# Patient Record
Sex: FEMALE | Race: WHITE | Employment: FULL TIME | URBAN - NONMETROPOLITAN AREA
[De-identification: names, ages, dates, MRNs, and addresses within clinical notes are randomized per-mention and may not be internally consistent; named-entity substitution may affect disease eponyms.]

---

## 2019-03-16 ENCOUNTER — HOSPITAL ENCOUNTER (EMERGENCY)
Age: 29
Discharge: HOME | End: 2019-03-16
Attending: EMERGENCY MEDICINE
Payer: COMMERCIAL

## 2019-03-16 VITALS
HEART RATE: 90 BPM | RESPIRATION RATE: 18 BRPM | SYSTOLIC BLOOD PRESSURE: 120 MMHG | TEMPERATURE: 98.7 F | DIASTOLIC BLOOD PRESSURE: 70 MMHG | OXYGEN SATURATION: 94 %

## 2019-03-16 DIAGNOSIS — J02.9 ACUTE PHARYNGITIS, UNSPECIFIED ETIOLOGY: Primary | ICD-10-CM

## 2019-03-16 LAB
DIRECT EXAM: NORMAL
DIRECT EXAM: NORMAL
Lab: NORMAL
Lab: NORMAL
MONONUCLEOSIS SCREEN: NEGATIVE
SPECIMEN DESCRIPTION: NORMAL
SPECIMEN DESCRIPTION: NORMAL

## 2019-03-16 PROCEDURE — 86308 HETEROPHILE ANTIBODY SCREEN: CPT

## 2019-03-16 PROCEDURE — 87804 INFLUENZA ASSAY W/OPTIC: CPT

## 2019-03-16 PROCEDURE — 99283 EMERGENCY DEPT VISIT LOW MDM: CPT

## 2019-03-16 PROCEDURE — 87880 STREP A ASSAY W/OPTIC: CPT

## 2019-03-16 PROCEDURE — 36415 COLL VENOUS BLD VENIPUNCTURE: CPT

## 2019-03-16 RX ORDER — AMOXICILLIN 500 MG/1
500 CAPSULE ORAL 3 TIMES DAILY
Qty: 30 CAPSULE | Refills: 0 | Status: SHIPPED | OUTPATIENT
Start: 2019-03-16 | End: 2019-03-26

## 2019-03-16 ASSESSMENT — ENCOUNTER SYMPTOMS
ABDOMINAL PAIN: 0
SORE THROAT: 1
SHORTNESS OF BREATH: 0
EYE REDNESS: 0
CHEST TIGHTNESS: 0
EYE DISCHARGE: 0
APNEA: 0

## 2019-03-16 ASSESSMENT — PAIN SCALES - GENERAL: PAINLEVEL_OUTOF10: 8

## 2019-03-16 ASSESSMENT — PAIN DESCRIPTION - PAIN TYPE: TYPE: ACUTE PAIN

## 2019-03-16 ASSESSMENT — PAIN DESCRIPTION - LOCATION: LOCATION: THROAT

## 2019-03-16 ASSESSMENT — PAIN DESCRIPTION - DESCRIPTORS: DESCRIPTORS: ACHING;SORE

## 2019-08-01 ENCOUNTER — HOSPITAL ENCOUNTER (OUTPATIENT)
Age: 29
Setting detail: SPECIMEN
Discharge: HOME | End: 2019-08-01
Payer: COMMERCIAL

## 2019-08-01 ENCOUNTER — INITIAL PRENATAL (OUTPATIENT)
Dept: OBGYN | Age: 29
End: 2019-08-01
Payer: COMMERCIAL

## 2019-08-01 DIAGNOSIS — Z32.02 URINE PREGNANCY TEST NEGATIVE: ICD-10-CM

## 2019-08-01 DIAGNOSIS — N91.2 AMENORRHEA: Primary | ICD-10-CM

## 2019-08-01 DIAGNOSIS — N91.2 AMENORRHEA: ICD-10-CM

## 2019-08-01 LAB
HCG QUANTITATIVE: <1 IU/L
THYROXINE, FREE: 1.08 NG/DL (ref 0.93–1.7)
TSH SERPL DL<=0.05 MIU/L-ACNC: 0.8 MIU/L (ref 0.3–5)

## 2019-08-01 PROCEDURE — 36415 COLL VENOUS BLD VENIPUNCTURE: CPT | Performed by: ADVANCED PRACTICE MIDWIFE

## 2019-08-01 PROCEDURE — 83001 ASSAY OF GONADOTROPIN (FSH): CPT

## 2019-08-01 PROCEDURE — 84702 CHORIONIC GONADOTROPIN TEST: CPT

## 2019-08-01 PROCEDURE — 84439 ASSAY OF FREE THYROXINE: CPT

## 2019-08-01 PROCEDURE — 84443 ASSAY THYROID STIM HORMONE: CPT

## 2019-08-01 PROCEDURE — 83002 ASSAY OF GONADOTROPIN (LH): CPT

## 2019-08-01 PROCEDURE — 84146 ASSAY OF PROLACTIN: CPT

## 2019-08-02 LAB
FOLLICLE STIMULATING HORMONE: 2.4 U/L (ref 1.7–21.5)
LH: 3.1 U/L (ref 1–95.6)
PROLACTIN: 7.57 UG/L (ref 4.79–23.3)

## 2020-07-06 ENCOUNTER — HOSPITAL ENCOUNTER (EMERGENCY)
Age: 30
Discharge: HOME | End: 2020-07-06
Payer: COMMERCIAL

## 2020-07-06 ENCOUNTER — APPOINTMENT (OUTPATIENT)
Dept: CT IMAGING | Age: 30
End: 2020-07-06
Payer: COMMERCIAL

## 2020-07-06 ENCOUNTER — APPOINTMENT (OUTPATIENT)
Dept: GENERAL RADIOLOGY | Age: 30
End: 2020-07-06
Payer: COMMERCIAL

## 2020-07-06 VITALS
WEIGHT: 135 LBS | BODY MASS INDEX: 24.84 KG/M2 | TEMPERATURE: 99.4 F | DIASTOLIC BLOOD PRESSURE: 71 MMHG | HEART RATE: 99 BPM | RESPIRATION RATE: 16 BRPM | OXYGEN SATURATION: 98 % | HEIGHT: 62 IN | SYSTOLIC BLOOD PRESSURE: 112 MMHG

## 2020-07-06 LAB
-: ABNORMAL
ABSOLUTE EOS #: 0 K/UL (ref 0–0.44)
ABSOLUTE IMMATURE GRANULOCYTE: 0.24 K/UL (ref 0–0.3)
ABSOLUTE LYMPH #: 1.43 K/UL (ref 1.1–3.7)
ABSOLUTE MONO #: 1.2 K/UL (ref 0.1–1.2)
AMORPHOUS: ABNORMAL
ANION GAP SERPL CALCULATED.3IONS-SCNC: 14 MMOL/L (ref 9–17)
BACTERIA: ABNORMAL
BASOPHILS # BLD: 1 % (ref 0–2)
BASOPHILS ABSOLUTE: 0.24 K/UL (ref 0–0.2)
BILIRUBIN URINE: NEGATIVE
BUN BLDV-MCNC: 6 MG/DL (ref 6–20)
BUN/CREAT BLD: 6 (ref 9–20)
CALCIUM SERPL-MCNC: 8.9 MG/DL (ref 8.6–10.4)
CASTS UA: ABNORMAL /LPF
CHLORIDE BLD-SCNC: 100 MMOL/L (ref 98–107)
CO2: 23 MMOL/L (ref 20–31)
COLOR: YELLOW
COMMENT UA: ABNORMAL
CREAT SERPL-MCNC: 0.97 MG/DL (ref 0.5–0.9)
CRYSTALS, UA: ABNORMAL /HPF
DIFFERENTIAL TYPE: ABNORMAL
EOSINOPHILS RELATIVE PERCENT: 0 % (ref 1–4)
EPITHELIAL CELLS UA: ABNORMAL /HPF (ref 0–25)
GFR AFRICAN AMERICAN: >60 ML/MIN
GFR NON-AFRICAN AMERICAN: >60 ML/MIN
GFR SERPL CREATININE-BSD FRML MDRD: ABNORMAL ML/MIN/{1.73_M2}
GFR SERPL CREATININE-BSD FRML MDRD: ABNORMAL ML/MIN/{1.73_M2}
GLUCOSE BLD-MCNC: 175 MG/DL (ref 70–99)
GLUCOSE URINE: NEGATIVE
HCG(URINE) PREGNANCY TEST: NEGATIVE
HCT VFR BLD CALC: 44.8 % (ref 36.3–47.1)
HEMOGLOBIN: 14.4 G/DL (ref 11.9–15.1)
IMMATURE GRANULOCYTES: 1 %
KETONES, URINE: NEGATIVE
LACTIC ACID, SEPSIS WHOLE BLOOD: NORMAL MMOL/L (ref 0.5–1.9)
LACTIC ACID, SEPSIS: 1.9 MMOL/L (ref 0.5–1.9)
LEUKOCYTE ESTERASE, URINE: ABNORMAL
LYMPHOCYTES # BLD: 6 % (ref 24–43)
MCH RBC QN AUTO: 29.8 PG (ref 25.2–33.5)
MCHC RBC AUTO-ENTMCNC: 32.1 G/DL (ref 28.4–34.8)
MCV RBC AUTO: 92.8 FL (ref 82.6–102.9)
MONOCYTES # BLD: 5 % (ref 3–12)
MORPHOLOGY: NORMAL
MUCUS: ABNORMAL
NITRITE, URINE: NEGATIVE
NRBC AUTOMATED: 0 PER 100 WBC
OTHER OBSERVATIONS UA: ABNORMAL
PDW BLD-RTO: 12.7 % (ref 11.8–14.4)
PH UA: 6 (ref 5–9)
PLATELET # BLD: 238 K/UL (ref 138–453)
PLATELET ESTIMATE: ABNORMAL
PMV BLD AUTO: 10.6 FL (ref 8.1–13.5)
POTASSIUM SERPL-SCNC: 4.3 MMOL/L (ref 3.7–5.3)
PROTEIN UA: ABNORMAL
RBC # BLD: 4.83 M/UL (ref 3.95–5.11)
RBC # BLD: ABNORMAL 10*6/UL
RBC UA: ABNORMAL /HPF (ref 0–2)
RENAL EPITHELIAL, UA: ABNORMAL /HPF
SEG NEUTROPHILS: 87 % (ref 36–65)
SEGMENTED NEUTROPHILS ABSOLUTE COUNT: 20.79 K/UL (ref 1.5–8.1)
SODIUM BLD-SCNC: 137 MMOL/L (ref 135–144)
SPECIFIC GRAVITY UA: 1.01 (ref 1.01–1.02)
TRICHOMONAS: ABNORMAL
TURBIDITY: ABNORMAL
URINE HGB: ABNORMAL
UROBILINOGEN, URINE: NORMAL
WBC # BLD: 23.9 K/UL (ref 3.5–11.3)
WBC # BLD: ABNORMAL 10*3/UL
WBC UA: ABNORMAL /HPF (ref 0–5)
YEAST: ABNORMAL

## 2020-07-06 PROCEDURE — 99284 EMERGENCY DEPT VISIT MOD MDM: CPT

## 2020-07-06 PROCEDURE — 85025 COMPLETE CBC W/AUTO DIFF WBC: CPT

## 2020-07-06 PROCEDURE — 71045 X-RAY EXAM CHEST 1 VIEW: CPT

## 2020-07-06 PROCEDURE — 6360000004 HC RX CONTRAST MEDICATION: Performed by: PHYSICIAN ASSISTANT

## 2020-07-06 PROCEDURE — 81025 URINE PREGNANCY TEST: CPT

## 2020-07-06 PROCEDURE — 96375 TX/PRO/DX INJ NEW DRUG ADDON: CPT

## 2020-07-06 PROCEDURE — 6370000000 HC RX 637 (ALT 250 FOR IP): Performed by: PHYSICIAN ASSISTANT

## 2020-07-06 PROCEDURE — 96374 THER/PROPH/DIAG INJ IV PUSH: CPT

## 2020-07-06 PROCEDURE — 6360000002 HC RX W HCPCS: Performed by: PHYSICIAN ASSISTANT

## 2020-07-06 PROCEDURE — 87086 URINE CULTURE/COLONY COUNT: CPT

## 2020-07-06 PROCEDURE — 87186 SC STD MICRODIL/AGAR DIL: CPT

## 2020-07-06 PROCEDURE — 87088 URINE BACTERIA CULTURE: CPT

## 2020-07-06 PROCEDURE — 81001 URINALYSIS AUTO W/SCOPE: CPT

## 2020-07-06 PROCEDURE — 2580000003 HC RX 258: Performed by: PHYSICIAN ASSISTANT

## 2020-07-06 PROCEDURE — 80048 BASIC METABOLIC PNL TOTAL CA: CPT

## 2020-07-06 PROCEDURE — 83605 ASSAY OF LACTIC ACID: CPT

## 2020-07-06 PROCEDURE — 74177 CT ABD & PELVIS W/CONTRAST: CPT

## 2020-07-06 RX ORDER — ACETAMINOPHEN 325 MG/1
650 TABLET ORAL ONCE
Status: COMPLETED | OUTPATIENT
Start: 2020-07-06 | End: 2020-07-06

## 2020-07-06 RX ORDER — 0.9 % SODIUM CHLORIDE 0.9 %
1000 INTRAVENOUS SOLUTION INTRAVENOUS ONCE
Status: COMPLETED | OUTPATIENT
Start: 2020-07-06 | End: 2020-07-06

## 2020-07-06 RX ORDER — KETOROLAC TROMETHAMINE 15 MG/ML
30 INJECTION, SOLUTION INTRAMUSCULAR; INTRAVENOUS ONCE
Status: COMPLETED | OUTPATIENT
Start: 2020-07-06 | End: 2020-07-06

## 2020-07-06 RX ORDER — IBUPROFEN 400 MG/1
400 TABLET ORAL EVERY 6 HOURS PRN
COMMUNITY
End: 2022-01-04

## 2020-07-06 RX ORDER — IBUPROFEN 600 MG/1
600 TABLET ORAL ONCE
Status: COMPLETED | OUTPATIENT
Start: 2020-07-06 | End: 2020-07-06

## 2020-07-06 RX ORDER — CEPHALEXIN 500 MG/1
500 CAPSULE ORAL 4 TIMES DAILY
Qty: 40 CAPSULE | Refills: 0 | Status: SHIPPED | OUTPATIENT
Start: 2020-07-06 | End: 2020-07-16

## 2020-07-06 RX ADMIN — ACETAMINOPHEN 650 MG: 325 TABLET, FILM COATED ORAL at 13:03

## 2020-07-06 RX ADMIN — IOPAMIDOL 75 ML: 755 INJECTION, SOLUTION INTRAVENOUS at 14:08

## 2020-07-06 RX ADMIN — WATER 1 G: 1 INJECTION INTRAMUSCULAR; INTRAVENOUS; SUBCUTANEOUS at 15:05

## 2020-07-06 RX ADMIN — SODIUM CHLORIDE 1000 ML: 9 INJECTION, SOLUTION INTRAVENOUS at 13:16

## 2020-07-06 RX ADMIN — IBUPROFEN 600 MG: 600 TABLET, FILM COATED ORAL at 14:21

## 2020-07-06 RX ADMIN — KETOROLAC TROMETHAMINE 30 MG: 15 INJECTION, SOLUTION INTRAMUSCULAR; INTRAVENOUS at 13:19

## 2020-07-06 ASSESSMENT — PAIN DESCRIPTION - LOCATION
LOCATION: FLANK
LOCATION: FLANK

## 2020-07-06 ASSESSMENT — PAIN DESCRIPTION - PAIN TYPE
TYPE: ACUTE PAIN
TYPE: ACUTE PAIN

## 2020-07-06 ASSESSMENT — PAIN DESCRIPTION - ORIENTATION
ORIENTATION: LEFT
ORIENTATION: LEFT

## 2020-07-06 ASSESSMENT — PAIN SCALES - GENERAL
PAINLEVEL_OUTOF10: 7
PAINLEVEL_OUTOF10: 0
PAINLEVEL_OUTOF10: 5

## 2020-07-06 NOTE — LETTER
[facility] ED  [address] [place] [address]  Phone: [phone]  Fax: [phone]             July 6, 2020    Patient: Marcia Cerda   YOB: 1990   Date of Visit: 7/6/2020       To Whom It May Concern:    Marleen Emery was seen and treated in our emergency department on 7/6/2020. She may return to work on 07/11/2020.       Sincerely,             Signature:__________________________________

## 2020-07-06 NOTE — ED PROVIDER NOTES
[facility]  EMERGENCY DEPARTMENT ENCOUNTER      Pt Name: Mauri Bethea  MRN: [id number]  [place] 1990  Date of evaluation: 7/6/2020  Provider: Katie Jacinto PA-C    CHIEF COMPLAINT       Chief Complaint   Patient presents with    Flank Pain     Left, onset 3 days ago. Pt reports the pain began in lower back and is now in left abdomen, side. HISTORY OF PRESENT ILLNESS    Mauri Bethea is a 31 y.o. female who presents to the emergency department from home 3 days left flank pain began in low back now in the left flank and abdominal side denies dysuria urgency or frequency she is had a fever for 2 days denies chest pain cough shortness of breath nausea or vomiting. Seen in outpatient clinic yesterday was COVID tested negative. Triage notes and Nursing notes were reviewed by myself. Any discrepancies are addressed above. PAST MEDICAL HISTORY   History reviewed. No pertinent past medical history. SURGICAL HISTORY     History reviewed. No pertinent surgical history. CURRENT MEDICATIONS       Previous Medications    ACETAMINOPHEN (TYLENOL) 500 MG TABLET    Take 500 mg by mouth every 6 hours as needed for Pain. IBUPROFEN (ADVIL;MOTRIN) 400 MG TABLET    Take 400 mg by mouth every 6 hours as needed for Pain       ALLERGIES     Patient has no known allergies. FAMILY HISTORY     History reviewed. No pertinent family history.      SOCIAL HISTORY       Social History     Socioeconomic History    Marital status: Single     Spouse name: None    Number of children: None    Years of education: None    Highest education level: None   Occupational History    None   Social Needs    Financial resource strain: None    Food insecurity     Worry: None     Inability: None    Transportation needs     Medical: None     Non-medical: None   Tobacco Use    Smoking status: Current Every Day Smoker     Packs/day: 0.50     Types: Cigarettes    Smokeless tobacco: Never Used neurologic deficit noted. Good gait and balance. Clear speech. Good affect. Pleasant patient. DIAGNOSTIC RESULTS       RADIOLOGY: (none if blank)   Interpretation per the Radiologistbelow, if available at the time of this note:    CT ABDOMEN PELVIS W IV CONTRAST Additional Contrast? None   Final Result   1. Findings suggestive of focal pyelonephritis in the superior pole of the   left kidney. 2.  2 mm stone in the lower pole of the left kidney. No hydronephrosis. XR CHEST PORTABLE   Final Result   No acute airspace disease identified. LABS:  Labs Reviewed   URINALYSIS - Abnormal; Notable for the following components:       Result Value    Turbidity UA SLIGHTLY CLOUDY (*)     Urine Hgb 2+ (*)     Protein, UA TRACE (*)     Leukocyte Esterase, Urine TRACE (*)     All other components within normal limits   CBC WITH AUTO DIFFERENTIAL - Abnormal; Notable for the following components:    WBC 23.9 (*)     Seg Neutrophils 87 (*)     Lymphocytes 6 (*)     Eosinophils % 0 (*)     Immature Granulocytes 1 (*)     Segs Absolute 20.79 (*)     Basophils Absolute 0.24 (*)     All other components within normal limits   BASIC METABOLIC PANEL W/ REFLEX TO MG FOR LOW K - Abnormal; Notable for the following components:    Glucose 175 (*)     CREATININE 0.97 (*)     Bun/Cre Ratio 6 (*)     All other components within normal limits   MICROSCOPIC URINALYSIS - Abnormal; Notable for the following components:    Bacteria, UA 2+ (*)     All other components within normal limits   CULTURE, URINE   LACTATE, SEPSIS   PREGNANCY, URINE   LACTATE, SEPSIS       All other labs were within normal range or not returned as of this dictation.     EMERGENCY DEPARTMENT COURSE andMedical Decision Making:     Vitals:    Vitals:    07/06/20 1357 07/06/20 1415 07/06/20 1416 07/06/20 1422   BP: 111/66   112/70   Pulse: 111   102   Resp: 16   16   Temp:  101 °F (38.3 °C)     TempSrc:  Oral     SpO2: 100%  98% 98%   Weight: Height:           MDM/     Patient does have a significant leukocytosis but only 1 immature  granulocytes, lactic acid is negative, vital signs remained stable and she has no other signs of septic shock. She does have pyelonephritis has been treated with IV Rocephin culture of the urine will be ordered and she will be sent home on oral Keflex. Patient is instructed have a very low threshold to return should she develop any worsening symptoms feeling lightheaded or dizzy vomiting or any worsening symptoms she should be reevaluated in the emergency department. Strict return precautions and follow up instructions were discussed with the patient with which the patient agrees    ED Medications administered this visit:    Medications   cefTRIAXone (ROCEPHIN) 1 g in sterile water 10 mL IV syringe (has no administration in time range)   ketorolac (TORADOL) injection 30 mg (30 mg Intravenous Given 7/6/20 1319)   acetaminophen (TYLENOL) tablet 650 mg (650 mg Oral Given 7/6/20 1303)   0.9 % sodium chloride bolus (1,000 mLs Intravenous New Bag 7/6/20 1316)   iopamidol (ISOVUE-370) 76 % injection 75 mL (75 mLs Intravenous Given 7/6/20 1408)   ibuprofen (ADVIL;MOTRIN) tablet 600 mg (600 mg Oral Given 7/6/20 1421)       CONSULTS: (None if blank)  None    Procedures: (None if blank)       CLINICAL       1.  Acute pyelonephritis          DISPOSITION/PLAN   DISPOSITION        PATIENT REFERRED TO:  [address]  [phone]  In 2 days        DISCHARGE MEDICATIONS:  New Prescriptions    CEPHALEXIN (KEFLEX) 500 MG CAPSULE    Take 1 capsule by mouth 4 times daily for 10 days              (Please note that portions of this note were completed with a voice recognition program.  Efforts were made to edit the dictations but occasionallywords are mis-transcribed.)      Ana Rosa Pandya II, PA-C (electronically signed)           Ana Rosa Pandya II, PA-C  07/06/20 [address] [facility] [facility] Luis Armando II, [place]  07/06/20 1456

## 2020-07-06 NOTE — ED NOTES
Patient reports continued flank pain but significant improvement to generalized aches. She is aware of plan for CT.       Jessica Bae RN  07/06/20 1400

## 2020-07-08 LAB
CULTURE: ABNORMAL
Lab: ABNORMAL
SPECIMEN DESCRIPTION: ABNORMAL

## 2021-08-23 ENCOUNTER — HOSPITAL ENCOUNTER (EMERGENCY)
Age: 31
Discharge: HOME | End: 2021-08-23
Payer: COMMERCIAL

## 2021-08-23 VITALS
DIASTOLIC BLOOD PRESSURE: 103 MMHG | OXYGEN SATURATION: 97 % | SYSTOLIC BLOOD PRESSURE: 146 MMHG | HEART RATE: 80 BPM | TEMPERATURE: 97.5 F | RESPIRATION RATE: 16 BRPM

## 2021-08-23 DIAGNOSIS — T78.40XA ALLERGIC REACTION, INITIAL ENCOUNTER: Primary | ICD-10-CM

## 2021-08-23 DIAGNOSIS — R21 RASH AND OTHER NONSPECIFIC SKIN ERUPTION: ICD-10-CM

## 2021-08-23 PROCEDURE — 99282 EMERGENCY DEPT VISIT SF MDM: CPT

## 2021-08-23 RX ORDER — TRIAMCINOLONE ACETONIDE 1 MG/G
CREAM TOPICAL
Qty: 1 G | Refills: 0 | Status: SHIPPED | OUTPATIENT
Start: 2021-08-23 | End: 2022-01-04

## 2021-08-23 ASSESSMENT — ENCOUNTER SYMPTOMS
CHEST TIGHTNESS: 0
NAUSEA: 0
BLOOD IN STOOL: 0
BACK PAIN: 0
SHORTNESS OF BREATH: 0
DIARRHEA: 0
VOMITING: 0
EYE DISCHARGE: 0
CONSTIPATION: 0
SORE THROAT: 0
EYE REDNESS: 0
ABDOMINAL PAIN: 0
COUGH: 0
RHINORRHEA: 0
WHEEZING: 0

## 2021-08-23 ASSESSMENT — PAIN SCALES - GENERAL
PAINLEVEL_OUTOF10: 0
PAINLEVEL_OUTOF10: 6

## 2021-08-23 ASSESSMENT — PAIN DESCRIPTION - PAIN TYPE: TYPE: ACUTE PAIN

## 2021-08-23 ASSESSMENT — PAIN DESCRIPTION - DESCRIPTORS: DESCRIPTORS: ITCHING

## 2021-08-23 ASSESSMENT — PAIN DESCRIPTION - LOCATION: LOCATION: GENERALIZED

## 2021-08-23 NOTE — ED PROVIDER NOTES
(Non-Medical):    Physical Activity:     Days of Exercise per Week:     Minutes of Exercise per Session:    Stress:     Feeling of Stress :    Social Connections:     Frequency of Communication with Friends and Family:     Frequency of Social Gatherings with Friends and Family:     Attends Restorationist Services:     Active Member of Clubs or Organizations:     Attends Club or Organization Meetings:     Marital Status:    Intimate Partner Violence:     Fear of Current or Ex-Partner:     Emotionally Abused:     Physically Abused:     Sexually Abused:        SCREENINGS             PHYSICAL EXAM    (up to 7 for level 4, 8 or more for level 5)     ED Triage Vitals [08/23/21 1021]   BP Temp Temp Source Pulse Resp SpO2 Height Weight   (!) 146/103 97.5 °F (36.4 °C) Tympanic 80 18 97 % -- --       Physical Exam  Vitals and nursing note reviewed. Constitutional:       General: She is not in acute distress. Appearance: She is well-developed. She is not diaphoretic. HENT:      Head: Normocephalic and atraumatic. Right Ear: External ear normal.      Left Ear: External ear normal.      Mouth/Throat:      Mouth: Mucous membranes are moist.      Pharynx: Oropharynx is clear. No oropharyngeal exudate or posterior oropharyngeal erythema. Comments: Uvula is midline. No oral pharyngeal swelling or edema noted. No stridorous breathing  Eyes:      General: No scleral icterus. Right eye: No discharge. Left eye: No discharge. Extraocular Movements: Extraocular movements intact. Conjunctiva/sclera: Conjunctivae normal.      Pupils: Pupils are equal, round, and reactive to light. Neck:      Thyroid: No thyromegaly. Trachea: No tracheal deviation. Cardiovascular:      Rate and Rhythm: Normal rate and regular rhythm. Heart sounds: No murmur heard. No friction rub. No gallop. Pulmonary:      Effort: Pulmonary effort is normal. No respiratory distress.       Breath sounds: Normal breath sounds. No stridor. No wheezing, rhonchi or rales. Abdominal:      General: Bowel sounds are normal. There is no distension. Palpations: Abdomen is soft. There is no mass. Tenderness: There is no abdominal tenderness. There is no guarding or rebound. Hernia: No hernia is present. Musculoskeletal:         General: No tenderness or deformity. Normal range of motion. Cervical back: Normal range of motion and neck supple. Lymphadenopathy:      Cervical: No cervical adenopathy. Skin:     General: Skin is warm and dry. Capillary Refill: Capillary refill takes less than 2 seconds. Findings: Rash present. No erythema. Comments: Patient has some small areas of urticaria noted to the abdomen. Nothing to palms or soles. No intraoral lesions. Neurological:      General: No focal deficit present. Mental Status: She is alert and oriented to person, place, and time. Cranial Nerves: No cranial nerve deficit. Motor: No abnormal muscle tone. Deep Tendon Reflexes: Reflexes are normal and symmetric. Reflexes normal.   Psychiatric:         Behavior: Behavior normal.         DIAGNOSTIC RESULTS     EKG: All EKG's are interpreted by the Emergency Department Physician who either signs orCo-signs this chart in the absence of a cardiologist.      RADIOLOGY:   Non-plainfilm images such as CT, Ultrasound and MRI are read by the radiologist. Plain radiographic images are visualized and preliminarily interpreted by the emergency physician with the below findings:      Interpretationper the Radiologist below, if available at the time of this note:    No orders to display         ED BEDSIDE ULTRASOUND:   Performed by ED Physician - none    LABS:  Labs Reviewed - No data to display    All other labs were within normal range or not returned as of this dictation.     EMERGENCY DEPARTMENT COURSE and DIFFERENTIAL DIAGNOSIS/MDM:   Vitals:    Vitals:    08/23/21 1021   BP: (!) 146/103   Pulse: 80   Resp: 18   Temp: 97.5 °F (36.4 °C)   TempSrc: Tympanic   SpO2: 97%         MDM  27-year-old female who presents secondary to itchy rash all over her body. Nothing intraorally nothing on palms or soles. Is actually starting to resolve she is already given a steroid shot. Intermittently will have some urticaria on her abdomen. No recent new medications no recent changes in laundry detergent no recent changes in food. At this point will give her follow-up with dermatology I do not think a systemic steroid is needed at this time. She will be given triamcinolone cream she understands not use on her face fingers or genitals. Specifically cannot get in her eyes. She will follow-up with dermatology otherwise return here with any worse complaints. Procedures    FINAL IMPRESSION      1. Allergic reaction, initial encounter    2. Rash and other nonspecific skin eruption        DISPOSITION/PLAN   DISPOSITION Decision To Discharge 08/23/2021 01:44:08 PM      PATIENT REFERRED TO:  [facility] ED  [address]  [phone]    If symptoms worsen, As needed    [address] [facility]  [phone]    Schedule an appointment as soon as possible for a visit         DISCHARGE MEDICATIONS:  New Prescriptions    TRIAMCINOLONE (KENALOG) 0.1 % CREAM    Apply topically 2 times daily. Do not apply to face or eyes. Or genitals. Summation      Patient Course:      ED Medications administered this visit:  Medications - No data to display    New Prescriptions from this visit:    New Prescriptions    TRIAMCINOLONE (KENALOG) 0.1 % CREAM    Apply topically 2 times daily. Do not apply to face or eyes. Or genitals.        Follow-up:  [facility] ED  [address] [facility] [address] [facility]  [phone]    If symptoms worsen, As needed    [address] [address]  [phone]    Schedule an appointment as soon as possible for a visit           Final Impression:   1. Allergic reaction, initial encounter    2.  Rash and other nonspecific skin eruption               (Please note that portions of this note were completed with a voice recognition program.  Efforts were made to edit the dictations but occasionally words are mis-transcribed.)           Marbin Staples PA-C  08/23/21 7889

## 2022-01-04 ENCOUNTER — OFFICE VISIT (OUTPATIENT)
Dept: FAMILY MEDICINE CLINIC | Age: 32
End: 2022-01-04
Payer: COMMERCIAL

## 2022-01-04 VITALS
SYSTOLIC BLOOD PRESSURE: 110 MMHG | WEIGHT: 160 LBS | OXYGEN SATURATION: 98 % | RESPIRATION RATE: 14 BRPM | HEIGHT: 62 IN | HEART RATE: 68 BPM | BODY MASS INDEX: 29.44 KG/M2 | DIASTOLIC BLOOD PRESSURE: 80 MMHG

## 2022-01-04 DIAGNOSIS — Z72.0 TOBACCO USE: Primary | ICD-10-CM

## 2022-01-04 DIAGNOSIS — K21.9 GASTROESOPHAGEAL REFLUX DISEASE, UNSPECIFIED WHETHER ESOPHAGITIS PRESENT: ICD-10-CM

## 2022-01-04 PROCEDURE — 4004F PT TOBACCO SCREEN RCVD TLK: CPT | Performed by: NURSE PRACTITIONER

## 2022-01-04 PROCEDURE — G8419 CALC BMI OUT NRM PARAM NOF/U: HCPCS | Performed by: NURSE PRACTITIONER

## 2022-01-04 PROCEDURE — 99204 OFFICE O/P NEW MOD 45 MIN: CPT | Performed by: NURSE PRACTITIONER

## 2022-01-04 PROCEDURE — G8427 DOCREV CUR MEDS BY ELIG CLIN: HCPCS | Performed by: NURSE PRACTITIONER

## 2022-01-04 PROCEDURE — G8484 FLU IMMUNIZE NO ADMIN: HCPCS | Performed by: NURSE PRACTITIONER

## 2022-01-04 RX ORDER — BUPROPION HYDROCHLORIDE 150 MG/1
TABLET, EXTENDED RELEASE ORAL
Qty: 60 TABLET | Refills: 0 | Status: SHIPPED | OUTPATIENT
Start: 2022-01-04 | End: 2022-02-19

## 2022-01-04 RX ORDER — FAMOTIDINE 20 MG/1
20 TABLET, FILM COATED ORAL
Qty: 60 TABLET | Refills: 2 | Status: SHIPPED | OUTPATIENT
Start: 2022-01-04 | End: 2022-02-23 | Stop reason: ALTCHOICE

## 2022-01-04 SDOH — ECONOMIC STABILITY: FOOD INSECURITY: WITHIN THE PAST 12 MONTHS, YOU WORRIED THAT YOUR FOOD WOULD RUN OUT BEFORE YOU GOT MONEY TO BUY MORE.: NEVER TRUE

## 2022-01-04 SDOH — ECONOMIC STABILITY: FOOD INSECURITY: WITHIN THE PAST 12 MONTHS, THE FOOD YOU BOUGHT JUST DIDN'T LAST AND YOU DIDN'T HAVE MONEY TO GET MORE.: NEVER TRUE

## 2022-01-04 ASSESSMENT — SOCIAL DETERMINANTS OF HEALTH (SDOH): HOW HARD IS IT FOR YOU TO PAY FOR THE VERY BASICS LIKE FOOD, HOUSING, MEDICAL CARE, AND HEATING?: NOT HARD AT ALL

## 2022-01-04 ASSESSMENT — ENCOUNTER SYMPTOMS
DIARRHEA: 0
SINUS PAIN: 0
CONSTIPATION: 0
RHINORRHEA: 0
SORE THROAT: 0
WHEEZING: 0
SINUS PRESSURE: 0
SHORTNESS OF BREATH: 0
COUGH: 0
ABDOMINAL PAIN: 0
NAUSEA: 1

## 2022-01-04 ASSESSMENT — PATIENT HEALTH QUESTIONNAIRE - PHQ9
SUM OF ALL RESPONSES TO PHQ QUESTIONS 1-9: 0
1. LITTLE INTEREST OR PLEASURE IN DOING THINGS: 0
SUM OF ALL RESPONSES TO PHQ QUESTIONS 1-9: 0
2. FEELING DOWN, DEPRESSED OR HOPELESS: 0
SUM OF ALL RESPONSES TO PHQ9 QUESTIONS 1 & 2: 0

## 2022-01-04 NOTE — PATIENT INSTRUCTIONS
SURVEY:    You may be receiving a survey from [employer] regarding your visit today. Please complete the survey to enable us to provide the highest quality of care to you and your family. If you cannot score us a very good on any question, please call the office to discuss how we could of made your experience a very good one. Thank you.

## 2022-01-04 NOTE — PROGRESS NOTES
Name: Gallito Reddy  : 1990         Chief Complaint:     Chief Complaint   Patient presents with    hospitals Care     new patient     Nicotine Dependence     patient would like to quit smoking       History of Present Illness:      Gallito Reddy is a 36 y.o.  female who presents with hospitals Care (new patient ) and Nicotine Dependence (patient would like to quit smoking)      HPI    The patient presents to [facility]. Wellness:  Medical history includes post-partum depression. She states her mood currently is \"up and down\". She admits lack of motivation and \"getting in her thoughts a lot\". Denies anxious thoughts. Denies thoughts of hurting self or others. Admits a \"decent\" well balanced diet with occasional poor eating choices. She is walking twice weekly for exercise. She denies routine dental appointments. Admits routine eye appointments. She is following with Idalia Osborn CNM. She is due for pap smear. She is not UTD on flu or covid vaccinations. Tobacco Use:   She started \"really young but started smoking heavy in her mid-20s\". She admits intermittently trying to stop. She has tried patches in the past but d/c due to side effects. She is averaging 0.5 - 1.5 packs daily. Denies coughing, SOB, or wheezing. Acid Reflux:  Admits worsening reflux during menses. She has taken TUMS with some relief. She admits heartburn, nausea, and vomiting stomach bile. Past Medical History:     No past medical history on file. Reviewed all health maintenance requirements and ordered appropriate tests  Health Maintenance Due   Topic Date Due    Pap smear  Never done       Past Surgical History:     No past surgical history on file. Medications:       Prior to Admission medications    Medication Sig Start Date End Date Taking?  Authorizing Provider   buPROPion (ZYBAN) 150 MG extended release tablet Take 1 tablet by mouth once daily for three days then increase to 1 tablet by mouth twice daily. 1/4/22  Yes LewisDEEDEE Loya CNP   famotidine (PEPCID) 20 MG tablet Take 1 tablet by mouth 2 times daily (before meals) . Take as needed for acid reflux. 1/4/22  Yes Lewis DEEDEE Moses CNP        Allergies:       Patient has no known allergies. Social History:     Tobacco:    reports that she has been smoking cigarettes. She has a 7.00 pack-year smoking history. She has never used smokeless tobacco.  Alcohol:      reports current alcohol use. Drug Use:  reports no history of drug use. Family History:     No family history on file. Review of Systems:     Positive and Negative as described in HPI    Review of Systems   Constitutional: Negative for chills, fatigue, fever and unexpected weight change. HENT: Negative for congestion, postnasal drip, rhinorrhea, sinus pressure, sinus pain and sore throat. Eyes: Negative for visual disturbance. Respiratory: Negative for cough, shortness of breath and wheezing. Cardiovascular: Negative for chest pain and palpitations. Gastrointestinal: Positive for nausea (with acid reflux). Negative for abdominal pain, constipation and diarrhea. Genitourinary: Negative for difficulty urinating. Musculoskeletal: Negative for arthralgias, joint swelling and myalgias. Skin: Negative for rash. Neurological: Negative for dizziness, light-headedness and headaches. Physical Exam:   Vitals:  /80   Pulse 68   Resp 14   Ht 5' 2\" (1.575 m)   Wt 160 lb (72.6 kg)   SpO2 98%   BMI 29.26 kg/m²     Physical Exam  Constitutional:       General: She is not in acute distress. Appearance: Normal appearance. She is normal weight. She is not ill-appearing or toxic-appearing. HENT:      Head: Normocephalic. Right Ear: Tympanic membrane, ear canal and external ear normal. There is no impacted cerumen. Left Ear: Tympanic membrane, ear canal and external ear normal. There is no impacted cerumen.       Nose: Nose normal. No congestion or rhinorrhea. Mouth/Throat:      Mouth: Mucous membranes are moist.      Pharynx: No oropharyngeal exudate or posterior oropharyngeal erythema. Cardiovascular:      Rate and Rhythm: Normal rate and regular rhythm. Heart sounds: Normal heart sounds. No murmur heard. Pulmonary:      Effort: Pulmonary effort is normal. No respiratory distress. Breath sounds: Normal breath sounds. No stridor. No wheezing, rhonchi or rales. Musculoskeletal:      Cervical back: Neck supple. Lymphadenopathy:      Cervical: No cervical adenopathy. Neurological:      Mental Status: She is alert and oriented to person, place, and time. Psychiatric:         Mood and Affect: Mood normal.         Behavior: Behavior normal.         Thought Content: Thought content normal.         Judgment: Judgment normal.         Data:     Lab Results   Component Value Date     07/06/2020    K 4.3 07/06/2020     07/06/2020    CO2 23 07/06/2020    BUN 6 07/06/2020    CREATININE 0.97 07/06/2020    GLUCOSE 175 07/06/2020     Lab Results   Component Value Date    WBC 23.9 07/06/2020    RBC 4.83 07/06/2020    RBC 4.35 09/29/2011    HGB 14.4 07/06/2020    HCT 44.8 07/06/2020    MCV 92.8 07/06/2020    MCH 29.8 07/06/2020    MCHC 32.1 07/06/2020    RDW 12.7 07/06/2020     07/06/2020     09/29/2011    MPV 10.6 07/06/2020     Lab Results   Component Value Date    TSH 0.80 08/01/2019     No results found for: CHOL, LDL, HDL, PSA, LABA1C    Assessment/Plan:      Diagnosis Orders   1. Tobacco use     2. Gastroesophageal reflux disease, unspecified whether esophagitis present         Wellness:   Counseled on routine diet and exercise  Recommend routine eye and dental exams  She is due for Covid, influenza, and tetanus vaccinations. Discussed in office. Offered flu vaccine, patient declines. Due for Pap smear. Offered follow-up here in office or to send patient back to Ryan Julien CNM.   She prefers to complete Pap smear here in PCP office. We will schedule for 3-month woman wellness. Will order wellness labs in 3 months at wellness visit    Smoking cessation:  Patient has trialed patches in the past but discontinued due to side effects of nausea and lightheadedness  Initiate Wellbutrin 150 mg daily x3 days then increase to 150 mg twice daily. Reviewed side effects of Wellbutrin  Offered counseling for depression, patient states she has done this in the past and this was not beneficial  Follow-up in 3 months for smoking cessation. If she is doing well on Wellbutrin from a depression standpoint, will continue. If this is not helping her depression and smoking cessation was successful, will discontinue Wellbutrin. GERD:  Initiate Pepcid twice daily as needed    Completed Refills   Requested Prescriptions     Signed Prescriptions Disp Refills    buPROPion (ZYBAN) 150 MG extended release tablet 60 tablet 0     Sig: Take 1 tablet by mouth once daily for three days then increase to 1 tablet by mouth twice daily.  famotidine (PEPCID) 20 MG tablet 60 tablet 2     Sig: Take 1 tablet by mouth 2 times daily (before meals) . Take as needed for acid reflux. No orders of the defined types were placed in this encounter. No results found for this visit on 01/04/22. Return in about 3 months (around 4/4/2022), or if symptoms worsen or fail to improve, for smoking cessation f/u + womens wellness (40 min) .     Electronically signed by DEEDEE Aragon CNP on 01/04/22 at 11:46 AM.

## 2022-01-24 ENCOUNTER — HOSPITAL ENCOUNTER (OUTPATIENT)
Age: 32
Discharge: HOME | End: 2022-01-24
Payer: COMMERCIAL

## 2022-01-24 DIAGNOSIS — N91.2 AMENORRHEA: Primary | ICD-10-CM

## 2022-01-24 DIAGNOSIS — N91.2 AMENORRHEA: ICD-10-CM

## 2022-01-24 LAB — HCG QUANTITATIVE: <1 IU/L

## 2022-01-24 PROCEDURE — 84702 CHORIONIC GONADOTROPIN TEST: CPT

## 2022-01-24 PROCEDURE — 36415 COLL VENOUS BLD VENIPUNCTURE: CPT

## 2022-01-24 PROCEDURE — 36415 COLL VENOUS BLD VENIPUNCTURE: CPT | Performed by: ADVANCED PRACTICE MIDWIFE

## 2022-02-17 ENCOUNTER — NURSE TRIAGE (OUTPATIENT)
Dept: OTHER | Facility: CLINIC | Age: 32
End: 2022-02-17

## 2022-02-17 NOTE — TELEPHONE ENCOUNTER
Received call from Patrice at [facility] with [employer]. Subjective: Caller states \"was supposed to have an appt and noticed it was yesterday. Appt was for back pain and pressure in lower abdomen. Started period last night and had been very heavy and having clots. \"     Current Symptoms: back pain, abd pressure-both are constant  Period started yesterday, is heavy and lots of clots. Has went through 3 pads soaked through this morning since 0500. No dizziness. Onset: today    Associated Symptoms: NA    Pain Severity: 5/10; discomfort; constant    Temperature: no fever    What has been tried: ibuprofen    LMP: started yesterday Pregnant: No    Recommended disposition: go to [facility] [address] or PCP triage    Care advice provided, patient verbalizes understanding; denies any other questions or concerns; instructed to call back for any new or worsening symptoms. Spoke with Feng Elkins at PCP office. She states pt need to go through OB/GYN. Attention Provider: Thank you for allowing me to participate in the care of your patient. The patient was connected to triage in response to information provided to the ECC/PSC. Please do not respond through this encounter as the response is not directed to a shared pool.       Reason for Disposition   Constant abdominal pain lasting > 2 hours    Protocols used: VAGINAL BLEEDING - ABNORMAL-ADULT-[place]

## 2022-02-18 ENCOUNTER — NURSE TRIAGE (OUTPATIENT)
Dept: OTHER | Facility: CLINIC | Age: 32
End: 2022-02-18

## 2022-02-18 NOTE — TELEPHONE ENCOUNTER
Subjective: Caller states \"has been having a heavy period since Wednesday evening with large blood clots, filling up pad every one to two hrs. Started 2 weeks early. Has abdominal pressure and lower back pain. Both thighs have been hurting. Experiencing fatigue. Was able to make appt with GYN on 3/10 but unable to get in sooner. \"     Current Symptoms: see above    Onset: 2 days ago; sudden    Associated Symptoms: increased sleepiness    Pain Severity: back 5/10; abdomen 3-6/10    Temperature: denies fever   What has been tried: Ibuprofen, Tylenol    LMP: 2/16/22 Pregnant: No    Recommended disposition: see in office today or go to [facility] Jose Luis Kerr advice provided, patient verbalizes understanding; denies any other questions or concerns; instructed to call back for any new or worsening symptoms. Patient/caller agrees to follow-up with PCP     This triage is a result of a call to [address] [place] [address]. Please do not respond to the triage nurse through this encounter. Any subsequent communication should be directly with the patient.       Reason for Disposition   Patient wants to be seen    Protocols used: VAGINAL BLEEDING - ABNORMAL-ADULT-[place]

## 2022-02-19 ENCOUNTER — APPOINTMENT (OUTPATIENT)
Dept: ULTRASOUND IMAGING | Age: 32
End: 2022-02-19
Payer: COMMERCIAL

## 2022-02-19 ENCOUNTER — HOSPITAL ENCOUNTER (EMERGENCY)
Age: 32
Discharge: HOME | End: 2022-02-19
Attending: EMERGENCY MEDICINE
Payer: COMMERCIAL

## 2022-02-19 VITALS
SYSTOLIC BLOOD PRESSURE: 147 MMHG | RESPIRATION RATE: 16 BRPM | OXYGEN SATURATION: 98 % | TEMPERATURE: 98.2 F | DIASTOLIC BLOOD PRESSURE: 102 MMHG | WEIGHT: 150 LBS | HEART RATE: 83 BPM | BODY MASS INDEX: 27.44 KG/M2

## 2022-02-19 DIAGNOSIS — N83.299 COMPLEX OVARIAN CYST: Primary | ICD-10-CM

## 2022-02-19 DIAGNOSIS — N93.9 VAGINAL BLEEDING: ICD-10-CM

## 2022-02-19 LAB
-: ABNORMAL
ABSOLUTE EOS #: 0.09 K/UL (ref 0–0.44)
ABSOLUTE IMMATURE GRANULOCYTE: 0.04 K/UL (ref 0–0.3)
ABSOLUTE LYMPH #: 1.45 K/UL (ref 1.1–3.7)
ABSOLUTE MONO #: 0.55 K/UL (ref 0.1–1.2)
AMORPHOUS: ABNORMAL
ANION GAP SERPL CALCULATED.3IONS-SCNC: 11 MMOL/L (ref 9–17)
BACTERIA: ABNORMAL
BASOPHILS # BLD: 1 % (ref 0–2)
BASOPHILS ABSOLUTE: 0.06 K/UL (ref 0–0.2)
BILIRUBIN URINE: NEGATIVE
BUN BLDV-MCNC: 8 MG/DL (ref 6–20)
BUN/CREAT BLD: 13 (ref 9–20)
CALCIUM SERPL-MCNC: 9.3 MG/DL (ref 8.6–10.4)
CASTS UA: ABNORMAL /LPF
CHLORIDE BLD-SCNC: 104 MMOL/L (ref 98–107)
CO2: 20 MMOL/L (ref 20–31)
COLOR: YELLOW
COMMENT UA: ABNORMAL
CREAT SERPL-MCNC: 0.61 MG/DL (ref 0.5–0.9)
CRYSTALS, UA: ABNORMAL /HPF
DIFFERENTIAL TYPE: ABNORMAL
EOSINOPHILS RELATIVE PERCENT: 1 % (ref 1–4)
EPITHELIAL CELLS UA: ABNORMAL /HPF (ref 0–25)
GFR AFRICAN AMERICAN: >60 ML/MIN
GFR NON-AFRICAN AMERICAN: >60 ML/MIN
GFR SERPL CREATININE-BSD FRML MDRD: ABNORMAL ML/MIN/{1.73_M2}
GFR SERPL CREATININE-BSD FRML MDRD: ABNORMAL ML/MIN/{1.73_M2}
GLUCOSE BLD-MCNC: 100 MG/DL (ref 70–99)
GLUCOSE URINE: NEGATIVE
HCG QUANTITATIVE: <1 IU/L
HCT VFR BLD CALC: 39.9 % (ref 36.3–47.1)
HEMOGLOBIN: 13.2 G/DL (ref 11.9–15.1)
IMMATURE GRANULOCYTES: 1 %
KETONES, URINE: ABNORMAL
LEUKOCYTE ESTERASE, URINE: NEGATIVE
LYMPHOCYTES # BLD: 18 % (ref 24–43)
MCH RBC QN AUTO: 29.2 PG (ref 25.2–33.5)
MCHC RBC AUTO-ENTMCNC: 33.1 G/DL (ref 28.4–34.8)
MCV RBC AUTO: 88.3 FL (ref 82.6–102.9)
MONOCYTES # BLD: 7 % (ref 3–12)
MUCUS: ABNORMAL
NITRITE, URINE: NEGATIVE
NRBC AUTOMATED: 0 PER 100 WBC
OTHER OBSERVATIONS UA: ABNORMAL
PDW BLD-RTO: 12.6 % (ref 11.8–14.4)
PH UA: 7.5 (ref 5–9)
PLATELET # BLD: 238 K/UL (ref 138–453)
PLATELET ESTIMATE: ABNORMAL
PMV BLD AUTO: 10.8 FL (ref 8.1–13.5)
POTASSIUM SERPL-SCNC: 3.8 MMOL/L (ref 3.7–5.3)
PROTEIN UA: NEGATIVE
RBC # BLD: 4.52 M/UL (ref 3.95–5.11)
RBC # BLD: ABNORMAL 10*6/UL
RBC UA: ABNORMAL /HPF (ref 0–2)
RENAL EPITHELIAL, UA: ABNORMAL /HPF
SEG NEUTROPHILS: 72 % (ref 36–65)
SEGMENTED NEUTROPHILS ABSOLUTE COUNT: 5.69 K/UL (ref 1.5–8.1)
SODIUM BLD-SCNC: 135 MMOL/L (ref 135–144)
SPECIFIC GRAVITY UA: 1.02 (ref 1.01–1.02)
TRICHOMONAS: ABNORMAL
TURBIDITY: CLEAR
URINE HGB: ABNORMAL
UROBILINOGEN, URINE: NORMAL
WBC # BLD: 7.9 K/UL (ref 3.5–11.3)
WBC # BLD: ABNORMAL 10*3/UL
WBC UA: ABNORMAL /HPF (ref 0–5)
YEAST: ABNORMAL

## 2022-02-19 PROCEDURE — 2580000003 HC RX 258: Performed by: EMERGENCY MEDICINE

## 2022-02-19 PROCEDURE — 36415 COLL VENOUS BLD VENIPUNCTURE: CPT

## 2022-02-19 PROCEDURE — 96375 TX/PRO/DX INJ NEW DRUG ADDON: CPT

## 2022-02-19 PROCEDURE — 6360000002 HC RX W HCPCS: Performed by: EMERGENCY MEDICINE

## 2022-02-19 PROCEDURE — 6370000000 HC RX 637 (ALT 250 FOR IP): Performed by: EMERGENCY MEDICINE

## 2022-02-19 PROCEDURE — 85025 COMPLETE CBC W/AUTO DIFF WBC: CPT

## 2022-02-19 PROCEDURE — 80048 BASIC METABOLIC PNL TOTAL CA: CPT

## 2022-02-19 PROCEDURE — 96374 THER/PROPH/DIAG INJ IV PUSH: CPT

## 2022-02-19 PROCEDURE — 81001 URINALYSIS AUTO W/SCOPE: CPT

## 2022-02-19 PROCEDURE — 76856 US EXAM PELVIC COMPLETE: CPT

## 2022-02-19 PROCEDURE — 84702 CHORIONIC GONADOTROPIN TEST: CPT

## 2022-02-19 PROCEDURE — 99284 EMERGENCY DEPT VISIT MOD MDM: CPT

## 2022-02-19 RX ORDER — ONDANSETRON 2 MG/ML
4 INJECTION INTRAMUSCULAR; INTRAVENOUS ONCE
Status: COMPLETED | OUTPATIENT
Start: 2022-02-19 | End: 2022-02-19

## 2022-02-19 RX ORDER — 0.9 % SODIUM CHLORIDE 0.9 %
1000 INTRAVENOUS SOLUTION INTRAVENOUS ONCE
Status: COMPLETED | OUTPATIENT
Start: 2022-02-19 | End: 2022-02-19

## 2022-02-19 RX ORDER — KETOROLAC TROMETHAMINE 15 MG/ML
30 INJECTION, SOLUTION INTRAMUSCULAR; INTRAVENOUS ONCE
Status: COMPLETED | OUTPATIENT
Start: 2022-02-19 | End: 2022-02-19

## 2022-02-19 RX ADMIN — Medication: at 19:15

## 2022-02-19 RX ADMIN — KETOROLAC TROMETHAMINE 30 MG: 15 INJECTION, SOLUTION INTRAMUSCULAR; INTRAVENOUS at 13:40

## 2022-02-19 RX ADMIN — ONDANSETRON 4 MG: 2 INJECTION INTRAMUSCULAR; INTRAVENOUS at 13:40

## 2022-02-19 RX ADMIN — SODIUM CHLORIDE 1000 ML: 9 INJECTION, SOLUTION INTRAVENOUS at 13:40

## 2022-02-19 ASSESSMENT — PAIN DESCRIPTION - DESCRIPTORS: DESCRIPTORS: CRAMPING

## 2022-02-19 ASSESSMENT — PAIN SCALES - GENERAL
PAINLEVEL_OUTOF10: 4
PAINLEVEL_OUTOF10: 4

## 2022-02-19 ASSESSMENT — PAIN DESCRIPTION - FREQUENCY: FREQUENCY: CONTINUOUS

## 2022-02-19 ASSESSMENT — PAIN DESCRIPTION - PAIN TYPE: TYPE: ACUTE PAIN

## 2022-02-19 ASSESSMENT — PAIN - FUNCTIONAL ASSESSMENT: PAIN_FUNCTIONAL_ASSESSMENT: 0-10

## 2022-02-19 ASSESSMENT — PAIN DESCRIPTION - ORIENTATION: ORIENTATION: LOWER

## 2022-02-19 NOTE — ED PROVIDER NOTES
[facility] [place] ED  EMERGENCY DEPARTMENT ENCOUNTER      Pt Name: Cuong Souza  MRN: [id number]  Hannahgftg 1990  Date of evaluation: 2/19/2022  Provider: Trinh Laguna MD    CHIEF COMPLAINT       Chief Complaint   Patient presents with    Vaginal Bleeding     onset for 3 days. Pt states she is going through one seth pad per hour. Pt states today she started feeling lightheaded         HISTORY OF PRESENT ILLNESS   (Location/Symptom, Timing/Onset, Context/Setting, Quality, Duration, Modifying Factors, Severity)  Note limiting factors. Cuong Souza is a 29 y.o. female who presents to the emergency department      Very pleasant 53-year-old female present in the emergency department for evaluation of vaginal bleeding. Patient states she has been bleeding for the past 3 days. Has had some intermittent cramping. Dates the bleeding has been heavy and she is now feeling lightheaded. Denies any dysuria urinary frequency. No vaginal discharge. No flank pain. States she is not pregnant. [place] any other acute concern          Nursing Notes were reviewed. REVIEW OF SYSTEMS    (2-9 systems for level 4, 10 or more for level 5)     Review of Systems   All other systems reviewed and are negative. Except as noted above the remainder of the review of systems was reviewed and negative. PAST MEDICAL HISTORY   History reviewed. No pertinent past medical history. SURGICAL HISTORY     History reviewed. No pertinent surgical history. CURRENT MEDICATIONS       Discharge Medication List as of 2/19/2022  4:05 PM      CONTINUE these medications which have NOT CHANGED    Details   famotidine (PEPCID) 20 MG tablet Take 1 tablet by mouth 2 times daily (before meals) . Take as needed for acid reflux. , Disp-60 tablet, R-2Normal             ALLERGIES     Patient has no known allergies. FAMILY HISTORY     History reviewed. No pertinent family history.        SOCIAL HISTORY       Social History Socioeconomic History    Marital status: Single     Spouse name: None    Number of children: None    Years of education: None    Highest education level: None   Occupational History    None   Tobacco Use    Smoking status: Current Some Day Smoker     Packs/day: 0.50     Years: 14.00     Pack years: 7.00     Types: Cigarettes    Smokeless tobacco: Never Used   Vaping Use    Vaping Use: Never used   Substance and Sexual Activity    Alcohol use: Yes     Comment: occ    Drug use: No    Sexual activity: Not Currently     Partners: Male     Birth control/protection: Condom   Other Topics Concern    None   Social History Narrative    None     Social Determinants of Health     Financial Resource Strain: Low Risk     Difficulty of Paying Living Expenses: Not hard at all   Food Insecurity: No Food Insecurity    Worried About Running Out of Food in the Last Year: Never true    Moy of Food in the Last Year: Never true   Transportation Needs:     Lack of Transportation (Medical): Not on file    Lack of Transportation (Non-Medical):  Not on file   Physical Activity:     Days of Exercise per Week: Not on file    Minutes of Exercise per Session: Not on file   Stress:     Feeling of Stress : Not on file   Social Connections:     Frequency of Communication with Friends and Family: Not on file    Frequency of Social Gatherings with Friends and Family: Not on file    Attends Yazidi Services: Not on file    Active Member of [address] [place] [address] or Organizations: Not on file    Attends Club or Organization Meetings: Not on file    Marital Status: Not on file   Intimate Partner Violence:     Fear of Current or Ex-Partner: Not on file    Emotionally Abused: Not on file    Physically Abused: Not on file    Sexually Abused: Not on file   Housing Stability:     Unable to Pay for Housing in the Last Year: Not on file    Number of [place] in the Last Year: Not on file    Unstable Housing in the Last Year: Not on file       SCREENINGS                        PHYSICAL EXAM    (up to 7 for level 4, 8 or more for level 5)     ED Triage Vitals [02/19/22 1238]   BP Temp Temp Source Pulse Resp SpO2 Height Weight   (!) 153/109 98.2 °F (36.8 °C) Tympanic 83 16 97 % -- 150 lb (68 kg)       Physical Exam  Vitals and nursing note reviewed. Constitutional:       General: She is not in acute distress. Appearance: She is not toxic-appearing. HENT:      Head: Normocephalic and atraumatic. Cardiovascular:      Rate and Rhythm: Normal rate and regular rhythm. Pulmonary:      Effort: Pulmonary effort is normal. No respiratory distress. Breath sounds: Normal breath sounds. Abdominal:      General: There is no distension. Palpations: Abdomen is soft. Tenderness: There is no abdominal tenderness. Comments: Mild right and left lower quadrant tenderness on exam.  No rebound tenderness. No guarding. Musculoskeletal:         General: Normal range of motion. Skin:     General: Skin is warm and dry. Neurological:      General: No focal deficit present. Mental Status: She is alert and oriented to person, place, and time. DIAGNOSTIC RESULTS     EKG: All EKG's are interpreted by the Emergency Department Physician who either signs or Co-signs this chart in the absence of a cardiologist.        RADIOLOGY:   Non-plain film images such as CT, Ultrasound and MRI are read by the radiologist. Plain radiographic images are visualized and preliminarily interpreted by the emergency physician with the below findings:        Interpretation per the Radiologist below, if available at the time of this note:    [address]   Final Result   Complex right ovarian cyst without evidence of torsion. Recommend pregnancy test to exclude ectopic pregnancy. Otherwise   recommendations as below.       RECOMMENDATIONS:   2.1 cm right ovarian indeterminate cyst. Recommend pelvic US follow-up in   6-12 weeks; if unchanged, continue follow-up with US OR MRI with IV contrast   - if follow-up studies do not confirm endometrioma or dermoid, consider   surgical evaluation. Reference: Radiology 2010 Sep;256(3):943-54               ED BEDSIDE ULTRASOUND:   Performed by ED Physician - none    LABS:  Labs Reviewed   CBC WITH AUTO DIFFERENTIAL - Abnormal; Notable for the following components:       Result Value    Seg Neutrophils 72 (*)     Lymphocytes 18 (*)     Immature Granulocytes 1 (*)     All other components within normal limits   BASIC METABOLIC PANEL W/ REFLEX TO MG FOR LOW K - Abnormal; Notable for the following components:    Glucose 100 (*)     All other components within normal limits   URINALYSIS WITH REFLEX TO CULTURE - Abnormal; Notable for the following components:    Ketones, Urine 1+ (*)     Urine Hgb 3+ (*)     All other components within normal limits   MICROSCOPIC URINALYSIS - Abnormal; Notable for the following components:    Mucus, UA TRACE (*)     Amorphous, UA TRACE (*)     All other components within normal limits   HCG, QUANTITATIVE, PREGNANCY       All other labs were within normal range or not returned as of this dictation. EMERGENCY DEPARTMENT COURSE and DIFFERENTIAL DIAGNOSIS/MDM:   Vitals:    Vitals:    02/19/22 1238 02/19/22 1645   BP: (!) 153/109 (!) 147/102   Pulse: 83    Resp: 16    Temp: 98.2 °F (36.8 °C)    TempSrc: Tympanic    SpO2: 97% 98%   Weight: 150 lb (68 kg)            MDM  Number of Diagnoses or Management Options  Complex ovarian cyst  Vaginal bleeding  Diagnosis management comments: Results reviewed. Patient's hemoglobin is 13.2. Urine significant for 3+ hemoglobin 0-2 white blood cells 0-2 red blood cells. Present was ordered since patient did have some mild tenderness bilaterally in her lower abdomen. Complex right ovarian cyst was noted without evidence of torsion per radiology read patient was resting comfortably. I was comfortable discharging her home.   ED return and follow-up instructions discussed prior to discharge      Edie Mojica Esperanza 0469 time was  minutes, excluding separately reportable procedures. There was a high probability of clinically significant/life threatening deterioration in the patient's condition which required my urgent intervention. CONSULTS:  None    PROCEDURES:  Unless otherwise noted below, none     Procedures        FINAL IMPRESSION      1. Complex ovarian cyst    2. Vaginal bleeding          DISPOSITION/PLAN   DISPOSITION Decision To Discharge 02/19/2022 04:00:19 PM      PATIENT REFERRED TO:  [address]ry [address]  [place] [address]   [phone]      Call Monday morning to schedule earliest available appointment      DISCHARGE MEDICATIONS:  Discharge Medication List as of 2/19/2022  4:05 PM        Controlled Substances Monitoring:     No flowsheet data found.     (Please note that portions of this note were completed with a voice recognition program.  Efforts were made to edit the dictations but occasionally words are mis-transcribed.)    Sebastian Moctezuma MD (electronically signed)  Attending Emergency Physician             Sebastian Moctezuma MD  02/23/22 26 [id number]

## 2022-02-22 ENCOUNTER — OFFICE VISIT (OUTPATIENT)
Dept: OBGYN | Age: 32
End: 2022-02-22
Payer: COMMERCIAL

## 2022-02-22 ENCOUNTER — HOSPITAL ENCOUNTER (OUTPATIENT)
Age: 32
Discharge: HOME | End: 2022-02-22
Payer: COMMERCIAL

## 2022-02-22 ENCOUNTER — HOSPITAL ENCOUNTER (OUTPATIENT)
Age: 32
Setting detail: SPECIMEN
Discharge: HOME | End: 2022-02-22
Payer: COMMERCIAL

## 2022-02-22 VITALS
DIASTOLIC BLOOD PRESSURE: 72 MMHG | BODY MASS INDEX: 29.52 KG/M2 | WEIGHT: 160.4 LBS | SYSTOLIC BLOOD PRESSURE: 136 MMHG | HEIGHT: 62 IN

## 2022-02-22 DIAGNOSIS — N83.299 COMPLEX OVARIAN CYST: ICD-10-CM

## 2022-02-22 DIAGNOSIS — R10.2 PELVIC PAIN: ICD-10-CM

## 2022-02-22 DIAGNOSIS — N94.9 CERVICAL MOTION TENDERNESS: ICD-10-CM

## 2022-02-22 DIAGNOSIS — Z12.4 SCREENING FOR MALIGNANT NEOPLASM OF CERVIX: ICD-10-CM

## 2022-02-22 DIAGNOSIS — N83.299 COMPLEX OVARIAN CYST: Primary | ICD-10-CM

## 2022-02-22 DIAGNOSIS — N83.209 CYST OF OVARY, UNSPECIFIED LATERALITY: Primary | ICD-10-CM

## 2022-02-22 PROCEDURE — 99213 OFFICE O/P EST LOW 20 MIN: CPT | Performed by: ADVANCED PRACTICE MIDWIFE

## 2022-02-22 PROCEDURE — G8484 FLU IMMUNIZE NO ADMIN: HCPCS | Performed by: ADVANCED PRACTICE MIDWIFE

## 2022-02-22 PROCEDURE — G8419 CALC BMI OUT NRM PARAM NOF/U: HCPCS | Performed by: ADVANCED PRACTICE MIDWIFE

## 2022-02-22 PROCEDURE — G8427 DOCREV CUR MEDS BY ELIG CLIN: HCPCS | Performed by: ADVANCED PRACTICE MIDWIFE

## 2022-02-22 PROCEDURE — 87070 CULTURE OTHR SPECIMN AEROBIC: CPT

## 2022-02-22 PROCEDURE — 82378 CARCINOEMBRYONIC ANTIGEN: CPT

## 2022-02-22 PROCEDURE — 86304 IMMUNOASSAY TUMOR CA 125: CPT

## 2022-02-22 PROCEDURE — 4004F PT TOBACCO SCREEN RCVD TLK: CPT | Performed by: ADVANCED PRACTICE MIDWIFE

## 2022-02-22 PROCEDURE — 36415 COLL VENOUS BLD VENIPUNCTURE: CPT

## 2022-02-22 RX ORDER — AZITHROMYCIN 250 MG/1
TABLET, FILM COATED ORAL
Qty: 10 TABLET | Refills: 0 | Status: ON HOLD | OUTPATIENT
Start: 2022-02-22 | End: 2022-05-06 | Stop reason: HOSPADM

## 2022-02-22 NOTE — PROGRESS NOTES
PROBLEM VISIT     Date of service: 2022    Gallito Reddy  Is a 29 y.o. single female    PT's PCP is: DEEDEE Maldonado - [facility]     : 1990                                             Subjective:       Patient's last menstrual period was 2022. OB History    Para Term  AB Living   2 2 2     2   SAB IAB Ectopic Molar Multiple Live Births             2      # Outcome Date GA Lbr Gallo/2nd Weight Sex Delivery Anes PTL Lv   2 Term 16 40w0d  7 lb 9 oz (3.43 kg) M Vag-Spont   MEGHNA   1 Term 11 40w0d  8 lb 10 oz (3.912 kg) M Vag-Spont   MEGHNA        Social History     Tobacco Use   Smoking Status Current Some Day Smoker    Packs/day: 0.50    Years: 14.00    Pack years: 7.00    Types: Cigarettes   Smokeless Tobacco Never Used        Social History     Substance and Sexual Activity   Alcohol Use Yes    Comment: occ       Allergies: Patient has no known allergies. Current Outpatient Medications:     azithromycin (ZITHROMAX) 250 MG tablet, One a day x 10 days, Disp: 10 tablet, Rfl: 0    Social History     Substance and Sexual Activity   Sexual Activity Not Currently    Partners: Male    Birth control/protection: Condom       Last Yearly:  unknown    Last pap: 5 years ago ? Last HPV: never ? Have you had a positive covid test: No    Have you had the covid immunization: No    Chief Complaint   Patient presents with    Vaginal Bleeding     ED follow up, patient was at [facility] 2022 with concerns of heavy bleeding and clots for last 5 days. Patient had ulrasound and was found to have right ovarian cyst. Patient c/o generalized abdominal pain  and pressure and thigh pain. Periods had been regular up until last cycle. PE:  Vital Signs  Blood pressure 136/72, height 5' 2\" (1.575 m), weight 160 lb 6.4 oz (72.8 kg), last menstrual period 2022, not currently breastfeeding.   Estimated body mass index is 29.34 kg/m² as calculated from the following: Height as of this encounter: 5' 2\" (1.575 m). Weight as of this encounter: 160 lb 6.4 oz (72.8 kg). No data recorded    NURSE: Byron COOK    HPI:here for f/u to  right complex ovarian cyst, had been seen in the ED for pelvic pain      PT denies fever, chills, nausea and vomiting       Objective   No acute distress  Excellent communications  Well-nourished  Lymphatic:   no lymphadenopathy     GI: Abdomen soft, non-tender. BS normal. No masses,  No organomegaly     Pelvic Exam: GENITAL/URINARY:  External Genitalia:  General appearance; normal, Hair distribution; normal, Lesions absent  Urethral Meatus:  Size normal, Location normal, Lesions absent, Prolapse absent  Urethra: Fullness absent, Masses absent  Bladder:  Fullness absent, Masses absent, Tenderness absent, Cystocele absent  Vagina:  General appearance normal, Estrogen effect normal, Discharge absent, Lesions absent, Pelvic support normal  Cervix:  General appearance normal, Lesions absent, Discharge absent, Tenderness absent, Enlargement absent, Nodularity absent  Uterus:  Size normal, tenderness present  Adenexa:  Tenderness absent  Anus/Perineum:  Lesions absent and Masses absent                                                          Results reviewed today:    No results found for this visit on 02/22/22. Assessment and Plan          Diagnosis Orders   1. Complex ovarian cyst  CEA       2. Screening for malignant neoplasm of cervix  PAP SMEAR   3. Pelvic pain  C.trachomatis N.gonorrhoeae DNA, Thin Prep    azithromycin (ZITHROMAX) 250 MG tablet    Culture, Genital   4. Cervical motion tenderness         will await cultures and tumor markers, initiate antibiotic treatment for CMT and if levels normal repeat sono in 4-6 weeks      I have discontinued Brigitte Anderson's famotidine. I am also having her start on azithromycin. Return for will call with results. There are no Patient Instructions on file for this visit.     Over 50% of time spent on counseling and care coordination on: see assessment and plan,  She was also counseled on her preventative health maintenance recommendations and follow-up.         FF time: 20 min      Lenette Lombard, APRN - CNM,2/23/2022 5:35 PM

## 2022-02-23 LAB
CA 125: 61 U/ML
CARCINOEMBRYONIC ANTIGEN: 1 NG/ML

## 2022-02-24 NOTE — PROGRESS NOTES
Since ca 125 was elevated I'm going to have her come back now to discuss options with Dr. Antonio Calvin please discontinue that scheduled ultrasound

## 2022-02-25 LAB
CHLAMYDIA TRACHOMATIS RNA: POSITIVE
CULTURE: NORMAL
GYNECOLOGY CYTOLOGY REPORT: NORMAL
HPV SPECIMEN TYPE: NORMAL
HPV, GENOTYPE 16: NEGATIVE
HPV, GENOTYPE 18: NEGATIVE
NEISSERIA GONORRHOEAE RNA: NEGATIVE
OTHER HR HPV GENOTYPES: NEGATIVE
SOURCE: ABNORMAL
SPECIMEN DESCRIPTION: NORMAL

## 2022-02-28 DIAGNOSIS — N83.201 RIGHT OVARIAN CYST: Primary | ICD-10-CM

## 2022-03-01 ENCOUNTER — OFFICE VISIT (OUTPATIENT)
Dept: OBGYN | Age: 32
End: 2022-03-01
Payer: COMMERCIAL

## 2022-03-01 VITALS
BODY MASS INDEX: 29.26 KG/M2 | SYSTOLIC BLOOD PRESSURE: 136 MMHG | DIASTOLIC BLOOD PRESSURE: 86 MMHG | WEIGHT: 159 LBS | HEIGHT: 62 IN

## 2022-03-01 DIAGNOSIS — N83.201 RIGHT OVARIAN CYST: Primary | ICD-10-CM

## 2022-03-01 DIAGNOSIS — N92.0 MENORRHAGIA WITH REGULAR CYCLE: ICD-10-CM

## 2022-03-01 DIAGNOSIS — N94.6 DYSMENORRHEA: ICD-10-CM

## 2022-03-01 PROCEDURE — G8427 DOCREV CUR MEDS BY ELIG CLIN: HCPCS | Performed by: OBSTETRICS & GYNECOLOGY

## 2022-03-01 PROCEDURE — 99213 OFFICE O/P EST LOW 20 MIN: CPT | Performed by: OBSTETRICS & GYNECOLOGY

## 2022-03-01 PROCEDURE — 4004F PT TOBACCO SCREEN RCVD TLK: CPT | Performed by: OBSTETRICS & GYNECOLOGY

## 2022-03-01 PROCEDURE — G8484 FLU IMMUNIZE NO ADMIN: HCPCS | Performed by: OBSTETRICS & GYNECOLOGY

## 2022-03-01 PROCEDURE — G8419 CALC BMI OUT NRM PARAM NOF/U: HCPCS | Performed by: OBSTETRICS & GYNECOLOGY

## 2022-03-01 NOTE — PATIENT INSTRUCTIONS
Patient Education        Laparoscopy: Before Your Surgery  What is laparoscopy? Laparoscopy (say \"rsf-fl-HUSZ-tom-david\") is a type of surgery that uses very small cuts. These cuts are called incisions. The doctor puts a lighted tube through incisions in your belly. This tube is called a scope. Then the doctor puts special tools through the tube to do the surgery. The surgery may be done to diagnose a condition, repair or remove an organ, or see if cancer has spread. For some surgeries, you can usually go home the same day. The incisions from the surgery usually leave several scars about half an inch long. Follow-up care is a key part of your treatment and safety. Be sure to make and go to all appointments, and call your doctor if you are having problems. It's also a good idea to know your test results and keep a list of the medicines you take. How do you prepare for surgery? Surgery can be stressful. This information will help you understand what you can expect. And it will help you safely prepare for surgery. Preparing for surgery    · You may need to empty your colon with an enema or laxative. Your doctor will tell you how to do this.     · Be sure you have someone to take you home. Anesthesia and pain medicine will make it unsafe for you to drive or get home on your own.     · Understand exactly what surgery is planned, along with the risks, benefits, and other options.     · Tell your doctor ALL the medicines, vitamins, supplements, and herbal remedies you take. Some may increase the risk of problems during your surgery. Your doctor will tell you if you should stop taking any of them before the surgery and how soon to do it.     · If you take aspirin or some other blood thinner, ask your doctor if you should stop taking it before your surgery. Make sure that you understand exactly what your doctor wants you to do.  These medicines increase the risk of bleeding.     · Make sure your doctor and the hospital a medical condition or this instruction, always ask your healthcare professional. [place] [address] any warranty or liability for your use of this information.

## 2022-03-01 NOTE — Clinical Note
Pt with complex r ov cyst and heavy menses - elevated ca 125 so pry endometriosis; needs lap salpingectomy and right ov cystectomy poss oophorectomy and hysteroscopy d&c ablation

## 2022-03-01 NOTE — PROGRESS NOTES
DATE OF VISIT:  3/1/22    PATIENT NAME:  Deborah Ware     YOB: 1990    REASON FOR VISIT:    Chief Complaint   Patient presents with   [address]     Patient is being seen to follow up on  elevation and she has complex ovarian cyst on the right. She notes that she has had episodes of pelvic pain but none currently. HISTORY OF PRESENT ILLNESS:  Pt had been seen in ED and then saw SS for follow up, patient was at ED 02/19/2022 with concerns of heavy bleeding and clots for last 5 days. Patient had ulrasound and was found to have right ovarian cyst. Patient c/o generalized abdominal pain  and pressure and thigh pain. Periods had been regular up until last cycle. USN done showed approx 9 cm ut with right ovary having complex cyst that measured 2.1x 1.3 x 1.3 cm.   61. Disc'd usn findings and ca 125. Disc'd need for diagnostic lap and potentially need for oophorectomy. Disc'd suspected endometriosis and need to rule out anything of malignant potential.  Pt interested in novasure with b/l salpingectomy - completed family status and dx lap with poss oophorectomy/lysis of adhesions or ablation of endo        Patient's last menstrual period was 02/16/2022. Vitals:    03/01/22 1009   BP: 136/86   Weight: 159 lb (72.1 kg)   Height: 5' 2\" (1.575 m)     Body mass index is 29.08 kg/m². No Known Allergies  Current Outpatient Medications   Medication Sig Dispense Refill    azithromycin (ZITHROMAX) 250 MG tablet One a day x 10 days 10 tablet 0     No current facility-administered medications for this visit.      Social History     Socioeconomic History    Marital status: Single     Spouse name: None    Number of children: None    Years of education: None    Highest education level: None   Occupational History    None   Tobacco Use    Smoking status: Current Some Day Smoker     Packs/day: 0.50     Years: 14.00     Pack years: 7.00     Types: Cigarettes    Smokeless tobacco: Exam  Constitutional:       Appearance: Normal appearance. HENT:      Head: Normocephalic and atraumatic. Eyes:      Extraocular Movements: Extraocular movements intact. Pupils: Pupils are equal, round, and reactive to light. Cardiovascular:      Rate and Rhythm: Normal rate. Neurological:      Mental Status: She is alert and oriented to person, place, and time. Skin:     General: Skin is warm and dry. Psychiatric:         Mood and Affect: Mood normal.         Behavior: Behavior normal.         Thought Content: Thought content normal.         Judgment: Judgment normal.       The patient, Thelma Mohan is a 29 y.o. female, was seen with a total time spent of 20 minutes for the visit on this date of service by the E/M provider. The time component had both face to face and non face to face time spent in determining the total time component. Counseling and education regarding her diagnosis listed below and her options regarding those diagnoses were also included in determining her time component. Diagnosis Orders   1. Right ovarian cyst     2. Menorrhagia with regular cycle     3. Dysmenorrhea          The patient had her preventative health maintenance recommendations and follow-up reviewed with her at the completion of her visit. ASSESSMENT:      1. Right ovarian cyst    2. Menorrhagia with regular cycle    3. Dysmenorrhea        PLAN:  No orders of the defined types were placed in this encounter. Return for  to coordinate.        Electronically signed by Kal Donnelly DO on 03/01/22

## 2022-03-02 ENCOUNTER — TELEPHONE (OUTPATIENT)
Dept: OBGYN | Age: 32
End: 2022-03-02

## 2022-03-02 DIAGNOSIS — R10.2 PELVIC PAIN: ICD-10-CM

## 2022-03-02 DIAGNOSIS — Z12.4 SCREENING FOR MALIGNANT NEOPLASM OF CERVIX: ICD-10-CM

## 2022-03-03 ENCOUNTER — NURSE ONLY (OUTPATIENT)
Dept: OBGYN | Age: 32
End: 2022-03-03
Payer: COMMERCIAL

## 2022-03-03 DIAGNOSIS — N73.9 PID (PELVIC INFLAMMATORY DISEASE): Primary | ICD-10-CM

## 2022-03-03 PROCEDURE — 96372 THER/PROPH/DIAG INJ SC/IM: CPT | Performed by: ADVANCED PRACTICE MIDWIFE

## 2022-03-03 RX ORDER — DOXYCYCLINE HYCLATE 100 MG
100 TABLET ORAL 2 TIMES DAILY
Qty: 28 TABLET | Refills: 0 | Status: SHIPPED | OUTPATIENT
Start: 2022-03-03 | End: 2022-03-15 | Stop reason: ALTCHOICE

## 2022-03-03 RX ORDER — METRONIDAZOLE 500 MG/1
500 TABLET ORAL 2 TIMES DAILY
Qty: 28 TABLET | Refills: 0 | Status: SHIPPED | OUTPATIENT
Start: 2022-03-03 | End: 2022-03-17

## 2022-03-03 RX ORDER — CEFTRIAXONE SODIUM 250 MG/1
500 INJECTION, POWDER, FOR SOLUTION INTRAMUSCULAR; INTRAVENOUS ONCE
Status: COMPLETED | OUTPATIENT
Start: 2022-03-03 | End: 2022-03-03

## 2022-03-03 RX ADMIN — CEFTRIAXONE SODIUM 500 MG: 250 INJECTION, POWDER, FOR SOLUTION INTRAMUSCULAR; INTRAVENOUS at 10:44

## 2022-03-03 NOTE — RESULT ENCOUNTER NOTE
Pt. notified of results and voices understanding. Patient has received medication, is scheduled for ANAHI 04/2022 and note added to schedule yearly pap 03/2022.

## 2022-03-07 NOTE — PROGRESS NOTES
I called patient to discuss surgery and expectations. She is scheduled for a Laparoscopic Bilateral Salpingectomy, Right Ovarian Cystectomy, possible Right Oophorectomy, Hysteroscopy D&C, Novasure endometrial ablation at [facility] on 4/8/22. She is aware that a nurse from [facility] will call her to complete a phone interview and arrange COVID testing. 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 She will talk to her employer to see if they need a note for work. Patient will come in to see Dr. Theresa Cooper prior to surgery and will get labs on admission. We will also follow up 3-4 weeks after surgery. Appointments scheduled. Patient verbalized understanding, no further questions/concerns voiced.

## 2022-03-14 ENCOUNTER — TELEPHONE (OUTPATIENT)
Dept: OBGYN CLINIC | Age: 32
End: 2022-03-14

## 2022-03-15 NOTE — PROGRESS NOTES
Patient instructed on the pre-operative, intra-operative, and post-operative process. Patient instructed on NPO status. Medication instructions and pre operative instruction sheet reviewed with the patient. CHG skin prep instructions reviewed with patient.

## 2022-03-16 ENCOUNTER — HOSPITAL ENCOUNTER (OUTPATIENT)
Dept: PREADMISSION TESTING | Age: 32
Setting detail: SPECIMEN
Discharge: HOME | End: 2022-03-20
Payer: COMMERCIAL

## 2022-03-16 DIAGNOSIS — Z01.818 PREOP TESTING: Primary | ICD-10-CM

## 2022-03-16 PROCEDURE — C9803 HOPD COVID-19 SPEC COLLECT: HCPCS

## 2022-03-16 PROCEDURE — U0005 INFEC AGEN DETEC AMPLI PROBE: HCPCS

## 2022-03-16 PROCEDURE — U0003 INFECTIOUS AGENT DETECTION BY NUCLEIC ACID (DNA OR RNA); SEVERE ACUTE RESPIRATORY SYNDROME CORONAVIRUS 2 (SARS-COV-2) (CORONAVIRUS DISEASE [COVID-19]), AMPLIFIED PROBE TECHNIQUE, MAKING USE OF HIGH THROUGHPUT TECHNOLOGIES AS DESCRIBED BY CMS-2020-01-R: HCPCS

## 2022-03-17 LAB
SARS-COV-2: NORMAL
SARS-COV-2: NOT DETECTED
SOURCE: NORMAL

## 2022-03-18 ENCOUNTER — ANESTHESIA EVENT (OUTPATIENT)
Dept: OPERATING ROOM | Age: 32
End: 2022-03-18
Payer: COMMERCIAL

## 2022-03-21 ENCOUNTER — ANESTHESIA (OUTPATIENT)
Dept: OPERATING ROOM | Age: 32
End: 2022-03-21
Payer: COMMERCIAL

## 2022-03-21 ENCOUNTER — HOSPITAL ENCOUNTER (OUTPATIENT)
Age: 32
Setting detail: OUTPATIENT SURGERY
Discharge: HOME | End: 2022-03-21
Attending: OBSTETRICS & GYNECOLOGY | Admitting: OBSTETRICS & GYNECOLOGY
Payer: COMMERCIAL

## 2022-03-21 VITALS
OXYGEN SATURATION: 99 % | RESPIRATION RATE: 18 BRPM | BODY MASS INDEX: 29.44 KG/M2 | TEMPERATURE: 98.1 F | HEART RATE: 74 BPM | HEIGHT: 62 IN | WEIGHT: 160 LBS | SYSTOLIC BLOOD PRESSURE: 121 MMHG | DIASTOLIC BLOOD PRESSURE: 64 MMHG

## 2022-03-21 VITALS — SYSTOLIC BLOOD PRESSURE: 120 MMHG | DIASTOLIC BLOOD PRESSURE: 74 MMHG | OXYGEN SATURATION: 98 %

## 2022-03-21 DIAGNOSIS — G89.18 POST-OP PAIN: Primary | ICD-10-CM

## 2022-03-21 LAB — HCG(URINE) PREGNANCY TEST: NEGATIVE

## 2022-03-21 PROCEDURE — 3600000013 HC SURGERY LEVEL 3 ADDTL 15MIN: Performed by: OBSTETRICS & GYNECOLOGY

## 2022-03-21 PROCEDURE — 3600000003 HC SURGERY LEVEL 3 BASE: Performed by: OBSTETRICS & GYNECOLOGY

## 2022-03-21 PROCEDURE — 88305 TISSUE EXAM BY PATHOLOGIST: CPT

## 2022-03-21 PROCEDURE — 7100000010 HC PHASE II RECOVERY - FIRST 15 MIN: Performed by: OBSTETRICS & GYNECOLOGY

## 2022-03-21 PROCEDURE — 7100000000 HC PACU RECOVERY - FIRST 15 MIN: Performed by: OBSTETRICS & GYNECOLOGY

## 2022-03-21 PROCEDURE — 58563 HYSTEROSCOPY ABLATION: CPT | Performed by: OBSTETRICS & GYNECOLOGY

## 2022-03-21 PROCEDURE — 7100000001 HC PACU RECOVERY - ADDTL 15 MIN: Performed by: OBSTETRICS & GYNECOLOGY

## 2022-03-21 PROCEDURE — 2709999900 HC NON-CHARGEABLE SUPPLY: Performed by: OBSTETRICS & GYNECOLOGY

## 2022-03-21 PROCEDURE — 2580000003 HC RX 258: Performed by: NURSE ANESTHETIST, CERTIFIED REGISTERED

## 2022-03-21 PROCEDURE — 6360000002 HC RX W HCPCS: Performed by: NURSE ANESTHETIST, CERTIFIED REGISTERED

## 2022-03-21 PROCEDURE — 6360000002 HC RX W HCPCS

## 2022-03-21 PROCEDURE — A4216 STERILE WATER/SALINE, 10 ML: HCPCS | Performed by: NURSE ANESTHETIST, CERTIFIED REGISTERED

## 2022-03-21 PROCEDURE — 2720000010 HC SURG SUPPLY STERILE: Performed by: OBSTETRICS & GYNECOLOGY

## 2022-03-21 PROCEDURE — 3700000001 HC ADD 15 MINUTES (ANESTHESIA): Performed by: OBSTETRICS & GYNECOLOGY

## 2022-03-21 PROCEDURE — 81025 URINE PREGNANCY TEST: CPT

## 2022-03-21 PROCEDURE — 2500000003 HC RX 250 WO HCPCS: Performed by: NURSE ANESTHETIST, CERTIFIED REGISTERED

## 2022-03-21 PROCEDURE — 6370000000 HC RX 637 (ALT 250 FOR IP): Performed by: NURSE ANESTHETIST, CERTIFIED REGISTERED

## 2022-03-21 PROCEDURE — 64488 TAP BLOCK BI INJECTION: CPT | Performed by: NURSE ANESTHETIST, CERTIFIED REGISTERED

## 2022-03-21 PROCEDURE — 58661 LAPAROSCOPY REMOVE ADNEXA: CPT | Performed by: OBSTETRICS & GYNECOLOGY

## 2022-03-21 PROCEDURE — 7100000011 HC PHASE II RECOVERY - ADDTL 15 MIN: Performed by: OBSTETRICS & GYNECOLOGY

## 2022-03-21 PROCEDURE — 3700000000 HC ANESTHESIA ATTENDED CARE: Performed by: OBSTETRICS & GYNECOLOGY

## 2022-03-21 RX ORDER — MIDAZOLAM HYDROCHLORIDE 1 MG/ML
INJECTION INTRAMUSCULAR; INTRAVENOUS PRN
Status: DISCONTINUED | OUTPATIENT
Start: 2022-03-21 | End: 2022-03-21 | Stop reason: SDUPTHER

## 2022-03-21 RX ORDER — SODIUM CHLORIDE 0.9 % (FLUSH) 0.9 %
5-40 SYRINGE (ML) INJECTION EVERY 12 HOURS SCHEDULED
Status: DISCONTINUED | OUTPATIENT
Start: 2022-03-21 | End: 2022-03-21 | Stop reason: HOSPADM

## 2022-03-21 RX ORDER — HYDROCODONE BITARTRATE AND ACETAMINOPHEN 5; 325 MG/1; MG/1
1 TABLET ORAL EVERY 6 HOURS PRN
Status: DISCONTINUED | OUTPATIENT
Start: 2022-03-21 | End: 2022-03-21 | Stop reason: HOSPADM

## 2022-03-21 RX ORDER — FENTANYL CITRATE 50 UG/ML
INJECTION, SOLUTION INTRAMUSCULAR; INTRAVENOUS PRN
Status: DISCONTINUED | OUTPATIENT
Start: 2022-03-21 | End: 2022-03-21 | Stop reason: SDUPTHER

## 2022-03-21 RX ORDER — ROCURONIUM BROMIDE 10 MG/ML
INJECTION, SOLUTION INTRAVENOUS PRN
Status: DISCONTINUED | OUTPATIENT
Start: 2022-03-21 | End: 2022-03-21 | Stop reason: SDUPTHER

## 2022-03-21 RX ORDER — KETOROLAC TROMETHAMINE 30 MG/ML
INJECTION, SOLUTION INTRAMUSCULAR; INTRAVENOUS PRN
Status: DISCONTINUED | OUTPATIENT
Start: 2022-03-21 | End: 2022-03-21 | Stop reason: SDUPTHER

## 2022-03-21 RX ORDER — DIMENHYDRINATE 50 MG/1
50 TABLET ORAL ONCE
Status: COMPLETED | OUTPATIENT
Start: 2022-03-21 | End: 2022-03-21

## 2022-03-21 RX ORDER — ROPIVACAINE HYDROCHLORIDE 5 MG/ML
INJECTION, SOLUTION EPIDURAL; INFILTRATION; PERINEURAL PRN
Status: DISCONTINUED | OUTPATIENT
Start: 2022-03-21 | End: 2022-03-21 | Stop reason: SDUPTHER

## 2022-03-21 RX ORDER — HYDRALAZINE HYDROCHLORIDE 20 MG/ML
INJECTION INTRAMUSCULAR; INTRAVENOUS PRN
Status: DISCONTINUED | OUTPATIENT
Start: 2022-03-21 | End: 2022-03-21 | Stop reason: SDUPTHER

## 2022-03-21 RX ORDER — SODIUM CHLORIDE 0.9 % (FLUSH) 0.9 %
5-40 SYRINGE (ML) INJECTION PRN
Status: DISCONTINUED | OUTPATIENT
Start: 2022-03-21 | End: 2022-03-21 | Stop reason: HOSPADM

## 2022-03-21 RX ORDER — LABETALOL HYDROCHLORIDE 5 MG/ML
INJECTION, SOLUTION INTRAVENOUS PRN
Status: DISCONTINUED | OUTPATIENT
Start: 2022-03-21 | End: 2022-03-21 | Stop reason: SDUPTHER

## 2022-03-21 RX ORDER — DEXAMETHASONE SODIUM PHOSPHATE 4 MG/ML
INJECTION, SOLUTION INTRA-ARTICULAR; INTRALESIONAL; INTRAMUSCULAR; INTRAVENOUS; SOFT TISSUE PRN
Status: DISCONTINUED | OUTPATIENT
Start: 2022-03-21 | End: 2022-03-21 | Stop reason: SDUPTHER

## 2022-03-21 RX ORDER — SODIUM CHLORIDE 9 MG/ML
INJECTION INTRAVENOUS PRN
Status: DISCONTINUED | OUTPATIENT
Start: 2022-03-21 | End: 2022-03-21 | Stop reason: SDUPTHER

## 2022-03-21 RX ORDER — METOCLOPRAMIDE HYDROCHLORIDE 5 MG/ML
10 INJECTION INTRAMUSCULAR; INTRAVENOUS
Status: COMPLETED | OUTPATIENT
Start: 2022-03-21 | End: 2022-03-21

## 2022-03-21 RX ORDER — FENTANYL CITRATE 50 UG/ML
50 INJECTION, SOLUTION INTRAMUSCULAR; INTRAVENOUS EVERY 5 MIN PRN
Status: DISCONTINUED | OUTPATIENT
Start: 2022-03-21 | End: 2022-03-21 | Stop reason: HOSPADM

## 2022-03-21 RX ORDER — SODIUM CHLORIDE 9 MG/ML
25 INJECTION, SOLUTION INTRAVENOUS PRN
Status: DISCONTINUED | OUTPATIENT
Start: 2022-03-21 | End: 2022-03-21 | Stop reason: HOSPADM

## 2022-03-21 RX ORDER — SODIUM CHLORIDE, SODIUM LACTATE, POTASSIUM CHLORIDE, CALCIUM CHLORIDE 600; 310; 30; 20 MG/100ML; MG/100ML; MG/100ML; MG/100ML
INJECTION, SOLUTION INTRAVENOUS CONTINUOUS
Status: DISCONTINUED | OUTPATIENT
Start: 2022-03-21 | End: 2022-03-21 | Stop reason: HOSPADM

## 2022-03-21 RX ORDER — GLYCOPYRROLATE 1 MG/5 ML
SYRINGE (ML) INTRAVENOUS PRN
Status: DISCONTINUED | OUTPATIENT
Start: 2022-03-21 | End: 2022-03-21 | Stop reason: SDUPTHER

## 2022-03-21 RX ORDER — ONDANSETRON 2 MG/ML
INJECTION INTRAMUSCULAR; INTRAVENOUS PRN
Status: DISCONTINUED | OUTPATIENT
Start: 2022-03-21 | End: 2022-03-21 | Stop reason: SDUPTHER

## 2022-03-21 RX ORDER — ACETAMINOPHEN 325 MG/1
650 TABLET ORAL ONCE
Status: COMPLETED | OUTPATIENT
Start: 2022-03-21 | End: 2022-03-21

## 2022-03-21 RX ORDER — NEOSTIGMINE METHYLSULFATE 1 MG/ML
INJECTION, SOLUTION INTRAVENOUS PRN
Status: DISCONTINUED | OUTPATIENT
Start: 2022-03-21 | End: 2022-03-21 | Stop reason: SDUPTHER

## 2022-03-21 RX ORDER — DEXAMETHASONE SODIUM PHOSPHATE 10 MG/ML
INJECTION, SOLUTION INTRAMUSCULAR; INTRAVENOUS PRN
Status: DISCONTINUED | OUTPATIENT
Start: 2022-03-21 | End: 2022-03-21 | Stop reason: SDUPTHER

## 2022-03-21 RX ORDER — PROPOFOL 10 MG/ML
INJECTION, EMULSION INTRAVENOUS PRN
Status: DISCONTINUED | OUTPATIENT
Start: 2022-03-21 | End: 2022-03-21 | Stop reason: SDUPTHER

## 2022-03-21 RX ORDER — HYDROCODONE BITARTRATE AND ACETAMINOPHEN 5; 325 MG/1; MG/1
1 TABLET ORAL EVERY 6 HOURS PRN
Qty: 6 TABLET | Refills: 0 | Status: SHIPPED | OUTPATIENT
Start: 2022-03-21 | End: 2022-03-26

## 2022-03-21 RX ORDER — LIDOCAINE HYDROCHLORIDE 20 MG/ML
INJECTION, SOLUTION EPIDURAL; INFILTRATION; INTRACAUDAL; PERINEURAL PRN
Status: DISCONTINUED | OUTPATIENT
Start: 2022-03-21 | End: 2022-03-21 | Stop reason: SDUPTHER

## 2022-03-21 RX ORDER — KETOROLAC TROMETHAMINE 10 MG/1
10 TABLET, FILM COATED ORAL EVERY 6 HOURS PRN
Qty: 20 TABLET | Refills: 0 | Status: ON HOLD | OUTPATIENT
Start: 2022-03-21 | End: 2022-05-06 | Stop reason: HOSPADM

## 2022-03-21 RX ORDER — ATROPA BELLADONNA AND OPIUM 16.2; 3 MG/1; MG/1
SUPPOSITORY RECTAL PRN
Status: DISCONTINUED | OUTPATIENT
Start: 2022-03-21 | End: 2022-03-21 | Stop reason: SDUPTHER

## 2022-03-21 RX ADMIN — ROCURONIUM BROMIDE 20 MG: 10 SOLUTION INTRAVENOUS at 14:44

## 2022-03-21 RX ADMIN — ATROPA BELLADONNA AND OPIUM 30 MG: 16.2; 3 SUPPOSITORY RECTAL at 14:36

## 2022-03-21 RX ADMIN — ONDANSETRON 4 MG: 2 INJECTION INTRAMUSCULAR; INTRAVENOUS at 15:10

## 2022-03-21 RX ADMIN — KETOROLAC TROMETHAMINE 30 MG: 30 INJECTION, SOLUTION INTRAMUSCULAR at 15:10

## 2022-03-21 RX ADMIN — LABETALOL HYDROCHLORIDE 10 MG: 5 INJECTION, SOLUTION INTRAVENOUS at 14:51

## 2022-03-21 RX ADMIN — ROCURONIUM BROMIDE 30 MG: 10 SOLUTION INTRAVENOUS at 14:23

## 2022-03-21 RX ADMIN — ROPIVACAINE HYDROCHLORIDE 40 ML: 5 INJECTION, SOLUTION EPIDURAL; INFILTRATION; PERINEURAL at 13:45

## 2022-03-21 RX ADMIN — PROPOFOL 150 MG: 10 INJECTION, EMULSION INTRAVENOUS at 14:23

## 2022-03-21 RX ADMIN — HYDROCODONE BITARTRATE AND ACETAMINOPHEN 1 TABLET: 5; 325 TABLET ORAL at 16:15

## 2022-03-21 RX ADMIN — SODIUM CHLORIDE, POTASSIUM CHLORIDE, SODIUM LACTATE AND CALCIUM CHLORIDE: 600; 310; 30; 20 INJECTION, SOLUTION INTRAVENOUS at 12:39

## 2022-03-21 RX ADMIN — Medication 0.4 MG: at 15:11

## 2022-03-21 RX ADMIN — FENTANYL CITRATE 50 MCG: 50 INJECTION INTRAMUSCULAR; INTRAVENOUS at 13:40

## 2022-03-21 RX ADMIN — SODIUM CHLORIDE 40 ML: 9 INJECTION, SOLUTION INTRAMUSCULAR; INTRAVENOUS; SUBCUTANEOUS at 13:45

## 2022-03-21 RX ADMIN — Medication 3 MG: at 15:11

## 2022-03-21 RX ADMIN — MIDAZOLAM 2 MG: 1 INJECTION INTRAMUSCULAR; INTRAVENOUS at 13:40

## 2022-03-21 RX ADMIN — DEXAMETHASONE SODIUM PHOSPHATE 10 MG: 10 INJECTION, SOLUTION INTRAMUSCULAR; INTRAVENOUS at 13:45

## 2022-03-21 RX ADMIN — DEXAMETHASONE SODIUM PHOSPHATE 4 MG: 4 INJECTION, SOLUTION INTRAMUSCULAR; INTRAVENOUS at 15:10

## 2022-03-21 RX ADMIN — METOCLOPRAMIDE 10 MG: 5 INJECTION, SOLUTION INTRAMUSCULAR; INTRAVENOUS at 15:40

## 2022-03-21 RX ADMIN — ACETAMINOPHEN 650 MG: 325 TABLET ORAL at 12:39

## 2022-03-21 RX ADMIN — DIMENHYDRINATE 50 MG: 50 TABLET ORAL at 12:39

## 2022-03-21 RX ADMIN — CEFAZOLIN 2000 MG: 10 INJECTION, POWDER, FOR SOLUTION INTRAVENOUS at 14:18

## 2022-03-21 RX ADMIN — LIDOCAINE HYDROCHLORIDE 70 MG: 20 INJECTION, SOLUTION EPIDURAL; INFILTRATION; INTRACAUDAL; PERINEURAL at 14:23

## 2022-03-21 RX ADMIN — HYDRALAZINE HYDROCHLORIDE 10 MG: 20 INJECTION INTRAMUSCULAR; INTRAVENOUS at 14:56

## 2022-03-21 ASSESSMENT — PAIN SCALES - GENERAL
PAINLEVEL_OUTOF10: 0
PAINLEVEL_OUTOF10: 6
PAINLEVEL_OUTOF10: 3

## 2022-03-21 ASSESSMENT — PAIN - FUNCTIONAL ASSESSMENT: PAIN_FUNCTIONAL_ASSESSMENT: 0-10

## 2022-03-21 ASSESSMENT — LIFESTYLE VARIABLES: SMOKING_STATUS: 1

## 2022-03-21 NOTE — OP NOTE
Preoperative diagnosis: Menorrhagia     Postoperative diagnosis: Same     Procedure: Hysteroscopy D&C Novasure endometrial ablation with medically indicated laparoscopic bilateral salpingectomy and right oophorectomy and lysis of adhesions    Surgeon:  Dr. Brian Medellin    Assistant: Melita Moreno PGY 2    Anesthesia:  general    EBL:  10 mL    Fluids:  as per anesthesia    Condition:  stable    Complications:  none    Speciamens:  Bilateral tubes, Right ovary, endometrial currettings    Findings: The uterus had filmy adhesions to the colon. The left tube was adhered to the colon and ovary. The posterior cul de sac was obliterated with filmy adhesions. The uterus sounded to 8 cm in depth. The uterine cavity was 5.5 cm deep and 3.1 cm wide. Description of Procedure: The patient was moved to the operative suite where she was placed under general anesthesia without difficulty. Time out was preformed. The patient was placed in the dorsal lithotomy position utilizing Twist. The Positioning was checked without stress or pressure on any joints. Her bladder was drained with a stevenson catheter. She was prepped and draped in sterile fashion. The uterus was sounded to 8.5 cm and a  kronner manipulator was placed. Gloves were then changed and attention was turned to the abdomen. A 5 millimeter infraumbilical skin incision was made. A Veress needle was carefully introduced at a 45 degree angle while tenting the abdominal wall. Intra-abdominal placement was confirmed by use of a water-filled syringe and drop in intra-abdominal pressure with insufflation of CO2. Pneumoperitoneum was obtained with about 2-1/2 L of CO2. A 5 mm step port was then placed without difficulty and intra-abdominal placement was confirmed by laparoscopy. Inspection of the patient's pelvis revealed the findings noted above.   Under direct visualization an 8 mm trocar was placed medial to the left ASIS and an 11 mm trocar was placed medial to the right ASIS. The adhesions were taken down with sharp and blunt dissection. The underlying structures were noted to be without trauma. Both fallopian tubes were traced from their cornual insertion to the fimbriated ends. The uterus was deviated laterally  with the manipulator to gain access to the bilateral fallopian tubes. The fallopian tube on the left was mobilized and grasped. The mesosalpinx was tented and utilizing the LigaSure in a stepwise fashion it was ligated and transected along the entire length of the fallopian tube. The isthmus of the tube was then ligated and transected in a similar manner. Hemostasis was maintained throughout. The fallopian tube was removed through the port. Excellent hemostasis was achieved. The right infundibulopelvic ligament was then ligated and transected. The dissection continued along the lateral aspect of tube along the mesosalpinx. The tubal stump and ovarian pedicle were then ligated and transected. The tube and ovary were placed into an Endocatch and removed through the 11 mm port. Re-inspection confirmed the bilateral mesenteric edges and tubal stumps were hemostatic. The bilateral tubes and ovary were sent to pathology. All instruments and excess gas was removed from the abdomen and the trocars were removed without difficulty. The skin incisions were closed with 4-0 vicryl in a subcuticular fashion and dressed with steri-strips and a sterile dressing. A weighted speculum was placed along the posterior vaginal wall and the  cervix was visualized. The uterine manipulator was removed. The cervix was progressively dilated and a hysteroscope was introduced with a proliferative lining being noted. The hysteroscope was then withdrawn and a sharp curettage was performed to sample the endometrium. The cervix was dilated to a # 21 and the NovaSure array was introduced.   A seating technique was utilized to find the overall cavity width. A CO2 test was performed and passed. The ablation was then carried out at [address]  for 78 seconds. At the completion of the ablation the array was withdrawn into its protective sheath and removed from the uterus. The tenaculum was released with hemostasis being noted and the weighted speculum was removed. The patient was awakened from anesthesia. The patient tolerated the procedure well and was taken to PACU in stable condition. All sponge, needle and instrument counts were correct x 2. Surgical Assistant documentation:    An assistant surgeon was required with this surgery. She was able to provide the necessary visualization, uterine manipulation, and suturing. Having her available allowed this case to be perfomed in a safe and timely manner.

## 2022-03-21 NOTE — PROGRESS NOTES
Patient states pain tolerable and ready to be discharged at this time. Discharge instructions given to pt and spouse. Both verbalize understanding,deny any questions and/or concerns. Pt transfer off unit in wheelchair w/ spouse and all belongings. Discharge Criteria    Inpatients must meet Criteria 1 through 7. All other patients are either YES or N/A. If a NO is chosen then Anesthesia or Surgeon must be notified. 1.  Minimum 30 minutes after last dose of sedative medication, minimum 120 minutes after last dose of reversal agent. Yes      2. Systolic BP stable within 20 mmHg for 30 minutes & systolic BP between 90 & 975 or within 10 mmHg of baseline. Yes      3. Pulse between 60 and 100 or within 10 bpm of baseline. Yes      4. Spontaneous respiratory rate >/= 10 per minute. Yes      5. SaO2 >/= 95 or  >/= baseline. Yes      6. Able to cough and swallow or return to baseline function. Yes      7. Alert and oriented or return to baseline mental status. Yes      8. Demonstrates controlled, coordinated movements, ambulates with steady gait, or return to baseline activity function. Yes      9. Minimal or no pain or nausea, or at a level tolerable and acceptable to patient. Yes      10. Takes and retains oral fluids as allowed. Yes      11. Procedural / perioperative site stable. Minimal or no bleeding. Yes          12. If GI endoscopy procedure, minimal or no abdominal distention or passing flatus. N/A      13. Written discharge instructions and emergency telephone number provided. Yes      14. Accompanied by a responsible adult.     Yes

## 2022-03-21 NOTE — ANESTHESIA PRE PROCEDURE
Department of Anesthesiology  Preprocedure Note       Name:  Padilla Madrid   Age:  29 y.o.  :  1990                                          MRN:  [id number]         Date:  3/21/2022      Surgeon: Lory Valente):  Cam Espinoza DO    Procedure: Procedure(s):  SALPINGECTOMY LAPAROSCOPIC  OVARIAN CYSTECTOMY LAPAROSCOPIC-POSSIBLE RIGHT OOPHERECTOMY  DILATATION AND CURETTAGE HYSTEROSCOPY CAUTERY ABLATION-NOVASURE ENDOMETRIAL ABLATION    Medications prior to admission:   Prior to Admission medications    Medication Sig Start Date End Date Taking? Authorizing Provider   ketorolac (TORADOL) 10 MG tablet Take 1 tablet by mouth every 6 hours as needed for Pain 3/21/22 3/21/23 Yes Martín Lopez PA-C   HYDROcodone-acetaminophen (NORCO) 5-325 MG per tablet Take 1 tablet by mouth every 6 hours as needed for Pain for up to 5 days. Intended supply: 5 days. Take lowest dose possible to manage pain 3/21/22 3/26/22 Yes Martín Lopez PA-C   [facility]) 250 MG tablet One a day x 10 days 22   DEEDEE Bar CNM       Current medications:    Current Facility-Administered Medications   Medication Dose Route Frequency Provider Last Rate Last Admin    lactated ringers infusion   IntraVENous Continuous DEEDEE Beatty CRNA 100 mL/hr at 22 1239 New Bag at 22 1239    sodium chloride flush 0.9 % injection 5-40 mL  5-40 mL IntraVENous 2 times per day Martín Lopez PA-C        sodium chloride flush 0.9 % injection 5-40 mL  5-40 mL IntraVENous PRN Martín Lopez PA-C        0.9 % sodium chloride infusion  25 mL IntraVENous PRN Martín Lopez PA-C        ceFAZolin (ANCEF) 2000 mg in dextrose 5 % 100 mL IVPB  2,000 mg IntraVENous On Call to Gloria Hall PA-C           Allergies:  No Known Allergies    Problem List:    Patient Active Problem List   Diagnosis Code    Tobacco use Z72.0    Gastroesophageal reflux disease K21.9       Past Tests: No results for input(s): POCGLU, POCNA, POCK, POCCL, POCBUN, POCHEMO, POCHCT in the last 72 hours. Coags: No results found for: PROTIME, INR, APTT    HCG (If Applicable):   Lab Results   Component Value Date    PREGTESTUR NEGATIVE 03/21/2022    HCGQUANT <1 02/19/2022        ABGs: No results found for: PHART, PO2ART, FRO6ZCH, HUY0TJA, BEART, H8WOXBJR     Type & Screen (If Applicable):  No results found for: LABABO, LABRH    Drug/Infectious Status (If Applicable):  No results found for: HIV, HEPCAB    COVID-19 Screening (If Applicable):   Lab Results   Component Value Date    COVID19 Not Detected 03/16/2022           Anesthesia Evaluation  Patient summary reviewed and Nursing notes reviewed no history of anesthetic complications (first anesthetic.):   Airway: Mallampati: III  TM distance: >3 FB   Neck ROM: full  Mouth opening: > = 3 FB Dental: normal exam         Pulmonary:normal exam  breath sounds clear to auscultation  (+) current smoker          Patient smoked on day of surgery. Cardiovascular:Negative CV ROS  Exercise tolerance: good (>4 METS),           Rhythm: regular  Rate: normal           Beta Blocker:  Not on Beta Blocker         Neuro/Psych:   Negative Neuro/Psych ROS              GI/Hepatic/Renal:   (+) GERD: well controlled,           Endo/Other: Negative Endo/Other ROS                    Abdominal:             Vascular: negative vascular ROS. Other Findings:             Anesthesia Plan      general     ASA 2       Induction: intravenous. MIPS: Postoperative opioids intended and Prophylactic antiemetics administered. Anesthetic plan and risks discussed with patient. Plan discussed with CRNA.                   DEEDEE Oliveira - CESAR   3/21/2022

## 2022-03-21 NOTE — ANESTHESIA PROCEDURE NOTES
Peripheral Block    Patient location during procedure: pre-op  Start time: 3/21/2022 1:40 PM  End time: 3/21/2022 1:45 PM  Staffing  Performed: resident/CRNA   Resident/CRNA: DEEDEE Renteria CRNA  Preanesthetic Checklist  Completed: patient identified, IV checked, site marked, risks and benefits discussed, surgical consent, monitors and equipment checked, pre-op evaluation, timeout performed, anesthesia consent given, oxygen available and patient being monitored  Peripheral Block  Patient position: supine  Prep: ChloraPrep  Patient monitoring: continuous pulse ox and IV access  Block type: TAP and Rectus sheath  Laterality: bilateral  Injection technique: single-shot  Guidance: ultrasound guided  Local infiltration: ropivacaine and decadron (See MAR for details.)  Provider prep: mask and sterile gloves  Local infiltration: ropivacaine and decadron (See MAR for details.)  Needle  Needle type:  Other   Needle gauge: 22 G  Needle localization: ultrasound guidanceOther needle type: pajunk  Assessment  Injection assessment: no paresthesia on injection, local visualized surrounding nerve on ultrasound and negative aspiration for heme  Paresthesia pain: none  Slow fractionated injection: yes  Hemodynamics: stable  Reason for block: post-op pain management and at surgeon's request

## 2022-03-21 NOTE — H&P
HISTORY AND PHYSICAL             Date: 3/21/2022        Patient Name: Doreene Harada     YOB: 1990      Age:  29 y.o. Chief Complaint   No chief complaint on file. History Obtained From   patient    History of Present Illness   Patient was at ED 02/19/2022 with concerns of heavy bleeding and clots for last 5 days. Patient had ulrasound and was found to have right ovarian cyst.  USN done showed approx 9 cm ut with right ovary having complex cyst that measured 2.1x 1.3 x 1.3 cm.   61. Pt presents today for novasure with b/l salpingectomy and dx lap with poss oophorectomy/lysis of adhesions or ablation of endo. Past Medical History   History reviewed. No pertinent past medical history. Past Surgical History   History reviewed. No pertinent surgical history. Medications Prior to Admission     Prior to Admission medications    Medication Sig Start Date End Date Taking? Authorizing Provider   ketorolac (TORADOL) 10 MG tablet Take 1 tablet by mouth every 6 hours as needed for Pain 3/21/22 3/21/23 Yes Ricky Pichardo PA-C   HYDROcodone-acetaminophen (NORCO) 5-325 MG per tablet Take 1 tablet by mouth every 6 hours as needed for Pain for up to 5 days. Intended supply: 5 days. Take lowest dose possible to manage pain 3/21/22 3/26/22 Yes Ricky Pichardo PA-C   azithromycin [facility]) 250 MG tablet One a day x 10 days 2/22/22   DEEDEE Freire CNM        Allergies   Patient has no known allergies.     Social History     Social History     Tobacco History     Smoking Status  Former Smoker Quit date  1/20/2022 Smoking Frequency  0.5 packs/day for 14 years (7 pk yrs) Smoking Tobacco Type  Cigarettes    Smokeless Tobacco Use  Never Used          Alcohol History     Alcohol Use Status  Yes Comment  occ          Drug Use     Drug Use Status  No          Sexual Activity     Sexually Active  Not Currently Partners  Male Birth Control/Protection  Condom                Family History   History reviewed. No pertinent family history. Review of Systems   Review of Systems   Constitutional: Negative for chills, fatigue and fever. Genitourinary: Positive for dyspareunia, menstrual problem and pelvic pain. Negative for dysuria and vaginal pain. Physical Exam   BP (!) 163/115 Comment: will recheck  Pulse 69   Temp 98 °F (36.7 °C) (Temporal)   Resp 22   Ht 5' 2\" (1.575 m)   Wt 160 lb (72.6 kg)   LMP 02/07/2022   SpO2 97%   Breastfeeding No   BMI 29.26 kg/m²     Physical Exam  Constitutional:       Appearance: Normal appearance. HENT:      Head: Normocephalic and atraumatic. Eyes:      Extraocular Movements: Extraocular movements intact. Pupils: Pupils are equal, round, and reactive to light. Cardiovascular:      Rate and Rhythm: Normal rate. Pulmonary:      Effort: Pulmonary effort is normal.   Musculoskeletal:         General: Normal range of motion. Cervical back: Normal range of motion. Skin:     General: Skin is warm and dry. Neurological:      Mental Status: She is alert and oriented to person, place, and time. Psychiatric:         Mood and Affect: Mood normal.         Behavior: Behavior normal.         Thought Content: Thought content normal.         Judgment: Judgment normal.         Labs      Recent Results (from the past 24 hour(s))   Pregnancy, Urine    Collection Time: 03/21/22 12:05 PM   Result Value Ref Range    HCG(Urine) Pregnancy Test NEGATIVE NEGATIVE        Imaging/Diagnostics Last 24 Hours   No results found. Assessment    1. Right ovarian cyst  2. Menorrhagia with regular cycle  3. Dysmenorrhea     Plan   1.  Novasure ablation w/ b/l laparoscopic salpingectomy, right ovarian cystectomy w/ poss right oophorectomy     Consultations Ordered:  None    Electronically signed by Dave Neff PA-C on 3/21/22 at 12:31 PM EDT

## 2022-03-22 NOTE — ANESTHESIA POSTPROCEDURE EVALUATION
Department of Anesthesiology  Postprocedure Note    Patient: Ana Bradshaw  MRN: [id number]  YOB: 1990  Date of evaluation: 3/22/2022  Time:  10:01 AM     Procedure Summary     Date: 03/21/22 Room / Location: [address] [place] [address]    Anesthesia Start: 1419 Anesthesia Stop: 2208    Procedures:       SALPINGECTOMY LAPAROSCOPIC (Bilateral )      LAPAROSCOPIC- RIGHT OOPHERECTOMY (Right )      DILATATION AND CURETTAGE HYSTEROSCOPY CAUTERY ABLATION-NOVASURE ENDOMETRIAL ABLATION (N/A ) Diagnosis: (RIGHT OVARIAN CYST  MENORRHAGIA, Pia Blackshear ABLATION)    Surgeons: Lenetta Nissen, DO Responsible Provider: DEEDEE Mireles CRNA    Anesthesia Type: general ASA Status: 2          Anesthesia Type: general    Priscilla Phase I: Priscilla Score: 9    Priscilla Phase II: Priscilla Score: 10    Last vitals: Reviewed and per EMR flowsheets.        Anesthesia Post Evaluation    Patient location during evaluation: PACU  Patient participation: complete - patient participated  Level of consciousness: awake and alert  Pain score: 3  Airway patency: patent  Nausea & Vomiting: no vomiting and no nausea  Complications: no  Cardiovascular status: blood pressure returned to baseline  Respiratory status: acceptable  Hydration status: stable  Multimodal analgesia pain management approach

## 2022-03-24 LAB — SURGICAL PATHOLOGY REPORT: NORMAL

## 2022-04-05 ENCOUNTER — OFFICE VISIT (OUTPATIENT)
Dept: OBGYN | Age: 32
End: 2022-04-05

## 2022-04-05 VITALS
WEIGHT: 160 LBS | HEIGHT: 62 IN | DIASTOLIC BLOOD PRESSURE: 76 MMHG | BODY MASS INDEX: 29.44 KG/M2 | SYSTOLIC BLOOD PRESSURE: 124 MMHG

## 2022-04-05 DIAGNOSIS — Z09 POSTOPERATIVE EXAMINATION: Primary | ICD-10-CM

## 2022-04-05 PROCEDURE — 99024 POSTOP FOLLOW-UP VISIT: CPT | Performed by: OBSTETRICS & GYNECOLOGY

## 2022-04-17 ENCOUNTER — HOSPITAL ENCOUNTER (EMERGENCY)
Age: 32
Discharge: ANOTHER HOSPITAL | End: 2022-04-17
Attending: EMERGENCY MEDICINE
Payer: COMMERCIAL

## 2022-04-17 ENCOUNTER — APPOINTMENT (OUTPATIENT)
Dept: CT IMAGING | Age: 32
End: 2022-04-17
Payer: COMMERCIAL

## 2022-04-17 VITALS
RESPIRATION RATE: 20 BRPM | SYSTOLIC BLOOD PRESSURE: 160 MMHG | DIASTOLIC BLOOD PRESSURE: 102 MMHG | TEMPERATURE: 98 F | OXYGEN SATURATION: 100 % | HEART RATE: 81 BPM

## 2022-04-17 DIAGNOSIS — I60.8 SUBARACHNOID HEMORRHAGE DUE TO CEREBRAL ANEURYSM (HCC): Primary | ICD-10-CM

## 2022-04-17 LAB
ABSOLUTE EOS #: 0.31 K/UL (ref 0–0.44)
ABSOLUTE IMMATURE GRANULOCYTE: 0.07 K/UL (ref 0–0.3)
ABSOLUTE LYMPH #: 3.56 K/UL (ref 1.1–3.7)
ABSOLUTE MONO #: 0.86 K/UL (ref 0.1–1.2)
BASOPHILS # BLD: 1 % (ref 0–2)
BASOPHILS ABSOLUTE: 0.12 K/UL (ref 0–0.2)
EOSINOPHILS RELATIVE PERCENT: 2 % (ref 1–4)
HCT VFR BLD CALC: 40 % (ref 36.3–47.1)
HEMOGLOBIN: 13.3 G/DL (ref 11.9–15.1)
IMMATURE GRANULOCYTES: 1 %
INR BLD: 1
LYMPHOCYTES # BLD: 26 % (ref 24–43)
MCH RBC QN AUTO: 29.5 PG (ref 25.2–33.5)
MCHC RBC AUTO-ENTMCNC: 33.3 G/DL (ref 28.4–34.8)
MCV RBC AUTO: 88.7 FL (ref 82.6–102.9)
MONOCYTES # BLD: 6 % (ref 3–12)
NRBC AUTOMATED: 0 PER 100 WBC
PARTIAL THROMBOPLASTIN TIME: 25.3 SEC (ref 26.8–34.8)
PDW BLD-RTO: 12.5 % (ref 11.8–14.4)
PLATELET # BLD: 235 K/UL (ref 138–453)
PMV BLD AUTO: 10.3 FL (ref 8.1–13.5)
PROTHROMBIN TIME: 12.6 SEC (ref 11.5–14.2)
RBC # BLD: 4.51 M/UL (ref 3.95–5.11)
SEG NEUTROPHILS: 64 % (ref 36–65)
SEGMENTED NEUTROPHILS ABSOLUTE COUNT: 8.82 K/UL (ref 1.5–8.1)
WBC # BLD: 13.7 K/UL (ref 3.5–11.3)

## 2022-04-17 PROCEDURE — 85730 THROMBOPLASTIN TIME PARTIAL: CPT

## 2022-04-17 PROCEDURE — 2500000003 HC RX 250 WO HCPCS: Performed by: EMERGENCY MEDICINE

## 2022-04-17 PROCEDURE — 36415 COLL VENOUS BLD VENIPUNCTURE: CPT

## 2022-04-17 PROCEDURE — 2580000003 HC RX 258: Performed by: EMERGENCY MEDICINE

## 2022-04-17 PROCEDURE — 96374 THER/PROPH/DIAG INJ IV PUSH: CPT

## 2022-04-17 PROCEDURE — 85610 PROTHROMBIN TIME: CPT

## 2022-04-17 PROCEDURE — 85025 COMPLETE CBC W/AUTO DIFF WBC: CPT

## 2022-04-17 PROCEDURE — 96375 TX/PRO/DX INJ NEW DRUG ADDON: CPT

## 2022-04-17 PROCEDURE — 6360000002 HC RX W HCPCS: Performed by: EMERGENCY MEDICINE

## 2022-04-17 PROCEDURE — 99284 EMERGENCY DEPT VISIT MOD MDM: CPT

## 2022-04-17 PROCEDURE — 70450 CT HEAD/BRAIN W/O DYE: CPT

## 2022-04-17 RX ORDER — ONDANSETRON 2 MG/ML
4 INJECTION INTRAMUSCULAR; INTRAVENOUS ONCE
Status: COMPLETED | OUTPATIENT
Start: 2022-04-17 | End: 2022-04-17

## 2022-04-17 RX ORDER — DEXAMETHASONE SODIUM PHOSPHATE 10 MG/ML
10 INJECTION INTRAMUSCULAR; INTRAVENOUS ONCE
Status: COMPLETED | OUTPATIENT
Start: 2022-04-17 | End: 2022-04-17

## 2022-04-17 RX ORDER — 0.9 % SODIUM CHLORIDE 0.9 %
1000 INTRAVENOUS SOLUTION INTRAVENOUS ONCE
Status: COMPLETED | OUTPATIENT
Start: 2022-04-17 | End: 2022-04-17

## 2022-04-17 RX ORDER — FERROUS SULFATE 325(65) MG
325 TABLET ORAL
COMMUNITY
End: 2022-08-31

## 2022-04-17 RX ADMIN — LEVETIRACETAM 1000 MG: 100 INJECTION, SOLUTION, CONCENTRATE INTRAVENOUS at 23:11

## 2022-04-17 RX ADMIN — ONDANSETRON 4 MG: 2 INJECTION INTRAMUSCULAR; INTRAVENOUS at 22:52

## 2022-04-17 RX ADMIN — SODIUM CHLORIDE 1000 ML: 9 INJECTION, SOLUTION INTRAVENOUS at 22:52

## 2022-04-17 RX ADMIN — DEXAMETHASONE SODIUM PHOSPHATE 10 MG: 10 INJECTION INTRAMUSCULAR; INTRAVENOUS at 22:53

## 2022-04-18 ENCOUNTER — APPOINTMENT (OUTPATIENT)
Dept: INTERVENTIONAL RADIOLOGY/VASCULAR | Age: 32
DRG: 021 | End: 2022-04-18
Payer: COMMERCIAL

## 2022-04-18 ENCOUNTER — ANESTHESIA EVENT (OUTPATIENT)
Dept: INTERVENTIONAL RADIOLOGY/VASCULAR | Age: 32
DRG: 021 | End: 2022-04-18
Payer: COMMERCIAL

## 2022-04-18 ENCOUNTER — APPOINTMENT (OUTPATIENT)
Dept: CT IMAGING | Age: 32
DRG: 021 | End: 2022-04-18
Payer: COMMERCIAL

## 2022-04-18 ENCOUNTER — APPOINTMENT (OUTPATIENT)
Dept: GENERAL RADIOLOGY | Age: 32
DRG: 021 | End: 2022-04-18
Payer: COMMERCIAL

## 2022-04-18 ENCOUNTER — ANESTHESIA (OUTPATIENT)
Dept: INTERVENTIONAL RADIOLOGY/VASCULAR | Age: 32
DRG: 021 | End: 2022-04-18
Payer: COMMERCIAL

## 2022-04-18 ENCOUNTER — HOSPITAL ENCOUNTER (INPATIENT)
Age: 32
LOS: 18 days | Discharge: HOME | DRG: 021 | End: 2022-05-06
Attending: EMERGENCY MEDICINE | Admitting: PSYCHIATRY & NEUROLOGY
Payer: COMMERCIAL

## 2022-04-18 VITALS — OXYGEN SATURATION: 99 % | TEMPERATURE: 97.6 F | SYSTOLIC BLOOD PRESSURE: 118 MMHG | DIASTOLIC BLOOD PRESSURE: 81 MMHG

## 2022-04-18 DIAGNOSIS — I60.9 SAH (SUBARACHNOID HEMORRHAGE) (HCC): Primary | ICD-10-CM

## 2022-04-18 PROBLEM — I67.1 ANEURYSM OF ANTERIOR COMMUNICATING ARTERY: Status: ACTIVE | Noted: 2022-04-18

## 2022-04-18 LAB
ABSOLUTE EOS #: 0.07 K/UL (ref 0–0.44)
ABSOLUTE IMMATURE GRANULOCYTE: 0.15 K/UL (ref 0–0.3)
ABSOLUTE LYMPH #: 1.39 K/UL (ref 1.1–3.7)
ABSOLUTE MONO #: 0.46 K/UL (ref 0.1–1.2)
ACTIVATED CLOTTING TIME: 131 SEC (ref 79–149)
ALBUMIN SERPL-MCNC: 4.7 G/DL (ref 3.5–5.2)
ALBUMIN/GLOBULIN RATIO: 1.6 (ref 1–2.5)
ALP BLD-CCNC: 83 U/L (ref 35–104)
ALT SERPL-CCNC: 25 U/L (ref 5–33)
ANION GAP SERPL CALCULATED.3IONS-SCNC: 11 MMOL/L (ref 9–17)
ANION GAP SERPL CALCULATED.3IONS-SCNC: 13 MMOL/L (ref 9–17)
AST SERPL-CCNC: 25 U/L
BASOPHILS # BLD: 1 % (ref 0–2)
BASOPHILS ABSOLUTE: 0.08 K/UL (ref 0–0.2)
BILIRUB SERPL-MCNC: 0.43 MG/DL (ref 0.3–1.2)
BUN BLDV-MCNC: 12 MG/DL (ref 6–20)
BUN BLDV-MCNC: 9 MG/DL (ref 6–20)
CALCIUM SERPL-MCNC: 8.4 MG/DL (ref 8.6–10.4)
CALCIUM SERPL-MCNC: 8.9 MG/DL (ref 8.6–10.4)
CHLORIDE BLD-SCNC: 102 MMOL/L (ref 98–107)
CHLORIDE BLD-SCNC: 106 MMOL/L (ref 98–107)
CHOLESTEROL/HDL RATIO: 2.5
CHOLESTEROL: 182 MG/DL
CO2: 19 MMOL/L (ref 20–31)
CO2: 22 MMOL/L (ref 20–31)
CREAT SERPL-MCNC: 0.62 MG/DL (ref 0.5–0.9)
CREAT SERPL-MCNC: 0.67 MG/DL (ref 0.5–0.9)
EKG ATRIAL RATE: 79 BPM
EKG P AXIS: 18 DEGREES
EKG P-R INTERVAL: 142 MS
EKG Q-T INTERVAL: 396 MS
EKG QRS DURATION: 78 MS
EKG QTC CALCULATION (BAZETT): 454 MS
EKG R AXIS: 32 DEGREES
EKG T AXIS: 16 DEGREES
EKG VENTRICULAR RATE: 79 BPM
EOSINOPHILS RELATIVE PERCENT: 1 % (ref 1–4)
ESTIMATED AVERAGE GLUCOSE: 111 MG/DL
GFR AFRICAN AMERICAN: >60 ML/MIN
GFR AFRICAN AMERICAN: >60 ML/MIN
GFR NON-AFRICAN AMERICAN: >60 ML/MIN
GFR NON-AFRICAN AMERICAN: >60 ML/MIN
GFR SERPL CREATININE-BSD FRML MDRD: ABNORMAL ML/MIN/{1.73_M2}
GFR SERPL CREATININE-BSD FRML MDRD: ABNORMAL ML/MIN/{1.73_M2}
GLUCOSE BLD-MCNC: 139 MG/DL (ref 70–99)
GLUCOSE BLD-MCNC: 150 MG/DL (ref 70–99)
HBA1C MFR BLD: 5.5 % (ref 4–6)
HCT VFR BLD CALC: 39.6 % (ref 36.3–47.1)
HCT VFR BLD CALC: 43.3 % (ref 36.3–47.1)
HDLC SERPL-MCNC: 74 MG/DL
HEMOGLOBIN: 13.2 G/DL (ref 11.9–15.1)
HEMOGLOBIN: 14.6 G/DL (ref 11.9–15.1)
IMMATURE GRANULOCYTES: 1 %
INR BLD: 0.9
LDL CHOLESTEROL: 96 MG/DL (ref 0–130)
LYMPHOCYTES # BLD: 9 % (ref 24–43)
MCH RBC QN AUTO: 29.7 PG (ref 25.2–33.5)
MCH RBC QN AUTO: 30 PG (ref 25.2–33.5)
MCHC RBC AUTO-ENTMCNC: 33.3 G/DL (ref 28.4–34.8)
MCHC RBC AUTO-ENTMCNC: 33.7 G/DL (ref 28.4–34.8)
MCV RBC AUTO: 88.9 FL (ref 82.6–102.9)
MCV RBC AUTO: 89.2 FL (ref 82.6–102.9)
MONOCYTES # BLD: 3 % (ref 3–12)
MYOGLOBIN: 205 NG/ML (ref 25–58)
NRBC AUTOMATED: 0 PER 100 WBC
NRBC AUTOMATED: 0 PER 100 WBC
PARTIAL THROMBOPLASTIN TIME: 22.6 SEC (ref 20.5–30.5)
PDW BLD-RTO: 12.7 % (ref 11.8–14.4)
PDW BLD-RTO: 12.7 % (ref 11.8–14.4)
PLATELET # BLD: 225 K/UL (ref 138–453)
PLATELET # BLD: 260 K/UL (ref 138–453)
PMV BLD AUTO: 10.5 FL (ref 8.1–13.5)
PMV BLD AUTO: 10.8 FL (ref 8.1–13.5)
POTASSIUM SERPL-SCNC: 3.8 MMOL/L (ref 3.7–5.3)
POTASSIUM SERPL-SCNC: 4.2 MMOL/L (ref 3.7–5.3)
PRO-BNP: 125 PG/ML
PROTHROMBIN TIME: 10.2 SEC (ref 9.1–12.3)
RBC # BLD: 4.44 M/UL (ref 3.95–5.11)
RBC # BLD: 4.87 M/UL (ref 3.95–5.11)
SARS-COV-2, RAPID: NOT DETECTED
SEDIMENTATION RATE, ERYTHROCYTE: 2 MM/HR (ref 0–20)
SEG NEUTROPHILS: 85 % (ref 36–65)
SEGMENTED NEUTROPHILS ABSOLUTE COUNT: 13.28 K/UL (ref 1.5–8.1)
SODIUM BLD-SCNC: 135 MMOL/L (ref 135–144)
SODIUM BLD-SCNC: 138 MMOL/L (ref 135–144)
SPECIMEN DESCRIPTION: NORMAL
TOTAL CK: 197 U/L (ref 26–192)
TOTAL PROTEIN: 7.6 G/DL (ref 6.4–8.3)
TRIGL SERPL-MCNC: 60 MG/DL
TROPONIN, HIGH SENSITIVITY: <6 NG/L (ref 0–14)
WBC # BLD: 12.2 K/UL (ref 3.5–11.3)
WBC # BLD: 15.4 K/UL (ref 3.5–11.3)

## 2022-04-18 PROCEDURE — 6360000004 HC RX CONTRAST MEDICATION: Performed by: STUDENT IN AN ORGANIZED HEALTH CARE EDUCATION/TRAINING PROGRAM

## 2022-04-18 PROCEDURE — 36415 COLL VENOUS BLD VENIPUNCTURE: CPT

## 2022-04-18 PROCEDURE — 94761 N-INVAS EAR/PLS OXIMETRY MLT: CPT

## 2022-04-18 PROCEDURE — C1760 CLOSURE DEV, VASC: HCPCS

## 2022-04-18 PROCEDURE — 2580000003 HC RX 258: Performed by: STUDENT IN AN ORGANIZED HEALTH CARE EDUCATION/TRAINING PROGRAM

## 2022-04-18 PROCEDURE — 85610 PROTHROMBIN TIME: CPT

## 2022-04-18 PROCEDURE — 3700000000 HC ANESTHESIA ATTENDED CARE

## 2022-04-18 PROCEDURE — 93010 ELECTROCARDIOGRAM REPORT: CPT | Performed by: INTERNAL MEDICINE

## 2022-04-18 PROCEDURE — 2709999900 HC NON-CHARGEABLE SUPPLY

## 2022-04-18 PROCEDURE — C1887 CATHETER, GUIDING: HCPCS

## 2022-04-18 PROCEDURE — 99282 EMERGENCY DEPT VISIT SF MDM: CPT

## 2022-04-18 PROCEDURE — C1889 IMPLANT/INSERT DEVICE, NOC: HCPCS

## 2022-04-18 PROCEDURE — 36224 PLACE CATH CAROTD ART: CPT

## 2022-04-18 PROCEDURE — 6370000000 HC RX 637 (ALT 250 FOR IP): Performed by: STUDENT IN AN ORGANIZED HEALTH CARE EDUCATION/TRAINING PROGRAM

## 2022-04-18 PROCEDURE — 71045 X-RAY EXAM CHEST 1 VIEW: CPT

## 2022-04-18 PROCEDURE — 85025 COMPLETE CBC W/AUTO DIFF WBC: CPT

## 2022-04-18 PROCEDURE — 36226 PLACE CATH VERTEBRAL ART: CPT

## 2022-04-18 PROCEDURE — 80053 COMPREHEN METABOLIC PANEL: CPT

## 2022-04-18 PROCEDURE — 85027 COMPLETE CBC AUTOMATED: CPT

## 2022-04-18 PROCEDURE — 2500000003 HC RX 250 WO HCPCS

## 2022-04-18 PROCEDURE — 83036 HEMOGLOBIN GLYCOSYLATED A1C: CPT

## 2022-04-18 PROCEDURE — 6360000002 HC RX W HCPCS: Performed by: ANESTHESIOLOGY

## 2022-04-18 PROCEDURE — 61624 TCAT PERM OCCLS/EMBOLJ CNS: CPT | Performed by: PSYCHIATRY & NEUROLOGY

## 2022-04-18 PROCEDURE — 6360000002 HC RX W HCPCS

## 2022-04-18 PROCEDURE — 61210 BURR HOLE IMPLT VENTR CATH: CPT | Performed by: NEUROLOGICAL SURGERY

## 2022-04-18 PROCEDURE — 6360000002 HC RX W HCPCS: Performed by: STUDENT IN AN ORGANIZED HEALTH CARE EDUCATION/TRAINING PROGRAM

## 2022-04-18 PROCEDURE — 83874 ASSAY OF MYOGLOBIN: CPT

## 2022-04-18 PROCEDURE — C1769 GUIDE WIRE: HCPCS

## 2022-04-18 PROCEDURE — 7100000001 HC PACU RECOVERY - ADDTL 15 MIN

## 2022-04-18 PROCEDURE — 2500000003 HC RX 250 WO HCPCS: Performed by: STUDENT IN AN ORGANIZED HEALTH CARE EDUCATION/TRAINING PROGRAM

## 2022-04-18 PROCEDURE — 85730 THROMBOPLASTIN TIME PARTIAL: CPT

## 2022-04-18 PROCEDURE — 6360000004 HC RX CONTRAST MEDICATION: Performed by: PSYCHIATRY & NEUROLOGY

## 2022-04-18 PROCEDURE — 2000000003 HC NEURO ICU R&B

## 2022-04-18 PROCEDURE — 3700000001 HC ADD 15 MINUTES (ANESTHESIA)

## 2022-04-18 PROCEDURE — 99223 1ST HOSP IP/OBS HIGH 75: CPT | Performed by: NEUROLOGICAL SURGERY

## 2022-04-18 PROCEDURE — 96374 THER/PROPH/DIAG INJ IV PUSH: CPT

## 2022-04-18 PROCEDURE — 80061 LIPID PANEL: CPT

## 2022-04-18 PROCEDURE — 2700000000 HC OXYGEN THERAPY PER DAY

## 2022-04-18 PROCEDURE — 36224 PLACE CATH CAROTD ART: CPT | Performed by: PSYCHIATRY & NEUROLOGY

## 2022-04-18 PROCEDURE — 61624 TCAT PERM OCCLS/EMBOLJ CNS: CPT

## 2022-04-18 PROCEDURE — 2580000003 HC RX 258: Performed by: ANESTHESIOLOGY

## 2022-04-18 PROCEDURE — 75898 FOLLOW-UP ANGIOGRAPHY: CPT

## 2022-04-18 PROCEDURE — 82553 CREATINE MB FRACTION: CPT

## 2022-04-18 PROCEDURE — C1894 INTRO/SHEATH, NON-LASER: HCPCS

## 2022-04-18 PROCEDURE — 93005 ELECTROCARDIOGRAM TRACING: CPT | Performed by: STUDENT IN AN ORGANIZED HEALTH CARE EDUCATION/TRAINING PROGRAM

## 2022-04-18 PROCEDURE — 36620 INSERTION CATHETER ARTERY: CPT | Performed by: ANESTHESIOLOGY

## 2022-04-18 PROCEDURE — 82550 ASSAY OF CK (CPK): CPT

## 2022-04-18 PROCEDURE — 84484 ASSAY OF TROPONIN QUANT: CPT

## 2022-04-18 PROCEDURE — 83880 ASSAY OF NATRIURETIC PEPTIDE: CPT

## 2022-04-18 PROCEDURE — 76377 3D RENDER W/INTRP POSTPROCES: CPT | Performed by: PSYCHIATRY & NEUROLOGY

## 2022-04-18 PROCEDURE — 36226 PLACE CATH VERTEBRAL ART: CPT | Performed by: PSYCHIATRY & NEUROLOGY

## 2022-04-18 PROCEDURE — 85347 COAGULATION TIME ACTIVATED: CPT

## 2022-04-18 PROCEDURE — 03VG3DZ RESTRICTION OF INTRACRANIAL ARTERY WITH INTRALUMINAL DEVICE, PERCUTANEOUS APPROACH: ICD-10-PCS | Performed by: PSYCHIATRY & NEUROLOGY

## 2022-04-18 PROCEDURE — 75894 X-RAYS TRANSCATH THERAPY: CPT

## 2022-04-18 PROCEDURE — 2580000003 HC RX 258

## 2022-04-18 PROCEDURE — 76377 3D RENDER W/INTRP POSTPROCES: CPT

## 2022-04-18 PROCEDURE — 87641 MR-STAPH DNA AMP PROBE: CPT

## 2022-04-18 PROCEDURE — 70498 CT ANGIOGRAPHY NECK: CPT

## 2022-04-18 PROCEDURE — 85652 RBC SED RATE AUTOMATED: CPT

## 2022-04-18 PROCEDURE — 7100000000 HC PACU RECOVERY - FIRST 15 MIN

## 2022-04-18 PROCEDURE — 36228 PLACE CATH INTRACRANIAL ART: CPT

## 2022-04-18 PROCEDURE — 87635 SARS-COV-2 COVID-19 AMP PRB: CPT

## 2022-04-18 PROCEDURE — 009630Z DRAINAGE OF CEREBRAL VENTRICLE WITH DRAINAGE DEVICE, PERCUTANEOUS APPROACH: ICD-10-PCS | Performed by: NEUROLOGICAL SURGERY

## 2022-04-18 PROCEDURE — 80048 BASIC METABOLIC PNL TOTAL CA: CPT

## 2022-04-18 RX ORDER — ACETAMINOPHEN 325 MG/1
650 TABLET ORAL EVERY 4 HOURS PRN
Status: DISCONTINUED | OUTPATIENT
Start: 2022-04-18 | End: 2022-04-22

## 2022-04-18 RX ORDER — SODIUM CHLORIDE 0.9 % (FLUSH) 0.9 %
5-40 SYRINGE (ML) INJECTION EVERY 12 HOURS SCHEDULED
Status: DISCONTINUED | OUTPATIENT
Start: 2022-04-18 | End: 2022-05-06 | Stop reason: HOSPADM

## 2022-04-18 RX ORDER — NIMODIPINE 30 MG/1
60 CAPSULE, LIQUID FILLED ORAL
Status: DISCONTINUED | OUTPATIENT
Start: 2022-04-18 | End: 2022-05-06 | Stop reason: HOSPADM

## 2022-04-18 RX ORDER — ONDANSETRON 4 MG/1
4 TABLET, ORALLY DISINTEGRATING ORAL EVERY 8 HOURS PRN
Status: DISCONTINUED | OUTPATIENT
Start: 2022-04-18 | End: 2022-04-19

## 2022-04-18 RX ORDER — FENTANYL CITRATE 50 UG/ML
25 INJECTION, SOLUTION INTRAMUSCULAR; INTRAVENOUS
Status: DISCONTINUED | OUTPATIENT
Start: 2022-04-18 | End: 2022-04-26

## 2022-04-18 RX ORDER — ATORVASTATIN CALCIUM 80 MG/1
80 TABLET, FILM COATED ORAL NIGHTLY
Status: DISCONTINUED | OUTPATIENT
Start: 2022-04-18 | End: 2022-05-06 | Stop reason: HOSPADM

## 2022-04-18 RX ORDER — ASPIRIN 81 MG/1
81 TABLET, CHEWABLE ORAL DAILY
Status: DISCONTINUED | OUTPATIENT
Start: 2022-04-19 | End: 2022-04-19

## 2022-04-18 RX ORDER — ONDANSETRON 2 MG/ML
4 INJECTION INTRAMUSCULAR; INTRAVENOUS EVERY 6 HOURS PRN
Status: DISCONTINUED | OUTPATIENT
Start: 2022-04-18 | End: 2022-05-06 | Stop reason: HOSPADM

## 2022-04-18 RX ORDER — DEXAMETHASONE SODIUM PHOSPHATE 10 MG/ML
INJECTION INTRAMUSCULAR; INTRAVENOUS PRN
Status: DISCONTINUED | OUTPATIENT
Start: 2022-04-18 | End: 2022-04-18 | Stop reason: SDUPTHER

## 2022-04-18 RX ORDER — SODIUM CHLORIDE 0.9 % (FLUSH) 0.9 %
5-40 SYRINGE (ML) INJECTION PRN
Status: DISCONTINUED | OUTPATIENT
Start: 2022-04-18 | End: 2022-05-06 | Stop reason: HOSPADM

## 2022-04-18 RX ORDER — PROPOFOL 10 MG/ML
INJECTION, EMULSION INTRAVENOUS PRN
Status: DISCONTINUED | OUTPATIENT
Start: 2022-04-18 | End: 2022-04-18 | Stop reason: SDUPTHER

## 2022-04-18 RX ORDER — LIDOCAINE HYDROCHLORIDE 10 MG/ML
INJECTION, SOLUTION EPIDURAL; INFILTRATION; INTRACAUDAL; PERINEURAL PRN
Status: DISCONTINUED | OUTPATIENT
Start: 2022-04-18 | End: 2022-04-18 | Stop reason: SDUPTHER

## 2022-04-18 RX ORDER — SODIUM CHLORIDE 9 MG/ML
INJECTION, SOLUTION INTRAVENOUS CONTINUOUS
Status: DISCONTINUED | OUTPATIENT
Start: 2022-04-18 | End: 2022-04-19

## 2022-04-18 RX ORDER — POLYETHYLENE GLYCOL 3350 17 G/17G
17 POWDER, FOR SOLUTION ORAL DAILY PRN
Status: DISCONTINUED | OUTPATIENT
Start: 2022-04-18 | End: 2022-04-22

## 2022-04-18 RX ORDER — FENTANYL CITRATE 50 UG/ML
INJECTION, SOLUTION INTRAMUSCULAR; INTRAVENOUS PRN
Status: DISCONTINUED | OUTPATIENT
Start: 2022-04-18 | End: 2022-04-18 | Stop reason: SDUPTHER

## 2022-04-18 RX ORDER — FENTANYL CITRATE 50 UG/ML
INJECTION, SOLUTION INTRAMUSCULAR; INTRAVENOUS
Status: COMPLETED
Start: 2022-04-18 | End: 2022-04-18

## 2022-04-18 RX ORDER — ONDANSETRON 2 MG/ML
4 INJECTION INTRAMUSCULAR; INTRAVENOUS
Status: DISPENSED | OUTPATIENT
Start: 2022-04-18 | End: 2022-04-18

## 2022-04-18 RX ORDER — CLOPIDOGREL BISULFATE 75 MG/1
75 TABLET ORAL DAILY
Status: DISCONTINUED | OUTPATIENT
Start: 2022-04-19 | End: 2022-04-19

## 2022-04-18 RX ORDER — IODIXANOL 270 MG/ML
96 INJECTION, SOLUTION INTRAVASCULAR
Status: COMPLETED | OUTPATIENT
Start: 2022-04-18 | End: 2022-04-18

## 2022-04-18 RX ORDER — FENTANYL CITRATE 50 UG/ML
50 INJECTION, SOLUTION INTRAMUSCULAR; INTRAVENOUS ONCE
Status: COMPLETED | OUTPATIENT
Start: 2022-04-18 | End: 2022-04-18

## 2022-04-18 RX ORDER — FENTANYL CITRATE 50 UG/ML
50 INJECTION, SOLUTION INTRAMUSCULAR; INTRAVENOUS EVERY 5 MIN PRN
Status: DISCONTINUED | OUTPATIENT
Start: 2022-04-18 | End: 2022-04-19

## 2022-04-18 RX ORDER — ONDANSETRON 4 MG/1
4 TABLET, ORALLY DISINTEGRATING ORAL EVERY 8 HOURS PRN
Status: DISCONTINUED | OUTPATIENT
Start: 2022-04-18 | End: 2022-05-06 | Stop reason: HOSPADM

## 2022-04-18 RX ORDER — FENTANYL CITRATE 50 UG/ML
25 INJECTION, SOLUTION INTRAMUSCULAR; INTRAVENOUS EVERY 5 MIN PRN
Status: COMPLETED | OUTPATIENT
Start: 2022-04-18 | End: 2022-04-18

## 2022-04-18 RX ORDER — PROMETHAZINE HYDROCHLORIDE 25 MG/ML
12.5 INJECTION, SOLUTION INTRAMUSCULAR; INTRAVENOUS ONCE
Status: DISCONTINUED | OUTPATIENT
Start: 2022-04-18 | End: 2022-04-25

## 2022-04-18 RX ORDER — CEFAZOLIN SODIUM 1 G/3ML
INJECTION, POWDER, FOR SOLUTION INTRAMUSCULAR; INTRAVENOUS PRN
Status: DISCONTINUED | OUTPATIENT
Start: 2022-04-18 | End: 2022-04-18 | Stop reason: SDUPTHER

## 2022-04-18 RX ORDER — ONDANSETRON 2 MG/ML
4 INJECTION INTRAMUSCULAR; INTRAVENOUS EVERY 6 HOURS PRN
Status: DISCONTINUED | OUTPATIENT
Start: 2022-04-18 | End: 2022-04-19

## 2022-04-18 RX ORDER — LABETALOL HYDROCHLORIDE 5 MG/ML
10 INJECTION, SOLUTION INTRAVENOUS EVERY 10 MIN PRN
Status: DISCONTINUED | OUTPATIENT
Start: 2022-04-18 | End: 2022-04-24

## 2022-04-18 RX ORDER — SODIUM CHLORIDE 9 MG/ML
INJECTION, SOLUTION INTRAVENOUS CONTINUOUS PRN
Status: DISCONTINUED | OUTPATIENT
Start: 2022-04-18 | End: 2022-04-18 | Stop reason: SDUPTHER

## 2022-04-18 RX ORDER — ONDANSETRON 2 MG/ML
INJECTION INTRAMUSCULAR; INTRAVENOUS PRN
Status: DISCONTINUED | OUTPATIENT
Start: 2022-04-18 | End: 2022-04-18 | Stop reason: SDUPTHER

## 2022-04-18 RX ORDER — SODIUM CHLORIDE 9 MG/ML
INJECTION, SOLUTION INTRAVENOUS PRN
Status: DISCONTINUED | OUTPATIENT
Start: 2022-04-18 | End: 2022-05-06 | Stop reason: HOSPADM

## 2022-04-18 RX ORDER — LEVETIRACETAM 5 MG/ML
500 INJECTION INTRAVASCULAR EVERY 12 HOURS
Status: COMPLETED | OUTPATIENT
Start: 2022-04-18 | End: 2022-04-19

## 2022-04-18 RX ORDER — ROCURONIUM BROMIDE 10 MG/ML
INJECTION, SOLUTION INTRAVENOUS PRN
Status: DISCONTINUED | OUTPATIENT
Start: 2022-04-18 | End: 2022-04-18 | Stop reason: SDUPTHER

## 2022-04-18 RX ADMIN — LIDOCAINE HYDROCHLORIDE 10 MG: 10 INJECTION, SOLUTION EPIDURAL; INFILTRATION; INTRACAUDAL; PERINEURAL at 07:49

## 2022-04-18 RX ADMIN — FENTANYL CITRATE 25 MCG: 50 INJECTION, SOLUTION INTRAMUSCULAR; INTRAVENOUS at 22:58

## 2022-04-18 RX ADMIN — PHENYLEPHRINE HYDROCHLORIDE 100 MCG: 10 INJECTION INTRAVENOUS at 09:49

## 2022-04-18 RX ADMIN — SODIUM CHLORIDE 2.5 MG/HR: 9 INJECTION, SOLUTION INTRAVENOUS at 03:25

## 2022-04-18 RX ADMIN — ACETAMINOPHEN 650 MG: 325 TABLET ORAL at 18:56

## 2022-04-18 RX ADMIN — SODIUM CHLORIDE 10 MG/HR: 9 INJECTION, SOLUTION INTRAVENOUS at 12:18

## 2022-04-18 RX ADMIN — PROPOFOL 150 MG: 10 INJECTION, EMULSION INTRAVENOUS at 07:49

## 2022-04-18 RX ADMIN — IODIXANOL 96 ML: 270 INJECTION, SOLUTION INTRAVASCULAR at 10:43

## 2022-04-18 RX ADMIN — SODIUM CHLORIDE 9.5 MG/HR: 9 INJECTION, SOLUTION INTRAVENOUS at 23:16

## 2022-04-18 RX ADMIN — FENTANYL CITRATE 50 MCG: 50 INJECTION, SOLUTION INTRAMUSCULAR; INTRAVENOUS at 01:00

## 2022-04-18 RX ADMIN — FENTANYL CITRATE 50 MCG: 50 INJECTION, SOLUTION INTRAMUSCULAR; INTRAVENOUS at 09:05

## 2022-04-18 RX ADMIN — PHENYLEPHRINE HYDROCHLORIDE 50 MCG: 10 INJECTION INTRAVENOUS at 09:41

## 2022-04-18 RX ADMIN — FENTANYL CITRATE 100 MCG: 50 INJECTION, SOLUTION INTRAMUSCULAR; INTRAVENOUS at 07:49

## 2022-04-18 RX ADMIN — CEFAZOLIN 2000 MG: 330 INJECTION, POWDER, FOR SOLUTION INTRAMUSCULAR; INTRAVENOUS at 08:49

## 2022-04-18 RX ADMIN — LEVETIRACETAM 500 MG: 5 INJECTION INTRAVENOUS at 13:26

## 2022-04-18 RX ADMIN — PHENYLEPHRINE HYDROCHLORIDE 50 MCG: 10 INJECTION INTRAVENOUS at 09:35

## 2022-04-18 RX ADMIN — ACETAMINOPHEN 650 MG: 325 TABLET ORAL at 14:04

## 2022-04-18 RX ADMIN — IOPAMIDOL 90 ML: 755 INJECTION, SOLUTION INTRAVENOUS at 01:01

## 2022-04-18 RX ADMIN — ROCURONIUM BROMIDE 10 MG: 10 INJECTION INTRAVENOUS at 09:28

## 2022-04-18 RX ADMIN — SODIUM CHLORIDE 9.5 MG/HR: 9 INJECTION, SOLUTION INTRAVENOUS at 16:39

## 2022-04-18 RX ADMIN — ROCURONIUM BROMIDE 10 MG: 10 INJECTION INTRAVENOUS at 09:59

## 2022-04-18 RX ADMIN — FENTANYL CITRATE 25 MCG: 50 INJECTION, SOLUTION INTRAMUSCULAR; INTRAVENOUS at 20:12

## 2022-04-18 RX ADMIN — SODIUM CHLORIDE, PRESERVATIVE FREE 10 ML: 5 INJECTION INTRAVENOUS at 20:16

## 2022-04-18 RX ADMIN — ROCURONIUM BROMIDE 20 MG: 10 INJECTION INTRAVENOUS at 09:05

## 2022-04-18 RX ADMIN — SODIUM CHLORIDE: 9 INJECTION, SOLUTION INTRAVENOUS at 06:04

## 2022-04-18 RX ADMIN — SODIUM CHLORIDE 9 MG/HR: 9 INJECTION, SOLUTION INTRAVENOUS at 13:26

## 2022-04-18 RX ADMIN — ROCURONIUM BROMIDE 10 MG: 10 INJECTION INTRAVENOUS at 08:30

## 2022-04-18 RX ADMIN — PHENYLEPHRINE HYDROCHLORIDE 50 MCG/MIN: 10 INJECTION INTRAVENOUS at 09:56

## 2022-04-18 RX ADMIN — SODIUM CHLORIDE 9.5 MG/HR: 9 INJECTION, SOLUTION INTRAVENOUS at 20:16

## 2022-04-18 RX ADMIN — PHENYLEPHRINE HYDROCHLORIDE 50 MCG: 10 INJECTION INTRAVENOUS at 08:33

## 2022-04-18 RX ADMIN — SODIUM CHLORIDE: 9 INJECTION, SOLUTION INTRAVENOUS at 07:45

## 2022-04-18 RX ADMIN — PHENYLEPHRINE HYDROCHLORIDE 100 MCG: 10 INJECTION INTRAVENOUS at 08:42

## 2022-04-18 RX ADMIN — NIMODIPINE 60 MG: 30 CAPSULE, LIQUID FILLED ORAL at 18:07

## 2022-04-18 RX ADMIN — FENTANYL CITRATE 50 MCG: 50 INJECTION, SOLUTION INTRAMUSCULAR; INTRAVENOUS at 08:17

## 2022-04-18 RX ADMIN — DEXAMETHASONE SODIUM PHOSPHATE 10 MG: 10 INJECTION INTRAMUSCULAR; INTRAVENOUS at 08:17

## 2022-04-18 RX ADMIN — PHENYLEPHRINE HYDROCHLORIDE 50 MCG: 10 INJECTION INTRAVENOUS at 09:53

## 2022-04-18 RX ADMIN — ATORVASTATIN CALCIUM 80 MG: 80 TABLET, FILM COATED ORAL at 20:47

## 2022-04-18 RX ADMIN — PHENYLEPHRINE HYDROCHLORIDE 50 MCG: 10 INJECTION INTRAVENOUS at 08:49

## 2022-04-18 RX ADMIN — SUGAMMADEX 150 MG: 100 INJECTION, SOLUTION INTRAVENOUS at 10:34

## 2022-04-18 RX ADMIN — PHENYLEPHRINE HYDROCHLORIDE 100 MCG: 10 INJECTION INTRAVENOUS at 09:45

## 2022-04-18 RX ADMIN — SODIUM CHLORIDE, PRESERVATIVE FREE 10 ML: 5 INJECTION INTRAVENOUS at 12:21

## 2022-04-18 RX ADMIN — SODIUM CHLORIDE: 9 INJECTION, SOLUTION INTRAVENOUS at 10:39

## 2022-04-18 RX ADMIN — SODIUM CHLORIDE 2.5 MG/HR: 9 INJECTION, SOLUTION INTRAVENOUS at 06:34

## 2022-04-18 RX ADMIN — ROCURONIUM BROMIDE 50 MG: 10 INJECTION INTRAVENOUS at 07:49

## 2022-04-18 RX ADMIN — ONDANSETRON 4 MG: 2 INJECTION INTRAMUSCULAR; INTRAVENOUS at 20:12

## 2022-04-18 RX ADMIN — NIMODIPINE 60 MG: 30 CAPSULE, LIQUID FILLED ORAL at 13:40

## 2022-04-18 RX ADMIN — PHENYLEPHRINE HYDROCHLORIDE 50 MCG: 10 INJECTION INTRAVENOUS at 08:39

## 2022-04-18 RX ADMIN — ONDANSETRON 4 MG: 2 INJECTION INTRAMUSCULAR; INTRAVENOUS at 10:35

## 2022-04-18 RX ADMIN — NIMODIPINE 60 MG: 30 CAPSULE, LIQUID FILLED ORAL at 20:47

## 2022-04-18 ASSESSMENT — ENCOUNTER SYMPTOMS
COUGH: 0
SHORTNESS OF BREATH: 0
NAUSEA: 1
ABDOMINAL PAIN: 0
VOMITING: 1

## 2022-04-18 ASSESSMENT — PULMONARY FUNCTION TESTS
PIF_VALUE: 22
PIF_VALUE: 21
PIF_VALUE: 22
PIF_VALUE: 20
PIF_VALUE: 22
PIF_VALUE: 22
PIF_VALUE: 23
PIF_VALUE: 22
PIF_VALUE: 1
PIF_VALUE: 22
PIF_VALUE: 19
PIF_VALUE: 22
PIF_VALUE: 21
PIF_VALUE: 21
PIF_VALUE: 23
PIF_VALUE: 22
PIF_VALUE: 22
PIF_VALUE: 21
PIF_VALUE: 22
PIF_VALUE: 21
PIF_VALUE: 18
PIF_VALUE: 22
PIF_VALUE: 23
PIF_VALUE: 22
PIF_VALUE: 22
PIF_VALUE: 23
PIF_VALUE: 0
PIF_VALUE: 22
PIF_VALUE: 23
PIF_VALUE: 22
PIF_VALUE: 29
PIF_VALUE: 21
PIF_VALUE: 22
PIF_VALUE: 22
PIF_VALUE: 23
PIF_VALUE: 22
PIF_VALUE: 23
PIF_VALUE: 22
PIF_VALUE: 7
PIF_VALUE: 23
PIF_VALUE: 22
PIF_VALUE: 23
PIF_VALUE: 5
PIF_VALUE: 21
PIF_VALUE: 22
PIF_VALUE: 23
PIF_VALUE: 19
PIF_VALUE: 22
PIF_VALUE: 21
PIF_VALUE: 23
PIF_VALUE: 23
PIF_VALUE: 20
PIF_VALUE: 22
PIF_VALUE: 21
PIF_VALUE: 22
PIF_VALUE: 22
PIF_VALUE: 23
PIF_VALUE: 20
PIF_VALUE: 22
PIF_VALUE: 20
PIF_VALUE: 22
PIF_VALUE: 2
PIF_VALUE: 21
PIF_VALUE: 23
PIF_VALUE: 22
PIF_VALUE: 25
PIF_VALUE: 22
PIF_VALUE: 23
PIF_VALUE: 21
PIF_VALUE: 22
PIF_VALUE: 22
PIF_VALUE: 1
PIF_VALUE: 21
PIF_VALUE: 22
PIF_VALUE: 23
PIF_VALUE: 22
PIF_VALUE: 23
PIF_VALUE: 22
PIF_VALUE: 32
PIF_VALUE: 23
PIF_VALUE: 22
PIF_VALUE: 23
PIF_VALUE: 22
PIF_VALUE: 23
PIF_VALUE: 22
PIF_VALUE: 23
PIF_VALUE: 23
PIF_VALUE: 21
PIF_VALUE: 6
PIF_VALUE: 22
PIF_VALUE: 20
PIF_VALUE: 22
PIF_VALUE: 23
PIF_VALUE: 23
PIF_VALUE: 21
PIF_VALUE: 22
PIF_VALUE: 22
PIF_VALUE: 18
PIF_VALUE: 22
PIF_VALUE: 23
PIF_VALUE: 23
PIF_VALUE: 22
PIF_VALUE: 22
PIF_VALUE: 23
PIF_VALUE: 22
PIF_VALUE: 21
PIF_VALUE: 21
PIF_VALUE: 22
PIF_VALUE: 22
PIF_VALUE: 23
PIF_VALUE: 22
PIF_VALUE: 23
PIF_VALUE: 22
PIF_VALUE: 22
PIF_VALUE: 21
PIF_VALUE: 4
PIF_VALUE: 23
PIF_VALUE: 23
PIF_VALUE: 22
PIF_VALUE: 11
PIF_VALUE: 19
PIF_VALUE: 23
PIF_VALUE: 22
PIF_VALUE: 23
PIF_VALUE: 21
PIF_VALUE: 21
PIF_VALUE: 22
PIF_VALUE: 21
PIF_VALUE: 22
PIF_VALUE: 22
PIF_VALUE: 21
PIF_VALUE: 22
PIF_VALUE: 23
PIF_VALUE: 9
PIF_VALUE: 22
PIF_VALUE: 2
PIF_VALUE: 22
PIF_VALUE: 22
PIF_VALUE: 21
PIF_VALUE: 21
PIF_VALUE: 22
PIF_VALUE: 23
PIF_VALUE: 22
PIF_VALUE: 22
PIF_VALUE: 5
PIF_VALUE: 22
PIF_VALUE: 21
PIF_VALUE: 23
PIF_VALUE: 22
PIF_VALUE: 19
PIF_VALUE: 23
PIF_VALUE: 10
PIF_VALUE: 22
PIF_VALUE: 23
PIF_VALUE: 19

## 2022-04-18 ASSESSMENT — PAIN SCALES - GENERAL
PAINLEVEL_OUTOF10: 0
PAINLEVEL_OUTOF10: 3
PAINLEVEL_OUTOF10: 7
PAINLEVEL_OUTOF10: 3
PAINLEVEL_OUTOF10: 0
PAINLEVEL_OUTOF10: 8

## 2022-04-18 ASSESSMENT — PAIN DESCRIPTION - LOCATION: LOCATION: HEAD

## 2022-04-18 ASSESSMENT — PAIN DESCRIPTION - FREQUENCY: FREQUENCY: CONTINUOUS

## 2022-04-18 ASSESSMENT — PAIN DESCRIPTION - DESCRIPTORS: DESCRIPTORS: ACHING;CONSTANT;DISCOMFORT

## 2022-04-18 ASSESSMENT — PAIN DESCRIPTION - PAIN TYPE: TYPE: ACUTE PAIN

## 2022-04-18 ASSESSMENT — PAIN DESCRIPTION - ORIENTATION: ORIENTATION: RIGHT

## 2022-04-18 ASSESSMENT — LIFESTYLE VARIABLES: SMOKING_STATUS: 1

## 2022-04-18 NOTE — ED PROVIDER NOTES
[facility] ED  EMERGENCY DEPARTMENT ENCOUNTER      Pt Name: Ryan Lee  MRN: [id number]  [place] 1990  Date of evaluation: 4/17/2022  Provider: Dixon Burns MD    CHIEF COMPLAINT       Chief Complaint   Patient presents with    Migraine     frontal, sudden sharp onset with blurry vision onset 1 hour PTA    Hemoptysis         HISTORY OF PRESENT ILLNESS   (Location/Symptom, Timing/Onset, Context/Setting, Quality, Duration, Modifying Factors, Severity)  Note limiting factors. Ryan Lee is a 29 y.o. female who presents to the emergency department concern for a sudden headache approxi-1 hour prior to arrival.  Patient's not on any antiplatelet or anticoagulation therapy. Denies any acute focal neuro deficits complains of feeling dizzy nauseous. States headache is diffuse. Denies any known trauma. HPI    Nursing Notes were reviewed. REVIEW OF SYSTEMS    (2-9 systems for level 4, 10 or more for level 5)     Review of Systems   All other systems reviewed and are negative. Except as noted above the remainder of the review of systems was reviewed and negative. PAST MEDICAL HISTORY   History reviewed. No pertinent past medical history.       SURGICAL HISTORY       Past Surgical History:   Procedure Laterality Date    DILATION AND CURETTAGE OF UTERUS N/A 3/21/2022    DILATATION AND CURETTAGE HYSTEROSCOPY CAUTERY ABLATION-NOVASURE ENDOMETRIAL ABLATION performed by Sergio Melo DO at [address] [facility] [place] Right 3/21/2022    LAPAROSCOPIC- RIGHT OOPHERECTOMY performed by Sergio Melo, DO at [address] [facility] SALPINGECTOMY Bilateral 3/21/2022    SALPINGECTOMY LAPAROSCOPIC performed by Sergio Melo DO at [address]       Discharge Medication List as of 4/17/2022 11:46 PM      CONTINUE these medications which have NOT CHANGED    Details   ferrous sulfate (IRON 325) 325 (65 Fe) MG tablet Take 325 mg by mouth daily (with breakfast)Historical Med      ketorolac (TORADOL) 10 MG tablet Take 1 tablet by mouth every 6 hours as needed for Pain, Disp-20 tablet, R-0Normal      azithromycin (ZITHROMAX) 250 MG tablet One a day x 10 days, Disp-10 tablet, R-0Normal             ALLERGIES     Patient has no known allergies. FAMILY HISTORY     History reviewed. No pertinent family history. SOCIAL HISTORY       Social History     Socioeconomic History    Marital status: Single     Spouse name: None    Number of children: None    Years of education: None    Highest education level: None   Occupational History    None   Tobacco Use    Smoking status: Former Smoker     Packs/day: 0.50     Years: 14.00     Pack years: 7.00     Types: Cigarettes     Quit date: 2022     Years since quittin.2    Smokeless tobacco: Never Used   Vaping Use    Vaping Use: Some days    Substances: Nicotine   Substance and Sexual Activity    Alcohol use: Yes     Comment: occ    Drug use: No    Sexual activity: Not Currently     Partners: Male     Birth control/protection: Condom   Other Topics Concern    None   Social History Narrative    None     Social Determinants of Health     Financial Resource Strain: Low Risk     Difficulty of Paying Living Expenses: Not hard at all   Food Insecurity: No Food Insecurity    Worried About Running Out of Food in the Last Year: Never true    Moy of Food in the Last Year: Never true   Transportation Needs:     Lack of Transportation (Medical): Not on file    Lack of Transportation (Non-Medical):  Not on file   Physical Activity:     Days of Exercise per Week: Not on file    Minutes of Exercise per Session: Not on file   Stress:     Feeling of Stress : Not on file   Social Connections:     Frequency of Communication with Friends and Family: Not on file    Frequency of Social Gatherings with Friends and Family: Not on file    Attends Judaism Services: Not on file   [employer] or Organizations: Not on file    Attends Club or Organization Meetings: Not on file    Marital Status: Not on file   Intimate Partner Violence:     Fear of Current or Ex-Partner: Not on file    Emotionally Abused: Not on file    Physically Abused: Not on file    Sexually Abused: Not on file   Housing Stability:     Unable to Pay for Housing in the Last Year: Not on file    Number of [place] in the Last Year: Not on file    Unstable Housing in the Last Year: Not on file       SCREENINGS         Ransom Coma Scale  Eye Opening: Spontaneous  Best Verbal Response: Oriented  Best Motor Response: Obeys commands  Ransom Coma Scale Score: 15                     CIWA Assessment  BP: (!) 160/102  Pulse: 81                 PHYSICAL EXAM    (up to 7 for level 4, 8 or more for level 5)     ED Triage Vitals [04/17/22 2214]   BP Temp Temp Source Pulse Resp SpO2 Height Weight   (!) 197/106 98 °F (36.7 °C) Tympanic 80 19 100 % -- --       Physical Exam  Constitutional:       General: She is in acute distress. Appearance: She is well-developed. She is ill-appearing. She is not diaphoretic. HENT:      Head: Normocephalic and atraumatic. Eyes:      General:         Right eye: No discharge. Left eye: No discharge. Neck:      Trachea: No tracheal deviation. Cardiovascular:      Rate and Rhythm: Normal rate and regular rhythm. Heart sounds: No murmur heard. No friction rub. Pulmonary:      Effort: Pulmonary effort is normal. No respiratory distress. Breath sounds: No stridor. No wheezing or rales. Chest:      Chest wall: No tenderness. Abdominal:      General: There is no distension. Palpations: Abdomen is soft. There is no mass. Tenderness: There is no abdominal tenderness. There is no guarding or rebound. Musculoskeletal:         General: No tenderness or deformity. Normal range of motion. Cervical back: Normal range of motion. Skin:     General: Skin is warm. Findings: No erythema or rash. Neurological:      General: No focal deficit present. Mental Status: She is alert and oriented to person, place, and time. Psychiatric:         Behavior: Behavior normal.         DIAGNOSTIC RESULTS     EKG: All EKG's are interpreted by the Emergency Department Physician who either signs or Co-signs this chart in the absence of a cardiologist.        RADIOLOGY:   Non-plain film images such as CT, Ultrasound and MRI are read by the radiologist. Plain radiographic images are visualized and preliminarily interpreted by the emergency physician with the below findings:        Interpretation per the Radiologist below, if available at the time of this note:    CT Head WO Contrast   Final Result   Diffuse subarachnoid hemorrhage. Recommend CTA for further evaluation. Findings were given to Dr. Nicole Braden in the ED on 4/17/2022 at 10:48 p.m. ED BEDSIDE ULTRASOUND:   Performed by ED Physician - none    LABS:  Labs Reviewed   APTT - Abnormal; Notable for the following components:       Result Value    PTT 25.3 (*)     All other components within normal limits   CBC WITH AUTO DIFFERENTIAL - Abnormal; Notable for the following components:    WBC 13.7 (*)     Immature Granulocytes 1 (*)     Segs Absolute 8.82 (*)     All other components within normal limits   PROTIME-INR       All other labs were within normal range or not returned as of this dictation. EMERGENCY DEPARTMENT COURSE and DIFFERENTIAL DIAGNOSIS/MDM:   Vitals:    Vitals:    04/17/22 2304 04/17/22 2309 04/17/22 2314 04/17/22 2319   BP: (!) 159/102  (!) 177/116 (!) 160/102   Pulse: 83 78 73 81   Resp: 20 19 18 20   Temp:       TempSrc:       SpO2:               MDM  Number of Diagnoses or Management Options  Diagnosis management comments: 82-year-old female present with concern for a sudden onset headache.   Initial assessment patient was hemodynamically stable with no acute focal neuro deficits but due to the intensity of her headache with concern for possible subarachnoid hemorrhage and a head CT was ordered. CT was concerning for a diffuse subarachnoid hemorrhage. Patient was provided Keppra IV, nicardipine infusion started to control patient's systolic blood pressure, below 140. Patient was transferred to [facility]'s neurosurgeon spoken to, who is agreed with management plan. At time of being transferred patient was maintaining her airway GCS was 15. Amount and/or Complexity of Data Reviewed  Clinical lab tests: reviewed  Tests in the radiology section of CPT®: reviewed          REASSESSMENT          CRITICAL CARE TIME   Total Critical Care time was  minutes, excluding separately reportable procedures. There was a high probability of clinically significant/life threatening deterioration in the patient's condition which required my urgent intervention. CONSULTS:  None    PROCEDURES:  Unless otherwise noted below, none     Procedures        FINAL IMPRESSION      1. Subarachnoid hemorrhage due to cerebral aneurysm [facility])          DISPOSITION/PLAN   DISPOSITION Decision To Transfer 04/17/2022 11:41:53 PM      PATIENT REFERRED TO:  No follow-up provider specified. DISCHARGE MEDICATIONS:  Discharge Medication List as of 4/17/2022 11:46 PM        Controlled Substances Monitoring:     No flowsheet data found.     (Please note that portions of this note were completed with a voice recognition program.  Efforts were made to edit the dictations but occasionally words are mis-transcribed.)    Timo Jaramillo MD (electronically signed)  Attending Emergency Physician            Timo Jaramillo MD  04/18/22 [id number]

## 2022-04-18 NOTE — SEDATION DOCUMENTATION
vascade deployed at this time. dr Halima Zaldivar holding manual pressure.  anesthesia to attempt extubation

## 2022-04-18 NOTE — ED PROVIDER NOTES
[facility]  ED  Emergency Department Encounter  Emergency Medicine Resident     Pt Name: Sravan Weaver  MRN: [id number]  [place] 1990  Date of evaluation: 4/18/22  PCP:  DEEDEE Ness CNP    CHIEF COMPLAINT       Chief Complaint   Patient presents with    Altered Mental Status       HISTORY OFPRESENT ILLNESS  (Location/Symptom, Timing/Onset, Context/Setting, Quality, Duration, Modifying Factors,Severity.)      Sravan Weaver is a 29 y. o.yo female who presents to the emergency room, transferred from [place] after found to have subarachnoid hemorrhage. Patient reports that prior to arrival to [place], her headache was sudden onset, started about 2 hours prior to initial arrival.  On arrival, patient was a bit confused, lethargic, found to have diffuse subarachnoid hemorrhage. Patient was started on Cardene drip for pressure less than 140s, was transferred to [facility] for further management and care by neuro critical care team.  Patient was recurrently vomiting on her way to [facility], was given multiple doses of antiemetics. Arrival, patient reports that her vomiting is much better, she denies any chest pain, shortness of breath, abdominal pain. Patient states her headache is mild now. PAST MEDICAL / SURGICAL / SOCIAL / FAMILY HISTORY      has no past medical history on file. has a past surgical history that includes salpingectomy (Bilateral, 3/21/2022); ovarian cyst removal (Right, 3/21/2022); and Dilation and curettage of uterus (N/A, 3/21/2022).      Social History     Socioeconomic History    Marital status: Single     Spouse name: Not on file    Number of children: Not on file    Years of education: Not on file    Highest education level: Not on file   Occupational History    Not on file   Tobacco Use    Smoking status: Former Smoker     Packs/day: 0.50     Years: 14.00     Pack years: 7.00     Types: Cigarettes     Quit date: 1/20/2022     Years since quittin.2    Smokeless tobacco: Never Used   Vaping Use    Vaping Use: Some days    Substances: Nicotine   Substance and Sexual Activity    Alcohol use: Yes     Comment: occ    Drug use: No    Sexual activity: Not Currently     Partners: Male     Birth control/protection: Condom   Other Topics Concern    Not on file   Social History Narrative    Not on file     Social Determinants of Health     Financial Resource Strain: Low Risk     Difficulty of Paying Living Expenses: Not hard at all   Food Insecurity: No Food Insecurity    Worried About Running Out of Food in the Last Year: Never true    Moy of Food in the Last Year: Never true   Transportation Needs:     Lack of Transportation (Medical): Not on file    Lack of Transportation (Non-Medical): Not on file   Physical Activity:     Days of Exercise per Week: Not on file    Minutes of Exercise per Session: Not on file   Stress:     Feeling of Stress : Not on file   Social Connections:     Frequency of Communication with Friends and Family: Not on file    Frequency of Social Gatherings with Friends and Family: Not on file    Attends Restoration Services: Not on file    Active Member of [address] [place] [address] or Organizations: Not on file    Attends Club or Organization Meetings: Not on file    Marital Status: Not on file   Intimate Partner Violence:     Fear of Current or Ex-Partner: Not on file    Emotionally Abused: Not on file    Physically Abused: Not on file    Sexually Abused: Not on file   Housing Stability:     Unable to Pay for Housing in the Last Year: Not on file    Number of [place] in the Last Year: Not on file    Unstable Housing in the Last Year: Not on file       No family history on file. Allergies:  Patient has no known allergies. Home Medications:  Prior to Admission medications    Medication Sig Start Date End Date Taking?  Authorizing Provider   ferrous sulfate (IRON 325) 325 (65 Fe) MG tablet Take 325 mg by mouth daily (with breakfast)    Historical Provider, MD   ketorolac (TORADOL) 10 MG tablet Take 1 tablet by mouth every 6 hours as needed for Pain  Patient not taking: Reported on 4/17/2022 3/21/22 3/21/23  Yesi Griffiths PA-C   azithromycin [facility]) 250 MG tablet One a day x 10 days  Patient not taking: Reported on 4/17/2022 2/22/22   DEEDEE Jordan - ALEXI       REVIEW OFSYSTEMS    (2-9 systems for level 4, 10 or more for level 5)      Review of Systems   Respiratory: Negative for cough and shortness of breath. Cardiovascular: Negative for chest pain and leg swelling. Gastrointestinal: Positive for nausea and vomiting. Negative for abdominal pain. Skin: Negative for rash and wound. Neurological: Positive for headaches. Psychiatric/Behavioral: Positive for confusion. Negative for behavioral problems. PHYSICAL EXAM   (up to 7 for level 4, 8 or more forlevel 5)      INITIAL VITALS:   ED Triage Vitals   BP Temp Temp src Pulse Resp SpO2 Height Weight   -- -- -- -- -- -- -- --       Physical Exam  HENT:      Head: Normocephalic. Mouth/Throat:      Mouth: Mucous membranes are moist.   Eyes:      Pupils: Pupils are equal, round, and reactive to light. Comments: Reports there are 2 mm however they are equal and reactive   Cardiovascular:      Rate and Rhythm: Normal rate. Pulmonary:      Effort: Pulmonary effort is normal. No respiratory distress. Abdominal:      General: There is no distension. Tenderness: There is no abdominal tenderness. Musculoskeletal:         General: No deformity or signs of injury. Cervical back: No rigidity or tenderness. Skin:     General: Skin is warm. Capillary Refill: Capillary refill takes less than 2 seconds. Coloration: Skin is not jaundiced. Neurological:      Mental Status: She is alert. GCS: GCS eye subscore is 3. GCS verbal subscore is 4. GCS motor subscore is 6. Comments:  On assessement, patient with GCS of 13 given that she does not open her eyes spontaneously only to voice. She does answer questions however she is a little bit slow to answer and slightly confused. She does not have any facial asymmetry, no dysarthric speech. She is able to plantarflex and dorsiflex her feet. Patient also able to sweeze both of my fingers. Unable to assess gait because patient is laying on bed   Psychiatric:         Mood and Affect: Mood normal.         Behavior: Behavior normal.         DIFFERENTIAL  DIAGNOSIS     PLAN (LABS / IMAGING / EKG):  Orders Placed This Encounter   Procedures     [facility]    Inpatient consult to Neurosurgery    Inpatient consult to Neurocritical care    End Tidal CO2 Continuous    EKG 12 Lead    ADMIT TO INPATIENT       MEDICATIONS ORDERED:  Orders Placed This Encounter   Medications    DISCONTD: dilTIAZem 125 mg in dextrose 5 % 125 mL infusion     Order Specific Question:   Titrate Infusion? Answer:   Yes     Order Specific Question:   Initial Infusion Dose: Answer:   5 mg/hr     Order Specific Question:   Goal of Therapy is: Answer: Other     Order Specific Question:   Other     Answer:   blood pressure goal of 120-140     Order Specific Question:   Contact Provider if:     Answer:   SBP less than 90 mmHg     Order Specific Question:   Contact Provider if:     Answer:   HR less than 60 bpm     Order Specific Question:   HOLD for     Answer:   SBP less than 90 mmHg     Order Specific Question:   HOLD for     Answer:   HR less than 60 bpm    promethazine (PHENERGAN) injection 12.5 mg    niCARdipine (CARDENE) 25 mg in sodium chloride 0.9 % 250 mL infusion     Order Specific Question:   Titrate Infusion? Answer:   Yes     Order Specific Question:   Initial Infusion Rate: Answer:   5 mg/hr     Order Specific Question:   Goal of Therapy is: Answer:    Other     Order Specific Question:   Other Goal:     Answer:   goal of 120-140     Order Specific Question:   Contact Provider if:     Answer:   Patient is receiving the maximum dose and is not achieving the goal of therapy    iopamidol (ISOVUE-370) 76 % injection 90 mL    fentaNYL (SUBLIMAZE) injection 50 mcg    fentaNYL (SUBLIMAZE) 100 MCG/2ML injection     Vesta Lew: cabinet override       Initial MDM/Plan: 29 y.o. female who presents with subarachnoid hemorrhage. Head of bed elevated at 30 degrees  Cardene drip restart for goal pressure of 449-163 systolic  Phenergan 46.4 mg IM ordered as needed for vomit  Neurosurgery and neuro critical care team consult  We will repeat EKG and get CMP labs  Patient to be admitted  DIAGNOSTIC RESULTS / [address] [place] [address] / [facility]     LABS:  Labs Reviewed   STROKE PANEL - Abnormal; Notable for the following components:       Result Value    WBC 15.4 (*)     Seg Neutrophils 85 (*)     Lymphocytes 9 (*)     Immature Granulocytes 1 (*)     Segs Absolute 13.28 (*)     All other components within normal limits         RADIOLOGY:  CT Head WO Contrast    Result Date: 4/17/2022  EXAMINATION: CT OF THE HEAD WITHOUT CONTRAST  4/17/2022 10:30 pm TECHNIQUE: CT of the head was performed without the administration of intravenous contrast. Dose modulation, iterative reconstruction, and/or weight based adjustment of the mA/kV was utilized to reduce the radiation dose to as low as reasonably achievable. COMPARISON: None. HISTORY: Acute headache. FINDINGS: BRAIN/VENTRICLES: Diffuse subarachnoid hemorrhage bilaterally. No midline shift. The gray-white differentiation is maintained without evidence of an acute infarct. No hydrocephalus. ORBITS: The visualized portion of the orbits demonstrate no acute abnormality. SINUSES: The visualized paranasal sinuses and mastoid air cells demonstrate no acute abnormality. SOFT TISSUES/SKULL:  No acute abnormality of the visualized skull or soft tissues. Diffuse subarachnoid hemorrhage. Recommend CTA for further evaluation. Findings were given to Dr. Kym Jaimes in the ED on 4/17/2022 at 10:48 p.m. CTA HEAD NECK W CONTRAST    Result Date: 4/18/2022  EXAMINATION: CTA OF THE HEAD AND NECK WITH CONTRAST 4/18/2022 12:50 am: TECHNIQUE: CTA of the head and neck was performed with the administration of intravenous contrast. Multiplanar reformatted images are provided for review. MIP images are provided for review. Stenosis of the internal carotid arteries measured using NASCET criteria. Dose modulation, iterative reconstruction, and/or weight based adjustment of the mA/kV was utilized to reduce the radiation dose to as low as reasonably achievable. COMPARISON: None. HISTORY: ORDERING SYSTEM PROVIDED HISTORY: acute headache TECHNOLOGIST PROVIDED HISTORY: acute headache Decision Support Exception - unselect if not a suspected or confirmed emergency medical condition->Emergency Medical Condition (MA) FINDINGS: CTA NECK: AORTIC ARCH/ARCH VESSELS: No dissection or arterial injury. No significant stenosis of the brachiocephalic or subclavian arteries. CAROTID ARTERIES: No dissection, arterial injury, or hemodynamically significant stenosis by NASCET criteria. VERTEBRAL ARTERIES: No dissection, arterial injury, or significant stenosis. SOFT TISSUES: The lung apices are clear. No cervical or superior mediastinal lymphadenopathy. The larynx and pharynx are unremarkable. No acute abnormality of the salivary and thyroid glands. BONES: No acute osseous abnormality. CTA HEAD: ANTERIOR CIRCULATION: No significant stenosis of the intracranial internal carotid, anterior cerebral, or middle cerebral arteries. 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 POSTERIOR CIRCULATION: No significant stenosis of the vertebral, basilar, or posterior cerebral arteries. No aneurysm. OTHER: No dural venous sinus thrombosis on this non-dedicated study. BRAIN: No mass effect or midline shift.  No extra-axial fluid collection. The gray-white differentiation is maintained. Aneurysm arising from the anterior communicating artery measuring 3 mm x 2 mm. Findings were discussed with Dr. Brandon Waller At 1:13 am on 4/18/2022. EKG      All EKG's are interpreted by the Emergency Department Physicianwho either signs or Co-signs this chart in the absence of a cardiologist.    EMERGENCY DEPARTMENT COURSE:          PROCEDURES:  None    CONSULTS:  IP CONSULT TO NEUROSURGERY  IP CONSULT TO NEUROCRITICAL CARE    CRITICAL CARE:      FINAL IMPRESSION      1. SAH (subarachnoid hemorrhage) ([facility] [address]          DISPOSITION / PLAN     DISPOSITION Admitted 04/18/2022 01:12:38 AM      PATIENT REFERRED TO:  No follow-up provider specified.     DISCHARGE MEDICATIONS:  New Prescriptions    No medications on file       Maggie Gupta MD  Emergency Medicine Resident    (Please note that portions of this note were completed with a voice recognition program.Efforts were made to edit the dictations but occasionally words are mis-transcribed.)        Maggie Gupta MD  Resident  04/18/22 [id number]

## 2022-04-18 NOTE — SIGNIFICANT EVENT
Herlinda [address]  Flight Physician       Pt Name: Elsa Nation  MRN: [id number]  [place] 1990  Date of evaluation: 04/18/22    REASON FOR FLIGHT      Patient was transported from [place] to [address] [place] [address] due to need for higher level of care. HISTORY OF PRESENT ILLNESS     Elsa Nation is a 46-year-old female with history of ablation, pelvic adhesions, menorrhagia who presented to [facility] with a 2-hour history of severe headache that started spontaneously. Noted a history of striking her head on a cabinet about 2 days prior but otherwise denies any recent trauma other than a new piercing over the right eyebrow last Thursday with persistent bruise to the area. While at [facility] it was noted she also had high-frequency hematemesis and patient stated that this started along with a headache. CT head demonstrated subarachnoid hemorrhage and [facility] was called for emergent transfer. Patient was noted to have severe hypertension at [facility] and so IV nicardipine was started with an initial dose of 5 mg/h. In addition 1 g of Keppra, 10 mg Decadron, 4 mg Zofran was administered with no change in patient condition. Upon arrival [address] team notes a female in acute distress, sitting forward holding her head and retching into a pan. Pants are noted to be soaked with urine but no history of witnessed seizure. Access: 20-gauge IV bilateral [facility]    PMH: Ablation, laparoscopic bilateral salpingectomy with right oophorectomy and D&C May 21, 2022    Meds:   Prior to Admission medications    Medication Sig Start Date End Date Taking?  Authorizing Provider   ferrous sulfate (IRON 325) 325 (65 Fe) MG tablet Take 325 mg by mouth daily (with breakfast)    Historical Provider, MD   ketorolac (TORADOL) 10 MG tablet Take 1 tablet by mouth every 6 hours as needed for Pain  Patient not taking: Reported on 4/17/2022 3/21/22 3/21/23  Meliza Lugo PA-C azithromycin (ZITHROMAX) 250 MG tablet One a day x 10 days  Patient not taking: Reported on 4/17/2022 2/22/22   DEEDEE Allen CNM       Allergies: No Known Allergies    [facility] Arrival Time: 2321    VS: HR 83 /106 RR 24 SPO2 99%    Physical Exam:  Gen: Unwell appearing female who is mentating appropriately but sitting forward holding her head and retching into a pan, hematemesis noted  HEENT: Normocephalic but patient refuses to open eyes for pupillary exam.  After pain and nausea control pupils appear equal and reactive  Neuro: GCS 15, patient alert and oriented x3, RACE score 0  CV: Regular rate and rhythm  Pulm: No respiratory distress, saturating appropriately on room air  Abd: Nondistended  MSK: Moving all 4 extremities spontaneously, able to transition self over to [address] Time: 2349    MDM:  Patient is a 79-year-old female with recent history of OB/GYN procedures in March of this year who presents with severe headache and CT diagnostic of subarachnoid hemorrhage, CTA pending. Initially nicardipine, antiepileptics, steroids, fluids, antiemetics provided but patient continued to vomit uncontrollably. An additional 4 mg of Zofran was administered at bedside at approximately 2320 with minimal improvement in condition and this was followed by a one-time dose of 25 mg IM Phenergan. Pain control provided with initial dose of 100 mcg fentanyl. After these medications patient stopped vomiting and appeared to be resting comfortably, stated her pain was significantly improved as well as nausea. Nicardipine was adjusted from 5 to 2.5 due to adequate blood pressure control. Slept comfortably on the trip to [facility] although patient did require another dose of 100 mcg fentanyl during takeoff secondary to shaking of the helicopter causing worsening symptoms of headache.   During flight fingerstick glucose obtained and found to be 153, temperature 97.0. Nicardipine remained at 2.5 throughout the trip. Care transferred at bedside to emergency room team at [facility], patient resting comfortably at that time and states her symptoms were still present but better controlled. All questions answered prior to departure. Gloria 69 MEDICATIONS     Medications at sending facility: Started 1 L normal saline, 4 mg Zofran at 2252, 10 mg Decadron, Cardene at 5 mg/h started at 2313, 1 g Keppra    Medications in flight: Fentanyl 100x2 with one-time dose of 50 mcg of fentanyl upon landing at [facility], Zofran 4 mg, Phenergan 25 mg IM, nicardipine adjusted to 2.5 and remained at that level throughout flight. IV fluids continued, total about 700 cc of the original 1 L normal saline bag administered prior to handoff. PROCEDURES/Ultrasound:  None    CRITICAL CARE:  There was a high probability of this patient's clinical status decompensating without my direct intervention. Total critical care time was 35 minutes.       Volodymyr Lange MD  [facility] Physician     (Please note that portions of this note were completed with a voice recognition program.  Efforts were made to edit the dictations but occasionally words are mis-transcribed.)

## 2022-04-18 NOTE — CONSULTS
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 Qing Villela is a 30 y/o f with no significant past medical history, chromic smoking, marijuana abuse presented to [facility]. V as a transfer from [facility] as patient was found to have diffuse SAH on CT head. Apparently patient was at her usual state of health at around 9 PM last night. Patient started having acute onset intense headache. Patient described the headache as worst headache of her life. Patient was then taken to ER where she received IV Zofran and Phenergan. CT head showed diffuse subarachnoid hemorrhage. Patient received CT angiogram head and neck at [facility], which showed anterior communicating artery aneurysm. Therefore endovascular team was consulted for further management. This morning patient complained that her headaches has been improved. Patient appeared to be sleepy. Allergies  has No Known Allergies. Medications  Prior to Admission medications    Medication Sig Start Date End Date Taking?  Authorizing Provider   ferrous sulfate (IRON 325) 325 (65 Fe) MG tablet Take 325 mg by mouth daily (with breakfast)    Historical Provider, MD   ketorolac (TORADOL) 10 MG tablet Take 1 tablet by mouth every 6 hours as needed for Pain  Patient not taking: Reported on 4/17/2022 3/21/22 3/21/23  Clementine Rust PA-C   azithromycin [facility]) 250 MG tablet One a day x 10 days  Patient not taking: Reported on 4/17/2022 2/22/22   DEEDEE Avery CNM    Scheduled Meds:  [address] [place] [address] promethazine  12.5 mg IntraMUSCular Once    atorvastatin  80 mg Oral Nightly    niMODipine  60 mg Oral 6 times per day     Continuous Infusions:   niCARdipine 2.5 mg/hr (04/18/22 0634)    sodium chloride 125 mL/hr at 04/18/22 0604     PRN Meds:.ondansetron **OR** ondansetron, polyethylene glycol, labetalol  Past Medical History   has no past medical history on file.  Past Surgical History   has a past surgical history that includes salpingectomy (Bilateral, 3/21/2022); ovarian cyst removal (Right, 3/21/2022); and Dilation and curettage of uterus (N/A, 3/21/2022). Social History   reports that she quit smoking about 2 months ago. Her smoking use included cigarettes. She has a 7.00 pack-year smoking history. She has never used smokeless tobacco.   reports current alcohol use. reports no history of drug use. Family History  family history is not on file. Review of Systems:  CONSTITUTIONAL:  negative for fevers, chills, fatigue and malaise    EYES:  negative for double vision, blurred vision and photophobia     HEENT:  negative for tinnitus, epistaxis and sore throat    RESPIRATORY:  negative for cough, shortness of breath, wheezing    CARDIOVASCULAR:  negative for chest pain, palpitations, syncope, edema    GASTROINTESTINAL:  negative for nausea, vomiting    GENITOURINARY:  negative for incontinence    MUSCULOSKELETAL:  negative for neck or back pain    NEUROLOGICAL:  Negative for weakness and tingling  negative for headaches and dizziness    PSYCHIATRIC:  negative for anxiety      Review of systems otherwise negative. OBJECTIVE:     Vitals:    04/18/22 0700   BP: 128/87   Pulse: 89   Resp: 15   Temp:    SpO2: 98%        General:  Gen: normal habitus, NAD  HEENT: NCAT, mucosa moist  Cvs: RRR, S1 S2 normal  Resp: symmetric unlabored breathing  Abd: s/nd/nt  Ext: no edema  Skin: no lesions seen, warm and dry    Neuro:  Gen: drowsy but easily arousable, oriented x3. Lang/speech: no aphasia or dysarthria. Follows commands. CN: PERRL, EOMI, VFF, V1-3 intact, face symmetric, hearing intact, shoulder shrug symmetric, tongue midline  Motor: grossly 5/5 UE and LE b/l  Sense: LT intact in all 4 ext.   Coord: FTN and HTS intact b/l  DTR: deferred  Gait: narrow base gait    NIH Stroke Scale:   1a  Level of consciousness: 1 - not alert but arousable by minor stimulation to obey, answer or respond   1b. LOC questions:  0 - answers both questions correctly   1c. LOC commands: 0 - performs both tasks correctly   2. Best Gaze: 0 - normal   3. Visual: 0 - no visual loss   4. Facial Palsy: 0 - normal symmetric movement   5a. Motor left arm: 0 - no drift, limb holds 90 (or 45) degrees for full 10 seconds   5b. Motor right arm: 0 - no drift, limb holds 90 (or 45) degrees for full 10 seconds   6a. Motor left le - no drift; leg holds 30 degree position for full 5 seconds   6b  Motor right le - no drift; leg holds 30 degree position for full 5 seconds   7. Limb Ataxia: 0 - absent   8. Sensory: 0 - normal; no sensory loss   9. Best Language:  0 - no aphasia, normal   10. Dysarthria: 0 - normal   11. Extinction and Inattention: 0 - no abnormality         Total:   0     MRS: 0      LABS:   Reviewed. Lab Results   Component Value Date    HGB 14.6 2022    WBC 12.2 (H) 2022     2022     2022    BUN 9 2022    CREATININE 0.62 2022    AST 25 2022    ALT 25 2022    APTT 22.6 2022    INR 0.9 2022      Lab Results   Component Value Date    COVID19 Not Detected 2022    COVID19 Not Detected 2022       RADIOLOGY:   Images were personally reviewed including:  CT head:   Diffuse subarachnoid hemorrhage. CTA head and neck; Aneurysm arising from the anterior communicating artery measuring 3 mm x 2 mm. ASSESSMENT:    30 y/o f with no significant past medical history, chromic smoking, marijuana abuse presented to [facility]. V as a transfer from [facility] [place] [facility] as patient was found to have diffuse SAH on CT head. CTA head significant for ACOM artery aneurysm measuring 3 mm x 2 mm. HH 03, mFS 03  PBD:01   Patient was therefore recommended to have diagnostic cerebral angiogram with possible endovascular intervention.    Risks and benefits discussed, risks include but are not limited to bruising, stroke, subarachnoid hemorrhage, death, retroperitoneal hematoma, pseudoaneurysm, lower extremity/renal/peripheral vascular compromise, informed consent obtained. PLAN:   --SBP goal  mm hg   --Emergent diagnostic cerebral angiogram with possible intervention of ruptured ACOm aneurysm   --EVD placement with ICP monitoring per neurosurgery   --Neuro ICU admission post procedure   --[facility] management with daily TCDs, Nimodipine, Mg level 3-4    Case discussed with Dr. Angela Eldridge attending.     Debbi Chaudhry MD    Stroke, [facility]  [id number]  Electronically signed 4/18/2022 at 7:53 AM

## 2022-04-18 NOTE — PROGRESS NOTES
Physical Therapy        Physical Therapy Cancel Note      DATE: 2022    NAME: Aby Alves  MRN: [id number]   : 1990      Patient not seen this date for Physical Therapy due to:    Surgery/Procedure: pt off unit at IR.  PT will check back as time allows this PM.       Electronically signed by Penelope Cutler PT on 2022 at 9:54 AM

## 2022-04-18 NOTE — ED NOTES
[facility] called back with Dr Phoebe Torres from 's, connected call to Dr Joann Banks  04/17/22 7940

## 2022-04-18 NOTE — ANESTHESIA PRE PROCEDURE
Department of Anesthesiology  Preprocedure Note       Name:  Burr Cogan   Age:  29 y.o.  :  1990                                          MRN:  [id number]         Date:  2022      Surgeon: Dr. Menard Staff    Procedure: IR ANGIOGRAM CAROTID CEREBRAL BILATERAL      Medications prior to admission:   Prior to Admission medications    Medication Sig Start Date End Date Taking?  Authorizing Provider   ferrous sulfate (IRON 325) 325 (65 Fe) MG tablet Take 325 mg by mouth daily (with breakfast)    Historical Provider, MD   ketorolac (TORADOL) 10 MG tablet Take 1 tablet by mouth every 6 hours as needed for Pain  Patient not taking: Reported on 2022 3/21/22 3/21/23  Mendoza Altamirano PA-C   azithromycin [facility]) 250 MG tablet One a day x 10 days  Patient not taking: Reported on 2022   DEEDEE Dodge CNM       Current medications:    Current Facility-Administered Medications   Medication Dose Route Frequency Provider Last Rate Last Admin    promethazine (PHENERGAN) injection 12.5 mg  12.5 mg IntraMUSCular Once Wilmer Singh MD        niCARdipine (CARDENE) 25 mg in sodium chloride 0.9 % 250 mL infusion  2.5-15 mg/hr IntraVENous Continuous Assumpta C Kody Oconnell MD 25 mL/hr at 22 0634 2.5 mg/hr at 22 0634    ondansetron (ZOFRAN-ODT) disintegrating tablet 4 mg  4 mg Oral Q8H PRN Darryl Latif MD        Or    ondansetron (ZOFRAN) injection 4 mg  4 mg IntraVENous Q6H PRN Darryl Olvera MD        polyethylene glycol (GLYCOLAX) packet 17 g  17 g Oral Daily PRN Darryl Olvera MD        0.9 % sodium chloride infusion   IntraVENous Continuous Jennifer Wing  mL/hr at 22 0604 New Bag at 22 0604    atorvastatin (LIPITOR) tablet 80 mg  80 mg Oral Nightly Darryl Olvera MD        labetalol (NORMODYNE;TRANDATE) injection 10 mg  10 mg IntraVENous Q10 Min PRN Darryl Olvera MD        niMODipine (NIMOTOP) capsule 60 mg  60 mg Oral 6 times per day Jennifer Wing MD Allergies:  No Known Allergies    Problem List:    Patient Active Problem List   Diagnosis Code    Tobacco use Z72.0    Gastroesophageal reflux disease K21.9    Pelvic adhesions N73.6    Menorrhagia with regular cycle N92.0    Complex cyst of right ovary N83.291    SAH (subarachnoid hemorrhage) ([facility]) I60.9    Aneurysm of anterior communicating artery I67.1       Past Medical History:  History reviewed. No pertinent past medical history.     Past Surgical History:        Procedure Laterality Date    DILATION AND CURETTAGE OF UTERUS N/A 3/21/2022    DILATATION AND CURETTAGE HYSTEROSCOPY CAUTERY ABLATION-NOVASURE ENDOMETRIAL ABLATION performed by Gomez Chua DO at [address] Right 3/21/2022    LAPAROSCOPIC- RIGHT OOPHERECTOMY performed by Gomez Chua DO at [address] SALPINGECTOMY Bilateral 3/21/2022    SALPINGECTOMY LAPAROSCOPIC performed by Gomez Chua DO at [facility] History:    Social History     Tobacco Use    Smoking status: Former Smoker     Packs/day: 0.50     Years: 14.00     Pack years: 7.00     Types: Cigarettes     Quit date: 2022     Years since quittin.2    Smokeless tobacco: Never Used   Substance Use Topics    Alcohol use: Yes     Comment: occ                                Counseling given: Not Answered      Vital Signs (Current):   Vitals:    22 0630 22 0643 22 0645 22 0700   BP: 123/78   128/87   Pulse: 90  80 89   Resp: 15  14 15   Temp:       TempSrc:       SpO2: 96%  98% 98%   Weight:  161 lb 13.1 oz (73.4 kg)     Height:                                                  BP Readings from Last 3 Encounters:   22 128/87   22 118/81   22 (!) 160/102       NPO Status:                                                                                 BMI:   Wt Readings from Last 3 Encounters:   22 161 lb 13.1 oz (73.4 kg)   22 160 lb (72.6 kg)   22 160 lb (72.6 kg)     Body mass index is 29.6 kg/m². CBC:   Lab Results   Component Value Date    WBC 12.2 04/18/2022    RBC 4.87 04/18/2022    RBC 4.35 09/29/2011    HGB 14.6 04/18/2022    HCT 43.3 04/18/2022    MCV 88.9 04/18/2022    RDW 12.7 04/18/2022     04/18/2022     09/29/2011       CMP:   Lab Results   Component Value Date     04/18/2022    K 4.2 04/18/2022     04/18/2022    CO2 19 04/18/2022    BUN 9 04/18/2022    CREATININE 0.62 04/18/2022    GFRAA >60 04/18/2022    LABGLOM >60 04/18/2022    GLUCOSE 150 04/18/2022    PROT 7.6 04/18/2022    CALCIUM 8.9 04/18/2022    BILITOT 0.43 04/18/2022    ALKPHOS 83 04/18/2022    AST 25 04/18/2022    ALT 25 04/18/2022       POC Tests: No results for input(s): POCGLU, POCNA, POCK, POCCL, POCBUN, POCHEMO, POCHCT in the last 72 hours. Coags:   Lab Results   Component Value Date    PROTIME 10.2 04/18/2022    INR 0.9 04/18/2022    APTT 22.6 04/18/2022       HCG (If Applicable):   Lab Results   Component Value Date    PREGTESTUR NEGATIVE 03/21/2022    HCGQUANT <1 02/19/2022        ABGs: No results found for: PHART, PO2ART, NTD3UQG, OOO7NWX, BEART, I0VSKGMA     Type & Screen (If Applicable):  No results found for: LABABO, LABRH    Drug/Infectious Status (If Applicable):  No results found for: HIV, HEPCAB    COVID-19 Screening (If Applicable):   Lab Results   Component Value Date    COVID19 Not Detected 04/18/2022    COVID19 Not Detected 03/16/2022           Anesthesia Evaluation  Patient summary reviewed and Nursing notes reviewed no history of anesthetic complications (first anesthetic.):   Airway: Mallampati: III  TM distance: >3 FB   Neck ROM: full  Mouth opening: > = 3 FB Dental: normal exam         Pulmonary:normal exam  breath sounds clear to auscultation  (+) current smoker          Patient smoked on day of surgery.                  Cardiovascular:Negative CV ROS  Exercise tolerance: good (>4 METS),           Rhythm: regular  Rate: normal           Beta Blocker:  Not on Beta Blocker         Neuro/Psych:   Negative Neuro/Psych ROS  (+) CVA:, headaches:,             GI/Hepatic/Renal:   (+) GERD: well controlled,           Endo/Other: Negative Endo/Other ROS                    Abdominal:             Vascular: negative vascular ROS. Other Findings:             Anesthesia Plan      general     ASA 3 - emergent       Induction: intravenous. arterial line  MIPS: Postoperative opioids intended and Prophylactic antiemetics administered. Anesthetic plan and risks discussed with patient and Unable to obtain due to emergent nature. Plan discussed with CRNA.                 Lake Doyle MD   4/18/2022

## 2022-04-18 NOTE — PROGRESS NOTES
Pt transported to Recovery from Neuro IR with CRNA and WILLIAM. Assessment unchanged. Continue to closely monitor.      Post Procedure Transfer from IR Table  [x] Tubes and Lines intact     Post Procedure Neuro-Checks/NIHSS/Vitals  [x] Completed post procedure  [x] Completed bedside handoff  [x] Frequency ordered  [x] Verbal communication of frequency      Post Procedure Puncture Site Checks  [x] Completed post procedure  [x] Completed bedside handoff  [x] Frequency ordered  [x] Verbal communication of frequency    Post Procedure Pulse  Checks  [x] Completed post procedure  [x] Completed bedside handoff  [x] Frequency ordered  [x] Verbal communication of frequency    Order Set  [x] Post Neuro-Endo Procedure  [] Stroke  [] t-PA     B/P control  [x] Verbal Communication   [x] Order in Care Path    Medication Review  [x] Given during procedure  [x] Current drips/meds/fluids    [x] Physician Notified of All changes in Assessment    Family  [x] Location Post Procedure; sister brought to recovery waiting room

## 2022-04-18 NOTE — ED NOTES
[facility]ed [facility] for transfer to [facility]. They will page neuro surg. [facility] auto launched.  Waiting for call back     Az Berrios  04/17/22 9230

## 2022-04-18 NOTE — RESEARCH
Writer RN to pt bedside to discuss potential involvement in SURF study. Pt currently resting. Writer RN educated pt on SURF study & possible enrollment. answered all questions regarding study participation. Copy of ICF left with patient for review. Neuro research team to follow up and answer any questions regarding research consent. Please call the Neuro Research Office with any questions.     Boris Saenz  [phone]

## 2022-04-18 NOTE — ED PROVIDER NOTES
[address] [facility]     Emergency Department     Faculty Attestation    I performed a history and physical examination of the patient and discussed management with the resident. I have reviewed and agree with the residents findings including all diagnostic interpretations, and treatment plans as written. Any areas of disagreement are noted on the chart. I was personally present for the key portions of any procedures. I have documented in the chart those procedures where I was not present during the key portions. I have reviewed the emergency nurses triage note. I agree with the chief complaint, past medical history, past surgical history, allergies, medications, social and family history as documented unless otherwise noted below. Documentation of the HPI, Physical Exam and Medical Decision Making performed by scribsvetlana is based on my personal performance of the HPI, PE and MDM. For Physician Assistant/ Nurse Practitioner cases/documentation I have personally evaluated this patient and have completed at least one if not all key elements of the E/M (history, physical exam, and MDM). Additional findings are as noted. 30 yo F sudden onset ha / vomiting, dx sah on ct [place],   C/o ha, vomiting, given fentanyl & phenergan by flight,   On nicardepine,   pe sbp 142  Pt drowsy, awakens with voice, se, neck supple,     Neurosurgery  //admitted    EKG Interpretation    Interpreted by me  Normal sinus, hr 79, st elevation, normal axis, qtc 454    CRITICAL CARE: There was a high probability of clinically significant/life threatening deterioration in this patient's condition which required my urgent intervention. Total critical care time was 10 minutes. This excludes any time for separately reportable procedures.        Rosalinda 24, DO  04/18/22 [address] [facility],  [address], DO  04/18/22 [address] [facility],  [address], DO  04/18/22 9996

## 2022-04-18 NOTE — SEDATION DOCUMENTATION
Pt arrives to neuro IR on monitor, drowsy. Pt requires multiple stimulation to open eyes. PERRLA 3 Alert to self, place. NIH 4 for alertness, speech slurred, decreased sensation on right. Intubated per anesthesia  Art line placed per anesthesia  Leija catheter placed per RN, clear yellow urine obtained without complication. Bilateral pedal pulses palpable and marked. Continue to closely monitor.

## 2022-04-18 NOTE — PLAN OF CARE
Problem: Falls - Risk of:  Goal: Will remain free from falls  Description: Will remain free from falls  Outcome: Ongoing  Goal: Absence of physical injury  Description: Absence of physical injury  Outcome: Ongoing     Problem: Anxiety/Stress:  Goal: Level of anxiety will decrease  Description: Level of anxiety will decrease  Outcome: Ongoing     Problem: Mental Status - Impaired:  Goal: Mental status will be restored to baseline  Description: Mental status will be restored to baseline  Outcome: Ongoing     Problem: Pain:  Goal: Pain level will decrease  Description: Pain level will decrease  Outcome: Ongoing  Goal: Recognizes and communicates pain  Description: Recognizes and communicates pain  Outcome: Ongoing  Goal: Control of acute pain  Description: Control of acute pain  Outcome: Ongoing  Goal: Control of chronic pain  Description: Control of chronic pain  Outcome: Ongoing     Problem: Skin Integrity - Impaired:  Goal: Will show no infection signs and symptoms  Description: Will show no infection signs and symptoms  Outcome: Ongoing  Goal: Absence of new skin breakdown  Description: Absence of new skin breakdown  Outcome: Ongoing

## 2022-04-18 NOTE — H&P
Neuro ICU History & Physical    Patient Name: Merari Akers  Patient : 1990  Room/Bed:   Code Status: FULL Code   Allergies: No Known Allergies    CHIEF COMPLAINT   Worst headache of life  HPI    History Obtained From: patient and EMR review     The patient is a 29 y.o.  female who presents to our emergency department 2022 via [address] as a transfer from [facility] with acute worst headache of her life. Patient does not have any significant past medical history in our chart and denies any. At outlying facility patient underwent CT head without demonstrating diffuse subarachnoid hemorrhage. Patient was seen by telestroke physician in our group recommending transfer to our facility for further work-up. In the emergency department patient states that she was washing TV with her child when she began to have severe pounding headache 10 out of 10 worst in her life. Patient noted to be severely nauseous requiring multiple doses of IV Zofran and IM Phenergan. Patient also admitting to photosensitivity. Patient had significant hypertension at [place] started on IV nicardipine given 1 g of Keppra 10 mg Decadron. Arrival to our emergency department 140/102, heart rate 77 temperature 97.6. Initial labs PTT 25.3, INR 1.0, WBC 13.7, platelets 674, glucose 139 and high-sensitivity troponin less than 6. 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 Case discussed with senior endovascular fellow who is aware of case and current neurologic exam planning for emergent DSA 2022 early morning. For [facility] Hunt&De Souza: 2, Modified Combs: grade 3       Admitted to ICU From: ED 24  Reason for ICU Admission: diffuse SAH with ACOM aneurysm        PATIENT HISTORY   Past Medical History:    No past medical history on file.     Past Surgical History:        Procedure Laterality Date    DILATION AND CURETTAGE OF UTERUS N/A 3/21/2022    DILATATION AND CURETTAGE HYSTEROSCOPY CAUTERY ABLATION-NOVASURE ENDOMETRIAL ABLATION performed by Flores Greenwood DO at [address] Right 3/21/2022    LAPAROSCOPIC- RIGHT OOPHERECTOMY performed by Flores Greenwood DO at [facility] SALPINGECTOMY Bilateral 3/21/2022    SALPINGECTOMY LAPAROSCOPIC performed by Flores Greenwood DO at [facility] History:   Social History     Socioeconomic History    Marital status: Single     Spouse name: Not on file    Number of children: Not on file    Years of education: Not on file    Highest education level: Not on file   Occupational History    Not on file   Tobacco Use    Smoking status: Former Smoker     Packs/day: 0.50     Years: 14.00     Pack years: 7.00     Types: Cigarettes     Quit date: 2022     Years since quittin.2    Smokeless tobacco: Never Used   Vaping Use    Vaping Use: Some days    Substances: Nicotine   Substance and Sexual Activity    Alcohol use: Yes     Comment: occ    Drug use: No    Sexual activity: Not Currently     Partners: Male     Birth control/protection: Condom   Other Topics Concern    Not on file   Social History Narrative    Not on file     Social Determinants of Health     Financial Resource Strain: Low Risk     Difficulty of Paying Living Expenses: Not hard at all   Food Insecurity: No Food Insecurity    Worried About Running Out of Food in the Last Year: Never true    Moy of Food in the Last Year: Never true   Transportation Needs:     Lack of Transportation (Medical): Not on file    Lack of Transportation (Non-Medical):  Not on file   Physical Activity:     Days of Exercise per Week: Not on file    Minutes of Exercise per Session: Not on file   Stress:     Feeling of Stress : Not on file   Social Connections:     Frequency of Communication with Friends and Family: Not on file    Frequency of Social Gatherings with Friends and Family: Not on file    Attends Gnosticist Services: Not on file    Active Member of Clubs or Organizations: Not on file    Attends Club or Organization Meetings: Not on file    Marital Status: Not on file   Intimate Partner Violence:     Fear of Current or Ex-Partner: Not on file    Emotionally Abused: Not on file    Physically Abused: Not on file    Sexually Abused: Not on file   Housing Stability:     Unable to Pay for Housing in the Last Year: Not on file    Number of [place] in the Last Year: Not on file    Unstable Housing in the Last Year: Not on file       Family History:   No family history on file. Allergies:    Patient has no known allergies. Medications Prior to Admission:    Not in a hospital admission.     Current Medications:  Current Facility-Administered Medications: promethazine (PHENERGAN) injection 12.5 mg, 12.5 mg, IntraMUSCular, Once  niCARdipine (CARDENE) 25 mg in sodium chloride 0.9 % 250 mL infusion, 2.5-15 mg/hr, IntraVENous, Continuous    REVIEW OF SYSTEMS     CONSTITUTIONAL:  In acute distress severe photosensitivity and debilitating headache   EYES:  Positive for photosensitivity difficulty keeping her eyes straight forward   HEENT: negative for tinnitus and sore throat   RESPIRATORY: negative for cough, shortness of breath   CARDIOVASCULAR: negative for chest pain, palpitations, or syncope   GASTROINTESTINAL: negative for abdominal pain, nausea, vomiting, diarrhea, or constipation    GENITOURINARY: negative for incontinence or retention    MUSCULOSKELETAL: negative for neck or back pain, negative for extremity pain   NEUROLOGICAL:  Positive for worse headache of her life   PSYCHIATRIC: negative for agitation, hallucination, SI/HI   SKIN  positive for recent right superior eyelid piercing with surrounding bruising and edema     PHYSICAL EXAM:     BP (!) 130/93   Pulse 88   Temp 97.6 °F (36.4 °C) (Oral)   Resp 16   Ht 5' 2\" (1.575 m)   Wt 160 lb (72.6 kg)   SpO2 99%   BMI 29.26 kg/m²     PHYSICAL EXAM:  CONSTITUTIONAL:  Well developed, well nourished, somnolent although patient is oriented to year month and president, in acute distress. GCS 14. Nontoxic. No dysarthria. No aphasia. HEAD:  normocephalic, atraumatic    EYES:  PERRLA, EOMI.   ENT:  moist mucous membranes   LUNGS:  Equal air entry bilaterally   CARDIOVASCULAR:  normal s1 / s2   ABDOMEN:  Soft, no rigidity   NECK supple, symmetric, no midline tenderness to palpation    BACK without midline tenderness, step-offs or deformities    EXTREMITIES Normal ROM with no deformities   NEUROLOGIC:  Mental Status:  A & O x3,somnolent             Cranial Nerves:    cranial nerves II-XII are grossly intact    Motor Exam:    Drift:  absent  Tone:  normal    Motor exam is symmetrical 5 out of 5 all extremities bilaterally    Sensory:    Touch:    Right Upper Extremity:  normal  Left Upper Extremity:  normal  Right Lower Extremity:  normal  Left Lower Extremity:  normal    Deep Tendon Reflexes:    Right Bicep:  1+  Left Bicep:  1+  Right Knee:  1+  Left Knee:  1+    Plantar Response:  Right:  downgoing  Left:  downgoing    Clonus:  absent  Andino's:  absent    Coordination/Dysmetria:  Heel to Shin:  Right:  normal  Left:  normal  Finger to Nose:   Right:  normal  Left:  normal      SKIN  recent right eye superior eyelid piercing with bruising   NIH Stroke Scale Total (if not done complete detailed one below):    1a.  Level of consciousness:  1 - not alert but arousable by minor stimulation to obey, answer or respond  1b. Level of consciousness questions:  0 - answers both questions correctly  1c. Level of consciousness questions:  0 - performs both tasks correctly  2. Best Gaze:  0 - normal  3. Visual:  0 - no visual loss  4. Facial Palsy:  0 - normal symmetric movement  5a. Motor left arm:  0 - no drift, limb holds 90 (or 45) degrees for full 10 seconds  5b. Motor right arm:  0 - no drift, limb holds 90 (or 45) degrees for full 10 seconds  6a. Motor left le - no drift; leg holds 30 degree position for full 5 seconds  6b. Motor right le - no drift; leg holds 30 degree position for full 5 seconds  7. Limb Ataxia:  0 - absent  8. Sensory:  0 - normal; no sensory loss  9. Best Language:  0 - no aphasia, normal  10. Dysarthria:  0 - normal  11. Extinction and Inattention:  0 - no abnormality  NIH 1   LABS AND IMAGING:     RECENT LABS:  CBC with Differential:    Lab Results   Component Value Date    WBC 15.4 2022    RBC 4.44 2022    RBC 4.35 2011    HGB 13.2 2022    HCT 39.6 2022     2022     2011    MCV 89.2 2022    MCH 29.7 2022    MCHC 33.3 2022    RDW 12.7 2022    LYMPHOPCT 9 2022    MONOPCT 3 2022    BASOPCT 1 2022    MONOSABS 0.46 2022    LYMPHSABS 1.39 2022    EOSABS 0.07 2022    BASOSABS 0.08 2022    DIFFTYPE NOT REPORTED 2022     BMP:    Lab Results   Component Value Date     2022    K 3.8 2022     2022    CO2 22 2022    BUN 12 2022    CREATININE 0.67 2022    CALCIUM 8.4 2022    GFRAA >60 2022    LABGLOM >60 2022    GLUCOSE 139 2022       RADIOLOGY:   CT head WO 22 @10:48 PM   Impression   Diffuse subarachnoid hemorrhage.  Recommend CTA for further evaluation. CTA head and neck 22   Impression   Aneurysm arising from the anterior communicating artery measuring 3 mm x 2 mm. Labs and Images reviewed with:    [x] Zelda Dang MD    [] Misha Goff MD  [] Tracy Haile MD  --[] there are no new interval images to review. ASSESSMENT AND PLAN:         ASSESSMENT:     This is a 29 y.o. female with acute worst headache of her life found to have diffuse subarachnoid hemorrhage presenting to [address] [place] [address].   Patient denies any significant past medical history and does not take any daily medications. 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 Patient admitted under neuro ICU services with endovascular neurosurgery consulted planning for urgent evaluation and likely DSA 4/18/2022 first thing in the morning    Patient care will be discussed with attending, will reevaluate patient along with attending. PLAN/MEDICAL DECISION MAKING:      NEUROLOGIC:  - Endovascular Neurosurgery consult for diffuse SAH- discussed with senior endovascular fellow at time of CTA head and neck demonstrating ACOM aneurysm and given stable neurologic exam planning for DSA first thing in morning but not emergently at this time   - Imaging   CT head WO-diffuse SAH  CTA head and Neck-ACOM aneurysm   - AEDs loaded 1gram keppra at tiffin  Continue 500mg BID   - Goal SBP <140  -nimodipine 60mg Q4 hours  -FU Mag with goal 2.5-3.0   - Neuro checks per protocol    CARDIOVASCULAR:  - Goal SBP <140  - titrate with cardene  - PRN IV labetalol 10mg Q4 hour for SBP >140  - Continue telemetry    PULMONARY:  - 99% on room air       RENAL/FLUID/ELECTROLYTE:  - BUN 12/ Creatinine . 67  - Urine output strict intake and out put monitoring   - IVF: 100 CC/hr NACL   - Replace electrolytes PRN  - Daily BMP    GI/NUTRITION:  NUTRITION: NPO prior to DSA with endovascular   No diet orders on file  - Bowel regimen: glycolax  - GI prophylaxis: protonix     ID:  - Tmax 97.6  - WBC 15.4  - FU CXR   - Continue to monitor for fevers  - Daily CBC    HEME:   - H&H 13.2/39.6  - Platelets 442  -PTT 95.8, INR 1.0   - Daily CBC    ENDOCRINE:  - Continue to monitor blood glucose, goal <180  - FU HBA1C and lipid panel   -TSH with reflex     OTHER:  - PT/OT/ST     PROPHYLAXIS:  Stress ulcer: not indicated at this time     DVT PROPHYLAXIS:  - SCD sleeves - Thigh High   - GERMAINE stockings - Thigh High  - No chemoprophylaxis anticoagulation at this time.       DISPOSITION: admit to Farhan Abdalla MD   PGY-3 Neurology Resident   Neuro Critical Care Service   Pager [phone]  4/18/2022     3:40 AM

## 2022-04-18 NOTE — ANESTHESIA PROCEDURE NOTES
Arterial Line:    An arterial line was placed using surface landmarks, in the procedure area for the following indication(s): continuous blood pressure monitoring and blood sampling needed. A 20 gauge (size), 4.45 cm (length), Arrow (type) catheter was placed, Seldinger technique used, into the left radial artery, secured by tape and Tegaderm. Anesthesia type: General    Events:  patient tolerated procedure well with no complications.   4/18/2022 7:56 AM4/18/2022 8:01 AM  Anesthesiologist: Mayelin Short MD  Performed: Anesthesiologist   Preanesthetic Checklist  Completed: patient identified, IV checked, site marked, risks and benefits discussed, surgical consent, monitors and equipment checked, pre-op evaluation, timeout performed, anesthesia consent given, oxygen available and patient being monitored

## 2022-04-18 NOTE — ED NOTES
29year old female    Sudden onset headache     CT showing [facility]    Concern for ruptured aneurysm    Neurosurgery aware    flight           Demi Schaefer RN  04/18/22 004
Pt. Presents to the ED from [facility] via life flight. Pt. Was seen at [facility] for a headache, dizziness and blurred vision. Pts. CT scan showed a sub arachnoid bleed. Pt. Is oriented x4 but she quiet somnolent but arousable. Pt. Answers questions appropriately. Pt. Was also vomiting at [facility] and was provided phenergan and fentanyl for pain.       Sho Waller RN  04/18/22 1968
Pure wick applied to patient.      Diana Chaudhry RN  04/18/22 6906
Report called to MICU. Pt. Handed off to [facility] until patient is transported upstairs.      Landon Hanley RN  04/18/22 1098
Sudden numbness or weakness of the face, arm, or leg, especially on one side of the body. Confusion, trouble speaking or understanding. Trouble seeing in one or both eyes. Trouble walking, dizziness, loss of balance or coordination. Severe headache.

## 2022-04-18 NOTE — ANESTHESIA POSTPROCEDURE EVALUATION
Department of Anesthesiology  Postprocedure Note    Patient: Merari Akers  MRN: [id number]  YOB: 1990  Date of evaluation: 4/18/2022  Time:  2:24 PM     Procedure Summary     Date: 04/18/22 Room / Location: [address] [place] [address] 83 Procedures    Anesthesia Start: 5205 Anesthesia Stop: 6420    Procedure: IR ANGIOGRAM CAROTID CEREBRAL BILATERAL Diagnosis: (Carotid Stent)    Scheduled Providers:  Responsible Provider: Lebron Valderrama MD    Anesthesia Type: general ASA Status: 3 - Emergent          Anesthesia Type: general    Priscilla Phase I: Priscilla Score: 8    Priscilla Phase II:      Last vitals: Reviewed and per EMR flowsheets.      POST-OP ANESTHESIA NOTE       BP (!) 140/97   Pulse 100   Temp 99.4 °F (37.4 °C) (Oral)   Resp (!) 7   Ht 5' 2\" (1.575 m)   Wt 161 lb 13.1 oz (73.4 kg)   SpO2 100%   BMI 29.60 kg/m²    Pain Assessment: FLACC  Pain Level: 3       Anesthesia Post Evaluation    Patient location during evaluation: PACU  Patient participation: complete - patient participated  Level of consciousness: awake  Pain score: 3  Airway patency: patent  Nausea & Vomiting: no vomiting and no nausea  Complications: no  Cardiovascular status: hemodynamically stable  Respiratory status: acceptable  Hydration status: stable

## 2022-04-18 NOTE — PROGRESS NOTES
[facility]  Speech Language Pathology    Date: 4/18/2022  Patient Name: Merari Akers  YOB: 1990   AGE: 29 y.o.   MRN: [id number]        Patient Not Available for Speech Therapy     Due to:  [] Testing  [] Hemodialysis  [] Cancelled by RN  [] Surgery   [] Intubation/Sedation/Pain Medication  [] Medical instability  [x] Other: Pt off the floor for procedure    Next scheduled treatment: later this date as appropriate or 4/19/22  Completed by: ELIU Casiano, M.S. [address] [facility], [place]

## 2022-04-18 NOTE — PROCEDURES
Time Out Performed    verbal consent was obtained from patient's sister after discussing the indications for the procedure as well as the risks patient radiographic imaging. Risks were discussed including seizure bleeding stroke infection and CSF diversion dependence. I reviewed with him the scan showing obstructive hydrocephalus indicating the need for EVD placement for CSF diversion    No complications or specimens     Procedure:     Imaging and labs reviewed. Right kochers point shaved, prepped, draped in sterile fashion. Sagittal incision over kochers made and retractor place. Trephination performed and dura perforated with spinal needle. EVD catheter advanced and csf clear return noted at 6mm and soft passed to 6.5cm. Catheter tunnelled and secured with nylon. Incision closed and catheter secured to head using combination of nylon running and staples.      Plan: keep open at 0436 [address] DO  Neurosurgery  O: Caroline  C: [address] [facility]

## 2022-04-18 NOTE — BRIEF OP NOTE
Brief Postoperative Note                  [facility]    NEUROENDOVASCULAR SERVICE: POST-OP NOTE: 4/18/2022    Pt Name: Ebony Jones  MRN: [id number]  [place]: 1990  Date of Procedure: 4/18/2022  Primary Care Physician: DEEDEE Ya CNP      Pre-Procedural Diagnosis:SAH secondary to ruptured ACOM aneurysm   Post-Procedural Diagnosis:Same as above       Procedure Performed:Endovascular primary coil embolization of the ruptured ACOM aneurysm     Surgeon:   Ada Kerr MD    Fellow:  MD Sumit Stephen MD, PhD    1st Assistant:  Ellie Dunne    PRE-PROCEDURAL EXAM:  Prestroke baseline mRS MODIFIED BRENDA SCORE: 0 - No symptoms at all. Neurological exam performed and unchanged from initial H&P or consult   Rosen and De Souza 3      Anesthesia: General Anesthesia  Complications: none    Intra-Operative EXAM:  Neurological exam performed and unchanged from initial H&P or consult    EBL: < less than 100       Cc            Specimens: Were not Obtained  Contrast:     Visipaque 270 low osmolar 96 Cc             Fluoro: 38.8 min    Findings:  Please see dictated Radiology note for further details:  --Wide necked inferiorly and anteriorly oriented ruptured ACOM aneurysm measuring neck 2.93 mm, height 2.82 mm, width 3.13 mm, length 2.78 mm   --The above aneurysm was treated with endovascular primary coil embolization using 6 fr short sheath, Neuron 070, SL-10 microcatheter and 3 penumbra smart coils. ( one coil deployed, not detached and later discarded)   --Final Vu Score class I   --Right fetal PCA  --Dominant right vertebral artery       Vu score: class I         POST-PROCEDURAL EXAM :   Stable neurological Exam  Neurological exam performed and unchanged from initial H&P or consult    Closure:  right Vascade 6   F        POST-PROCEDURAL MONITORING : see orders  Disposition: Neuro ICU      Recommendations:  --Back to Neuro ICU   --Do not bend right leg for 3 hours.  --Groin checks per protocol. --Peripheral pulse checks per protocol. --SBP goal 100-140 mm hg   --SAH therapy with daily TCDs, Nimodipine, Mg goal 3-4, Statins   --EVD management per Neurosurgery  --Follow up with Dr. Lillie Garvin 2 weeks after discharge and Dr. Cherri Talbot 3 months after discharge.     Lori Bates MD fellow    Alejandro Eric MD   Pager [phone]  Stroke, [facility]  [facility] [address]  Electronically signed 4/18/2022 at 10:48 AM

## 2022-04-18 NOTE — PLAN OF CARE
Problem: Falls - Risk of:  Goal: Will remain free from falls  Description: Will remain free from falls  4/18/2022 1833 by Te Plaza RN  Outcome: Ongoing  4/18/2022 0721 by Yazan Jimenez RN  Outcome: Ongoing  Goal: Absence of physical injury  Description: Absence of physical injury  4/18/2022 1833 by Te Plaza RN  Outcome: Ongoing  4/18/2022 0721 by Yazan Jimenez RN  Outcome: Ongoing     Problem: Anxiety/Stress:  Goal: Level of anxiety will decrease  Description: Level of anxiety will decrease  4/18/2022 1833 by Te Plaza RN  Outcome: Ongoing  4/18/2022 0721 by Yazan Jimenez RN  Outcome: Ongoing     Problem: Mental Status - Impaired:  Goal: Mental status will be restored to baseline  Description: Mental status will be restored to baseline  4/18/2022 1833 by Te Plaza RN  Outcome: Ongoing  4/18/2022 0721 by Yazan Jimenez RN  Outcome: Ongoing     Problem: Pain:  Goal: Pain level will decrease  Description: Pain level will decrease  4/18/2022 1833 by Te Plaza RN  Outcome: Ongoing  4/18/2022 0721 by Yazan Jimenez RN  Outcome: Ongoing  Goal: Recognizes and communicates pain  Description: Recognizes and communicates pain  4/18/2022 1833 by Te Plaza RN  Outcome: Ongoing  4/18/2022 0721 by Yazan Jimenez RN  Outcome: Ongoing  Goal: Control of acute pain  Description: Control of acute pain  4/18/2022 1833 by Te Plaza RN  Outcome: Ongoing  4/18/2022 0721 by Yazan Jimenez RN  Outcome: Ongoing  Goal: Control of chronic pain  Description: Control of chronic pain  4/18/2022 1833 by Te Plaza RN  Outcome: Ongoing  4/18/2022 0721 by Yazan Jimenez RN  Outcome: Ongoing     Problem: Skin Integrity - Impaired:  Goal: Will show no infection signs and symptoms  Description: Will show no infection signs and symptoms  4/18/2022 1833 by Te Plaza RN  Outcome: Ongoing  4/18/2022 0721 by Yazan Jimenez RN  Outcome: Ongoing  Goal: Absence of new skin breakdown  Description: Absence of new skin breakdown  4/18/2022 1833 by Jeanine Partida RN  Outcome: Ongoing  4/18/2022 0721 by Nakia Fisher RN  Outcome: Ongoing

## 2022-04-18 NOTE — CONSULTS
[address]    Department of Neurosurgery  Resident Consult Note      Reason for Consult:  Diffuse [facility]   Requesting Physician:  Dr. Morales Sis:   [x]Dr. Meche Figueroa      History Obtained From:  patient, electronic medical record    CHIEF COMPLAINT:     Acute worst headache of her life    HISTORY OF PRESENT ILLNESS:     The patient is a 29 y.o.  female who presents to our emergency department 4/18/2022 via [facility] as a transfer from [facility] with acute worst headache of her life. Patient does not have any significant past medical history in our chart and denies any. At outlying facility patient underwent CT head without demonstrating diffuse subarachnoid hemorrhage. Patient was seen by telestroke physician in our group recommending transfer to our facility for further work-up. In the emergency department patient states that she was washing TV with her child when she began to have severe pounding headache 10 out of 10 worst in her life. Patient noted to be severely nauseous requiring multiple doses of IV Zofran and IM Phenergan. Patient also admitting to photosensitivity. Patient had significant hypertension at [place] started on IV nicardipine given 1 g of Keppra 10 mg Decadron.     Arrival to our emergency department 140/102, heart rate 77 temperature 97.6. Initial labs PTT 25.3, INR 1.0, WBC 13.7, platelets 229, glucose 139 and high-sensitivity troponin less than 6. 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 Case discussed with senior endovascular fellow who is aware of case and current neurologic exam planning for emergent DSA 4/18/2022 early morning.     For [facility] Hunt&De Souza: 2, Modified Combs: grade 3, GCS 14     PAST MEDICAL HISTORY :       Past Medical History:    No past medical history on file.     Past Surgical History:        Procedure Laterality Date    DILATION AND CURETTAGE OF UTERUS N/A 3/21/2022    DILATATION AND CURETTAGE HYSTEROSCOPY CAUTERY ABLATION-NOVASURE ENDOMETRIAL ABLATION performed by Joellen Hoffman DO at [address] Right 3/21/2022    LAPAROSCOPIC- RIGHT OOPHERECTOMY performed by Joellen Hoffman DO at [address] SALPINGECTOMY Bilateral 3/21/2022    SALPINGECTOMY LAPAROSCOPIC performed by Joellen Hoffman DO at [facility] History:   Social History     Socioeconomic History    Marital status: Single     Spouse name: Not on file    Number of children: Not on file    Years of education: Not on file    Highest education level: Not on file   Occupational History    Not on file   Tobacco Use    Smoking status: Former Smoker     Packs/day: 0.50     Years: 14.00     Pack years: 7.00     Types: Cigarettes     Quit date: 2022     Years since quittin.2    Smokeless tobacco: Never Used   Vaping Use    Vaping Use: Some days    Substances: Nicotine   Substance and Sexual Activity    Alcohol use: Yes     Comment: occ    Drug use: No    Sexual activity: Not Currently     Partners: Male     Birth control/protection: Condom   Other Topics Concern    Not on file   Social History Narrative    Not on file     Social Determinants of Health     Financial Resource Strain: Low Risk     Difficulty of Paying Living Expenses: Not hard at all   Food Insecurity: No Food Insecurity    Worried About Running Out of Food in the Last Year: Never true    Moy of Food in the Last Year: Never true   Transportation Needs:     Lack of Transportation (Medical): Not on file    Lack of Transportation (Non-Medical):  Not on file   Physical Activity:     Days of Exercise per Week: Not on file    Minutes of Exercise per Session: Not on file   Stress:     Feeling of Stress : Not on file   Social Connections:     Frequency of Communication with Friends and Family: Not on file    Frequency of Social Gatherings with Friends and Family: Not on file    Attends Advent Services: Not on file    Active Member of Clubs or Organizations: Not on file    Attends Club or Organization Meetings: Not on file    Marital Status: Not on file   Intimate Partner Violence:     Fear of Current or Ex-Partner: Not on file    Emotionally Abused: Not on file    Physically Abused: Not on file    Sexually Abused: Not on file   Housing Stability:     Unable to Pay for Housing in the Last Year: Not on file    Number of [place] in the Last Year: Not on file    Unstable Housing in the Last Year: Not on file       Family History:   No family history on file. Allergies:   Patient has no known allergies. Home Medications:  Prior to Admission medications    Medication Sig Start Date End Date Taking?  Authorizing Provider   ferrous sulfate (IRON 325) 325 (65 Fe) MG tablet Take 325 mg by mouth daily (with breakfast)    Historical Provider, MD   ketorolac (TORADOL) 10 MG tablet Take 1 tablet by mouth every 6 hours as needed for Pain  Patient not taking: Reported on 4/17/2022 3/21/22 3/21/23  Berny Browning PA-C   azithromycin [facility]) 250 MG tablet One a day x 10 days  Patient not taking: Reported on 4/17/2022 2/22/22   DEEDEE Schmitz CNM       Current Medications:   Current Facility-Administered Medications: promethazine (PHENERGAN) injection 12.5 mg, 12.5 mg, IntraMUSCular, Once  niCARdipine (CARDENE) 25 mg in sodium chloride 0.9 % 250 mL infusion, 2.5-15 mg/hr, IntraVENous, Continuous    REVIEW OF SYSTEMS:       CONSTITUTIONAL:  In acute distress severe photosensitivity and debilitating headache   EYES:  Positive for photosensitivity difficulty keeping her eyes straight forward   HEENT: negative for tinnitus and sore throat   RESPIRATORY: negative for cough, shortness of breath   CARDIOVASCULAR: negative for chest pain, palpitations, or syncope   GASTROINTESTINAL: negative for abdominal pain, nausea, vomiting, diarrhea, or constipation GENITOURINARY: negative for incontinence or retention    MUSCULOSKELETAL: negative for neck or back pain, negative for extremity pain   NEUROLOGICAL:  Positive for worse headache of her life   PSYCHIATRIC: negative for agitation, hallucination, SI/HI   SKIN  positive for recent right superior eyelid piercing with surrounding bruising and edema       PHYSICAL EXAM:       Vitals:    04/18/22 0325   BP: (!) 130/93   Pulse: 88   Resp: 16   Temp:    SpO2: 99%     CONSTITUTIONAL:  Well developed, well nourished, somnolent although patient is oriented to year month and president, in acute distress. GCS 14. Nontoxic. No dysarthria. No aphasia.    HEAD:  normocephalic, atraumatic    EYES:  PERRLA, EOMI.   ENT:  moist mucous membranes   LUNGS:  Equal air entry bilaterally   CARDIOVASCULAR:  normal s1 / s2   ABDOMEN:  Soft, no rigidity   NECK supple, symmetric, no midline tenderness to palpation    BACK without midline tenderness, step-offs or deformities    EXTREMITIES Normal ROM with no deformities   NEUROLOGIC:  Mental Status:  A & O x3,somnolent             Cranial Nerves:    cranial nerves II-XII are grossly intact     Motor Exam:    Drift:  absent  Tone:  normal     Motor exam is symmetrical 5 out of 5 all extremities bilaterally     Sensory:    Touch:    Right Upper Extremity:  normal  Left Upper Extremity:  normal  Right Lower Extremity:  normal  Left Lower Extremity:  normal     Deep Tendon Reflexes:    Right Bicep:  1+  Left Bicep:  1+  Right Knee:  1+  Left Knee:  1+     Plantar Response:  Right:  downgoing  Left:  downgoing     Clonus:  absent  Andino's:  absent     Coordination/Dysmetria:  Heel to Shin:  Right:  normal  Left:  normal  Finger to Nose:   Right:  normal  Left:  normal       SKIN  recent right eye superior eyelid piercing with bruising         LABS AND IMAGING:     Labs:  CBC with Differential:    Lab Results   Component Value Date    WBC 15.4 04/18/2022    RBC 4.44 04/18/2022    RBC 4.35 09/29/2011 HGB 13.2 04/18/2022    HCT 39.6 04/18/2022     04/18/2022     09/29/2011    MCV 89.2 04/18/2022    MCH 29.7 04/18/2022    MCHC 33.3 04/18/2022    RDW 12.7 04/18/2022    LYMPHOPCT 9 04/18/2022    MONOPCT 3 04/18/2022    BASOPCT 1 04/18/2022    MONOSABS 0.46 04/18/2022    LYMPHSABS 1.39 04/18/2022    EOSABS 0.07 04/18/2022    BASOSABS 0.08 04/18/2022    DIFFTYPE NOT REPORTED 02/19/2022     BMP:    Lab Results   Component Value Date     04/18/2022    K 3.8 04/18/2022     04/18/2022    CO2 22 04/18/2022    BUN 12 04/18/2022    CREATININE 0.67 04/18/2022    CALCIUM 8.4 04/18/2022    GFRAA >60 04/18/2022    LABGLOM >60 04/18/2022    GLUCOSE 139 04/18/2022       Radiology Review:    CT head without 4/17/2022  Impression   Diffuse subarachnoid hemorrhage.  Recommend CTA for further evaluation. CTA head and neck 4/18/2022  Impression   Aneurysm arising from the anterior communicating artery measuring 3 mm x 2 mm. ASSESSMENT AND PLAN:       Patient Active Problem List   Diagnosis    Tobacco use    Gastroesophageal reflux disease    Pelvic adhesions    Menorrhagia with regular cycle    Complex cyst of right ovary    SAH (subarachnoid hemorrhage) ([address]    Aneurysm of anterior communicating artery       A/P:  Logan Darby is a 29 y.o. female who presents with worst headache of her life acute onset found to have diffuse subarachnoid hemorrhage with a comm aneurysm. Patient care will be discussed with attending, will reevaluate patient along with attending     - No neurosurgical interventions planned for now although do recommend urgent endovascular neurosurgery consult   - HOB: 45 degrees   - Obtain CT head in AM   - Neuro checks per protocol  - Hold all antiplatelets and anticoagulants  - We recommend SBP < 140        Additional recommendations may follow    Please contact neurosurgery with any changes in patient's neurologic status. Thank you for your consult. Rae Barboza MD    PGY-3 Neurology Resident   Neurosurgery Team - pager [phone]  4/18/2022 3:45 AM

## 2022-04-18 NOTE — PROGRESS NOTES
[address]   Occupational Therapy Not Seen Note    DATE: 2022    NAME: Rosita Jordan  MRN: [id number]   : 1990      Patient not seen this date for Occupational Therapy due to:    Surgery/Procedure: Interventional Radiology    Next Scheduled Treatment: Ck      Electronically signed by Nguyen Alvarado OT on 2022 at 8:27 AM

## 2022-04-18 NOTE — ED NOTES
Unable to complete CTA as ordered due to time contraints with [facility] arrival, [facility] ED and Dr. Jessy Peñaloza aware and given approval.      Oz Bray RN  04/17/22 0569

## 2022-04-19 ENCOUNTER — APPOINTMENT (OUTPATIENT)
Dept: CT IMAGING | Age: 32
DRG: 021 | End: 2022-04-19
Payer: COMMERCIAL

## 2022-04-19 PROBLEM — I60.9 SUBARACHNOID HEMORRHAGE (HCC): Status: ACTIVE | Noted: 2022-04-19

## 2022-04-19 LAB
ALBUMIN SERPL-MCNC: 3.8 G/DL (ref 3.5–5.2)
ALBUMIN/GLOBULIN RATIO: 1.8 (ref 1–2.5)
ALP BLD-CCNC: 66 U/L (ref 35–104)
ALT SERPL-CCNC: 18 U/L (ref 5–33)
AMPHETAMINE SCREEN URINE: NEGATIVE
ANION GAP SERPL CALCULATED.3IONS-SCNC: 10 MMOL/L (ref 9–17)
ANION GAP SERPL CALCULATED.3IONS-SCNC: 8 MMOL/L (ref 9–17)
AST SERPL-CCNC: 17 U/L
BARBITURATE SCREEN URINE: NEGATIVE
BENZODIAZEPINE SCREEN, URINE: NEGATIVE
BILIRUB SERPL-MCNC: 0.25 MG/DL (ref 0.3–1.2)
BUN BLDV-MCNC: 8 MG/DL (ref 6–20)
BUN BLDV-MCNC: 9 MG/DL (ref 6–20)
C-REACTIVE PROTEIN: <3 MG/L (ref 0–5)
CALCIUM SERPL-MCNC: 8.1 MG/DL (ref 8.6–10.4)
CALCIUM SERPL-MCNC: 8.3 MG/DL (ref 8.6–10.4)
CANNABINOID SCREEN URINE: NEGATIVE
CHLORIDE BLD-SCNC: 108 MMOL/L (ref 98–107)
CHLORIDE BLD-SCNC: 112 MMOL/L (ref 98–107)
CO2: 20 MMOL/L (ref 20–31)
CO2: 23 MMOL/L (ref 20–31)
COCAINE METABOLITE, URINE: NEGATIVE
CREAT SERPL-MCNC: 0.56 MG/DL (ref 0.5–0.9)
CREAT SERPL-MCNC: 0.6 MG/DL (ref 0.5–0.9)
CREATININE URINE: 15.3 MG/DL (ref 28–217)
CREATININE URINE: 15.6 MG/DL (ref 28–217)
GFR AFRICAN AMERICAN: >60 ML/MIN
GFR AFRICAN AMERICAN: >60 ML/MIN
GFR NON-AFRICAN AMERICAN: >60 ML/MIN
GFR NON-AFRICAN AMERICAN: >60 ML/MIN
GFR SERPL CREATININE-BSD FRML MDRD: ABNORMAL ML/MIN/{1.73_M2}
GFR SERPL CREATININE-BSD FRML MDRD: ABNORMAL ML/MIN/{1.73_M2}
GLUCOSE BLD-MCNC: 111 MG/DL (ref 70–99)
GLUCOSE BLD-MCNC: 135 MG/DL (ref 70–99)
HCT VFR BLD CALC: 37.2 % (ref 36.3–47.1)
HEMOGLOBIN: 12.2 G/DL (ref 11.9–15.1)
MAGNESIUM: 2.1 MG/DL (ref 1.6–2.6)
MCH RBC QN AUTO: 29.8 PG (ref 25.2–33.5)
MCHC RBC AUTO-ENTMCNC: 32.8 G/DL (ref 28.4–34.8)
MCV RBC AUTO: 90.7 FL (ref 82.6–102.9)
METHADONE SCREEN, URINE: NEGATIVE
MRSA, DNA, NASAL: NEGATIVE
NRBC AUTOMATED: 0 PER 100 WBC
OPIATES, URINE: NEGATIVE
OSMOLALITY URINE: 251 MOSM/KG (ref 80–1300)
OXYCODONE SCREEN URINE: POSITIVE
PDW BLD-RTO: 13.1 % (ref 11.8–14.4)
PHENCYCLIDINE, URINE: NEGATIVE
PLATELET # BLD: 225 K/UL (ref 138–453)
PMV BLD AUTO: 10.4 FL (ref 8.1–13.5)
POTASSIUM SERPL-SCNC: 3.8 MMOL/L (ref 3.7–5.3)
POTASSIUM SERPL-SCNC: 4.2 MMOL/L (ref 3.7–5.3)
POTASSIUM, UR: 6 MMOL/L
PROCALCITONIN: 0.04 NG/ML
RBC # BLD: 4.1 M/UL (ref 3.95–5.11)
SERUM OSMOLALITY: 292 MOSM/KG (ref 275–295)
SODIUM BLD-SCNC: 139 MMOL/L (ref 135–144)
SODIUM BLD-SCNC: 142 MMOL/L (ref 135–144)
SODIUM,UR: 86 MMOL/L
SPECIFIC GRAVITY UA: 1.01 (ref 1–1.03)
SPECIMEN DESCRIPTION: NORMAL
TEST INFORMATION: ABNORMAL
TOTAL PROTEIN, URINE: <4 MG/DL
TOTAL PROTEIN: 5.9 G/DL (ref 6.4–8.3)
URINE TOTAL PROTEIN CREATININE RATIO: ABNORMAL (ref 0–0.2)
WBC # BLD: 18.3 K/UL (ref 3.5–11.3)

## 2022-04-19 PROCEDURE — 6360000002 HC RX W HCPCS: Performed by: STUDENT IN AN ORGANIZED HEALTH CARE EDUCATION/TRAINING PROGRAM

## 2022-04-19 PROCEDURE — 70450 CT HEAD/BRAIN W/O DYE: CPT

## 2022-04-19 PROCEDURE — 83735 ASSAY OF MAGNESIUM: CPT

## 2022-04-19 PROCEDURE — 2580000003 HC RX 258: Performed by: STUDENT IN AN ORGANIZED HEALTH CARE EDUCATION/TRAINING PROGRAM

## 2022-04-19 PROCEDURE — 6370000000 HC RX 637 (ALT 250 FOR IP): Performed by: STUDENT IN AN ORGANIZED HEALTH CARE EDUCATION/TRAINING PROGRAM

## 2022-04-19 PROCEDURE — 84145 PROCALCITONIN (PCT): CPT

## 2022-04-19 PROCEDURE — 80307 DRUG TEST PRSMV CHEM ANLYZR: CPT

## 2022-04-19 PROCEDURE — 81003 URINALYSIS AUTO W/O SCOPE: CPT

## 2022-04-19 PROCEDURE — 6370000000 HC RX 637 (ALT 250 FOR IP): Performed by: NURSE PRACTITIONER

## 2022-04-19 PROCEDURE — 84133 ASSAY OF URINE POTASSIUM: CPT

## 2022-04-19 PROCEDURE — 92523 SPEECH SOUND LANG COMPREHEN: CPT

## 2022-04-19 PROCEDURE — 82570 ASSAY OF URINE CREATININE: CPT

## 2022-04-19 PROCEDURE — 84156 ASSAY OF PROTEIN URINE: CPT

## 2022-04-19 PROCEDURE — 93886 INTRACRANIAL COMPLETE STUDY: CPT

## 2022-04-19 PROCEDURE — 97535 SELF CARE MNGMENT TRAINING: CPT

## 2022-04-19 PROCEDURE — 83930 ASSAY OF BLOOD OSMOLALITY: CPT

## 2022-04-19 PROCEDURE — 97163 PT EVAL HIGH COMPLEX 45 MIN: CPT

## 2022-04-19 PROCEDURE — 85027 COMPLETE CBC AUTOMATED: CPT

## 2022-04-19 PROCEDURE — 87086 URINE CULTURE/COLONY COUNT: CPT

## 2022-04-19 PROCEDURE — 2500000003 HC RX 250 WO HCPCS: Performed by: STUDENT IN AN ORGANIZED HEALTH CARE EDUCATION/TRAINING PROGRAM

## 2022-04-19 PROCEDURE — 6360000002 HC RX W HCPCS: Performed by: HEALTH CARE PROVIDER

## 2022-04-19 PROCEDURE — 2580000003 HC RX 258: Performed by: ANESTHESIOLOGY

## 2022-04-19 PROCEDURE — 83935 ASSAY OF URINE OSMOLALITY: CPT

## 2022-04-19 PROCEDURE — 99233 SBSQ HOSP IP/OBS HIGH 50: CPT | Performed by: PSYCHIATRY & NEUROLOGY

## 2022-04-19 PROCEDURE — 97530 THERAPEUTIC ACTIVITIES: CPT

## 2022-04-19 PROCEDURE — 2000000003 HC NEURO ICU R&B

## 2022-04-19 PROCEDURE — 80048 BASIC METABOLIC PNL TOTAL CA: CPT

## 2022-04-19 PROCEDURE — 84300 ASSAY OF URINE SODIUM: CPT

## 2022-04-19 PROCEDURE — 86140 C-REACTIVE PROTEIN: CPT

## 2022-04-19 PROCEDURE — 97166 OT EVAL MOD COMPLEX 45 MIN: CPT

## 2022-04-19 PROCEDURE — 80053 COMPREHEN METABOLIC PANEL: CPT

## 2022-04-19 PROCEDURE — 99223 1ST HOSP IP/OBS HIGH 75: CPT | Performed by: PSYCHIATRY & NEUROLOGY

## 2022-04-19 PROCEDURE — 99232 SBSQ HOSP IP/OBS MODERATE 35: CPT | Performed by: NEUROLOGICAL SURGERY

## 2022-04-19 RX ORDER — OXYCODONE HYDROCHLORIDE 5 MG/1
5 TABLET ORAL EVERY 4 HOURS PRN
Status: DISCONTINUED | OUTPATIENT
Start: 2022-04-19 | End: 2022-04-21

## 2022-04-19 RX ORDER — LEVETIRACETAM 500 MG/1
500 TABLET ORAL 2 TIMES DAILY
Status: COMPLETED | OUTPATIENT
Start: 2022-04-19 | End: 2022-04-21

## 2022-04-19 RX ORDER — MAGNESIUM SULFATE IN WATER 40 MG/ML
2000 INJECTION, SOLUTION INTRAVENOUS 2 TIMES DAILY
Status: DISCONTINUED | OUTPATIENT
Start: 2022-04-19 | End: 2022-04-20

## 2022-04-19 RX ADMIN — LEVETIRACETAM 500 MG: 500 TABLET, FILM COATED ORAL at 20:59

## 2022-04-19 RX ADMIN — SODIUM CHLORIDE 7 MG/HR: 9 INJECTION, SOLUTION INTRAVENOUS at 02:45

## 2022-04-19 RX ADMIN — LEVETIRACETAM 500 MG: 5 INJECTION INTRAVENOUS at 01:33

## 2022-04-19 RX ADMIN — POTASSIUM BICARBONATE 40 MEQ: 782 TABLET, EFFERVESCENT ORAL at 13:27

## 2022-04-19 RX ADMIN — FENTANYL CITRATE 25 MCG: 50 INJECTION, SOLUTION INTRAMUSCULAR; INTRAVENOUS at 08:34

## 2022-04-19 RX ADMIN — OXYCODONE 5 MG: 5 TABLET ORAL at 10:59

## 2022-04-19 RX ADMIN — SODIUM CHLORIDE, PRESERVATIVE FREE 10 ML: 5 INJECTION INTRAVENOUS at 08:35

## 2022-04-19 RX ADMIN — ATORVASTATIN CALCIUM 80 MG: 80 TABLET, FILM COATED ORAL at 20:59

## 2022-04-19 RX ADMIN — SODIUM CHLORIDE, PRESERVATIVE FREE 10 ML: 5 INJECTION INTRAVENOUS at 20:59

## 2022-04-19 RX ADMIN — SODIUM CHLORIDE 7.5 MG/HR: 9 INJECTION, SOLUTION INTRAVENOUS at 14:13

## 2022-04-19 RX ADMIN — NIMODIPINE 60 MG: 30 CAPSULE, LIQUID FILLED ORAL at 08:34

## 2022-04-19 RX ADMIN — NIMODIPINE 60 MG: 30 CAPSULE, LIQUID FILLED ORAL at 13:27

## 2022-04-19 RX ADMIN — NIMODIPINE 60 MG: 30 CAPSULE, LIQUID FILLED ORAL at 20:59

## 2022-04-19 RX ADMIN — LEVETIRACETAM 500 MG: 5 INJECTION INTRAVENOUS at 13:27

## 2022-04-19 RX ADMIN — ACETAMINOPHEN 650 MG: 325 TABLET ORAL at 08:42

## 2022-04-19 RX ADMIN — SODIUM CHLORIDE 5 ML/HR: 9 INJECTION, SOLUTION INTRAVENOUS at 21:07

## 2022-04-19 RX ADMIN — ACETAMINOPHEN 650 MG: 325 TABLET ORAL at 04:39

## 2022-04-19 RX ADMIN — MAGNESIUM SULFATE 2000 MG: 2 INJECTION INTRAVENOUS at 21:10

## 2022-04-19 RX ADMIN — NIMODIPINE 60 MG: 30 CAPSULE, LIQUID FILLED ORAL at 18:00

## 2022-04-19 RX ADMIN — SODIUM CHLORIDE: 9 INJECTION, SOLUTION INTRAVENOUS at 04:30

## 2022-04-19 RX ADMIN — OXYCODONE 5 MG: 5 TABLET ORAL at 19:46

## 2022-04-19 RX ADMIN — NIMODIPINE 60 MG: 30 CAPSULE, LIQUID FILLED ORAL at 05:01

## 2022-04-19 RX ADMIN — SODIUM CHLORIDE 5 MG/HR: 9 INJECTION, SOLUTION INTRAVENOUS at 09:45

## 2022-04-19 RX ADMIN — OXYCODONE 5 MG: 5 TABLET ORAL at 15:38

## 2022-04-19 RX ADMIN — SODIUM CHLORIDE 75 ML/HR: 234 INJECTION INTRAMUSCULAR; INTRAVENOUS; SUBCUTANEOUS at 19:20

## 2022-04-19 RX ADMIN — ACETAMINOPHEN 650 MG: 325 TABLET ORAL at 19:11

## 2022-04-19 RX ADMIN — MAGNESIUM SULFATE 2000 MG: 2 INJECTION INTRAVENOUS at 14:43

## 2022-04-19 RX ADMIN — NIMODIPINE 60 MG: 30 CAPSULE, LIQUID FILLED ORAL at 01:30

## 2022-04-19 ASSESSMENT — PAIN DESCRIPTION - PAIN TYPE
TYPE: ACUTE PAIN
TYPE: ACUTE PAIN

## 2022-04-19 ASSESSMENT — PAIN DESCRIPTION - ORIENTATION: ORIENTATION: RIGHT

## 2022-04-19 ASSESSMENT — PAIN SCALES - GENERAL
PAINLEVEL_OUTOF10: 6
PAINLEVEL_OUTOF10: 4
PAINLEVEL_OUTOF10: 7
PAINLEVEL_OUTOF10: 5
PAINLEVEL_OUTOF10: 5
PAINLEVEL_OUTOF10: 4
PAINLEVEL_OUTOF10: 1
PAINLEVEL_OUTOF10: 5
PAINLEVEL_OUTOF10: 7
PAINLEVEL_OUTOF10: 4

## 2022-04-19 ASSESSMENT — PAIN DESCRIPTION - LOCATION
LOCATION: HEAD
LOCATION: HEAD

## 2022-04-19 ASSESSMENT — PAIN DESCRIPTION - FREQUENCY: FREQUENCY: CONTINUOUS

## 2022-04-19 ASSESSMENT — PAIN DESCRIPTION - DESCRIPTORS
DESCRIPTORS: DISCOMFORT;THROBBING
DESCRIPTORS: THROBBING

## 2022-04-19 NOTE — PROGRESS NOTES
Neurosurgery Resident  Daily Progress Note    4/19/2022  3:09 PM    Chart reviewed. No acute events overnight. No new complaints. Vitals reviewed, afebrile. Vitals:    04/19/22 0500 04/19/22 0600 04/19/22 0700 04/19/22 0800   BP: 120/66 119/66 115/65    Pulse: 82 96 93 90   Resp: 21 19 21    Temp:  98.7 °F (37.1 °C)     TempSrc:       SpO2: 98% 98% 98%    Weight:       Height:           PE:   Gen: AOx3, NAD  Cardiovascular: Regular rate, no dependent edema, distal pulses 2+  Respiratory: Chest symmetric, no accessory muscle use, normal respirations   Neuro: 5/5 BUE, 5/5 BLE. CN2-12 intact. Visual fields PERRLA, EOMI.      Lab Results   Component Value Date    WBC 18.3 (H) 04/19/2022    HGB 12.2 04/19/2022    HCT 37.2 04/19/2022     04/19/2022    CHOL 182 04/18/2022    TRIG 60 04/18/2022    HDL 74 04/18/2022    ALT 18 04/19/2022    AST 17 04/19/2022     04/19/2022    K 4.2 04/19/2022     (H) 04/19/2022    CREATININE 0.60 04/19/2022    BUN 8 04/19/2022    CO2 23 04/19/2022    TSH 0.80 08/01/2019    INR 0.9 04/18/2022    LABA1C 5.5 04/18/2022       Leatha Frias is a 29 y.o. female who presents with worst headache of her life acute onset found to have diffuse subarachnoid hemorrhage with a comm aneurysm s/p coil    - CT head today              - No neurosurgical interventions planned    - Neuroendovascular on   - Neurocritical care on  - HOB: 45 degrees              - Neuro checks per protocol  - Hold all antiplatelets and anticoagulants  - We recommend SBP < 16[address], DO   3:09 PM

## 2022-04-19 NOTE — CARE COORDINATION
PHQ-9  Pt presents with [facility]  Met with pt to assess for support and needs. Parents at bedside. Pt states she lives with her 2 children. Pt denies having any feelings of depression or suicidal ideations. Parents are very supportive. Patient Health Questionnaire-9 (PHQ-9)    Over the last 2 weeks, how often have you been bothered by any of the following problems? 1. Little Interest or pleasure in doing things? [x] Not at all  [] Several Days  [] More than half the day  []  Nearly every day    2. Feeling down, depressed or hopeless? [x] Not at all  [] Several Days  [] More than half the day  []  Nearly every day    3. Trouble falling or staying asleep, or sleeping too much? [x] Not at all  [] Several Days  [] More than half the day  []  Nearly every day    4. Feeling tired or having little energy? [x] Not at all  [] Several Days  [] More than half the day  []  Nearly every day    5. Poor apettite or overeating? [x] Not at all  [] Several Days  [] More than half the day  []  Nearly every day    6. Feeling bad about yourself-or that you are a failure or have let yourself or your family down? [x] Not at all  [] Several Days  [] More than half the day  []  Nearly every day    7. Trouble concentrating on things, such as reading the newspaper or watching television? [] Not at all  [x] Several Days  [] More than half the day  []  Nearly every day    8. Moving or speaking so slowly that other people could have noticed? Or the opposite-being so fidgety or restless that you have been moving around a lot more than usual?   [x] Not at all  [] Several Days  [] More than half the day  []  Nearly every day    9. Thoughts that you would be better off dead or of hurting yourself in some way?    [x] Not at all  [] Several Days  [] More than half the day  []  Nearly every day    Total Score: 1    If you checked off any problems, how difficult have these problems made it for you to do your work, take care of things at home, or get along with other people?    [x] Not difficult at all  [] Somewhat Difficult  [] Very Difficult  []  Extremely Difficult

## 2022-04-19 NOTE — PROGRESS NOTES
Physical Therapy    Facility/Department: [address]  Initial Assessment    NAME: Suzie Andrews  : 1990  MRN: [id number]    Date of Service: 2022  \"The patient is a 33 y.o.  female who presents to our emergency department 2022 via [address] as a transfer from [facility] acute worst headache of her life. Lester Strickland does not have any significant past medical history in our chart and denies any. [address] facility patient underwent CT head without demonstrating diffuse subarachnoid hemorrhage\"  Discharge Recommendations:    Further therapy recommended at discharge. PT Equipment Recommendations  Equipment Needed: No    Assessment   Body structures, Functions, Activity limitations: Decreased functional mobility ; Increased pain;Decreased balance;Decreased endurance  Assessment: Pt performed bed mobility and functional transfers CGA, ambulating 10ft with no AD CGA this date. Pt's tolerance to functional mobility limited due to dizziness with performance of mobility. Pt reports significant support from family upon discharge. Recommending continued skilled physical therapy to address thses deficits and maximize independence. Prognosis: Good  Decision Making: High Complexity  PT Education: Goals;PT Role;Plan of Care;Transfer Training  REQUIRES PT FOLLOW UP: Yes  Activity Tolerance  Activity Tolerance: Patient Tolerated treatment well       Patient Diagnosis(es): The encounter diagnosis was SAH (subarachnoid hemorrhage) ([facility] [address] has no past medical history on file. has a past surgical history that includes salpingectomy (Bilateral, 3/21/2022); ovarian cyst removal (Right, 3/21/2022); Dilation and curettage of uterus (N/A, 3/21/2022); and other surgical history (2022).     Restrictions  Restrictions/Precautions  Restrictions/Precautions: Up as Tolerated  Position Activity Restriction  Other position/activity restrictions: up as tolerated; EVD; BP<140; s/p angiogram 4/18  Vision/Hearing  Vision: Impaired  Vision Exceptions: Wears glasses for distance (glasses for driving)  Hearing: Within functional limits     Subjective  General  Patient assessed for rehabilitation services?: Yes  Response To Previous Treatment: Not applicable  Family / Caregiver Present: No  Follows Commands: Within Functional Limits  Subjective  Subjective: RN and pt in agreement for PT eval; pt supine in bed upon pt arrival, EVD clamped per RN prior to mobility; pt very pleasant and cooperative throughout session  Pain Screening  Patient Currently in Pain: Yes  Pain Assessment  Pain Assessment: 0-10  Pain Level: 5  Pain Type: Acute pain  Pain Location: Head  Pain Descriptors: Discomfort; Throbbing  Non-Pharmaceutical Pain Intervention(s): Ambulation/Increased Activity;Repositioned  Response to Pain Intervention: Patient Satisfied  Multiple Pain Sites: No  Vital Signs  Patient Currently in Pain: Yes       Orientation  Orientation  Overall Orientation Status: Within Functional Limits  Social/Functional History  Social/Functional History  Lives With: Family (two children, 11and 8years old)  Type of Home: House  Home Layout: One level,Able to Live on Main level with bedroom/bathroom  Home Access: Stairs to enter with rails  Entrance Stairs - Number of Steps: 2  Entrance Stairs - Rails: Both  Bathroom Shower/Tub: Walk-in shower  Bathroom Toilet: Standard  Bathroom Equipment: Grab bars in shower  Bathroom Accessibility: Accessible  Home Equipment:  (No DME)  Receives Help From: Friend(s),Family  ADL Assistance: Independent  Homemaking Assistance: Independent  Homemaking Responsibilities: Yes  Ambulation Assistance: Independent  Transfer Assistance: Independent  Active : Yes  Mode of Transportation: Truck,SUV  Occupation: Full time employment  Type of occupation: -heavy equipment  Leisure & Hobbies: nature walks  Additional Comments: Pt reports friends and family are closeby and can assist at home upon discharge as needed  Cognition   Cognition  Overall Cognitive Status: [facility]    Objective    Joint Mobility  Spine: WFL  ROM RLE: WFL  ROM LLE: WFL  ROM RUE: WFL  ROM LUE: WFL  Strength RLE  Strength RLE: WFL  Strength LLE  Strength LLE: WFL  Strength RUE  Strength RUE: WFL  Strength LUE  Strength LUE: WFL  Motor Control  Gross Motor?: WFL  Sensation  Overall Sensation Status: Impaired (Pt reports tingling in L hand s/p surgery)  Bed mobility  Supine to Sit: Contact guard assistance  Sit to Supine: Contact guard assistance  Comment: Min verbal cueing required for acknowledgement of lines with fair return demo. Pt reports dizziness with performance of bed mobility with subsession symptoms of ~2 minutes of seated rest break while EOB. Transfers  Sit to Stand: Contact guard assistance  Stand to sit: Contact guard assistance  Comment: Pt reports dizziness of ~2 minutes with change of position, subsession of symptoms reported from patient within ~2 minutes. Ambulation  Ambulation?: Yes  Ambulation 1  Surface: level tile  Device: No Device  Assistance: Contact guard assistance  Gait Deviations: Slow Lona  Distance: 10ft  Comments: Ambulation distance limited due to dizziness and hypertension of SBP >160 with mobility outside of recommended parameters. No LOB noted throughout session.   Stairs/Curb  Stairs?: No     Balance  Posture: Fair  Sitting - Static: Good  Sitting - Dynamic: Good  Standing - Static: Good;-  Standing - Dynamic: Fair;+  Comments: standing balance assessed w/ no AD; pt able to sit EOB with supervision        Plan   Plan  Times per week: 5-6x/week  Current Treatment Recommendations: Strengthening,Home Exercise Program,ROM,Safety Education & Training,Balance Training,Endurance Training,Patient/Caregiver Education & Training,Functional Mobility Training,Transfer Training,Gait Training,Stair training  Safety Devices  Type of devices: Call light within reach,Gait belt,Left in bed,Nurse notified  Restraints  Initially in place: No  AM-PAC Score  AM-PAC Inpatient Mobility Raw Score : 23 (04/19/22 1438)  AM-PAC Inpatient T-Scale Score : 56.93 (04/19/22 1438)  Mobility Inpatient CMS 0-100% Score: 11.2 (04/19/22 1438)  Mobility Inpatient CMS G-Code Modifier : CI (04/19/22 1438)          Goals  Short term goals  Time Frame for Short term goals: 14  Short term goal 1: Pt to perform bed mobility from flat surface independently  Short term goal 2: Demonstrate functional transfers independently  Short term goal 3: Ambulate 300ft w/ no AD independently  Short term goal 4: Ascend/descend 2 stairs with R rail independently  Patient Goals   Patient goals :  To go home       Therapy Time   Individual Concurrent Group Co-treatment   Time In 0946         Time Out 1032         Minutes 46         Timed Code Treatment Minutes: [address]       Orlin Abdalla PT

## 2022-04-19 NOTE — PROGRESS NOTES
RN noticed that EVD order was to have EVD at 15cm H2O. RN noticed two notes stating EVD to be open at 10cm H2O. RN clarified with Dr. Wen Siddiqi, EVD to be open at 10cm H2O.

## 2022-04-19 NOTE — PROGRESS NOTES
Daily Progress Note  Neuro Critical Care    Patient Name: Hansa Bradley  Patient : 1990  Room/Bed: 2100/2740-78  Code Status: FULL CODE   Allergies: No Known Allergies    CHIEF COMPLAINT:     Worst headache of her life acute onset x2 hours  INTERVAL HISTORY    Initial Presentation (Admitted 22 @00:45AM): History Obtained From: patient and EMR review      The patient is a 29 y.o.  female who presents to our emergency department 2022 via [address] as a transfer from [facility] with acute worst headache of her life. Patient does not have any significant past medical history in our chart and denies any. At outlying facility patient underwent CT head without demonstrating diffuse subarachnoid hemorrhage. Patient was seen by telestroke physician in our group recommending transfer to our facility for further work-up. In the emergency department patient states that she was washing TV with her child when she began to have severe pounding headache 10 out of 10 worst in her life. Patient noted to be severely nauseous requiring multiple doses of IV Zofran and IM Phenergan. Patient also admitting to photosensitivity. Patient had significant hypertension at [place] started on IV nicardipine given 1 g of Keppra 10 mg Decadron.     Arrival to our emergency department 140/102, heart rate 77 temperature 97.6. Initial labs PTT 25.3, INR 1.0, WBC 13.7, platelets 184, glucose 139 and high-sensitivity troponin less than 6. 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   Case discussed with senior endovascular fellow who is aware of case and current neurologic exam planning for emergent DSA 2022 early morning.     For [facility] Hunt&De Souza: 2, Modified Combs: grade 3         Admitted to ICU From: ED 24  Reason for ICU Admission: diffuse SAH with ACOM aneurysm     Hospital Course:   -around 8:00 AM Dr. Ольга Somers at bedside placing EVD prior to endovascular neurosurgery. Patient went for urgent DSA found to have a comm aneurysm treated by primary coil embolization. Last 24h:   No acute events overnight. Hemodynamically stable for arterial line readings -142, heart rate 82-99 T-max 98.9. Currently receiving María 5 mg every 4 hours as needed and fentanyl 25 every 2 as needed for pain control.     CURRENT MEDICATIONS:  SCHEDULED MEDICATIONS:   promethazine  12.5 mg IntraMUSCular Once    atorvastatin  80 mg Oral Nightly    niMODipine  60 mg Oral 6 times per day    sodium chloride flush  5-40 mL IntraVENous 2 times per day    levetiracetam  500 mg IntraVENous Q12H    aspirin  81 mg Oral Daily    clopidogrel  75 mg Oral Daily     CONTINUOUS INFUSIONS:   niCARdipine 5 mg/hr (22 0502)    sodium chloride 100 mL/hr at 22 0430    sodium chloride       PRN MEDICATIONS:   ondansetron **OR** ondansetron, polyethylene glycol, labetalol, sodium chloride flush, sodium chloride, meperidine, fentanNYL, acetaminophen, ondansetron **OR** ondansetron, fentanNYL    VITALS:  Temperature Range: Temp: 98.7 °F (37.1 °C) Temp  Av.3 °F (36.3 °C)  Min: 96.7 °F (35.9 °C)  Max: 99.4 °F (37.4 °C)  BP Range: Systolic (30RKL), MFA:192 , Min:104 , DOMINIC:401     Diastolic (33QIB), UMN:57, Min:55, Max:97    Pulse Range: Pulse  Av.9  Min: 80  Max: 106  Respiration Range: Resp  Av.9  Min: 0  Max: 27  Current Pulse Ox: SpO2: 98 %  24HR Pulse Ox Range: SpO2  Av.7 %  Min: 97 %  Max: 100 %  Patient Vitals for the past 12 hrs:   BP Temp Temp src Pulse Resp SpO2   22 0600 119/66 98.7 °F (37.1 °C) -- 96 19 98 %   22 0500 120/66 -- -- 82 21 98 %   22 0400 129/71 98.9 °F (37.2 °C) Oral 95 22 98 %   22 0300 (!) 108/55 -- -- 99 16 98 %   22 0200 (!) 116/57 98.9 °F (37.2 °C) -- 98 15 98 %   22 0100 (!) 120/59 -- -- 101 17 98 %   22 0000 124/60 98.6 °F (37 °C) Oral 106 18 98 %   22 2300 (!) 124/59 -- -- 103 22 99 %   04/18/22 2200 127/70 98.7 °F (37.1 °C) -- 106 14 98 %   04/18/22 2100 126/72 -- -- 96 20 99 %   04/18/22 2000 128/71 98.3 °F (36.8 °C) Oral 96 15 99 %   04/18/22 1900 126/71 -- -- 97 17 99 %     Estimated body mass index is 29.6 kg/m² as calculated from the following:    Height as of this encounter: 5' 2\" (1.575 m). Weight as of this encounter: 161 lb 13.1 oz (73.4 kg).  []<16 Severe malnutrition  []16-16.99 Moderate malnutrition  []17-18.49 Mild malnutrition  []18.5-24.9 Normal  []25-29.9 Overweight (not obese)  []30-34.9 Obese class 1 (Low Risk)  []35-39.9 Obese class 2 (Moderate Risk)  []=40 Obese class 3 (High Risk)    RECENT LABS:   Lab Results   Component Value Date    WBC 18.3 (H) 04/19/2022    HGB 12.2 04/19/2022    HCT 37.2 04/19/2022     04/19/2022    CHOL 182 04/18/2022    TRIG 60 04/18/2022    HDL 74 04/18/2022    ALT 25 04/18/2022    AST 25 04/18/2022     04/18/2022    K 4.2 04/18/2022     04/18/2022    CREATININE 0.62 04/18/2022    BUN 9 04/18/2022    CO2 19 (L) 04/18/2022    TSH 0.80 08/01/2019    INR 0.9 04/18/2022    LABA1C 5.5 04/18/2022     24 HOUR INTAKE/OUTPUT:    Intake/Output Summary (Last 24 hours) at 4/19/2022 0641  Last data filed at 4/19/2022 0600  Gross per 24 hour   Intake 5306.26 ml   Output 5379 ml   Net -72.74 ml       IMAGING:   CT head WO 4/17/22 @10:48 PM   Impression   Diffuse subarachnoid hemorrhage.  Recommend CTA for further evaluation. 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      CXR 4/18/22  Impression   No acute cardiopulmonary process. 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 --the above treated with Smart coil embolization x3, Vu 1. Smart coil x1 opened but not detached.    --Prominent musculocutaneous branch from the left V2 segment that anastomoses to the left occipital artery. 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 Labs and Images reviewed with:  [x] Dr. Alex Bolivar      PHYSICAL EXAM       CONSTITUTIONAL:  Well developed, well nourished, alert and oriented x 3, in no acute distress. GCS 15. Nontoxic. No dysarthria. No aphasia. HEAD:  normocephalic, atraumatic    EYES:  PERRLA, EOMI.   ENT:  moist mucous membranes   NECK:  supple, symmetric   LUNGS:  Equal air entry bilaterally   CARDIOVASCULAR:  normal s1 / s2, RRR, distal pulses intact   ABDOMEN:  Soft, no rigidity   NEUROLOGIC:  Mental Status:  A & O x3,awake             Cranial Nerves:    cranial nerves II-XII are grossly intact    Motor Exam:    Drift:  absent  Tone:  normal    Motor exam is symmetrical 5 out of 5 all extremities bilaterally    Sensory:    Touch:    Right Upper Extremity:  normal  Left Upper Extremity:  abnormal - slightly diminished describing odd sensory complaint feels like arm is swollen (on exam is not)  Right Lower Extremity:  normal  Left Lower Extremity:  normal    Deep Tendon Reflexes:    Right Bicep:  1+  Left Bicep:  1+  Right Knee:  2+  Left Knee:  1+    Plantar Response:  Right:  downgoing  Left:  downgoing    Clonus:  absent  Andino's:  absent    Coordination/Dysmetria:  Heel to Shin:  Right:  normal  Left:  normal  Finger to Nose:   Right:  normal  Left:  normal   Dysdiadochokinesia:  absent   NIH Stroke Scale Total (if not done complete detailed one below):    1a.  Level of consciousness:  0 - alert; keenly responsive  1b. Level of consciousness questions:  0 - answers both questions correctly  1c. Level of consciousness questions:  0 - performs both tasks correctly  2. Best Gaze:  0 - normal  3. Visual:  0 - no visual loss  4. Facial Palsy:  0 - normal symmetric movement  5a.   Motor left arm:  0 - no drift, limb holds 90 (or 45) degrees for full 10 seconds  5b. Motor right arm:  0 - no drift, limb holds 90 (or 45) degrees for full 10 seconds  6a. Motor left le - no drift; leg holds 30 degree position for full 5 seconds  6b. Motor right le - no drift; leg holds 30 degree position for full 5 seconds  7. Limb Ataxia:  0 - absent  8. Sensory:  1 - mild to moderate sensory loss; patient feels pinprick is less sharp or is dull on the affected side; there is a loss of superficial pain with pinprick but patient is aware of being touched   9. Best Language:  0 - no aphasia, normal  10. Dysarthria:  0 - normal  11. Extinction and Inattention:  0 - no abnormality  TOTAL: 1    DRAINS:  EVD right parietal placed - open at [id number] CC CSF ouput over 24 hours   Urethral catheter 22  Left radial art line placed  [email or web address]      ASSESSMENT AND PLAN:     ASSESSMENT:      This is a 29 y.o. female with acute worst headache of her life found to have diffuse subarachnoid hemorrhage presenting to [address] [place] [address]. Patient denies any significant past medical history and does not take any daily medications. 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 Patient admitted under neuro ICU services with endovascular neurosurgery consulted planning for urgent evaluation and likely DSA 2022 first thing in the morning. Patient underwent emergent EVD placement at bedside 2022 and diagnostic angio with primary coil embolization of a comm aneurysm successful.   Postoperatively being managed by neuro ICU for vasospasm monitoring.     Patient care will be discussed with attending, will reevaluate patient along with attending.      PLAN/MEDICAL DECISION MAKING:        NEUROLOGIC:    - Imaging   CT head WO-diffuse SAH  CTA head and Neck-ACOM aneurysm   IR angio- AM-primary coil embolization  Neurosurgery-Following placed EVD 4/18  - AEDs loaded 1gram keppra at [place]  Continue 500mg BID   -nimodipine 60mg Q4 hours Day2  -FU Mag with goal 3-4  - Neuro checks per protocol     CARDIOVASCULAR:  - Goal -140  - titrate with cardene  - PRN IV labetalol 10mg Q4 hour for SBP >140  - Continue telemetry     PULMONARY:  - 99% on room air         RENAL/FLUID/ELECTROLYTE:  - BUN 9/ Creatinine . 56  - Urine output strict intake and out put monitoring   - IVF: 100 CC/hr NACL   - Replace electrolytes PRN  - Daily BMP     GI/NUTRITION:  NUTRITION: NPO prior to DSA with endovascular   No diet orders on file  - Bowel regimen: glycolax  - GI prophylaxis: protonix      ID:  - Tmax 97.6  - WBC 18.3   - FU CXR   - Continue to monitor for fevers  - Daily CBC     HEME:   - H&H 12.2/39.6  - Platelets 925  -PTT 82.3, INR 1.0   - Daily CBC     ENDOCRINE:  - Continue to monitor blood glucose, goal <180  - FU HBA1C and lipid panel   -TSH with reflex      OTHER:  - PT/OT/ST      PROPHYLAXIS:  Stress ulcer: not indicated at this time      DVT PROPHYLAXIS:  - SCD sleeves - Thigh High   - GERMAINE stockings - Thigh High  - No chemoprophylaxis anticoagulation at this time. DISPOSITION:  [x] To remain NICU:   [] OK for out of ICU from Neuro Critical Care standpoint    We will continue to follow along. For any changes in exam or patient status please contact Neuro Critical Care.       Tanya Cadet MD   PGY-3 Neurology Resident   Neuro Critical Care  Pager [phone]  4/19/2022     6:41 AM

## 2022-04-19 NOTE — PROGRESS NOTES
Endovascular Neurosurgery Progress Note    SUBJECTIVE:   Patient was complaining of headache overnight. This am headache 6/10, denied nausea. Review of Systems:  CONSTITUTIONAL:  negative for fevers, chills, fatigue and malaise    EYES:  negative for double vision, blurred vision and photophobia     HEENT:  negative for tinnitus, epistaxis and sore throat    RESPIRATORY:  negative for cough, shortness of breath, wheezing    CARDIOVASCULAR:  negative for chest pain, palpitations, syncope, edema    GASTROINTESTINAL:  negative for nausea, vomiting    GENITOURINARY:  negative for incontinence    MUSCULOSKELETAL:  negative for neck or back pain    NEUROLOGICAL:  Negative for weakness and tingling  negative for headaches and dizziness    PSYCHIATRIC:  negative for anxiety      Review of systems otherwise negative. OBJECTIVE:     Vitals:    04/19/22 1806   BP:    Pulse: 91   Resp: 24   Temp:    SpO2: 98%        General:  Gen: normal habitus, NAD  HEENT: NCAT, mucosa moist  Cvs: RRR, S1 S2 normal  Resp: symmetric unlabored breathing  Abd: s/nd/nt  Ext: no edema  Skin: no lesions seen, warm and dry    Neuro:  Gen: awake and alert, oriented x3. Lang/speech: no aphasia or dysarthria. Follows commands. CN: PERRL, EOMI, VFF, V1-3 intact, face symmetric, hearing intact, shoulder shrug symmetric, tongue midline  Motor: grossly 5/5 UE and LE b/l  Sense: LT intact in all 4 ext. Coord: FTN and HTS intact b/l  DTR: deferred  Gait: narrow base gait    NIH Stroke Scale:   1a  Level of consciousness: 0 - alert; keenly responsive   1b. LOC questions:  0 - answers both questions correctly   1c. LOC commands: 0 - performs both tasks correctly   2. Best Gaze: 0 - normal   3. Visual: 0 - no visual loss   4. Facial Palsy: 0 - normal symmetric movement   5a. Motor left arm: 0 - no drift, limb holds 90 (or 45) degrees for full 10 seconds   5b.   Motor right arm: 0 - no drift, limb holds 90 (or 45) degrees for full 10 seconds 6a. Motor left le - no drift; leg holds 30 degree position for full 5 seconds   6b  Motor right le - no drift; leg holds 30 degree position for full 5 seconds   7. Limb Ataxia: 0 - absent   8. Sensory: 0 - normal; no sensory loss   9. Best Language:  0 - no aphasia, normal   10. Dysarthria: 0 - normal   11. Extinction and Inattention: 0 - no abnormality         Total:   0     MRS: 0      LABS:   Reviewed. Lab Results   Component Value Date    HGB 12.2 2022    WBC 18.3 (H) 2022     2022     2022    BUN 8 2022    CREATININE 0.60 2022    AST 17 2022    ALT 18 2022    MG 2.1 2022    APTT 22.6 2022    INR 0.9 2022      Lab Results   Component Value Date    COVID19 Not Detected 2022    COVID19 Not Detected 2022       RADIOLOGY:   Images were personally reviewed including:   CT head :   Scattered subarachnoid hemorrhage that is decreased compared to prior   examination.       Interval KRISSY aneurysm coiling. IR :   --left wide necked inferiorly and anteriorly oriented ruptured AComA aneurysm, dimensions neck 2.93mm, height 2.82mm, width 3.13mm, length 2.78mm. --the above treated with Smart coil embolization x3, Vu 1. Smart coil x1 opened but not detached. --Prominent musculocutaneous branch from the left V2 segment that anastomoses to the left occipital artery. 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            CT head :   Diffuse subarachnoid hemorrhage.     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     ASSESSMENT:   30 y/o f with no significant past medical history, chromic smoking, marijuana abuse presented to [facility] as a transfer from [facility] as patient was found to have diffuse SAH on CT head.  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 Eboni Rahman. HH 02, mFS 03  PBD:02  Patient is SURF registry candidate, consent to be obtained. Patient complains of moderate to severe headaches, non-focal neuro exam. Repeat CT head showed decreased SAH. PLAN:   --SBP goal 100-140  mm hg   --SAH management with daily TCDs, Nimodipine, Mg level 3-4 last Mg level 2.1   --On hypertonic saline per ICU   --EVD management per Neuro surgery. --f/up with Dr. Albert Tam in 2 weeks after discharge and f/up with Dr. Eboni Rahman in 3 months after discharge    Case discussed with Dr. Eboni Rahman attending.     Gracia Cho MD  Stroke, [facility]  [id number]  Electronically signed 4/19/2022 at 7:29 PM

## 2022-04-19 NOTE — PROGRESS NOTES
Speech Language Pathology  Facility/Department: [facility]  Initial Speech/Language/Cognitive Assessment    NAME: Dom Vázquez  : 1990   MRN: [id number]  ADMISSION DATE: 2022  ADMITTING DIAGNOSIS: has Tobacco use; Gastroesophageal reflux disease; Pelvic adhesions; Menorrhagia with regular cycle; Complex cyst of right ovary; SAH (subarachnoid hemorrhage) ([address] Aneurysm of anterior communicating artery; [place] headache; Subarachnoid hemorrhage from aneurysm of left anterior communicating artery ([address] Obstructive hydrocephalus ([address] and Subarachnoid hemorrhage (HCC) on their problem list.    Date of Eval: 2022   Evaluating Therapist: Veronica Spear    RECENT RESULTS  CT OF HEAD/MRI:  Scattered subarachnoid hemorrhage that is decreased compared to prior   examination.       Interval KRISSY aneurysm coiling. Primary Complaint:   The patient is a 29 y.o.  female who presents to our emergency department 2022 via [address] as a transfer from [facility] with acute worst headache of her life. Patient does not have any significant past medical history in our chart and denies any. At outlying facility patient underwent CT head without demonstrating diffuse subarachnoid hemorrhage. Patient was seen by telestroke physician in our group recommending transfer to our facility for further work-up. In the emergency department patient states that she was washing TV with her child when she began to have severe pounding headache 10 out of 10 worst in her life. Patient noted to be severely nauseous requiring multiple doses of IV Zofran and IM Phenergan. Patient also admitting to photosensitivity. Patient had significant hypertension at [place] started on IV nicardipine given 1 g of Keppra 10 mg Decadron.     Arrival to our emergency department 140/102, heart rate 77 temperature 97.6.   Initial labs PTT 25.3, INR 1.0, WBC 13.7, platelets 380, glucose 139 and high-sensitivity troponin less than 6. 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 Case discussed with senior endovascular fellow who is aware of case and current neurologic exam planning for emergent DSA 4/18/2022 early morning. Pain:  Pain Assessment  Pain Assessment: 0-10  Pain Level: 5    Assessment:    Pt presents with no apparent cognitive deficits at this time. No dysarthria noted, no oral motor deficits. No further ST is recommended. Verbal and written education provided. Recommendations:  Requires SLP Intervention: No     D/C Recommendations: No therapy recommended at discharge. Goals:  Patient/family involved in developing goals and treatment plan: yes    Subjective:  General  Chart Reviewed: Yes  Family / Caregiver Present: Yes  Social/Functional History  Lives With: Family  Vision  Vision: Within Functional Limits  Hearing  Hearing: Within functional limits           Objective:     Oral/Motor  Oral Motor: Within functional limits    Motor Speech  Motor Speech: Within Functional Limits     Cognition:      Orientation  Overall Orientation Status: Within Functional Limits  Attention  Attention: Within Functional Limits  Memory  Memory: Within Funtional Limits  Short-term Memory:  (2/3 increased to 3/3. 3/3.  Pt reports within baseline functioning.)  Problem Solving  Problem Solving: Within Functional Limits  Abstract Reasoning  Abstract Reasoning: Within Functional Limits  Safety/Judgement  Safety/Judgement: Within Functional Limits    Prognosis:  Speech Therapy Prognosis  Prognosis: Good  Individuals consulted  Consulted and agree with results and recommendations: Patient    Education:  Patient Education: yes  Patient Education Response: Verbalizes understanding          Therapy Time:   Individual Concurrent Group Co-treatment   Time In 1104         Time Out 1113         Minutes 9               Completed by: Jose Hogan  Clinician    Cosigned By: Viridiana Hayes S.CCC/SLP    4/19/2022 12:35 PM

## 2022-04-19 NOTE — RESEARCH
Writer RN spoke with patient at bedside regarding research participation. Pt had ample time to read consent and discuss with family. Pt would not like to participate in research at this time. Patient was encouraged to call research office with any questions or concerns.     [facility] BSN RN  Clinical Research Services - Neuroscience

## 2022-04-19 NOTE — PLAN OF CARE
Problem: Falls - Risk of:  Goal: Will remain free from falls  Description: Will remain free from falls  4/19/2022 0416 by Danielle Cho RN  Outcome: Ongoing     Problem: Falls - Risk of:  Goal: Absence of physical injury  Description: Absence of physical injury  4/19/2022 0416 by Danielle Cho RN  Outcome: Ongoing     Problem: Anxiety/Stress:  Goal: Level of anxiety will decrease  Description: Level of anxiety will decrease  4/19/2022 0416 by Danielle Cho RN  Outcome: Ongoing     Problem: Mental Status - Impaired:  Goal: Mental status will be restored to baseline  Description: Mental status will be restored to baseline  4/19/2022 0416 by Danielle Cho RN  Outcome: Ongoing     Problem: Pain:  Goal: Pain level will decrease  Description: Pain level will decrease  4/19/2022 0416 by Danielle Cho RN  Outcome: Ongoing     Problem: Pain:  Goal: Recognizes and communicates pain  Description: Recognizes and communicates pain  4/19/2022 0416 by Danielle Cho RN  Outcome: Ongoing     Problem: Pain:  Goal: Control of acute pain  Description: Control of acute pain  4/19/2022 0416 by Danielle Cho RN  Outcome: Ongoing     Problem: Pain:  Goal: Control of chronic pain  Description: Control of chronic pain  4/19/2022 0416 by Danielle Cho RN  Outcome: Ongoing

## 2022-04-19 NOTE — PROGRESS NOTES
Occupational Therapy   Occupational Therapy Initial Assessment  Date: 2022   Patient Name: Parker Garcia  MRN: [id number]     : 1990    Date of Service: 2022    Chief Complaint   Patient presents with    Altered Mental Status       Discharge Recommendations: No therapy recommended at discharge. Assessment   Performance deficits / Impairments: Decreased functional mobility ; Decreased ADL status; Decreased high-level IADLs  Assessment: Pt agreeable to OT eval this date. Pt completes bed mobility sup>sit with CGA for trunk support and SBA for sit>sup. Pt completes functional sit<>stand transfers and functional side-stepping with CGA for safety d/t pt reported dizziness upon sitting upright. Pt side-stepped ~2ft toward HOB with no LOB/unsteadiness. Pt limited to complete further functional mobility by art line and EVD line. Pt demonstrates deficits in functional mobility, ADLs, and IADLs. Pt would benefit from continued OT services at this time to increase independence in self-care and daily activities. Decision Making: Medium Complexity  OT Education: OT Role;Plan of Care;IADL Safety  Patient Education: Pt educated on OT role, OT POC, bed mobility, functional mobility and importance of continued OT- good return. REQUIRES OT FOLLOW UP: Yes  Activity Tolerance  Activity Tolerance: Patient Tolerated treatment well  Safety Devices  Safety Devices in place: Yes  Type of devices: Gait belt;Left in bed;Call light within reach;Nurse notified  Restraints  Initially in place: No           Patient Diagnosis(es): The encounter diagnosis was SAH (subarachnoid hemorrhage) ([facility] [address] has no past medical history on file. has a past surgical history that includes salpingectomy (Bilateral, 3/21/2022); ovarian cyst removal (Right, 3/21/2022); Dilation and curettage of uterus (N/A, 3/21/2022); and other surgical history (2022).            Restrictions  Restrictions/Precautions  Restrictions/Precautions: Up as Tolerated  Position Activity Restriction  Other position/activity restrictions: up as tolerated; EVD; BP<140; s/p angiogram 4/18    Subjective   General  Patient assessed for rehabilitation services?: Yes  Family / Caregiver Present: Yes (mother and father present at start of session)  General Comment  Comments: RN ok'd pt for OT eval this date. Pt pleasant/cooperative throughout session. Pt reports throbbing pain 5/10 in head. Social/Functional History  Social/Functional History  Lives With: Family (two children, 11and 8years old)  Type of Home: House  Home Layout: One level,Able to Live on Main level with bedroom/bathroom  Home Access: Stairs to enter with rails  Entrance Stairs - Number of Steps: 2  Entrance Stairs - Rails: Both  Bathroom Shower/Tub: Walk-in shower  Bathroom Toilet: Standard  Bathroom Equipment: Grab bars in shower  Bathroom Accessibility: Accessible  Home Equipment:  (No DME)  Receives Help From: Friend(s),Family  ADL Assistance: [address] [place] [address] Avenue: Independent  Homemaking Responsibilities: Yes  Ambulation Assistance: Independent  Transfer Assistance: Independent  Active : Yes  Mode of Transportation: Truck,SUV  Occupation: Full time employment  Type of occupation: -heavy equipment  Leisure & Hobbies: nature walks  Additional Comments: Pt reports friends and family are closeby and can assist at home upon discharge as needed       Objective   Vision: Impaired  Vision Exceptions: Wears glasses for distance (glasses for driving)  Hearing: Within functional limits          Balance  Sitting Balance: Contact guard assistance (SBA for static sitting; CGA for dynamic sitting balance d/t pt reported dizziness.  Pt sat EOB unsupported for ~35 min.)  Standing Balance: Contact guard assistance  Standing Balance  Time: ~2 min  Activity: Static standing  Comment: Pt stood statically with CGA in preparation for side-stepping with no LOB, unsteadiness, or report of dizziness. Functional Mobility  Functional - Mobility Device: No device  Assist Level: Contact guard assistance  Functional Mobility Comments: Pt side-stepped ~2ft toward HOB with CGA/no device. Pt with no LOB, unsteadiness, or report of dizziness. ADL  Feeding: Modified independent ;Setup  Grooming: Modified independent ;Setup; Pt completed oral hygiene and washed face seated EOB   UE Bathing: Stand by assistance;Setup  LE Bathing: Contact guard assistance;Setup  UE Dressing: Stand by assistance;Setup; Pt donned gown while seated EOB with assistance for line management  LE Dressing: Contact guard assistance;Setup; Pt donned socks while seated EOB using figure-four technique   Toileting: Contact guard assistance;    Tone RUE  RUE Tone: Normotonic  Tone LUE  LUE Tone: Normotonic  Coordination  Movements Are Fluid And Coordinated: Yes     Bed mobility  Supine to Sit:  (CGA for trunk support)  Sit to Supine: Stand by assistance  Scooting: Stand by assistance  Comment: Pt reported dizziness upon sitting upright that subsided after ~2 min  Transfers  Sit to stand: Contact guard assistance  Stand to sit: Contact guard assistance     Cognition  Overall Cognitive Status: WFL        Sensation  Overall Sensation Status: Impaired (Pt reports tingling in L hand s/p surgery)        LUE AROM (degrees)  LUE AROM : WFL  Left Hand AROM (degrees)  Left Hand AROM: WFL  RUE AROM (degrees)  RUE AROM : WFL  Right Hand AROM (degrees)  Right Hand AROM: WFL  LUE Strength  Gross LUE Strength: WFL  L Hand General: 4/5  LUE Strength Comment: Grossly 4/5  RUE Strength  Gross RUE Strength: WFL  R Hand General: 4/5  RUE Strength Comment: Grossly 4/5     Hand Dominance  Hand Dominance: Right          Plan   Plan  Times per week: 3-4x/wk  Current Treatment Recommendations: Safety Education & Training,Balance Training,Patient/Caregiver Education & Training,Self-Care / ADL,Functional Mobility Training,Equipment Evaluation, Education, & procurement      AM-PAC Score        AM-PAC Inpatient Daily Activity Raw Score: 20 (04/19/22 1814)  AM-PAC Inpatient ADL T-Scale Score : 42.03 (04/19/22 1814)  ADL Inpatient CMS 0-100% Score: 38.32 (04/19/22 1814)  ADL Inpatient CMS G-Code Modifier : CJ (04/19/22 1814)    Goals  Short term goals  Time Frame for Short term goals: By discharge, pt will:  Short term goal 1: Demo bed mobility sit<>supine with Mod. IND and HOB flat to mimic home setup.   Short term goal 2: Demo functional sit<>stand transfers and functional mobility simulating household distance with SBA and LRD PRN  Short term goal 3: Demo UB ADLs with SUP and setup  Short term goal 4: Demo LB ADLs with SUP and setup  Short term goal 5: Demo 8+ min of dynamic standing and reaching during functional task with SBA for improved balance during ADLs/IADLs       Therapy Time   Individual Concurrent Group Co-treatment   Time In 0947         Time Out 1034         Minutes 47         Timed Code Treatment Minutes: 25 Minutes       Ariana Connell, OTS

## 2022-04-20 LAB
ALBUMIN SERPL-MCNC: 3.5 G/DL (ref 3.5–5.2)
ALBUMIN/GLOBULIN RATIO: 1.7 (ref 1–2.5)
ALP BLD-CCNC: 57 U/L (ref 35–104)
ALT SERPL-CCNC: 16 U/L (ref 5–33)
ANION GAP SERPL CALCULATED.3IONS-SCNC: 8 MMOL/L (ref 9–17)
AST SERPL-CCNC: 15 U/L
BILIRUB SERPL-MCNC: 0.2 MG/DL (ref 0.3–1.2)
BUN BLDV-MCNC: 9 MG/DL (ref 6–20)
CALCIUM SERPL-MCNC: 7.7 MG/DL (ref 8.6–10.4)
CHLORIDE BLD-SCNC: 110 MMOL/L (ref 98–107)
CO2: 21 MMOL/L (ref 20–31)
CREAT SERPL-MCNC: 0.55 MG/DL (ref 0.5–0.9)
CULTURE: NO GROWTH
GFR AFRICAN AMERICAN: >60 ML/MIN
GFR NON-AFRICAN AMERICAN: >60 ML/MIN
GFR SERPL CREATININE-BSD FRML MDRD: ABNORMAL ML/MIN/{1.73_M2}
GLUCOSE BLD-MCNC: 100 MG/DL (ref 70–99)
HCT VFR BLD CALC: 35.1 % (ref 36.3–47.1)
HEMOGLOBIN: 11.3 G/DL (ref 11.9–15.1)
MAGNESIUM: 2.3 MG/DL (ref 1.6–2.6)
MCH RBC QN AUTO: 29.7 PG (ref 25.2–33.5)
MCHC RBC AUTO-ENTMCNC: 32.2 G/DL (ref 28.4–34.8)
MCV RBC AUTO: 92.4 FL (ref 82.6–102.9)
NRBC AUTOMATED: 0 PER 100 WBC
PDW BLD-RTO: 13.2 % (ref 11.8–14.4)
PLATELET # BLD: 214 K/UL (ref 138–453)
PMV BLD AUTO: 10.4 FL (ref 8.1–13.5)
POTASSIUM SERPL-SCNC: 3.9 MMOL/L (ref 3.7–5.3)
RBC # BLD: 3.8 M/UL (ref 3.95–5.11)
SODIUM BLD-SCNC: 137 MMOL/L (ref 135–144)
SODIUM BLD-SCNC: 139 MMOL/L (ref 135–144)
SODIUM BLD-SCNC: 140 MMOL/L (ref 135–144)
SODIUM BLD-SCNC: 141 MMOL/L (ref 135–144)
SPECIMEN DESCRIPTION: NORMAL
TOTAL PROTEIN: 5.6 G/DL (ref 6.4–8.3)
TSH SERPL DL<=0.05 MIU/L-ACNC: 2.18 UIU/ML (ref 0.3–5)
WBC # BLD: 10.7 K/UL (ref 3.5–11.3)

## 2022-04-20 PROCEDURE — 2580000003 HC RX 258: Performed by: ANESTHESIOLOGY

## 2022-04-20 PROCEDURE — 97530 THERAPEUTIC ACTIVITIES: CPT

## 2022-04-20 PROCEDURE — 2580000003 HC RX 258: Performed by: NURSE PRACTITIONER

## 2022-04-20 PROCEDURE — 2000000003 HC NEURO ICU R&B

## 2022-04-20 PROCEDURE — 84295 ASSAY OF SERUM SODIUM: CPT

## 2022-04-20 PROCEDURE — 6360000002 HC RX W HCPCS: Performed by: HEALTH CARE PROVIDER

## 2022-04-20 PROCEDURE — 99233 SBSQ HOSP IP/OBS HIGH 50: CPT | Performed by: PSYCHIATRY & NEUROLOGY

## 2022-04-20 PROCEDURE — 80053 COMPREHEN METABOLIC PANEL: CPT

## 2022-04-20 PROCEDURE — 99291 CRITICAL CARE FIRST HOUR: CPT | Performed by: PSYCHIATRY & NEUROLOGY

## 2022-04-20 PROCEDURE — APPSS15 APP SPLIT SHARED TIME 0-15 MINUTES: Performed by: NURSE PRACTITIONER

## 2022-04-20 PROCEDURE — 6370000000 HC RX 637 (ALT 250 FOR IP): Performed by: NURSE PRACTITIONER

## 2022-04-20 PROCEDURE — 85027 COMPLETE CBC AUTOMATED: CPT

## 2022-04-20 PROCEDURE — 6360000002 HC RX W HCPCS: Performed by: NURSE PRACTITIONER

## 2022-04-20 PROCEDURE — 99232 SBSQ HOSP IP/OBS MODERATE 35: CPT | Performed by: NEUROLOGICAL SURGERY

## 2022-04-20 PROCEDURE — 2500000003 HC RX 250 WO HCPCS: Performed by: STUDENT IN AN ORGANIZED HEALTH CARE EDUCATION/TRAINING PROGRAM

## 2022-04-20 PROCEDURE — 83735 ASSAY OF MAGNESIUM: CPT

## 2022-04-20 PROCEDURE — 6370000000 HC RX 637 (ALT 250 FOR IP): Performed by: STUDENT IN AN ORGANIZED HEALTH CARE EDUCATION/TRAINING PROGRAM

## 2022-04-20 PROCEDURE — 2580000003 HC RX 258: Performed by: STUDENT IN AN ORGANIZED HEALTH CARE EDUCATION/TRAINING PROGRAM

## 2022-04-20 PROCEDURE — 84443 ASSAY THYROID STIM HORMONE: CPT

## 2022-04-20 PROCEDURE — 6360000002 HC RX W HCPCS: Performed by: STUDENT IN AN ORGANIZED HEALTH CARE EDUCATION/TRAINING PROGRAM

## 2022-04-20 PROCEDURE — 94761 N-INVAS EAR/PLS OXIMETRY MLT: CPT

## 2022-04-20 PROCEDURE — 97116 GAIT TRAINING THERAPY: CPT

## 2022-04-20 RX ORDER — MAGNESIUM SULFATE HEPTAHYDRATE 40 MG/ML
4000 INJECTION, SOLUTION INTRAVENOUS ONCE
Status: COMPLETED | OUTPATIENT
Start: 2022-04-20 | End: 2022-04-20

## 2022-04-20 RX ORDER — ENOXAPARIN SODIUM 100 MG/ML
40 INJECTION SUBCUTANEOUS DAILY
Status: DISCONTINUED | OUTPATIENT
Start: 2022-04-20 | End: 2022-05-06 | Stop reason: HOSPADM

## 2022-04-20 RX ORDER — MAGNESIUM SULFATE HEPTAHYDRATE 40 MG/ML
4000 INJECTION, SOLUTION INTRAVENOUS 2 TIMES DAILY
Status: DISCONTINUED | OUTPATIENT
Start: 2022-04-20 | End: 2022-04-20

## 2022-04-20 RX ORDER — 0.9 % SODIUM CHLORIDE 0.9 %
500 INTRAVENOUS SOLUTION INTRAVENOUS ONCE
Status: COMPLETED | OUTPATIENT
Start: 2022-04-20 | End: 2022-04-20

## 2022-04-20 RX ADMIN — NIMODIPINE 60 MG: 30 CAPSULE, LIQUID FILLED ORAL at 17:44

## 2022-04-20 RX ADMIN — LEVETIRACETAM 500 MG: 500 TABLET, FILM COATED ORAL at 20:18

## 2022-04-20 RX ADMIN — NIMODIPINE 60 MG: 30 CAPSULE, LIQUID FILLED ORAL at 05:25

## 2022-04-20 RX ADMIN — Medication 10 MG: at 12:37

## 2022-04-20 RX ADMIN — SODIUM CHLORIDE, PRESERVATIVE FREE 10 ML: 5 INJECTION INTRAVENOUS at 08:33

## 2022-04-20 RX ADMIN — OXYCODONE 5 MG: 5 TABLET ORAL at 01:35

## 2022-04-20 RX ADMIN — NIMODIPINE 60 MG: 30 CAPSULE, LIQUID FILLED ORAL at 20:18

## 2022-04-20 RX ADMIN — Medication 10 MG: at 17:48

## 2022-04-20 RX ADMIN — OXYCODONE 5 MG: 5 TABLET ORAL at 20:25

## 2022-04-20 RX ADMIN — OXYCODONE 5 MG: 5 TABLET ORAL at 05:56

## 2022-04-20 RX ADMIN — SODIUM CHLORIDE 500 ML: 9 INJECTION, SOLUTION INTRAVENOUS at 11:42

## 2022-04-20 RX ADMIN — SODIUM CHLORIDE 75 ML/HR: 234 INJECTION INTRAMUSCULAR; INTRAVENOUS; SUBCUTANEOUS at 14:04

## 2022-04-20 RX ADMIN — LEVETIRACETAM 500 MG: 500 TABLET, FILM COATED ORAL at 08:32

## 2022-04-20 RX ADMIN — ENOXAPARIN SODIUM 40 MG: 100 INJECTION SUBCUTANEOUS at 17:44

## 2022-04-20 RX ADMIN — ACETAMINOPHEN 650 MG: 325 TABLET ORAL at 08:42

## 2022-04-20 RX ADMIN — MAGNESIUM SULFATE IN WATER 4000 MG: 40 INJECTION, SOLUTION INTRAVENOUS at 10:55

## 2022-04-20 RX ADMIN — OXYCODONE 5 MG: 5 TABLET ORAL at 11:50

## 2022-04-20 RX ADMIN — SODIUM CHLORIDE 75 ML/HR: 234 INJECTION INTRAMUSCULAR; INTRAVENOUS; SUBCUTANEOUS at 00:03

## 2022-04-20 RX ADMIN — NIMODIPINE 60 MG: 30 CAPSULE, LIQUID FILLED ORAL at 08:32

## 2022-04-20 RX ADMIN — SODIUM CHLORIDE, PRESERVATIVE FREE 10 ML: 5 INJECTION INTRAVENOUS at 20:19

## 2022-04-20 RX ADMIN — ATORVASTATIN CALCIUM 80 MG: 80 TABLET, FILM COATED ORAL at 20:18

## 2022-04-20 RX ADMIN — SODIUM CHLORIDE 75 ML/HR: 234 INJECTION INTRAMUSCULAR; INTRAVENOUS; SUBCUTANEOUS at 07:23

## 2022-04-20 RX ADMIN — SODIUM CHLORIDE 75 ML/HR: 234 INJECTION INTRAMUSCULAR; INTRAVENOUS; SUBCUTANEOUS at 21:15

## 2022-04-20 RX ADMIN — FENTANYL CITRATE 25 MCG: 50 INJECTION, SOLUTION INTRAMUSCULAR; INTRAVENOUS at 22:12

## 2022-04-20 RX ADMIN — NIMODIPINE 60 MG: 30 CAPSULE, LIQUID FILLED ORAL at 13:25

## 2022-04-20 RX ADMIN — NIMODIPINE 60 MG: 30 CAPSULE, LIQUID FILLED ORAL at 01:35

## 2022-04-20 RX ADMIN — POLYETHYLENE GLYCOL 3350 17 G: 17 POWDER, FOR SOLUTION ORAL at 20:18

## 2022-04-20 RX ADMIN — OXYCODONE 5 MG: 5 TABLET ORAL at 17:14

## 2022-04-20 ASSESSMENT — PAIN DESCRIPTION - ONSET
ONSET: GRADUAL
ONSET: GRADUAL
ONSET: ON-GOING

## 2022-04-20 ASSESSMENT — PAIN DESCRIPTION - FREQUENCY
FREQUENCY: INTERMITTENT
FREQUENCY: CONTINUOUS
FREQUENCY: INTERMITTENT

## 2022-04-20 ASSESSMENT — PAIN DESCRIPTION - DESCRIPTORS
DESCRIPTORS: POUNDING
DESCRIPTORS: CONSTANT;HEADACHE
DESCRIPTORS: ACHING
DESCRIPTORS: ACHING;DISCOMFORT
DESCRIPTORS: DISCOMFORT;ACHING
DESCRIPTORS: HEADACHE;POUNDING;SHARP

## 2022-04-20 ASSESSMENT — PAIN SCALES - GENERAL
PAINLEVEL_OUTOF10: 0
PAINLEVEL_OUTOF10: 1
PAINLEVEL_OUTOF10: 0
PAINLEVEL_OUTOF10: 6
PAINLEVEL_OUTOF10: 0
PAINLEVEL_OUTOF10: 7
PAINLEVEL_OUTOF10: 0
PAINLEVEL_OUTOF10: 6
PAINLEVEL_OUTOF10: 3
PAINLEVEL_OUTOF10: 7
PAINLEVEL_OUTOF10: 7
PAINLEVEL_OUTOF10: 0
PAINLEVEL_OUTOF10: 0
PAINLEVEL_OUTOF10: 5
PAINLEVEL_OUTOF10: 0
PAINLEVEL_OUTOF10: 4
PAINLEVEL_OUTOF10: 3
PAINLEVEL_OUTOF10: 7
PAINLEVEL_OUTOF10: 0

## 2022-04-20 ASSESSMENT — PAIN DESCRIPTION - ORIENTATION
ORIENTATION: PROXIMAL
ORIENTATION: UPPER
ORIENTATION: ANTERIOR
ORIENTATION: PROXIMAL
ORIENTATION: PROXIMAL
ORIENTATION: ANTERIOR
ORIENTATION: ANTERIOR

## 2022-04-20 ASSESSMENT — PAIN SCALES - WONG BAKER
WONGBAKER_NUMERICALRESPONSE: 0

## 2022-04-20 ASSESSMENT — PAIN DESCRIPTION - LOCATION
LOCATION: HEAD
LOCATION: BACK;HEAD

## 2022-04-20 ASSESSMENT — PAIN DESCRIPTION - PAIN TYPE
TYPE: ACUTE PAIN
TYPE: ACUTE PAIN;CHRONIC PAIN
TYPE: ACUTE PAIN

## 2022-04-20 ASSESSMENT — PAIN - FUNCTIONAL ASSESSMENT
PAIN_FUNCTIONAL_ASSESSMENT: INTOLERABLE, UNABLE TO DO ANY ACTIVE OR PASSIVE ACTIVITIES
PAIN_FUNCTIONAL_ASSESSMENT: PREVENTS OR INTERFERES SOME ACTIVE ACTIVITIES AND ADLS

## 2022-04-20 ASSESSMENT — PAIN DESCRIPTION - PROGRESSION: CLINICAL_PROGRESSION: NOT CHANGED

## 2022-04-20 NOTE — PROGRESS NOTES
Daily Progress Note  Neuro Critical Care    Patient Name: Keyanna Oliver  Patient : 1990  Room/Bed: 0516/0516-01  Code Status: FULL   Allergies: No Known Allergies    CHIEF COMPLAINT:     Worst headache of her life acute onset x2 hours     INTERVAL HISTORY    Initial Presentation (Admitted 22 @00:45AM): History Obtained From: patient and EMR review      The patient is B 32 y.o.  female who presents to our emergency department 2022 via [address] as a transfer from [facility] acute worst headache of her life. Pierre Huynh does not have any significant past medical history in our chart and denies any.  At outlying facility patient underwent CT head without demonstrating diffuse subarachnoid hemorrhage.  Patient was seen by telestroke physician in our group recommending transfer to our facility for further work-up.  In the emergency department patient states that she was washing TV with her child when she began to have severe pounding headache 10 out of 10 worst in her life.  Patient noted to be severely nauseous requiring multiple doses of IV Zofran and IM Phenergan.  Patient also admitting to photosensitivity.  Patient had significant hypertension at [place] started on IV nicardipine given 1 g of Keppra 10 mg Decadron.     Arrival to our emergency department 140/102, heart rate 77 temperature 97. 6.  Initial labs PTT 25.3, INR 1.0, WBC 13.7, platelets 405, glucose 139 and high-sensitivity troponin less than 6.  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  Case discussed with senior endovascular fellow who is aware of case and current neurologic exam planning for emergent DSA 2022 early morning.     For [facility]: 2, Modified Combs: grade 3         Admitted to ICU From: ED 24  Reason for ICU Admission: diffuse SAH with [facility] aneurysm      Hospital Course:   -around 8:00 AM Dr. Cruz Sy at bedside placing EVD prior to endovascular neurosurgery. Patient went for urgent DSA found to have a comm aneurysm treated by primary coil embolization. : Hemodynamically stable for arterial line readings -142, heart rate 82-99 T-max 98.9. Currently receiving Morenci 5 mg every 4 hours as needed and fentanyl 25 every 2 as needed for pain control. Last 24h:    No acute events overnight. Blood pressure within parameter -139 overnight Cardene has been held off since 6 PM 2022 not requiring any as needed IV medications, heart rate 65-79 T-max 98.5. Serum magnesium 2.3 was started on IV magnesium 2 g twice daily will increase to 4 g twice daily continue to trend magnesium daily. Of note yesterday patient started to have increased urine output 9 L total in 24 hours urine studies sent consistent with central salt wasting patient started on hypertonic saline 2% 150 cc bolus followed by 75 cc/h maintenance. Sodium checks every 8 hours with a sodium goal of 145.     CURRENT MEDICATIONS:  SCHEDULED MEDICATIONS:   magnesium sulfate  2,000 mg IntraVENous BID    levETIRAcetam  500 mg Oral BID    sodium chloride   IntraVENous Once    promethazine  12.5 mg IntraMUSCular Once    atorvastatin  80 mg Oral Nightly    niMODipine  60 mg Oral 6 times per day    sodium chloride flush  5-40 mL IntraVENous 2 times per day     CONTINUOUS INFUSIONS:   sodium chloride 75 mL/hr (22 0500)    niCARdipine Stopped (22 1905)    sodium chloride Stopped (22)     PRN MEDICATIONS:   oxyCODONE, ondansetron **OR** ondansetron, polyethylene glycol, labetalol, sodium chloride flush, sodium chloride, acetaminophen, fentanNYL    VITALS:  Temperature Range: Temp: 98.4 °F (36.9 °C) Temp  Av.7 °F (37.1 °C)  Min: 98.4 °F (36.9 °C)  Max: 99.2 °F (37.3 °C)  BP Range: Systolic (59PJS), BSQ:279 , Min:115 , CIC:654     Diastolic (57CUI), UBF:43, Min:65, Max:79    Pulse Range: Pulse  Av.9  Min: 65  Max: 110  Respiration Range: Resp  Av.7  Min: 13  Max: 29  Current Pulse Ox: SpO2: 97 %  24HR Pulse Ox Range: SpO2  Av.5 %  Min: 96 %  Max: 99 %  Patient Vitals for the past 12 hrs:   Temp Temp src Pulse Resp SpO2   22 0600 98.4 °F (36.9 °C) Oral 77 16 97 %   22 0500 -- -- 68 14 96 %   22 0400 98.4 °F (36.9 °C) Oral 65 13 97 %   22 0300 -- -- 71 13 97 %   22 0200 -- -- 67 18 98 %   22 0100 -- -- 71 17 98 %   22 0000 98.5 °F (36.9 °C) Oral 70 14 97 %   22 2300 -- -- 71 17 98 %   22 2200 -- -- 72 16 98 %   22 2100 -- -- 75 18 99 %   22 2000 98.7 °F (37.1 °C) Oral 79 21 98 %   22 1900 -- -- 86 19 98 %   22 1845 -- -- 87 17 98 %     Estimated body mass index is 29.6 kg/m² as calculated from the following:    Height as of this encounter: 5' 2\" (1.575 m).     Weight as of this encounter: 161 lb 13.1 oz (73.4 kg).  []<16 Severe malnutrition  []16-16.99 Moderate malnutrition  []17-18.49 Mild malnutrition  []18.5-24.9 Normal  [x]25-29.9 Overweight (not obese)  []30-34.9 Obese class 1 (Low Risk)  []35-39.9 Obese class 2 (Moderate Risk)  []=40 Obese class 3 (High Risk)    RECENT LABS:   Lab Results   Component Value Date    WBC 10.7 2022    HGB 11.3 (L) 2022    HCT 35.1 (L) 2022     2022    CHOL 182 2022    TRIG 60 2022    HDL 74 2022    ALT 16 2022    AST 15 2022     2022    K 3.9 2022     (H) 2022    CREATININE 0.55 2022    BUN 9 2022    CO2 21 2022    TSH 0.80 2019    INR 0.9 2022    LABA1C 5.5 2022     24 HOUR INTAKE/OUTPUT:    Intake/Output Summary (Last 24 hours) at 2022 0611  Last data filed at 2022 0600  Gross per 24 hour   Intake 2703.18 ml   Output 9178 ml   Net -6474.82 ml       IMAGING:   CT head WO 22     Impression   Scattered subarachnoid hemorrhage that is decreased compared to prior   examination.       Interval KRISSY aneurysm coiling. CT head WO 4/17/22 @10:48 PM   Impression   Diffuse subarachnoid hemorrhage.  Recommend CTA for further evaluation.      CTA head and neck 4/18/22   Impression   Aneurysm arising from the anterior communicating artery measuring 3 mm x 2 mm.      CXR 4/18/22  Impression   No acute cardiopulmonary process.      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 --the above treated with Smart coil embolization x3, Vu 1. Smart coil x1 opened but not detached. --Prominent musculocutaneous branch from the left V2 segment that anastomoses to the left occipital artery. 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 Labs and Images reviewed with:  [x] Dr. Emanuel Rosado. [facility]        PHYSICAL EXAM       CONSTITUTIONAL:  Well developed, well nourished, alert and oriented x 3, in no acute distress. GCS 15. Nontoxic. No dysarthria. No aphasia.    HEAD:  normocephalic, atraumatic    EYES:  PERRLA, EOMI.   ENT:  moist mucous membranes   NECK:  supple, symmetric   LUNGS:  Equal air entry bilaterally   CARDIOVASCULAR:  normal s1 / s2, RRR, distal pulses intact   ABDOMEN:  Soft, no rigidity   NEUROLOGIC:  Mental Status:  A & O x3,awake             Cranial Nerves:    cranial nerves II-XII are grossly intact    Motor Exam:    Drift:  absent  Tone:  normal    Motor exam is symmetrical 5 out of 5 all extremities bilaterally    Sensory:    Touch:    Right Upper Extremity:  normal  Left Upper Extremity:  abnormal   Right Lower Extremity:  normal  Left Lower Extremity:  normal    Deep Tendon Reflexes:    Right Bicep:  1+  Left Bicep:  1+  Right Knee:  1+  Left Knee:  1+    Plantar Response:  Right:  downgoing  Left:  downgoing    Clonus:  absent  Andino's: absent    Coordination/Dysmetria:  Heel to [place] magan:  Right:  normal  Left:  normal  Finger to Nose:   Right:  normal  Left:  normal   Dysdiadochokinesia:  absent       DRAINS:  EVD right parietal placed 4/18- open at [id number] 324 CC CSF ouput over 24 hours   Urethral catheter 4/18/22  Left radial art line placed 4/18 [email or web address]  ASSESSMENT AND PLAN:     ASSESSMENT:      This is a 32 y.o. female with acute worst headache of her life found to have diffuse subarachnoid hemorrhage presenting to [address] [place] [address], [address] denies any significant past medical history and does not take any daily medications.  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  Patient admitted under neuro ICU services with endovascular neurosurgery consulted planning for urgent evaluation and likely DSA 4/18/2022 first thing in the morning. Patient underwent emergent EVD placement at bedside 4/18/2022 and diagnostic angio with primary coil embolization of a comm aneurysm successful. Postoperatively being managed by neuro ICU for vasospasm monitoring.     Patient care will be discussed with attending, will reevaluate patient along with attending.      PLAN/MEDICAL DECISION MAKING:        NEUROLOGIC:     - Imaging   CT head WO-diffuse SAH  CTA head and Neck-ACOM aneurysm   IR angio-4/18 AM-primary coil embolization  Neurosurgery-Following placed EVD 4/18  - AEDs loaded 1gram keppra at [place]  Continue 500mg BID   -nimodipine 60mg Q4 hours Day2  -FU Mag with goal 3-4 --> currently on 4 g IV twice daily  -Noted to have central salt wasting and 9 L urine output over 24 hours starting 4/19.  Started on hypertonic saline 2% @75cc/hr and sodium goal of 145  - Neuro checks per protocol     CARDIOVASCULAR:  - Goal SBP 100-140  - titrate with cardene has been held since 6:00 PM 4/19, has not required any additional PRN in 24 hours   - PRN IV labetalol 10mg Q4 hour for SBP >140  - Continue telemetry     PULMONARY:  - 99% on room air         RENAL/FLUID/ELECTROLYTE:  - BUN 9/ Creatinine .55  - Urine output strict intake and out put monitoring   - IVF: 75cc/hr 2% hypertonic saline solution  - Replace electrolytes PRN  - Daily BMP     GI/NUTRITION:  NUTRITION: NPO prior to DSA with endovascular   No diet orders on file  - Bowel regimen: glycolax  - GI prophylaxis: protonix      ID:  - Tmax 98.6  - WBC 10.7   - FU CXR   - Continue to monitor for fevers  - Daily CBC     HEME:   - H&H 10.7/35.1  - Platelets 636  -PTT 11.2, INR 1.0   - Daily CBC     ENDOCRINE:  - Continue to monitor blood glucose, goal <180  -HbA1c 5.5 4/18/2022 and lipid panel total cholesterol 182, HDL 74, LDL 96  -TSH with reflex      OTHER:  - PT/OT/ST      PROPHYLAXIS:  Stress ulcer: not indicated at this time      DVT PROPHYLAXIS:  - SCD sleeves - Thigh High   - GERMAINE stockings - Thigh High  - will discuss starting lovenox daily 4/20        DISPOSITION:  [x] To remain ICU: Given modified Biggs 3 diffuse subarachnoid hemorrhage we will continue to monitor in neuro ICU closely given high risk for vasospasm. We will continue to follow along. For any changes in exam or patient status please contact Neuro Critical Care.       Kimberley Carbajal MD   PGY-3 Neurology Resident   Neuro Critical Care  Pager [phone]  4/20/2022     6:11 AM

## 2022-04-20 NOTE — PLAN OF CARE
TODAY:  4/20/22    AWAKE & FOLLOWING COMMANDS:  [] No   [x] Yes    INTUBATED:   [x] No   [] Yes    SEDATION/ANALGESIA:    [] Propofol gtt  [] Versed gtt  [] Ativan gtt   [x] No Sedation  Pain medications: Fentanyl 25 mcg q2h PRN, oxycodone 5 mg q4h prn    FEEDING: Able to take PO?     [x] Yes:  Diet: Adult diet    DVT Prophylaxis:  [] Yes:           [x] No rationale: SAH, EVD; start when OK with neurosurgery     Stress Ulcer Prophylaxis: [] Yes:   [x] Not indicated    VASOPRESSORS:  [x] No    [] Yes  [] Levophed [] Dopamine [] Vasopressin  [] Dobutamine [] Phenylephrine [] Epinephrine    CENTRAL/ARTERIAL LINES:  [] No    [x] Yes:  Location: Left radial arterial line, Date placed: 4/18, Indication: blood pressure monitoring    CAPUTO CATHETER: [] No    [x] Yes:  Date placed: 4/18, Indication: Strict I&Os, high urine output    DRAINS: [] No    [x] Yes:  Location: Right EVD, Date placed: 4/18, Output: 318 mL/24 hours    Head of Bed: [x] Elevated:          [] Flat    Glucose management: [x] Not indicated, consistently less than [id number] Bryn Sanchez, DEEDEE - CNP  4/20/2022

## 2022-04-20 NOTE — PLAN OF CARE
Problem: Falls - Risk of:  Goal: Will remain free from falls  Description: Will remain free from falls  Outcome: Ongoing  Goal: Absence of physical injury  Description: Absence of physical injury  Outcome: Ongoing     Problem: Anxiety/Stress:  Goal: Level of anxiety will decrease  Description: Level of anxiety will decrease  Outcome: Ongoing     Problem: Mental Status - Impaired:  Goal: Mental status will be restored to baseline  Description: Mental status will be restored to baseline  Outcome: Ongoing     Problem: Pain:  Goal: Pain level will decrease  Description: Pain level will decrease  Outcome: Ongoing  Goal: Recognizes and communicates pain  Description: Recognizes and communicates pain  Outcome: Ongoing  Goal: Control of acute pain  Description: Control of acute pain  Outcome: Ongoing  Goal: Control of chronic pain  Description: Control of chronic pain  Outcome: Ongoing     Problem: Skin Integrity - Impaired:  Goal: Will show no infection signs and symptoms  Description: Will show no infection signs and symptoms  Outcome: Ongoing  Goal: Absence of new skin breakdown  Description: Absence of new skin breakdown  Outcome: Ongoing     Problem: HEMODYNAMIC STATUS  Goal: Patient has stable vital signs and fluid balance  Outcome: Ongoing     Problem: ACTIVITY INTOLERANCE/IMPAIRED MOBILITY  Goal: Mobility/activity is maintained at optimum level for patient  Outcome: Ongoing     Problem: COMMUNICATION IMPAIRMENT  Goal: Ability to express needs and understand communication  Outcome: Ongoing     Problem: Swallowing - Impaired:  Goal: Able to swallow without choking  Description: Able to swallow without choking  Outcome: Ongoing  Goal: Absence of aspiration  Description: Absence of aspiration  Outcome: Ongoing     Problem: Pain:  Goal: Pain level will decrease  Description: Pain level will decrease  Outcome: Ongoing  Goal: Control of acute pain  Description: Control of acute pain  Outcome: Ongoing  Goal: Control of chronic pain  Description: Control of chronic pain  Outcome: Ongoing

## 2022-04-20 NOTE — PROGRESS NOTES
Endovascular Neurosurgery Progress Note    SUBJECTIVE:   Patient was complaining of headache overnight. This am headache 6/10, denied nausea. Review of Systems:  CONSTITUTIONAL:  negative for fevers, chills, fatigue and malaise    EYES:  negative for double vision, blurred vision and photophobia     HEENT:  negative for tinnitus, epistaxis and sore throat    RESPIRATORY:  negative for cough, shortness of breath, wheezing    CARDIOVASCULAR:  negative for chest pain, palpitations, syncope, edema    GASTROINTESTINAL:  negative for nausea, vomiting    GENITOURINARY:  negative for incontinence    MUSCULOSKELETAL:  negative for neck or back pain    NEUROLOGICAL:  Negative for weakness and tingling  negative for headaches and dizziness    PSYCHIATRIC:  negative for anxiety      Review of systems otherwise negative. OBJECTIVE:     Vitals:    04/20/22 1600   BP:    Pulse: 77   Resp: 16   Temp:    SpO2: 97%        General:  Gen: normal habitus, NAD  HEENT: NCAT, mucosa moist  Cvs: RRR, S1 S2 normal  Resp: symmetric unlabored breathing  Abd: s/nd/nt  Ext: no edema  Skin: no lesions seen, warm and dry    Neuro:  Gen: awake and alert, oriented x3. Lang/speech: no aphasia or dysarthria. Follows commands. CN: PERRL, EOMI, VFF, V1-3 intact, face symmetric, hearing intact, shoulder shrug symmetric, tongue midline  Motor: grossly 5/5 UE and LE b/l  Sense: LT intact in all 4 ext. Coord: FTN and HTS intact b/l  DTR: deferred  Gait: narrow base gait    NIH Stroke Scale:   1a  Level of consciousness: 0 - alert; keenly responsive   1b. LOC questions:  0 - answers both questions correctly   1c. LOC commands: 0 - performs both tasks correctly   2. Best Gaze: 0 - normal   3. Visual: 0 - no visual loss   4. Facial Palsy: 0 - normal symmetric movement   5a. Motor left arm: 0 - no drift, limb holds 90 (or 45) degrees for full 10 seconds   5b.   Motor right arm: 0 - no drift, limb holds 90 (or 45) degrees for full 10 seconds 6a. Motor left le - no drift; leg holds 30 degree position for full 5 seconds   6b  Motor right le - no drift; leg holds 30 degree position for full 5 seconds   7. Limb Ataxia: 0 - absent   8. Sensory: 0 - normal; no sensory loss   9. Best Language:  0 - no aphasia, normal   10. Dysarthria: 0 - normal   11. Extinction and Inattention: 0 - no abnormality         Total:   0     MRS: 0      LABS:   Reviewed. Lab Results   Component Value Date    HGB 11.3 (L) 2022    WBC 10.7 2022     2022     2022    BUN 9 2022    CREATININE 0.55 2022    AST 15 2022    ALT 16 2022    MG 2.3 2022    APTT 22.6 2022    INR 0.9 2022      Lab Results   Component Value Date    COVID19 Not Detected 2022    COVID19 Not Detected 2022       RADIOLOGY:   Images were personally reviewed including:   CT head :   Scattered subarachnoid hemorrhage that is decreased compared to prior   examination.       Interval KRISSY aneurysm coiling. IR :   --left wide necked inferiorly and anteriorly oriented ruptured AComA aneurysm, dimensions neck 2.93mm, height 2.82mm, width 3.13mm, length 2.78mm. --the above treated with Smart coil embolization x3, Vu 1. Smart coil x1 opened but not detached. --Prominent musculocutaneous branch from the left V2 segment that anastomoses to the left occipital artery. 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            CT head :   Diffuse subarachnoid hemorrhage.     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     ASSESSMENT:   28 y/o f with no significant past medical history, chromic smoking, marijuana abuse presented to [facility] as a transfer from [facility] as patient was found to have diffuse SAH on CT head.  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 Judi Jewell. HH 02, mFS 03  PBD:03   Patient complains of moderate to severe headaches, non-focal neuro exam. Repeat CT head on 04/19 showed decreased SAH. PLAN:   --SBP goal 100-140  mm hg   --SAH management with daily TCDs, Nimodipine, Mg level 3-4 last Mg level 2.1   --On hypertonic saline per ICU   --EVD management per Neuro surgery. --f/up with Dr. Leydi Alvarado in 2 weeks after discharge and f/up with Dr. Judi Jewell in 3 months after discharge    Case discussed with Dr. Judi Jewell attending.     Matias Deluna MD  Stroke, [facility]  [id number]  Electronically signed 4/20/2022 at 6:20 PM

## 2022-04-20 NOTE — PROGRESS NOTES
Physical Therapy  Facility/Department: [address]  Daily treatment notes    Name: Hansa Bradley  : 1990  MRN: [id number]  Date of Service: 2022    Discharge Recommendations:  Patient would benefit from continued therapy after discharge   PT Equipment Recommendations  Equipment Needed: No      Patient Diagnosis(es): The encounter diagnosis was SAH (subarachnoid hemorrhage) ([facility] [address] Past Medical History:  has no past medical history on file. Past Surgical History:  has a past surgical history that includes salpingectomy (Bilateral, 3/21/2022); ovarian cyst removal (Right, 3/21/2022); Dilation and curettage of uterus (N/A, 3/21/2022); and other surgical history (2022). Assessment   Body Structures, Functions, Activity Limitations Requiring Skilled Therapeutic Intervention: Decreased functional mobility ; Increased pain;Decreased balance;Decreased endurance  Assessment: The pt demo progress this date with funstional mobility , able to amb 50ft x2Pt reports significant support from family upon discharge. She will continue to benefit from continued skilled physical therapy to address thses deficits and maximize independence.   Therapy Prognosis: Good  Requires PT Follow-Up: Yes  Activity Tolerance  Activity Tolerance: Patient tolerated treatment well;Patient limited by fatigue     Plan   Plan  Current Treatment Recommendations: Balance training,ROM,Strengthening,Functional mobility training,Transfer training,Stair training,Gait training,Endurance training,Home exercise program,Safety education & training,Patient/Caregiver education & training  Safety Devices  Type of Devices: Call light within reach,Gait belt,Left in bed,Nurse notified  Restraints  Restraints Initially in Place: No     Restrictions  Restrictions/Precautions  Restrictions/Precautions: Up as Tolerated  Position Activity Restriction  Other position/activity restrictions: up as tolerated; EVD; BP<140; s/p angiogram      Subjective General  Patient assessed for rehabilitation services?: Yes  Response To Previous Treatment: Patient with no complaints from previous session. Family / Caregiver Present: No  Follows Commands: Within Functional Limits  Subjective  Subjective: RN and pt agreeable to PT; pt supine in bed upon pt arrival, EVD clamped per RN prior to mobility; pt very pleasant and cooperative throughout session  Pain Assessment  Pain Assessment: None - Denies Pain      Objective       Bed mobility  Supine to Sit: Contact guard assistance  Sit to Supine: Stand by assistance  Scooting: Stand by assistance  Transfers  Sit to Stand: Contact guard assistance  Stand to sit: Stand by assistance  Comment: Stood 4 mins weight shifting prior to ambulation. Ambulation  Surface: level tile  Device: No Device  Assistance: Contact guard assistance  Gait Deviations: Slow Lona  Distance: 50ft x2  Comments: Pt tolerated ambulation well this date. No LOB noted throughout session. More Ambulation?: No     Balance  Posture: Fair  Sitting - Static: Good  Sitting - Dynamic: Good  Standing - Static: Good;-  Standing - Dynamic: Fair;+  Comments: standing balance assessed w/ no AD; pt able to sit EOB with supervision  Exercise Treatment:  (defer d/t fatigue .)      Goals  Short Term Goals  Time Frame for Short term goals: 15  Short term goal 1: Pt to perform bed mobility from flat surface independently  Short term goal 2: Demonstrate functional transfers independently  Short term goal 3: Ambulate 300ft w/ no AD independently  Short term goal 4: Ascend/descend 2 stairs with R rail independently  Patient Goals   Patient goals :  To go home       Therapy Time   Individual Concurrent Group Co-treatment   Time In 1504         Time Out 1527         Minutes 23         Timed Code Treatment Minutes: [address], PTA

## 2022-04-20 NOTE — PROGRESS NOTES
Neurosurgery LILLY/Resident    Daily Progress Note   CC:  Chief Complaint   Patient presents with    Altered Mental Status     4/20/2022  11:33 AM    Chart reviewed. No acute events overnight. No new complaints.  Resting in bed, reporting headache this morning, ICP 5-10 over night, 159ml/12 hours from EVD, tolerating diet well, no nausea     Vitals:    04/20/22 0900 04/20/22 1000 04/20/22 1100 04/20/22 1115   BP:  119/70     Pulse: 88 86 82    Resp: 19 17 20 19   Temp:  98.6 °F (37 °C)     TempSrc:  Oral     SpO2: 99% 100% 99% 99%   Weight:       Height:           PE:   AOx3   PERRL, EOMI  Cranial Nerves:    II: Visual acuity:  normal  III: Pupils:  equal, round, reactive to light  III,IV,VI: Extra Ocular Movements:intact  V: Facial sensation:  intact  VII: Facial strength: intact  VIII: Hearing:  intact  IX: Palate:  intact  XI: Shoulder shrug: intact  XII: Tongue movement: intact      Motor   L deltoid 5/5; R deltoid 5/5  L biceps 5/5; R biceps 5/5  L triceps 5/5; R triceps 5/5  L wrist extension 5/5; R wrist extension 5/5  L intrinsics 5/5; R intrinsics 5/5      L iliopsoas 5/5 , R iliopsoas 5/5  L quadriceps 5/5; R quadriceps 5/5  L Dorsiflexion 5/5; R dorsiflexion 5/5  L Plantarflexion 5/5; R plantarflexion 5/5  L EHL 5/5; R EHL 5/5  Sensation: intact   Drain output: 159ml/12 hours   Incision: clean dry intact   ICP 5-10       Lab Results   Component Value Date    WBC 10.7 04/20/2022    HGB 11.3 (L) 04/20/2022    HCT 35.1 (L) 04/20/2022     04/20/2022    CHOL 182 04/18/2022    TRIG 60 04/18/2022    HDL 74 04/18/2022    ALT 16 04/20/2022    AST 15 04/20/2022     04/20/2022    K 3.9 04/20/2022     (H) 04/20/2022    CREATININE 0.55 04/20/2022    BUN 9 04/20/2022    CO2 21 04/20/2022    TSH 2.18 04/20/2022    INR 0.9 04/18/2022    LABA1C 5.5 04/18/2022    CRP <3.0 04/19/2022       A/P  29 y.o. female who presents with aneurysmal subarachnoid hemorrhage s/p right EVD placement 4/18 and Acom coiling    Raise EVD to 15cm H20, monitor ICP hourly and record- page NSG if patient develops nausea vomiting wosen headache, drainage from EVD site, AMS    [id number] for DVT prophylaxis  Neuro checks per floor protocol      Please contact neurosurgery with any changes in patients neurologic status.        Humphrey Alexis CNP  4/20/22  11:33 AM

## 2022-04-21 LAB
CREATININE URINE: 35.1 MG/DL (ref 28–217)
LV EF: 65 %
LVEF MODALITY: NORMAL
MAGNESIUM: 2.1 MG/DL (ref 1.6–2.6)
OSMOLALITY URINE: 405 MOSM/KG (ref 80–1300)
SERUM OSMOLALITY: 284 MOSM/KG (ref 275–295)
SODIUM BLD-SCNC: 129 MMOL/L (ref 135–144)
SODIUM BLD-SCNC: 135 MMOL/L (ref 135–144)
SODIUM BLD-SCNC: 140 MMOL/L (ref 135–144)
SODIUM BLD-SCNC: 141 MMOL/L (ref 135–144)
SODIUM,UR: 142 MMOL/L
SPECIFIC GRAVITY UA: 1.01 (ref 1–1.03)

## 2022-04-21 PROCEDURE — 36415 COLL VENOUS BLD VENIPUNCTURE: CPT

## 2022-04-21 PROCEDURE — 99233 SBSQ HOSP IP/OBS HIGH 50: CPT | Performed by: PSYCHIATRY & NEUROLOGY

## 2022-04-21 PROCEDURE — 76937 US GUIDE VASCULAR ACCESS: CPT

## 2022-04-21 PROCEDURE — 6370000000 HC RX 637 (ALT 250 FOR IP): Performed by: STUDENT IN AN ORGANIZED HEALTH CARE EDUCATION/TRAINING PROGRAM

## 2022-04-21 PROCEDURE — 6360000002 HC RX W HCPCS: Performed by: NURSE PRACTITIONER

## 2022-04-21 PROCEDURE — 2580000003 HC RX 258: Performed by: NURSE PRACTITIONER

## 2022-04-21 PROCEDURE — 6370000000 HC RX 637 (ALT 250 FOR IP): Performed by: NURSE PRACTITIONER

## 2022-04-21 PROCEDURE — 2500000003 HC RX 250 WO HCPCS: Performed by: STUDENT IN AN ORGANIZED HEALTH CARE EDUCATION/TRAINING PROGRAM

## 2022-04-21 PROCEDURE — 2000000003 HC NEURO ICU R&B

## 2022-04-21 PROCEDURE — 84300 ASSAY OF URINE SODIUM: CPT

## 2022-04-21 PROCEDURE — 93306 TTE W/DOPPLER COMPLETE: CPT

## 2022-04-21 PROCEDURE — 93886 INTRACRANIAL COMPLETE STUDY: CPT

## 2022-04-21 PROCEDURE — 83735 ASSAY OF MAGNESIUM: CPT

## 2022-04-21 PROCEDURE — 94761 N-INVAS EAR/PLS OXIMETRY MLT: CPT

## 2022-04-21 PROCEDURE — 83935 ASSAY OF URINE OSMOLALITY: CPT

## 2022-04-21 PROCEDURE — 84295 ASSAY OF SERUM SODIUM: CPT

## 2022-04-21 PROCEDURE — 83930 ASSAY OF BLOOD OSMOLALITY: CPT

## 2022-04-21 PROCEDURE — 82570 ASSAY OF URINE CREATININE: CPT

## 2022-04-21 PROCEDURE — 2580000003 HC RX 258: Performed by: STUDENT IN AN ORGANIZED HEALTH CARE EDUCATION/TRAINING PROGRAM

## 2022-04-21 PROCEDURE — 2580000003 HC RX 258: Performed by: ANESTHESIOLOGY

## 2022-04-21 PROCEDURE — 99291 CRITICAL CARE FIRST HOUR: CPT | Performed by: PSYCHIATRY & NEUROLOGY

## 2022-04-21 PROCEDURE — 6360000002 HC RX W HCPCS: Performed by: HEALTH CARE PROVIDER

## 2022-04-21 PROCEDURE — 6360000002 HC RX W HCPCS: Performed by: STUDENT IN AN ORGANIZED HEALTH CARE EDUCATION/TRAINING PROGRAM

## 2022-04-21 PROCEDURE — 81003 URINALYSIS AUTO W/O SCOPE: CPT

## 2022-04-21 PROCEDURE — 99232 SBSQ HOSP IP/OBS MODERATE 35: CPT | Performed by: NEUROLOGICAL SURGERY

## 2022-04-21 PROCEDURE — 2500000003 HC RX 250 WO HCPCS: Performed by: NURSE PRACTITIONER

## 2022-04-21 RX ORDER — DEXAMETHASONE SODIUM PHOSPHATE 4 MG/ML
4 INJECTION, SOLUTION INTRA-ARTICULAR; INTRALESIONAL; INTRAMUSCULAR; INTRAVENOUS; SOFT TISSUE EVERY 6 HOURS
Status: COMPLETED | OUTPATIENT
Start: 2022-04-21 | End: 2022-04-24

## 2022-04-21 RX ORDER — PANTOPRAZOLE SODIUM 40 MG/1
40 TABLET, DELAYED RELEASE ORAL
Status: DISCONTINUED | OUTPATIENT
Start: 2022-04-22 | End: 2022-04-26

## 2022-04-21 RX ORDER — 0.9 % SODIUM CHLORIDE 0.9 %
1000 INTRAVENOUS SOLUTION INTRAVENOUS ONCE
Status: COMPLETED | OUTPATIENT
Start: 2022-04-21 | End: 2022-04-21

## 2022-04-21 RX ORDER — OXYCODONE HYDROCHLORIDE 5 MG/1
5 TABLET ORAL EVERY 6 HOURS PRN
Status: DISCONTINUED | OUTPATIENT
Start: 2022-04-21 | End: 2022-04-26

## 2022-04-21 RX ORDER — HYDRALAZINE HYDROCHLORIDE 20 MG/ML
10 INJECTION INTRAMUSCULAR; INTRAVENOUS EVERY 6 HOURS PRN
Status: DISCONTINUED | OUTPATIENT
Start: 2022-04-21 | End: 2022-04-24

## 2022-04-21 RX ORDER — MAGNESIUM SULFATE HEPTAHYDRATE 40 MG/ML
4000 INJECTION, SOLUTION INTRAVENOUS ONCE
Status: COMPLETED | OUTPATIENT
Start: 2022-04-21 | End: 2022-04-21

## 2022-04-21 RX ADMIN — FENTANYL CITRATE 25 MCG: 50 INJECTION, SOLUTION INTRAMUSCULAR; INTRAVENOUS at 04:00

## 2022-04-21 RX ADMIN — ACETAMINOPHEN 650 MG: 325 TABLET ORAL at 10:48

## 2022-04-21 RX ADMIN — ATORVASTATIN CALCIUM 80 MG: 80 TABLET, FILM COATED ORAL at 20:05

## 2022-04-21 RX ADMIN — FENTANYL CITRATE 25 MCG: 50 INJECTION, SOLUTION INTRAMUSCULAR; INTRAVENOUS at 09:32

## 2022-04-21 RX ADMIN — MAGNESIUM SULFATE IN WATER 4000 MG: 40 INJECTION, SOLUTION INTRAVENOUS at 13:57

## 2022-04-21 RX ADMIN — ENOXAPARIN SODIUM 40 MG: 100 INJECTION SUBCUTANEOUS at 08:40

## 2022-04-21 RX ADMIN — SODIUM CHLORIDE 75 ML/HR: 234 INJECTION INTRAMUSCULAR; INTRAVENOUS; SUBCUTANEOUS at 10:32

## 2022-04-21 RX ADMIN — SODIUM CHLORIDE 75 ML/HR: 234 INJECTION INTRAMUSCULAR; INTRAVENOUS; SUBCUTANEOUS at 03:14

## 2022-04-21 RX ADMIN — OXYCODONE 5 MG: 5 TABLET ORAL at 18:15

## 2022-04-21 RX ADMIN — SODIUM CHLORIDE, PRESERVATIVE FREE 5 ML: 5 INJECTION INTRAVENOUS at 08:41

## 2022-04-21 RX ADMIN — NIMODIPINE 60 MG: 30 CAPSULE, LIQUID FILLED ORAL at 08:40

## 2022-04-21 RX ADMIN — NIMODIPINE 60 MG: 30 CAPSULE, LIQUID FILLED ORAL at 21:00

## 2022-04-21 RX ADMIN — OXYCODONE 5 MG: 5 TABLET ORAL at 10:48

## 2022-04-21 RX ADMIN — NIMODIPINE 60 MG: 30 CAPSULE, LIQUID FILLED ORAL at 18:14

## 2022-04-21 RX ADMIN — NIMODIPINE 60 MG: 30 CAPSULE, LIQUID FILLED ORAL at 06:23

## 2022-04-21 RX ADMIN — SODIUM CHLORIDE 1000 ML: 9 INJECTION, SOLUTION INTRAVENOUS at 11:46

## 2022-04-21 RX ADMIN — ACETAMINOPHEN 650 MG: 325 TABLET ORAL at 22:30

## 2022-04-21 RX ADMIN — FENTANYL CITRATE 25 MCG: 50 INJECTION, SOLUTION INTRAMUSCULAR; INTRAVENOUS at 20:07

## 2022-04-21 RX ADMIN — FENTANYL CITRATE 25 MCG: 50 INJECTION, SOLUTION INTRAMUSCULAR; INTRAVENOUS at 17:01

## 2022-04-21 RX ADMIN — NIMODIPINE 60 MG: 30 CAPSULE, LIQUID FILLED ORAL at 13:06

## 2022-04-21 RX ADMIN — FENTANYL CITRATE 25 MCG: 50 INJECTION, SOLUTION INTRAMUSCULAR; INTRAVENOUS at 00:33

## 2022-04-21 RX ADMIN — NIMODIPINE 60 MG: 30 CAPSULE, LIQUID FILLED ORAL at 00:34

## 2022-04-21 RX ADMIN — ACETAMINOPHEN 650 MG: 325 TABLET ORAL at 18:14

## 2022-04-21 RX ADMIN — DEXAMETHASONE SODIUM PHOSPHATE 4 MG: 4 INJECTION, SOLUTION INTRAMUSCULAR; INTRAVENOUS at 10:49

## 2022-04-21 RX ADMIN — LEVETIRACETAM 500 MG: 500 TABLET, FILM COATED ORAL at 08:39

## 2022-04-21 RX ADMIN — BISACODYL 10 MG: 5 TABLET, COATED ORAL at 08:45

## 2022-04-21 RX ADMIN — SODIUM CHLORIDE, PRESERVATIVE FREE 10 ML: 5 INJECTION INTRAVENOUS at 20:07

## 2022-04-21 RX ADMIN — Medication 10 MG: at 10:11

## 2022-04-21 RX ADMIN — DEXAMETHASONE SODIUM PHOSPHATE 4 MG: 4 INJECTION, SOLUTION INTRAMUSCULAR; INTRAVENOUS at 16:12

## 2022-04-21 RX ADMIN — SODIUM CHLORIDE 75 ML/HR: 234 INJECTION INTRAMUSCULAR; INTRAVENOUS; SUBCUTANEOUS at 17:53

## 2022-04-21 RX ADMIN — DEXAMETHASONE SODIUM PHOSPHATE 4 MG: 4 INJECTION, SOLUTION INTRAMUSCULAR; INTRAVENOUS at 21:51

## 2022-04-21 ASSESSMENT — PAIN DESCRIPTION - LOCATION
LOCATION: HEAD

## 2022-04-21 ASSESSMENT — PAIN SCALES - GENERAL
PAINLEVEL_OUTOF10: 0
PAINLEVEL_OUTOF10: 0
PAINLEVEL_OUTOF10: 5
PAINLEVEL_OUTOF10: 8
PAINLEVEL_OUTOF10: 0
PAINLEVEL_OUTOF10: 7
PAINLEVEL_OUTOF10: 8
PAINLEVEL_OUTOF10: 3
PAINLEVEL_OUTOF10: 4
PAINLEVEL_OUTOF10: 7
PAINLEVEL_OUTOF10: 0
PAINLEVEL_OUTOF10: 8

## 2022-04-21 ASSESSMENT — PAIN DESCRIPTION - ORIENTATION
ORIENTATION: RIGHT
ORIENTATION: PROXIMAL;UPPER
ORIENTATION: MID;PROXIMAL
ORIENTATION: PROXIMAL
ORIENTATION: PROXIMAL

## 2022-04-21 ASSESSMENT — PAIN DESCRIPTION - DESCRIPTORS
DESCRIPTORS: DISCOMFORT
DESCRIPTORS: POUNDING;PRESSURE
DESCRIPTORS: THROBBING
DESCRIPTORS: ACHING;DISCOMFORT
DESCRIPTORS: PRESSURE;POUNDING
DESCRIPTORS: SHOOTING
DESCRIPTORS: ACHING;DISCOMFORT

## 2022-04-21 ASSESSMENT — PAIN SCALES - WONG BAKER: WONGBAKER_NUMERICALRESPONSE: 0

## 2022-04-21 ASSESSMENT — PAIN DESCRIPTION - ONSET: ONSET: GRADUAL

## 2022-04-21 ASSESSMENT — PAIN DESCRIPTION - PAIN TYPE
TYPE: ACUTE PAIN

## 2022-04-21 ASSESSMENT — PAIN DESCRIPTION - FREQUENCY
FREQUENCY: INTERMITTENT
FREQUENCY: INTERMITTENT

## 2022-04-21 ASSESSMENT — PAIN - FUNCTIONAL ASSESSMENT
PAIN_FUNCTIONAL_ASSESSMENT: PREVENTS OR INTERFERES WITH MANY ACTIVE NOT PASSIVE ACTIVITIES
PAIN_FUNCTIONAL_ASSESSMENT: PREVENTS OR INTERFERES SOME ACTIVE ACTIVITIES AND ADLS
PAIN_FUNCTIONAL_ASSESSMENT: PREVENTS OR INTERFERES SOME ACTIVE ACTIVITIES AND ADLS

## 2022-04-21 NOTE — PROGRESS NOTES
Occupational [address]  Occupational Therapy Not Seen Note    DATE: 2022    NAME: Waylon Alvarado  MRN: [id number]   : 1990      Patient not seen this date for Occupational Therapy due to:    Per RN hold therapy for today d/t increased headache pain. Will continue as able.      Electronically signed by WALDO Mary on 2022 at 11:12 AM

## 2022-04-21 NOTE — PROGRESS NOTES
Progress Note - Neurosurgery    Chief Complaint   Patient presents with    Altered Mental Status       Subjective:  Woodrow Bergeron is a 29 y.o. female. Headache overnight improved with medication  Review of Systems    Objective:  Blood pressure (!) 141/91, pulse 69, temperature 99 °F (37.2 °C), temperature source Oral, resp. rate 14, height 5' 2\" (1.575 m), weight 161 lb 13.1 oz (73.4 kg), SpO2 96 %, not currently breastfeeding. Physical Exam reg rate. Normoxic. Soft ntnd abd. Equal pulses  Neurologic Exam   Tre. Eomi. Face symm. 5/5 all ext. Sensation intact. No drift  evd cdi  ICP< 12cm; output 9-10cc/hr    Labs:  Admission on 04/17/2022, Discharged on 04/17/2022   Component Date Value Ref Range Status    PTT 04/17/2022 25.3* 26.8 - 34.8 sec Final    Comment:       IV Heparin Therapy Range:      62.0-94.0            Protime 04/17/2022 12.6  11.5 - 14.2 sec Final    INR 04/17/2022 1.0   Final    Comment:       Non-therapeutic Range:     INR = 0.9-1.2  Therapeutic Range:    Moderate Anticoagulant Intensity:     INR = 2.0-3.0   High Anticoagulant Intensity:     INR = 2.5-3.5            WBC 04/17/2022 13.7* 3.5 - 11.3 k/uL Final    RBC 04/17/2022 4.51  3.95 - 5.11 m/uL Final    Hemoglobin 04/17/2022 13.3  11.9 - 15.1 g/dL Final    Hematocrit 04/17/2022 40.0  36.3 - 47.1 % Final    MCV 04/17/2022 88.7  82.6 - 102.9 fL Final    MCH 04/17/2022 29.5  25.2 - 33.5 pg Final    MCHC 04/17/2022 33.3  28.4 - 34.8 g/dL Final    RDW 04/17/2022 12.5  11.8 - 14.4 % Final    Platelets 15/47/7638 235  138 - 453 k/uL Final    MPV 04/17/2022 10.3  8.1 - 13.5 fL Final    NRBC Automated 04/17/2022 0.0  0.0 per 100 WBC Final    Seg Neutrophils 04/17/2022 64  36 - 65 % Final    Lymphocytes 04/17/2022 26  24 - 43 % Final    Monocytes 04/17/2022 6  3 - 12 % Final    Eosinophils % 04/17/2022 2  1 - 4 % Final    Basophils 04/17/2022 1  0 - 2 % Final    Immature Granulocytes 04/17/2022 1* 0 % Final    Segs Absolute 04/17/2022 8.82* 1.50 - 8.10 k/uL Final    Absolute Lymph # 04/17/2022 3.56  1.10 - 3.70 k/uL Final    Absolute Mono # 04/17/2022 0.86  0.10 - 1.20 k/uL Final    Absolute Eos # 04/17/2022 0.31  0.00 - 0.44 k/uL Final    Basophils Absolute 04/17/2022 0.12  0.00 - 0.20 k/uL Final    Absolute Immature Granulocyte 04/17/2022 0.07  0.00 - 0.30 k/uL Final     No results found for this or any previous visit. No results found for this or any previous visit. No results found for this or any previous visit. Results for orders placed during the hospital encounter of 04/18/22    CT HEAD WO CONTRAST    Narrative  EXAMINATION:  CT OF THE HEAD WITHOUT CONTRAST  4/19/2022 11:32 am    TECHNIQUE:  CT of the head was performed without the administration of intravenous  contrast. Dose modulation, iterative reconstruction, and/or weight based  adjustment of the mA/kV was utilized to reduce the radiation dose to as low  as reasonably achievable. COMPARISON:  CT brain performed 04/17/2022. HISTORY:  ORDERING SYSTEM PROVIDED HISTORY: sah  TECHNOLOGIST PROVIDED HISTORY:    sah  Is the patient pregnant?->No  Reason for Exam: sah    FINDINGS:  BRAIN/VENTRICLES: There is redemonstration of scattered subarachnoid  hemorrhage that is decreased compared to prior. There has been interval KRISSY  aneurysm coiling. The ventricular structures are unremarkable. There is a  shunt tube from a right frontal approach. The infratentorial structures are  unremarkable. ORBITS: The visualized portion of the orbits demonstrate no acute abnormality. SINUSES: The visualized paranasal sinuses and mastoid air cells demonstrate  no acute abnormality. SOFT TISSUES/SKULL:  No acute abnormality of the visualized skull or soft  tissues. Impression  Scattered subarachnoid hemorrhage that is decreased compared to prior  examination. Interval KRISSY aneurysm coiling.     RECOMMENDATIONS:  Unavailable      Assessment and Plan:    IVH  Hydrocephalus  Aneurysmal SAH from [facility] s/p coiliing    Keep evd at 15cm today  Regina. [address], DO  4/21/2022

## 2022-04-21 NOTE — PLAN OF CARE
DATE: 04/21/22    AWAKE & FOLLOWING COMMANDS:  [] No   [x] Yes    INTUBATED:   [x] No   [] Yes    SEDATION/ANALGESIA:    [] Propofol gtt  [] Precedex gtt [] Versed gtt   [x] No Sedation or Not Applicable  Pain medications: Roxicodone 5mg Q4h PRN, Fentanyl 25mcg Q2h PRN    VASOPRESSORS:  [x] No    [] Yes  [] Levophed [] Dopamine [] Vasopressin  [] Dobutamine [] Phenylephrine [] Epinephrine    CENTRAL LINES:  [x] No    [] Yes:     CAPUTO CATHETER: [] No    [x] Yes:  Date placed: 4/18/22, Indication: strict I&O in setting of CSW    FEEDING: Able to take PO?  [] No:  [] NG/OG [] PEG  [] NPO for: [] Tube Feeds    [x] Yes:  Diet: Regular diet    DVT Prophylaxis:  [x] Lovenox   [] Heparin subQ   [] Anticoagulation   [] Contraindicated secondary to:     Stress Ulcer Prophylaxis:  [x] PPI (starting steroids)  [] H2 Receptor Blocker  [] Not indicated    Blood Glucose:  [x] Blood glucose <180 consistently  [] Insulin sliding scale   [] Home Medications addressed    HOB >30 Degrees:  [x] Yes  [] No, contraindicated due to    DEEDEE Olson - CNP

## 2022-04-21 NOTE — PROGRESS NOTES
Physical Therapy         Physical Therapy Cancel Note      DATE: 2022    NAME: Chelsi Yee  MRN: [id number]   : 1990      Patient not seen this date for Physical Therapy due to: Withhold PT per nurse due to patient having a severe headache.         Electronically signed by Zhen Medellin PT on 2022 at 11:13 AM

## 2022-04-21 NOTE — PROGRESS NOTES
Notified dr Elin Koenig of pt throbbing headache that is becoming more constant and \"seems worse than yesterday\", but pain meds are working at this time per pt, he wants notified if pain is not relieved with current prn's. Dr Elin Koenig also notified of sodium of 129 from 137, will place a 150 bolus for this patient and recheck at next ordered time. Notified dr Elin Koenig of higher than 300ml/hr urine output for two hours tonight and all her urine trends, will continue to monitor and he will place appropriate orders. Will continue to monitor pt closely and as ordered.

## 2022-04-21 NOTE — PROGRESS NOTES
Endovascular Neurosurgery Progress Note    SUBJECTIVE:   Patient was complaining of headache overnight. This am headache 7/10, denied nausea. Review of Systems:  CONSTITUTIONAL:  negative for fevers, chills, fatigue and malaise    EYES:  negative for double vision, blurred vision and photophobia     HEENT:  negative for tinnitus, epistaxis and sore throat    RESPIRATORY:  negative for cough, shortness of breath, wheezing    CARDIOVASCULAR:  negative for chest pain, palpitations, syncope, edema    GASTROINTESTINAL:  negative for nausea, vomiting    GENITOURINARY:  negative for incontinence    MUSCULOSKELETAL:  negative for neck or back pain    NEUROLOGICAL:  Negative for weakness and tingling  negative for headaches and dizziness    PSYCHIATRIC:  negative for anxiety      Review of systems otherwise negative. OBJECTIVE:     Vitals:    04/21/22 1000   BP: (!) 164/115   Pulse: 76   Resp: 18   Temp:    SpO2: 98%        General:  Gen: normal habitus, NAD  HEENT: NCAT, mucosa moist  Cvs: RRR, S1 S2 normal  Resp: symmetric unlabored breathing  Abd: s/nd/nt  Ext: no edema  Skin: no lesions seen, warm and dry    Neuro:  Gen: awake and alert, oriented x3. Lang/speech: no aphasia or dysarthria. Follows commands. CN: PERRL, EOMI, VFF, V1-3 intact, face symmetric, hearing intact, shoulder shrug symmetric, tongue midline  Motor: grossly 5/5 UE and LE b/l  Sense: LT intact in all 4 ext. Coord: FTN and HTS intact b/l  DTR: deferred  Gait: narrow base gait    NIH Stroke Scale:   1a  Level of consciousness: 0 - alert; keenly responsive   1b. LOC questions:  0 - answers both questions correctly   1c. LOC commands: 0 - performs both tasks correctly   2. Best Gaze: 0 - normal   3. Visual: 0 - no visual loss   4. Facial Palsy: 0 - normal symmetric movement   5a. Motor left arm: 0 - no drift, limb holds 90 (or 45) degrees for full 10 seconds   5b.   Motor right arm: 0 - no drift, limb holds 90 (or 45) degrees for full 10 seconds   6a. Motor left le - no drift; leg holds 30 degree position for full 5 seconds   6b  Motor right le - no drift; leg holds 30 degree position for full 5 seconds   7. Limb Ataxia: 0 - absent   8. Sensory: 0 - normal; no sensory loss   9. Best Language:  0 - no aphasia, normal   10. Dysarthria: 0 - normal   11. Extinction and Inattention: 0 - no abnormality         Total:   0     MRS: 0      LABS:   Reviewed. Lab Results   Component Value Date    HGB 11.3 (L) 2022    WBC 10.7 2022     2022     2022    BUN 9 2022    CREATININE 0.55 2022    AST 15 2022    ALT 16 2022    MG 2.3 2022    APTT 22.6 2022    INR 0.9 2022      Lab Results   Component Value Date    COVID19 Not Detected 2022    COVID19 Not Detected 2022       RADIOLOGY:   Images were personally reviewed including:   CT head :   Scattered subarachnoid hemorrhage that is decreased compared to prior   examination.       Interval KRISSY aneurysm coiling. IR :   --left wide necked inferiorly and anteriorly oriented ruptured AComA aneurysm, dimensions neck 2.93mm, height 2.82mm, width 3.13mm, length 2.78mm. --the above treated with Smart coil embolization x3, Vu 1. Smart coil x1 opened but not detached. --Prominent musculocutaneous branch from the left V2 segment that anastomoses to the left occipital artery. 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            CT head :   Diffuse subarachnoid hemorrhage.     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     ASSESSMENT:   28 y/o f with no significant past medical history, chromic smoking, marijuana abuse presented to [facility] as a transfer from [facility] as patient was found to have diffuse SAH on CT head.  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 Marcos Cr. HH 02, mFS 03  PBD:04   Patient complains of moderate to severe headaches, non-focal neuro exam. Repeat CT head on 04/19 showed decreased SAH. TCD 04/20 wnl, no vasospasm, cerebral salt wasting  PLAN:   --SBP goal 100-140  mm hg   --SAH management with daily TCDs, Nimodipine, statins, Mg level 3-4 last Mg level 2.3  --On hypertonic saline per ICU   --EVD management per Neuro surgery. --f/up with Dr. Sofia Das in 2 weeks after discharge and f/up with Dr. Marcos Cr in 3 months after discharge    Case discussed with Dr. Marcos Cr attending.     Facundo Larry MD  Stroke, [facility]  [id number] [place]  Electronically signed 4/21/2022 at 11:05 AM

## 2022-04-21 NOTE — CARE COORDINATION
Case Management Initial Discharge Plan  [place], [place] with:patient and patient's sister to discuss discharge plans. Information verified: address, contacts, phone number, , insurance Yes  Insurance Provider: [employer] [place]    Emergency Contact/Next of Kin name & number:   Anika Olivarez (Sister) [phone]  Jason Wang (Mother) [phone]  Who are involved in patient's support system? Patient lives alone with 2 sons; patient's parents live 28 mins away    PCP: DEEDEE Saha CNP  Date of last visit: past year      Discharge Planning    Living Arrangements:        Home has 1 stories  5 stairs to climb to get into front door,   Patient able to perform ADL's:Independent    Current Services (outpatient & in home)   DME equipment: n/a   DME provider:    Is patient receiving oral anticoagulation therapy? No    Does patient have any issues/concerns obtaining medications? No  If yes, what are patient's concerns? Is there a preferred Pharmacy after hours or on weekends? Yes    If yes, which pharmacy? Potential Assistance Needed:       Patient agreeable to home care: Yes  Freedom of choice provided:  yes    Prior SNF/Rehab Placement and Facility:    Agreeable to SNF/Rehab: No  [place] of choice provided: n/a     Evaluation: yes    Expected Discharge date:       Patient expects to be discharged to: If home: is the family and/or caregiver wiling & able to provide support at home? no  Who will be providing this support? n/a*    Follow Up Appointment: Best Day/ Time:      Transportation provider: patient's mother/sister. Transportation arrangements needed for discharge: Yes    Readmission Risk              Risk of Unplanned Readmission:  10             Does patient have a readmission risk score greater than 14?: No  If yes, follow-up appointment must be made within 7 days of discharge.      Goals of Care:       Educated patient and patient's sister on transitional options, provided freedom of choice and are agreeable with plan      Discharge Plan: home goal; declined placement. Agreeable to [facility] if needed. Has transportation.            Electronically signed by Kavita Vincent RN on 4/21/22 at 11:00 AM EDT

## 2022-04-21 NOTE — PLAN OF CARE
Problem: Falls - Risk of:  Goal: Will remain free from falls  Description: Will remain free from falls  4/20/2022 2258 by Haley Mcarthur RN  Outcome: Progressing  4/20/2022 1621 by Bertin Giraldo RN  Outcome: Progressing  Goal: Absence of physical injury  Description: Absence of physical injury  4/20/2022 2258 by Haley Mcarthur RN  Outcome: Progressing  4/20/2022 1621 by Bertin Giraldo RN  Outcome: Progressing     Problem: Anxiety/Stress:  Goal: Level of anxiety will decrease  Description: Level of anxiety will decrease  4/20/2022 2258 by Haley Mcarthur RN  Outcome: Progressing  4/20/2022 1621 by Bertin Giraldo RN  Outcome: Progressing     Problem: Mental Status - Impaired:  Goal: Mental status will be restored to baseline  Description: Mental status will be restored to baseline  4/20/2022 2258 by Haley Mcarthur RN  Outcome: Progressing  4/20/2022 1621 by Bertin Giraldo RN  Outcome: Progressing     Problem: Pain:  Goal: Pain level will decrease  Description: Pain level will decrease  4/20/2022 2258 by Haley Mcarthur RN  Outcome: Progressing  4/20/2022 1621 by Bertin Giraldo RN  Outcome: Progressing  Goal: Recognizes and communicates pain  Description: Recognizes and communicates pain  4/20/2022 2258 by Haley Mcarthur RN  Outcome: Progressing  4/20/2022 1621 by Bertin Giraldo RN  Outcome: Progressing  Goal: Control of acute pain  Description: Control of acute pain  4/20/2022 2258 by Haley Mcarthur RN  Outcome: Progressing  4/20/2022 1621 by Bertin Giraldo RN  Outcome: Progressing  Goal: Control of chronic pain  Description: Control of chronic pain  4/20/2022 2258 by Haley Mcarthur RN  Outcome: Progressing  4/20/2022 1621 by Bertin Giraldo RN  Outcome: Progressing     Problem: Skin Integrity - Impaired:  Goal: Will show no infection signs and symptoms  Description: Will show no infection signs and symptoms  4/20/2022 2258 by Haley Mcarthur RN  Outcome: Progressing  4/20/2022 1621 by Bertin Giraldo RN  Outcome: Progressing  Goal: Absence of new skin breakdown  Description: Absence of new skin breakdown  4/20/2022 2258 by Philomena Hensley RN  Outcome: Progressing  4/20/2022 1621 by Yobani Kaminski RN  Outcome: Progressing     Problem: HEMODYNAMIC STATUS  Goal: Patient has stable vital signs and fluid balance  4/20/2022 2258 by Philomena Hensley RN  Outcome: Progressing  4/20/2022 1621 by Yobani Kaminski RN  Outcome: Progressing     Problem: ACTIVITY INTOLERANCE/IMPAIRED MOBILITY  Goal: Mobility/activity is maintained at optimum level for patient  4/20/2022 2258 by Philomena Hensley RN  Outcome: Progressing  4/20/2022 1621 by Yobani Kaminski RN  Outcome: Progressing     Problem: COMMUNICATION IMPAIRMENT  Goal: Ability to express needs and understand communication  4/20/2022 2258 by Philomena Hensley RN  Outcome: Progressing  4/20/2022 1621 by Yobani Kaminski RN  Outcome: Progressing     Problem: Swallowing - Impaired:  Goal: Able to swallow without choking  Description: Able to swallow without choking  4/20/2022 2258 by Philomena Hensley RN  Outcome: Progressing  4/20/2022 1621 by Yobani Kaminski RN  Outcome: Progressing  Goal: Absence of aspiration  Description: Absence of aspiration  4/20/2022 2258 by Philomena Hensley RN  Outcome: Progressing  4/20/2022 1621 by Yobani Kaminski RN  Outcome: Progressing     Problem: Pain:  Goal: Pain level will decrease  Description: Pain level will decrease  4/20/2022 2258 by Philomena Hensley RN  Outcome: Progressing  4/20/2022 1621 by Yobani Kaminski RN  Outcome: Progressing  Goal: Control of acute pain  Description: Control of acute pain  4/20/2022 2258 by Philomena Hensley RN  Outcome: Progressing  4/20/2022 1621 by Yobani Kaminski RN  Outcome: Progressing  Goal: Control of chronic pain  Description: Control of chronic pain  4/20/2022 2258 by Philomena Hensley RN  Outcome: Progressing  4/20/2022 1621 by Yobani Kaminski RN  Outcome: Progressing     Problem: Discharge Planning  Goal: Discharge to home or other facility with appropriate resources  4/20/2022 2258 by Esthela Kline RN  Outcome: Progressing  4/20/2022 1621 by Sanju Ponce RN  Outcome: Progressing

## 2022-04-21 NOTE — PROGRESS NOTES
Daily Progress Note  Neuro Critical Care    Patient Name: Markell Soto  Patient : 1990  Room/Bed: 0516/0516-01  Code Status: FULL CODE   Allergies: No Known Allergies    CHIEF COMPLAINT:     Worst headache of her life acute onset x2 hours     INTERVAL HISTORY    Initial Presentation (Admitted 22 @00:45AM): History Obtained From: patient and EMR review      The patient is D 75 y.o.  female who presents to our emergency department 2022 via [address] as a transfer from [facility] acute worst headache of her life. Cara Jay does not have any significant past medical history in our chart and denies any.  At outlying facility patient underwent CT head without demonstrating diffuse subarachnoid hemorrhage.  Patient was seen by telestroke physician in our group recommending transfer to our facility for further work-up.  In the emergency department patient states that she was washing TV with her child when she began to have severe pounding headache 10 out of 10 worst in her life.  Patient noted to be severely nauseous requiring multiple doses of IV Zofran and IM Phenergan.  Patient also admitting to photosensitivity.  Patient had significant hypertension at [place] started on IV nicardipine given 1 g of Keppra 10 mg Decadron.     Arrival to our emergency department 140/102, heart rate 77 temperature 97. 6.  Initial labs PTT 25.3, INR 1.0, WBC 13.7, platelets 488, glucose 139 and high-sensitivity troponin less than 6.  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  Case discussed with senior endovascular fellow who is aware of case and current neurologic exam planning for emergent DSA 2022 early morning.     For [facility]: 2, Modified Combs: grade 3         Admitted to ICU From: ED 24  Reason for ICU Admission: [facility] with [facility] [place] Course:   -around 8:00 AM Dr. Freddie Pierce bedside placing EVD prior to endovascular neurosurgery. Ami Reyes went for urgent DSA found to have a comm aneurysm treated by primary coil embolization. 4/19: Hemodynamically stable for arterial line readings -142, heart rate 82-99 T-max 98. 9.  Currently receiving [place] 5 mg every 4 hours as needed and fentanyl 25 every 2 as needed for pain control. 4/20: Blood pressure within parameter -139 overnight Cardene has been held off since 6 PM 4/19/2022 not requiring any as needed IV medications, heart rate 65-79 T-max 98.5. Serum magnesium 2.3 was started on IV magnesium 2 g twice daily will increase to 4 g twice daily continue to trend magnesium daily. Of note yesterday patient started to have increased urine output 9 L total in 24 hours urine studies sent consistent with central salt wasting patient started on hypertonic saline 2% 150 cc bolus followed by 75 cc/h maintenance. Sodium checks every 8 hours with a sodium goal of 145. Last 24h:   Vitals stable overnight blood pressures trending slightly up although okay to liberalize SBP <160 today. Overnight -152, HR 69-80, tmax 99.3. Labs pending this AM although sodium noted to drop from 137 @17:03 yesterday to 129 @midnight. Patient given 150CC bolus 2% and pending repeat this AM. Will send repeat urine studies and discussed with patient she has been drinking excess water in large water bottle at bedside so to limit today to Gatorade and 1-1.5L max. Headache continues to be stable same as yesterday 6/10 constant pressure and throbbing bifrontal location. Will get repeat TCD today to ensure no increase in vasospasm. No bowel movement X3 days giving dulcolax and milk of mag continue to monitor. protonix 40 and starting decadron 4Q6hr for headache decrease oxy pain med Q6hr.      CURRENT MEDICATIONS:  SCHEDULED MEDICATIONS:   sodium chloride   IntraVENous Once    enoxaparin  40 mg SubCUTAneous Daily    levETIRAcetam  500 mg Oral BID    sodium chloride   IntraVENous Once  promethazine  12.5 mg IntraMUSCular Once    atorvastatin  80 mg Oral Nightly    niMODipine  60 mg Oral 6 times per day    sodium chloride flush  5-40 mL IntraVENous 2 times per day     CONTINUOUS INFUSIONS:   sodium chloride 75 mL/hr (22)    niCARdipine Stopped (22)    sodium chloride Stopped (22)     PRN MEDICATIONS:   oxyCODONE, ondansetron **OR** ondansetron, polyethylene glycol, labetalol, sodium chloride flush, sodium chloride, acetaminophen, fentanNYL    VITALS:  Temperature Range: Temp: 98.9 °F (37.2 °C) Temp  Av.2 °F (36.8 °C)  Min: 97.8 °F (36.6 °C)  Max: 99.3 °F (37.4 °C)  BP Range: Systolic (94CSK), QZK:024 , Min:114 , QQF:603     Diastolic (80DYX), PIW:11, Min:58, Max:98    Pulse Range: Pulse  Av.4  Min: 67  Max: 89  Respiration Range: Resp  Av.5  Min: 13  Max: 30  Current Pulse Ox: SpO2: 96 %  24HR Pulse Ox Range: SpO2  Av.2 %  Min: 92 %  Max: 100 %  Patient Vitals for the past 12 hrs:   BP Temp Temp src Pulse Resp SpO2   22 0522 (!) 152/96 -- -- 71 17 96 %   22 0502 -- -- -- 80 18 98 %   22 0500 -- -- -- 70 16 96 %   22 0400 -- 98.9 °F (37.2 °C) Oral 72 16 98 %   22 0300 135/89 -- -- 69 16 98 %   22 0230 (!) 137/92 -- -- 70 16 98 %   22 0200 133/78 99.3 °F (37.4 °C) Oral 75 21 97 %   22 0130 129/82 -- -- 71 16 97 %   22 0100 135/87 -- -- 76 17 97 %   22 0053 -- -- -- 70 19 98 %   22 0050 -- -- -- 70 16 97 %   22 0037 (!) 143/88 -- -- 76 13 100 %   22 0005 (!) 129/95 98 °F (36.7 °C) Oral 77 15 100 %   22 2301 (!) 138/90 -- -- 73 16 98 %   22 2300 -- -- -- -- -- 100 %   22 2200 136/76 98 °F (36.7 °C) Oral 78 16 99 %   22 -- -- -- 72 15 99 %   22 -- -- -- 82 17 97 %   22 -- -- -- 72 13 99 %   22 -- -- -- 69 16 100 %   22 (!) 138/90 -- -- 67 18 100 %   22 -- 97.9 °F (36.6 °C) Oral 75 19 92 %   04/20/22 2025 -- -- -- -- 20 --   04/20/22 2015 -- -- -- 71 15 99 %   04/20/22 2000 (!) 117/98 -- -- 73 23 100 %   04/20/22 1945 -- -- -- 77 21 99 %   04/20/22 1930 128/78 -- -- 89 30 98 %   04/20/22 1919 -- -- -- 74 18 98 %   04/20/22 1915 -- -- -- 74 17 99 %   04/20/22 1900 125/78 -- -- 80 16 99 %     Estimated body mass index is 29.6 kg/m² as calculated from the following:    Height as of this encounter: 5' 2\" (1.575 m). Weight as of this encounter: 161 lb 13.1 oz (73.4 kg).  []<16 Severe malnutrition  []16-16.99 Moderate malnutrition  []17-18.49 Mild malnutrition  []18.5-24.9 Normal  [x]25-29.9 Overweight (not obese)  []30-34.9 Obese class 1 (Low Risk)  []35-39.9 Obese class 2 (Moderate Risk)  []?40 Obese class 3 (High Risk)    RECENT LABS:   Lab Results   Component Value Date    WBC 10.7 04/20/2022    HGB 11.3 (L) 04/20/2022    HCT 35.1 (L) 04/20/2022     04/20/2022    CHOL 182 04/18/2022    TRIG 60 04/18/2022    HDL 74 04/18/2022    ALT 16 04/20/2022    AST 15 04/20/2022     (L) 04/21/2022    K 3.9 04/20/2022     (H) 04/20/2022    CREATININE 0.55 04/20/2022    BUN 9 04/20/2022    CO2 21 04/20/2022    TSH 2.18 04/20/2022    INR 0.9 04/18/2022    LABA1C 5.5 04/18/2022     24 HOUR INTAKE/OUTPUT:    Intake/Output Summary (Last 24 hours) at 4/21/2022 0605  Last data filed at 4/21/2022 0500  Gross per 24 hour   Intake 2889.17 ml   Output 6735 ml   Net -3845.83 ml       IMAGING:   TCD 4/19/22-Peak velocities on the left 80's and peak velocities on the right 103.  Left Lindegaard ratio=2.07 and right lindegaard ratio-2.22.     CT head WO 4/20/22      Impression   Scattered subarachnoid hemorrhage that is decreased compared to prior   examination.       Interval KRISSY aneurysm coiling.            CT head WO 4/17/22 @10:48 PM   Impression   Diffuse subarachnoid hemorrhage.  Recommend CTA for further evaluation.      CTA head and neck 4/18/22   Impression   Aneurysm arising from the anterior communicating artery measuring 3 mm x 2 mm.      CXR 4/18/22  Impression   No acute cardiopulmonary process.      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 --the above treated with Smart coil embolization x3, Vu 1. Smart coil x1 opened but not detached. --Prominent musculocutaneous branch from the left V2 segment that anastomoses to the left occipital artery. 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              Labs and Images reviewed with:  [x] Dr. Jens Valdovinos. [facility]        PHYSICAL EXAM       CONSTITUTIONAL:  Well developed, well nourished, alert and oriented x 3, in no acute distress. GCS 15. Nontoxic. No dysarthria. No aphasia. HEAD:  normocephalic, atraumatic    EYES:  PERRLA, EOMI.   ENT:  moist mucous membranes   NECK:  supple, symmetric   LUNGS:  Equal air entry bilaterally   CARDIOVASCULAR:  normal s1 / s2, RRR, distal pulses intact   ABDOMEN:  Soft, no rigidity   NEUROLOGIC:  Mental Status:  A & O x3,awake             Cranial Nerves:    cranial nerves II-XII are grossly intact    Motor Exam:    Drift:  absent  Tone:  normal    Motor exam is symmetrical 5 out of 5 all extremities bilaterally    Sensory:    Touch:    Right Upper Extremity:  normal  Left Upper Extremity:  normal  Right Lower Extremity:  normal  Left Lower Extremity:  normal    Deep Tendon Reflexes:    Right Bicep:  1+  Left Bicep:  1+  Right Knee:  1+  Left Knee:  1+    Plantar Response:  Right:  downgoing  Left:  downgoing    Clonus:  absent  Andino's:  absent    Coordination/Dysmetria:  Heel to Shin:  Right:  normal  Left:  normal  Finger to Nose:   Right:  normal  Left:  normal   Dysdiadochokinesia:  absent    Gait:  normal   NIH Stroke Scale Total (if not done complete detailed one below):    1a. Level of consciousness:  0 - alert; keenly responsive  1b. Level of consciousness questions:  0 - answers both questions correctly  1c. Level of consciousness questions:  0 - performs both tasks correctly  2. Best Gaze:  0 - normal  3. Visual:  0 - no visual loss  4. Facial Palsy:  0 - normal symmetric movement  5a. Motor left arm:  0 - no drift, limb holds 90 (or 45) degrees for full 10 seconds  5b. Motor right arm:  0 - no drift, limb holds 90 (or 45) degrees for full 10 seconds  6a. Motor left le - no drift; leg holds 30 degree position for full 5 seconds  6b. Motor right le - no drift; leg holds 30 degree position for full 5 seconds  7. Limb Ataxia:  0 - absent  8. Sensory:  0 - normal; no sensory loss  9. Best Language:  0 - no aphasia, normal  10. Dysarthria:  0 - normal  11. Extinction and Inattention:  0 - no abnormality  TOTAL: 0    DRAINS:  [] There are no drains for Neuro Critical Care to monitor at this time.      ASSESSMENT AND PLAN:     ASSESSMENT:      This is a 32 y.o. female with acute worst headache of her life found to have diffuse subarachnoid hemorrhage presenting to [address] [place] [address],  [address] denies any significant past medical history and does not take any daily medications.  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  Patient admitted under neuro ICU services with endovascular neurosurgery consulted planning for urgent evaluation and likely DSA 2022 first thing in the morning.  Patient underwent emergent EVD placement at bedside 2022 and diagnostic angio with primary coil embolization of a comm aneurysm successful.  Postoperatively being managed by neuro ICU for vasospasm monitoring.     Patient care will be discussed with attending, will reevaluate patient along with attending.      PLAN/MEDICAL DECISION MAKING:        NEUROLOGIC:     - Imaging   CT head WO-diffuse SAH  CTA head and Neck-ACOM aneurysm   IR angio-4/18 AM-primary coil embolization  Neurosurgery-Following placed EVD 4/18 open at 15 cmH20 total output 204CC yesterday   - AEDs loaded 1gram keppra at [place]  keppra 500BID course completed last dose this morning   -nimodipine 60mg Q4 hours Day3  -FU Mag with goal 3-4 --> currently on 4 g IV twice daily  -Noted to have central salt wasting and 9 L urine output over 24 hours starting 4/19. Started on hypertonic saline 2% @75cc/hr and sodium goal of 145  - Neuro checks per protocol     CARDIOVASCULAR:  - Goal SBP 100-140  - titrate with cardene has been held since 6:00 PM 4/19, has not required any additional PRN in 24 hours   - PRN IV labetalol 10mg Q4 hour for SBP >140  - Continue telemetry     PULMONARY:  - 99% on room air         RENAL/FLUID/ELECTROLYTE:  - BUN 9/ Creatinine .55  - Urine output strict intake and out put monitoring   - IVF: 75cc/hr 2% hypertonic saline solution  - Replace electrolytes PRN  - Daily BMP     GI/NUTRITION:  NUTRITION: NPO prior to DSA with endovascular   No diet orders on file  - Bowel regimen: glycolax  - GI prophylaxis: protonix      ID:  - Tmax 98.6  - WBC 10.7   - FU CXR   - Continue to monitor for fevers  - Daily CBC     HEME:   - H&H 10.7/35.1  - Platelets 215  -PTT 23.4, INR 1.0   - Daily CBC     ENDOCRINE:  - Continue to monitor blood glucose, goal <180  -HbA1c 5.5 4/18/2022 and lipid panel total cholesterol 182, HDL 74, LDL 96  -TSH 2.18 4/20/22     OTHER:  - PT/OT/ST      PROPHYLAXIS:   Stress ulcer: protonix      DVT PROPHYLAXIS:  - SCD sleeves - Thigh High   - GERMAINE stockings - Thigh High  - started on lovenox 4/20    DISPOSITION:  [x] To remain ICU: [facility] with EVD. Close neuro checks with watch for vasospasm related complications from [facility]     We will continue to follow along. For any changes in exam or patient status please contact Neuro Critical Care.       Crystal Phelan MD   PGY-3 Neurology Resident   Neuro Critical Care  Pager [phone]  4/21/2022     6:05 AM

## 2022-04-22 LAB
ANION GAP SERPL CALCULATED.3IONS-SCNC: 12 MMOL/L (ref 9–17)
BUN BLDV-MCNC: 7 MG/DL (ref 6–20)
CALCIUM SERPL-MCNC: 9 MG/DL (ref 8.6–10.4)
CHLORIDE BLD-SCNC: 106 MMOL/L (ref 98–107)
CO2: 21 MMOL/L (ref 20–31)
CREAT SERPL-MCNC: 0.51 MG/DL (ref 0.5–0.9)
GFR AFRICAN AMERICAN: >60 ML/MIN
GFR NON-AFRICAN AMERICAN: >60 ML/MIN
GFR SERPL CREATININE-BSD FRML MDRD: ABNORMAL ML/MIN/{1.73_M2}
GLUCOSE BLD-MCNC: 118 MG/DL (ref 70–99)
HCT VFR BLD CALC: 39.3 % (ref 36.3–47.1)
HEMOGLOBIN: 13.1 G/DL (ref 11.9–15.1)
MAGNESIUM: 2.4 MG/DL (ref 1.6–2.6)
MCH RBC QN AUTO: 30.1 PG (ref 25.2–33.5)
MCHC RBC AUTO-ENTMCNC: 33.3 G/DL (ref 28.4–34.8)
MCV RBC AUTO: 90.3 FL (ref 82.6–102.9)
NRBC AUTOMATED: 0 PER 100 WBC
PDW BLD-RTO: 12.3 % (ref 11.8–14.4)
PLATELET # BLD: 251 K/UL (ref 138–453)
PMV BLD AUTO: 10.4 FL (ref 8.1–13.5)
POTASSIUM SERPL-SCNC: 4.2 MMOL/L (ref 3.7–5.3)
RBC # BLD: 4.35 M/UL (ref 3.95–5.11)
SODIUM BLD-SCNC: 137 MMOL/L (ref 135–144)
SODIUM BLD-SCNC: 138 MMOL/L (ref 135–144)
SODIUM BLD-SCNC: 139 MMOL/L (ref 135–144)
SODIUM BLD-SCNC: 140 MMOL/L (ref 135–144)
WBC # BLD: 16.9 K/UL (ref 3.5–11.3)

## 2022-04-22 PROCEDURE — 6370000000 HC RX 637 (ALT 250 FOR IP): Performed by: STUDENT IN AN ORGANIZED HEALTH CARE EDUCATION/TRAINING PROGRAM

## 2022-04-22 PROCEDURE — 6360000002 HC RX W HCPCS: Performed by: NURSE PRACTITIONER

## 2022-04-22 PROCEDURE — 97110 THERAPEUTIC EXERCISES: CPT

## 2022-04-22 PROCEDURE — 83735 ASSAY OF MAGNESIUM: CPT

## 2022-04-22 PROCEDURE — 99291 CRITICAL CARE FIRST HOUR: CPT | Performed by: PSYCHIATRY & NEUROLOGY

## 2022-04-22 PROCEDURE — 80048 BASIC METABOLIC PNL TOTAL CA: CPT

## 2022-04-22 PROCEDURE — 36415 COLL VENOUS BLD VENIPUNCTURE: CPT

## 2022-04-22 PROCEDURE — 2580000003 HC RX 258: Performed by: STUDENT IN AN ORGANIZED HEALTH CARE EDUCATION/TRAINING PROGRAM

## 2022-04-22 PROCEDURE — 99232 SBSQ HOSP IP/OBS MODERATE 35: CPT | Performed by: NEUROLOGICAL SURGERY

## 2022-04-22 PROCEDURE — 97530 THERAPEUTIC ACTIVITIES: CPT

## 2022-04-22 PROCEDURE — APPSS45 APP SPLIT SHARED TIME 31-45 MINUTES: Performed by: REGISTERED NURSE

## 2022-04-22 PROCEDURE — 2580000003 HC RX 258: Performed by: ANESTHESIOLOGY

## 2022-04-22 PROCEDURE — 2580000003 HC RX 258: Performed by: NURSE PRACTITIONER

## 2022-04-22 PROCEDURE — 6360000002 HC RX W HCPCS: Performed by: STUDENT IN AN ORGANIZED HEALTH CARE EDUCATION/TRAINING PROGRAM

## 2022-04-22 PROCEDURE — 93886 INTRACRANIAL COMPLETE STUDY: CPT

## 2022-04-22 PROCEDURE — 97116 GAIT TRAINING THERAPY: CPT

## 2022-04-22 PROCEDURE — 99233 SBSQ HOSP IP/OBS HIGH 50: CPT | Performed by: PSYCHIATRY & NEUROLOGY

## 2022-04-22 PROCEDURE — 2500000003 HC RX 250 WO HCPCS: Performed by: NURSE PRACTITIONER

## 2022-04-22 PROCEDURE — 6360000002 HC RX W HCPCS: Performed by: HEALTH CARE PROVIDER

## 2022-04-22 PROCEDURE — 6370000000 HC RX 637 (ALT 250 FOR IP): Performed by: NURSE PRACTITIONER

## 2022-04-22 PROCEDURE — 94761 N-INVAS EAR/PLS OXIMETRY MLT: CPT

## 2022-04-22 PROCEDURE — 2500000003 HC RX 250 WO HCPCS: Performed by: STUDENT IN AN ORGANIZED HEALTH CARE EDUCATION/TRAINING PROGRAM

## 2022-04-22 PROCEDURE — 85027 COMPLETE CBC AUTOMATED: CPT

## 2022-04-22 PROCEDURE — 84295 ASSAY OF SERUM SODIUM: CPT

## 2022-04-22 PROCEDURE — 2000000003 HC NEURO ICU R&B

## 2022-04-22 RX ORDER — SODIUM CHLORIDE 9 MG/ML
INJECTION, SOLUTION INTRAVENOUS CONTINUOUS
Status: DISCONTINUED | OUTPATIENT
Start: 2022-04-22 | End: 2022-04-30

## 2022-04-22 RX ORDER — 0.9 % SODIUM CHLORIDE 0.9 %
1000 INTRAVENOUS SOLUTION INTRAVENOUS ONCE
Status: COMPLETED | OUTPATIENT
Start: 2022-04-22 | End: 2022-04-22

## 2022-04-22 RX ORDER — CHOLECALCIFEROL (VITAMIN D3) 125 MCG
5 CAPSULE ORAL NIGHTLY
Status: DISCONTINUED | OUTPATIENT
Start: 2022-04-22 | End: 2022-05-06 | Stop reason: HOSPADM

## 2022-04-22 RX ORDER — POLYETHYLENE GLYCOL 3350 17 G/17G
17 POWDER, FOR SOLUTION ORAL DAILY
Status: DISCONTINUED | OUTPATIENT
Start: 2022-04-23 | End: 2022-05-06 | Stop reason: HOSPADM

## 2022-04-22 RX ORDER — MAGNESIUM SULFATE HEPTAHYDRATE 40 MG/ML
4000 INJECTION, SOLUTION INTRAVENOUS DAILY
Status: DISCONTINUED | OUTPATIENT
Start: 2022-04-22 | End: 2022-04-25

## 2022-04-22 RX ORDER — ACETAMINOPHEN 325 MG/1
650 TABLET ORAL EVERY 4 HOURS PRN
Status: DISCONTINUED | OUTPATIENT
Start: 2022-04-22 | End: 2022-04-29

## 2022-04-22 RX ORDER — CHOLECALCIFEROL (VITAMIN D3) 125 MCG
5 CAPSULE ORAL NIGHTLY PRN
Status: DISCONTINUED | OUTPATIENT
Start: 2022-04-22 | End: 2022-05-06 | Stop reason: HOSPADM

## 2022-04-22 RX ADMIN — DEXAMETHASONE SODIUM PHOSPHATE 4 MG: 4 INJECTION, SOLUTION INTRAMUSCULAR; INTRAVENOUS at 16:14

## 2022-04-22 RX ADMIN — PANTOPRAZOLE SODIUM 40 MG: 40 TABLET, DELAYED RELEASE ORAL at 06:03

## 2022-04-22 RX ADMIN — OXYCODONE 5 MG: 5 TABLET ORAL at 05:59

## 2022-04-22 RX ADMIN — FENTANYL CITRATE 25 MCG: 50 INJECTION, SOLUTION INTRAMUSCULAR; INTRAVENOUS at 08:13

## 2022-04-22 RX ADMIN — FENTANYL CITRATE 25 MCG: 50 INJECTION, SOLUTION INTRAMUSCULAR; INTRAVENOUS at 13:54

## 2022-04-22 RX ADMIN — Medication 5 MG: at 21:12

## 2022-04-22 RX ADMIN — DEXAMETHASONE SODIUM PHOSPHATE 4 MG: 4 INJECTION, SOLUTION INTRAMUSCULAR; INTRAVENOUS at 21:12

## 2022-04-22 RX ADMIN — NIMODIPINE 60 MG: 30 CAPSULE, LIQUID FILLED ORAL at 01:50

## 2022-04-22 RX ADMIN — DEXAMETHASONE SODIUM PHOSPHATE 4 MG: 4 INJECTION, SOLUTION INTRAMUSCULAR; INTRAVENOUS at 04:50

## 2022-04-22 RX ADMIN — ENOXAPARIN SODIUM 40 MG: 100 INJECTION SUBCUTANEOUS at 09:10

## 2022-04-22 RX ADMIN — NIMODIPINE 60 MG: 30 CAPSULE, LIQUID FILLED ORAL at 09:10

## 2022-04-22 RX ADMIN — Medication 10 MG: at 12:12

## 2022-04-22 RX ADMIN — MAGNESIUM SULFATE IN WATER 4000 MG: 40 INJECTION, SOLUTION INTRAVENOUS at 10:15

## 2022-04-22 RX ADMIN — ATORVASTATIN CALCIUM 80 MG: 80 TABLET, FILM COATED ORAL at 21:12

## 2022-04-22 RX ADMIN — SODIUM CHLORIDE 75 ML/HR: 234 INJECTION INTRAMUSCULAR; INTRAVENOUS; SUBCUTANEOUS at 03:02

## 2022-04-22 RX ADMIN — Medication 10 MG: at 08:12

## 2022-04-22 RX ADMIN — SODIUM CHLORIDE: 9 INJECTION, SOLUTION INTRAVENOUS at 10:14

## 2022-04-22 RX ADMIN — NIMODIPINE 60 MG: 30 CAPSULE, LIQUID FILLED ORAL at 21:12

## 2022-04-22 RX ADMIN — ACETAMINOPHEN 650 MG: 325 TABLET ORAL at 02:57

## 2022-04-22 RX ADMIN — ACETAMINOPHEN 650 MG: 325 TABLET ORAL at 14:45

## 2022-04-22 RX ADMIN — HYDRALAZINE HYDROCHLORIDE 10 MG: 20 INJECTION INTRAMUSCULAR; INTRAVENOUS at 09:29

## 2022-04-22 RX ADMIN — SODIUM CHLORIDE, PRESERVATIVE FREE 10 ML: 5 INJECTION INTRAVENOUS at 21:12

## 2022-04-22 RX ADMIN — ACETAMINOPHEN 650 MG: 325 TABLET ORAL at 21:11

## 2022-04-22 RX ADMIN — SODIUM CHLORIDE, PRESERVATIVE FREE 10 ML: 5 INJECTION INTRAVENOUS at 09:00

## 2022-04-22 RX ADMIN — OXYCODONE 5 MG: 5 TABLET ORAL at 12:11

## 2022-04-22 RX ADMIN — NIMODIPINE 60 MG: 30 CAPSULE, LIQUID FILLED ORAL at 13:18

## 2022-04-22 RX ADMIN — NIMODIPINE 60 MG: 30 CAPSULE, LIQUID FILLED ORAL at 04:50

## 2022-04-22 RX ADMIN — BISACODYL 10 MG: 5 TABLET, COATED ORAL at 09:10

## 2022-04-22 RX ADMIN — DEXAMETHASONE SODIUM PHOSPHATE 4 MG: 4 INJECTION, SOLUTION INTRAMUSCULAR; INTRAVENOUS at 09:10

## 2022-04-22 RX ADMIN — SODIUM CHLORIDE 1000 ML: 9 INJECTION, SOLUTION INTRAVENOUS at 09:11

## 2022-04-22 RX ADMIN — OXYCODONE 5 MG: 5 TABLET ORAL at 00:02

## 2022-04-22 RX ADMIN — MAGNESIUM HYDROXIDE 30 ML: 400 SUSPENSION ORAL at 04:51

## 2022-04-22 RX ADMIN — NIMODIPINE 60 MG: 30 CAPSULE, LIQUID FILLED ORAL at 17:06

## 2022-04-22 ASSESSMENT — PAIN SCALES - GENERAL
PAINLEVEL_OUTOF10: 7
PAINLEVEL_OUTOF10: 7
PAINLEVEL_OUTOF10: 5
PAINLEVEL_OUTOF10: 8
PAINLEVEL_OUTOF10: 7
PAINLEVEL_OUTOF10: 10
PAINLEVEL_OUTOF10: 7
PAINLEVEL_OUTOF10: 5
PAINLEVEL_OUTOF10: 8

## 2022-04-22 ASSESSMENT — PAIN DESCRIPTION - DESCRIPTORS: DESCRIPTORS: ACHING;DISCOMFORT;PRESSURE

## 2022-04-22 ASSESSMENT — PAIN DESCRIPTION - PAIN TYPE: TYPE: ACUTE PAIN

## 2022-04-22 NOTE — PROGRESS NOTES
Physical Therapy  Facility/Department: [address]  Physical Therapy Daily Treatment Note    Name: Ryan Lee  : 1990  MRN: [id number]  Date of Service: 2022    Discharge Recommendations:  Patient would benefit from continued therapy after discharge   PT Equipment Recommendations  Equipment Needed: No      Assessment   Body Structures, Functions, Activity Limitations Requiring Skilled Therapeutic Intervention: Decreased functional mobility ; Increased pain;Decreased balance;Decreased endurance  Assessment: Pt amb 250 ft without device and CGA. Limited by decreased balance, strength, endurance. , and increased c/o headache with mobility. Recommend continuned PT after d/c. Therapy Prognosis: Good  Requires PT Follow-Up: Yes  Activity Tolerance  Activity Tolerance: Patient tolerated treatment well;Patient limited by fatigue     Plan   Plan  Plan:  (5-6x)  Current Treatment Recommendations: Balance training,ROM,Strengthening,Functional mobility training,Transfer training,Stair training,Gait training,Endurance training,Home exercise program,Safety education & training,Patient/Caregiver education & training  Safety Devices  Type of Devices: Call light within reach,Gait belt,Left in bed,Nurse notified  Restraints  Restraints Initially in Place: No     Restrictions  Restrictions/Precautions  Restrictions/Precautions: Up as Tolerated  Required Braces or Orthoses?: No  Position Activity Restriction  Other position/activity restrictions: up as tolerated; EVD; BP<140; s/p angiogram      Subjective   General  Patient assessed for rehabilitation services?: Yes  Response To Previous Treatment: Patient with no complaints from previous session. Family / Caregiver Present: No  Follows Commands: Within Functional Limits  Subjective  Subjective: Pt agreeable to PT with encouragment.  Checked on multiple times, c/o headache and fatigue after not sleeping well last night  Pain Assessment  Pain Assessment: 0-10  Pain Level: 8  Pain Type: Acute pain  Response to Pain Intervention: None (pain unchanged or no improvement)           Cognition   Orientation  Overall Orientation Status: Within Functional Limits      Objective  Bed mobility  Rolling to Right: Stand by assistance  Supine to Sit: Stand by assistance  Sit to Supine: Stand by assistance  Scooting: Stand by assistance  Comment: EVD clamped prior to mobility  Transfers  Sit to Stand: Contact guard assistance  Stand to sit: Contact guard assistance  Comment: Sit to stand x multiple trials from bed and toilet, CGA for safety due to occasional c/o lightheadedness  Ambulation  Surface: level tile  Device: No Device  Assistance: Contact guard assistance  Quality of Gait: extremely slow maddy, narrow DIANA, occasional unsteadiness wtih c/o dizziness  Gait Deviations: Slow Maddy  Distance: 250 ft  Comments: increased time to complete, no LOB  More Ambulation?: No     Balance  Posture: Good  Sitting - Static: Good;-  Sitting - Dynamic: Fair;+  Standing - Static: Fair;+  Standing - Dynamic: Fair  Comments: pt stood x ~10 min total, withotu device and CGA due to occasional unsteadiness and dlateral sway. Exercise  Supine B LE AROM in all planes x 10  Supine SKTC and figure 4 stretch 2 x 20 sec      AM-PAC Score  AM-PAC Inpatient Mobility Raw Score : 21 (04/22/22 1453)  AM-PAC Inpatient T-Scale Score : 50.25 (04/22/22 1453)  Mobility Inpatient CMS 0-100% Score: 28.97 (04/22/22 1453)  Mobility Inpatient CMS G-Code Modifier : CJ (04/22/22 1453)          Goals  Short Term Goals  Time Frame for Short term goals: 14  Short term goal 1: Pt to perform bed mobility from flat surface independently  Short term goal 2: Demonstrate functional transfers independently  Short term goal 3: Ambulate 300ft w/ no AD independently  Short term goal 4: Ascend/descend 2 stairs with R rail independently  Patient Goals   Patient goals :  To go home       Therapy Time   Individual Concurrent Group Co-treatment   Time In 1048         Time Out 1126         Minutes 38         Timed Code Treatment Minutes: Lila Walls 19, [place]

## 2022-04-22 NOTE — PLAN OF CARE
Problem: Falls - Risk of:  Goal: Will remain free from falls  Description: Will remain free from falls  4/22/2022 0404 by Christopher Zuleta RN  Outcome: Progressing     Problem: Falls - Risk of:  Goal: Absence of physical injury  Description: Absence of physical injury  4/22/2022 0404 by Christopher Zuleta RN  Outcome: Progressing     Problem: Anxiety/Stress:  Goal: Level of anxiety will decrease  Description: Level of anxiety will decrease  4/22/2022 0404 by Christopher Zuleta RN  Outcome: Progressing     Problem: Mental Status - Impaired:  Goal: Mental status will be restored to baseline  Description: Mental status will be restored to baseline  4/22/2022 0404 by Christopher Zuleta RN  Outcome: Progressing     Problem: Pain:  Goal: Pain level will decrease  Description: Pain level will decrease  4/22/2022 0404 by Christopher Zuleta RN  Outcome: Progressing     Problem: Pain:  Goal: Recognizes and communicates pain  Description: Recognizes and communicates pain  4/22/2022 0404 by Christopher Zuleta RN  Outcome: Progressing     Problem: Pain:  Goal: Control of acute pain  Description: Control of acute pain  4/22/2022 0404 by Christopher Zuleta RN  Outcome: Progressing     Problem: Pain:  Goal: Control of chronic pain  Description: Control of chronic pain  4/22/2022 0404 by Christopher Zuleta RN  Outcome: Progressing     Problem: Skin Integrity - Impaired:  Goal: Will show no infection signs and symptoms  Description: Will show no infection signs and symptoms  4/22/2022 0404 by Christopher Zuleta RN  Outcome: Progressing     Problem: Skin Integrity - Impaired:  Goal: Absence of new skin breakdown  Description: Absence of new skin breakdown  4/22/2022 0404 by Christopher Zuleta RN  Outcome: Progressing     Problem: HEMODYNAMIC STATUS  Goal: Patient has stable vital signs and fluid balance  4/22/2022 0404 by Christopher Zuleta RN  Outcome: Progressing     Problem: ACTIVITY INTOLERANCE/IMPAIRED MOBILITY  Goal: Mobility/activity is maintained at optimum level for patient  4/22/2022 0404 by Vamshi Tadeo RN  Outcome: Progressing  Problem: Swallowing - Impaired:  Goal: Able to swallow without choking  Description: Able to swallow without choking  4/22/2022 0404 by Vamshi Tadeo RN  Outcome: Progressing     Problem: Swallowing - Impaired:  Goal: Absence of aspiration  Description: Absence of aspiration  4/22/2022 0404 by Vamshi Tadeo RN  Outcome: Progressing     Problem: Pain:  Goal: Pain level will decrease  Description: Pain level will decrease  4/22/2022 0404 by Vamshi Tadeo RN  Outcome: Progressing     Problem: Pain:  Goal: Control of acute pain  Description: Control of acute pain  4/22/2022 0404 by Vamshi Tadeo RN  Outcome: Progressing     Problem: Pain:  Goal: Control of chronic pain  Description: Control of chronic pain  4/22/2022 0404 by Vamshi Tadeo RN  Outcome: Progressing     Problem: Discharge Planning  Goal: Discharge to home or other facility with appropriate resources  4/22/2022 0404 by Vamshi Tadeo RN  Outcome: Progressing     Problem: ABCDS Injury Assessment  Goal: Absence of physical injury  Outcome: Progressing        Problem: COMMUNICATION IMPAIRMENT  Goal: Ability to express needs and understand communication  4/22/2022 0404 by Vamshi Tadeo RN  Outcome: Progressing

## 2022-04-22 NOTE — PROGRESS NOTES
Neurosurgery LILLY/Resident    Daily Progress Note   Chief Complaint   Patient presents with    Altered Mental Status     4/22/2022  2:06 PM    Chart reviewed. No acute events overnight. ICP 3-12 over last 24 hours. Continues to have intermittent headache. States was constant but no comes and goes. Vitals:    04/22/22 1132 04/22/22 1200 04/22/22 1241 04/22/22 1300   BP: (!) 164/99 (!) 167/105  (!) 152/83   Pulse: 75 87  92   Resp: 12 21 19 21   Temp:  98.2 °F (36.8 °C)     TempSrc:  Oral     SpO2:  98%  97%   Weight:       Height:             PE: AOx3   CNII-XII intact   PERRL, EOMI   Motor   No pronator drift  L deltoid 5/5; R deltoid 5/5  L biceps 5/5; R biceps 5/5  L triceps 5/5; R triceps 5/5  L intrinsics 5/5; R intrinsics 5/5      L iliopsoas 5/5 , R iliopsoas 5/5  L quadriceps 5/5; R quadriceps 5/5  L Dorsiflexion 5/5; R dorsiflexion 5/5  L Plantarflexion 5/5; R plantarflexion 5/5    Sensation intact    EVD open at 15 cmH2O, patent  Output 96 ml/12h, 189 ml/24h      Lab Results   Component Value Date    WBC 16.9 (H) 04/22/2022    HGB 13.1 04/22/2022    HCT 39.3 04/22/2022     04/22/2022    CHOL 182 04/18/2022    TRIG 60 04/18/2022    HDL 74 04/18/2022    ALT 16 04/20/2022    AST 15 04/20/2022     04/22/2022    K 4.2 04/22/2022     04/22/2022    CREATININE 0.51 04/22/2022    BUN 7 04/22/2022    CO2 21 04/22/2022    TSH 2.18 04/20/2022    INR 0.9 04/18/2022    LABA1C 5.5 04/18/2022    CRP <3.0 04/19/2022    SEDRATE 2 04/18/2022       A/P  Intraventricular hemorrhage  Aneurysmal subarachnoid hemorrhage from [facility] s/p coiling  Hydrocephalus with EVD placement    - raise EVD to 20 cmH2O today, monitor ICP and output      Please contact neurosurgery with any changes in patients neurologic status.        Oswald Gunderson CNP  4/22/22  2:06 PM

## 2022-04-22 NOTE — PROGRESS NOTES
Endovascular Neurosurgery Progress Note    SUBJECTIVE:   Patient was complaining of headache overnight. This am headache 7/10, denied nausea. Review of Systems:  CONSTITUTIONAL:  negative for fevers, chills, fatigue and malaise    EYES:  negative for double vision, blurred vision and photophobia     HEENT:  negative for tinnitus, epistaxis and sore throat    RESPIRATORY:  negative for cough, shortness of breath, wheezing    CARDIOVASCULAR:  negative for chest pain, palpitations, syncope, edema    GASTROINTESTINAL:  negative for nausea, vomiting    GENITOURINARY:  negative for incontinence    MUSCULOSKELETAL:  negative for neck or back pain    NEUROLOGICAL:  Negative for weakness and tingling  negative for headaches and dizziness    PSYCHIATRIC:  negative for anxiety      Review of systems otherwise negative. OBJECTIVE:     Vitals:    04/22/22 1000   BP: (!) 154/86   Pulse: 77   Resp: 16   Temp:    SpO2: 96%        General:  Gen: normal habitus, NAD  HEENT: NCAT, mucosa moist  Cvs: RRR, S1 S2 normal  Resp: symmetric unlabored breathing  Abd: s/nd/nt  Ext: no edema  Skin: no lesions seen, warm and dry    Neuro:  Gen: awake and alert, oriented x3. Lang/speech: no aphasia or dysarthria. Follows commands. CN: PERRL, EOMI, VFF, V1-3 intact, face symmetric, hearing intact, shoulder shrug symmetric, tongue midline  Motor: grossly 5/5 UE and LE b/l  Sense: LT intact in all 4 ext. Coord: FTN and HTS intact b/l  DTR: deferred  Gait: narrow base gait    NIH Stroke Scale:   1a  Level of consciousness: 0 - alert; keenly responsive   1b. LOC questions:  0 - answers both questions correctly   1c. LOC commands: 0 - performs both tasks correctly   2. Best Gaze: 0 - normal   3. Visual: 0 - no visual loss   4. Facial Palsy: 0 - normal symmetric movement   5a. Motor left arm: 0 - no drift, limb holds 90 (or 45) degrees for full 10 seconds   5b.   Motor right arm: 0 - no drift, limb holds 90 (or 45) degrees for full 10 seconds   6a. Motor left le - no drift; leg holds 30 degree position for full 5 seconds   6b  Motor right le - no drift; leg holds 30 degree position for full 5 seconds   7. Limb Ataxia: 0 - absent   8. Sensory: 0 - normal; no sensory loss   9. Best Language:  0 - no aphasia, normal   10. Dysarthria: 0 - normal   11. Extinction and Inattention: 0 - no abnormality         Total:   0     MRS: 0      LABS:   Reviewed. Lab Results   Component Value Date    HGB 13.1 2022    WBC 16.9 (H) 2022     2022     2022    BUN 7 2022    CREATININE 0.51 2022    AST 15 2022    ALT 16 2022    MG 2.4 2022    APTT 22.6 2022    INR 0.9 2022      Lab Results   Component Value Date    COVID19 Not Detected 2022    COVID19 Not Detected 2022       RADIOLOGY:   Images were personally reviewed including:   CT head :   Scattered subarachnoid hemorrhage that is decreased compared to prior   examination.       Interval KRISSY aneurysm coiling. IR :   --left wide necked inferiorly and anteriorly oriented ruptured AComA aneurysm, dimensions neck 2.93mm, height 2.82mm, width 3.13mm, length 2.78mm. --the above treated with Smart coil embolization x3, Vu 1. Smart coil x1 opened but not detached. --Prominent musculocutaneous branch from the left V2 segment that anastomoses to the left occipital artery. 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            CT head :   Diffuse subarachnoid hemorrhage.     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     ASSESSMENT:   28 y/o f with no significant past medical history, chromic smoking, marijuana abuse presented to [facility] as a transfer from [facility] as patient was found to have diffuse SAH on CT head.  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 Cherri Talbot. HH 02, mFS 03  PBD:05  Patient complains of moderate to severe headaches, non-focal neuro exam. Repeat CT head on 04/19 showed decreased SAH. TCD 04/21: slightly increased mean velocities in right MCA, cerebral salt wasting  PLAN:   --SBP goal 140-180  mm hg   --SAH management with daily TCDs, Nimodipine, statins, Mg level 3-4 last Mg level 2.4  --EVD management per Neuro surgery. --f/up with Dr. Gauri Reeves in 2 weeks after discharge and f/up with Dr. Cherri Talbot in 3 months after discharge    Case discussed with Dr. Cherir Talbot attending.     Lori Bates MD  Stroke, [facility]  [id number]  Electronically signed 4/22/2022 at 11:17 AM

## 2022-04-22 NOTE — PLAN OF CARE
Problem: Falls - Risk of:  Goal: Will remain free from falls  Description: Will remain free from falls  4/22/2022 1640 by Mal Underwood  Outcome: Progressing  4/22/2022 0404 by Elton Flores RN  Outcome: Progressing  Goal: Absence of physical injury  Description: Absence of physical injury  4/22/2022 1640 by Mal Underwood  Outcome: Progressing  4/22/2022 0404 by Elton Flores RN  Outcome: Progressing  Patient remained free from falls/injuries. Bed locked and in lowest position. Call light and bedside table within reach. Patient taught to call out appropriately. Bed alarm set.        Problem: Anxiety/Stress:  Goal: Level of anxiety will decrease  Description: Level of anxiety will decrease  4/22/2022 1640 by Mal Underwood  Outcome: Progressing  4/22/2022 0404 by Elton Flores RN  Outcome: Progressing     Problem: Mental Status - Impaired:  Goal: Mental status will be restored to baseline  Description: Mental status will be restored to baseline  4/22/2022 1640 by Mal Underwood  Outcome: Progressing  4/22/2022 0404 by Elton Flores RN  Outcome: Progressing     Problem: Pain:  Goal: Pain level will decrease  Description: Pain level will decrease  4/22/2022 1640 by Mal Underwood  Outcome: Progressing  4/22/2022 0404 by Elton Flores RN  Outcome: Progressing  Goal: Recognizes and communicates pain  Description: Recognizes and communicates pain  4/22/2022 1640 by Mal Underwood  Outcome: Progressing  4/22/2022 0404 by Elton Flores RN  Outcome: Progressing  Goal: Control of acute pain  Description: Control of acute pain  4/22/2022 1640 by Mal Underwood  Outcome: Progressing  4/22/2022 0404 by Elton Flores RN  Outcome: Progressing  Goal: Control of chronic pain  Description: Control of chronic pain  4/22/2022 1640 by Mal Underwood  Outcome: Progressing  4/22/2022 0404 by Elton Flores RN  Outcome: Progressing     Problem: Skin Integrity - Impaired:  Goal: Will show no infection signs and symptoms  Description: Will show no infection signs and symptoms  4/22/2022 1640 by Vani Underwood  Outcome: Progressing  4/22/2022 0404 by Sharonda Campos RN  Outcome: Progressing  Goal: Absence of new skin breakdown  Description: Absence of new skin breakdown  4/22/2022 1640 by Vani Underwood  Outcome: Progressing  4/22/2022 0404 by Sharonda Campos RN  Outcome: Progressing  Patient's skin is assessed for new skin breakdown. Javier scale assessment completed. Brief checked frequently. Barrier cream/powder applied as needed. Patient turned every two hours. Heels elevated off of the bed. Problem: HEMODYNAMIC STATUS  Goal: Patient has stable vital signs and fluid balance  4/22/2022 1640 by Vani Underwood  Outcome: Progressing  4/22/2022 0404 by Sharonda Campos RN  Outcome: Progressing     Problem: ACTIVITY INTOLERANCE/IMPAIRED MOBILITY  Goal: Mobility/activity is maintained at optimum level for patient  4/22/2022 1640 by Vani Underwood  Outcome: Progressing  4/22/2022 0404 by Sharonda Campos RN  Outcome: Progressing     Problem: COMMUNICATION IMPAIRMENT  Goal: Ability to express needs and understand communication  4/22/2022 1640 by Vani Underwood  Outcome: Progressing  4/22/2022 0404 by Sharonda Campos RN  Outcome: Progressing     Problem: Swallowing - Impaired:  Goal: Able to swallow without choking  Description: Able to swallow without choking  4/22/2022 1640 by Vani Underwood  Outcome: Progressing  4/22/2022 0404 by Sharonda Campos RN  Outcome: Progressing  Goal: Absence of aspiration  Description: Absence of aspiration  4/22/2022 1640 by Vani Underwood  Outcome: Progressing  4/22/2022 0404 by Sharonda Campos RN  Outcome: Progressing  Neuro assessments completed. Fall and aspiration precautions in place. Barriers in communication and mobility assessed. Interventions to assist in communication and mobility in place. Adaptive devices used as needed.       Problem: Pain:  Goal: Pain level will decrease  Description: Pain level will decrease  4/22/2022 1640 by Belinda Underwood  Outcome: Progressing  4/22/2022 0404 by Janusz Del Valle RN  Outcome: Progressing  Goal: Control of acute pain  Description: Control of acute pain  4/22/2022 1640 by Belinda Underwood  Outcome: Progressing  4/22/2022 0404 by Janusz Del Valle RN  Outcome: Progressing  Goal: Control of chronic pain  Description: Control of chronic pain  4/22/2022 1640 by Belinda Undrewood  Outcome: Progressing  4/22/2022 0404 by Janusz Del Valle RN  Outcome: Progressing     Problem: Discharge Planning  Goal: Discharge to home or other facility with appropriate resources  4/22/2022 1640 by [facility]  Outcome: Progressing  4/22/2022 0404 by Janusz Del Valle RN  Outcome: Progressing     Problem: ABCDS Injury Assessment  Goal: Absence of physical injury  4/22/2022 1640 by Belinda Underwood  Outcome: Progressing  4/22/2022 0404 by Janusz Del Valle RN  Outcome: Progressing

## 2022-04-23 LAB
ANION GAP SERPL CALCULATED.3IONS-SCNC: 9 MMOL/L (ref 9–17)
APPEARANCE CSF: ABNORMAL
BUN BLDV-MCNC: 13 MG/DL (ref 6–20)
CALCIUM SERPL-MCNC: 8.5 MG/DL (ref 8.6–10.4)
CHLORIDE BLD-SCNC: 109 MMOL/L (ref 98–107)
CO2: 20 MMOL/L (ref 20–31)
CREAT SERPL-MCNC: 0.53 MG/DL (ref 0.5–0.9)
GFR AFRICAN AMERICAN: >60 ML/MIN
GFR NON-AFRICAN AMERICAN: >60 ML/MIN
GFR SERPL CREATININE-BSD FRML MDRD: ABNORMAL ML/MIN/{1.73_M2}
GLUCOSE BLD-MCNC: 121 MG/DL (ref 70–99)
GLUCOSE, CSF: 81 MG/DL (ref 40–70)
HCT VFR BLD CALC: 38 % (ref 36.3–47.1)
HEMOGLOBIN: 12.5 G/DL (ref 11.9–15.1)
MAGNESIUM: 2.2 MG/DL (ref 1.6–2.6)
MCH RBC QN AUTO: 30.1 PG (ref 25.2–33.5)
MCHC RBC AUTO-ENTMCNC: 32.9 G/DL (ref 28.4–34.8)
MCV RBC AUTO: 91.6 FL (ref 82.6–102.9)
NRBC AUTOMATED: 0 PER 100 WBC
PDW BLD-RTO: 12.9 % (ref 11.8–14.4)
PLATELET # BLD: 281 K/UL (ref 138–453)
PMV BLD AUTO: 10.3 FL (ref 8.1–13.5)
POTASSIUM SERPL-SCNC: 4.3 MMOL/L (ref 3.7–5.3)
PROTEIN CSF: 9.4 MG/DL (ref 15–45)
RBC # BLD: 4.15 M/UL (ref 3.95–5.11)
RBC CSF: 1875 /MM3
SODIUM BLD-SCNC: 138 MMOL/L (ref 135–144)
SODIUM BLD-SCNC: 142 MMOL/L (ref 135–144)
TUBE NUMBER CSF: ABNORMAL
VOLUME CSF: ABNORMAL
WBC # BLD: 20.9 K/UL (ref 3.5–11.3)
WBC CSF: 2 /MM3
XANTHOCHROMIA: ABNORMAL

## 2022-04-23 PROCEDURE — 99232 SBSQ HOSP IP/OBS MODERATE 35: CPT | Performed by: NEUROLOGICAL SURGERY

## 2022-04-23 PROCEDURE — 84295 ASSAY OF SERUM SODIUM: CPT

## 2022-04-23 PROCEDURE — 84157 ASSAY OF PROTEIN OTHER: CPT

## 2022-04-23 PROCEDURE — APPSS30 APP SPLIT SHARED TIME 16-30 MINUTES: Performed by: REGISTERED NURSE

## 2022-04-23 PROCEDURE — 2580000003 HC RX 258: Performed by: STUDENT IN AN ORGANIZED HEALTH CARE EDUCATION/TRAINING PROGRAM

## 2022-04-23 PROCEDURE — 89050 BODY FLUID CELL COUNT: CPT

## 2022-04-23 PROCEDURE — 6360000002 HC RX W HCPCS: Performed by: NURSE PRACTITIONER

## 2022-04-23 PROCEDURE — 99291 CRITICAL CARE FIRST HOUR: CPT | Performed by: PSYCHIATRY & NEUROLOGY

## 2022-04-23 PROCEDURE — 85027 COMPLETE CBC AUTOMATED: CPT

## 2022-04-23 PROCEDURE — 6360000002 HC RX W HCPCS: Performed by: HEALTH CARE PROVIDER

## 2022-04-23 PROCEDURE — 6370000000 HC RX 637 (ALT 250 FOR IP): Performed by: NURSE PRACTITIONER

## 2022-04-23 PROCEDURE — 94761 N-INVAS EAR/PLS OXIMETRY MLT: CPT

## 2022-04-23 PROCEDURE — 93886 INTRACRANIAL COMPLETE STUDY: CPT

## 2022-04-23 PROCEDURE — 99233 SBSQ HOSP IP/OBS HIGH 50: CPT | Performed by: PSYCHIATRY & NEUROLOGY

## 2022-04-23 PROCEDURE — 80048 BASIC METABOLIC PNL TOTAL CA: CPT

## 2022-04-23 PROCEDURE — 36415 COLL VENOUS BLD VENIPUNCTURE: CPT

## 2022-04-23 PROCEDURE — 6360000002 HC RX W HCPCS: Performed by: STUDENT IN AN ORGANIZED HEALTH CARE EDUCATION/TRAINING PROGRAM

## 2022-04-23 PROCEDURE — 2000000003 HC NEURO ICU R&B

## 2022-04-23 PROCEDURE — 2580000003 HC RX 258: Performed by: ANESTHESIOLOGY

## 2022-04-23 PROCEDURE — 82945 GLUCOSE OTHER FLUID: CPT

## 2022-04-23 PROCEDURE — 6370000000 HC RX 637 (ALT 250 FOR IP): Performed by: STUDENT IN AN ORGANIZED HEALTH CARE EDUCATION/TRAINING PROGRAM

## 2022-04-23 PROCEDURE — 87070 CULTURE OTHR SPECIMN AEROBIC: CPT

## 2022-04-23 PROCEDURE — 87205 SMEAR GRAM STAIN: CPT

## 2022-04-23 PROCEDURE — 83735 ASSAY OF MAGNESIUM: CPT

## 2022-04-23 RX ORDER — SENNA AND DOCUSATE SODIUM 50; 8.6 MG/1; MG/1
2 TABLET, FILM COATED ORAL DAILY
Status: DISCONTINUED | OUTPATIENT
Start: 2022-04-23 | End: 2022-05-06 | Stop reason: HOSPADM

## 2022-04-23 RX ORDER — 0.9 % SODIUM CHLORIDE 0.9 %
1000 INTRAVENOUS SOLUTION INTRAVENOUS ONCE
Status: COMPLETED | OUTPATIENT
Start: 2022-04-23 | End: 2022-04-23

## 2022-04-23 RX ADMIN — OXYCODONE 5 MG: 5 TABLET ORAL at 10:10

## 2022-04-23 RX ADMIN — SODIUM CHLORIDE: 9 INJECTION, SOLUTION INTRAVENOUS at 21:15

## 2022-04-23 RX ADMIN — OXYCODONE 5 MG: 5 TABLET ORAL at 00:27

## 2022-04-23 RX ADMIN — DEXAMETHASONE SODIUM PHOSPHATE 4 MG: 4 INJECTION, SOLUTION INTRAMUSCULAR; INTRAVENOUS at 21:07

## 2022-04-23 RX ADMIN — MAGNESIUM SULFATE IN WATER 4000 MG: 40 INJECTION, SOLUTION INTRAVENOUS at 09:47

## 2022-04-23 RX ADMIN — Medication 5 MG: at 21:07

## 2022-04-23 RX ADMIN — ATORVASTATIN CALCIUM 80 MG: 80 TABLET, FILM COATED ORAL at 22:12

## 2022-04-23 RX ADMIN — NIMODIPINE 60 MG: 30 CAPSULE, LIQUID FILLED ORAL at 09:41

## 2022-04-23 RX ADMIN — SODIUM CHLORIDE, PRESERVATIVE FREE 10 ML: 5 INJECTION INTRAVENOUS at 10:12

## 2022-04-23 RX ADMIN — NIMODIPINE 60 MG: 30 CAPSULE, LIQUID FILLED ORAL at 05:15

## 2022-04-23 RX ADMIN — DEXAMETHASONE SODIUM PHOSPHATE 4 MG: 4 INJECTION, SOLUTION INTRAMUSCULAR; INTRAVENOUS at 09:41

## 2022-04-23 RX ADMIN — OXYCODONE 5 MG: 5 TABLET ORAL at 16:38

## 2022-04-23 RX ADMIN — FENTANYL CITRATE 25 MCG: 50 INJECTION, SOLUTION INTRAMUSCULAR; INTRAVENOUS at 19:39

## 2022-04-23 RX ADMIN — NIMODIPINE 60 MG: 30 CAPSULE, LIQUID FILLED ORAL at 01:02

## 2022-04-23 RX ADMIN — FENTANYL CITRATE 25 MCG: 50 INJECTION, SOLUTION INTRAMUSCULAR; INTRAVENOUS at 13:09

## 2022-04-23 RX ADMIN — DEXAMETHASONE SODIUM PHOSPHATE 4 MG: 4 INJECTION, SOLUTION INTRAMUSCULAR; INTRAVENOUS at 04:02

## 2022-04-23 RX ADMIN — NIMODIPINE 60 MG: 30 CAPSULE, LIQUID FILLED ORAL at 13:11

## 2022-04-23 RX ADMIN — NIMODIPINE 60 MG: 30 CAPSULE, LIQUID FILLED ORAL at 18:12

## 2022-04-23 RX ADMIN — POLYETHYLENE GLYCOL 3350 17 G: 17 POWDER, FOR SOLUTION ORAL at 10:12

## 2022-04-23 RX ADMIN — DEXAMETHASONE SODIUM PHOSPHATE 4 MG: 4 INJECTION, SOLUTION INTRAMUSCULAR; INTRAVENOUS at 18:12

## 2022-04-23 RX ADMIN — ENOXAPARIN SODIUM 40 MG: 100 INJECTION SUBCUTANEOUS at 09:41

## 2022-04-23 RX ADMIN — DOCUSATE SODIUM 50 MG AND SENNOSIDES 8.6 MG 2 TABLET: 8.6; 5 TABLET, FILM COATED ORAL at 09:41

## 2022-04-23 RX ADMIN — BISACODYL 10 MG: 5 TABLET, COATED ORAL at 09:41

## 2022-04-23 RX ADMIN — NIMODIPINE 60 MG: 30 CAPSULE, LIQUID FILLED ORAL at 21:07

## 2022-04-23 RX ADMIN — SODIUM CHLORIDE, PRESERVATIVE FREE 10 ML: 5 INJECTION INTRAVENOUS at 21:00

## 2022-04-23 RX ADMIN — SODIUM CHLORIDE 1000 ML: 9 INJECTION, SOLUTION INTRAVENOUS at 09:34

## 2022-04-23 RX ADMIN — PANTOPRAZOLE SODIUM 40 MG: 40 TABLET, DELAYED RELEASE ORAL at 09:41

## 2022-04-23 RX ADMIN — FENTANYL CITRATE 25 MCG: 50 INJECTION, SOLUTION INTRAMUSCULAR; INTRAVENOUS at 22:05

## 2022-04-23 RX ADMIN — ACETAMINOPHEN 650 MG: 325 TABLET ORAL at 05:17

## 2022-04-23 ASSESSMENT — PAIN SCALES - GENERAL
PAINLEVEL_OUTOF10: 6
PAINLEVEL_OUTOF10: 7
PAINLEVEL_OUTOF10: 5
PAINLEVEL_OUTOF10: 7
PAINLEVEL_OUTOF10: 6
PAINLEVEL_OUTOF10: 7
PAINLEVEL_OUTOF10: 7
PAINLEVEL_OUTOF10: 4
PAINLEVEL_OUTOF10: 5
PAINLEVEL_OUTOF10: 1

## 2022-04-23 ASSESSMENT — PAIN DESCRIPTION - ONSET: ONSET: ON-GOING

## 2022-04-23 ASSESSMENT — PAIN - FUNCTIONAL ASSESSMENT: PAIN_FUNCTIONAL_ASSESSMENT: ACTIVITIES ARE NOT PREVENTED

## 2022-04-23 ASSESSMENT — PAIN DESCRIPTION - FREQUENCY: FREQUENCY: CONTINUOUS

## 2022-04-23 ASSESSMENT — PAIN DESCRIPTION - LOCATION: LOCATION: HEAD

## 2022-04-23 ASSESSMENT — PAIN DESCRIPTION - PAIN TYPE: TYPE: SURGICAL PAIN

## 2022-04-23 NOTE — PROGRESS NOTES
Endovascular Neurosurgery Progress Note    SUBJECTIVE:   Patient was complaining of headache overnight. This am headache 5/10, denied nausea. Review of Systems:  CONSTITUTIONAL:  negative for fevers, chills, fatigue and malaise    EYES:  negative for double vision, blurred vision and photophobia     HEENT:  negative for tinnitus, epistaxis and sore throat    RESPIRATORY:  negative for cough, shortness of breath, wheezing    CARDIOVASCULAR:  negative for chest pain, palpitations, syncope, edema    GASTROINTESTINAL:  negative for nausea, vomiting    GENITOURINARY:  negative for incontinence    MUSCULOSKELETAL:  negative for neck or back pain    NEUROLOGICAL:  Negative for weakness and tingling  negative for headaches and dizziness    PSYCHIATRIC:  negative for anxiety      Review of systems otherwise negative. OBJECTIVE:     Vitals:    04/23/22 0700   BP: (!) 156/98   Pulse: 68   Resp: 20   Temp:    SpO2: 97%        General:  Gen: normal habitus, NAD  HEENT: NCAT, mucosa moist  Cvs: RRR, S1 S2 normal  Resp: symmetric unlabored breathing  Abd: s/nd/nt  Ext: no edema  Skin: no lesions seen, warm and dry    Neuro:  Gen: awake and alert, oriented x3. Lang/speech: no aphasia or dysarthria. Follows commands. CN: PERRL, EOMI, VFF, V1-3 intact, face symmetric, hearing intact, shoulder shrug symmetric, tongue midline  Motor: grossly 5/5 UE and LE b/l  Sense: LT intact in all 4 ext. Coord: FTN and HTS intact b/l  DTR: deferred  Gait: narrow base gait    NIH Stroke Scale:   1a  Level of consciousness: 0 - alert; keenly responsive   1b. LOC questions:  0 - answers both questions correctly   1c. LOC commands: 0 - performs both tasks correctly   2. Best Gaze: 0 - normal   3. Visual: 0 - no visual loss   4. Facial Palsy: 0 - normal symmetric movement   5a. Motor left arm: 0 - no drift, limb holds 90 (or 45) degrees for full 10 seconds   5b.   Motor right arm: 0 - no drift, limb holds 90 (or 45) degrees for full 10 seconds   6a. Motor left le - no drift; leg holds 30 degree position for full 5 seconds   6b  Motor right le - no drift; leg holds 30 degree position for full 5 seconds   7. Limb Ataxia: 0 - absent   8. Sensory: 0 - normal; no sensory loss   9. Best Language:  0 - no aphasia, normal   10. Dysarthria: 0 - normal   11. Extinction and Inattention: 0 - no abnormality         Total:   0     MRS: 0      LABS:   Reviewed. Lab Results   Component Value Date    HGB 12.5 2022    WBC 20.9 (H) 2022     2022     2022    BUN 13 2022    CREATININE 0.53 2022    AST 15 2022    ALT 16 2022    MG 2.2 2022    APTT 22.6 2022    INR 0.9 2022      Lab Results   Component Value Date    COVID19 Not Detected 2022    COVID19 Not Detected 2022       RADIOLOGY:   Images were personally reviewed including:   CT head :   Scattered subarachnoid hemorrhage that is decreased compared to prior   examination.       Interval KRISSY aneurysm coiling. IR :   --left wide necked inferiorly and anteriorly oriented ruptured AComA aneurysm, dimensions neck 2.93mm, height 2.82mm, width 3.13mm, length 2.78mm. --the above treated with Smart coil embolization x3, Vu 1. Smart coil x1 opened but not detached. --Prominent musculocutaneous branch from the left V2 segment that anastomoses to the left occipital artery. 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            CT head :   Diffuse subarachnoid hemorrhage.     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     ASSESSMENT:   28 y/o f with no significant past medical history, chromic smoking, marijuana abuse presented to [facility] as a transfer from [facility] as patient was found to have diffuse SAH on CT head.  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 Linda Martinez. HH 02, mFS 03  PBD:06  Patient complains of moderate to severe headaches, non-focal neuro exam. Repeat CT head on 04/19 showed decreased SAH. TCD 04/22:  increased mean velocities in right MCA,  LR in right side 3.67   PLAN:   --SBP goal 140-180  mm hg   --SAH management with daily TCDs, Nimodipine, statins, Mg level 3-4 last Mg level 2.2, replacement   --On steroids  --CSF study today   --EVD management per Neuro surgery. --f/up with Dr. Humberto Lyon in 2 weeks after discharge and f/up with Dr. Linda Martinez in 3 months after discharge    Case discussed with Dr. Carson Patterson attending.     Lady Meneses MD  Stroke, [facility]  [id number]  Electronically signed 4/23/2022 at 9:17 AM

## 2022-04-23 NOTE — FLOWSHEET NOTE
SPIRITUAL CARE DEPARTMENT - [facility] [address]  PROGRESS NOTE    Shift date: 4/23/2022  Shift day: Saturday   Shift # 1    Room # 9815/9202-48   Name: Elder Ho            Age: 29 y.o. Gender: female          Judaism:    Place of Yazidism:     Referral: Routine Visit    Admit Date & Time: 4/18/2022 12:19 AM    PATIENT/EVENT DESCRIPTION:  Elder Ho is a 29 y.o. female   (Description of condition/event). SPIRITUAL ASSESSMENT/INTERVENTION:  Assessment: Patient appeared to be sleeping at first. She was curled up in bed and head was partially covered by blanket. She engaged in brief conversation explaining her health issue. She spoke of having family support. Intervention:  provided a supportive presence through active listening and words of affirmation. Outcome: Patient appeared comforted by visit and thanked . SPIRITUAL CARE FOLLOW-UP PLAN:  Chaplains will remain available to offer spiritual and emotional support as needed.       Electronically signed by Onur Hardy on 4/23/2022 at 3:24 PM.  [address] [employer] [address]  [phone]       04/23/22 5270   Encounter Summary   Encounter Overview/Reason  Initial Encounter   Service Provided For: Patient   Referral/Consult From: Giovanny   Last Encounter    (4/23/22)   Complexity of Encounter Low   Begin Time 1445   End Time  1455   Total Time Calculated 10 min   Spiritual/Emotional needs   Type Spiritual Support   Assessment/Intervention/Outcome   Assessment Calm;Coping   Intervention Active listening;Explored/Affirmed feelings, thoughts, concerns   Outcome Receptive

## 2022-04-23 NOTE — PROGRESS NOTES
Neurosurgery LILLY/Resident    Daily Progress Note   Chief Complaint   Patient presents with    Altered Mental Status     4/23/2022  10:11 AM    Chart reviewed. No acute events overnight. No new complaints. ICP 4-13 last 24 hours. Intermittent headache, no worsening from yesterday. Vitals:    04/23/22 0400 04/23/22 0500 04/23/22 0600 04/23/22 0700   BP: (!) 151/97 (!) 141/83 133/69 (!) 156/98   Pulse: 58 67 69 68   Resp: 15 17 16 20   Temp: 97.9 °F (36.6 °C)      TempSrc: Oral      SpO2: 97% 97% 95% 97%   Weight:       Height:           PE:   AOx3   CNII-XII intact   PERRL, EOMI   Motor   No pronator drift  L deltoid 5/5; R deltoid 5/5  L biceps 5/5; R biceps 5/5  L triceps 5/5; R triceps 5/5  L intrinsics 5/5; R intrinsics 5/5      L iliopsoas 5/5 , R iliopsoas 5/5  L quadriceps 5/5; R quadriceps 5/5  L Dorsiflexion 5/5; R dorsiflexion 5/5  L Plantarflexion 5/5; R plantarflexion 5/5     Sensation intact     EVD open at 20 cmH2O, patent  Output 80 ml/12h, 154 ml/24h    Lab Results   Component Value Date    WBC 20.9 (H) 04/23/2022    HGB 12.5 04/23/2022    HCT 38.0 04/23/2022     04/23/2022    CHOL 182 04/18/2022    TRIG 60 04/18/2022    HDL 74 04/18/2022    ALT 16 04/20/2022    AST 15 04/20/2022     04/23/2022    K 4.3 04/23/2022     (H) 04/23/2022    CREATININE 0.53 04/23/2022    BUN 13 04/23/2022    CO2 20 04/23/2022    TSH 2.18 04/20/2022    INR 0.9 04/18/2022    LABA1C 5.5 04/18/2022    CRP <3.0 04/19/2022    SEDRATE 2 04/18/2022         A/P  Intraventricular hemorrhage  Aneurysmal subarachnoid hemorrhage from [facility] s/p coiling  Hydrocephalus with EVD placement     - raise EVD to 25 cmH2O today, monitor ICP and output      Please contact neurosurgery with any changes in patients neurologic status.        Ainsley Moore CNP  4/23/22  10:11 AM

## 2022-04-23 NOTE — PLAN OF CARE
Problem: Falls - Risk of:  Goal: Will remain free from falls  Description: Will remain free from falls  4/23/2022 1551 by Eden Sung RN  Outcome: Progressing     Problem: Falls - Risk of:  Goal: Absence of physical injury  Description: Absence of physical injury  4/23/2022 1551 by Eden Sung RN  Outcome: Progressing     Problem: Anxiety/Stress:  Goal: Level of anxiety will decrease  Description: Level of anxiety will decrease  4/23/2022 1551 by Eden Sung RN  Outcome: Progressing     Problem: Mental Status - Impaired:  Goal: Mental status will be restored to baseline  Description: Mental status will be restored to baseline  4/23/2022 1551 by Eden Sung RN  Outcome: Progressing     Problem: Pain:  Goal: Pain level will decrease  Description: Pain level will decrease  4/23/2022 1551 by Eden Sung RN  Outcome: Progressing     Problem: Pain:  Goal: Recognizes and communicates pain  Description: Recognizes and communicates pain  4/23/2022 1551 by Eden Sung RN  Outcome: Progressing     Problem: Pain:  Goal: Control of acute pain  Description: Control of acute pain  4/23/2022 1551 by Eden Sung RN  Outcome: Progressing     Problem: Pain:  Goal: Control of chronic pain  Description: Control of chronic pain  4/23/2022 1551 by Eden Sung RN  Outcome: Progressing     Problem: Skin Integrity - Impaired:  Goal: Will show no infection signs and symptoms  Description: Will show no infection signs and symptoms  4/23/2022 1551 by Eden Sung RN  Outcome: Progressing     Problem: Skin Integrity - Impaired:  Goal: Absence of new skin breakdown  Description: Absence of new skin breakdown  4/23/2022 1551 by Eden Sung RN  Outcome: Progressing     Problem: HEMODYNAMIC STATUS  Goal: Patient has stable vital signs and fluid balance  4/23/2022 1551 by Eden Sung RN  Outcome: Progressing     Problem: ACTIVITY INTOLERANCE/IMPAIRED MOBILITY  Goal: Mobility/activity is maintained at optimum level for patient  4/23/2022 1551 by Sandra Mckeon RN  Outcome: Progressing     Problem: COMMUNICATION IMPAIRMENT  Goal: Ability to express needs and understand communication  4/23/2022 1551 by Sandra Mckeon RN  Outcome: Progressing     Problem: Swallowing - Impaired:  Goal: Able to swallow without choking  Description: Able to swallow without choking  4/23/2022 1551 by Sandra Mckeon RN  Outcome: Progressing     Problem: Swallowing - Impaired:  Goal: Absence of aspiration  Description: Absence of aspiration  4/23/2022 1551 by Sandra Mckeon RN  Outcome: Progressing     Problem: Pain:  Goal: Pain level will decrease  Description: Pain level will decrease  4/23/2022 1551 by Sandra Mckeon RN  Outcome: Progressing     Problem: Pain:  Goal: Control of acute pain  Description: Control of acute pain  4/23/2022 1551 by Sandra Mckeon RN  Outcome: Progressing     Problem: Pain:  Goal: Control of chronic pain  Description: Control of chronic pain  4/23/2022 1551 by Sandra Mckeon RN  Outcome: Progressing     Problem: Discharge Planning  Goal: Discharge to home or other facility with appropriate resources  4/23/2022 1551 by Sandra Mckeon RN  Outcome: Progressing     Problem: ABCDS Injury Assessment  Goal: Absence of physical injury  4/23/2022 1551 by Sandra Mckeon RN  Outcome: Progressing

## 2022-04-23 NOTE — PROGRESS NOTES
Daily Progress Note  Neuro Critical Care    Patient Name: Gabriela Winters  Patient : 1990  Room/Bed: 5031/4192-09  Code Status: FULL CODE   Allergies: No Known Allergies    CHIEF COMPLAINT:     Worst headache of her life acute onset x2 hours  INTERVAL HISTORY    Initial Presentation (Admitted 22 @00:45AM): History Obtained From: patient and EMR review      The patient is O 61 y.o.  female who presents to our emergency department 2022 via [address] as a transfer from [facility] acute worst headache of her life. Addie Iglesias does not have any significant past medical history in our chart and denies any.  At outlying facility patient underwent CT head without demonstrating diffuse subarachnoid hemorrhage.  Patient was seen by telestroke physician in our group recommending transfer to our facility for further work-up.  In the emergency department patient states that she was washing TV with her child when she began to have severe pounding headache 10 out of 10 worst in her life.  Patient noted to be severely nauseous requiring multiple doses of IV Zofran and IM Phenergan.  Patient also admitting to photosensitivity.  Patient had significant hypertension at [place] started on IV nicardipine given 1 g of Keppra 10 mg Decadron.     Arrival to our emergency department 140/102, heart rate 77 temperature 97. 6.  Initial labs PTT 25.3, INR 1.0, WBC 13.7, platelets 263, glucose 139 and high-sensitivity troponin less than 6.  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  Case discussed with senior endovascular fellow who is aware of case and current neurologic exam planning for emergent DSA 2022 early morning.     For [facility] [employer]: 2, Modified Combs: grade 3         Admitted to ICU From: ED 24  Reason for ICU Admission: [facility] with [facility] Course:   -around 8:00 AM Dr. Jenni Franklin bedside placing EVD prior to endovascular neurosurgery. Zacarias Huff went for urgent DSA found to have a comm aneurysm treated by primary coil embolization. 4/19: Hemodynamically stable for arterial line readings -142, heart rate 82-99 T-max 98. 9.  Currently receiving María 5 mg every 4 hours as needed and fentanyl 25 every 2 as needed for pain control. 4/20: Blood pressure within parameter -139 overnight Cardene has been held off since 6 PM 4/19/2022 not requiring any as needed IV medications, heart rate 65-79 T-max 98.5.  Serum magnesium 2.3 was started on IV magnesium 2 g twice daily will increase to 4 g twice daily continue to trend magnesium daily.  Of note yesterday patient started to have increased urine output 9 L total in 24 hours urine studies sent consistent with central salt wasting patient started on hypertonic saline 2% 150 cc bolus followed by 75 cc/h maintenance.  Sodium checks every 8 hours with a sodium goal of 145.  4/21: Vitals stable overnight blood pressures trending slightly up although okay to liberalize SBP <160 today. Overnight -152, HR 69-80, tmax 99.3. Labs pending this AM although sodium noted to drop from 137 @17:03 yesterday to 129 @midnight. Patient given 150CC bolus 2% and pending repeat this AM. Will send repeat urine studies and discussed with patient she has been drinking excess water in large water bottle at bedside so to limit today to Gatorade and 1-1.5L max. Headache continues to be stable same as yesterday 6/10 constant pressure and throbbing bifrontal location. Will get repeat TCD today to ensure no increase in vasospasm. No bowel movement X3 days giving dulcolax and milk of mag continue to monitor. protonix 40 and starting decadron 4Q6hr for headache decrease oxy pain med Q6hr.   4/22: Blood pressure goal liberalized to SBP <160 yesterday although pressures trending up still -172 overnight (giving IV labetalol 10mg early AM) , HR 64-83, tmax 98. 6.  patient given 1L nacl bolus yesterday following slightly compressible IVC on ultrasound as she continues to have central salt wasting urine output slightly decreasing over last 3 days although 5,989 still yesterday. Discontinue hypertonic 2% 75cc/hr, start NaCl bolus 1L this AM and continue with 125cc/hr. Sodium goal can liberalize to 140 if Na <135 resume 2% hypertonic at 75cc/hr. Still no BM receiving dulcolax, milk of mag and glycolax. Headache improved slightly today 5/10. EVD remains open at 26cfG50 and decreasing output 189 over last 24 hours. Transcranial dopplers reviewed slightly increasing vasospasm on the right velocities up to low 100s Lindegaard ratio up from 2-->3.3. Last 24h:    No acute events overnight. EVD to 20cm H2O. EVD output 80cc overnight, high UOP overnight although improved. Headache improving, ambulating well.     CURRENT MEDICATIONS:  SCHEDULED MEDICATIONS:   polyethylene glycol  17 g Oral Daily    magnesium sulfate  4,000 mg IntraVENous Daily    melatonin  5 mg Oral Nightly    bisacodyl  10 mg Oral Daily    dexamethasone  4 mg IntraVENous Q6H    pantoprazole  40 mg Oral QAM AC    enoxaparin  40 mg SubCUTAneous Daily    promethazine  12.5 mg IntraMUSCular Once    atorvastatin  80 mg Oral Nightly    niMODipine  60 mg Oral 6 times per day    sodium chloride flush  5-40 mL IntraVENous 2 times per day     CONTINUOUS INFUSIONS:   sodium chloride 125 mL/hr at 22 0254    sodium chloride Stopped (22)     PRN MEDICATIONS:   acetaminophen, melatonin, magnesium hydroxide, oxyCODONE, hydrALAZINE, ondansetron **OR** ondansetron, labetalol, sodium chloride flush, sodium chloride, fentanNYL    VITALS:  Temperature Range: Temp: 97.9 °F (36.6 °C) Temp  Av.3 °F (36.8 °C)  Min: 97.9 °F (36.6 °C)  Max: 98.6 °F (37 °C)  BP Range: Systolic (69VMI), KTN:208 , Min:130 , LAE:036     Diastolic (75MMC), NWJ:03, Min:69, Max:107    Pulse Range: Pulse  Av.4  Min: 58  Max: 92  Respiration Range: Resp Av  Min: 12  Max: 25  Current Pulse Ox: SpO2: 97 %  24HR Pulse Ox Range: SpO2  Av.5 %  Min: 95 %  Max: 98 %  Patient Vitals for the past 12 hrs:   BP Temp Temp src Pulse Resp SpO2   22 0700 (!) 156/98 -- -- 68 20 97 %   22 0600 133/69 -- -- 69 16 95 %   22 0500 (!) 141/83 -- -- 67 17 97 %   22 0400 (!) 151/97 97.9 °F (36.6 °C) Oral 58 15 97 %   22 0300 (!) 155/100 -- -- 61 14 97 %   22 0200 (!) 156/102 -- -- 64 16 95 %   22 0100 (!) 160/104 -- -- 68 16 97 %   22 0000 (!) 153/97 98.4 °F (36.9 °C) Oral 71 20 96 %   22 2300 (!) 147/102 -- -- 68 22 97 %   22 2208 (!) 144/87 -- -- 86 20 97 %   22 2102 (!) 162/107 -- -- 69 25 98 %   22 2000 (!) 157/95 98.4 °F (36.9 °C) Oral 70 21 97 %   22 1930 130/76 -- -- 72 19 97 %     Estimated body mass index is 29.6 kg/m² as calculated from the following:    Height as of this encounter: 5' 2\" (1.575 m).     Weight as of this encounter: 161 lb 13.1 oz (73.4 kg).  []<16 Severe malnutrition  []16-16.99 Moderate malnutrition  []17-18.49 Mild malnutrition  []18.5-24.9 Normal  [x]25-29.9 Overweight (not obese)  []30-34.9 Obese class 1 (Low Risk)  []35-39.9 Obese class 2 (Moderate Risk)  []?40 Obese class 3 (High Risk)    RECENT LABS:   Lab Results   Component Value Date    WBC 20.9 (H) 2022    HGB 12.5 2022    HCT 38.0 2022     2022    CHOL 182 2022    TRIG 60 2022    HDL 74 2022    ALT 16 2022    AST 15 2022     2022    K 4.3 2022     (H) 2022    CREATININE 0.53 2022    BUN 13 2022    CO2 20 2022    TSH 2.18 2022    INR 0.9 2022    LABA1C 5.5 2022     24 HOUR INTAKE/OUTPUT:    Intake/Output Summary (Last 24 hours) at 2022 0722  Last data filed at 2022 0600  Gross per 24 hour   Intake 3920.05 ml   Output 5404 ml   Net -1483.95 ml       IMAGING:     TCD 22-->peak velocities 108 on the right lindegard ratio approx 2 on the left and 3.3 on the right     TCD 4/19/22-Peak velocities on the left 80's and peak velocities on the right 103. Left Lindegaard ratio=2.07 and right lindegaard ratio-2. 22.      CT head WO 4/20/22      Impression   Scattered subarachnoid hemorrhage that is decreased compared to prior   examination.       Interval KRISSY aneurysm coiling.            CT head WO 4/17/22 @10:48 PM   Impression   Diffuse subarachnoid hemorrhage.  Recommend CTA for further evaluation.      CTA head and neck 4/18/22   Impression   Aneurysm arising from the anterior communicating artery measuring 3 mm x 2 mm.      CXR 4/18/22  Impression   No acute cardiopulmonary process.      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 --the above treated with Smart coil embolization x3, Vu 1. Smart coil x1 opened but not detached. --Prominent musculocutaneous branch from the left V2 segment that anastomoses to the left occipital artery. 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            Labs and Images reviewed with:  [x] Dr. Lester Gotti:  Well developed, well nourished, alert and oriented x 3, in no acute distress. GCS 15. Nontoxic. No dysarthria. No aphasia.    HEAD:  normocephalic, atraumatic    EYES:  PERRLA, EOMI.   ENT:  moist mucous membranes   NECK:  supple, symmetric   LUNGS:  Equal air entry bilaterally   CARDIOVASCULAR:  normal s1 / s2, RRR, distal pulses intact   ABDOMEN:  Soft, no rigidity   NEUROLOGIC:  Mental Status:  A & O x3,awake             Cranial Nerves:    cranial nerves II-XII are grossly intact    Motor Exam:    Drift:  absent  Tone:  normal    Motor exam is symmetrical 5 out of 5 all extremities bilaterally    Sensory:    Touch:    Right Upper Extremity:  normal  Left Upper Extremity:  normal  Right Lower Extremity:  normal  Left Lower Extremity:  normal    Deep Tendon Reflexes:    Right Bicep:  1+  Left Bicep:  1+  Right Knee:  1+  Left Knee:  1+    Plantar Response:  Right:  downgoing  Left:  downgoing    Clonus:  absent  Andino's:  absent    Coordination/Dysmetria:  Heel to Shin:  Right:  normal  Left:  normal  Finger to Nose:   Right:  normal  Left:  normal   Dysdiadochokinesia:  absent       DRAINS:  EVD open draining [id number] placed 4/18/22    ASSESSMENT AND PLAN:     ASSESSMENT:      This is a 32 y.o. female with acute worst headache of her life found to have diffuse subarachnoid hemorrhage presenting to [address] [place] [address] Drive, [address] denies any significant past medical history and does not take any daily medications.  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  Patient admitted under neuro ICU services with endovascular neurosurgery consulted planning for urgent evaluation and likely DSA 4/18/2022 first thing in the morning.  Patient underwent emergent EVD placement at bedside 4/18/2022 and diagnostic angio with primary coil embolization of a comm aneurysm successful.  Postoperatively being managed by neuro ICU for vasospasm monitoring.     Patient care will be discussed with attending, will reevaluate patient along with attending.      PLAN/MEDICAL DECISION MAKING:        NEUROLOGIC:     - Imaging   CT head WO-diffuse SAH  CTA head and Neck-ACOM aneurysm   IR angio-4/18 AM-primary coil embolization  Neurosurgery-Following placed EVD 4/18 open at 15 cmH20 total output 204CC yesterday   - AEDs loaded 1gram keppra at [facility] 500BID course completed last dose this morning   -nimodipine 60mg Q4 hours Day3  -FU Mag with goal 3-4 --> currently on 4 g IV twice daily  -Noted to have central salt wasting with large UOP s/p 2% infusion, no longer running.   - Na goal to 140   - Neuro checks per protocol     CARDIOVASCULAR:  - Goal SBP <180  - titrate with cardene has been held since 6:00 PM 4/19,  - PRN IV labetalol 10mg Q4 hour for SBP >180  - Continue telemetry     PULMONARY:  - 99% on room air      RENAL/FLUID/ELECTROLYTE:  - MJW 61 / Creatinine 0.53  - Na 138  - Mag 2.2  - Urine output In/ OUT  3920 / 5789  - UOP 2.6 cc/kg/hr overnight, improving  - IVF: 125cc/hr NS  - Magnesium IV 4 grams daily   - Replace electrolytes PRN  - Daily BMP     GI/NUTRITION:  NUTRITION: general diet   - Bowel regimen: glycolax  - GI prophylaxis: protonix      ID:  - Afebrile  - WBC 20.9 suspect steroid induced increase from decadron    - CSF studies not infectious appearing  - Continue to monitor for fevers  - Daily CBC     HEME:   - H&H 12.5 / 38.0  - Platelets 281  - Daily CBC     ENDOCRINE:  - Continue to monitor blood glucose, goal <180     OTHER:  - PT/OT/ST      PROPHYLAXIS:   Stress ulcer: protonix      DVT PROPHYLAXIS:  - SCD sleeves - Thigh High   - GERMAINE stockings - Thigh High  - lovenox        DISPOSITION:  [x] To remain ICU: [facility] with EVD. Close neuro checks with watch for vasospasm related complications from [facility]       We will continue to follow along. For any changes in exam or patient status please contact Neuro Critical Care.       Gus Celis MD   Neuro Critical Care  Pager [phone]  4/23/2022     7:22 AM

## 2022-04-23 NOTE — PLAN OF CARE
Problem: Falls - Risk of:  Goal: Will remain free from falls  Description: Will remain free from falls  4/23/2022 0444 by Metta Claude, RN  Outcome: Progressing  4/22/2022 1640 by Charles Samayoa  Outcome: Progressing  Goal: Absence of physical injury  Description: Absence of physical injury  4/23/2022 0444 by Metta Claude, RN  Outcome: Progressing  4/22/2022 1640 by Charles Samayoa  Outcome: Progressing     Problem: Anxiety/Stress:  Goal: Level of anxiety will decrease  Description: Level of anxiety will decrease  4/23/2022 0444 by Metta Claude, RN  Outcome: Progressing  4/22/2022 1640 by Charles Samayoa  Outcome: Progressing     Problem: Mental Status - Impaired:  Goal: Mental status will be restored to baseline  Description: Mental status will be restored to baseline  4/23/2022 0444 by Metta Claude, RN  Outcome: Progressing  4/22/2022 1640 by Maria Victoria Underwood  Outcome: Progressing     Problem: Pain:  Goal: Pain level will decrease  Description: Pain level will decrease  4/23/2022 0444 by Metta Claude, RN  Outcome: Progressing  4/22/2022 1640 by Charles Samayoa  Outcome: Progressing  Goal: Recognizes and communicates pain  Description: Linell Anurag and communicates pain  4/23/2022 0444 by Metta Claude, RN  Outcome: Progressing  4/22/2022 1640 by Charles Samayoa  Outcome: Progressing  Goal: Control of acute pain  Description: Control of acute pain  4/23/2022 0444 by Metta Claude, RN  Outcome: Progressing  4/22/2022 1640 by Charles Samayoa  Outcome: Progressing  Goal: Control of chronic pain  Description: Control of chronic pain  4/23/2022 0444 by Metta Claude, RN  Outcome: Progressing  4/22/2022 1640 by Charles Samayoa  Outcome: Progressing     Problem: Skin Integrity - Impaired:  Goal: Will show no infection signs and symptoms  Description: Will show no infection signs and symptoms  4/23/2022 0444 by Metta Claude, RN  Outcome: Progressing  4/22/2022 1640 by Charles Samayoa  Outcome: Progressing  Goal: Absence of new skin breakdown  Description: Absence of new skin breakdown  4/23/2022 0444 by Tasha Rodney RN  Outcome: Progressing  4/22/2022 1640 by Jaquan Shell  Outcome: Progressing     Problem: HEMODYNAMIC STATUS  Goal: Patient has stable vital signs and fluid balance  4/23/2022 0444 by Tasha Rodney RN  Outcome: Progressing  4/22/2022 1640 by Jaquan Shell  Outcome: Progressing     Problem: ACTIVITY INTOLERANCE/IMPAIRED MOBILITY  Goal: Mobility/activity is maintained at optimum level for patient  4/23/2022 0444 by Tasha Rodney RN  Outcome: Progressing  4/22/2022 1640 by Jaquan Shell  Outcome: Progressing     Problem: COMMUNICATION IMPAIRMENT  Goal: Ability to express needs and understand communication  4/23/2022 0444 by Tasha Rodney RN  Outcome: Progressing  4/22/2022 1640 by Jaquan Shell  Outcome: Progressing     Problem: Swallowing - Impaired:  Goal: Able to swallow without choking  Description: Able to swallow without choking  4/23/2022 0444 by Tasha Rodney RN  Outcome: Progressing  4/22/2022 1640 by Mackenzie Underwood  Outcome: Progressing  Goal: Absence of aspiration  Description: Absence of aspiration  4/23/2022 0444 by Tasha Rodney RN  Outcome: Progressing  4/22/2022 1640 by Mackenzie Underwood  Outcome: Progressing     Problem: Pain:  Goal: Pain level will decrease  Description: Pain level will decrease  4/23/2022 0444 by Tasha Rodney RN  Outcome: Progressing  4/22/2022 1640 by Jaquan Shell  Outcome: Progressing  Goal: Control of acute pain  Description: Control of acute pain  4/23/2022 0444 by Tasha Rodney RN  Outcome: Progressing  4/22/2022 1640 by Jaquan Shell  Outcome: Progressing  Goal: Control of chronic pain  Description: Control of chronic pain  4/23/2022 0444 by Tasha Rodney RN  Outcome: Progressing  4/22/2022 1640 by Jaquan Shell  Outcome: Progressing     Problem: Discharge Planning  Goal: Discharge to home or other facility with appropriate resources  4/23/2022 0444 by Tasha Rodney RN  Outcome: Progressing  4/22/2022 1640 by Kait Underwood  Outcome: Progressing     Problem: ABCDS Injury Assessment  Goal: Absence of physical injury  4/23/2022 0444 by Gunjan Elizabeth RN  Outcome: Progressing  4/22/2022 1640 by Kyara Lopez  Outcome: Progressing

## 2022-04-24 ENCOUNTER — APPOINTMENT (OUTPATIENT)
Dept: CT IMAGING | Age: 32
DRG: 021 | End: 2022-04-24
Payer: COMMERCIAL

## 2022-04-24 LAB
ANION GAP SERPL CALCULATED.3IONS-SCNC: 11 MMOL/L (ref 9–17)
BUN BLDV-MCNC: 11 MG/DL (ref 6–20)
CALCIUM SERPL-MCNC: 8.5 MG/DL (ref 8.6–10.4)
CHLORIDE BLD-SCNC: 103 MMOL/L (ref 98–107)
CO2: 22 MMOL/L (ref 20–31)
CREAT SERPL-MCNC: 0.53 MG/DL (ref 0.5–0.9)
GFR AFRICAN AMERICAN: >60 ML/MIN
GFR NON-AFRICAN AMERICAN: >60 ML/MIN
GFR SERPL CREATININE-BSD FRML MDRD: ABNORMAL ML/MIN/{1.73_M2}
GLUCOSE BLD-MCNC: 113 MG/DL (ref 70–99)
HCT VFR BLD CALC: 37.3 % (ref 36.3–47.1)
HEMOGLOBIN: 12.6 G/DL (ref 11.9–15.1)
MAGNESIUM: 2.1 MG/DL (ref 1.6–2.6)
MCH RBC QN AUTO: 29.8 PG (ref 25.2–33.5)
MCHC RBC AUTO-ENTMCNC: 33.8 G/DL (ref 28.4–34.8)
MCV RBC AUTO: 88.2 FL (ref 82.6–102.9)
NRBC AUTOMATED: 0 PER 100 WBC
PDW BLD-RTO: 12.2 % (ref 11.8–14.4)
PLATELET # BLD: 298 K/UL (ref 138–453)
PMV BLD AUTO: 10 FL (ref 8.1–13.5)
POTASSIUM SERPL-SCNC: 3.8 MMOL/L (ref 3.7–5.3)
RBC # BLD: 4.23 M/UL (ref 3.95–5.11)
SODIUM BLD-SCNC: 135 MMOL/L (ref 135–144)
SODIUM BLD-SCNC: 135 MMOL/L (ref 135–144)
SODIUM BLD-SCNC: 136 MMOL/L (ref 135–144)
SODIUM BLD-SCNC: 137 MMOL/L (ref 135–144)
WBC # BLD: 17 K/UL (ref 3.5–11.3)

## 2022-04-24 PROCEDURE — 85027 COMPLETE CBC AUTOMATED: CPT

## 2022-04-24 PROCEDURE — 6370000000 HC RX 637 (ALT 250 FOR IP): Performed by: NURSE PRACTITIONER

## 2022-04-24 PROCEDURE — 99291 CRITICAL CARE FIRST HOUR: CPT | Performed by: NEUROLOGICAL SURGERY

## 2022-04-24 PROCEDURE — 94761 N-INVAS EAR/PLS OXIMETRY MLT: CPT

## 2022-04-24 PROCEDURE — 84295 ASSAY OF SERUM SODIUM: CPT

## 2022-04-24 PROCEDURE — 2000000003 HC NEURO ICU R&B

## 2022-04-24 PROCEDURE — 99233 SBSQ HOSP IP/OBS HIGH 50: CPT | Performed by: PSYCHIATRY & NEUROLOGY

## 2022-04-24 PROCEDURE — 76937 US GUIDE VASCULAR ACCESS: CPT

## 2022-04-24 PROCEDURE — 6360000002 HC RX W HCPCS: Performed by: STUDENT IN AN ORGANIZED HEALTH CARE EDUCATION/TRAINING PROGRAM

## 2022-04-24 PROCEDURE — 6360000002 HC RX W HCPCS: Performed by: HEALTH CARE PROVIDER

## 2022-04-24 PROCEDURE — 83735 ASSAY OF MAGNESIUM: CPT

## 2022-04-24 PROCEDURE — 99291 CRITICAL CARE FIRST HOUR: CPT | Performed by: PSYCHIATRY & NEUROLOGY

## 2022-04-24 PROCEDURE — 93005 ELECTROCARDIOGRAM TRACING: CPT | Performed by: STUDENT IN AN ORGANIZED HEALTH CARE EDUCATION/TRAINING PROGRAM

## 2022-04-24 PROCEDURE — 2580000003 HC RX 258: Performed by: ANESTHESIOLOGY

## 2022-04-24 PROCEDURE — 2580000003 HC RX 258: Performed by: STUDENT IN AN ORGANIZED HEALTH CARE EDUCATION/TRAINING PROGRAM

## 2022-04-24 PROCEDURE — 93886 INTRACRANIAL COMPLETE STUDY: CPT

## 2022-04-24 PROCEDURE — 6370000000 HC RX 637 (ALT 250 FOR IP): Performed by: STUDENT IN AN ORGANIZED HEALTH CARE EDUCATION/TRAINING PROGRAM

## 2022-04-24 PROCEDURE — APPSS30 APP SPLIT SHARED TIME 16-30 MINUTES: Performed by: REGISTERED NURSE

## 2022-04-24 PROCEDURE — 70450 CT HEAD/BRAIN W/O DYE: CPT

## 2022-04-24 PROCEDURE — 80048 BASIC METABOLIC PNL TOTAL CA: CPT

## 2022-04-24 PROCEDURE — 6360000002 HC RX W HCPCS: Performed by: NURSE PRACTITIONER

## 2022-04-24 PROCEDURE — 36415 COLL VENOUS BLD VENIPUNCTURE: CPT

## 2022-04-24 RX ORDER — BISACODYL 10 MG
10 SUPPOSITORY, RECTAL RECTAL DAILY PRN
Status: DISCONTINUED | OUTPATIENT
Start: 2022-04-24 | End: 2022-05-06 | Stop reason: HOSPADM

## 2022-04-24 RX ORDER — LABETALOL HYDROCHLORIDE 5 MG/ML
10 INJECTION, SOLUTION INTRAVENOUS ONCE
Status: DISCONTINUED | OUTPATIENT
Start: 2022-04-24 | End: 2022-04-25

## 2022-04-24 RX ORDER — KETOROLAC TROMETHAMINE 30 MG/ML
30 INJECTION, SOLUTION INTRAMUSCULAR; INTRAVENOUS ONCE
Status: COMPLETED | OUTPATIENT
Start: 2022-04-24 | End: 2022-04-24

## 2022-04-24 RX ORDER — KETOROLAC TROMETHAMINE 30 MG/ML
30 INJECTION, SOLUTION INTRAMUSCULAR; INTRAVENOUS ONCE
Status: DISCONTINUED | OUTPATIENT
Start: 2022-04-24 | End: 2022-04-24

## 2022-04-24 RX ORDER — DROPERIDOL 2.5 MG/ML
1.25 INJECTION, SOLUTION INTRAMUSCULAR; INTRAVENOUS ONCE
Status: COMPLETED | OUTPATIENT
Start: 2022-04-25 | End: 2022-04-25

## 2022-04-24 RX ORDER — HYDRALAZINE HYDROCHLORIDE 20 MG/ML
10 INJECTION INTRAMUSCULAR; INTRAVENOUS EVERY 6 HOURS PRN
Status: DISCONTINUED | OUTPATIENT
Start: 2022-04-24 | End: 2022-04-25

## 2022-04-24 RX ORDER — SODIUM PHOSPHATE, DIBASIC AND SODIUM PHOSPHATE, MONOBASIC 7; 19 G/133ML; G/133ML
1 ENEMA RECTAL
Status: ACTIVE | OUTPATIENT
Start: 2022-04-24 | End: 2022-04-24

## 2022-04-24 RX ORDER — POTASSIUM CHLORIDE 20 MEQ/1
40 TABLET, EXTENDED RELEASE ORAL ONCE
Status: COMPLETED | OUTPATIENT
Start: 2022-04-24 | End: 2022-04-24

## 2022-04-24 RX ADMIN — SODIUM CHLORIDE: 9 INJECTION, SOLUTION INTRAVENOUS at 06:34

## 2022-04-24 RX ADMIN — NIMODIPINE 60 MG: 30 CAPSULE, LIQUID FILLED ORAL at 15:13

## 2022-04-24 RX ADMIN — ENOXAPARIN SODIUM 40 MG: 100 INJECTION SUBCUTANEOUS at 10:32

## 2022-04-24 RX ADMIN — HYDRALAZINE HYDROCHLORIDE 10 MG: 20 INJECTION INTRAMUSCULAR; INTRAVENOUS at 06:11

## 2022-04-24 RX ADMIN — BISACODYL 10 MG: 10 SUPPOSITORY RECTAL at 06:21

## 2022-04-24 RX ADMIN — KETOROLAC TROMETHAMINE 30 MG: 30 INJECTION, SOLUTION INTRAMUSCULAR at 18:45

## 2022-04-24 RX ADMIN — BISACODYL 10 MG: 5 TABLET, COATED ORAL at 10:32

## 2022-04-24 RX ADMIN — FENTANYL CITRATE 25 MCG: 50 INJECTION, SOLUTION INTRAMUSCULAR; INTRAVENOUS at 20:47

## 2022-04-24 RX ADMIN — ACETAMINOPHEN 650 MG: 325 TABLET ORAL at 11:23

## 2022-04-24 RX ADMIN — PANTOPRAZOLE SODIUM 40 MG: 40 TABLET, DELAYED RELEASE ORAL at 10:32

## 2022-04-24 RX ADMIN — FENTANYL CITRATE 25 MCG: 50 INJECTION, SOLUTION INTRAMUSCULAR; INTRAVENOUS at 01:21

## 2022-04-24 RX ADMIN — Medication 5 MG: at 21:31

## 2022-04-24 RX ADMIN — OXYCODONE 5 MG: 5 TABLET ORAL at 15:21

## 2022-04-24 RX ADMIN — NIMODIPINE 60 MG: 30 CAPSULE, LIQUID FILLED ORAL at 21:31

## 2022-04-24 RX ADMIN — FENTANYL CITRATE 25 MCG: 50 INJECTION, SOLUTION INTRAMUSCULAR; INTRAVENOUS at 18:09

## 2022-04-24 RX ADMIN — MAGNESIUM HYDROXIDE 30 ML: 400 SUSPENSION ORAL at 01:12

## 2022-04-24 RX ADMIN — ATORVASTATIN CALCIUM 80 MG: 80 TABLET, FILM COATED ORAL at 21:31

## 2022-04-24 RX ADMIN — FENTANYL CITRATE 25 MCG: 50 INJECTION, SOLUTION INTRAMUSCULAR; INTRAVENOUS at 05:49

## 2022-04-24 RX ADMIN — FENTANYL CITRATE 25 MCG: 50 INJECTION, SOLUTION INTRAMUSCULAR; INTRAVENOUS at 23:45

## 2022-04-24 RX ADMIN — SODIUM CHLORIDE, PRESERVATIVE FREE 10 ML: 5 INJECTION INTRAVENOUS at 20:45

## 2022-04-24 RX ADMIN — MAGNESIUM SULFATE IN WATER 4000 MG: 40 INJECTION, SOLUTION INTRAVENOUS at 11:58

## 2022-04-24 RX ADMIN — DEXAMETHASONE SODIUM PHOSPHATE 4 MG: 4 INJECTION, SOLUTION INTRAMUSCULAR; INTRAVENOUS at 04:05

## 2022-04-24 RX ADMIN — OXYCODONE 5 MG: 5 TABLET ORAL at 07:59

## 2022-04-24 RX ADMIN — POLYETHYLENE GLYCOL 3350 17 G: 17 POWDER, FOR SOLUTION ORAL at 10:39

## 2022-04-24 RX ADMIN — NIMODIPINE 60 MG: 30 CAPSULE, LIQUID FILLED ORAL at 10:32

## 2022-04-24 RX ADMIN — SODIUM CHLORIDE, PRESERVATIVE FREE 10 ML: 5 INJECTION INTRAVENOUS at 10:32

## 2022-04-24 RX ADMIN — NIMODIPINE 60 MG: 30 CAPSULE, LIQUID FILLED ORAL at 06:24

## 2022-04-24 RX ADMIN — NIMODIPINE 60 MG: 30 CAPSULE, LIQUID FILLED ORAL at 01:13

## 2022-04-24 RX ADMIN — NIMODIPINE 60 MG: 30 CAPSULE, LIQUID FILLED ORAL at 18:10

## 2022-04-24 RX ADMIN — FENTANYL CITRATE 25 MCG: 50 INJECTION, SOLUTION INTRAMUSCULAR; INTRAVENOUS at 08:05

## 2022-04-24 RX ADMIN — DOCUSATE SODIUM 50 MG AND SENNOSIDES 8.6 MG 2 TABLET: 8.6; 5 TABLET, FILM COATED ORAL at 10:32

## 2022-04-24 RX ADMIN — POTASSIUM CHLORIDE 40 MEQ: 1500 TABLET, EXTENDED RELEASE ORAL at 10:32

## 2022-04-24 ASSESSMENT — PAIN - FUNCTIONAL ASSESSMENT
PAIN_FUNCTIONAL_ASSESSMENT: PREVENTS OR INTERFERES SOME ACTIVE ACTIVITIES AND ADLS
PAIN_FUNCTIONAL_ASSESSMENT: ACTIVITIES ARE NOT PREVENTED
PAIN_FUNCTIONAL_ASSESSMENT: ACTIVITIES ARE NOT PREVENTED

## 2022-04-24 ASSESSMENT — PAIN DESCRIPTION - ORIENTATION
ORIENTATION: OUTER
ORIENTATION: POSTERIOR

## 2022-04-24 ASSESSMENT — PAIN SCALES - WONG BAKER: WONGBAKER_NUMERICALRESPONSE: 4

## 2022-04-24 ASSESSMENT — PAIN SCALES - GENERAL
PAINLEVEL_OUTOF10: 6
PAINLEVEL_OUTOF10: 6
PAINLEVEL_OUTOF10: 4
PAINLEVEL_OUTOF10: 4
PAINLEVEL_OUTOF10: 5
PAINLEVEL_OUTOF10: 5
PAINLEVEL_OUTOF10: 7
PAINLEVEL_OUTOF10: 10
PAINLEVEL_OUTOF10: 6
PAINLEVEL_OUTOF10: 9
PAINLEVEL_OUTOF10: 7

## 2022-04-24 ASSESSMENT — PAIN DESCRIPTION - PAIN TYPE
TYPE: ACUTE PAIN
TYPE: SURGICAL PAIN
TYPE: SURGICAL PAIN

## 2022-04-24 ASSESSMENT — PAIN DESCRIPTION - ONSET: ONSET: AWAKENED FROM SLEEP

## 2022-04-24 ASSESSMENT — PAIN DESCRIPTION - FREQUENCY
FREQUENCY: CONTINUOUS
FREQUENCY: CONTINUOUS

## 2022-04-24 ASSESSMENT — PAIN DESCRIPTION - DESCRIPTORS
DESCRIPTORS: ACHING;DISCOMFORT
DESCRIPTORS: ACHING;DISCOMFORT
DESCRIPTORS: SHARP;SHOOTING

## 2022-04-24 ASSESSMENT — PAIN DESCRIPTION - LOCATION
LOCATION: HEAD

## 2022-04-24 NOTE — PLAN OF CARE
Notified by RN patient ICP 25 and patient was feeling head pressure  RN opened drain and 5 ml drained  Will keep EVD open at 25 cmH2O

## 2022-04-24 NOTE — PROGRESS NOTES
Pt notified writer that her headache was advancing towards the sides and back of her head which was new. Primary was notified and Marietta Avila came to bedside. Will continue to monitor.

## 2022-04-24 NOTE — PLAN OF CARE
Problem: Falls - Risk of:  Goal: Will remain free from falls  Description: Will remain free from falls  4/24/2022 1440 by Dustin Wells RN  Outcome: Progressing     Problem: Falls - Risk of:  Goal: Absence of physical injury  Description: Absence of physical injury  4/24/2022 1440 by Dustin Wells RN  Outcome: Progressing     Problem: Anxiety/Stress:  Goal: Level of anxiety will decrease  Description: Level of anxiety will decrease  4/24/2022 1440 by Dustin Wells RN  Outcome: Progressing     Problem: Mental Status - Impaired:  Goal: Mental status will be restored to baseline  Description: Mental status will be restored to baseline  4/24/2022 1440 by Dustin Wells RN  Outcome: Progressing     Problem: Pain:  Goal: Pain level will decrease  Description: Pain level will decrease  Outcome: Progressing  Goal: Recognizes and communicates pain  Description: Recognizes and communicates pain  Outcome: Progressing  Goal: Control of acute pain  Description: Control of acute pain  Outcome: Progressing  Goal: Control of chronic pain  Description: Control of chronic pain  Outcome: Progressing     Problem: Skin Integrity - Impaired:  Goal: Will show no infection signs and symptoms  Description: Will show no infection signs and symptoms  Outcome: Progressing  Goal: Absence of new skin breakdown  Description: Absence of new skin breakdown  Outcome: Progressing     Problem: HEMODYNAMIC STATUS  Goal: Patient has stable vital signs and fluid balance  Outcome: Progressing     Problem: ACTIVITY INTOLERANCE/IMPAIRED MOBILITY  Goal: Mobility/activity is maintained at optimum level for patient  Outcome: Progressing     Problem: COMMUNICATION IMPAIRMENT  Goal: Ability to express needs and understand communication  Outcome: Progressing     Problem: Swallowing - Impaired:  Goal: Able to swallow without choking  Description: Able to swallow without choking  Outcome: Progressing  Goal: Absence of aspiration  Description: Absence of aspiration  Outcome: Progressing     Problem: Pain:  Goal: Pain level will decrease  Description: Pain level will decrease  Outcome: Progressing  Goal: Control of acute pain  Description: Control of acute pain  Outcome: Progressing  Goal: Control of chronic pain  Description: Control of chronic pain  Outcome: Progressing     Problem: Discharge Planning  Goal: Discharge to home or other facility with appropriate resources  Outcome: Progressing     Problem: ABCDS Injury Assessment  Goal: Absence of physical injury  Outcome: Progressing

## 2022-04-24 NOTE — PROGRESS NOTES
Endovascular Neurosurgery Progress Note    SUBJECTIVE:   Patient was complaining of headache overnight. This am headache 7/10, denied nausea. Review of Systems:  CONSTITUTIONAL:  negative for fevers, chills, fatigue and malaise    EYES:  negative for double vision, blurred vision and photophobia     HEENT:  negative for tinnitus, epistaxis and sore throat    RESPIRATORY:  negative for cough, shortness of breath, wheezing    CARDIOVASCULAR:  negative for chest pain, palpitations, syncope, edema    GASTROINTESTINAL:  negative for nausea, vomiting    GENITOURINARY:  negative for incontinence    MUSCULOSKELETAL:  negative for neck or back pain    NEUROLOGICAL:  Negative for weakness and tingling  negative for headaches and dizziness    PSYCHIATRIC:  negative for anxiety      Review of systems otherwise negative. OBJECTIVE:     Vitals:    04/24/22 0700   BP: (!) 126/55   Pulse: 69   Resp: 10   Temp:    SpO2:         General:  Gen: normal habitus, NAD  HEENT: NCAT, mucosa moist  Cvs: RRR, S1 S2 normal  Resp: symmetric unlabored breathing  Abd: s/nd/nt  Ext: no edema  Skin: no lesions seen, warm and dry    Neuro:  Gen: awake and alert, oriented x3. Lang/speech: no aphasia or dysarthria. Follows commands. CN: PERRL, EOMI, VFF, V1-3 intact, face symmetric, hearing intact, shoulder shrug symmetric, tongue midline  Motor: grossly 5/5 UE and LE b/l  Sense: LT intact in all 4 ext. Coord: FTN and HTS intact b/l  DTR: deferred  Gait: narrow base gait    NIH Stroke Scale:   1a  Level of consciousness: 0 - alert; keenly responsive   1b. LOC questions:  0 - answers both questions correctly   1c. LOC commands: 0 - performs both tasks correctly   2. Best Gaze: 0 - normal   3. Visual: 0 - no visual loss   4. Facial Palsy: 0 - normal symmetric movement   5a. Motor left arm: 0 - no drift, limb holds 90 (or 45) degrees for full 10 seconds   5b.   Motor right arm: 0 - no drift, limb holds 90 (or 45) degrees for full 10 seconds   6a. Motor left le - no drift; leg holds 30 degree position for full 5 seconds   6b  Motor right le - no drift; leg holds 30 degree position for full 5 seconds   7. Limb Ataxia: 0 - absent   8. Sensory: 0 - normal; no sensory loss   9. Best Language:  0 - no aphasia, normal   10. Dysarthria: 0 - normal   11. Extinction and Inattention: 0 - no abnormality         Total:   0     MRS: 0      LABS:   Reviewed. Lab Results   Component Value Date    HGB 12.6 2022    WBC 17.0 (H) 2022     2022     2022    BUN 11 2022    CREATININE 0.53 2022    AST 15 2022    ALT 16 2022    MG 2.1 2022    APTT 22.6 2022    INR 0.9 2022      Lab Results   Component Value Date    COVID19 Not Detected 2022    COVID19 Not Detected 2022       RADIOLOGY:   Images were personally reviewed including:   CT head :   Scattered subarachnoid hemorrhage that is decreased compared to prior   examination.       Interval KRISSY aneurysm coiling. IR :   --left wide necked inferiorly and anteriorly oriented ruptured AComA aneurysm, dimensions neck 2.93mm, height 2.82mm, width 3.13mm, length 2.78mm. --the above treated with Smart coil embolization x3, Vu 1. Smart coil x1 opened but not detached. --Prominent musculocutaneous branch from the left V2 segment that anastomoses to the left occipital artery. 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            CT head :   Diffuse subarachnoid hemorrhage.     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     ASSESSMENT:   30 y/o f with no significant past medical history, chromic smoking, marijuana abuse presented to [facility] as a transfer from [facility] as patient was found to have diffuse SAH on CT head.  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 Pierre Nguyen. HH 02, mFS 03  PBD:07  Patient complains of moderate to severe headaches, non-focal neuro exam. Repeat CT head on 04/19 showed decreased SAH. TCD 04/23:  increased mean velocities in right MCA/KRISSY 102-109, no vasospasm but velocities trending up. PLAN:   --SBP goal 140-180  mm hg   --SAH management with daily TCDs, Nimodipine, statins, Mg level 3-4 last Mg level 2.1,needs  replacement   --EVD management per Neuro surgery. --f/up with Dr. Thu Yanez in 2 weeks after discharge and f/up with Dr. Pierre Nguyen in 3 months after discharge    Case discussed with Dr. Sarahy Albrecht attending.     Jennifer Vargas MD  Stroke, [facility]  [id number]  Electronically signed 4/24/2022 at 9:19 AM

## 2022-04-24 NOTE — PROGRESS NOTES
Neurosurgery LILLY/Resident    Daily Progress Note   Chief Complaint   Patient presents with    Altered Mental Status     4/24/2022  8:54 AM    Chart reviewed. No acute events overnight. No new complaints. Mild headache. ICP 2-15. Vitals:    04/24/22 0400 04/24/22 0500 04/24/22 0600 04/24/22 0700   BP: (!) 159/101 (!) 168/105 (!) 187/114 (!) 126/55   Pulse: 53 53 50 69   Resp: 14 16 17 10   Temp: 98.4 °F (36.9 °C)      TempSrc: Oral      SpO2: 95% 94% 96%    Weight:   157 lb 10.1 oz (71.5 kg)    Height:           PE:   AOx3   CNII-XII intact   PERRL, EOMI   Motor   No pronator drift  L deltoid 5/5; R deltoid 5/5  L biceps 5/5; R biceps 5/5  L triceps 5/5; R triceps 5/5  L intrinsics 5/5; R intrinsics 5/5      L iliopsoas 5/5 , R iliopsoas 5/5  L quadriceps 5/5; R quadriceps 5/5  L Dorsiflexion 5/5; R dorsiflexion 5/5  L Plantarflexion 5/5; R plantarflexion 5/5     Sensation intact     EVD open at 25 cmH2O, patent  Output 25 ml/12h, 95 ml/24h      Lab Results   Component Value Date    WBC 17.0 (H) 04/24/2022    HGB 12.6 04/24/2022    HCT 37.3 04/24/2022     04/24/2022    CHOL 182 04/18/2022    TRIG 60 04/18/2022    HDL 74 04/18/2022    ALT 16 04/20/2022    AST 15 04/20/2022     04/24/2022    K 3.8 04/24/2022     04/24/2022    CREATININE 0.53 04/24/2022    BUN 11 04/24/2022    CO2 22 04/24/2022    TSH 2.18 04/20/2022    INR 0.9 04/18/2022    LABA1C 5.5 04/18/2022    CRP <3.0 04/19/2022    SEDRATE 2 04/18/2022       A/P  Intraventricular hemorrhage  Aneurysmal subarachnoid hemorrhage from [facility] s/p coiling  Hydrocephalus with EVD placement   HH 02, mFS 03      - post bleed day 6  - clamp EVD today, monitor ICP      Please contact neurosurgery with any changes in patients neurologic status.        Bernice Mancia CNP  4/24/22  8:54 AM

## 2022-04-24 NOTE — PROGRESS NOTES
Daily Progress Note  Neuro Critical Care    Patient Name: Misti Jimenez  Patient : 1990  Room/Bed: 7030/8707-88  Code Status: FULL CODE  Allergies: No Known Allergies    CHIEF COMPLAINT:     Worst headache of her life acute onset x2 hours  INTERVAL HISTORY    Initial Presentation (Admitted 22 @00:45AM): History Obtained From: patient and EMR review      The patient is W 60 y.o.  female who presents to our emergency department 2022 via [address] as a transfer from [facility] acute worst headache of her life. Sana Martin does not have any significant past medical history in our chart and denies any.  At outlying facility patient underwent CT head without demonstrating diffuse subarachnoid hemorrhage.  Patient was seen by telestroke physician in our group recommending transfer to our facility for further work-up.  In the emergency department patient states that she was washing TV with her child when she began to have severe pounding headache 10 out of 10 worst in her life.  Patient noted to be severely nauseous requiring multiple doses of IV Zofran and IM Phenergan.  Patient also admitting to photosensitivity.  Patient had significant hypertension at [place] started on IV nicardipine given 1 g of Keppra 10 mg Decadron.     Arrival to our emergency department 140/102, heart rate 77 temperature 97. 6.  Initial labs PTT 25.3, INR 1.0, WBC 13.7, platelets 306, glucose 139 and high-sensitivity troponin less than 6.  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  Case discussed with senior endovascular fellow who is aware of case and current neurologic exam planning for emergent DSA 2022 early morning.     For [facility] Hunt&De Souza: 2, Modified Combs: grade 3         Admitted to ICU From: ED 24  Reason for ICU Admission: [facility] with [facility] Course:   -around 8:00 AM Dr. Elena Cruz bedside placing EVD prior to endovascular neurosurgery. Pablo Hernandez went for urgent DSA found to have a comm aneurysm treated by primary coil embolization. 4/19: Hemodynamically stable for arterial line readings -142, heart rate 82-99 T-max 98. 9.  Currently receiving Mosinee 5 mg every 4 hours as needed and fentanyl 25 every 2 as needed for pain control. 4/20: Blood pressure within parameter -139 overnight Cardene has been held off since 6 PM 4/19/2022 not requiring any as needed IV medications, heart rate 65-79 T-max 98.5.  Serum magnesium 2.3 was started on IV magnesium 2 g twice daily will increase to 4 g twice daily continue to trend magnesium daily.  Of note yesterday patient started to have increased urine output 9 L total in 24 hours urine studies sent consistent with central salt wasting patient started on hypertonic saline 2% 150 cc bolus followed by 75 cc/h maintenance.  Sodium checks every 8 hours with a sodium goal of 145.  4/21: Vitals stable overnight blood pressures trending slightly up although okay to liberalize SBP <160 today. Overnight -152, HR 69-80, tmax 99.3. Labs pending this AM although sodium noted to drop from 137 @17:03 yesterday to 129 @midnight. Patient given 150CC bolus 2% and pending repeat this AM. Will send repeat urine studies and discussed with patient she has been drinking excess water in large water bottle at bedside so to limit today to Gatorade and 1-1.5L max. Headache continues to be stable same as yesterday 6/10 constant pressure and throbbing bifrontal location. Will get repeat TCD today to ensure no increase in vasospasm. No bowel movement X3 days giving dulcolax and milk of mag continue to monitor. protonix 40 and starting decadron 4Q6hr for headache decrease oxy pain med Q6hr.   4/22: Blood pressure goal liberalized to SBP <160 yesterday although pressures trending up still -172 overnight (giving IV labetalol 10mg early AM) , HR 64-83, tmax 98. 6.  patient given 1L nacl bolus yesterday following slightly compressible IVC on ultrasound as she continues to have central salt wasting urine output slightly decreasing over last 3 days although 5,989 still yesterday. Discontinue hypertonic 2% 75cc/hr, start NaCl bolus 1L this AM and continue with 125cc/hr. Sodium goal can liberalize to 140 if Na <135 resume 2% hypertonic at 75cc/hr. Still no BM receiving dulcolax, milk of mag and glycolax. Headache improved slightly today 5/10. EVD remains open at [id number] and decreasing output 189 over last 24 hours. Transcranial dopplers reviewed slightly increasing vasospasm on the right velocities up to low 100s Lindegaard ratio up from 2-->3.3.   4/23: EVD to 20cm H2O. EVD output 80cc overnight, high UOP overnight although improved. Headache improving, ambulating well. Last 24h:   No acute events overnight. EVD open draining at [id number] total output 95cc over last 24 hours. Hypertensive overnight -187, HR 50-72, tmax 98.6. 72 hours of decadron 4mg Q6 completed overnight. Patient complaining of constipation has been on bowel regimen with glycolax, milk of mag and PO dulcolax. Added dulcolax suppository this morning small BM still very uncomfortable thus will add fleets PRN also. TCD's increasing left sided velocities from day prior peak left MCA velocity 156 peak right 160 Lindgard ratio left 3.49 up from 2.54 and right 3.38 down from 3.67. CT head WO this morning prior to toradol. Discussing with NS also when okay to give toradol given EVD in place.      CURRENT MEDICATIONS:  SCHEDULED MEDICATIONS:   sennosides-docusate sodium  2 tablet Oral Daily    polyethylene glycol  17 g Oral Daily    magnesium sulfate  4,000 mg IntraVENous Daily    melatonin  5 mg Oral Nightly    bisacodyl  10 mg Oral Daily    pantoprazole  40 mg Oral QAM AC    enoxaparin  40 mg SubCUTAneous Daily    promethazine  12.5 mg IntraMUSCular Once    atorvastatin  80 mg Oral Nightly    niMODipine  60 mg Oral 6 times per day  sodium chloride flush  5-40 mL IntraVENous 2 times per day     CONTINUOUS INFUSIONS:   sodium chloride 125 mL/hr at 22 0634    sodium chloride Stopped (22)     PRN MEDICATIONS:   bisacodyl, acetaminophen, melatonin, magnesium hydroxide, oxyCODONE, hydrALAZINE, ondansetron **OR** ondansetron, labetalol, sodium chloride flush, sodium chloride, fentanNYL    VITALS:  Temperature Range: Temp: 98.4 °F (36.9 °C) Temp  Av.2 °F (36.8 °C)  Min: 97.8 °F (36.6 °C)  Max: 98.6 °F (37 °C)  BP Range: Systolic (53OER), XFV:688 , Min:131 , ZMJ:806     Diastolic (23IFB), RJD:53, Min:76, Max:125    Pulse Range: Pulse  Av.6  Min: 50  Max: 75  Respiration Range: Resp  Avg: 15.8  Min: 9  Max: 22  Current Pulse Ox: SpO2: 96 %  24HR Pulse Ox Range: SpO2  Av.9 %  Min: 94 %  Max: 99 %  Patient Vitals for the past 12 hrs:   BP Temp Temp src Pulse Resp SpO2 Weight   22 0600 (!) 187/114 -- -- 50 17 96 % 157 lb 10.1 oz (71.5 kg)   22 0500 (!) 168/105 -- -- 53 16 94 % --   22 0400 (!) 159/101 98.4 °F (36.9 °C) Oral 53 14 95 % --   22 0300 (!) 151/96 -- -- 59 12 96 % --   22 0200 (!) 140/82 -- -- 68 18 95 % --   22 0100 (!) 157/96 -- -- 62 18 97 % --   22 0000 (!) 158/99 98.4 °F (36.9 °C) Oral 56 15 94 % --   22 2300 (!) 143/85 -- -- 66 17 97 % --   22 2200 (!) 144/80 -- -- 62 9 95 % --   22 (!) 156/97 -- -- 62 18 98 % --   22 (!) 160/104 98.6 °F (37 °C) Oral 61 12 98 % --   22 -- -- -- 67 12 98 % --     Estimated body mass index is 28.83 kg/m² as calculated from the following:    Height as of this encounter: 5' 2\" (1.575 m).     Weight as of this encounter: 157 lb 10.1 oz (71.5 kg).  []<16 Severe malnutrition  []16-16.99 Moderate malnutrition  []17-18.49 Mild malnutrition  []18.5-24.9 Normal  [x]25-29.9 Overweight (not obese)  []30-34.9 Obese class 1 (Low Risk)  []35-39.9 Obese class 2 (Moderate Risk)  []?40 Obese class 3 (High Risk)    RECENT LABS:   Lab Results   Component Value Date    WBC 17.0 (H) 04/24/2022    HGB 12.6 04/24/2022    HCT 37.3 04/24/2022     04/24/2022    CHOL 182 04/18/2022    TRIG 60 04/18/2022    HDL 74 04/18/2022    ALT 16 04/20/2022    AST 15 04/20/2022     04/24/2022    K 3.8 04/24/2022     04/24/2022    CREATININE 0.53 04/24/2022    BUN 11 04/24/2022    CO2 22 04/24/2022    TSH 2.18 04/20/2022    INR 0.9 04/18/2022    LABA1C 5.5 04/18/2022     24 HOUR INTAKE/OUTPUT:    Intake/Output Summary (Last 24 hours) at 4/24/2022 0658  Last data filed at 4/24/2022 0600  Gross per 24 hour   Intake 4003.02 ml   Output 5955 ml   Net -1951.98 ml       IMAGING:   TCD 4/23--> peak velocities left MCA-156, Right LCM182, Lindegard ratio left 3.49, right 3.38. TCD 4/22-->peak velocities left 214 right 168 Lindegard ratio left 2.54, Right-3.67     TCD 4/21/22-->peak velocities 108 on the right lindegard ratio approx 2 on the left and 3.3 on the right      TCD 4/19/22-Peak velocities on the left 80's and peak velocities on the right 103. Left Lindegaard ratio=2.07 and right lindegaard ratio-2.22.      CT head WO 4/20/22      Impression   Scattered subarachnoid hemorrhage that is decreased compared to prior   examination.       Interval KRISSY aneurysm coiling.            CT head WO 4/17/22 @10:48 PM   Impression   Diffuse subarachnoid hemorrhage.  Recommend CTA for further evaluation.      CTA head and neck 4/18/22   Impression   Aneurysm arising from the anterior communicating artery measuring 3 mm x 2 mm.      CXR 4/18/22  Impression   No acute cardiopulmonary process.      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 --the above treated with Smart coil embolization x3, Vu 1. Smart coil x1 opened but not detached.    --Prominent musculocutaneous branch from the left V2 segment that anastomoses to the left occipital artery. 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              Labs and Images reviewed with:    [x] Dr. Yayo Harris:  Well developed, well nourished, alert and oriented x 3, in no acute distress. GCS 15. Nontoxic. No dysarthria. No aphasia.    HEAD:  normocephalic, atraumatic    EYES:  PERRLA, EOMI.   ENT:  moist mucous membranes   NECK:  supple, symmetric   LUNGS:  Equal air entry bilaterally   CARDIOVASCULAR:  normal s1 / s2, RRR, distal pulses intact   ABDOMEN:  Soft, no rigidity   NEUROLOGIC:  Mental Status:  A & O x3,awake             Cranial Nerves:    cranial nerves II-XII are grossly intact    Motor Exam:    Drift:  absent  Tone:  normal    Motor exam is symmetrical 5 out of 5 all extremities bilaterally    Sensory:    Touch:    Right Upper Extremity:  normal  Left Upper Extremity:  normal  Right Lower Extremity:  normal  Left Lower Extremity:  normal    Deep Tendon Reflexes:    Right Bicep:  1+  Left Bicep:  1+  Right Knee:  1+  Left Knee:  1+    Plantar Response:  Right:  downgoing  Left:  downgoing    Clonus:  absent  Andino's:  absent    Coordination/Dysmetria:  Heel to Shin:  Right:  normal  Left:  normal  Finger to Nose:   Right:  normal  Left:  normal   Dysdiadochokinesia:  absent    Gait:  normal       DRAINS:  EVD open draining [id number] placed 4/18/22    ASSESSMENT AND PLAN:     ASSESSMENT:      This is a 32 y.o. female with acute worst headache of her life found to have diffuse subarachnoid hemorrhage presenting to outlying facility [address] [place] [address] denies any significant past medical history and does not take any daily medications.  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  Patient admitted under neuro ICU services with endovascular neurosurgery consulted planning for urgent evaluation and likely DSA 4/18/2022 first thing in the morning.  Patient underwent emergent EVD placement at bedside 4/18/2022 and diagnostic angio with primary coil embolization of a comm aneurysm successful.  Postoperatively being managed by neuro ICU for vasospasm monitoring.     Patient care will be discussed with attending, will reevaluate patient along with attending.      PLAN/MEDICAL DECISION MAKING:        NEUROLOGIC:     - Imaging   CT head WO-diffuse SAH  CTA head and Neck-ACOM aneurysm   IR angio-4/18 AM-primary coil embolization  Neurosurgery-Following placed EVD 4/18 open at 25 cmH20 total output [address] yesterday   - AEDs loaded 1gram keppra at [place]  keppra 500BID course completed 4 days total  -nimodipine 60mg Q4 hours Day 7  -FU Mag with goal >2.5 currently on 4 g IV twice daily  -Noted to have central salt wasting with large UOP s/p 2% infusion, no longer running.   - Na goal eunatremia   - Neuro checks per protocol     CARDIOVASCULAR:  - Goal SBP <180  - titrate with cardene has been held since 6:00 PM 4/19,  - PRN IV labetalol 10mg Q4 hour for SBP >180  - Continue telemetry     PULMONARY:  - 99% on room air      RENAL/FLUID/ELECTROLYTE:  - OPZ 49 / Creatinine 0.53  - Na 136  - Mag 2.1  - Urine output In/ OUT  4,000/5,870  Net -1,867  - IVF: 125cc/hr NS  - Magnesium IV 4 grams daily   - Replace electrolytes PRN  - Daily BMP     GI/NUTRITION:  NUTRITION: general diet   - Bowel regimen: glycolax  - GI prophylaxis: protonix      ID:  - Afebrile  - WBC 17.0 suspect steroid induced increase from decadron    - CSF studies not infectious appearing  - Continue to monitor for fevers  - Daily CBC     HEME:   - H&H 12.6/37.3   - Platelets 298  - Daily CBC     ENDOCRINE:  - Continue to monitor blood glucose, goal <180   -HbA1c 5.5 4/18/2022 and lipid panel total cholesterol 182, HDL 74, LDL 96  -TSH 2.18 4/20/22    OTHER:  - PT/OT/ST    PROPHYLAXIS:   Stress ulcer: protonix      DVT PROPHYLAXIS:  - SCD sleeves - Thigh High   - GERMAINE stockings - Thigh High  - lovenox    DISPOSITION:  [x] To remain ICU: [facility] with EVD. Close neuro checks with watch for vasospasm related complications from [facility]  We will continue to follow along. For any changes in exam or patient status please contact Neuro Critical Care.       Iman Bledsoe MD   PGY-3 Neurology Resident   Neuro Critical Care  Pager [phone]  4/24/2022     6:58 AM

## 2022-04-24 NOTE — PLAN OF CARE
Problem: Falls - Risk of:  Goal: Will remain free from falls  Description: Will remain free from falls  4/24/2022 0053 by Bhupendra Beard RN  Outcome: Progressing  4/23/2022 1551 by Lalit Smith RN  Outcome: Progressing     Problem: Falls - Risk of:  Goal: Absence of physical injury  Description: Absence of physical injury  4/24/2022 0053 by Bhupendra Beard RN  Outcome: Progressing  4/23/2022 1551 by Lalit Smith RN  Outcome: Progressing     Problem: Anxiety/Stress:  Goal: Level of anxiety will decrease  Description: Level of anxiety will decrease  4/24/2022 0053 by Bhupendra Beard RN  Outcome: Progressing  4/23/2022 1551 by Lalit Smith RN  Outcome: Progressing     Problem: Mental Status - Impaired:  Goal: Mental status will be restored to baseline  Description: Mental status will be restored to baseline  4/24/2022 0053 by Bhupendra Beard RN  Outcome: Progressing  4/23/2022 1551 by Lalit Smith RN  Outcome: Progressing

## 2022-04-25 LAB
ANION GAP SERPL CALCULATED.3IONS-SCNC: 10 MMOL/L (ref 9–17)
BUN BLDV-MCNC: 14 MG/DL (ref 6–20)
CALCIUM SERPL-MCNC: 8.6 MG/DL (ref 8.6–10.4)
CHLORIDE BLD-SCNC: 105 MMOL/L (ref 98–107)
CO2: 22 MMOL/L (ref 20–31)
CREAT SERPL-MCNC: 0.66 MG/DL (ref 0.5–0.9)
EKG ATRIAL RATE: 61 BPM
EKG P AXIS: 48 DEGREES
EKG P-R INTERVAL: 128 MS
EKG Q-T INTERVAL: 422 MS
EKG QRS DURATION: 78 MS
EKG QTC CALCULATION (BAZETT): 424 MS
EKG R AXIS: 46 DEGREES
EKG T AXIS: 37 DEGREES
EKG VENTRICULAR RATE: 61 BPM
GFR AFRICAN AMERICAN: >60 ML/MIN
GFR NON-AFRICAN AMERICAN: >60 ML/MIN
GFR SERPL CREATININE-BSD FRML MDRD: ABNORMAL ML/MIN/{1.73_M2}
GLUCOSE BLD-MCNC: 104 MG/DL (ref 70–99)
HCT VFR BLD CALC: 39.8 % (ref 36.3–47.1)
HEMOGLOBIN: 13.6 G/DL (ref 11.9–15.1)
MAGNESIUM: 2.2 MG/DL (ref 1.6–2.6)
MCH RBC QN AUTO: 30 PG (ref 25.2–33.5)
MCHC RBC AUTO-ENTMCNC: 34.2 G/DL (ref 28.4–34.8)
MCV RBC AUTO: 87.7 FL (ref 82.6–102.9)
NRBC AUTOMATED: 0 PER 100 WBC
PDW BLD-RTO: 12.7 % (ref 11.8–14.4)
PLATELET # BLD: 293 K/UL (ref 138–453)
PMV BLD AUTO: 9.9 FL (ref 8.1–13.5)
POTASSIUM SERPL-SCNC: 3.7 MMOL/L (ref 3.7–5.3)
RBC # BLD: 4.54 M/UL (ref 3.95–5.11)
SODIUM BLD-SCNC: 133 MMOL/L (ref 135–144)
SODIUM BLD-SCNC: 134 MMOL/L (ref 135–144)
SODIUM BLD-SCNC: 137 MMOL/L (ref 135–144)
WBC # BLD: 14.4 K/UL (ref 3.5–11.3)

## 2022-04-25 PROCEDURE — 83735 ASSAY OF MAGNESIUM: CPT

## 2022-04-25 PROCEDURE — 6360000002 HC RX W HCPCS: Performed by: STUDENT IN AN ORGANIZED HEALTH CARE EDUCATION/TRAINING PROGRAM

## 2022-04-25 PROCEDURE — 2580000003 HC RX 258: Performed by: INTERNAL MEDICINE

## 2022-04-25 PROCEDURE — 6360000002 HC RX W HCPCS: Performed by: NURSE PRACTITIONER

## 2022-04-25 PROCEDURE — 99291 CRITICAL CARE FIRST HOUR: CPT | Performed by: PSYCHIATRY & NEUROLOGY

## 2022-04-25 PROCEDURE — 6370000000 HC RX 637 (ALT 250 FOR IP): Performed by: STUDENT IN AN ORGANIZED HEALTH CARE EDUCATION/TRAINING PROGRAM

## 2022-04-25 PROCEDURE — 99232 SBSQ HOSP IP/OBS MODERATE 35: CPT | Performed by: NEUROLOGICAL SURGERY

## 2022-04-25 PROCEDURE — 2000000003 HC NEURO ICU R&B

## 2022-04-25 PROCEDURE — 2580000003 HC RX 258: Performed by: STUDENT IN AN ORGANIZED HEALTH CARE EDUCATION/TRAINING PROGRAM

## 2022-04-25 PROCEDURE — 02HV33Z INSERTION OF INFUSION DEVICE INTO SUPERIOR VENA CAVA, PERCUTANEOUS APPROACH: ICD-10-PCS | Performed by: STUDENT IN AN ORGANIZED HEALTH CARE EDUCATION/TRAINING PROGRAM

## 2022-04-25 PROCEDURE — 6360000002 HC RX W HCPCS: Performed by: HEALTH CARE PROVIDER

## 2022-04-25 PROCEDURE — 80048 BASIC METABOLIC PNL TOTAL CA: CPT

## 2022-04-25 PROCEDURE — 99233 SBSQ HOSP IP/OBS HIGH 50: CPT | Performed by: PSYCHIATRY & NEUROLOGY

## 2022-04-25 PROCEDURE — APPSS30 APP SPLIT SHARED TIME 16-30 MINUTES: Performed by: REGISTERED NURSE

## 2022-04-25 PROCEDURE — 93886 INTRACRANIAL COMPLETE STUDY: CPT

## 2022-04-25 PROCEDURE — 85027 COMPLETE CBC AUTOMATED: CPT

## 2022-04-25 PROCEDURE — 6370000000 HC RX 637 (ALT 250 FOR IP): Performed by: NURSE PRACTITIONER

## 2022-04-25 PROCEDURE — 2580000003 HC RX 258: Performed by: ANESTHESIOLOGY

## 2022-04-25 PROCEDURE — 93010 ELECTROCARDIOGRAM REPORT: CPT | Performed by: INTERNAL MEDICINE

## 2022-04-25 PROCEDURE — 36415 COLL VENOUS BLD VENIPUNCTURE: CPT

## 2022-04-25 PROCEDURE — 84295 ASSAY OF SERUM SODIUM: CPT

## 2022-04-25 RX ORDER — KETOROLAC TROMETHAMINE 30 MG/ML
30 INJECTION, SOLUTION INTRAMUSCULAR; INTRAVENOUS 2 TIMES DAILY PRN
Status: DISPENSED | OUTPATIENT
Start: 2022-04-25 | End: 2022-04-28

## 2022-04-25 RX ORDER — TOPIRAMATE 25 MG/1
50 TABLET ORAL 2 TIMES DAILY
Status: DISCONTINUED | OUTPATIENT
Start: 2022-04-25 | End: 2022-04-26

## 2022-04-25 RX ORDER — SODIUM CHLORIDE 0.9 % (FLUSH) 0.9 %
5-40 SYRINGE (ML) INJECTION EVERY 12 HOURS SCHEDULED
Status: DISCONTINUED | OUTPATIENT
Start: 2022-04-25 | End: 2022-05-06 | Stop reason: HOSPADM

## 2022-04-25 RX ORDER — MILRINONE LACTATE 0.2 MG/ML
.0625-.75 INJECTION, SOLUTION INTRAVENOUS CONTINUOUS
Status: DISCONTINUED | OUTPATIENT
Start: 2022-04-25 | End: 2022-04-28

## 2022-04-25 RX ORDER — TOPIRAMATE 100 MG/1
100 TABLET, FILM COATED ORAL ONCE
Status: COMPLETED | OUTPATIENT
Start: 2022-04-25 | End: 2022-04-25

## 2022-04-25 RX ORDER — SODIUM CHLORIDE 9 MG/ML
25 INJECTION, SOLUTION INTRAVENOUS PRN
Status: DISCONTINUED | OUTPATIENT
Start: 2022-04-25 | End: 2022-05-06 | Stop reason: HOSPADM

## 2022-04-25 RX ORDER — LIDOCAINE HYDROCHLORIDE 10 MG/ML
5 INJECTION, SOLUTION INFILTRATION; PERINEURAL ONCE
Status: DISCONTINUED | OUTPATIENT
Start: 2022-04-25 | End: 2022-04-28

## 2022-04-25 RX ORDER — 0.9 % SODIUM CHLORIDE 0.9 %
1000 INTRAVENOUS SOLUTION INTRAVENOUS ONCE
Status: COMPLETED | OUTPATIENT
Start: 2022-04-25 | End: 2022-04-25

## 2022-04-25 RX ORDER — 3% SODIUM CHLORIDE 3 G/100ML
50 INJECTION, SOLUTION INTRAVENOUS CONTINUOUS
Status: DISCONTINUED | OUTPATIENT
Start: 2022-04-25 | End: 2022-04-25

## 2022-04-25 RX ORDER — MAGNESIUM SULFATE HEPTAHYDRATE 40 MG/ML
4000 INJECTION, SOLUTION INTRAVENOUS 2 TIMES DAILY
Status: DISCONTINUED | OUTPATIENT
Start: 2022-04-25 | End: 2022-05-02

## 2022-04-25 RX ORDER — SODIUM CHLORIDE 0.9 % (FLUSH) 0.9 %
5-40 SYRINGE (ML) INJECTION PRN
Status: DISCONTINUED | OUTPATIENT
Start: 2022-04-25 | End: 2022-05-06 | Stop reason: HOSPADM

## 2022-04-25 RX ORDER — 3% SODIUM CHLORIDE 3 G/100ML
50 INJECTION, SOLUTION INTRAVENOUS CONTINUOUS
Status: DISCONTINUED | OUTPATIENT
Start: 2022-04-25 | End: 2022-04-26

## 2022-04-25 RX ORDER — SODIUM CHLORIDE 1000 MG
1 TABLET, SOLUBLE MISCELLANEOUS
Status: DISCONTINUED | OUTPATIENT
Start: 2022-04-25 | End: 2022-05-03

## 2022-04-25 RX ADMIN — OXYCODONE 5 MG: 5 TABLET ORAL at 13:39

## 2022-04-25 RX ADMIN — FENTANYL CITRATE 25 MCG: 50 INJECTION, SOLUTION INTRAMUSCULAR; INTRAVENOUS at 03:49

## 2022-04-25 RX ADMIN — SODIUM CHLORIDE, PRESERVATIVE FREE 10 ML: 5 INJECTION INTRAVENOUS at 20:25

## 2022-04-25 RX ADMIN — TOPIRAMATE 50 MG: 25 TABLET, FILM COATED ORAL at 20:26

## 2022-04-25 RX ADMIN — TOPIRAMATE 100 MG: 100 TABLET, FILM COATED ORAL at 12:18

## 2022-04-25 RX ADMIN — NIMODIPINE 60 MG: 30 CAPSULE, LIQUID FILLED ORAL at 05:46

## 2022-04-25 RX ADMIN — SODIUM CHLORIDE 50 ML/HR: 3 INJECTION, SOLUTION INTRAVENOUS at 18:59

## 2022-04-25 RX ADMIN — OXYCODONE 5 MG: 5 TABLET ORAL at 22:29

## 2022-04-25 RX ADMIN — DROPERIDOL 1.25 MG: 2.5 INJECTION, SOLUTION INTRAMUSCULAR; INTRAVENOUS at 01:27

## 2022-04-25 RX ADMIN — OXYCODONE 5 MG: 5 TABLET ORAL at 05:49

## 2022-04-25 RX ADMIN — NIMODIPINE 60 MG: 30 CAPSULE, LIQUID FILLED ORAL at 01:28

## 2022-04-25 RX ADMIN — ENOXAPARIN SODIUM 40 MG: 100 INJECTION SUBCUTANEOUS at 08:32

## 2022-04-25 RX ADMIN — MAGNESIUM HYDROXIDE 30 ML: 400 SUSPENSION ORAL at 22:29

## 2022-04-25 RX ADMIN — DOCUSATE SODIUM 50 MG AND SENNOSIDES 8.6 MG 2 TABLET: 8.6; 5 TABLET, FILM COATED ORAL at 08:33

## 2022-04-25 RX ADMIN — NIMODIPINE 60 MG: 30 CAPSULE, LIQUID FILLED ORAL at 13:33

## 2022-04-25 RX ADMIN — SODIUM CHLORIDE, PRESERVATIVE FREE 10 ML: 5 INJECTION INTRAVENOUS at 08:35

## 2022-04-25 RX ADMIN — FENTANYL CITRATE 25 MCG: 50 INJECTION, SOLUTION INTRAMUSCULAR; INTRAVENOUS at 12:40

## 2022-04-25 RX ADMIN — MAGNESIUM SULFATE IN WATER 4000 MG: 40 INJECTION, SOLUTION INTRAVENOUS at 10:16

## 2022-04-25 RX ADMIN — SODIUM CHLORIDE 1000 ML: 9 INJECTION, SOLUTION INTRAVENOUS at 08:16

## 2022-04-25 RX ADMIN — SODIUM CHLORIDE, PRESERVATIVE FREE 10 ML: 5 INJECTION INTRAVENOUS at 20:26

## 2022-04-25 RX ADMIN — NIMODIPINE 60 MG: 30 CAPSULE, LIQUID FILLED ORAL at 17:45

## 2022-04-25 RX ADMIN — MAGNESIUM SULFATE IN WATER 4000 MG: 40 INJECTION, SOLUTION INTRAVENOUS at 20:42

## 2022-04-25 RX ADMIN — FENTANYL CITRATE 25 MCG: 50 INJECTION, SOLUTION INTRAMUSCULAR; INTRAVENOUS at 17:45

## 2022-04-25 RX ADMIN — Medication 5 MG: at 22:29

## 2022-04-25 RX ADMIN — BISACODYL 10 MG: 5 TABLET, COATED ORAL at 08:33

## 2022-04-25 RX ADMIN — FENTANYL CITRATE 25 MCG: 50 INJECTION, SOLUTION INTRAMUSCULAR; INTRAVENOUS at 15:50

## 2022-04-25 RX ADMIN — NIMODIPINE 60 MG: 30 CAPSULE, LIQUID FILLED ORAL at 21:01

## 2022-04-25 RX ADMIN — POLYETHYLENE GLYCOL 3350 17 G: 17 POWDER, FOR SOLUTION ORAL at 08:34

## 2022-04-25 RX ADMIN — FENTANYL CITRATE 25 MCG: 50 INJECTION, SOLUTION INTRAMUSCULAR; INTRAVENOUS at 07:47

## 2022-04-25 RX ADMIN — SODIUM CHLORIDE TAB 1 GM 1 G: 1 TAB at 19:02

## 2022-04-25 RX ADMIN — FENTANYL CITRATE 25 MCG: 50 INJECTION, SOLUTION INTRAMUSCULAR; INTRAVENOUS at 20:24

## 2022-04-25 RX ADMIN — ACETAMINOPHEN 650 MG: 325 TABLET ORAL at 08:06

## 2022-04-25 RX ADMIN — MILRINONE LACTATE IN DEXTROSE 0.2 MCG/KG/MIN: 200 INJECTION, SOLUTION INTRAVENOUS at 17:41

## 2022-04-25 RX ADMIN — PANTOPRAZOLE SODIUM 40 MG: 40 TABLET, DELAYED RELEASE ORAL at 08:32

## 2022-04-25 RX ADMIN — NIMODIPINE 60 MG: 30 CAPSULE, LIQUID FILLED ORAL at 08:32

## 2022-04-25 RX ADMIN — ATORVASTATIN CALCIUM 80 MG: 80 TABLET, FILM COATED ORAL at 20:25

## 2022-04-25 RX ADMIN — SODIUM CHLORIDE: 9 INJECTION, SOLUTION INTRAVENOUS at 08:11

## 2022-04-25 RX ADMIN — KETOROLAC TROMETHAMINE 30 MG: 30 INJECTION, SOLUTION INTRAMUSCULAR; INTRAVENOUS at 17:35

## 2022-04-25 ASSESSMENT — PAIN SCALES - GENERAL
PAINLEVEL_OUTOF10: 7
PAINLEVEL_OUTOF10: 7
PAINLEVEL_OUTOF10: 10
PAINLEVEL_OUTOF10: 7
PAINLEVEL_OUTOF10: 7
PAINLEVEL_OUTOF10: 10
PAINLEVEL_OUTOF10: 7
PAINLEVEL_OUTOF10: 6
PAINLEVEL_OUTOF10: 6
PAINLEVEL_OUTOF10: 10

## 2022-04-25 ASSESSMENT — PAIN DESCRIPTION - ORIENTATION
ORIENTATION: MID
ORIENTATION: RIGHT
ORIENTATION: RIGHT

## 2022-04-25 ASSESSMENT — PAIN DESCRIPTION - DESCRIPTORS
DESCRIPTORS: ACHING;PRESSURE;POUNDING
DESCRIPTORS: POUNDING;PRESSURE
DESCRIPTORS: ACHING;PRESSURE
DESCRIPTORS: POUNDING;PRESSURE
DESCRIPTORS: THROBBING

## 2022-04-25 ASSESSMENT — PAIN DESCRIPTION - LOCATION
LOCATION: HEAD

## 2022-04-25 NOTE — PROGRESS NOTES
Neurosurgery LILLY/Resident    Daily Progress Note   Chief Complaint   Patient presents with    Altered Mental Status     4/25/2022  6:49 AM    Chart reviewed. Headache worsening yesterday evening and ICP 25. EVD opened up at 25 cmH2O. ICP 14-20 overnight. Continues to have headache. Denies nausea and vomiting. Vitals:    04/25/22 0300 04/25/22 0400 04/25/22 0500 04/25/22 0600   BP: 131/77 (!) 133/95 (!) 136/90 (!) 136/90   Pulse: 65 63 65 66   Resp: 17 16 16 17   Temp:       TempSrc:       SpO2: 96% 93% 95% 97%   Weight:       Height:         PE:   AOx3   CNII-XII intact   PERRL, EOMI   Motor   No pronator drift  L deltoid 5/5; R deltoid 5/5  L biceps 5/5; R biceps 5/5  L triceps 5/5; R triceps 5/5  L intrinsics 5/5; R intrinsics 5/5      L iliopsoas 5/5 , R iliopsoas 5/5  L quadriceps 5/5; R quadriceps 5/5  L Dorsiflexion 5/5; R dorsiflexion 5/5  L Plantarflexion 5/5; R plantarflexion 5/5     Sensation intact     EVD open at 25 cmH2O, patent  Output 133 ml/12h, 181 ml/24h        Lab Results   Component Value Date    WBC 14.4 (H) 04/25/2022    HGB 13.6 04/25/2022    HCT 39.8 04/25/2022     04/25/2022    CHOL 182 04/18/2022    TRIG 60 04/18/2022    HDL 74 04/18/2022    ALT 16 04/20/2022    AST 15 04/20/2022     04/25/2022    K 3.7 04/25/2022     04/25/2022    CREATININE 0.66 04/25/2022    BUN 14 04/25/2022    CO2 22 04/25/2022    TSH 2.18 04/20/2022    INR 0.9 04/18/2022    LABA1C 5.5 04/18/2022    CRP <3.0 04/19/2022    SEDRATE 2 04/18/2022       Radiology   CT HEAD WO CONTRAST    Result Date: 4/24/2022  EXAMINATION: CT OF THE HEAD WITHOUT CONTRAST  4/24/2022 1:01 pm TECHNIQUE: CT of the head was performed without the administration of intravenous contrast. Dose modulation, iterative reconstruction, and/or weight based adjustment of the mA/kV was utilized to reduce the radiation dose to as low as reasonably achievable.  COMPARISON: 04/19/2022 HISTORY: ORDERING SYSTEM PROVIDED HISTORY: [facility]  with EVD in place FINDINGS: BRAIN/VENTRICLES: Stable positioning of a right frontal approach external ventricular drain with the tip terminating within vicinity of the 3rd ventricle to left of midline. Stable caliber and configuration of the ventricles. 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 Trace residual scattered subarachnoid hemorrhage. No large territory acute cortical infarct. No significant mass effect or midline shift. ORBITS: The visualized portion of the orbits demonstrate no acute abnormality. SINUSES: The visualized paranasal sinuses and mastoid air cells demonstrate no acute abnormality. SOFT TISSUES/SKULL:  No acute abnormality of the visualized skull or soft tissues. Again seen is a right frontal naomi hole defect. Significant reduction with trace residual scattered subarachnoid hemorrhage. Stable positioning of a right frontal EVD, as well as stable caliber and configuration of the ventricles. .      A/P  Intraventricular hemorrhage  Aneurysmal subarachnoid hemorrhage from [facility] s/p coiling  Hydrocephalus with EVD placement              HH 02, mFS 03                            - post bleed day 7  - EVD open at 25 cmH2O, monitor output and ICP      Please contact neurosurgery with any changes in patients neurologic status.        Ainsley Moore CNP  4/25/22  6:49 AM

## 2022-04-25 NOTE — PLAN OF CARE
Problem: Falls - Risk of:  Goal: Will remain free from falls  Description: Will remain free from falls  Outcome: Progressing  Goal: Absence of physical injury  Description: Absence of physical injury  Outcome: Progressing     Problem: Anxiety/Stress:  Goal: Level of anxiety will decrease  Description: Level of anxiety will decrease  Outcome: Progressing     Problem: Mental Status - Impaired:  Goal: Mental status will be restored to baseline  Description: Mental status will be restored to baseline  Outcome: Progressing     Problem: Pain:  Goal: Pain level will decrease  Description: Pain level will decrease  Outcome: Progressing  Goal: Recognizes and communicates pain  Description: Recognizes and communicates pain  Outcome: Progressing  Goal: Control of acute pain  Description: Control of acute pain  Outcome: Progressing  Goal: Control of chronic pain  Description: Control of chronic pain  Outcome: Progressing     Problem: Pain:  Goal: Control of chronic pain  Description: Control of chronic pain  Outcome: Progressing

## 2022-04-25 NOTE — PROGRESS NOTES
Comprehensive Nutrition Assessment    Type and Reason for Visit:  Initial (LOS)    Nutrition Recommendations/Plan:   1. Continue current diet. Encourage/monitor PO intakes as tolerated. Monitor need for ONS with meals. 2. Monitor labs, weights, and plan of care. Malnutrition Assessment:  Malnutrition Status: At risk for malnutrition (Comment) (04/25/22 6651)    Context:  Acute Illness     Findings of the 6 clinical characteristics of malnutrition:  Energy Intake:  Mild decrease in energy intake  Weight Loss:  No significant weight loss     Body Fat Loss:  No significant body fat loss   Muscle Mass Loss:  No significant muscle mass loss  Fluid Accumulation:  No significant fluid accumulation   Strength:  Not Performed    Nutrition Assessment:    Chart reviewed/pt seen for length of stay. Pt admitted with severe headache along with nausea and AMS. Pt with a subarachnoid hemorrhage. S/p EVD placement at bedside 4/18/2022 and diagnostic angio with primary coil embolization of a comm aneurysm. Pt continues to have a headache but denies nausea. Appetite is \"okay\" per pt. Pt ate more than 50% of breakfast this morning. RN reports pt did not eat much for lunch. Noted pt has been drinking water and Gatorade. Labs reviewed: Na 134-137 mmol/L. Meds reviewed. Nutrition Related Findings:    Labs reviewed. Meds include: Dulcolax, Glycolax, Senokot. Last BM 4/24. Wound Type: Surgical Incision (to anterior head)       Current Nutrition Intake & Therapies:    Average Meal Intake: 51-75% (Variable PO intakes of 1-75% per chart.)  Average Supplements Intake: None Ordered  ADULT DIET; Regular    Anthropometric Measures:  Height: 5' 2\" (157.5 cm)  Ideal Body Weight (IBW): 110 lbs (50 kg)    Admission Body Weight: 161 lb 13.1 oz (73.4 kg)  Current Body Weight: 157 lb 10.1 oz (71.5 kg), 143.3 % IBW.  Weight Source: Bed Scale  Current BMI (kg/m2): 28.8  Usual Body Weight: 160 lb 6.4 oz (72.8 kg) (2/22/22 bed scale per chart review)  % Weight Change (Calculated): -1.7                    BMI Categories: Overweight (BMI 25.0-29. 9)    Estimated Daily Nutrient Needs:  Energy Requirements Based On: Kcal/kg  Weight Used for Energy Requirements: Current  Energy (kcal/day): 7181-5488 kcals/day  Weight Used for Protein Requirements: Current  Protein (g/day): 1.2-1.4 gm/kg =  gm pro/day  Method Used for Fluid Requirements: Other  Fluid (ml/day): per MD    Nutrition Diagnosis:   · Inadequate oral intake related to  (current condition; headache) as evidenced by intake 0-25%,intake 26-50%,intake 51-75% (variable PO intakes)    Nutrition Interventions:   Food and/or Nutrient Delivery: Continue Current Diet (Monitor need for ONS with meals.)  Nutrition Education/Counseling: No recommendation at this time  Coordination of Nutrition Care: Continue to monitor while inpatient       Goals:  Previous Goal Met:  (Goal Set)  Goals: PO intake 75% or greater,within 7 days       Nutrition Monitoring and Evaluation:   Behavioral-Environmental Outcomes: None Identified  Food/Nutrient Intake Outcomes: Food and Nutrient Intake  Physical Signs/Symptoms Outcomes: Biochemical Data,GI Status,Fluid Status or Edema,Hemodynamic Status,Nutrition Focused Physical Findings,Skin,Weight    Discharge Planning:     Too soon to determine     [place]ard LUCIA Hernandez, PERCY  Contact: [phone]

## 2022-04-25 NOTE — PROCEDURES
Picc ordered for access, blood draws, and Milrone IV. Benefits include stable long term intravenous access. Risks include failure to obtain the desired result(s) of the procedure, discomfort, injury, the need for additional therapies, permanent loss of body function, bleeding from the site, arterial puncture, air embolism, nerve damage, hematoma, phlebitis, catheter fracture/rupture, catheter embolism, catheter occlusion, catheter migration, catheter site infection, unintentional/accidental removal of catheter, bloodstream infection, infiltration, cardiac arrhythmia, vein thrombosis, difficult catheter removal, pleural effusion, pericardial effusion/cardiac tamponade, and death. Alternatives discussed including centrally inserted central catheter, as well as less invasive procedures such as multiple peripheral IVs, extended dwell catheters and midline placement    Consent signed and obtained by proceduralist, verbal consent from patient with Keyla-RN to witness. Picc placement note:    Peripheral ultrasound assessment done. Plan for right basilic vein insertion. Vein measurement = 0.36cm and area based CVR= 13.4%. Preferable CVR based on area would be between 11%-20% (which correlates to 33% to 45% linear CVR).   Product type: Bard 4fr dual lumen Power PICC SV(Small Vein) catheter  History/Labs/Allergies Reviewed  Placed By: Vania Recio - RN IV Team  Assisted By: Karina Mauricio  Time out Performed using Two Identifiers  Lot # [id number]  Expiration date = 2022/11/30  Trimmed at 45cm  Total length inserted 41cm  External catheter length 4cm  Number of attempts 1  Estimated blood loss = 1ml  Placement verified by- VPS Tip confirmation system (Neg deflection p-wave and max p-wave noted), positive blood return, and flushes easily  Special equipment used- VPS Tip tracker, ultrasound, and micro-introducer technique   Catheter securement = adhesive securement device  Dressing applied= Tegaderm CHG  Lidocaine administered intradermally conc.1%, approx 2 ml. RN aware picc placed with VPS ECG technology and is confirmed in the distal 1/3 SVC. Picc is released for use. Rn aware new iv tubing required. PICC education:     [ X ] Discussed with patient/Family or POA prior to procedure. Risks and Benefits along with reason for procedure were discussed and teaching was reinforced with an education handout on line  insertion. [facility] FAQ Catheter Associated Blood Stream Infections and [facility] [id number] REV. 7/13 Nursing and Booklet left at bedside or in chart. Patient (Family or POA) acknowledged understanding of information taught and agreed to procedure.            Breonna Ramos RN

## 2022-04-25 NOTE — PROGRESS NOTES
Daily Progress Note  Neuro Critical Care    Patient Name: Lauryn Arreola  Patient : 1990  Room/Bed: 2455/8486-51  Code Status: Full Code   Allergies: No Known Allergies    CHIEF COMPLAINT:     Worst headache of her life acute onset X2 hours  INTERVAL HISTORY    Initial Presentation (Admitted 22 @00:45AM): History Obtained From: patient and EMR review      The patient is S 38 y.o.  female who presents to our emergency department 2022 via [address] as a transfer from [facility] acute worst headache of her life. Nas Morales does not have any significant past medical history in our chart and denies any.  At outlying facility patient underwent CT head without demonstrating diffuse subarachnoid hemorrhage.  Patient was seen by telestroke physician in our group recommending transfer to our facility for further work-up.  In the emergency department patient states that she was washing TV with her child when she began to have severe pounding headache 10 out of 10 worst in her life.  Patient noted to be severely nauseous requiring multiple doses of IV Zofran and IM Phenergan.  Patient also admitting to photosensitivity.  Patient had significant hypertension at [place] started on IV nicardipine given 1 g of Keppra 10 mg Decadron.     Arrival to our emergency department 140/102, heart rate 77 temperature 97. 6.  Initial labs PTT 25.3, INR 1.0, WBC 13.7, platelets 816, glucose 139 and high-sensitivity troponin less than 6.  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  Case discussed with senior endovascular fellow who is aware of case and current neurologic exam planning for emergent DSA 2022 early morning.     For [facility]: 2, Modified Combs: grade 3         Admitted to ICU From: ED 24  Reason for ICU Admission: [facility] with [facility] Course:   -around 8:00 AM Dr. Kelli Roberts bedside placing EVD prior to endovascular neurosurgery. Reynaldo Reveles went for urgent DSA found to have a comm aneurysm treated by primary coil embolization. 4/19: Hemodynamically stable for arterial line readings -142, heart rate 82-99 T-max 98. 9.  Currently receiving Slinger 5 mg every 4 hours as needed and fentanyl 25 every 2 as needed for pain control. 4/20: Blood pressure within parameter -139 overnight Cardene has been held off since 6 PM 4/19/2022 not requiring any as needed IV medications, heart rate 65-79 T-max 98.5.  Serum magnesium 2.3 was started on IV magnesium 2 g twice daily will increase to 4 g twice daily continue to trend magnesium daily.  Of note yesterday patient started to have increased urine output 9 L total in 24 hours urine studies sent consistent with central salt wasting patient started on hypertonic saline 2% 150 cc bolus followed by 75 cc/h maintenance.  Sodium checks every 8 hours with a sodium goal of 145.  4/21: Vitals stable overnight blood pressures trending slightly up although okay to liberalize SBP <160 today. Overnight -152, HR 69-80, tmax 99.3. Labs pending this AM although sodium noted to drop from 137 @17:03 yesterday to 129 @midnight. Patient given 150CC bolus 2% and pending repeat this AM. Will send repeat urine studies and discussed with patient she has been drinking excess water in large water bottle at bedside so to limit today to Gatorade and 1-1.5L max. Headache continues to be stable same as yesterday 6/10 constant pressure and throbbing bifrontal location. Will get repeat TCD today to ensure no increase in vasospasm. No bowel movement X3 days giving dulcolax and milk of mag continue to monitor. protonix 40 and starting decadron 4Q6hr for headache decrease oxy pain med Q6hr.   4/22: Blood pressure goal liberalized to SBP <160 yesterday although pressures trending up still -172 overnight (giving IV labetalol 10mg early AM) , HR 64-83, tmax 98. 6.  patient given 1L nacl bolus yesterday following slightly compressible IVC on ultrasound as she continues to have central salt wasting urine output slightly decreasing over last 3 days although 5,989 still yesterday. Discontinue hypertonic 2% 75cc/hr, start NaCl bolus 1L this AM and continue with 125cc/hr. Sodium goal can liberalize to 140 if Na <135 resume 2% hypertonic at 75cc/hr. Still no BM receiving dulcolax, milk of mag and glycolax. Headache improved slightly today 5/10. EVD remains open at [id number] and decreasing output 189 over last 24 hours. Transcranial dopplers reviewed slightly increasing vasospasm on the right velocities up to low 100s Lindegaard ratio up from 2-->3. 3.   4/23: EVD to 20cm H2O. EVD output 80cc overnight, high UOP overnight although improved. Headache improving, ambulating well. 4/24: EVD open draining at [id number] total output 95cc over last 24 hours. Hypertensive overnight -187, HR 50-72, tmax 98.6. 72 hours of decadron 4mg Q6 completed overnight. Patient complaining of constipation has been on bowel regimen with glycolax, milk of mag and PO dulcolax. Added dulcolax suppository this morning small BM still very uncomfortable thus will add fleets PRN also. TCD's increasing left sided velocities from day prior peak left MCA velocity 156 peak right 160 Lindgard ratio left 3.49 up from 2.54 and right 3.38 down from 3.67. CT head WO this morning prior to toradol. Discussing with NS also when okay to give toradol given EVD in place. Last 24h:    Received dose of 30mg IV Toradol yesterday afternoon which helped her headache until 2Am then was given a dose of droperidol 1.25mg which helped until this morning. Continues to have 5-6/10 bifrontal constant achy headache. Plan to increase IV magnesium 4grams BID mag 2.2 today and IV mag helps her headache and will also trial 100mg PO topamax and follow her response to this.  TCD fairly stable yesterday bilateral Lindegard ratio 3.2 left MCA mean velocities low 100s and right mca mean velocities 90s. Patient noted to have 4L out and only 867 in, appears miscommunication with overnight nursing and patient was taken off IVF. Will bolus 1L NaCl, continue 125/hr maintenance. Failed EVD clamp trial yesterday increased ICP thus remains open at 25 with total output of 134cc/12 and 214 for the full 24 hours.      CURRENT MEDICATIONS:  SCHEDULED MEDICATIONS:   labetalol  10 mg IntraVENous Once    sennosides-docusate sodium  2 tablet Oral Daily    polyethylene glycol  17 g Oral Daily    magnesium sulfate  4,000 mg IntraVENous Daily    melatonin  5 mg Oral Nightly    bisacodyl  10 mg Oral Daily    pantoprazole  40 mg Oral QAM AC    enoxaparin  40 mg SubCUTAneous Daily    promethazine  12.5 mg IntraMUSCular Once    atorvastatin  80 mg Oral Nightly    niMODipine  60 mg Oral 6 times per day    sodium chloride flush  5-40 mL IntraVENous 2 times per day     CONTINUOUS INFUSIONS:   sodium chloride Stopped (22 1311)    sodium chloride Stopped (220)     PRN MEDICATIONS:   bisacodyl, hydrALAZINE, acetaminophen, melatonin, magnesium hydroxide, oxyCODONE, ondansetron **OR** ondansetron, sodium chloride flush, sodium chloride, fentanNYL    VITALS:  Temperature Range: Temp: 98 °F (36.7 °C) Temp  Av.1 °F (36.7 °C)  Min: 98 °F (36.7 °C)  Max: 98.3 °F (36.8 °C)  BP Range: Systolic (62SVX), VZN:089 , Min:125 , OSH:968     Diastolic (68CNV), WLD:22, Min:58, Max:102    Pulse Range: Pulse  Av.3  Min: 55  Max: 84  Respiration Range: Resp  Av.5  Min: 13  Max: 26  Current Pulse Ox: SpO2: 97 %  24HR Pulse Ox Range: SpO2  Av.7 %  Min: 93 %  Max: 98 %  Patient Vitals for the past 12 hrs:   BP Temp Temp src Pulse Resp SpO2   22 0600 (!) 136/90 -- -- 66 17 97 %   22 0500 (!) 136/90 -- -- 65 16 95 %   22 0400 (!) 133/95 -- -- 63 16 93 %   04/25/22 0300 131/77 -- -- 65 17 96 %   22 0200 (!) 125/58 -- -- 84 26 96 %   22 0100 (!) 141/100 -- -- 58 14 94 %   04/25/22 0000 134/89 98 °F (36.7 °C) Oral 64 13 98 %   04/24/22 2300 132/81 -- -- 62 18 98 %   04/24/22 2200 134/83 -- -- 60 16 --   04/24/22 2100 (!) 141/94 -- -- 72 17 94 %   04/24/22 2000 (!) 154/102 98 °F (36.7 °C) Oral 73 19 97 %     Estimated body mass index is 28.83 kg/m² as calculated from the following:    Height as of this encounter: 5' 2\" (1.575 m). Weight as of this encounter: 157 lb 10.1 oz (71.5 kg).  []<16 Severe malnutrition  []16-16.99 Moderate malnutrition  []17-18.49 Mild malnutrition  []18.5-24.9 Normal  [x]25-29.9 Overweight (not obese)  []30-34.9 Obese class 1 (Low Risk)  []35-39.9 Obese class 2 (Moderate Risk)  []?40 Obese class 3 (High Risk)    RECENT LABS:   Lab Results   Component Value Date    WBC 14.4 (H) 04/25/2022    HGB 13.6 04/25/2022    HCT 39.8 04/25/2022     04/25/2022    CHOL 182 04/18/2022    TRIG 60 04/18/2022    HDL 74 04/18/2022    ALT 16 04/20/2022    AST 15 04/20/2022     04/25/2022    K 3.7 04/25/2022     04/25/2022    CREATININE 0.66 04/25/2022    BUN 14 04/25/2022    CO2 22 04/25/2022    TSH 2.18 04/20/2022    INR 0.9 04/18/2022    LABA1C 5.5 04/18/2022     24 HOUR INTAKE/OUTPUT:    Intake/Output Summary (Last 24 hours) at 4/25/2022 0748  Last data filed at 4/25/2022 0600  Gross per 24 hour   Intake 867.79 ml   Output 4874 ml   Net -4006.21 ml       IMAGING:   TCD 4/24--> Peak velocities left [id number], Right -140s, Lindegaard ratio 3.2 bilaterally   TCD 4/23--> peak velocities left MCA-156, Right [id number], Lindegard ratio left 3.49, right 3.38. TCD 4/22-->peak velocities left 214 right 168 Lindegard ratio left 2.54, Right-3.67      TCD 4/21/22-->peak velocities 108 on the right lindegard ratio approx 2 on the left and 3.3 on the right      TCD 4/19/22-Peak velocities on the left 80's and peak velocities on the right 103.  Left Lindegaard ratio=2.07 and right lindegaard ratio-2.22.      CT head WO 4/20/22      Impression   Scattered subarachnoid hemorrhage that is decreased compared to prior   examination.       Interval KRISSY aneurysm coiling.            CT head WO 4/17/22 @10:48 PM   Impression   Diffuse subarachnoid hemorrhage.  Recommend CTA for further evaluation.      CTA head and neck 4/18/22   Impression   Aneurysm arising from the anterior communicating artery measuring 3 mm x 2 mm.      CXR 4/18/22  Impression   No acute cardiopulmonary process.      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 --the above treated with Smart coil embolization x3, Vu 1. Smart coil x1 opened but not detached. --Prominent musculocutaneous branch from the left V2 segment that anastomoses to the left occipital artery. 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              Labs and Images reviewed with:  [x] Dr. Kisha Barnes:  Well developed, well nourished, alert and oriented x 3, in no acute distress. GCS 15. Nontoxic. No dysarthria. No aphasia.    HEAD:  normocephalic, atraumatic    EYES:  PERRLA, EOMI.   ENT:  moist mucous membranes   NECK:  supple, symmetric   LUNGS:  Equal air entry bilaterally   CARDIOVASCULAR:  normal s1 / s2, RRR, distal pulses intact   ABDOMEN:  Soft, no rigidity   NEUROLOGIC:  Mental Status:  A & O x3,awake             Cranial Nerves:    cranial nerves II-XII are grossly intact    Motor Exam:    Drift:  absent  Tone:  normal    Motor exam is symmetrical 5 out of 5 all extremities bilaterally    Sensory:    Touch:    Right Upper Extremity:  normal  Left Upper Extremity:  normal  Right Lower Extremity:  normal  Left Lower Extremity:  normal    Deep Tendon Reflexes:    Right Bicep:  1+  Left Bicep:  1+  Right Knee:  1+  Left Knee:  1+    Plantar Response:  Right: downgoing  Left:  downgoing    Clonus:  absent  Andino's:  absent    Coordination/Dysmetria:  Heel to Shin:  Right:  normal  Left:  normal  Finger to Nose:   Right:  normal  Left:  normal   Dysdiadochokinesia:  absent    Gait:  normal       DRAINS:  EVD open draining [id number] placed 4/18/22  ASSESSMENT AND PLAN:     ASSESSMENT:      This is a 32 y.o. female with acute worst headache of her life found to have diffuse subarachnoid hemorrhage presenting to [address] [place] [address] Drive, [address] denies any significant past medical history and does not take any daily medications.  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  Patient admitted under neuro ICU services with endovascular neurosurgery consulted planning for urgent evaluation and likely DSA 4/18/2022 first thing in the morning.  Patient underwent emergent EVD placement at bedside 4/18/2022 and diagnostic angio with primary coil embolization of a comm aneurysm successful.  Postoperatively being managed by neuro ICU for vasospasm monitoring.     Patient care will be discussed with attending, will reevaluate patient along with attending.      PLAN/MEDICAL DECISION MAKING:        NEUROLOGIC:     - Imaging   CT head WO-diffuse SAH  CTA head and Neck-ACOM aneurysm   IR angio-4/18 AM-primary coil embolization  Neurosurgery-Following placed EVD 4/18 open at 25 cmH20 total output 214CC yesterday   - AEDs loaded 1gram keppra at [place]  keppra 500BID course completed 4 days total  - trial topamax 100mg PO 4/25/22 early afternoon follow response for headache   -nimodipine 60mg Q4 hours Day 7  -FU Mag with goal >2.5 currently on 4 g IV twice daily  -Noted to have central salt wasting with large UOP s/p 2% infusion, no longer running.   - Na goal eunatremia and euvolemic   - Neuro checks per protocol     CARDIOVASCULAR:  - Goal SBP 140-180  - PRN IV labetalol 10mg Q4 hour for SBP >180  - Continue telemetry     PULMONARY:  - 99% on room air      RENAL/FLUID/ELECTROLYTE:  - BUN 14 / Creatinine 0.53  - Na 137  - Urine output In/ OEW  405/7,103  - IVF: 125cc/hr NS Will bolus 1L normal saline 4/25 AM given net -4L yesterday   - Magnesium IV 4 grams BID mag level 2.2 today   - Replace electrolytes PRN  - Daily BMP     GI/NUTRITION:  NUTRITION: general diet   - Bowel regimen: glycolax  - GI prophylaxis: protonix      ID:  Adonay Byrd  - MFF 35.9 suspect steroid induced increase from decadron    - CSF studies not infectious appearing  - Continue to monitor for fevers  - Daily CBC     HEME:   - H&H 13.6/39.8  - Platelets 293  - Daily CBC     ENDOCRINE:  - Continue to monitor blood glucose, goal <180   -HbA1c 5.5 4/18/2022 and lipid panel total cholesterol 182, HDL 74, LDL 96  -TSH 2.18 4/20/22     OTHER:  - PT/OT/ST      PROPHYLAXIS:   Stress ulcer: protonix      DVT PROPHYLAXIS:  - SCD sleeves - Thigh High   - GERMAINE stockings - Thigh High  - lovenox          DISPOSITION:  [x] To remain ICU: We will continue to follow along. For any changes in exam or patient status please contact Neuro Critical Care.       Lesia Loja MD   PGY-3 Neurology Resident   Neuro Critical Care  Pager [phone]  4/25/2022     7:48 AM

## 2022-04-26 LAB
ANION GAP SERPL CALCULATED.3IONS-SCNC: 13 MMOL/L (ref 9–17)
ANION GAP SERPL CALCULATED.3IONS-SCNC: 14 MMOL/L (ref 9–17)
BUN BLDV-MCNC: 10 MG/DL (ref 6–20)
BUN BLDV-MCNC: 7 MG/DL (ref 6–20)
CALCIUM SERPL-MCNC: 8.4 MG/DL (ref 8.6–10.4)
CALCIUM SERPL-MCNC: 8.4 MG/DL (ref 8.6–10.4)
CHLORIDE BLD-SCNC: 105 MMOL/L (ref 98–107)
CHLORIDE BLD-SCNC: 108 MMOL/L (ref 98–107)
CO2: 17 MMOL/L (ref 20–31)
CO2: 17 MMOL/L (ref 20–31)
CREAT SERPL-MCNC: 0.76 MG/DL (ref 0.5–0.9)
CREAT SERPL-MCNC: 0.83 MG/DL (ref 0.5–0.9)
CULTURE: ABNORMAL
DIRECT EXAM: ABNORMAL
GFR AFRICAN AMERICAN: >60 ML/MIN
GFR AFRICAN AMERICAN: >60 ML/MIN
GFR NON-AFRICAN AMERICAN: >60 ML/MIN
GFR NON-AFRICAN AMERICAN: >60 ML/MIN
GFR SERPL CREATININE-BSD FRML MDRD: ABNORMAL ML/MIN/{1.73_M2}
GFR SERPL CREATININE-BSD FRML MDRD: ABNORMAL ML/MIN/{1.73_M2}
GLUCOSE BLD-MCNC: 117 MG/DL (ref 65–105)
GLUCOSE BLD-MCNC: 145 MG/DL (ref 70–99)
GLUCOSE BLD-MCNC: 151 MG/DL (ref 70–99)
HCT VFR BLD CALC: 39.8 % (ref 36.3–47.1)
HEMOGLOBIN: 12.9 G/DL (ref 11.9–15.1)
MAGNESIUM: 2.6 MG/DL (ref 1.6–2.6)
MCH RBC QN AUTO: 29.3 PG (ref 25.2–33.5)
MCHC RBC AUTO-ENTMCNC: 32.4 G/DL (ref 28.4–34.8)
MCV RBC AUTO: 90.5 FL (ref 82.6–102.9)
NRBC AUTOMATED: 0 PER 100 WBC
PDW BLD-RTO: 12.7 % (ref 11.8–14.4)
PLATELET # BLD: 302 K/UL (ref 138–453)
PMV BLD AUTO: 10 FL (ref 8.1–13.5)
POTASSIUM SERPL-SCNC: 3.4 MMOL/L (ref 3.7–5.3)
POTASSIUM SERPL-SCNC: 3.9 MMOL/L (ref 3.7–5.3)
RBC # BLD: 4.4 M/UL (ref 3.95–5.11)
SODIUM BLD-SCNC: 135 MMOL/L (ref 135–144)
SODIUM BLD-SCNC: 136 MMOL/L (ref 135–144)
SODIUM BLD-SCNC: 138 MMOL/L (ref 135–144)
SODIUM BLD-SCNC: 138 MMOL/L (ref 135–144)
SODIUM BLD-SCNC: 139 MMOL/L (ref 135–144)
SPECIMEN DESCRIPTION: ABNORMAL
WBC # BLD: 12.8 K/UL (ref 3.5–11.3)

## 2022-04-26 PROCEDURE — 2580000003 HC RX 258: Performed by: NURSE PRACTITIONER

## 2022-04-26 PROCEDURE — 6360000002 HC RX W HCPCS: Performed by: STUDENT IN AN ORGANIZED HEALTH CARE EDUCATION/TRAINING PROGRAM

## 2022-04-26 PROCEDURE — APPSS30 APP SPLIT SHARED TIME 16-30 MINUTES: Performed by: REGISTERED NURSE

## 2022-04-26 PROCEDURE — 84295 ASSAY OF SERUM SODIUM: CPT

## 2022-04-26 PROCEDURE — 99233 SBSQ HOSP IP/OBS HIGH 50: CPT | Performed by: PSYCHIATRY & NEUROLOGY

## 2022-04-26 PROCEDURE — 82947 ASSAY GLUCOSE BLOOD QUANT: CPT

## 2022-04-26 PROCEDURE — 2580000003 HC RX 258: Performed by: INTERNAL MEDICINE

## 2022-04-26 PROCEDURE — 6370000000 HC RX 637 (ALT 250 FOR IP): Performed by: HEALTH CARE PROVIDER

## 2022-04-26 PROCEDURE — 6370000000 HC RX 637 (ALT 250 FOR IP): Performed by: NURSE PRACTITIONER

## 2022-04-26 PROCEDURE — 6360000002 HC RX W HCPCS: Performed by: NURSE PRACTITIONER

## 2022-04-26 PROCEDURE — 93886 INTRACRANIAL COMPLETE STUDY: CPT | Performed by: PSYCHIATRY & NEUROLOGY

## 2022-04-26 PROCEDURE — 2580000003 HC RX 258: Performed by: STUDENT IN AN ORGANIZED HEALTH CARE EDUCATION/TRAINING PROGRAM

## 2022-04-26 PROCEDURE — 6370000000 HC RX 637 (ALT 250 FOR IP): Performed by: STUDENT IN AN ORGANIZED HEALTH CARE EDUCATION/TRAINING PROGRAM

## 2022-04-26 PROCEDURE — 99232 SBSQ HOSP IP/OBS MODERATE 35: CPT | Performed by: NEUROLOGICAL SURGERY

## 2022-04-26 PROCEDURE — 99291 CRITICAL CARE FIRST HOUR: CPT | Performed by: PSYCHIATRY & NEUROLOGY

## 2022-04-26 PROCEDURE — 83735 ASSAY OF MAGNESIUM: CPT

## 2022-04-26 PROCEDURE — 2000000003 HC NEURO ICU R&B

## 2022-04-26 PROCEDURE — APPNB60 APP NON BILLABLE TIME 46-60 MINS: Performed by: NURSE PRACTITIONER

## 2022-04-26 PROCEDURE — 6360000002 HC RX W HCPCS: Performed by: HEALTH CARE PROVIDER

## 2022-04-26 PROCEDURE — 36415 COLL VENOUS BLD VENIPUNCTURE: CPT

## 2022-04-26 PROCEDURE — 80048 BASIC METABOLIC PNL TOTAL CA: CPT

## 2022-04-26 PROCEDURE — 85027 COMPLETE CBC AUTOMATED: CPT

## 2022-04-26 PROCEDURE — 93886 INTRACRANIAL COMPLETE STUDY: CPT

## 2022-04-26 RX ORDER — TOPIRAMATE 100 MG/1
100 TABLET, FILM COATED ORAL 2 TIMES DAILY
Status: DISCONTINUED | OUTPATIENT
Start: 2022-04-26 | End: 2022-04-29

## 2022-04-26 RX ORDER — MORPHINE SULFATE 15 MG/1
15 TABLET, FILM COATED, EXTENDED RELEASE ORAL EVERY 12 HOURS SCHEDULED
Status: DISCONTINUED | OUTPATIENT
Start: 2022-04-26 | End: 2022-04-26

## 2022-04-26 RX ORDER — 0.9 % SODIUM CHLORIDE 0.9 %
500 INTRAVENOUS SOLUTION INTRAVENOUS ONCE
Status: COMPLETED | OUTPATIENT
Start: 2022-04-26 | End: 2022-04-26

## 2022-04-26 RX ORDER — MORPHINE SULFATE 15 MG/1
15 TABLET, FILM COATED, EXTENDED RELEASE ORAL EVERY 12 HOURS SCHEDULED
Status: COMPLETED | OUTPATIENT
Start: 2022-04-26 | End: 2022-04-27

## 2022-04-26 RX ORDER — FENTANYL CITRATE 50 UG/ML
25 INJECTION, SOLUTION INTRAMUSCULAR; INTRAVENOUS EVERY 8 HOURS PRN
Status: DISCONTINUED | OUTPATIENT
Start: 2022-04-26 | End: 2022-05-02

## 2022-04-26 RX ORDER — POTASSIUM CHLORIDE 20 MEQ/1
40 TABLET, EXTENDED RELEASE ORAL 2 TIMES DAILY
Status: COMPLETED | OUTPATIENT
Start: 2022-04-26 | End: 2022-04-26

## 2022-04-26 RX ADMIN — NIMODIPINE 60 MG: 30 CAPSULE, LIQUID FILLED ORAL at 01:18

## 2022-04-26 RX ADMIN — SODIUM CHLORIDE, PRESERVATIVE FREE 5 ML: 5 INJECTION INTRAVENOUS at 21:12

## 2022-04-26 RX ADMIN — ENOXAPARIN SODIUM 40 MG: 100 INJECTION SUBCUTANEOUS at 09:31

## 2022-04-26 RX ADMIN — MILRINONE LACTATE IN DEXTROSE 0.2 MCG/KG/MIN: 200 INJECTION, SOLUTION INTRAVENOUS at 12:13

## 2022-04-26 RX ADMIN — KETOROLAC TROMETHAMINE 30 MG: 30 INJECTION, SOLUTION INTRAMUSCULAR; INTRAVENOUS at 16:55

## 2022-04-26 RX ADMIN — SODIUM CHLORIDE: 9 INJECTION, SOLUTION INTRAVENOUS at 04:58

## 2022-04-26 RX ADMIN — SODIUM CHLORIDE, PRESERVATIVE FREE 10 ML: 5 INJECTION INTRAVENOUS at 09:35

## 2022-04-26 RX ADMIN — SODIUM CHLORIDE TAB 1 GM 1 G: 1 TAB at 14:00

## 2022-04-26 RX ADMIN — MORPHINE SULFATE 15 MG: 15 TABLET, FILM COATED, EXTENDED RELEASE ORAL at 20:08

## 2022-04-26 RX ADMIN — POTASSIUM CHLORIDE 40 MEQ: 1500 TABLET, EXTENDED RELEASE ORAL at 20:10

## 2022-04-26 RX ADMIN — NIMODIPINE 60 MG: 30 CAPSULE, LIQUID FILLED ORAL at 09:31

## 2022-04-26 RX ADMIN — Medication 5 MG: at 20:10

## 2022-04-26 RX ADMIN — FENTANYL CITRATE 25 MCG: 50 INJECTION, SOLUTION INTRAMUSCULAR; INTRAVENOUS at 06:31

## 2022-04-26 RX ADMIN — NIMODIPINE 60 MG: 30 CAPSULE, LIQUID FILLED ORAL at 14:01

## 2022-04-26 RX ADMIN — SODIUM CHLORIDE TAB 1 GM 1 G: 1 TAB at 09:30

## 2022-04-26 RX ADMIN — MAGNESIUM SULFATE IN WATER 4000 MG: 40 INJECTION, SOLUTION INTRAVENOUS at 12:20

## 2022-04-26 RX ADMIN — SODIUM CHLORIDE TAB 1 GM 1 G: 1 TAB at 16:55

## 2022-04-26 RX ADMIN — TOPIRAMATE 100 MG: 100 TABLET, FILM COATED ORAL at 21:10

## 2022-04-26 RX ADMIN — ONDANSETRON 4 MG: 2 INJECTION INTRAMUSCULAR; INTRAVENOUS at 19:30

## 2022-04-26 RX ADMIN — ATORVASTATIN CALCIUM 80 MG: 80 TABLET, FILM COATED ORAL at 20:10

## 2022-04-26 RX ADMIN — TOPIRAMATE 50 MG: 25 TABLET, FILM COATED ORAL at 09:31

## 2022-04-26 RX ADMIN — BISACODYL 10 MG: 5 TABLET, COATED ORAL at 09:31

## 2022-04-26 RX ADMIN — NIMODIPINE 60 MG: 30 CAPSULE, LIQUID FILLED ORAL at 21:10

## 2022-04-26 RX ADMIN — POTASSIUM CHLORIDE 40 MEQ: 1500 TABLET, EXTENDED RELEASE ORAL at 09:32

## 2022-04-26 RX ADMIN — FENTANYL CITRATE 25 MCG: 50 INJECTION, SOLUTION INTRAMUSCULAR; INTRAVENOUS at 04:01

## 2022-04-26 RX ADMIN — ACETAMINOPHEN 650 MG: 325 TABLET ORAL at 19:30

## 2022-04-26 RX ADMIN — SODIUM CHLORIDE 500 ML: 9 INJECTION, SOLUTION INTRAVENOUS at 17:08

## 2022-04-26 RX ADMIN — DOCUSATE SODIUM 50 MG AND SENNOSIDES 8.6 MG 2 TABLET: 8.6; 5 TABLET, FILM COATED ORAL at 09:31

## 2022-04-26 RX ADMIN — FENTANYL CITRATE 25 MCG: 50 INJECTION, SOLUTION INTRAMUSCULAR; INTRAVENOUS at 01:21

## 2022-04-26 RX ADMIN — OXYCODONE 5 MG: 5 TABLET ORAL at 09:24

## 2022-04-26 RX ADMIN — MORPHINE SULFATE 15 MG: 15 TABLET, FILM COATED, EXTENDED RELEASE ORAL at 12:09

## 2022-04-26 RX ADMIN — NALOXEGOL OXALATE 12.5 MG: 12.5 TABLET, FILM COATED ORAL at 12:15

## 2022-04-26 RX ADMIN — POLYETHYLENE GLYCOL 3350 17 G: 17 POWDER, FOR SOLUTION ORAL at 09:31

## 2022-04-26 RX ADMIN — PANTOPRAZOLE SODIUM 40 MG: 40 TABLET, DELAYED RELEASE ORAL at 09:31

## 2022-04-26 RX ADMIN — NIMODIPINE 60 MG: 30 CAPSULE, LIQUID FILLED ORAL at 05:25

## 2022-04-26 RX ADMIN — FENTANYL CITRATE 25 MCG: 50 INJECTION, SOLUTION INTRAMUSCULAR; INTRAVENOUS at 14:04

## 2022-04-26 RX ADMIN — NIMODIPINE 60 MG: 30 CAPSULE, LIQUID FILLED ORAL at 16:55

## 2022-04-26 RX ADMIN — KETOROLAC TROMETHAMINE 30 MG: 30 INJECTION, SOLUTION INTRAMUSCULAR; INTRAVENOUS at 05:25

## 2022-04-26 RX ADMIN — MAGNESIUM SULFATE IN WATER 4000 MG: 40 INJECTION, SOLUTION INTRAVENOUS at 21:10

## 2022-04-26 ASSESSMENT — PAIN SCALES - GENERAL
PAINLEVEL_OUTOF10: 9
PAINLEVEL_OUTOF10: 5
PAINLEVEL_OUTOF10: 0
PAINLEVEL_OUTOF10: 9
PAINLEVEL_OUTOF10: 5
PAINLEVEL_OUTOF10: 7
PAINLEVEL_OUTOF10: 8
PAINLEVEL_OUTOF10: 5

## 2022-04-26 ASSESSMENT — PAIN DESCRIPTION - LOCATION: LOCATION: HEAD

## 2022-04-26 ASSESSMENT — PAIN DESCRIPTION - DESCRIPTORS: DESCRIPTORS: ACHING

## 2022-04-26 NOTE — CARE COORDINATION
Transitional Plannin: Patient's plan is to return home  following discharge. Patient has family that can be there  to assist with her care.

## 2022-04-26 NOTE — PROGRESS NOTES
Daily Progress Note  Neuro Critical Care    Patient Name: Waylon Alvarado  Patient : 1990  Room/Bed: 2873/3802-69  Code Status: Full Code   Allergies: No Known Allergies    CHIEF COMPLAINT:     Headache    INTERVAL HISTORY    Initial Presentation (Admitted 22 @00:45AM): The patient is a 32 y.o.  female who presents to our emergency department 2022 via [address] as a transfer from [facility] acute worst headache of her life. Ekaterina Vergara does not have any significant past medical history in our chart and denies any.  [facility] without demonstrating diffuse subarachnoid hemorrhage.  Patient was seen by telestroke physician in our group recommending transfer to our facility for further work-up.  In the emergency department patient states that she was washing TV with her child when she began to have severe pounding headache 10 out of 10 worst in her life. Ekaterina Vergara noted to be severely nauseous requiring multiple doses of IV Zofran and IM Phenergan.  Patient also admitting to photosensitivity.  Patient had significant hypertension at [place] started on IV nicardipine given 1 g of Keppra 10 mg Decadron.     Arrival to our emergency department 140/102, heart rate 77 temperature 97. 6.  Initial labs PTT 25.3, INR 1.0, WBC 13.7, platelets 772, glucose 139 and high-sensitivity troponin less than 6.  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  Case discussed with senior endovascular fellow who is aware of case and current neurologic exam planning for emergent DSA 2022 early morning.     For [facility] Hunt&De Souza: 2, Modified Combs: grade 3     Hospital Course:   -around 8:00 AM Dr. Gabriela Garcia bedside placing EVD prior to endovascular neurosurgery. Ekaterina Vergara went for urgent DSA found to have a comm aneurysm treated by primary coil embolization.   : Hemodynamically stable for arterial line readings SBP 118-142, heart rate 82-99 T-max 98. 9.  Currently receiving María 5 mg every 4 hours as needed and fentanyl 25 every 2 as needed for pain control. 4/20: Blood pressure within parameter -139 overnight Cardene has been held off since 6 PM 4/19/2022 not requiring any as needed IV medications, heart rate 65-79 T-max 98.5.  Serum magnesium 2.3 was started on IV magnesium 2 g twice daily will increase to 4 g twice daily continue to trend magnesium daily.  Of note yesterday patient started to have increased urine output 9 L total in 24 hours urine studies sent consistent with central salt wasting patient started on hypertonic saline 2% 150 cc bolus followed by 75 cc/h maintenance.  Sodium checks every 8 hours with a sodium goal of 145.  4/21: Vitals stable overnight blood pressures trending slightly up although okay to liberalize SBP <160 today. Overnight -152, HR 69-80, tmax 99.3. Labs pending this AM although sodium noted to drop from 137 @17:03 yesterday to 129 @midnight. Patient given 150CC bolus 2% and pending repeat this AM. Will send repeat urine studies and discussed with patient she has been drinking excess water in large water bottle at bedside so to limit today to Gatorade and 1-1.5L max. Headache continues to be stable same as yesterday 6/10 constant pressure and throbbing bifrontal location. Will get repeat TCD today to ensure no increase in vasospasm. No bowel movement X3 days giving dulcolax and milk of mag continue to monitor. protonix 40 and starting decadron 4Q6hr for headache decrease oxy pain med Q6hr.   4/22: Blood pressure goal liberalized to SBP <160 yesterday although pressures trending up still -172 overnight (giving IV labetalol 10mg early AM) , HR 64-83, tmax 98. 6.  patient given 1L nacl bolus yesterday following slightly compressible IVC on ultrasound as she continues to have central salt wasting urine output slightly decreasing over last 3 days although 5,989 still yesterday. Discontinue hypertonic 2% 75cc/hr, start NaCl bolus 1L this AM and continue with 125cc/hr. Sodium goal can liberalize to 140 if Na <135 resume 2% hypertonic at 75cc/hr. Still no BM receiving dulcolax, milk of mag and glycolax. Headache improved slightly today 5/10. EVD remains open at [id number] and decreasing output 189 over last 24 hours. Transcranial dopplers reviewed slightly increasing vasospasm on the right velocities up to low 100s Lindegaard ratio up from 2-->3. 3.   4/23: EVD to 20cm H2O. EVD output 80cc overnight, high UOP overnight although improved. Headache improving, ambulating well. 4/24: EVD open draining at [id number] total output 95cc over last 24 hours. Hypertensive overnight -187, HR 50-72, tmax 98.6. 72 hours of decadron 4mg Q6 completed overnight. Patient complaining of constipation has been on bowel regimen with glycolax, milk of mag and PO dulcolax. Added dulcolax suppository this morning small BM still very uncomfortable thus will add fleets PRN also. TCD's increasing left sided velocities from day prior peak left MCA velocity 156 peak right 160 Lindgard ratio left 3.49 up from 2.54 and right 3.38 down from 3.67. CT head WO this morning prior to toradol. Discussing with NS also when okay to give toradol given EVD in place. 4/25:  Failed EVD clamp trial yesterday increased ICP thus remains open at 25 with total output of 134cc/12 and 214 for the full 24 hours. Severe headache; Topamax 100mg QD and Mag 4g BID added. EVD lowered to 10mmgHg. TCD suggested mild right MCA and basilar spasm. Started on Milrinone infusion. Last 24h:  Hyponatremic yesterday evening; started on [email or web address] x6h with improvement, switched back to [email or web address] this AM.  Headache improved yesterday evening/overnight but started to become severe again this morning. Will trial Morphine ER 15mg BID x48h, decrease PRN Fentanyl to 25mg Q8h PRN. Increase Topamax to 100mg BID.   Last BM on Saturday, given narcotic usage will trial dose of Movantik. Continues on aggressive bowel regimen and took PRN Milk of Mag overnight. Clinical exam remains overall stable. No focal deficits on exam.  EVD set at [address] [place], ICP 10-17, 331cc out/24h. TCD  overall stable; mild elevations of right MCA (mean velocities 129, LR 4.07). Continues on Milrinone 0.20mcg/kg/min.     CURRENT MEDICATIONS:  SCHEDULED MEDICATIONS:   magnesium sulfate  4,000 mg IntraVENous BID    topiramate  50 mg Oral BID    lidocaine 1 % injection  5 mL IntraDERmal Once    sodium chloride flush  5-40 mL IntraVENous 2 times per day    lidocaine 1 % injection  5 mL IntraDERmal Once    sodium chloride flush  5-40 mL IntraVENous 2 times per day    sodium chloride  1 g Oral TID WC    sennosides-docusate sodium  2 tablet Oral Daily    polyethylene glycol  17 g Oral Daily    melatonin  5 mg Oral Nightly    bisacodyl  10 mg Oral Daily    pantoprazole  40 mg Oral QAM AC    enoxaparin  40 mg SubCUTAneous Daily    atorvastatin  80 mg Oral Nightly    niMODipine  60 mg Oral 6 times per day    sodium chloride flush  5-40 mL IntraVENous 2 times per day     CONTINUOUS INFUSIONS:   milrinone 0.2 mcg/kg/min (22 0500)    sodium chloride      sodium chloride      sodium chloride 125 mL/hr at 22 0500    sodium chloride Stopped (22)     PRN MEDICATIONS:   ketorolac, sodium chloride flush, sodium chloride, sodium chloride flush, sodium chloride, bisacodyl, acetaminophen, melatonin, magnesium hydroxide, oxyCODONE, ondansetron **OR** ondansetron, sodium chloride flush, sodium chloride, fentanNYL    VITALS:  Temperature Range: Temp: 98.3 °F (36.8 °C) Temp  Av.1 °F (36.7 °C)  Min: 97.7 °F (36.5 °C)  Max: 98.6 °F (37 °C)  BP Range: Systolic (36YKX), EUM:574 , Min:129 , OIL:078     Diastolic (10IUO), SXD:35, Min:60, Max:105    Pulse Range: Pulse  Av.4  Min: 58  Max: 84  Respiration Range: Resp  Av  Min: 14  Max: 23  Current Pulse Ox: SpO2: 98 %  24HR Pulse Ox Range: SpO2  Av.1 %  Min: 88 %  Max: 99 %  Patient Vitals for the past 12 hrs:   BP Temp Temp src Pulse Resp SpO2   22 0700 134/81 -- -- 64 17 98 %   22 0600 (!) 145/85 -- -- 72 16 98 %   22 0500 (!) 140/89 -- -- 76 18 97 %   22 0400 (!) 140/78 98.3 °F (36.8 °C) Oral 83 14 97 %   22 0300 (!) 142/84 -- -- 75 15 99 %   22 0200 (!) 142/90 -- -- 71 15 97 %   22 0100 (!) 144/100 -- -- 73 18 96 %   22 0000 131/79 98.2 °F (36.8 °C) Oral 72 20 98 %   22 2300 135/84 -- -- 71 18 96 %   22 2200 (!) 144/93 -- -- 74 16 97 %   22 2100 134/88 -- -- 70 21 98 %     Estimated body mass index is 28.83 kg/m² as calculated from the following:    Height as of this encounter: 5' 2\" (1.575 m).     Weight as of this encounter: 157 lb 10.1 oz (71.5 kg).  []<16 Severe malnutrition  []16-16.99 Moderate malnutrition  []17-18.49 Mild malnutrition  []18.5-24.9 Normal  [x]25-29.9 Overweight (not obese)  []30-34.9 Obese class 1 (Low Risk)  []35-39.9 Obese class 2 (Moderate Risk)  []?40 Obese class 3 (High Risk)    RECENT LABS:   Lab Results   Component Value Date    WBC 12.8 (H) 2022    HGB 12.9 2022    HCT 39.8 2022     2022    CHOL 182 2022    TRIG 60 2022    HDL 74 2022    ALT 16 2022    AST 15 2022     2022    K 3.4 (L) 2022     (H) 2022    CREATININE 0.83 2022    BUN 10 2022    CO2 17 (L) 2022    TSH 2.18 2022    INR 0.9 2022    LABA1C 5.5 2022     24 HOUR INTAKE/OUTPUT:    Intake/Output Summary (Last 24 hours) at 2022 0503  Last data filed at 2022 0700  Gross per 24 hour   Intake 3474.56 ml   Output 5361 ml   Net -1886.44 ml       IMAGING:   ECHO Complete 2D W Doppler W Color  Result Date: 2022  Transthoracic Echocardiography Report (TTE)  Patient Name Jose Leyva    Date of Study 04/21/2022               LINDA KING   Date of      1990  Gender                        Female  Birth   Age          29 year(s)  Race                             Room Number  516         Height:                       62 inch, 157.48 cm   Corporate ID [id number]    Weight:                       161 pounds, 73 kg  #   Patient Acct [de-identified]   BSA:           1.74 m^2       BMI:      29.45  #                                                                kg/m^2   MR #         [id number]     Sonographer                   Ana Donovan   Accession #  [id number]  Interpreting Physician        [address] [place] [address]   Fellow                   Referring Nurse Practitioner   Interpreting             Referring Physician           Sebas Bonilla MD  Fellow  Type of Study   TTE procedure:2D Echocardiogram, M-Mode, Doppler, Color Doppler, Bubble  Study. Procedure Date Date: 04/21/2022 Start: 06:37 PM Study Location: [facility] Indications:CVA. History / Tech. Comments: SAH Patient Status: Inpatient Height: 62 inches Weight: 161 pounds BSA: 1.74 m^2 BMI: 29.45 kg/m^2 HR: 72 bpm CONCLUSIONS Summary Negative bubble study. (no right to left intra-cardiac shunt via agitated saline ). Left ventricle is normal in size with normal systolic function globally. Calculated ejection fraction is 65% Mitral valve structure is normal. Mild mitral regurgitation. Tricuspid valve structure is normal. Mild tricuspid regurgitation. Estimated right ventricular systolic pressure is 31 mmHg.     VL TRANSCRANIAL DOPPLER COMPLETE  Result Date: 4/25/2022    [facility]  Vascular Transcranial Procedure   Patient Name  Mackenzie Medina    Date of Study         04/25/2022                LINDA KING   Date of Birth 1990  Gender                Female   Age           29 year(s)  Race                     Room Number   6435        Height:               62 inch, 157.48 cm   Corporate ID  [id number]    Weight:               161 pounds, 73 kg  #   Patient Acct  [de-identified]   BSA:       1.74 m^2   BMI:         29.45 kg/m^2  #   MR #          [id number]     Sonographer           Iraida Barkley, RVT, [facility]   Accession #   [id number]  Interpreting          Delos Reyes,Arthur                            Physician   Referring                 Referring Physician   Mayra Robison  Nurse  Practitioner  Additional Comments CLASSIFICATION OF VASOSPASM MEAN MCA VELOCITY ----- MCA/ICA VELOCITY RATIO ------- INTERPRETATION <120 cm/sec --------------------- less than 3 --------------------------- Normal, nonspecific elevation or distal MCA spasm >120 cm/sec ------------------------ 3 to 6 -------------------------------- Mild vasospasm of proximal MCA >160 cm/sec ------------------------ 3 to 6 -------------------------------- Moderate vasospasm of proximal MCA >200 cm/sec -------------------- greater than 6------------------------- Severe vasospasm of proximal MCA. MEAN BASILAR ARTERY VELOCITY------- INTERPRETATION >60 cm/sec -------------------------------------------- Mild BA vasospasm >90 cm/sec -------------------------------------------- Moderate BA vasospasm >120 cm/sec ------------------------------------------ Severe BA vasospasm. Procedure Type of Study:   Cerebral: Transcranial.  Indications for Study:Subarachnoid hemorrhage. Patient Status: In Patient. Conclusions   Summary   Mild vasospasm noted in the right MCA and basilar artery. Labs and Images reviewed with:  [x] Dr. Jake Lynn:  Alert and oriented x 3. Appears uncomfortable, keeping eyes closed. GCS 15. No dysarthria. No aphasia.    HEAD:  normocephalic, atraumatic    EYES:  PERRL, EOMI.   ENT:  moist mucous membranes   NECK:  supple, symmetric   LUNGS:  Equal air entry bilaterally, clear   CARDIOVASCULAR:  normal s1 / s2, RRR, distal pulses intact   ABDOMEN:  Soft, no rigidity, normal bowel sounds   NEUROLOGIC:  Mental Status:  A & O x3, Awake Cranial Nerves:    cranial nerves II-XII are grossly intact    Motor Exam:    Drift:  absent  Tone:  normal    Motor exam is symmetrical 5 out of 5 all extremities bilaterally    Sensory:    Touch:    Right Upper Extremity:  normal  Left Upper Extremity:  normal  Right Lower Extremity:  normal  Left Lower Extremity:  normal    Coordination/Dysmetria:  Finger to Nose:   Right:  normal  Left:  normal        DRAINS:  EVD set at [address] [place], management per NSG. ASSESSMENT AND PLAN:       This is a 32 y.o. female with no significant medical history who initially presented to [facility] ED with thunderclap headache and was found to have diffuse subarachnoid hemorrhage. Underwent emergent EVD placement at bedside 4/18/2022. 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 Neuro ICU course complicated by cerebral salt wasting and mild right MCA vasospasm.       Patient care will be discussed with attending, will reevaluate patient along with attending.      NEUROLOGIC:  - Diffuse SAH with IVH secondary to ruptured ACOM aneurysm  - POD #8 s/p coil embolization of the ruptured ACOM aneurysm  - Continue Nimodipine 60mg Q4h  - Mild R MCA vasospasm ? symptomatic with worsening headache, no focal deficits  - TCD 4/26 continues to show mild vasospasm in right right MCA (mean velocities 129, LR 4.07)  - Continue on Milrinone 0.20mcg/kg/min  - Goal -220, avoid symptomatic hypotension  - Obstructive hydrocephalus s/p EVD placement 4/18  - Failed EVD clamp trial 4/24 due to elevated ICH (25) and head pressure  - EVD set at 10mmHg, ICP 10-17, 331cc out/24h  - Headache management; increase Topamax to 100mg BID, trial Morphine ER 15mg BID, stop PRN Oxy, decrease frequency of Fentanyl to 25mcg Q8h PRN  - Neuro checks per protocol     CARDIOVASCULAR:  - Goal -220, avoid symptomatic hypotension  - Echo EF 65%, negative bubble  - Continue telemetry     PULMONARY:  - Maintaining O2 sats on room air  - Incentive spirometry     RENAL/FLUID/ELECTROLYTE:  - Normal renal functioning  - BUN 10/ Creatinine 0.83  - Monitor I&O; 6344/5200  - Cerebral salt wasting secondary to [facility]  - Goal eunatremia, Q8h sodium checks, sodium 139 this AM  - Continue salt tabs 1g TID  - Continue [email or web address]   - Goal Mag>2.5; continue Magnesium IV 4 grams BID to help with headaches  - Hypokalemia, K 3.4; replace with 80meq potassium chloride  - Replace electrolytes PRN  - Daily BMP     GI/NUTRITION:  NUTRITION: General Diet  - Tolerating diet well  - Last BM 4/23  - Give one dose of Movantik today given narcotic requirements  - Bowel regimen: Continue Dulcolax PO, Glycolax, Senokot-S daily and Milk of Mag PRN  - GI prophylaxis: N/A      ID:  - Afebrile, Tmax 36.8C  - Leukocytosis likely secondary to steroids, improving, WBC 12.8  - CSF culture 4/23 negative to date  - Monitor off antibiotics  - Daily CBC     HEME:   - H&H 12.9/39.8  - Platelets 302  - Daily CBC     ENDOCRINE:  - Continue to monitor blood glucose, goal <180     OTHER:  - PT/OT/ST     PROPHYLAXIS:   Stress ulcer: N/A     DVT PROPHYLAXIS:  - SCD sleeves - Thigh High   - Lovenox    DISPOSITION:  [x] To remain ICU for close neurological monitoring. We will continue to follow along. For any changes in exam or patient status please contact Neuro Critical Care.       DEEDEE Pierce CNP   PGY-3 Neurology Resident   Neuro Critical Care  Pager [phone]  4/26/2022     8:12 AM

## 2022-04-26 NOTE — PLAN OF CARE
Problem: Falls - Risk of:  Goal: Will remain free from falls  Description: Will remain free from falls  Outcome: Progressing  Goal: Absence of physical injury  Description: Absence of physical injury  Outcome: Progressing     Problem: Anxiety/Stress:  Goal: Level of anxiety will decrease  Description: Level of anxiety will decrease  Outcome: Progressing     Problem: Mental Status - Impaired:  Goal: Mental status will be restored to baseline  Description: Mental status will be restored to baseline  Outcome: Progressing

## 2022-04-26 NOTE — PROGRESS NOTES
Endovascular Neurosurgery Progress Note    SUBJECTIVE:   No reported events overnight. This am pt continues to have headache, rated as \"5.5/10. \" she denies any new focal symptoms. Review of Systems:  CONSTITUTIONAL:  negative for fevers, chills, fatigue and malaise    EYES:  negative for double vision, blurred vision and photophobia     HEENT:  negative for tinnitus, epistaxis and sore throat    RESPIRATORY:  negative for cough, shortness of breath, wheezing    CARDIOVASCULAR:  negative for chest pain, palpitations, syncope, edema    GASTROINTESTINAL:  negative for nausea, vomiting    GENITOURINARY:  negative for incontinence    MUSCULOSKELETAL:  negative for neck or back pain    NEUROLOGICAL:  Negative for weakness and tingling  negative for headaches and dizziness    PSYCHIATRIC:  negative for anxiety      Review of systems otherwise negative. OBJECTIVE:     Vitals:    04/25/22 2000   BP:    Pulse:    Resp:    Temp: 97.7 °F (36.5 °C)   SpO2:         General:  Gen: normal habitus, NAD  HEENT: EVD in place. mucosa moist  Cvs: RRR, S1 S2 normal  Resp: symmetric unlabored breathing  Abd: s/nd/nt  Ext: no edema  Skin: no lesions seen, warm and dry    Neuro:  Gen: awake and alert, oriented x3. Lang/speech: no aphasia. Mild dysarthria. Follows commands. CN: PERRL, EOMI, VFF, V1-3 intact, face symmetric, hearing intact, shoulder shrug symmetric, tongue midline  Motor: grossly 5/5 UE and LE b/l  Sense: LT intact in all 4 ext. Coord: FTN and HTS intact b/l  DTR: deferred  Gait: deferred    NIH Stroke Scale:   1a  Level of consciousness: 0 - alert; keenly responsive   1b. LOC questions:  0 - answers both questions correctly   1c. LOC commands: 0 - performs both tasks correctly   2. Best Gaze: 0 - normal   3. Visual: 0 - no visual loss   4. Facial Palsy: 0 - normal symmetric movement   5a. Motor left arm: 0 - no drift, limb holds 90 (or 45) degrees for full 10 seconds   5b.   Motor right arm: 0 - no drift, Problem: Goal Outcome Summary  Goal: Goal Outcome Summary  PT 6B: Discharge Planner PT   Patient plan for discharge:   Current status: Pt continues to show good progress with strength and activity tolerance in therapy sessions. Completes supine>sitting EOB with ModA of 2. Tolerates sitting EOB ~10 min with Min-CGA. Also trialed standing from elevated EOB with ModA of 2. Not able to reach full hip/trunk extension with stands. Attempted x2 - tolerates standing ~20 sec mx each bout. Lift transfer to recliner chair with ceiling lift and Ax2.   Barriers to return to prior living situation: assist for mobility / safety, deconditioning, weakness  Recommendations for discharge: ARU  Rationale for recommendations: significantly below baseline functional mobility, showing excellent gains in therapy with mobility, very motivated to return home       Entered by: Queta Santiago 10/10/2017 4:03 PM              limb holds 90 (or 45) degrees for full 10 seconds   6a. Motor left le - no drift; leg holds 30 degree position for full 5 seconds   6b  Motor right le - no drift; leg holds 30 degree position for full 5 seconds   7. Limb Ataxia: 0 - absent   8. Sensory: 0 - normal; no sensory loss   9. Best Language:  0 - no aphasia, normal   10. Dysarthria: 1 - mild to moderate, patient slurs at least some words and at worst, can be understood with some difficulty   11. Extinction and Inattention: 0 - no abnormality         Total:   1     MRS: 1      LABS:   Reviewed. Lab Results   Component Value Date    HGB 13.6 2022    WBC 14.4 (H) 2022     2022     (L) 2022    BUN 14 2022    CREATININE 0.66 2022    AST 15 2022    ALT 16 2022    MG 2.2 2022    APTT 22.6 2022    INR 0.9 2022      Lab Results   Component Value Date    COVID19 Not Detected 2022    COVID19 Not Detected 2022       RADIOLOGY:   Images were personally reviewed including:   tcd 2022  Mild vasospasm L 's, LR 3 b/l. stable    CT head 2022:   Significant reduction with trace residual scattered subarachnoid hemorrhage.       Stable positioning of a right frontal EVD, as well as stable caliber and   configuration of the ventricles. 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 --the above treated with Smart coil embolization x3, Vu 1. Smart coil x1 opened but not detached. --Prominent musculocutaneous branch from the left V2 segment that anastomoses to the left occipital artery.     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 PBD: 7    Patient complains of moderate to severe headaches, non-focal neuro exam. 4/24/2022 clamp trial failed. TCD 4/24/2022 mild vasospasm L MCA, CT head stable, reduced SAH    PLAN:   --SBP goal 140-180 mm hg   --SAH management with daily TCDs, Nimodipine, statin  Mg level 3-4 last Mg 2.2  --f/up with Dr. Tamiko Connor in 2 weeks after discharge and f/up with Dr. Esvin Lopez in 3 months after discharge    Case discussed with Dr. Esvin Lopez attending.     Brandy Osman MD  Stroke, [facility]  [address] [facility]   Electronically signed 4/25/2022 at 9:20 PM

## 2022-04-26 NOTE — PROGRESS NOTES
Endovascular Neurosurgery Progress Note    SUBJECTIVE:   No reported events overnight. This am pt continues to have headache, rated as 4/10. she denies any new focal symptoms. Review of Systems:  CONSTITUTIONAL:  negative for fevers, chills, fatigue and malaise    EYES:  negative for double vision, blurred vision and photophobia     HEENT:  negative for tinnitus, epistaxis and sore throat    RESPIRATORY:  negative for cough, shortness of breath, wheezing    CARDIOVASCULAR:  negative for chest pain, palpitations, syncope, edema    GASTROINTESTINAL:  negative for nausea, vomiting    GENITOURINARY:  negative for incontinence    MUSCULOSKELETAL:  negative for neck or back pain    NEUROLOGICAL:  Negative for weakness and tingling  negative for headaches and dizziness    PSYCHIATRIC:  negative for anxiety      Review of systems otherwise negative. OBJECTIVE:     Vitals:    04/26/22 1600   BP: (!) 141/102   Pulse: 86   Resp: 13   Temp:    SpO2:         General:  Gen: normal habitus, NAD  HEENT: EVD in place, open to 10cm H2o. mucosa moist  Cvs: RRR, S1 S2 normal  Resp: symmetric unlabored breathing  Abd: s/nd/nt  Ext: no edema  Skin: no lesions seen, warm and dry    Neuro: on milrinone 0.2 gtt  Gen: awake and alert, oriented x3. Lang/speech: no aphasia. Mild dysarthria. Follows commands. CN: PERRL, EOMI, VFF, V1-3 intact, face symmetric, hearing intact, shoulder shrug symmetric, tongue midline  Motor: grossly 5/5 UE and LE b/l  Sense: LT intact in all 4 ext. Coord: FTN and HTS intact b/l  DTR: deferred  Gait: deferred    NIH Stroke Scale:   1a  Level of consciousness: 0 - alert; keenly responsive   1b. LOC questions:  0 - answers both questions correctly   1c. LOC commands: 0 - performs both tasks correctly   2. Best Gaze: 0 - normal   3. Visual: 0 - no visual loss   4. Facial Palsy: 0 - normal symmetric movement   5a. Motor left arm: 0 - no drift, limb holds 90 (or 45) degrees for full 10 seconds   5b. Motor right arm: 0 - no drift, limb holds 90 (or 45) degrees for full 10 seconds   6a. Motor left le - no drift; leg holds 30 degree position for full 5 seconds   6b  Motor right le - no drift; leg holds 30 degree position for full 5 seconds   7. Limb Ataxia: 0 - absent   8. Sensory: 0 - normal; no sensory loss   9. Best Language:  0 - no aphasia, normal   10. Dysarthria: 1 - mild to moderate, patient slurs at least some words and at worst, can be understood with some difficulty   11. Extinction and Inattention: 0 - no abnormality         Total:   1     MRS: 1      LABS:   Reviewed. Lab Results   Component Value Date    HGB 12.9 2022    WBC 12.8 (H) 2022     2022     2022    BUN 10 2022    CREATININE 0.83 2022    AST 15 2022    ALT 16 2022    MG 2.6 2022    APTT 22.6 2022    INR 0.9 2022      Lab Results   Component Value Date    COVID19 Not Detected 2022    COVID19 Not Detected 2022       RADIOLOGY:   Images were personally reviewed including:   tcd 2022  Mild vasospasm b/l 's, LR 3 b/l. Mildly increased    CT head 2022:   Significant reduction with trace residual scattered subarachnoid hemorrhage.       Stable positioning of a right frontal EVD, as well as stable caliber and   configuration of the ventricles. 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 --the above treated with Smart coil embolization x3, Vu 1. Smart coil x1 opened but not detached. --Prominent musculocutaneous branch from the left V2 segment that anastomoses to the left occipital artery.     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 PBD: 8    Patient complains of moderate to severe headaches, non-focal neuro exam. 4/24/2022 clamp trial failed. 4/24/2022 CT head stable, reduced SAH. 4/25/2022 TCD mild vasospasm b/l MCA, mildly worsened. PLAN:   --SBP goal 140-180 mm hg   --SAH management with daily TCDs, Nimodipine, statin, milrinone started. 3% na for hypo na x6h  Mg level 3-4 last Mg 2.6  --f/up with Dr. Cox in 2 weeks after discharge and f/up with Dr. Hong Westbrook in 3 months after discharge    Case discussed with Dr. Hong Westbrook attending.     Jamaal Sigala MD  Stroke, [facility]  [id number]  Electronically signed 4/26/2022 at 5:07 PM

## 2022-04-26 NOTE — PLAN OF CARE
Problem: Falls - Risk of:  Goal: Will remain free from falls  Description: Will remain free from falls  4/26/2022 0436 by Bonilla Fox RN  Outcome: Progressing     Problem: Falls - Risk of:  Goal: Absence of physical injury  Description: Absence of physical injury  4/26/2022 0436 by Bonilla Fox RN  Outcome: Progressing     Problem: Anxiety/Stress:  Goal: Level of anxiety will decrease  Description: Level of anxiety will decrease  4/26/2022 0436 by Bonilla Fox RN  Outcome: Progressing     Problem: Mental Status - Impaired:  Goal: Mental status will be restored to baseline  Description: Mental status will be restored to baseline  4/26/2022 0436 by Bonilla Fox RN  Outcome: Progressing     Problem: Pain:  Goal: Pain level will decrease  Description: Pain level will decrease  4/26/2022 0436 by Bonilla Fox RN  Outcome: Progressing     Problem: Pain:  Goal: Recognizes and communicates pain  Description: Recognizes and communicates pain  4/26/2022 0436 by Bonilla Fox RN  Outcome: Progressing     Problem: Pain:  Goal: Control of acute pain  Description: Control of acute pain  4/26/2022 0436 by Bonilla Fox RN  Outcome: Progressing     Problem: Pain:  Goal: Control of chronic pain  Description: Control of chronic pain  4/26/2022 0436 by Bonilla Fox RN  Outcome: Progressing     Problem: Skin Integrity - Impaired:  Goal: Will show no infection signs and symptoms  Description: Will show no infection signs and symptoms  4/26/2022 0436 by Bonilla Fox RN  Outcome: Progressing     Problem: Skin Integrity - Impaired:  Goal: Absence of new skin breakdown  Description: Absence of new skin breakdown  4/26/2022 0436 by Bonilla Fox RN  Outcome: Progressing     Problem: HEMODYNAMIC STATUS  Goal: Patient has stable vital signs and fluid balance  4/26/2022 0436 by Bonilla Fox RN  Outcome: Progressing     Problem: ACTIVITY INTOLERANCE/IMPAIRED MOBILITY  Goal: Mobility/activity is maintained at optimum level for patient  4/26/2022 0436 by Kat Willams RN  Outcome: Progressing     Problem: COMMUNICATION IMPAIRMENT  Goal: Ability to express needs and understand communication  4/26/2022 0436 by Kat Willams RN  Outcome: Progressing     Problem: Swallowing - Impaired:  Goal: Able to swallow without choking  Description: Able to swallow without choking  4/26/2022 0436 by Kat Willams RN  Outcome: Progressing     Problem: Swallowing - Impaired:  Goal: Absence of aspiration  Description: Absence of aspiration  4/26/2022 0436 by Kat Willams RN  Outcome: Progressing     Problem: Pain:  Goal: Pain level will decrease  Description: Pain level will decrease  4/26/2022 0436 by Kat Willams RN  Outcome: Progressing     Problem: Pain:  Goal: Control of acute pain  Description: Control of acute pain  4/26/2022 0436 by Kat Willams RN  Outcome: Progressing     Problem: Pain:  Goal: Control of chronic pain  Description: Control of chronic pain  4/26/2022 0436 by Kat Willams RN  Outcome: Progressing     Problem: Discharge Planning  Goal: Discharge to home or other facility with appropriate resources  4/26/2022 0436 by Kat Willams RN  Outcome: Progressing     Problem: ABCDS Injury Assessment  Goal: Absence of physical injury  4/26/2022 0436 by Kat Willams RN  Outcome: Progressing

## 2022-04-26 NOTE — PROGRESS NOTES
Neurosurgery LILLY/Resident    Daily Progress Note   Chief Complaint   Patient presents with    Altered Mental Status     4/26/2022  10:03 AM    Chart reviewed. Headache yesterday. Improved today. ICP 4-14. Vitals:    04/26/22 0400 04/26/22 0500 04/26/22 0600 04/26/22 0700   BP: (!) 140/78 (!) 140/89 (!) 145/85 134/81   Pulse: 83 76 72 64   Resp: 14 18 16 17   Temp: 98.3 °F (36.8 °C)      TempSrc: Oral      SpO2: 97% 97% 98% 98%   Weight:       Height:         PE:   AOx3   CNII-XII intact   PERRL, EOMI   Motor   No pronator drift  L deltoid 5/5; R deltoid 5/5  L biceps 5/5; R biceps 5/5  L triceps 5/5; R triceps 5/5  L intrinsics 5/5; R intrinsics 5/5      L iliopsoas 5/5 , R iliopsoas 5/5  L quadriceps 5/5; R quadriceps 5/5  L Dorsiflexion 5/5; R dorsiflexion 5/5  L Plantarflexion 5/5; R plantarflexion 5/5     Sensation intact     EVD open at 10 cmH2O, patent  Output 169 ml/12h, 331 ml/24h    Lab Results   Component Value Date    WBC 12.8 (H) 04/26/2022    HGB 12.9 04/26/2022    HCT 39.8 04/26/2022     04/26/2022    CHOL 182 04/18/2022    TRIG 60 04/18/2022    HDL 74 04/18/2022    ALT 16 04/20/2022    AST 15 04/20/2022     04/26/2022    K 3.4 (L) 04/26/2022     (H) 04/26/2022    CREATININE 0.83 04/26/2022    BUN 10 04/26/2022    CO2 17 (L) 04/26/2022    TSH 2.18 04/20/2022    INR 0.9 04/18/2022    LABA1C 5.5 04/18/2022    CRP <3.0 04/19/2022    SEDRATE 2 04/18/2022         A/P    Intraventricular hemorrhage  Aneurysmal subarachnoid hemorrhage from [facility] s/p coiling  Hydrocephalus with EVD placement              HH 02, mFS 03                            - post bleed day 8  - keep EVD open at 10 cmH2O, monitor output and ICP    Please contact neurosurgery with any changes in patients neurologic status.        Tonja Pope CNP  4/26/22  10:03 AM

## 2022-04-27 LAB
-: ABNORMAL
ANION GAP SERPL CALCULATED.3IONS-SCNC: 12 MMOL/L (ref 9–17)
APPEARANCE CSF: ABNORMAL
BACTERIA: ABNORMAL
BILIRUBIN URINE: NEGATIVE
BUN BLDV-MCNC: 6 MG/DL (ref 6–20)
CALCIUM SERPL-MCNC: 8.6 MG/DL (ref 8.6–10.4)
CASTS UA: ABNORMAL /LPF (ref 0–8)
CHLORIDE BLD-SCNC: 105 MMOL/L (ref 98–107)
CO2: 18 MMOL/L (ref 20–31)
COLOR: YELLOW
CREAT SERPL-MCNC: 0.72 MG/DL (ref 0.5–0.9)
EPITHELIAL CELLS UA: ABNORMAL /HPF (ref 0–5)
GFR AFRICAN AMERICAN: >60 ML/MIN
GFR NON-AFRICAN AMERICAN: >60 ML/MIN
GFR SERPL CREATININE-BSD FRML MDRD: ABNORMAL ML/MIN/{1.73_M2}
GLUCOSE BLD-MCNC: 134 MG/DL (ref 70–99)
GLUCOSE URINE: NEGATIVE
GLUCOSE, CSF: 91 MG/DL (ref 40–70)
HCT VFR BLD CALC: 43.1 % (ref 36.3–47.1)
HEMOGLOBIN: 14.1 G/DL (ref 11.9–15.1)
KETONES, URINE: NEGATIVE
LEUKOCYTE ESTERASE, URINE: ABNORMAL
MAGNESIUM: 2.4 MG/DL (ref 1.6–2.6)
MCH RBC QN AUTO: 29.2 PG (ref 25.2–33.5)
MCHC RBC AUTO-ENTMCNC: 32.7 G/DL (ref 28.4–34.8)
MCV RBC AUTO: 89.2 FL (ref 82.6–102.9)
NITRITE, URINE: NEGATIVE
NRBC AUTOMATED: 0 PER 100 WBC
PDW BLD-RTO: 12.3 % (ref 11.8–14.4)
PH UA: 7.5 (ref 5–8)
PLATELET # BLD: 266 K/UL (ref 138–453)
PMV BLD AUTO: 9.9 FL (ref 8.1–13.5)
POTASSIUM SERPL-SCNC: 3.8 MMOL/L (ref 3.7–5.3)
PROTEIN CSF: 20.6 MG/DL (ref 15–45)
PROTEIN UA: NEGATIVE
RBC # BLD: 4.83 M/UL (ref 3.95–5.11)
RBC CSF: 200 /MM3
RBC UA: ABNORMAL /HPF (ref 0–4)
SODIUM BLD-SCNC: 135 MMOL/L (ref 135–144)
SPECIFIC GRAVITY UA: 1.01 (ref 1–1.03)
TUBE NUMBER CSF: ABNORMAL
TURBIDITY: ABNORMAL
URINE HGB: ABNORMAL
UROBILINOGEN, URINE: NORMAL
VOLUME CSF: 3
WBC # BLD: 13.4 K/UL (ref 3.5–11.3)
WBC CSF: 59 /MM3
WBC UA: ABNORMAL /HPF (ref 0–5)
XANTHOCHROMIA: PRESENT

## 2022-04-27 PROCEDURE — 85027 COMPLETE CBC AUTOMATED: CPT

## 2022-04-27 PROCEDURE — 87205 SMEAR GRAM STAIN: CPT

## 2022-04-27 PROCEDURE — 6360000002 HC RX W HCPCS: Performed by: STUDENT IN AN ORGANIZED HEALTH CARE EDUCATION/TRAINING PROGRAM

## 2022-04-27 PROCEDURE — 97530 THERAPEUTIC ACTIVITIES: CPT

## 2022-04-27 PROCEDURE — 99291 CRITICAL CARE FIRST HOUR: CPT | Performed by: PSYCHIATRY & NEUROLOGY

## 2022-04-27 PROCEDURE — 87070 CULTURE OTHR SPECIMN AEROBIC: CPT

## 2022-04-27 PROCEDURE — 6370000000 HC RX 637 (ALT 250 FOR IP): Performed by: STUDENT IN AN ORGANIZED HEALTH CARE EDUCATION/TRAINING PROGRAM

## 2022-04-27 PROCEDURE — 99232 SBSQ HOSP IP/OBS MODERATE 35: CPT | Performed by: NEUROLOGICAL SURGERY

## 2022-04-27 PROCEDURE — 6370000000 HC RX 637 (ALT 250 FOR IP): Performed by: NURSE PRACTITIONER

## 2022-04-27 PROCEDURE — 99233 SBSQ HOSP IP/OBS HIGH 50: CPT | Performed by: PSYCHIATRY & NEUROLOGY

## 2022-04-27 PROCEDURE — 2500000003 HC RX 250 WO HCPCS: Performed by: STUDENT IN AN ORGANIZED HEALTH CARE EDUCATION/TRAINING PROGRAM

## 2022-04-27 PROCEDURE — 6370000000 HC RX 637 (ALT 250 FOR IP): Performed by: HEALTH CARE PROVIDER

## 2022-04-27 PROCEDURE — 81001 URINALYSIS AUTO W/SCOPE: CPT

## 2022-04-27 PROCEDURE — 89051 BODY FLUID CELL COUNT: CPT

## 2022-04-27 PROCEDURE — 93886 INTRACRANIAL COMPLETE STUDY: CPT

## 2022-04-27 PROCEDURE — APPSS15 APP SPLIT SHARED TIME 0-15 MINUTES: Performed by: NURSE PRACTITIONER

## 2022-04-27 PROCEDURE — 82945 GLUCOSE OTHER FLUID: CPT

## 2022-04-27 PROCEDURE — 2580000003 HC RX 258: Performed by: STUDENT IN AN ORGANIZED HEALTH CARE EDUCATION/TRAINING PROGRAM

## 2022-04-27 PROCEDURE — 6370000000 HC RX 637 (ALT 250 FOR IP): Performed by: PSYCHIATRY & NEUROLOGY

## 2022-04-27 PROCEDURE — 84157 ASSAY OF PROTEIN OTHER: CPT

## 2022-04-27 PROCEDURE — 83735 ASSAY OF MAGNESIUM: CPT

## 2022-04-27 PROCEDURE — 94761 N-INVAS EAR/PLS OXIMETRY MLT: CPT

## 2022-04-27 PROCEDURE — 97116 GAIT TRAINING THERAPY: CPT

## 2022-04-27 PROCEDURE — 97110 THERAPEUTIC EXERCISES: CPT

## 2022-04-27 PROCEDURE — 80048 BASIC METABOLIC PNL TOTAL CA: CPT

## 2022-04-27 PROCEDURE — 2000000003 HC NEURO ICU R&B

## 2022-04-27 PROCEDURE — 2580000003 HC RX 258: Performed by: ANESTHESIOLOGY

## 2022-04-27 PROCEDURE — 6360000002 HC RX W HCPCS: Performed by: NURSE PRACTITIONER

## 2022-04-27 PROCEDURE — 2580000003 HC RX 258: Performed by: INTERNAL MEDICINE

## 2022-04-27 PROCEDURE — 36415 COLL VENOUS BLD VENIPUNCTURE: CPT

## 2022-04-27 RX ORDER — FENTANYL CITRATE 50 UG/ML
25 INJECTION, SOLUTION INTRAMUSCULAR; INTRAVENOUS ONCE
Status: COMPLETED | OUTPATIENT
Start: 2022-04-27 | End: 2022-04-27

## 2022-04-27 RX ORDER — POTASSIUM CHLORIDE 20 MEQ/1
40 TABLET, EXTENDED RELEASE ORAL ONCE
Status: COMPLETED | OUTPATIENT
Start: 2022-04-27 | End: 2022-04-27

## 2022-04-27 RX ORDER — BUTALBITAL, ACETAMINOPHEN AND CAFFEINE 50; 325; 40 MG/1; MG/1; MG/1
1 TABLET ORAL EVERY 6 HOURS PRN
Status: DISCONTINUED | OUTPATIENT
Start: 2022-04-27 | End: 2022-05-02

## 2022-04-27 RX ADMIN — POTASSIUM CHLORIDE 40 MEQ: 20 TABLET, EXTENDED RELEASE ORAL at 12:19

## 2022-04-27 RX ADMIN — NALOXEGOL OXALATE 25 MG: 12.5 TABLET, FILM COATED ORAL at 12:19

## 2022-04-27 RX ADMIN — NIMODIPINE 60 MG: 30 CAPSULE, LIQUID FILLED ORAL at 13:02

## 2022-04-27 RX ADMIN — NIMODIPINE 60 MG: 30 CAPSULE, LIQUID FILLED ORAL at 10:55

## 2022-04-27 RX ADMIN — SODIUM CHLORIDE, PRESERVATIVE FREE 10 ML: 5 INJECTION INTRAVENOUS at 20:28

## 2022-04-27 RX ADMIN — SODIUM CHLORIDE, PRESERVATIVE FREE 10 ML: 5 INJECTION INTRAVENOUS at 10:57

## 2022-04-27 RX ADMIN — CEFTRIAXONE SODIUM 1000 MG: 1 INJECTION, POWDER, FOR SOLUTION INTRAMUSCULAR; INTRAVENOUS at 17:40

## 2022-04-27 RX ADMIN — TOPIRAMATE 100 MG: 100 TABLET, FILM COATED ORAL at 21:49

## 2022-04-27 RX ADMIN — ACETAMINOPHEN 650 MG: 325 TABLET ORAL at 16:14

## 2022-04-27 RX ADMIN — BUTALBITAL, ACETAMINOPHEN AND CAFFEINE 1 TABLET: 50; 325; 40 TABLET ORAL at 16:09

## 2022-04-27 RX ADMIN — SODIUM CHLORIDE TAB 1 GM 1 G: 1 TAB at 17:11

## 2022-04-27 RX ADMIN — ATORVASTATIN CALCIUM 80 MG: 80 TABLET, FILM COATED ORAL at 20:21

## 2022-04-27 RX ADMIN — MORPHINE SULFATE 15 MG: 15 TABLET, FILM COATED, EXTENDED RELEASE ORAL at 10:54

## 2022-04-27 RX ADMIN — KETOROLAC TROMETHAMINE 30 MG: 30 INJECTION, SOLUTION INTRAMUSCULAR; INTRAVENOUS at 06:54

## 2022-04-27 RX ADMIN — SODIUM CHLORIDE TAB 1 GM 1 G: 1 TAB at 10:54

## 2022-04-27 RX ADMIN — ENOXAPARIN SODIUM 40 MG: 100 INJECTION SUBCUTANEOUS at 10:55

## 2022-04-27 RX ADMIN — Medication 5 MG: at 20:21

## 2022-04-27 RX ADMIN — POLYETHYLENE GLYCOL 3350 17 G: 17 POWDER, FOR SOLUTION ORAL at 10:55

## 2022-04-27 RX ADMIN — FENTANYL CITRATE 25 MCG: 50 INJECTION, SOLUTION INTRAMUSCULAR; INTRAVENOUS at 04:39

## 2022-04-27 RX ADMIN — KETOROLAC TROMETHAMINE 30 MG: 30 INJECTION, SOLUTION INTRAMUSCULAR; INTRAVENOUS at 17:39

## 2022-04-27 RX ADMIN — SODIUM CHLORIDE TAB 1 GM 1 G: 1 TAB at 13:02

## 2022-04-27 RX ADMIN — FENTANYL CITRATE 25 MCG: 50 INJECTION, SOLUTION INTRAMUSCULAR; INTRAVENOUS at 01:45

## 2022-04-27 RX ADMIN — NIMODIPINE 60 MG: 30 CAPSULE, LIQUID FILLED ORAL at 17:12

## 2022-04-27 RX ADMIN — NIMODIPINE 60 MG: 30 CAPSULE, LIQUID FILLED ORAL at 01:39

## 2022-04-27 RX ADMIN — NIMODIPINE 60 MG: 30 CAPSULE, LIQUID FILLED ORAL at 21:50

## 2022-04-27 RX ADMIN — BISACODYL 10 MG: 5 TABLET, COATED ORAL at 10:54

## 2022-04-27 RX ADMIN — MAGNESIUM SULFATE IN WATER 4000 MG: 40 INJECTION, SOLUTION INTRAVENOUS at 21:49

## 2022-04-27 RX ADMIN — NIMODIPINE 60 MG: 30 CAPSULE, LIQUID FILLED ORAL at 05:18

## 2022-04-27 RX ADMIN — SODIUM CHLORIDE, PRESERVATIVE FREE 10 ML: 5 INJECTION INTRAVENOUS at 20:27

## 2022-04-27 RX ADMIN — MAGNESIUM SULFATE IN WATER 4000 MG: 40 INJECTION, SOLUTION INTRAVENOUS at 12:14

## 2022-04-27 RX ADMIN — DOCUSATE SODIUM 50 MG AND SENNOSIDES 8.6 MG 2 TABLET: 8.6; 5 TABLET, FILM COATED ORAL at 10:57

## 2022-04-27 RX ADMIN — FENTANYL CITRATE 25 MCG: 50 INJECTION, SOLUTION INTRAMUSCULAR; INTRAVENOUS at 12:13

## 2022-04-27 RX ADMIN — MORPHINE SULFATE 15 MG: 15 TABLET, FILM COATED, EXTENDED RELEASE ORAL at 20:26

## 2022-04-27 RX ADMIN — TOPIRAMATE 100 MG: 100 TABLET, FILM COATED ORAL at 10:54

## 2022-04-27 ASSESSMENT — PAIN DESCRIPTION - DESCRIPTORS: DESCRIPTORS: ACHING

## 2022-04-27 ASSESSMENT — PAIN SCALES - GENERAL
PAINLEVEL_OUTOF10: 5
PAINLEVEL_OUTOF10: 6
PAINLEVEL_OUTOF10: 6
PAINLEVEL_OUTOF10: 7
PAINLEVEL_OUTOF10: 8
PAINLEVEL_OUTOF10: 7

## 2022-04-27 ASSESSMENT — PAIN DESCRIPTION - LOCATION: LOCATION: HEAD

## 2022-04-27 NOTE — PLAN OF CARE
Problem: Falls - Risk of:  Goal: Will remain free from falls  Description: Will remain free from falls  4/27/2022 0525 by Tammi Duarte RN  Outcome: Progressing  4/26/2022 1855 by Leatha Ott RN  Outcome: Progressing  Goal: Absence of physical injury  Description: Absence of physical injury  4/27/2022 0525 by Tammi Duarte RN  Outcome: Progressing  4/26/2022 1855 by Leatha Ott RN  Outcome: Progressing     Problem: Anxiety/Stress:  Goal: Level of anxiety will decrease  Description: Level of anxiety will decrease  4/27/2022 0525 by Tammi Duarte RN  Outcome: Progressing  4/26/2022 1855 by Leatha Ott RN  Outcome: Progressing     Problem: Mental Status - Impaired:  Goal: Mental status will be restored to baseline  Description: Mental status will be restored to baseline  4/27/2022 0525 by Tammi Duarte RN  Outcome: Progressing  4/26/2022 1855 by Leatha Ott RN  Outcome: Progressing     Problem: Pain:  Goal: Pain level will decrease  Description: Pain level will decrease  Outcome: Progressing  Goal: Recognizes and communicates pain  Description: Recognizes and communicates pain  Outcome: Progressing  Goal: Control of acute pain  Description: Control of acute pain  Outcome: Progressing  Goal: Control of chronic pain  Description: Control of chronic pain  Outcome: Progressing     Problem: Skin Integrity - Impaired:  Goal: Will show no infection signs and symptoms  Description: Will show no infection signs and symptoms  Outcome: Progressing  Goal: Absence of new skin breakdown  Description: Absence of new skin breakdown  Outcome: Progressing     Problem: HEMODYNAMIC STATUS  Goal: Patient has stable vital signs and fluid balance  Outcome: Progressing     Problem: ACTIVITY INTOLERANCE/IMPAIRED MOBILITY  Goal: Mobility/activity is maintained at optimum level for patient  Outcome: Progressing     Problem: COMMUNICATION IMPAIRMENT  Goal: Ability to express needs and understand communication  Outcome: Progressing     Problem: Swallowing - Impaired:  Goal: Able to swallow without choking  Description: Able to swallow without choking  Outcome: Progressing  Goal: Absence of aspiration  Description: Absence of aspiration  Outcome: Progressing     Problem: Pain:  Goal: Pain level will decrease  Description: Pain level will decrease  Outcome: Progressing  Goal: Control of acute pain  Description: Control of acute pain  Outcome: Progressing  Goal: Control of chronic pain  Description: Control of chronic pain  Outcome: Progressing     Problem: Discharge Planning  Goal: Discharge to home or other facility with appropriate resources  Outcome: Progressing     Problem: ABCDS Injury Assessment  Goal: Absence of physical injury  Outcome: Progressing

## 2022-04-27 NOTE — PROGRESS NOTES
Endovascular Neurosurgery Progress Note    SUBJECTIVE:   No reported events overnight. This am pt continues to have headache, rated as 4/10. she denies any new focal symptoms. Review of Systems:  CONSTITUTIONAL:  negative for fevers, chills, fatigue and malaise    EYES:  negative for double vision, blurred vision and photophobia     HEENT:  negative for tinnitus, epistaxis and sore throat    RESPIRATORY:  negative for cough, shortness of breath, wheezing    CARDIOVASCULAR:  negative for chest pain, palpitations, syncope, edema    GASTROINTESTINAL:  negative for nausea, vomiting    GENITOURINARY:  negative for incontinence    MUSCULOSKELETAL:  negative for neck or back pain    NEUROLOGICAL:  Negative for weakness and tingling  negative for headaches and dizziness    PSYCHIATRIC:  negative for anxiety      Review of systems otherwise negative. OBJECTIVE:     Vitals:    04/27/22 1600   BP: (!) 146/100   Pulse: 80   Resp: 16   Temp: 101.5 °F (38.6 °C)   SpO2:         General:  Gen: normal habitus, NAD  HEENT: EVD in place, open to 10cm H2o. mucosa moist  Cvs: RRR, S1 S2 normal  Resp: symmetric unlabored breathing  Abd: s/nd/nt  Ext: no edema  Skin: no lesions seen, warm and dry    Neuro: on milrinone 0.2 gtt  Gen: awake and alert, oriented x3. Lang/speech: no aphasia. No dysarthria. Follows commands. CN: PERRL, EOMI, VFF, V1-3 intact, face symmetric, hearing intact, shoulder shrug symmetric, tongue midline  Motor: grossly 5/5 UE and LE b/l  Sense: LT intact in all 4 ext. Coord: FTN and HTS intact b/l  DTR: deferred  Gait: deferred    NIH Stroke Scale:   1a  Level of consciousness: 0 - alert; keenly responsive   1b. LOC questions:  0 - answers both questions correctly   1c. LOC commands: 0 - performs both tasks correctly   2. Best Gaze: 0 - normal   3. Visual: 0 - no visual loss   4. Facial Palsy: 0 - normal symmetric movement   5a.  Motor left arm: 0 - no drift, limb holds 90 (or 45) degrees for full 10 seconds   5b. Motor right arm: 0 - no drift, limb holds 90 (or 45) degrees for full 10 seconds   6a. Motor left le - no drift; leg holds 30 degree position for full 5 seconds   6b  Motor right le - no drift; leg holds 30 degree position for full 5 seconds   7. Limb Ataxia: 0 - absent   8. Sensory: 0 - normal; no sensory loss   9. Best Language:  0 - no aphasia, normal   10. Dysarthria: 0 - normal   11. Extinction and Inattention: 0 - no abnormality         Total:   0     MRS: 0      LABS:   Reviewed. Lab Results   Component Value Date    HGB 14.1 2022    WBC 13.4 (H) 2022     2022     2022    BUN 6 2022    CREATININE 0.72 2022    AST 15 2022    ALT 16 2022    MG 2.4 2022    APTT 22.6 2022    INR 0.9 2022      Lab Results   Component Value Date    COVID19 Not Detected 2022    COVID19 Not Detected 2022       RADIOLOGY:   Images were personally reviewed including:   tcd 2022  Mild vasospasm b/l 's, LR 3 b/l. Stable. CT head 2022:   Significant reduction with trace residual scattered subarachnoid hemorrhage.       Stable positioning of a right frontal EVD, as well as stable caliber and   configuration of the ventricles. 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 --the above treated with Smart coil embolization x3, Vu 1. Smart coil x1 opened but not detached. --Prominent musculocutaneous branch from the left V2 segment that anastomoses to the left occipital artery.     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 PBD: 9    Patient complains of moderate to severe headaches, non-focal neuro exam. 4/24/2022 clamp trial failed. 4/24/2022 CT head stable, reduced SAH. 4/26/2022 TCD mild vasospasm b/l MCA    PLAN:   --SBP goal 140-180 mm hg   --SAH management with daily TCDs, Nimodipine, statin, milrinone  Mg level 3-4 last Mg 2.4  --f/up with Dr. Fauzia Francois in 2 weeks after discharge and f/up with Dr. Ryann Daniels in 3 months after discharge    Case discussed with Dr. Ryann Daniels attending.     Joseph Mattson MD  Stroke, [facility]  [id number] [address]  Electronically signed 4/27/2022 at 4:35 PM

## 2022-04-27 NOTE — PROGRESS NOTES
Daily Progress Note  Neuro Critical Care    Patient Name: Gabriela Winters  Patient : 1990  Room/Bed: 5089/8132-29  Code Status: FULL code   Allergies: No Known Allergies    CHIEF COMPLAINT:     Worst headache of her life acute onset X2 hours  INTERVAL HISTORY    Initial Presentation (Admitted 22 @00:45AM): History Obtained From: patient and EMR review      The patient is P 53 y.o.  female who presents to our emergency department 2022 via [facility] as a transfer from [facility] acute worst headache of her life. Addie Iglesias does not have any significant past medical history in our chart and denies any.  At outlying facility patient underwent CT head without demonstrating diffuse subarachnoid hemorrhage.  Patient was seen by telestroke physician in our group recommending transfer to our facility for further work-up.  In the emergency department patient states that she was washing TV with her child when she began to have severe pounding headache 10 out of 10 worst in her life.  Patient noted to be severely nauseous requiring multiple doses of IV Zofran and IM Phenergan.  Patient also admitting to photosensitivity.  Patient had significant hypertension at [place] started on IV nicardipine given 1 g of Keppra 10 mg Decadron.     Arrival to our emergency department 140/102, heart rate 77 temperature 97. 6.  Initial labs PTT 25.3, INR 1.0, WBC 13.7, platelets 640, glucose 139 and high-sensitivity troponin less than 6.  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  Case discussed with senior endovascular fellow who is aware of case and current neurologic exam planning for emergent DSA 2022 early morning.     For [facility] Hunt&De Souza: 2, Modified Combs: grade 3         Admitted to ICU From: ED 24  Reason for ICU Admission: [facility] with [facility] Course:   -around 8:00 AM Dr. Jenni Franklin bedside placing EVD prior to endovascular neurosurgery. Yanet Gale went for urgent DSA found to have a comm aneurysm treated by primary coil embolization. 4/19: Hemodynamically stable for arterial line readings -142, heart rate 82-99 T-max 98. 9.  Currently receiving María 5 mg every 4 hours as needed and fentanyl 25 every 2 as needed for pain control. 4/20: Blood pressure within parameter -139 overnight Cardene has been held off since 6 PM 4/19/2022 not requiring any as needed IV medications, heart rate 65-79 T-max 98.5.  Serum magnesium 2.3 was started on IV magnesium 2 g twice daily will increase to 4 g twice daily continue to trend magnesium daily.  Of note yesterday patient started to have increased urine output 9 L total in 24 hours urine studies sent consistent with central salt wasting patient started on hypertonic saline 2% 150 cc bolus followed by 75 cc/h maintenance.  Sodium checks every 8 hours with a sodium goal of 145.  4/21: Vitals stable overnight blood pressures trending slightly up although okay to liberalize SBP <160 today. Overnight -152, HR 69-80, tmax 99.3. Labs pending this AM although sodium noted to drop from 137 @17:03 yesterday to 129 @midnight. Patient given 150CC bolus 2% and pending repeat this AM. Will send repeat urine studies and discussed with patient she has been drinking excess water in large water bottle at bedside so to limit today to Gatorade and 1-1.5L max. Headache continues to be stable same as yesterday 6/10 constant pressure and throbbing bifrontal location. Will get repeat TCD today to ensure no increase in vasospasm. No bowel movement X3 days giving dulcolax and milk of mag continue to monitor. protonix 40 and starting decadron 4Q6hr for headache decrease oxy pain med Q6hr.   4/22: Blood pressure goal liberalized to SBP <160 yesterday although pressures trending up still -172 overnight (giving IV labetalol 10mg early AM) , HR 64-83, tmax 98. 6.  patient given 1L nacl bolus yesterday following slightly compressible IVC on ultrasound as she continues to have central salt wasting urine output slightly decreasing over last 3 days although 5,989 still yesterday. Discontinue hypertonic 2% 75cc/hr, start NaCl bolus 1L this AM and continue with 125cc/hr. Sodium goal can liberalize to 140 if Na <135 resume 2% hypertonic at 75cc/hr. Still no BM receiving dulcolax, milk of mag and glycolax. Headache improved slightly today 5/10. EVD remains open at [id number] and decreasing output 189 over last 24 hours. Transcranial dopplers reviewed slightly increasing vasospasm on the right velocities up to low 100s Lindegaard ratio up from 2-->3. 3.   4/23: EVD to 20cm H2O. EVD output 80cc overnight, high UOP overnight although improved. Headache improving, ambulating well. 4/24: EVD open draining at [id number] total output 95cc over last 24 hours. Hypertensive overnight -187, HR 50-72, tmax 98.6. 72 hours of decadron 4mg Q6 completed overnight. Patient complaining of constipation has been on bowel regimen with glycolax, milk of mag and PO dulcolax. Added dulcolax suppository this morning small BM still very uncomfortable thus will add fleets PRN also. TCD's increasing left sided velocities from day prior peak left MCA velocity 156 peak right 160 Lindgard ratio left 3.49 up from 2.54 and right 3.38 down from 3.67. CT head WO this morning prior to toradol. Discussing with NS also when okay to give toradol given EVD in place.   4/25:   Received dose of 30mg IV Toradol yesterday afternoon which helped her headache until 2Am then was given a dose of droperidol 1.25mg which helped until this morning. Continues to have 5-6/10 bifrontal constant achy headache. Plan to increase IV magnesium 4grams BID mag 2.2 today and IV mag helps her headache and will also trial 100mg PO topamax and follow her response to this.  TCD fairly stable yesterday bilateral Lindegard ratio 3.2 left MCA mean velocities low 100s and right mca mean velocities 90s. Patient noted to have 4L out and only 867 in, appears miscommunication with overnight nursing and patient was taken off IVF. Will bolus 1L NaCl, continue 125/hr maintenance. Failed EVD clamp trial yesterday increased ICP thus remains open at 25 with total output of 134cc/12 and 214 for the full 24 hours. 4/26:Hyponatremic yesterday evening; started on [email or web address] x6h with improvement, switched back to [email or web address] this AM.  Headache improved yesterday evening/overnight but started to become severe again this morning. Will trial Morphine ER 15mg BID x48h, decrease PRN Fentanyl to 25mg Q8h PRN. Increase Topamax to 100mg BID. Last BM on Saturday, given narcotic usage will trial dose of Movantik. Continues on aggressive bowel regimen and took PRN Milk of Mag overnight. Clinical exam remains overall stable. No focal deficits on exam.  EVD set at [address], ICP 10-17, 331cc out/24h. TCD 4/26 overall stable; mild elevations of right MCA (mean velocities 129, LR 4.07). Continues on Milrinone 0.20mcg/kg/min. Last 24h:   No acute events overnight. Vitals stable -153, HR 63-88, tmax 98. 4. labs stable sodium 135 this AM continues to have significant urine output 6,691 In and 6,585 cc out net +106. 8. patients topamax increased to 100mg BID yesterday she is tolerating although continues to have breakthrough severe pain every morning (will obtain Stat ABG to rule out hypercarbia in am consistent with CARL if justin morning is similar). Currently day 9 post spontaneous SAH. NS repeating CSF studies, TCD improved LR left 2.52 down from 3.4 yesterday and Right 2.63 down from 4.07. will also trial fioricet Q6hr PRN if this does not help her headache can consider Depacon 500 IV once.      CURRENT MEDICATIONS:  SCHEDULED MEDICATIONS:   topiramate  100 mg Oral BID    morphine  15 mg Oral 2 times per day    magnesium sulfate  4,000 mg IntraVENous BID    lidocaine 1 % injection  5 mL IntraDERmal Once  sodium chloride flush  5-40 mL IntraVENous 2 times per day    lidocaine 1 % injection  5 mL IntraDERmal Once    sodium chloride flush  5-40 mL IntraVENous 2 times per day    sodium chloride  1 g Oral TID     sennosides-docusate sodium  2 tablet Oral Daily    polyethylene glycol  17 g Oral Daily    melatonin  5 mg Oral Nightly    bisacodyl  10 mg Oral Daily    enoxaparin  40 mg SubCUTAneous Daily    atorvastatin  80 mg Oral Nightly    niMODipine  60 mg Oral 6 times per day    sodium chloride flush  5-40 mL IntraVENous 2 times per day     CONTINUOUS INFUSIONS:   milrinone 0.2 mcg/kg/min (22)    sodium chloride      sodium chloride      sodium chloride 125 mL/hr at 22    sodium chloride Stopped (22)     PRN MEDICATIONS:   fentanNYL, ketorolac, sodium chloride flush, sodium chloride, sodium chloride flush, sodium chloride, bisacodyl, acetaminophen, melatonin, magnesium hydroxide, ondansetron **OR** ondansetron, sodium chloride flush, sodium chloride    VITALS:  Temperature Range: Temp: 98.4 °F (36.9 °C) Temp  Av.2 °F (36.8 °C)  Min: 98 °F (36.7 °C)  Max: 98.4 °F (36.9 °C)  BP Range: Systolic (41HKR), TKH:224 , Min:109 , RZQ:372     Diastolic (67NFL), URI:99, Min:70, Max:105    Pulse Range: Pulse  Av.8  Min: 60  Max: 87  Respiration Range: Resp  Av.6  Min: 10  Max: 26  Current Pulse Ox: SpO2: 98 %  24HR Pulse Ox Range: SpO2  Av %  Min: 97 %  Max: 99 %  Patient Vitals for the past 12 hrs:   BP Temp Temp src Pulse Resp SpO2   22 0500 (!) 146/91 -- -- 70 18 98 %   22 0400 (!) 132/99 98.4 °F (36.9 °C) Oral 73 18 98 %   22 0300 121/70 -- -- 76 15 97 %   22 0200 (!) 141/103 -- -- 63 17 99 %   22 0100 (!) 141/104 -- -- 66 13 98 %   22 0000 (!) 135/90 98 °F (36.7 °C) Oral 66 16 98 %   22 2300 117/73 -- -- 72 19 97 %   22 2200 136/87 -- -- 74 17 98 %   22 2100 (!) 141/98 -- -- 67 19 98 %   22 (!) 109/93 98.1 °F (36.7 °C) Oral 72 26 99 %   04/26/22 1900 (!) 140/95 -- -- 69 14 --     Estimated body mass index is 28.83 kg/m² as calculated from the following:    Height as of this encounter: 5' 2\" (1.575 m). Weight as of this encounter: 157 lb 10.1 oz (71.5 kg).  []<16 Severe malnutrition  []16-16.99 Moderate malnutrition  []17-18.49 Mild malnutrition  []18.5-24.9 Normal  [x]25-29.9 Overweight (not obese)  []30-34.9 Obese class 1 (Low Risk)  []35-39.9 Obese class 2 (Moderate Risk)  []?40 Obese class 3 (High Risk)    RECENT LABS:   Lab Results   Component Value Date    WBC 12.8 (H) 04/26/2022    HGB 12.9 04/26/2022    HCT 39.8 04/26/2022     04/26/2022    CHOL 182 04/18/2022    TRIG 60 04/18/2022    HDL 74 04/18/2022    ALT 16 04/20/2022    AST 15 04/20/2022     04/26/2022    K 3.9 04/26/2022     04/26/2022    CREATININE 0.76 04/26/2022    BUN 7 04/26/2022    CO2 17 (L) 04/26/2022    TSH 2.18 04/20/2022    INR 0.9 04/18/2022    LABA1C 5.5 04/18/2022     24 HOUR INTAKE/OUTPUT:    Intake/Output Summary (Last 24 hours) at 4/27/2022 0607  Last data filed at 4/27/2022 0500  Gross per 24 hour   Intake 5634.18 ml   Output 6409 ml   Net -774.82 ml       IMAGING:   TCD 4/27--->peak velocities left -120s LR 2.52, Right -120s LR 2.63. TCD 4/24--> Peak velocities left [id number], Right -140s, Lindegaard ratio 3.2 bilaterally   TCD 4/23--> peak velocities left MCA-156, Right [id number], Lindegard ratio left 3.49, right 3.38. TCD 4/22-->peak velocities left 214 right 168 Lindegard ratio left 2.54, Right-3.67      TCD 4/21/22-->peak velocities 108 on the right lindegard ratio approx 2 on the left and 3.3 on the right      TCD 4/19/22-Peak velocities on the left 80's and peak velocities on the right 103.  Left Lindegaard ratio=2.07 and right lindegaard ratio-2.22.      CT head WO 4/20/22      Impression   Scattered subarachnoid hemorrhage that is decreased compared to prior examination.       Interval KRISSY aneurysm coiling.            CT head WO 4/17/22 @10:48 PM   Impression   Diffuse subarachnoid hemorrhage.  Recommend CTA for further evaluation.      CTA head and neck 4/18/22   Impression   Aneurysm arising from the anterior communicating artery measuring 3 mm x 2 mm.      CXR 4/18/22  Impression   No acute cardiopulmonary process.      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 --the above treated with Smart coil embolization x3, Vu 1. Smart coil x1 opened but not detached. --Prominent musculocutaneous branch from the left V2 segment that anastomoses to the left occipital artery. 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              Labs and Images reviewed with:  [x] Dr. Milagro Bailey:  Well developed, well nourished, alert and oriented x 3, in no acute distress. GCS 15. Nontoxic. No dysarthria. No aphasia.    HEAD:  normocephalic, atraumatic    EYES:  PERRLA, EOMI.   ENT:  moist mucous membranes   NECK:  supple, symmetric   LUNGS:  Equal air entry bilaterally   CARDIOVASCULAR:  normal s1 / s2, RRR, distal pulses intact   ABDOMEN:  Soft, no rigidity   NEUROLOGIC:  Mental Status:  A & O x3,awake             Cranial Nerves:    cranial nerves II-XII are grossly intact    Motor Exam:    Drift:  absent  Tone:  normal    Motor exam is symmetrical 5 out of 5 all extremities bilaterally    Sensory:    Touch:    Right Upper Extremity:  normal  Left Upper Extremity:  normal  Right Lower Extremity:  normal  Left Lower Extremity:  normal    Deep Tendon Reflexes:    Right Bicep:  1+  Left Bicep:  1+  Right Knee:  1+  Left Knee:  1+    Plantar Response:  Right:  downgoing  Left:  downgoing    Clonus:  absent  Andino's: absent    Coordination/Dysmetria:  Heel to [place] magan:  Right:  normal  Left:  normal  Finger to Nose:   Right:  normal          DRAINS:  EVD placed 4/18/22 currently open and draining at 10mmHg  Urethral catheter placed 4/18/22-->required due to central salt wasting for close I/O monitoring   PICC double lumen placed 4/25/22     ASSESSMENT AND PLAN:     This is a 32 y.o. female with acute worst headache of her life found to have diffuse subarachnoid hemorrhage presenting to [address] [place] [address] Drive, [address] denies any significant past medical history and does not take any daily medications.  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  Patient admitted under neuro ICU services with endovascular neurosurgery consulted planning for urgent evaluation and likely DSA 4/18/2022 first thing in the morning.  Patient underwent emergent EVD placement at bedside 4/18/2022 and diagnostic angio with primary coil embolization of a comm aneurysm successful.  Postoperatively being managed by neuro ICU for vasospasm monitoring. NEUROLOGIC:  - Diffuse SAH with IVH secondary to ruptured ACOM aneurysm  - POD #8 s/p coil embolization of the ruptured ACOM aneurysm  - Continue Nimodipine 60mg Q4h  - Mild R MCA vasospasm ? symptomatic with worsening headache, no focal deficits  - TCD 4/26 continues to show mild vasospasm in right right MCA (mean velocities 129, LR 4.07)  - Continue on Milrinone 0.20mcg/kg/min  - Goal -220, avoid symptomatic hypotension  - Obstructive hydrocephalus s/p EVD placement 4/18  - Failed EVD clamp trial 4/24 due to elevated ICH (25) and head pressure  - EVD set at 10mmHg, ICP 1-12 overnight, 235cc out/24h discuss with NS raising drain to 15   - Headache management; increase Topamax to 100mg BID, trial Morphine ER 15mg BID, stop PRN Oxy, decrease frequency of Fentanyl to 25mcg Q8h PRN  - Neuro checks per protocol     CARDIOVASCULAR:  - Goal -220, avoid symptomatic hypotension  - Echo EF 65%, negative bubble  - Continue telemetry     PULMONARY:  - Maintaining O2 sats on room air  - Incentive spirometry     RENAL/FLUID/ELECTROLYTE:  - Normal renal functioning  - BUN 6/ Creatinine 0..72  - Monitor I&O; 2,056/9,648 net +107   - Cerebral salt wasting secondary to [facility]  - Goal eunatremia, Q8h sodium checks, sodium 135 this AM  - Continue salt tabs 1g TID  - Continue [email or web address]   - Goal Mag>2.5; continue Magnesium IV 4 grams BID to help with headaches  - Hypokalemia, K 3.8; replace with 40meq potassium chloride  - Replace electrolytes PRN  - Daily BMP     GI/NUTRITION:  NUTRITION: General Diet  - Tolerating diet well  - Last BM 4/23  - Give one dose of Movantik today given narcotic requirements  - Bowel regimen: Continue Dulcolax PO, Glycolax, Senokot-S daily and Milk of Mag PRN  - GI prophylaxis: N/A      ID:  - Afebrile, Tmax 98.4  - Leukocytosis WBC 12.8 up trending again 13.4 has been off decadron 3 days   - CSF culture 4/23 negative to date  - Neurosurgery repeating CSF culture 4/27  - Monitor off antibiotics  - Daily CBC     HEME:   - H&H 14.1/43.1  - Platelets 266  - Daily CBC     ENDOCRINE:  - Continue to monitor blood glucose, goal <180     OTHER:  - PT/OT/ST     PROPHYLAXIS:   Stress ulcer: N/A     DVT PROPHYLAXIS:  - SCD sleeves - Thigh High   - Lovenox    DISPOSITION:  [x] To remain ICU: We will continue to follow along. For any changes in exam or patient status please contact Neuro Critical Care.       Jacquelyn Rapp MD   PGY-3 Neurology Resident   Neuro Critical Care  Pager [phone]  4/27/2022     6:07 AM

## 2022-04-27 NOTE — PROGRESS NOTES
Neurosurgery LILLY/Resident    Daily Progress Note   CC:  Chief Complaint   Patient presents with    Altered Mental Status     4/27/2022  6:22 AM    Chart reviewed. No acute events overnight. No new complaints. Sitting up in chair this morning, reports mild headache overall improved since admission.  ICP controlled 1-12 over night, afebrile, EVD output 235ml/24 hours     Vitals:    04/27/22 0300 04/27/22 0400 04/27/22 0500 04/27/22 0600   BP: 121/70 (!) 132/99 (!) 146/91 135/71   Pulse: 76 73 70 80   Resp: 15 18 18 21   Temp:  98.4 °F (36.9 °C)     TempSrc:  Oral     SpO2: 97% 98% 98% 99%   Weight:       Height:           PE:   AOx3   PERRL, EOMI  Cranial Nerves:    II: Visual acuity:  normal  III: Pupils:  equal, round, reactive to light  III,IV,VI: Extra Ocular Movements:intact  V: Facial sensation:  intact  VII: Facial strength: intact  VIII: Hearing:  intact  IX: Palate:  intact  XI: Shoulder shrug: intact  XII: Tongue movement: intact      Motor   L deltoid 5/5; R deltoid 5/5  L biceps 5/5; R biceps 5/5  L triceps 5/5; R triceps 5/5  L wrist extension 5/5; R wrist extension 5/5  L intrinsics 5/5; R intrinsics 5/5      L iliopsoas 5/5 , R iliopsoas 5/5  L quadriceps 5/5; R quadriceps 5/5  L Dorsiflexion 5/5; R dorsiflexion 5/5  L Plantarflexion 5/5; R plantarflexion 5/5  L EHL 5/5; R EHL 5/5      Sensation: intact     Drain output: 235ml/24 hours   Incision: clean dry intact       Lab Results   Component Value Date    WBC 12.8 (H) 04/26/2022    HGB 12.9 04/26/2022    HCT 39.8 04/26/2022     04/26/2022    CHOL 182 04/18/2022    TRIG 60 04/18/2022    HDL 74 04/18/2022    ALT 16 04/20/2022    AST 15 04/20/2022     04/26/2022    K 3.9 04/26/2022     04/26/2022    CREATININE 0.76 04/26/2022    BUN 7 04/26/2022    CO2 17 (L) 04/26/2022    TSH 2.18 04/20/2022    INR 0.9 04/18/2022    LABA1C 5.5 04/18/2022    CRP <3.0 04/19/2022         A/P  29 y.o. female who presents with intraventricular hemorrhage, aneurysmal subarachnoid hemorrhage from [facility] s/p coiling, hydrocephalus with EVD placement 4/18, post bleed day 9          - Neuro checks per floor protocol  - continue EVD open at 10mmHG , monitor ICP hourly and record  - send CSF studies today and follow up  - pending TCD will reevaluate EVD       Please contact neurosurgery with any changes in patients neurologic status.        Lance Morocho CNP  4/27/22  6:22 AM

## 2022-04-27 NOTE — PROGRESS NOTES
Physical Therapy  Facility/Department: [address]  Physical Therapy Daily Treatment Note    Name: Waylon Alvarado  : 1990  MRN: [id number]  Date of Service: 2022    Discharge Recommendations:  Patient would benefit from continued therapy after discharge   PT Equipment Recommendations  Equipment Needed: Yes (CTA, possible RW)      Assessment   Body Structures, Functions, Activity Limitations Requiring Skilled Therapeutic Intervention: Decreased functional mobility ; Increased pain;Decreased balance;Decreased endurance  Assessment: Pt amb 100 ft with RW and CGA. Decreased tolerance to amb this date due to headache and c/o muscle spasms. Recommend contiuned PT after d/c to address deficits  Therapy Prognosis: Good  Requires PT Follow-Up: Yes  Activity Tolerance  Activity Tolerance: Patient tolerated treatment well;Patient limited by fatigue;Patient limited by pain     Plan   Plan  Plan:  (5-6x)  Current Treatment Recommendations: Balance training,ROM,Strengthening,Functional mobility training,Transfer training,Stair training,Gait training,Endurance training,Home exercise program,Safety education & training,Patient/Caregiver education & training  Safety Devices  Type of Devices: Call light within reach,Gait belt,Nurse notified,Left in chair  Restraints  Restraints Initially in Place: No     Restrictions  Restrictions/Precautions  Restrictions/Precautions: Up as Tolerated  Required Braces or Orthoses?: No  Position Activity Restriction  Other position/activity restrictions: up as tolerated; EVD; BP<140; s/p angiogram      Subjective   General  Patient assessed for rehabilitation services?: Yes  Response To Previous Treatment: Patient with no complaints from previous session.   Family / Caregiver Present: No  Follows Commands: Within Functional Limits  General Comment  Comments: EVD clamped prior to mobility  Subjective  Subjective: Pt resting in bed upon arrival, agreeable to PT with encouragment, c/o continuous headache and more recent onset of muscle spasms/cramps. Rn aware         Cognition   Orientation  Overall Orientation Status: Within Normal Limits     Objective      Bed mobility  Rolling to Right: Stand by assistance  Supine to Sit: Stand by assistance  Sit to Supine: Stand by assistance  Scooting: Stand by assistance  Comment: Pt with very fast pace initially, cues to slow down due to c/o dizziness and increasing muscle spasms with mobility  Transfers  Sit to Stand: Contact guard assistance  Stand to sit: Contact guard assistance  Comment: Sit to stand x 5 trials with slow pace, cues to hand placement, CGA. occasional c/o lightheadedness, increased time to complete  Ambulation  Surface: level tile  Device: Rolling Walker (Pt reports increased weakness this date and requesting to use RW)  Assistance: Contact guard assistance  Quality of Gait: extremely slow maddy, narrow DIANA, occasional scissoring, heavily relying on UE's, increased unsteadiness with c/o muscle spasms  Gait Deviations: Slow Maddy  Distance: 100 ft  Comments: 3 extended standing rest breaks due to c/o muscle spasms to B quads  More Ambulation?: No     Balance  Posture: Good  Sitting - Static: Good;-  Sitting - Dynamic: Fair;+  Standing - Static: Fair  Standing - Dynamic: Fair  Comments: assessed with RW. Pt stood x ~6 min total, MIN A due to balance   Exercise  Seated LE exercise program: Long Arc Quads, hip abduction/adduction, heel/toe raises, and marches.  Reps: 15x  Standing marching in place/heel toe raise x 10        AM-PAC Score  AM-PAC Inpatient Mobility Raw Score : 21 (04/22/22 1453)  AM-PAC Inpatient T-Scale Score : 50.25 (04/22/22 1453)  Mobility Inpatient CMS 0-100% Score: 28.97 (04/22/22 1453)  Mobility Inpatient CMS G-Code Modifier : CJ (04/22/22 1453)          Goals  Short Term Goals  Time Frame for Short term goals: 14  Short term goal 1: Pt to perform bed mobility from flat surface independently  Short term goal 2: Demonstrate functional transfers independently  Short term goal 3: Ambulate 300ft w/ no AD independently  Short term goal 4: Ascend/descend 2 stairs with R rail independently  Patient Goals   Patient goals :  To go home       Therapy Time   Individual Concurrent Group Co-treatment   Time In 7225         Time Out 1205         Minutes 41         Timed Code Treatment Minutes: Ralf Loja PTA

## 2022-04-27 NOTE — PROGRESS NOTES
Occupational [address]  Occupational Therapy Not Seen Note    DATE: 2022    NAME: Meghan Betancur  MRN: [id number]   : 1990      Patient not seen this date for Occupational Therapy due to:    Pt. Declined OT d/t 10/10 pain in head.     Electronically signed by WALDO Mota on 2022 at 1:41 PM

## 2022-04-28 ENCOUNTER — APPOINTMENT (OUTPATIENT)
Dept: GENERAL RADIOLOGY | Age: 32
DRG: 021 | End: 2022-04-28
Payer: COMMERCIAL

## 2022-04-28 LAB
ANION GAP SERPL CALCULATED.3IONS-SCNC: 11 MMOL/L (ref 9–17)
BUN BLDV-MCNC: 7 MG/DL (ref 6–20)
CALCIUM SERPL-MCNC: 8.5 MG/DL (ref 8.6–10.4)
CHLORIDE BLD-SCNC: 107 MMOL/L (ref 98–107)
CO2: 17 MMOL/L (ref 20–31)
CREAT SERPL-MCNC: 0.81 MG/DL (ref 0.5–0.9)
GFR AFRICAN AMERICAN: >60 ML/MIN
GFR NON-AFRICAN AMERICAN: >60 ML/MIN
GFR SERPL CREATININE-BSD FRML MDRD: ABNORMAL ML/MIN/{1.73_M2}
GLUCOSE BLD-MCNC: 105 MG/DL (ref 70–99)
HCT VFR BLD CALC: 40.5 % (ref 36.3–47.1)
HEMOGLOBIN: 13.4 G/DL (ref 11.9–15.1)
LYMPHS CSF: 45 %
MAGNESIUM: 2.4 MG/DL (ref 1.6–2.6)
MCH RBC QN AUTO: 29.3 PG (ref 25.2–33.5)
MCHC RBC AUTO-ENTMCNC: 33.1 G/DL (ref 28.4–34.8)
MCV RBC AUTO: 88.6 FL (ref 82.6–102.9)
NEUTROPHILS, CSF: 34 %
NRBC AUTOMATED: 0 PER 100 WBC
OTHER CELLS FLUID: NORMAL %
PDW BLD-RTO: 12.2 % (ref 11.8–14.4)
PLATELET # BLD: 278 K/UL (ref 138–453)
PMV BLD AUTO: 10.1 FL (ref 8.1–13.5)
POTASSIUM SERPL-SCNC: 3.9 MMOL/L (ref 3.7–5.3)
RBC # BLD: 4.57 M/UL (ref 3.95–5.11)
SODIUM BLD-SCNC: 135 MMOL/L (ref 135–144)
WBC # BLD: 14.6 K/UL (ref 3.5–11.3)

## 2022-04-28 PROCEDURE — 2500000003 HC RX 250 WO HCPCS: Performed by: STUDENT IN AN ORGANIZED HEALTH CARE EDUCATION/TRAINING PROGRAM

## 2022-04-28 PROCEDURE — 94761 N-INVAS EAR/PLS OXIMETRY MLT: CPT

## 2022-04-28 PROCEDURE — 6360000002 HC RX W HCPCS: Performed by: STUDENT IN AN ORGANIZED HEALTH CARE EDUCATION/TRAINING PROGRAM

## 2022-04-28 PROCEDURE — 99233 SBSQ HOSP IP/OBS HIGH 50: CPT | Performed by: PSYCHIATRY & NEUROLOGY

## 2022-04-28 PROCEDURE — 2000000003 HC NEURO ICU R&B

## 2022-04-28 PROCEDURE — 6370000000 HC RX 637 (ALT 250 FOR IP): Performed by: PSYCHIATRY & NEUROLOGY

## 2022-04-28 PROCEDURE — 2500000003 HC RX 250 WO HCPCS: Performed by: NURSE PRACTITIONER

## 2022-04-28 PROCEDURE — 83735 ASSAY OF MAGNESIUM: CPT

## 2022-04-28 PROCEDURE — 6370000000 HC RX 637 (ALT 250 FOR IP): Performed by: STUDENT IN AN ORGANIZED HEALTH CARE EDUCATION/TRAINING PROGRAM

## 2022-04-28 PROCEDURE — 51702 INSERT TEMP BLADDER CATH: CPT

## 2022-04-28 PROCEDURE — 99291 CRITICAL CARE FIRST HOUR: CPT | Performed by: PSYCHIATRY & NEUROLOGY

## 2022-04-28 PROCEDURE — 6370000000 HC RX 637 (ALT 250 FOR IP): Performed by: HEALTH CARE PROVIDER

## 2022-04-28 PROCEDURE — 6370000000 HC RX 637 (ALT 250 FOR IP): Performed by: NURSE PRACTITIONER

## 2022-04-28 PROCEDURE — 80048 BASIC METABOLIC PNL TOTAL CA: CPT

## 2022-04-28 PROCEDURE — 2700000000 HC OXYGEN THERAPY PER DAY

## 2022-04-28 PROCEDURE — 2580000003 HC RX 258: Performed by: STUDENT IN AN ORGANIZED HEALTH CARE EDUCATION/TRAINING PROGRAM

## 2022-04-28 PROCEDURE — 6360000002 HC RX W HCPCS: Performed by: NURSE PRACTITIONER

## 2022-04-28 PROCEDURE — 99232 SBSQ HOSP IP/OBS MODERATE 35: CPT | Performed by: NEUROLOGICAL SURGERY

## 2022-04-28 PROCEDURE — 85027 COMPLETE CBC AUTOMATED: CPT

## 2022-04-28 PROCEDURE — APPNB60 APP NON BILLABLE TIME 46-60 MINS: Performed by: NURSE PRACTITIONER

## 2022-04-28 PROCEDURE — 93886 INTRACRANIAL COMPLETE STUDY: CPT

## 2022-04-28 PROCEDURE — 74018 RADEX ABDOMEN 1 VIEW: CPT

## 2022-04-28 PROCEDURE — 93886 INTRACRANIAL COMPLETE STUDY: CPT | Performed by: PSYCHIATRY & NEUROLOGY

## 2022-04-28 PROCEDURE — APPSS15 APP SPLIT SHARED TIME 0-15 MINUTES: Performed by: NURSE PRACTITIONER

## 2022-04-28 PROCEDURE — 2580000003 HC RX 258: Performed by: INTERNAL MEDICINE

## 2022-04-28 RX ORDER — MILRINONE LACTATE 0.2 MG/ML
0.5 INJECTION, SOLUTION INTRAVENOUS CONTINUOUS
Status: DISCONTINUED | OUTPATIENT
Start: 2022-04-28 | End: 2022-05-03

## 2022-04-28 RX ORDER — SODIUM PHOSPHATE, DIBASIC AND SODIUM PHOSPHATE, MONOBASIC 7; 19 G/133ML; G/133ML
1 ENEMA RECTAL
Status: COMPLETED | OUTPATIENT
Start: 2022-04-28 | End: 2022-04-28

## 2022-04-28 RX ORDER — KETOROLAC TROMETHAMINE 30 MG/ML
30 INJECTION, SOLUTION INTRAMUSCULAR; INTRAVENOUS ONCE
Status: COMPLETED | OUTPATIENT
Start: 2022-04-28 | End: 2022-04-28

## 2022-04-28 RX ADMIN — Medication 5 MG: at 20:58

## 2022-04-28 RX ADMIN — MILRINONE LACTATE 0.5 MCG/KG/MIN: 0.2 INJECTION, SOLUTION INTRAVENOUS at 13:45

## 2022-04-28 RX ADMIN — MAGNESIUM SULFATE IN WATER 4000 MG: 40 INJECTION, SOLUTION INTRAVENOUS at 09:33

## 2022-04-28 RX ADMIN — BISACODYL 10 MG: 5 TABLET, COATED ORAL at 08:39

## 2022-04-28 RX ADMIN — SODIUM CHLORIDE, PRESERVATIVE FREE 10 ML: 5 INJECTION INTRAVENOUS at 22:09

## 2022-04-28 RX ADMIN — NALOXEGOL OXALATE 25 MG: 12.5 TABLET, FILM COATED ORAL at 08:39

## 2022-04-28 RX ADMIN — MAGNESIUM SULFATE IN WATER 4000 MG: 40 INJECTION, SOLUTION INTRAVENOUS at 22:08

## 2022-04-28 RX ADMIN — KETOROLAC TROMETHAMINE 30 MG: 30 INJECTION, SOLUTION INTRAMUSCULAR at 18:01

## 2022-04-28 RX ADMIN — ACETAMINOPHEN 650 MG: 325 TABLET ORAL at 16:31

## 2022-04-28 RX ADMIN — TOPIRAMATE 100 MG: 100 TABLET, FILM COATED ORAL at 08:39

## 2022-04-28 RX ADMIN — BUTALBITAL, ACETAMINOPHEN AND CAFFEINE 1 TABLET: 50; 325; 40 TABLET ORAL at 07:52

## 2022-04-28 RX ADMIN — SODIUM PHOSPHATE, DIBASIC AND SODIUM PHOSPHATE, MONOBASIC 1 ENEMA: 7; 19 ENEMA RECTAL at 16:34

## 2022-04-28 RX ADMIN — SODIUM CHLORIDE, PRESERVATIVE FREE 10 ML: 5 INJECTION INTRAVENOUS at 08:40

## 2022-04-28 RX ADMIN — FENTANYL CITRATE 25 MCG: 50 INJECTION, SOLUTION INTRAMUSCULAR; INTRAVENOUS at 13:02

## 2022-04-28 RX ADMIN — BUTALBITAL, ACETAMINOPHEN AND CAFFEINE 1 TABLET: 50; 325; 40 TABLET ORAL at 00:22

## 2022-04-28 RX ADMIN — NIMODIPINE 60 MG: 30 CAPSULE, LIQUID FILLED ORAL at 12:56

## 2022-04-28 RX ADMIN — NIMODIPINE 60 MG: 30 CAPSULE, LIQUID FILLED ORAL at 08:39

## 2022-04-28 RX ADMIN — FENTANYL CITRATE 25 MCG: 50 INJECTION, SOLUTION INTRAMUSCULAR; INTRAVENOUS at 03:31

## 2022-04-28 RX ADMIN — ENOXAPARIN SODIUM 40 MG: 100 INJECTION SUBCUTANEOUS at 08:38

## 2022-04-28 RX ADMIN — NIMODIPINE 60 MG: 30 CAPSULE, LIQUID FILLED ORAL at 01:32

## 2022-04-28 RX ADMIN — KETOROLAC TROMETHAMINE 30 MG: 30 INJECTION, SOLUTION INTRAMUSCULAR; INTRAVENOUS at 08:39

## 2022-04-28 RX ADMIN — SODIUM CHLORIDE: 9 INJECTION, SOLUTION INTRAVENOUS at 02:22

## 2022-04-28 RX ADMIN — NIMODIPINE 60 MG: 30 CAPSULE, LIQUID FILLED ORAL at 05:27

## 2022-04-28 RX ADMIN — POLYETHYLENE GLYCOL 3350 17 G: 17 POWDER, FOR SOLUTION ORAL at 08:40

## 2022-04-28 RX ADMIN — NIMODIPINE 60 MG: 30 CAPSULE, LIQUID FILLED ORAL at 17:55

## 2022-04-28 RX ADMIN — VALPROATE SODIUM 500 MG: 100 INJECTION, SOLUTION INTRAVENOUS at 21:00

## 2022-04-28 RX ADMIN — DOCUSATE SODIUM 50 MG AND SENNOSIDES 8.6 MG 2 TABLET: 8.6; 5 TABLET, FILM COATED ORAL at 08:39

## 2022-04-28 RX ADMIN — TOPIRAMATE 100 MG: 100 TABLET, FILM COATED ORAL at 20:57

## 2022-04-28 RX ADMIN — SODIUM CHLORIDE TAB 1 GM 1 G: 1 TAB at 08:39

## 2022-04-28 RX ADMIN — ONDANSETRON 4 MG: 2 INJECTION INTRAMUSCULAR; INTRAVENOUS at 14:37

## 2022-04-28 RX ADMIN — ATORVASTATIN CALCIUM 80 MG: 80 TABLET, FILM COATED ORAL at 20:58

## 2022-04-28 RX ADMIN — MILRINONE LACTATE 0.5 MCG/KG/MIN: 0.2 INJECTION, SOLUTION INTRAVENOUS at 23:08

## 2022-04-28 RX ADMIN — NIMODIPINE 60 MG: 30 CAPSULE, LIQUID FILLED ORAL at 20:58

## 2022-04-28 RX ADMIN — SODIUM CHLORIDE TAB 1 GM 1 G: 1 TAB at 12:56

## 2022-04-28 RX ADMIN — BUTALBITAL, ACETAMINOPHEN AND CAFFEINE 1 TABLET: 50; 325; 40 TABLET ORAL at 14:37

## 2022-04-28 RX ADMIN — BUTALBITAL, ACETAMINOPHEN AND CAFFEINE 1 TABLET: 50; 325; 40 TABLET ORAL at 22:05

## 2022-04-28 RX ADMIN — SODIUM CHLORIDE, PRESERVATIVE FREE 10 ML: 5 INJECTION INTRAVENOUS at 09:33

## 2022-04-28 RX ADMIN — SODIUM CHLORIDE TAB 1 GM 1 G: 1 TAB at 17:55

## 2022-04-28 RX ADMIN — CEFTRIAXONE SODIUM 1000 MG: 1 INJECTION, POWDER, FOR SOLUTION INTRAMUSCULAR; INTRAVENOUS at 17:50

## 2022-04-28 ASSESSMENT — PAIN SCALES - GENERAL
PAINLEVEL_OUTOF10: 6
PAINLEVEL_OUTOF10: 9
PAINLEVEL_OUTOF10: 7
PAINLEVEL_OUTOF10: 7
PAINLEVEL_OUTOF10: 6
PAINLEVEL_OUTOF10: 7
PAINLEVEL_OUTOF10: 8
PAINLEVEL_OUTOF10: 6

## 2022-04-28 ASSESSMENT — PAIN DESCRIPTION - ORIENTATION: ORIENTATION: MID

## 2022-04-28 ASSESSMENT — PAIN DESCRIPTION - LOCATION
LOCATION: HEAD
LOCATION: HEAD

## 2022-04-28 ASSESSMENT — PAIN DESCRIPTION - PAIN TYPE: TYPE: SURGICAL PAIN

## 2022-04-28 ASSESSMENT — PAIN DESCRIPTION - DESCRIPTORS
DESCRIPTORS: THROBBING
DESCRIPTORS: ACHING
DESCRIPTORS: ACHING;DISCOMFORT
DESCRIPTORS: ACHING

## 2022-04-28 ASSESSMENT — PAIN DESCRIPTION - FREQUENCY: FREQUENCY: CONTINUOUS

## 2022-04-28 ASSESSMENT — PAIN DESCRIPTION - ONSET: ONSET: ON-GOING

## 2022-04-28 NOTE — PROGRESS NOTES
Endovascular Neurosurgery Progress Note    SUBJECTIVE:   No reported events overnight. This am pt continues to have headache, pain is worse than yesterday. Review of Systems:  CONSTITUTIONAL:  negative for fevers, chills, fatigue and malaise    EYES:  negative for double vision, blurred vision and photophobia     HEENT:  negative for tinnitus, epistaxis and sore throat    RESPIRATORY:  negative for cough, shortness of breath, wheezing    CARDIOVASCULAR:  negative for chest pain, palpitations, syncope, edema    GASTROINTESTINAL:  negative for nausea, vomiting    GENITOURINARY:  negative for incontinence    MUSCULOSKELETAL:  negative for neck or back pain    NEUROLOGICAL:  Negative for weakness and tingling  negative for headaches and dizziness    PSYCHIATRIC:  negative for anxiety      Review of systems otherwise negative. OBJECTIVE:     Vitals:    04/28/22 1400   BP: (!) 144/96   Pulse: 77   Resp: 18   Temp:    SpO2: 97%        General:  Gen: normal habitus, NAD  HEENT: EVD in place, open to 15cm H2o. mucosa moist  Cvs: RRR, S1 S2 normal  Resp: symmetric unlabored breathing  Abd: s/nd/nt  Ext: no edema  Skin: no lesions seen, warm and dry    Neuro: on milrinone 0.2 gtt  Gen: awake and alert, oriented x3. Lang/speech: no aphasia. No dysarthria. Follows commands. CN: PERRL, EOMI, VFF, V1-3 intact, face symmetric, hearing intact, shoulder shrug symmetric, tongue midline  Motor: grossly 5/5 UE and LE b/l  Sense: LT intact in all 4 ext. Coord: FTN and HTS intact b/l  DTR: deferred  Gait: deferred    NIH Stroke Scale:   1a  Level of consciousness: 0 - alert; keenly responsive   1b. LOC questions:  0 - answers both questions correctly   1c. LOC commands: 0 - performs both tasks correctly   2. Best Gaze: 0 - normal   3. Visual: 0 - no visual loss   4. Facial Palsy: 0 - normal symmetric movement   5a. Motor left arm: 0 - no drift, limb holds 90 (or 45) degrees for full 10 seconds   5b.   Motor right arm: 0 - no drift, limb holds 90 (or 45) degrees for full 10 seconds   6a. Motor left le - no drift; leg holds 30 degree position for full 5 seconds   6b  Motor right le - no drift; leg holds 30 degree position for full 5 seconds   7. Limb Ataxia: 0 - absent   8. Sensory: 0 - normal; no sensory loss   9. Best Language:  0 - no aphasia, normal   10. Dysarthria: 0 - normal   11. Extinction and Inattention: 0 - no abnormality         Total:   0     MRS: 0      LABS:   Reviewed. Lab Results   Component Value Date    HGB 13.4 2022    WBC 14.6 (H) 2022     2022     2022    BUN 7 2022    CREATININE 0.81 2022    AST 15 2022    ALT 16 2022    MG 2.4 2022    APTT 22.6 2022    INR 0.9 2022      Lab Results   Component Value Date    COVID19 Not Detected 2022    COVID19 Not Detected 2022       RADIOLOGY:   Images were personally reviewed including:   tcd 2022  Mild vasospasm L 's, R 's LR 2.6 b/l. Mildly improved. CT head 2022:   Significant reduction with trace residual scattered subarachnoid hemorrhage.       Stable positioning of a right frontal EVD, as well as stable caliber and   configuration of the ventricles. 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 --the above treated with Smart coil embolization x3, Vu 1. Smart coil x1 opened but not detached. --Prominent musculocutaneous branch from the left V2 segment that anastomoses to the left occipital artery.     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 PBD: 10    Patient complains of moderate to severe headaches, non-focal neuro exam. 4/24/2022 clamp trial failed. 4/24/2022 CT head stable, reduced SAH. 4/27/2022 TCD mild vasospasm b/l MCA. Issues of urine retention/uti. PLAN:   --SBP goal 120-220 mm hg   --SAH management with daily TCDs, Nimodipine, statin, milrinone  Mg level 3-4 last Mg 2.4  --f/up with Dr. Tamra Rubio in 2 weeks after discharge and f/up with Dr. Rosmery Ross in 3 months after discharge    Case discussed with Dr. Rosmery Ross attending.     Bhavesh Crawford MD  Stroke, [facility]  [id number] [place]  Electronically signed 4/28/2022 at 4:16 PM

## 2022-04-28 NOTE — CONSULTS
Harini Teresa, Charmian Council[facility], [place]   Urology Consultation      Patient:  Misti Jimenez  MRN: [id number]  YOB: 1990    CHIEF COMPLAINT:  Urinary retention     HISTORY OF PRESENT ILLNESS:   The patient is a 29 y.o. female who presented with subarachnoid hemorrhage status post external ventricular drain due to obstructive hydrocephalus and coil embolization. Urology was consulted for urinary retention. Her course has been complicated by cerebral salt wasting. She had her Leija catheter removed yesterday after it was found that she had a UTI. She was started on Rocephin. Since her Leija catheter was removed she has been required to be straight cathed for greater than 1 L x 2. Leija catheter was replaced by primary team.  She has been severely constipated and is requiring enemas. She has not ambulated. She has no urological history and has never had urinary retention in the past.    Patient's old records, notes and chart reviewed and summarized above. Past Medical History:    History reviewed. No pertinent past medical history.     Past Surgical History:    Past Surgical History:   Procedure Laterality Date    DILATION AND CURETTAGE OF UTERUS N/A 3/21/2022    DILATATION AND CURETTAGE HYSTEROSCOPY CAUTERY ABLATION-NOVASURE ENDOMETRIAL ABLATION performed by Amrita Webb DO at [place] [address]  04/18/2022     IR ANGIOGRAM CAROTID CEREBRAL BILATERAL    OVARIAN CYST REMOVAL Right 3/21/2022    LAPAROSCOPIC- RIGHT OOPHERECTOMY performed by Amrita Webb DO at [address] [place] [address]  4/25/2022         SALPINGECTOMY Bilateral 3/21/2022    SALPINGECTOMY LAPAROSCOPIC performed by Amrita Webb DO at [facility]       Medications:      Current Facility-Administered Medications:     cefTRIAXone (ROCEPHIN) 1,000 mg in sterile water 10 mL IV syringe, 1,000 mg, IntraVENous, Q24H, DEEDEE Harvey - CNP    fleet rectal enema 1 enema, 1 enema, Rectal, Once PRN, Amira Wongmins, APRN - CNP    milrinone (PRIMACOR) 20 mg in dextrose 5 % 100 mL infusion, 0.5 mcg/kg/min, IntraVENous, Continuous, Amira Wongmins, APRN - CNP, Last Rate: 10.7 mL/hr at 04/28/22 1345, 0.5 mcg/kg/min at 04/28/22 1345    butalbital-acetaminophen-caffeine (FIORICET, ESGIC) per tablet 1 tablet, 1 tablet, Oral, Q6H PRN, Candelaria Serrato MD, 1 tablet at 04/28/22 1437    naloxegol (MOVANTIK) tablet 25 mg, 25 mg, Oral, QAM, Cary Cantu MD, 25 mg at 04/28/22 0839    topiramate (TOPAMAX) tablet 100 mg, 100 mg, Oral, BID, Chaim Ro MD, 100 mg at 04/28/22 0839    fentaNYL (SUBLIMAZE) injection 25 mcg, 25 mcg, IntraVENous, Q8H PRN, Amira Tineo, APRN - CNP, 25 mcg at 04/28/22 1302    magnesium sulfate 4000 mg in 100 mL IVPB premix, 4,000 mg, IntraVENous, BID, Candelaria Serrato MD, Stopped at 04/28/22 1502    sodium chloride flush 0.9 % injection 5-40 mL, 5-40 mL, IntraVENous, 2 times per day, Aracelis Silverio MD, 10 mL at 04/28/22 0933    sodium chloride flush 0.9 % injection 5-40 mL, 5-40 mL, IntraVENous, PRN, Aracelis Silverio MD    0.9 % sodium chloride infusion, 25 mL, IntraVENous, PRN, Aracelis Silverio MD    sodium chloride flush 0.9 % injection 5-40 mL, 5-40 mL, IntraVENous, 2 times per day, Candelaria Serrato MD, 10 mL at 04/28/22 0840    sodium chloride flush 0.9 % injection 5-40 mL, 5-40 mL, IntraVENous, PRN, Darryl Olvera MD    0.9 % sodium chloride infusion, 25 mL, IntraVENous, PRN, Candelaria Serrato MD    sodium chloride tablet 1 g, 1 g, Oral, TID WC, Candelaria Serrato MD, 1 g at 04/28/22 1256    bisacodyl (DULCOLAX) suppository 10 mg, 10 mg, Rectal, Daily PRN, Candelaria Serrato MD, 10 mg at 04/24/22 5334    sennosides-docusate sodium (SENOKOT-S) 8.6-50 MG tablet 2 tablet, 2 tablet, Oral, Daily, Whit Zeng MD, 2 tablet at 04/28/22 0875    polyethylene glycol (GLYCOLAX) packet 17 g, 17 g, Oral, Daily, Amira Tineo, APRN - CNP, 17 g at 04/28/22 0840    0.9 % sodium chloride infusion, , IntraVENous, Continuous, Darryl Olvera MD, Last Rate: 125 mL/hr at 04/28/22 0700, Rate Verify at 04/28/22 0700    melatonin tablet 5 mg, 5 mg, Oral, Nightly, Delmer Sykes APRN - CNP, 5 mg at 04/27/22 2021    acetaminophen (TYLENOL) tablet 650 mg, 650 mg, Oral, Q4H PRN, Delmer Sykes APRN - CNP, 650 mg at 04/28/22 1631    melatonin tablet 5 mg, 5 mg, Oral, Nightly PRN, Raúl Mcneill MD    bisacodyl (DULCOLAX) EC tablet 10 mg, 10 mg, Oral, Daily, Raúl Mcneill MD, 10 mg at 04/28/22 0839    magnesium hydroxide (MILK OF MAGNESIA) 400 MG/5ML suspension 30 mL, 30 mL, Oral, Daily PRN, Raúl Mcneill MD, 30 mL at 04/25/22 2229    enoxaparin (LOVENOX) injection 40 mg, 40 mg, SubCUTAneous, Daily, Darryl Olvera MD, 40 mg at 04/28/22 0838    ondansetron (ZOFRAN-ODT) disintegrating tablet 4 mg, 4 mg, Oral, Q8H PRN **OR** ondansetron (ZOFRAN) injection 4 mg, 4 mg, IntraVENous, Q6H PRN, Darryl Olvera MD, 4 mg at 04/28/22 1437    atorvastatin (LIPITOR) tablet 80 mg, 80 mg, Oral, Nightly, Darryl Olvera MD, 80 mg at 04/27/22 2021    niMODipine (NIMOTOP) capsule 60 mg, 60 mg, Oral, 6 times per day, Raúl Mcneill MD, 60 mg at 04/28/22 1256    sodium chloride flush 0.9 % injection 5-40 mL, 5-40 mL, IntraVENous, 2 times per day, Madeline Espinosa MD, 10 mL at 04/27/22 2028    sodium chloride flush 0.9 % injection 5-40 mL, 5-40 mL, IntraVENous, PRN, Madeline Espinosa MD    0.9 % sodium chloride infusion, , IntraVENous, PRN, Madeline Espinosa MD, Stopped at 04/19/22 2110    Allergies:  No Known Allergies    Social History:   Social History     Socioeconomic History    Marital status: Single     Spouse name: Not on file    Number of children: Not on file    Years of education: Not on file    Highest education level: Not on file   Occupational History    Not on file   Tobacco Use    Smoking status: Former Smoker     Packs/day: 0.50     Years: 14.00     Pack years: 7.00 Types: Cigarettes     Quit date: 2022     Years since quittin.2    Smokeless tobacco: Never Used   Vaping Use    Vaping Use: Some days    Substances: Nicotine   Substance and Sexual Activity    Alcohol use: Yes     Comment: occ    Drug use: No    Sexual activity: Not Currently     Partners: Male     Birth control/protection: Condom   Other Topics Concern    Not on file   Social History Narrative    Not on file     Social Determinants of Health     Financial Resource Strain: Low Risk     Difficulty of Paying Living Expenses: Not hard at all   Food Insecurity: No Food Insecurity    Worried About Running Out of Food in the Last Year: Never true    Moy of Food in the Last Year: Never true   Transportation Needs:     Lack of Transportation (Medical): Not on file    Lack of Transportation (Non-Medical): Not on file   Physical Activity:     Days of Exercise per Week: Not on file    Minutes of Exercise per Session: Not on file   Stress:     Feeling of Stress : Not on file   Social Connections:     Frequency of Communication with Friends and Family: Not on file    Frequency of Social Gatherings with Friends and Family: Not on file    Attends Tenriism Services: Not on file    Active Member of [address] [place] [address] or Organizations: Not on file    Attends Club or Organization Meetings: Not on file    Marital Status: Not on file   Intimate Partner Violence:     Fear of Current or Ex-Partner: Not on file    Emotionally Abused: Not on file    Physically Abused: Not on file    Sexually Abused: Not on file   Housing Stability:     Unable to Pay for Housing in the Last Year: Not on file    Number of [place] in the Last Year: Not on file    Unstable Housing in the Last Year: Not on file       Family History:  History reviewed. No pertinent family history. REVIEW OF SYSTEMS:  A comprehensive 14 point review of systems was obtained.   Constitutional: No fatigue  Eyes: No blurry vision  Ears, nose, mouth, throat, face: No ringing in the ears; no facial droop. Respiratory: No cough or cold. Cardiovascular: No palpitations  Gastrointestinal: + constipation. Genitourinary: No burning with urination  Integument/Skin: No rashes  Hematologic/Lymphatic: No easy bruising  Musculoskeletal: No muscle pains  Neurologic: No weakness in the extremities. Psychiatric: No depression or suicidal thoughts. Endocrine: No heat or cold intolerances. Allergic/Immunologic: No current seasonal allergies; no skin hives. Physical Exam:      This a 29 y.o. female   Vitals:    04/28/22 1400   BP: (!) 144/96   Pulse: 77   Resp: 18   Temp:    SpO2: 97%     Constitutional: Patient in no acute distress. Neuro: alert and oriented to person place and time. Head: Atraumatic and normocephalic. Neck: Trachea midline. Ext: 2+ radial pulses bilaterally. Psych: Mood and affect normal.  Skin: No rashes or bruising present. Lungs: Respiratory effort normal.  Cardiovascular:  Regular rhythm. Abdomen: Soft, non-tender, non-distended. Neither side has CVA tenderness on exam.  Bladder non-tender and not distended.    : Catheter in place with yellow urine    Labs:  Recent Labs     04/26/22  0556 04/27/22  0611 04/28/22  0716   WBC 12.8* 13.4* 14.6*   HGB 12.9 14.1 13.4   HCT 39.8 43.1 40.5   MCV 90.5 89.2 88.6    266 278     Recent Labs     04/26/22  1759 04/27/22  0611 04/28/22  0716    135 135   K 3.9 3.8 3.9    105 107   CO2 17* 18* 17*   BUN 7 6 7   CREATININE 0.76 0.72 0.81       Recent Labs     04/27/22  1322   COLORU Yellow   PHUR 7.5   WBCUA 20 TO 50   RBCUA 50    BACTERIA MANY*   SPECGRAV 1.006   LEUKOCYTESUR MODERATE*   UROBILINOGEN Normal   BILIRUBINUR NEGATIVE           -----------------------------------------------------------------  Imaging Results:  CT HEAD WO CONTRAST    Result Date: 4/19/2022  EXAMINATION: CT OF THE HEAD WITHOUT CONTRAST  4/19/2022 11:32 am TECHNIQUE: CT of the head was performed without the administration of intravenous contrast. Dose modulation, iterative reconstruction, and/or weight based adjustment of the mA/kV was utilized to reduce the radiation dose to as low as reasonably achievable. COMPARISON: CT brain performed 04/17/2022. HISTORY: ORDERING SYSTEM PROVIDED HISTORY: sah TECHNOLOGIST PROVIDED HISTORY: sah Is the patient pregnant?->No Reason for Exam: sah FINDINGS: BRAIN/VENTRICLES: There is redemonstration of scattered subarachnoid hemorrhage that is decreased compared to prior. There has been interval KRISSY aneurysm coiling. The ventricular structures are unremarkable. There is a shunt tube from a right frontal approach. The infratentorial structures are unremarkable. ORBITS: The visualized portion of the orbits demonstrate no acute abnormality. SINUSES: The visualized paranasal sinuses and mastoid air cells demonstrate no acute abnormality. SOFT TISSUES/SKULL:  No acute abnormality of the visualized skull or soft tissues. Scattered subarachnoid hemorrhage that is decreased compared to prior examination. Interval KRISSY aneurysm coiling. RECOMMENDATIONS: Unavailable     IR ANGIOGRAM CAROTID C EREBRAL BILATERAL    Result Date: 4/20/2022  Date of Service: 4/18/2022 Patient arrived to the angio suite at: 0743 Anesthesia/sedation initiated at: 0848 Puncture obtained at: 0853 Vascular access was removed at: 1037 Manual pressure for minutes: 10 Patient wheeled out of the angio suite at: 1050 Diagnosis: Ruptured left anterior communicating artery aneurysm Total anesthesia/sedation: 122 mins. Procedures: 1. Right ultrasound guided femoral artery access 2. Selective right common carotid artery (CCA) cerebral angiogram 3. Selective right internal carotid artery (ICA) cerebral angiogram 4. Selective left common carotid artery (CCA) cerebral angiogram 5. Selective left internal carotid artery (ICA) cerebral angiogram 6. Selective left vertebral artery (VA) cerebral angiogram 7. Left internal carotid artery 3D rotational angiography with reconstruction on an external work station 8. Transarterial coil embolization of the left anterior communicating artery (AComA) aneurysm 9. Continuous IA nicardipine infusion 10. Right common femoral artery (CFA) angiogram Neurointerventionalist: Bahman Montague MD [place]: Jhony Roper MD PhD Dennie Sally MD Contrast: 96 cc of Visipaque-270. Fluoroscopy time: 38.8 minutes Access: Right common femoral artery. Comparison: None Consent: After explaining the risks and benefits to the patient and the patient's family, including but not limited to hemorrhagic and ischemic stroke, coma, death, vessel injury, dissection, tear, occlusion, and X-ray dye allergic type reaction, a signed consent form was obtained from sister. Indication and Clinical History: 29year old female with past medical history of chronic smoking, marijuana abuse presented with diffuse subarachnoid hemorrhage on ct head at [place] due to anterior communicating artery aneurysm rupture. Rosen and almeida 3 Modified [place] 3. EVD placed in suite after intubation. Plan for emergent cerebral angiogram and likely coil embolization. Status post extraventricular drain placement by Dr. Tino Pérez. Anesthesia: Local anesthesia with lidocaine. General Anesthesia. Description and findings: The patient's right groin was prepped and draped in standard sterile fashion and under general anesthesia. Ultrasound was used to insonate the right common femoral artery, which was then accessed with a micropuncture technique, and a 6 [place]an 10cm short intravascular sheath was placed within the right common femoral artery, establishing arterial access using Seldinger technique. No heparin bolus was given due to acute bleed. A 6 [place]an Neuron 070 intermediate catheter (IC) was then advanced over a 5F select catheter and a stiff glidewire guide wire to the level of the aortic arch. Right CCA technique:  The right common carotid artery was selectively catheterized under fluoroscopic guidance and digital subtraction angiography images were obtained in biplane projections of the right cervical carotid artery. Interpretation: The right common carotid artery injection demonstrates normal antegrade flow into the external and internal carotid arteries with normal filling of the external carotid artery branches. Course and caliber of the cervical portion of the right internal carotid artery are unremarkable. Further inspection demonstrates no evidence of dissection, stenosis, aneurysm, or other vascular abnormality within the right CCA into the cervical segment of the right ICA. Right ICA technique: The right internal carotid artery was then selectively catheterized, and digital subtraction angiography of the intracranial right internal carotid circulation was performed in frontal, and lateral projections. Interpretation: There is normal antegrade filling of the distal internal carotid artery, ophthalmic artery, anterior cerebral artery, middle cerebral artery and the distal branches. There is a prominent right posterior communicating artery with a functionally fetal right posterior cerebral artery. There is a left AComA aneurysm that is better visualized and opacified on the left ICA run. Inspection of the remaining right internal carotid circulation revealed no other evidence of cerebral aneurysm, arteriovenous malformation, or arterial stenosis. Capillary and venous phase images were also unremarkable, with no evidence of veno-occlusive disease. Left CCA technique: The left common carotid artery was selectively catheterized under fluoroscopic guidance and digital subtraction angiography images were obtained in biplane projections of the left cervical common carotid artery.  Interpretation: The left common carotid artery injection demonstrates normal antegrade flow into the external and internal carotid arteries with normal filling of the external carotid artery branches. Caliber and lumen contour of the cervical portion of the left internal carotid artery are unremarkable. Further inspection demonstrates no other evidence of dissection, stenosis, aneurysm, or other vascular abnormality within the left CCA into the cervical segment of the left ICA. Left ICA technique: The left internal carotid artery was then selectively catheterized, and digital subtraction angiography of the intracranial left internal carotid artery circulation was performed in frontal and lateral projections. Interpretation: There is normal antegrade filling of the distal internal carotid artery, ophthalmic artery, anterior cerebral artery, middle cerebral artery and distal branches. There is a prominent left posterior communicating artery. The left A1 anterior cerebral artery is dominant. There is a left wide necked inferiorly and anteriorly oriented ruptured AComA aneurysm, dimensions neck 2.93mm, height 2.82mm, width 3.13mm, length 2.78mm. Inspection of the remaining left internal carotid artery circulation revealed no other evidence of cerebral aneurysm, arteriovenous malformation, or arterial stenosis. Capillary and venous phase images were also unremarkable, with no evidence of veno-occlusive disease. Left VA technique: The left subclavian artery was then selectively catheterized and the left vertebral artery was selectively catheterized with roadmap guidance. Digital subtraction angiography of the intracranial left vertebrobasilar run-off was obtained in frontal and lateral projections. Interpretation: The left vertebral artery injection demonstrates normal antegrade opacification of the left vertebral artery, including the cervical segment, basilar artery, and respective branches. The right VA is dominant. There is a prominent musculocutaneous branch from the left V2 segment that anastomoses to the left occipital artery.  Retrograde contrast opacification of the contralateral right V4 segment was achieved. The right P1 posterior cerebral artery is hypoplastic or absent. There is a wide base infundibulum at the right P1 origin. There was no evidence of cerebral aneurysm, arteriovenous malformation, or arterial stenosis. Capillary and venous phase images were unremarkable. 3-D rotational left internal carotid cerebral angiogram: A 3-D rotation of cerebral angiogram was performed with a left ICA injection using a power injector to better visualize the aneurysmal morphology and for surgical planning. This was interpreted on a separate workstation. Information obtained from the 3-D  rotational angiogram could not be obtained from other noninvasive diagnostic studies done prior to the procedure. AComA aneurysm embolization: The select catheter was removed and an Excelsior SL-10 microcatheter was advanced over a Synchro2 014 microwire into the aneurysm sac and the microwire was removed. The following coils were successfully deployed: --Penumbra Smart coil 2.5mm x 4cm extra soft --Penumbra Smart coil 1mm x 3cm wave extra soft --Penumbra Smart coil 1mm x 2cm wave extra soft The following coils were opened but not detached: --Penumbra Smart coil 1.5mm x 3cm wave extra soft Interpretation: There was complete cessation of flow to the aneurysm, Vu score 1. Intracranial imaging showed no interval new occlusions or embolizations. After reviewing the final images, the microcatheter and IC were removed under fluoroscopic guidance. Right CFA technique: A right common femoral artery angiogram was performed and demonstrated arterial catheterization proximal to the bifurcation. There was no evidence of dissection or occlusion within the right common femoral artery. A 6/7F Vascade closure device was used to establish hemostasis at the right common femoral artery access site. No immediate complications were experienced.  Patient was extubated and re-examined after the procedure with no change noted in their neurologic  examination, with distal pulses present. The patient was subsequently discharged from the neurointerventional suite to the neurointensive care unit. 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 --The above treated with Penumbra Smartcoil embolization x 3, Vu 1. Smart coil x1 opened but not detached. --Prominent musculocutaneous branch from the left V2 segment that anastomoses to the left occipital artery. --S/p EVD in the Suite with Dr. 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 Dr. Kayleen Velasquez dictated this invasive procedure. Dr Severiano Beaver was present for all procedural and imaging components of this case. Examination was reviewed and reported findings confirmed and evaluated by Dr. Severiano Beaver. Severiano Beaver, MD Final report electronically signed by Severiano Beaver, M.D. on 4/20/2022 3:25 PM    XR CHEST PORTABLE    Result Date: 4/18/2022  EXAMINATION: ONE XRAY VIEW OF THE CHEST 4/18/2022 4:09 am COMPARISON: Chest radiograph performed 07/06/2020. HISTORY: ORDERING SYSTEM PROVIDED HISTORY: leukocytosis baseline CXR TECHNOLOGIST PROVIDED HISTORY: leukocytosis baseline CXR Reason for Exam: supine FINDINGS: There is no acute consolidation or effusion. There is no pneumothorax. The mediastinal structures are unremarkable. The upper abdomen is unremarkable. The extrathoracic soft tissues are unremarkable. There is no acute osseous abnormality. No acute cardiopulmonary process. CTA HEAD NECK W CONTRAST    Result Date: 4/18/2022  EXAMINATION: CTA OF THE HEAD AND NECK WITH CONTRAST 4/18/2022 12:50 am: TECHNIQUE: CTA of the head and neck was performed with the administration of intravenous contrast. Multiplanar reformatted images are provided for review. MIP images are provided for review.  Stenosis of the internal carotid arteries measured using NASCET criteria. Dose modulation, iterative reconstruction, and/or weight based adjustment of the mA/kV was utilized to reduce the radiation dose to as low as reasonably achievable. COMPARISON: None. HISTORY: ORDERING SYSTEM PROVIDED HISTORY: acute headache TECHNOLOGIST PROVIDED HISTORY: acute headache Decision Support Exception - unselect if not a suspected or confirmed emergency medical condition->Emergency Medical Condition (MA) FINDINGS: CTA NECK: AORTIC ARCH/ARCH VESSELS: No dissection or arterial injury. No significant stenosis of the brachiocephalic or subclavian arteries. CAROTID ARTERIES: No dissection, arterial injury, or hemodynamically significant stenosis by NASCET criteria. VERTEBRAL ARTERIES: No dissection, arterial injury, or significant stenosis. SOFT TISSUES: The lung apices are clear. No cervical or superior mediastinal lymphadenopathy. The larynx and pharynx are unremarkable. No acute abnormality of the salivary and thyroid glands. BONES: No acute osseous abnormality. CTA HEAD: ANTERIOR CIRCULATION: No significant stenosis of the intracranial internal carotid, anterior cerebral, or middle cerebral arteries. 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 POSTERIOR CIRCULATION: No significant stenosis of the vertebral, basilar, or posterior cerebral arteries. No aneurysm. OTHER: No dural venous sinus thrombosis on this non-dedicated study. BRAIN: No mass effect or midline shift. No extra-axial fluid collection. The gray-white differentiation is maintained. Aneurysm arising from the anterior communicating artery measuring 3 mm x 2 mm. Findings were discussed with Dr. Emmett Euceda At 1:13 am on 4/18/2022.      VL TRANSCRANIAL DOPPLER COMPLETE    Result Date: 4/25/2022    [facility]  Vascular Transcranial Procedure   Patient Name Jsoe Leyva    Date of Study 04/19/2022               LINDA KING   Date of      1990  Gender                  Female  Birth   Age          29 year(s)  Race                       Room Number  0528        Height:                 62 inch, 157.48 cm   Corporate ID [id number]    Weight:                 161 pounds, 73 kg  #   Patient Acct [de-identified]   BSA:        1.74 m^2    BMI:        29.45 kg/m^2  #   MR #         [id number]     Brigid Bernardo   Accession #  [id number]  Interpreting Physician  Luis Chin   Referring                Referring Physician     Juan Valentin MD  Nurse  Practitioner  Additional Comments CLASSIFICATION OF VASOSPASM MEAN MCA VELOCITY ----- MCA/ICA VELOCITY RATIO ------- INTERPRETATION <120 cm/sec --------------------- less than 3 --------------------------- Normal, nonspecific elevation or distal MCA spasm >120 cm/sec ------------------------ 3 to 6 -------------------------------- Mild vasospasm of proximal MCA >160 cm/sec ------------------------ 3 to 6 -------------------------------- Moderate vasospasm of proximal MCA >200 cm/sec -------------------- greater than 6------------------------- Severe vasospasm of proximal MCA. MEAN BASILAR ARTERY VELOCITY------- INTERPRETATION >60 cm/sec -------------------------------------------- Mild BA vasospasm >90 cm/sec -------------------------------------------- Moderate BA vasospasm >120 cm/sec ------------------------------------------ Severe BA vasospasm. Procedure Type of Study:   Cerebral: Transcranial.  Indications for Study:Subarachnoid hemorrhage. Patient Status: In Patient. Conclusions   Summary   No evidence of vasospasm in the bilateral MCAs and basilar artery.    Signature   ----------------------------------------------------------------  Electronically signed by Alina Geronimo(Sonographer) on  04/19/2022 08:29 AM  ---------------------------------------------------------------- ----------------------------------------------------------------  Electronically signed by Christina Pipes Reyes,Arthur(Interpreting  physician) on 04/25/2022 12:54 PM  ----------------------------------------------------------------  Findings:   Right Impression:                     Left Impression:  Right Lindegaard Ratio = 2.22         Left Lindegaard Ratio = 2.07  MEAN VELOCITIES:                      MEAN VELOCITIES:  Transtemporal Approach                Transtemporal Approach  Proximal MCA: 53                      Proximal MCA: 45  Mid MCA: 59                           Mid MCA: 44  Distal MCA: 48                        Distal MCA: 48  Proximal KRISSY: 48                      Proximal KRISSY: 53  Mid KRISSY: 52                           Mid KRISSY: 52  PCA: 35                               PCA: 25  T ICA: 39                             T ICA: 20  Submandibular Approach                Submandibular Approach  D ICA: 27                             D ICA: 24  Transorbital Approach                 Transorbital Approach  Siphon: 32                            Siphon: 26  Transforamenal Approach               Transforamenal Approach  Vertebral: 47                         Vertebral: 41                                        BASILAR: 46  Risk Factors History +---------+----------+-----------------------------------------------------+ ! Diagnosis! Date      ! Comments                                             ! +---------+----------+-----------------------------------------------------+ ! Other    !04/18/2022! ACOMM A. ANEURYSM 3MM X 2MM                          ! +---------+----------+-----------------------------------------------------+ ! Other    !04/19/2022! Hematocrit: 39.3                                     ! +---------+----------+-----------------------------------------------------+ ! Other    !04/23/2022! Hematocrit: 38                                       ! +---------+----------+-----------------------------------------------------+ !Other    !04/24/2022! Hematocrit 37.3                                      ! +---------+----------+-----------------------------------------------------+      Assessment and Plan   Impression:    59-year-old female  Active  problems  Acute urinary retention  Cerebral salt wasting  Urinary tract infection  Constipation      Plan:   Continue Leija catheter, will plan for void trial closer to discharge.   Creatinine at baseline   Minimize constipation, recommend scheduled bowel regimen  Minimize narcotics and anticholinergics as able  Ambulate, out of bed as able  Follow-up urine culture, continue Rocephin until urine culture results  Appreciate medical management per primary team  Please contact us 1 day prior to discharge for void trial instructions  Please feel free to contact us with any further questions or concern    Kari Starkey MD

## 2022-04-28 NOTE — PROGRESS NOTES
Neurosurgery LILLY/Resident    Daily Progress Note   CC:  Chief Complaint   Patient presents with    Altered Mental Status     4/28/2022  6:49 AM    Chart reviewed. No acute events overnight. No new complaints.  EVD raised to 15mmHG yesterday, she was reporting worsen headache this morning though she was laying flat for stevenson insertion (removed yesterday but had multipl episodes or retention requiring straight cathing) ICP 5-13, EVD output 173ml/24 hours , up trending leukocytosis at 14.6 now, CSF studies sent yesterday , positive for UTI started on Rocephin for 5 days, temp 38.6 yesterday about 1500 no spikes in temp since   Vitals:    04/28/22 0300 04/28/22 0400 04/28/22 0500 04/28/22 0600   BP: (!) 130/92 (!) 141/100 138/89 139/89   Pulse: 83 93 81 79   Resp: 18 17 16 19   Temp:  98.4 °F (36.9 °C)     TempSrc:  Oral     SpO2: 96% 100% 96% 96%   Weight:       Height:           PE:   Alert and oriented x3  Appears to not be as bright and cheerful as yesterday  Following commands   Motor  L deltoid 5/5; R deltoid 5/5  L biceps 5/5; R biceps 5/5  L triceps 5/5; R triceps 5/5  L wrist extension 5/5; R wrist extension 5/5  L intrinsics 5/5; R intrinsics 5/5      L iliopsoas 5/5 , R iliopsoas 5/5  L quadriceps 5/5; R quadriceps 5/5  L Dorsiflexion 5/5; R dorsiflexion 5/5  L Plantarflexion 5/5; R plantarflexion 5/5  L EHL 5/5; R EHL 5/5    Sensation: intact     Drain output: 173ml/24 hours   Incision: clean dry intact no drainage noted from site       Lab Results   Component Value Date    WBC 13.4 (H) 04/27/2022    HGB 14.1 04/27/2022    HCT 43.1 04/27/2022     04/27/2022    CHOL 182 04/18/2022    TRIG 60 04/18/2022    HDL 74 04/18/2022    ALT 16 04/20/2022    AST 15 04/20/2022     04/27/2022    K 3.8 04/27/2022     04/27/2022    CREATININE 0.72 04/27/2022    BUN 6 04/27/2022    CO2 18 (L) 04/27/2022    TSH 2.18 04/20/2022    INR 0.9 04/18/2022    LABA1C 5.5 04/18/2022    CRP <3.0 04/19/2022 A/P  29 y.o. female who presents with intraventricular hemorrhage, aneurysmal subarachnoid hemorrhage from [facility] s/p coiling, hydrocephalus with EVD placement 4/18, post bleed day 910        - Neuro checks per floor protocol  - Keep EVD open at 15mmHG monitor ICP hourly and record       Please contact neurosurgery with any changes in patients neurologic status.        Humphrey Alexis CNP  4/28/22  6:49 AM

## 2022-04-28 NOTE — PROGRESS NOTES
Daily Progress Note  Neuro Critical Care    Patient Name: Facundo Boland  Patient : 1990  Room/Bed: 0621/2064-14  Code Status: Full Code   Allergies: No Known Allergies    CHIEF COMPLAINT:     Headache    INTERVAL HISTORY    Initial Presentation (Admitted 22 @00:45AM): The patient is a 32 y.o.  female who presents to our emergency department 2022 via [address] as a transfer from [facility] acute worst headache of her life. Fernando Hopkins does not have any significant past medical history in our chart and denies any.  [facility] without demonstrating diffuse subarachnoid hemorrhage.  Patient was seen by telestroke physician in our group recommending transfer to our facility for further work-up.  In the emergency department patient states that she was washing TV with her child when she began to have severe pounding headache 10 out of 10 worst in her life. Fernando Hopkins noted to be severely nauseous requiring multiple doses of IV Zofran and IM Phenergan.  Patient also admitting to photosensitivity.  Patient had significant hypertension at [place] started on IV nicardipine given 1 g of Keppra 10 mg Decadron.     Arrival to our emergency department 140/102, heart rate 77 temperature 97. 6.  Initial labs PTT 25.3, INR 1.0, WBC 13.7, platelets 983, glucose 139 and high-sensitivity troponin less than 6.  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  Case discussed with senior endovascular fellow who is aware of case and current neurologic exam planning for emergent DSA 2022 early morning.     Rosen&De Souza: 2, Modified Combs: grade 3     Hospital Course:   -around 8:00 AM Dr. Lopez Kill bedside placing EVD prior to endovascular neurosurgery. Fernando Hopkins went for urgent DSA found to have a comm aneurysm treated by primary coil embolization.   : Hemodynamically stable for arterial line readings -142, heart rate 82-99 T-max 98. 9.  Currently receiving María 5 mg every 4 hours as needed and fentanyl 25 every 2 as needed for pain control. 4/20: Blood pressure within parameter -139 overnight Cardene has been held off since 6 PM 4/19/2022 not requiring any as needed IV medications, heart rate 65-79 T-max 98.5.  Serum magnesium 2.3 was started on IV magnesium 2 g twice daily will increase to 4 g twice daily continue to trend magnesium daily.  Of note yesterday patient started to have increased urine output 9 L total in 24 hours urine studies sent consistent with central salt wasting patient started on hypertonic saline 2% 150 cc bolus followed by 75 cc/h maintenance.  Sodium checks every 8 hours with a sodium goal of 145.  4/21: Vitals stable overnight blood pressures trending slightly up although okay to liberalize SBP <160 today. Overnight -152, HR 69-80, tmax 99.3. Labs pending this AM although sodium noted to drop from 137 @17:03 yesterday to 129 @midnight. Patient given 150CC bolus 2% and pending repeat this AM. Will send repeat urine studies and discussed with patient she has been drinking excess water in large water bottle at bedside so to limit today to Gatorade and 1-1.5L max. Headache continues to be stable same as yesterday 6/10 constant pressure and throbbing bifrontal location. Will get repeat TCD today to ensure no increase in vasospasm. No bowel movement X3 days giving dulcolax and milk of mag continue to monitor. protonix 40 and starting decadron 4Q6hr for headache decrease oxy pain med Q6hr.   4/22: Blood pressure goal liberalized to SBP <160 yesterday although pressures trending up still -172 overnight (giving IV labetalol 10mg early AM) , HR 64-83, tmax 98. 6.  patient given 1L nacl bolus yesterday following slightly compressible IVC on ultrasound as she continues to have central salt wasting urine output slightly decreasing over last 3 days although 5,989 still yesterday. Discontinue hypertonic 2% 75cc/hr, start NaCl bolus 1L this AM and continue with 125cc/hr. Sodium goal can liberalize to 140 if Na <135 resume 2% hypertonic at 75cc/hr. Still no BM receiving dulcolax, milk of mag and glycolax. Headache improved slightly today 5/10. EVD remains open at [id number] and decreasing output 189 over last 24 hours. Transcranial dopplers reviewed slightly increasing vasospasm on the right velocities up to low 100s Lindegaard ratio up from 2-->3. 3.   4/23: EVD to 20cm H2O. EVD output 80cc overnight, high UOP overnight although improved. Headache improving, ambulating well. 4/24: EVD open draining at [id number] total output 95cc over last 24 hours. Hypertensive overnight -187, HR 50-72, tmax 98.6. 72 hours of decadron 4mg Q6 completed overnight. Patient complaining of constipation has been on bowel regimen with glycolax, milk of mag and PO dulcolax. Added dulcolax suppository this morning small BM still very uncomfortable thus will add fleets PRN also. TCD's increasing left sided velocities from day prior peak left MCA velocity 156 peak right 160 Lindgard ratio left 3.49 up from 2.54 and right 3.38 down from 3.67. CT head WO this morning prior to toradol. Discussing with NS also when okay to give toradol given EVD in place. 4/25:  Failed EVD clamp trial yesterday increased ICP thus remains open at 25 with total output of 134cc/12 and 214 for the full 24 hours. Severe headache; Topamax 100mg QD and Mag 4g BID added. EVD lowered to 10mmgHg. TCD suggested mild right MCA and basilar spasm. Started on Milrinone infusion. 4/26: Hyponatremic yesterday evening; started on [email or web address] x6h with improvement, switched back to [email or web address] this AM.  Headache improved yesterday evening/overnight but started to become severe again this morning. Will trial Morphine ER 15mg BID x48h, decrease PRN Fentanyl to 25mg Q8h PRN. Increase Topamax to 100mg BID.   Last BM on Saturday, given narcotic usage will trial dose of Movantik. Continues on aggressive bowel regimen and took PRN Milk of Mag overnight. EVD set at [address], ICP 10-17, 331cc out/24h. TCD 4/26 overall stable; mild elevations of right MCA (mean velocities 129, LR 4.07). Continues on Milrinone 0.20mcg/kg/min. 4/27: Sodium 135 this AM, continues to have significant urine output (>6L/24h). TCD improved LR left 2.52 down from 3.4 yesterday and right 2.63 down from 4.07. EVD raised to 15mmHg. Trial Fioricet Q6hr PRN. Fever in evening. UA suggestive of UTI; started on Rocephin. Last 24h:  No acute events overnight. EVD set at 15mmHg, ICP 5-13, 173cc out/24h. Clinical exam remains stable. Continues to have headaches. Completed 48h course of MS contin 15mg BID, monitor off today. TCD this afternoon stable. Milrinone infusion increased to 0.5mcg/kg/min. No further fevers. Mild uptrend in WBC to 14.6. Leija removed yesterday but patient had recurrent urinary retention overnight requiring straight cath x2 each time with >1L out. Leija catheter replaced this morning due to recurrent retention. Urology consulted. Patient has not had a BM since Sunday (smal BM after enema per nursing). Will obtain KUB. Abdomen soft, non-distended and non-tender. Continue Movantik x2 more doses. Plan to give enema later today. Patient reports her appetite has been OK, states she snacks a lot.       CURRENT MEDICATIONS:  SCHEDULED MEDICATIONS:   cefTRIAXone (ROCEPHIN) IV  1,000 mg IntraVENous Q24H    naloxegol  25 mg Oral QAM    topiramate  100 mg Oral BID    magnesium sulfate  4,000 mg IntraVENous BID    sodium chloride flush  5-40 mL IntraVENous 2 times per day    sodium chloride flush  5-40 mL IntraVENous 2 times per day    sodium chloride  1 g Oral TID     sennosides-docusate sodium  2 tablet Oral Daily    polyethylene glycol  17 g Oral Daily    melatonin  5 mg Oral Nightly    bisacodyl  10 mg Oral Daily    enoxaparin  40 mg SubCUTAneous Daily    atorvastatin  80 mg Oral Nightly    niMODipine  60 mg Oral 6 times per day    sodium chloride flush  5-40 mL IntraVENous 2 times per day     CONTINUOUS INFUSIONS:   milrinone 0.2 mcg/kg/min (22 1833)    sodium chloride      sodium chloride      sodium chloride 125 mL/hr at 22 0222    sodium chloride Stopped (22)     PRN MEDICATIONS:   butalbital-acetaminophen-caffeine, fentanNYL, ketorolac, sodium chloride flush, sodium chloride, sodium chloride flush, sodium chloride, bisacodyl, acetaminophen, melatonin, magnesium hydroxide, ondansetron **OR** ondansetron, sodium chloride flush, sodium chloride    VITALS:  Temperature Range: Temp: 98.4 °F (36.9 °C) Temp  Av.8 °F (37.1 °C)  Min: 98 °F (36.7 °C)  Max: 101.5 °F (38.6 °C)  BP Range: Systolic (86EMA), AMF:069 , Min:117 , FMD:190     Diastolic (63ULV), CWX:00, Min:74, Max:102    Pulse Range: Pulse  Av.4  Min: 67  Max: 93  Respiration Range: Resp  Av.2  Min: 12  Max: 23  Current Pulse Ox: SpO2: 96 %  24HR Pulse Ox Range: SpO2  Av.6 %  Min: 95 %  Max: 100 %  Patient Vitals for the past 12 hrs:   BP Temp Temp src Pulse Resp SpO2   22 0600 139/89 -- -- 79 19 96 %   22 0500 138/89 -- -- 81 16 96 %   22 0400 (!) 141/100 98.4 °F (36.9 °C) Oral 93 17 100 %   22 0300 (!) 130/92 -- -- 83 18 96 %   22 0200 (!) 138/91 -- -- 83 17 --   22 0100 (!) 144/98 -- -- 73 19 --   22 0000 (!) 145/96 98.5 °F (36.9 °C) Oral 75 15 --   22 2300 (!) 138/94 -- -- 78 15 --   22 (!) 140/101 -- -- 79 23 --   22 (!) 130/102 -- -- 79 17 --   22 117/85 98.3 °F (36.8 °C) Oral 83 18 --   22 -- 98.3 °F (36.8 °C) Oral -- -- --   22 (!) 150/78 -- -- 87 20 --     Estimated body mass index is 28.83 kg/m² as calculated from the following:    Height as of this encounter: 5' 2\" (1.575 m).     Weight as of this encounter: 157 lb 10.1 oz (71.5 kg).  []<16 Severe malnutrition  []16-16.99 Moderate malnutrition  []17-18.49 Mild malnutrition  []18.5-24.9 Normal  [x]25-29.9 Overweight (not obese)  []30-34.9 Obese class 1 (Low Risk)  []35-39.9 Obese class 2 (Moderate Risk)  []?40 Obese class 3 (High Risk)    RECENT LABS:   Lab Results   Component Value Date    WBC 13.4 (H) 04/27/2022    HGB 14.1 04/27/2022    HCT 43.1 04/27/2022     04/27/2022    CHOL 182 04/18/2022    TRIG 60 04/18/2022    HDL 74 04/18/2022    ALT 16 04/20/2022    AST 15 04/20/2022     04/27/2022    K 3.8 04/27/2022     04/27/2022    CREATININE 0.72 04/27/2022    BUN 6 04/27/2022    CO2 18 (L) 04/27/2022    TSH 2.18 04/20/2022    INR 0.9 04/18/2022    LABA1C 5.5 04/18/2022     24 HOUR INTAKE/OUTPUT:    Intake/Output Summary (Last 24 hours) at 4/28/2022 0705  Last data filed at 4/28/2022 0600  Gross per 24 hour   Intake 4541.02 ml   Output 4747 ml   Net -205.98 ml       IMAGING:   VL TRANSCRANIAL DOPPLER COMPLETE  Result Date: 4/27/2022    [facility]  Vascular Transcranial Procedure   Patient Name  Bea Graft    Date of Study         04/26/2022                LINDA KING   Date of Birth 1990  Gender                Female   Age           29 year(s)  Race                     Room Number   0930        Height:               62 inch, 157.48 cm   Corporate ID  [id number]    Weight:               161 pounds, 73 kg  #   Patient Acct  [de-identified]   BSA:       1.74 m^2   BMI:         29.45 kg/m^2  #   MR #          [id number]     Sonographer           Odalis Greenfield RVT, [facility]   Accession #   [id number]  Interpreting          Dago eTllo                            Physician   Referring                 Referring Physician   Oj De La Rosa  Nurse  Practitioner  Additional Comments CLASSIFICATION OF VASOSPASM MEAN MCA VELOCITY ----- MCA/ICA VELOCITY RATIO ------- INTERPRETATION <120 cm/sec --------------------- less than 3 --------------------------- Normal, nonspecific elevation or distal MCA spasm >120 cm/sec ------------------------ 3 to 6 -------------------------------- Mild vasospasm of proximal MCA >160 cm/sec ------------------------ 3 to 6 -------------------------------- Moderate vasospasm of proximal MCA >200 cm/sec -------------------- greater than 6------------------------- Severe vasospasm of proximal MCA. MEAN BASILAR ARTERY VELOCITY------- INTERPRETATION >60 cm/sec -------------------------------------------- Mild BA vasospasm >90 cm/sec -------------------------------------------- Moderate BA vasospasm >120 cm/sec ------------------------------------------ Severe BA vasospasm. Procedure Type of Study:   Cerebral: Transcranial.  Indications for Study:Subarachnoid hemorrhage. Patient Status: In Patient. Conclusions   Summary   TRANSCRANIAL DOPPLER ULTRASOUND  Date of Service: 4/26/2022   Interpretation: This is bilateral complete TCD with insonation of ALL Cerebral Arteries  via transtemporal, submandibular, transorbital, and transforaminal windows  demonstrating distal left Middle Cerebral Artery elevated mean cerebral  blood flow velocities (MCBFVs) at 115 cm/sec while the right MCA was up to  129 cm/sec. the remaining MCBFVs were within normal limits and the PI were  normal.  The Lindegaard ratio was 4.07 on the right side while it was 3.4 on the  left side. IMPRESSION:  1) Mild bilateral MCA vasospasm (right worse than left) as described above       Labs and Images reviewed with:  [x] Dr. Blas Hopkins:  Alert and oriented x 3. Appears uncomfortable. GCS 15. No dysarthria. No aphasia.    HEAD:  normocephalic, EVD in place without drainage   EYES:  PERRL, EOMI.   ENT:  moist mucous membranes   NECK:  supple, symmetric   LUNGS:  Equal air entry bilaterally, clear   CARDIOVASCULAR:  normal s1 / s2, RRR, distal pulses intact   ABDOMEN:  Soft, no rigidity, normal bowel sounds   NEUROLOGIC:  Mental Status:  A & O x3, Awake Cranial Nerves:    cranial nerves II-XII are grossly intact    Motor Exam:    Drift:  absent  Tone:  normal    Motor exam is symmetrical 5 out of 5 all extremities bilaterally    Sensory:    Touch:    Right Upper Extremity:  normal  Left Upper Extremity:  normal  Right Lower Extremity:  normal  Left Lower Extremity:  normal    Coordination/Dysmetria:  Finger to Nose:   Right:  normal  Left:  normal        DRAINS:  EVD set at 15mmHg, management per NSG. ASSESSMENT AND PLAN:       This is a 32 y.o. female with no significant medical history who initially presented to [facility] ED with thunderclap headache and was found to have diffuse subarachnoid hemorrhage. Underwent emergent EVD placement at bedside 4/18/2022. 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 Neuro ICU course complicated by cerebral salt wasting and mild right MCA vasospasm.       Patient care will be discussed with attending, will reevaluate patient along with attending.      NEUROLOGIC:  - Diffuse SAH with IVH secondary to ruptured ACOM aneurysm  - POD #10 s/p coil embolization of the ruptured ACOM aneurysm  - Continue Nimodipine 60mg Q4h  - Mild R MCA vasospasm ? symptomatic with worsening headache, no focal deficits  - Increase Milrinone to 0.5mcg/kg/min  - TCD 4/28 stable  - Goal -220, avoid symptomatic hypotension  - Obstructive hydrocephalus s/p EVD placement 4/18  - Failed EVD clamp trial 4/24 due to elevated ICH (25) and head pressure  - EVD set at 15mmHg, ICP 5-13, 173cc out/24h  - Headache management;  Completed 48h course of MS contin 15mg BID last night (monitor off for now), continue Topamax 100mg BID, Fentanyl to 25mcg Q8h PRN, Toradol 30mg BID PRN, Magnesium boluses PRN  - Neuro checks per protocol     CARDIOVASCULAR:  - Goal -220, avoid symptomatic hypotension  - Echo EF 65%, negative bubble  - Continue telemetry     PULMONARY:  - Maintaining O2 sats on room air  - Incentive spirometry     RENAL/FLUID/ELECTROLYTE:  - Normal renal functioning  - BUN 7/ Creatinine 0.81  - Monitor I&O; 6142/7602  - Cerebral salt wasting secondary to [facility]  - Goal eunatremia, Q8h sodium checks, sodium 135 this AM  - Continue salt tabs 1g TID  - IVF: [email or web address]   - Goal Mag>2.5; Mag level 2.4, continue Magnesium IV 4 grams BID to help with headaches  - Replace electrolytes PRN  - Daily BMP     GI/NUTRITION:  NUTRITION: General Diet  - Tolerating diet well  - Last BM 4/23 per patient/nursing  - Continue constipation  - Check KUB today   - Continue Movantik 25mg QD x3 (last dose tomorrow)  - Consider enema later today  - Bowel regimen: Continue Dulcolax PO, Glycolax, Senokot-S daily and Milk of Mag PRN  - GI prophylaxis: N/A      ID:  - One time fever yesterday evening (38.6C)  - Leukocytosis mildly worsened today, WBC 14.6  - CSF culture 4/27 negative todate  - UA 4/27 suggestive of UTI with moderate leukocyte esterase and many bacteria  - Started on Rocephin for UTI x 5 days, completes 5/3  - Daily CBC     HEME:   - H&H 13.4/40.5, stable  - Platelets 278  - Daily CBC     ENDOCRINE:  - Continue to monitor blood glucose, goal <180     OTHER:  - PT/OT/ST     PROPHYLAXIS:   Stress ulcer: N/A     DVT PROPHYLAXIS:  - SCD sleeves - Thigh High   - Lovenox    DISPOSITION:  [x] To remain ICU for close neurological monitoring, EVD management. We will continue to follow along. For any changes in exam or patient status please contact Neuro Critical Care.       DEEDEE Weiner - CNP   PGY-3 Neurology Resident   Neuro Critical Care  Pager [phone]  4/28/2022     7:05 AM

## 2022-04-28 NOTE — PLAN OF CARE
Problem: Falls - Risk of:  Goal: Will remain free from falls  Description: Will remain free from falls  Outcome: Progressing  Goal: Absence of physical injury  Description: Absence of physical injury  Outcome: Progressing     Problem: Anxiety/Stress:  Goal: Level of anxiety will decrease  Description: Level of anxiety will decrease  Outcome: Progressing     Problem: Mental Status - Impaired:  Goal: Mental status will be restored to baseline  Description: Mental status will be restored to baseline  Outcome: Progressing     Problem: Pain:  Goal: Pain level will decrease  Description: Pain level will decrease  Outcome: Progressing  Goal: Recognizes and communicates pain  Description: Recognizes and communicates pain  Outcome: Progressing  Goal: Control of acute pain  Description: Control of acute pain  Outcome: Progressing  Goal: Control of chronic pain  Description: Control of chronic pain  Outcome: Progressing     Problem: Skin Integrity - Impaired:  Goal: Will show no infection signs and symptoms  Description: Will show no infection signs and symptoms  Outcome: Progressing  Goal: Absence of new skin breakdown  Description: Absence of new skin breakdown  Outcome: Progressing     Problem: HEMODYNAMIC STATUS  Goal: Patient has stable vital signs and fluid balance  Outcome: Progressing     Problem: ACTIVITY INTOLERANCE/IMPAIRED MOBILITY  Goal: Mobility/activity is maintained at optimum level for patient  Outcome: Progressing     Problem: COMMUNICATION IMPAIRMENT  Goal: Ability to express needs and understand communication  Outcome: Progressing     Problem: Swallowing - Impaired:  Goal: Able to swallow without choking  Description: Able to swallow without choking  Outcome: Progressing  Goal: Absence of aspiration  Description: Absence of aspiration  Outcome: Progressing     Problem: Pain:  Goal: Pain level will decrease  Description: Pain level will decrease  Outcome: Progressing  Goal: Control of acute pain  Description: Control of acute pain  Outcome: Progressing  Goal: Control of chronic pain  Description: Control of chronic pain  Outcome: Progressing     Problem: Discharge Planning  Goal: Discharge to home or other facility with appropriate resources  Outcome: Progressing     Problem: ABCDS Injury Assessment  Goal: Absence of physical injury  Outcome: Progressing

## 2022-04-28 NOTE — PLAN OF CARE
Problem: Falls - Risk of:  Goal: Will remain free from falls  Description: Will remain free from falls  4/28/2022 1813 by Gurwinder Nath RN  Outcome: Progressing     Problem: Falls - Risk of:  Goal: Absence of physical injury  Description: Absence of physical injury  4/28/2022 1813 by Gurwinder Nath RN  Outcome: Progressing     Problem: Anxiety/Stress:  Goal: Level of anxiety will decrease  Description: Level of anxiety will decrease  4/28/2022 1813 by Gurwinder Nath RN  Outcome: Progressing     Problem: Mental Status - Impaired:  Goal: Mental status will be restored to baseline  Description: Mental status will be restored to baseline  4/28/2022 1813 by Gurwinder Nath RN  Outcome: Progressing     Problem: Pain:  Goal: Pain level will decrease  Description: Pain level will decrease  4/28/2022 1813 by Gurwinder Nath RN  Outcome: Progressing     Problem: Pain:  Goal: Recognizes and communicates pain  Description: Recognizes and communicates pain  4/28/2022 1813 by Gurwinder Nath RN  Outcome: Progressing     Problem: Pain:  Goal: Control of acute pain  Description: Control of acute pain  4/28/2022 1813 by Gurwinder Nath RN  Outcome: Progressing     Problem: Pain:  Goal: Control of chronic pain  Description: Control of chronic pain  4/28/2022 1813 by Gurwinder Nath RN  Outcome: Progressing     Problem: Skin Integrity - Impaired:  Goal: Will show no infection signs and symptoms  Description: Will show no infection signs and symptoms  4/28/2022 1813 by Gurwinder Nath RN  Outcome: Progressing     Problem: Skin Integrity - Impaired:  Goal: Absence of new skin breakdown  Description: Absence of new skin breakdown  4/28/2022 1813 by Gurwinder Nath RN  Outcome: Progressing     Problem: HEMODYNAMIC STATUS  Goal: Patient has stable vital signs and fluid balance  4/28/2022 1813 by Gurwinder Nath RN  Outcome: Progressing     Problem: ACTIVITY INTOLERANCE/IMPAIRED MOBILITY  Goal: Mobility/activity is maintained at optimum level for patient  4/28/2022 1813 by Leydi Godinez RN  Outcome: Progressing     Problem: COMMUNICATION IMPAIRMENT  Goal: Ability to express needs and understand communication  4/28/2022 1813 by Leydi Godinez RN  Outcome: Progressing     Problem: Swallowing - Impaired:  Goal: Able to swallow without choking  Description: Able to swallow without choking  4/28/2022 1813 by Leydi Godinez RN  Outcome: Progressing     Problem: Swallowing - Impaired:  Goal: Absence of aspiration  Description: Absence of aspiration  4/28/2022 1813 by Leydi Godinez RN  Outcome: Progressing     Problem: Pain:  Goal: Pain level will decrease  Description: Pain level will decrease  4/28/2022 1813 by Leydi Godinez RN  Outcome: Progressing     Problem: Pain:  Goal: Control of acute pain  Description: Control of acute pain  4/28/2022 1813 by Leydi Godinez RN  Outcome: Progressing     Problem: Pain:  Goal: Control of chronic pain  Description: Control of chronic pain  4/28/2022 1813 by Leydi Godinez RN  Outcome: Progressing     Problem: Discharge Planning  Goal: Discharge to home or other facility with appropriate resources  4/28/2022 1813 by Leydi Godinez RN  Outcome: Progressing     Problem: ABCDS Injury Assessment  Goal: Absence of physical injury  4/28/2022 1813 by Leydi Godinez RN  Outcome: Progressing

## 2022-04-28 NOTE — PROGRESS NOTES
Comprehensive Nutrition Assessment    Type and Reason for Visit:  Reassess    Nutrition Recommendations/Plan:   1. Encourage PO intake as tolerated including protein foods     Malnutrition Assessment:  Malnutrition Status: At risk for malnutrition (Comment) (04/25/22 3650)      Nutrition Assessment:    Pt states she is eating \"fair\". C/o to have significant headaches. Had not touched breakfast at time of visit. States she does not like the hospital's food. Says family will bring her in foods to eat. Has not been ordering/eating much protein. Ordered toast, light yogurt, and fruit cup for breakfast. Says brother could bring her in a protein shake to drink, but did not want any hospital provided options at this time. Nutrition Related Findings:    meds/labs reviewed Wound Type: Surgical Incision (to anterior head)       Current Nutrition Intake & Therapies:    Average Meal Intake: 0% (observed at breakfast (during visit))  Average Supplements Intake: None Ordered (not interested)  ADULT DIET; Regular    Anthropometric Measures:  Height: 5' 2\" (157.5 cm)  Ideal Body Weight (IBW): 110 lbs (50 kg)    Admission Body Weight: 161 lb 13.1 oz (73.4 kg)  Current Body Weight: 157 lb 10.1 oz (71.5 kg), 143.3 % IBW. Weight Source: Bed Scale  Current BMI (kg/m2): 28.8  Usual Body Weight: 160 lb 6.4 oz (72.8 kg) (2/22/22 bed scale per chart review)  % Weight Change (Calculated): -1.7                    BMI Categories: Overweight (BMI 25.0-29. 9)    Estimated Daily Nutrient Needs:  Energy Requirements Based On: Kcal/kg  Weight Used for Energy Requirements: Current  Energy (kcal/day): 3130-7061 kcals/day  Weight Used for Protein Requirements: Current  Protein (g/day): 1.2-1.4 gm/kg =  gm pro/day  Method Used for Fluid Requirements: Other (Comment)  Fluid (ml/day): per MD    Nutrition Diagnosis:   · Inadequate oral intake related to  (appetite, headache, dislike of provided foods) as evidenced by  (decreased PO intake)      Nutrition Interventions:   Food and/or Nutrient Delivery: Continue Current Diet  Nutrition Education/Counseling: Education completed (Discussed importance of protein intake for healing/rehab with [facility]. Reviewed protein sources with pt.)  Coordination of Nutrition Care: Continue to monitor while inpatient       Goals:  Previous Goal Met: No Progress toward Goal(s)  Goals: PO intake 75% or greater,within 7 days       Nutrition Monitoring and Evaluation:   Behavioral-Environmental Outcomes: None Identified  Food/Nutrient Intake Outcomes: Food and Nutrient Intake  Physical Signs/Symptoms Outcomes: Weight,Biochemical Data,Nutrition Focused Physical Findings    Discharge Planning:     Too soon to determine     Kyree Woods MS, RD, LD  Contact: [phone]

## 2022-04-28 NOTE — PROGRESS NOTES
[facility]up[facility] [address] [employer]  Occupational Therapy Not Seen Note    DATE: 2022    NAME: Vee Alvarez  MRN: [id number]   : 1990      Patient not seen this date for Occupational Therapy due to:    Patient Declined: Pt c/o high pain level in head. Pt lethargic and mumbling, unable to state pain number.     Next Scheduled Treatment:     Electronically signed by WALDO Yung on 2022 at 11:54 AM

## 2022-04-28 NOTE — PROGRESS NOTES
Physical Therapy        Physical Therapy Cancel Note      DATE: 2022    NAME: Suzie Andrews  MRN: [id number]   : 1990      Patient not seen this date for Physical Therapy due to: Other: Checked on pt earlier, pt getting transcranial dopplers. Checked back on pt after dopplers were complete, pt very lethargic not answering questions will defer PT and check back as appropriate.       Electronically signed by Mayda Chahal PTA on 2022 at 10:05 AM

## 2022-04-29 LAB
ANION GAP SERPL CALCULATED.3IONS-SCNC: 13 MMOL/L (ref 9–17)
BUN BLDV-MCNC: 5 MG/DL (ref 6–20)
CALCIUM SERPL-MCNC: 8.2 MG/DL (ref 8.6–10.4)
CHLORIDE BLD-SCNC: 105 MMOL/L (ref 98–107)
CO2: 16 MMOL/L (ref 20–31)
CREAT SERPL-MCNC: 0.77 MG/DL (ref 0.5–0.9)
GFR AFRICAN AMERICAN: >60 ML/MIN
GFR NON-AFRICAN AMERICAN: >60 ML/MIN
GFR SERPL CREATININE-BSD FRML MDRD: ABNORMAL ML/MIN/{1.73_M2}
GLUCOSE BLD-MCNC: 122 MG/DL (ref 70–99)
HCT VFR BLD CALC: 38.1 % (ref 36.3–47.1)
HEMOGLOBIN: 13 G/DL (ref 11.9–15.1)
MAGNESIUM: 2.7 MG/DL (ref 1.6–2.6)
MCH RBC QN AUTO: 29.7 PG (ref 25.2–33.5)
MCHC RBC AUTO-ENTMCNC: 34.1 G/DL (ref 28.4–34.8)
MCV RBC AUTO: 87.2 FL (ref 82.6–102.9)
NRBC AUTOMATED: 0 PER 100 WBC
PDW BLD-RTO: 11.9 % (ref 11.8–14.4)
PLATELET # BLD: 299 K/UL (ref 138–453)
PMV BLD AUTO: 10 FL (ref 8.1–13.5)
POTASSIUM SERPL-SCNC: 3.5 MMOL/L (ref 3.7–5.3)
RBC # BLD: 4.37 M/UL (ref 3.95–5.11)
SODIUM BLD-SCNC: 134 MMOL/L (ref 135–144)
WBC # BLD: 14.1 K/UL (ref 3.5–11.3)

## 2022-04-29 PROCEDURE — 2500000003 HC RX 250 WO HCPCS: Performed by: NURSE PRACTITIONER

## 2022-04-29 PROCEDURE — 83735 ASSAY OF MAGNESIUM: CPT

## 2022-04-29 PROCEDURE — 99233 SBSQ HOSP IP/OBS HIGH 50: CPT | Performed by: PSYCHIATRY & NEUROLOGY

## 2022-04-29 PROCEDURE — 97530 THERAPEUTIC ACTIVITIES: CPT

## 2022-04-29 PROCEDURE — 99291 CRITICAL CARE FIRST HOUR: CPT | Performed by: PSYCHIATRY & NEUROLOGY

## 2022-04-29 PROCEDURE — 2580000003 HC RX 258: Performed by: INTERNAL MEDICINE

## 2022-04-29 PROCEDURE — APPSS15 APP SPLIT SHARED TIME 0-15 MINUTES: Performed by: NURSE PRACTITIONER

## 2022-04-29 PROCEDURE — 2580000003 HC RX 258: Performed by: ANESTHESIOLOGY

## 2022-04-29 PROCEDURE — 2000000003 HC NEURO ICU R&B

## 2022-04-29 PROCEDURE — 85027 COMPLETE CBC AUTOMATED: CPT

## 2022-04-29 PROCEDURE — 6370000000 HC RX 637 (ALT 250 FOR IP): Performed by: STUDENT IN AN ORGANIZED HEALTH CARE EDUCATION/TRAINING PROGRAM

## 2022-04-29 PROCEDURE — 93886 INTRACRANIAL COMPLETE STUDY: CPT

## 2022-04-29 PROCEDURE — 6370000000 HC RX 637 (ALT 250 FOR IP): Performed by: HEALTH CARE PROVIDER

## 2022-04-29 PROCEDURE — 93886 INTRACRANIAL COMPLETE STUDY: CPT | Performed by: PSYCHIATRY & NEUROLOGY

## 2022-04-29 PROCEDURE — 2580000003 HC RX 258: Performed by: STUDENT IN AN ORGANIZED HEALTH CARE EDUCATION/TRAINING PROGRAM

## 2022-04-29 PROCEDURE — 36415 COLL VENOUS BLD VENIPUNCTURE: CPT

## 2022-04-29 PROCEDURE — 6370000000 HC RX 637 (ALT 250 FOR IP): Performed by: NURSE PRACTITIONER

## 2022-04-29 PROCEDURE — 6360000002 HC RX W HCPCS: Performed by: NURSE PRACTITIONER

## 2022-04-29 PROCEDURE — 6370000000 HC RX 637 (ALT 250 FOR IP): Performed by: PSYCHIATRY & NEUROLOGY

## 2022-04-29 PROCEDURE — 97116 GAIT TRAINING THERAPY: CPT

## 2022-04-29 PROCEDURE — 80048 BASIC METABOLIC PNL TOTAL CA: CPT

## 2022-04-29 PROCEDURE — 6360000002 HC RX W HCPCS: Performed by: STUDENT IN AN ORGANIZED HEALTH CARE EDUCATION/TRAINING PROGRAM

## 2022-04-29 PROCEDURE — 87086 URINE CULTURE/COLONY COUNT: CPT

## 2022-04-29 RX ORDER — FENTANYL CITRATE 50 UG/ML
50 INJECTION, SOLUTION INTRAMUSCULAR; INTRAVENOUS ONCE
Status: COMPLETED | OUTPATIENT
Start: 2022-04-29 | End: 2022-04-29

## 2022-04-29 RX ORDER — IBUPROFEN 400 MG/1
800 TABLET ORAL ONCE
Status: COMPLETED | OUTPATIENT
Start: 2022-04-29 | End: 2022-04-29

## 2022-04-29 RX ORDER — IBUPROFEN 800 MG/1
800 TABLET ORAL ONCE
Status: COMPLETED | OUTPATIENT
Start: 2022-04-29 | End: 2022-04-29

## 2022-04-29 RX ORDER — VERAPAMIL HYDROCHLORIDE 40 MG/1
40 TABLET ORAL EVERY 8 HOURS SCHEDULED
Status: DISCONTINUED | OUTPATIENT
Start: 2022-04-29 | End: 2022-05-01

## 2022-04-29 RX ORDER — VERAPAMIL HYDROCHLORIDE 40 MG/1
40 TABLET ORAL ONCE
Status: COMPLETED | OUTPATIENT
Start: 2022-04-29 | End: 2022-04-29

## 2022-04-29 RX ADMIN — POLYETHYLENE GLYCOL 3350 17 G: 17 POWDER, FOR SOLUTION ORAL at 08:21

## 2022-04-29 RX ADMIN — SODIUM CHLORIDE TAB 1 GM 1 G: 1 TAB at 11:51

## 2022-04-29 RX ADMIN — IBUPROFEN 800 MG: 400 TABLET, FILM COATED ORAL at 22:25

## 2022-04-29 RX ADMIN — NALOXEGOL OXALATE 25 MG: 12.5 TABLET, FILM COATED ORAL at 08:21

## 2022-04-29 RX ADMIN — FENTANYL CITRATE 50 MCG: 50 INJECTION, SOLUTION INTRAMUSCULAR; INTRAVENOUS at 04:20

## 2022-04-29 RX ADMIN — VERAPAMIL HYDROCHLORIDE 40 MG: 40 TABLET ORAL at 15:05

## 2022-04-29 RX ADMIN — NIMODIPINE 60 MG: 30 CAPSULE, LIQUID FILLED ORAL at 01:14

## 2022-04-29 RX ADMIN — FENTANYL CITRATE 25 MCG: 50 INJECTION, SOLUTION INTRAMUSCULAR; INTRAVENOUS at 00:05

## 2022-04-29 RX ADMIN — IBUPROFEN 800 MG: 800 TABLET, FILM COATED ORAL at 14:11

## 2022-04-29 RX ADMIN — SODIUM CHLORIDE, PRESERVATIVE FREE 10 ML: 5 INJECTION INTRAVENOUS at 09:45

## 2022-04-29 RX ADMIN — NIMODIPINE 60 MG: 30 CAPSULE, LIQUID FILLED ORAL at 17:05

## 2022-04-29 RX ADMIN — BUTALBITAL, ACETAMINOPHEN AND CAFFEINE 1 TABLET: 50; 325; 40 TABLET ORAL at 06:32

## 2022-04-29 RX ADMIN — SODIUM CHLORIDE, PRESERVATIVE FREE 10 ML: 5 INJECTION INTRAVENOUS at 09:44

## 2022-04-29 RX ADMIN — NIMODIPINE 60 MG: 30 CAPSULE, LIQUID FILLED ORAL at 05:50

## 2022-04-29 RX ADMIN — TOPIRAMATE 150 MG: 100 TABLET, FILM COATED ORAL at 20:44

## 2022-04-29 RX ADMIN — DOCUSATE SODIUM 50 MG AND SENNOSIDES 8.6 MG 2 TABLET: 8.6; 5 TABLET, FILM COATED ORAL at 08:20

## 2022-04-29 RX ADMIN — ONDANSETRON 4 MG: 2 INJECTION INTRAMUSCULAR; INTRAVENOUS at 04:20

## 2022-04-29 RX ADMIN — CEFTRIAXONE SODIUM 1000 MG: 1 INJECTION, POWDER, FOR SOLUTION INTRAMUSCULAR; INTRAVENOUS at 17:06

## 2022-04-29 RX ADMIN — MAGNESIUM SULFATE IN WATER 4000 MG: 40 INJECTION, SOLUTION INTRAVENOUS at 22:26

## 2022-04-29 RX ADMIN — NIMODIPINE 60 MG: 30 CAPSULE, LIQUID FILLED ORAL at 08:21

## 2022-04-29 RX ADMIN — NIMODIPINE 60 MG: 30 CAPSULE, LIQUID FILLED ORAL at 12:23

## 2022-04-29 RX ADMIN — ATORVASTATIN CALCIUM 80 MG: 80 TABLET, FILM COATED ORAL at 20:44

## 2022-04-29 RX ADMIN — ENOXAPARIN SODIUM 40 MG: 100 INJECTION SUBCUTANEOUS at 08:21

## 2022-04-29 RX ADMIN — SODIUM CHLORIDE TAB 1 GM 1 G: 1 TAB at 08:20

## 2022-04-29 RX ADMIN — SODIUM CHLORIDE: 9 INJECTION, SOLUTION INTRAVENOUS at 04:27

## 2022-04-29 RX ADMIN — FENTANYL CITRATE 25 MCG: 50 INJECTION, SOLUTION INTRAMUSCULAR; INTRAVENOUS at 20:45

## 2022-04-29 RX ADMIN — MAGNESIUM SULFATE IN WATER 4000 MG: 40 INJECTION, SOLUTION INTRAVENOUS at 11:50

## 2022-04-29 RX ADMIN — MILRINONE LACTATE 0.5 MCG/KG/MIN: 0.2 INJECTION, SOLUTION INTRAVENOUS at 08:18

## 2022-04-29 RX ADMIN — NIMODIPINE 60 MG: 30 CAPSULE, LIQUID FILLED ORAL at 20:45

## 2022-04-29 RX ADMIN — TOPIRAMATE 100 MG: 100 TABLET, FILM COATED ORAL at 08:20

## 2022-04-29 RX ADMIN — FENTANYL CITRATE 25 MCG: 50 INJECTION, SOLUTION INTRAMUSCULAR; INTRAVENOUS at 08:14

## 2022-04-29 RX ADMIN — BISACODYL 10 MG: 5 TABLET, COATED ORAL at 08:21

## 2022-04-29 RX ADMIN — Medication 5 MG: at 20:45

## 2022-04-29 RX ADMIN — VERAPAMIL HYDROCHLORIDE 40 MG: 40 TABLET ORAL at 22:32

## 2022-04-29 RX ADMIN — BUTALBITAL, ACETAMINOPHEN AND CAFFEINE 1 TABLET: 50; 325; 40 TABLET ORAL at 12:24

## 2022-04-29 RX ADMIN — SODIUM CHLORIDE TAB 1 GM 1 G: 1 TAB at 17:05

## 2022-04-29 RX ADMIN — MILRINONE LACTATE 0.7 MCG/KG/MIN: 0.2 INJECTION, SOLUTION INTRAVENOUS at 22:27

## 2022-04-29 RX ADMIN — ACETAMINOPHEN 650 MG: 325 TABLET ORAL at 08:21

## 2022-04-29 ASSESSMENT — ENCOUNTER SYMPTOMS
RHINORRHEA: 0
VOMITING: 0
NAUSEA: 1
SHORTNESS OF BREATH: 0
ABDOMINAL PAIN: 0
SORE THROAT: 0
EYE REDNESS: 0

## 2022-04-29 ASSESSMENT — PAIN SCALES - GENERAL
PAINLEVEL_OUTOF10: 6
PAINLEVEL_OUTOF10: 7
PAINLEVEL_OUTOF10: 7
PAINLEVEL_OUTOF10: 10
PAINLEVEL_OUTOF10: 7
PAINLEVEL_OUTOF10: 9
PAINLEVEL_OUTOF10: 10

## 2022-04-29 NOTE — CARE COORDINATION
[facility] Flow/Interdisciplinary Rounds Progress Note    Quality Flow Rounds held on April 29, 2022 at [address] Attending:  Bedside Nurse, ,  and Nursing Unit Leadership    Barriers to Discharge: EVD in place    Anticipated Discharge Date:       Anticipated Discharge Disposition: Home with family    Readmission Risk              Risk of Unplanned Readmission:  11           Discussed patient goal for the day, patient clinical progression, and barriers to discharge. The following Goal(s) of the Day/Commitment(s) have been identified:  Activity Progression  Transitional Care Plan     Patient still has EVD in place. Goal remains for patient to return home with family providing support 24/7. [facility]. needs.   Has ride and PCP      Aurea Sandifer, RN  April 29, 2022

## 2022-04-29 NOTE — PROGRESS NOTES
Occupational [address] [employer]  Occupational Therapy Not Seen Note    DATE: 2022    NAME: Regla Molina  MRN: [id number]   : 1990      Patient not seen this date for Occupational Therapy due to:    Testing: TCD at bedside.     Next Scheduled Treatment:     Electronically signed by WALDO Fernandes on 2022 at 10:03 AM

## 2022-04-29 NOTE — PROGRESS NOTES
Daily Progress Note  Neuro Critical Care    Patient Name: Eliana Luque  Patient : 1990  Room/Bed: 4675/1550-17  Code Status: full code  Allergies: No Known Allergies    CHIEF COMPLAINT:      headache     INTERVAL HISTORY      Initial Presentation (Admitted 22 @00:45AM): The patient is a 32 y.o.  female who presents to our emergency department 2022 via [address] [facility] worst [facility] of her life. Steffanie Homans does not have any significant past medical history in our chart and denies any.  [facility] without demonstrating diffuse subarachnoid hemorrhage.  Patient was seen by telestroke physician in our group recommending transfer to our facility for further work-up.  In the emergency department patient states that she was washing TV with her child when she began to have severe pounding headache 10 out of 10 worst in her life. Steffanie Homans noted to be severely nauseous requiring multiple doses of IV Zofran and IM Phenergan.  Patient also admitting to photosensitivity.  Patient had significant hypertension at [place] started on IV nicardipine given 1 g of Keppra 10 mg Decadron.     Arrival to our emergency department 140/102, heart rate 77 temperature 97. 6.  Initial labs PTT 25.3, INR 1.0, WBC 13.7, platelets 781, glucose 139 and high-sensitivity troponin less than 6.  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  Case discussed with senior endovascular fellow who is aware of case and current neurologic exam planning for emergent DSA 2022 early morning.     Rosen&De Souza: 2, Modified Combs: grade 3     Hospital Course:    -around 8:00 AM Dr. Radha Vaughan bedside placing EVD prior to endovascular neurosurgery. Steffanie Homans went for urgent DSA found to have a comm aneurysm treated by primary coil embolization.   : Hemodynamically stable for arterial line readings -142, heart rate 82-99 T-max 98. 9.  Currently receiving María 5 mg every 4 hours as needed and fentanyl 25 every 2 as needed for pain control. 4/20: Blood pressure within parameter -139 overnight Cardene has been held off since 6 PM 4/19/2022 not requiring any as needed IV medications, heart rate 65-79 T-max 98.5.  Serum magnesium 2.3 was started on IV magnesium 2 g twice daily will increase to 4 g twice daily continue to trend magnesium daily.  Of note yesterday patient started to have increased urine output 9 L total in 24 hours urine studies sent consistent with central salt wasting patient started on hypertonic saline 2% 150 cc bolus followed by 75 cc/h maintenance.  Sodium checks every 8 hours with a sodium goal of 145.  4/21: Vitals stable overnight blood pressures trending slightly up although okay to liberalize SBP <160 today. Overnight -152, HR 69-80, tmax 99.3. Labs pending this AM although sodium noted to drop from 137 @17:03 yesterday to 129 @midnight. Patient given 150CC bolus 2% and pending repeat this AM. Will send repeat urine studies and discussed with patient she has been drinking excess water in large water bottle at bedside so to limit today to Gatorade and 1-1.5L max. Headache continues to be stable same as yesterday 6/10 constant pressure and throbbing bifrontal location. Will get repeat TCD today to ensure no increase in vasospasm. No bowel movement X3 days giving dulcolax and milk of mag continue to monitor. protonix 40 and starting decadron 4Q6hr for headache decrease oxy pain med Q6hr.   4/22: Blood pressure goal liberalized to SBP <160 yesterday although pressures trending up still -172 overnight (giving IV labetalol 10mg early AM) , HR 64-83, tmax 98. 6.  patient given 1L nacl bolus yesterday following slightly compressible IVC on ultrasound as she continues to have central salt wasting urine output slightly decreasing over last 3 days although 5,989 still yesterday. Discontinue hypertonic 2% 75cc/hr, start NaCl bolus 1L this AM and continue with 125cc/hr. Sodium goal can liberalize to 140 if Na <135 resume 2% hypertonic at 75cc/hr. Still no BM receiving dulcolax, milk of mag and glycolax. Headache improved slightly today 5/10. EVD remains open at [id number] and decreasing output 189 over last 24 hours. Transcranial dopplers reviewed slightly increasing vasospasm on the right velocities up to low 100s Lindegaard ratio up from 2-->3. 3.   4/23: EVD to 20cm H2O. EVD output 80cc overnight, high UOP overnight although improved. Headache improving, ambulating well. 4/24: EVD open draining at [id number] total output 95cc over last 24 hours. Hypertensive overnight -187, HR 50-72, tmax 98.6. 72 hours of decadron 4mg Q6 completed overnight. Patient complaining of constipation has been on bowel regimen with glycolax, milk of mag and PO dulcolax. Added dulcolax suppository this morning small BM still very uncomfortable thus will add fleets PRN also. TCD's increasing left sided velocities from day prior peak left MCA velocity 156 peak right 160 Lindgard ratio left 3.49 up from 2.54 and right 3.38 down from 3.67. CT head WO this morning prior to toradol. Discussing with NS also when okay to give toradol given EVD in place.   4/25:  Failed EVD clamp trial yesterday increased ICP thus remains open at 25 with total output of 134cc/12 and 214 for the full 24 hours. Severe headache; Topamax 100mg QD and Mag 4g BID added. EVD lowered to 10mmgHg. TCD suggested mild right MCA and basilar spasm. Started on Milrinone infusion. 4/26: Hyponatremic yesterday evening; started on [email or web address] x6h with improvement, switched back to [email or web address] this AM.  Headache improved yesterday evening/overnight but started to become severe again this morning. Will trial Morphine ER 15mg BID x48h, decrease PRN Fentanyl to 25mg Q8h PRN. Increase Topamax to 100mg BID.   Last BM on Saturday, given narcotic usage will trial dose of Movantik. Continues on aggressive bowel regimen and took PRN Milk of Mag overnight. EVD set at [address], ICP 10-17, 331cc out/24h. TCD 4/26 overall stable; mild elevations of right MCA (mean velocities 129, LR 4.07). Continues on Milrinone 0.20mcg/kg/min. 4/27: Sodium 135 this AM, continues to have significant urine output (>6L/24h). TCD improved LR left 2.52 down from 3.4 yesterday and right 2.63 down from 4.07. EVD raised to 15mmHg. Trial Fioricet Q6hr PRN. Fever in evening. UA suggestive of UTI; started on Rocephin.  4/28: No acute events overnight. EVD set at 15mmHg, ICP 5-13, 173cc out/24h. Clinical exam remains stable. Continues to have headaches. Completed 48h course of MS contin 15mg BID, monitor off today. TCD this afternoon stable. Milrinone infusion increased to 0.5mcg/kg/min. No further fevers. Mild uptrend in WBC to 14.6. Leija removed yesterday but patient had recurrent urinary retention overnight requiring straight cath x2 each time with >1L out. Leija catheter replaced this morning due to recurrent retention. Urology consulted. Patient has not had a BM since Sunday (smal BM after enema per nursing). Will obtain KUB. Abdomen soft, non-distended and non-tender. Continue Movantik x2 more doses. Plan to give enema later today. Patient reports her appetite has been OK, states she snacks a lot. Last 24h:   Pain control issues overnight due to b/l parietal/temporal headache received Depakote, Fioricet and 50ug fentanyl .  Persistent nausea and dizziness    CURRENT MEDICATIONS:  SCHEDULED MEDICATIONS:   cefTRIAXone (ROCEPHIN) IV  1,000 mg IntraVENous Q24H    naloxegol  25 mg Oral QAM    topiramate  100 mg Oral BID    magnesium sulfate  4,000 mg IntraVENous BID    sodium chloride flush  5-40 mL IntraVENous 2 times per day    sodium chloride flush  5-40 mL IntraVENous 2 times per day    sodium chloride  1 g Oral TID     sennosides-docusate sodium 2 tablet Oral Daily    polyethylene glycol  17 g Oral Daily    melatonin  5 mg Oral Nightly    bisacodyl  10 mg Oral Daily    enoxaparin  40 mg SubCUTAneous Daily    atorvastatin  80 mg Oral Nightly    niMODipine  60 mg Oral 6 times per day    sodium chloride flush  5-40 mL IntraVENous 2 times per day     CONTINUOUS INFUSIONS:   milrinone 0.5 mcg/kg/min (22 0500)    sodium chloride      sodium chloride      sodium chloride 125 mL/hr at 22 0500    sodium chloride Stopped (22)     PRN MEDICATIONS:   butalbital-acetaminophen-caffeine, fentanNYL, sodium chloride flush, sodium chloride, sodium chloride flush, sodium chloride, bisacodyl, acetaminophen, melatonin, magnesium hydroxide, ondansetron **OR** ondansetron, sodium chloride flush, sodium chloride    VITALS:  Temperature Range: Temp: 99.1 °F (37.3 °C) Temp  Av °F (37.2 °C)  Min: 98.2 °F (36.8 °C)  Max: 100 °F (37.8 °C)  BP Range: Systolic (29ZVZ), QIS:856 , Min:101 , ZYP:503     Diastolic (05YIH), UDX:61, Min:58, Max:133    Pulse Range: Pulse  Av.2  Min: 70  Max: 97  Respiration Range: Resp  Av.8  Min: 10  Max: 22  Current Pulse Ox: SpO2: 98 %  24HR Pulse Ox Range: SpO2  Av.2 %  Min: 94 %  Max: 100 %  Patient Vitals for the past 12 hrs:   BP Temp Temp src Pulse Resp SpO2   22 0600 (!) 147/98 -- -- 92 18 98 %   22 0500 (!) 152/100 -- -- 85 15 97 %   22 0400 (!) 156/102 99.1 °F (37.3 °C) Axillary 88 14 100 %   22 0300 (!) 145/104 -- -- 94 19 96 %   22 0200 127/79 -- -- 97 19 100 %   22 0100 124/75 -- -- 95 17 97 %   22 0000 (!) 151/95 99 °F (37.2 °C) Axillary 79 17 96 %   22 2300 (!) 156/95 -- -- 84 17 96 %   22 2200 (!) 155/133 -- -- 85 22 97 %   22 2100 (!) 148/97 -- -- 70 15 97 %   22 (!) 132/93 99 °F (37.2 °C) Axillary 77 16 96 %   22 1900 -- -- -- 90 18 96 %     Estimated body mass index is 28.83 kg/m² as calculated from the following:    Height as of this encounter: 5' 2\" (1.575 m). Weight as of this encounter: 157 lb 10.1 oz (71.5 kg).  []<16 Severe malnutrition  []16-16.99 Moderate malnutrition  []17-18.49 Mild malnutrition  []18.5-24.9 Normal  [x]25-29.9 Overweight (not obese)  []30-34.9 Obese class 1 (Low Risk)  []35-39.9 Obese class 2 (Moderate Risk)  []?40 Obese class 3 (High Risk)    RECENT LABS:   Lab Results   Component Value Date    WBC 14.1 (H) 04/29/2022    HGB 13.0 04/29/2022    HCT 38.1 04/29/2022     04/29/2022    CHOL 182 04/18/2022    TRIG 60 04/18/2022    HDL 74 04/18/2022    ALT 16 04/20/2022    AST 15 04/20/2022     (L) 04/29/2022    K 3.5 (L) 04/29/2022     04/29/2022    CREATININE 0.77 04/29/2022    BUN 5 (L) 04/29/2022    CO2 16 (L) 04/29/2022    TSH 2.18 04/20/2022    INR 0.9 04/18/2022    LABA1C 5.5 04/18/2022     24 HOUR INTAKE/OUTPUT:    Intake/Output Summary (Last 24 hours) at 4/29/2022 5669  Last data filed at 4/29/2022 0600  Gross per 24 hour   Intake 3956.71 ml   Output 7338 ml   Net -3381.29 ml     XR ABDOMEN (KUB) (SINGLE AP VIEW)    Result Date: 4/28/2022  EXAMINATION: ONE SUPINE XRAY VIEW(S) OF THE ABDOMEN 4/28/2022 1:34 pm COMPARISON: None. HISTORY: ORDERING SYSTEM PROVIDED HISTORY: constipation TECHNOLOGIST PROVIDED HISTORY: constipation FINDINGS: AP portable view of the chest time stamped at 1324 hours demonstrates mild to moderate colonic stool load. Gas is noted throughout the intestinal tract in a nonobstructive pattern. Overlying monitoring electrodes are noted. No organomegaly is seen. Hemidiaphragms are not included in the field of view. Osseous structures are age-appropriate. Mild to moderate colonic stool load. Nonobstructive bowel gas pattern.      VL TRANSCRANIAL DOPPLER COMPLETE    Result Date: 4/28/2022    [facility]  Vascular Transcranial Procedure   Patient Name   Manuel Silva     Date of Study           04/28/2022                 KEVIN BROOKS Date of Birth  1990   Gender                  Female   Age            29 year(s)   Race                       Room Number    9827         Height:                 62 inch, 157.48 cm   Corporate ID # [id number]     Weight:                 161 pounds, 73 kg   Patient Acct # [de-identified]    BSA:        1.74 m^2    BMI:      29.45 kg/m^2   MR #           [id number]      Sonographer             Hakeem Winters, T   Accession #    [id number]   Interpreting Physician  [address] [place] [address]   Referring                   Referring Physician     Javier Peck  Nurse  Practitioner  Additional Comments CLASSIFICATION OF VASOSPASM MEAN MCA VELOCITY ----- MCA/ICA VELOCITY RATIO ------- INTERPRETATION <120 cm/sec --------------------- less than 3 --------------------------- Normal, nonspecific elevation or distal MCA spasm >120 cm/sec ------------------------ 3 to 6 -------------------------------- Mild vasospasm of proximal MCA >160 cm/sec ------------------------ 3 to 6 -------------------------------- Moderate vasospasm of proximal MCA >200 cm/sec -------------------- greater than 6------------------------- Severe vasospasm of proximal MCA. MEAN BASILAR ARTERY VELOCITY------- INTERPRETATION >60 cm/sec -------------------------------------------- Mild BA vasospasm >90 cm/sec -------------------------------------------- Moderate BA vasospasm >120 cm/sec ------------------------------------------ Severe BA vasospasm. Procedure Type of Study:   Cerebral: Transcranial.  Indications for Study:Vasospasm post-subarachnoid hemorrhage. Patient Status: In Patient. Conclusions   Summary   TRANSCRANIAL DOPPLER ULTRASOUND  Date of Service: 4/28/2022   Interpretation:   This is bilateral complete TCD with insonation of ALL Cerebral Arteries  via transtemporal, submandibular, transorbital, and transforaminal windows  demonstrating distal left Middle Cerebral Artery elevated mean cerebral  blood flow velocities (MCBFVs) at 116 cm/sec while the right MCA was up to  114 cm/sec. the remaining MCBFVs were within normal limits and the PI were  normal.   The Lindegaard ratio was 3.43 on the right side while it was 3.76 on the  left side. IMPRESSION:  1) Mild bilateral MCA vasospasm (improved from the previous studies) as  described above   Recommendations   Follow up TCD as per the Neuro ICU Team   Signature   ----------------------------------------------------------------  Electronically signed by Bart Medellin RVT(Sonographer) on  04/28/2022 12:04 PM  ----------------------------------------------------------------   ----------------------------------------------------------------  Electronically signed by Dago Tello(Interpreting physician)  on 04/28/2022 09:38 PM  ----------------------------------------------------------------  Findings:   Right Impression:                     Left Impression:  Right Lindegaard Ratio: 3.43          Left Lindegaard Ratio: 3.76  MEAN VELOCITIES:                      MEAN VELOCITIES:  Transtemporal Approach. Transtemporal Approach. Proximal MCA: 97.8 . Proximal MCA: 113  Mid MCA: 114 . Mid MCA: 116  Distal MCA: 97.0 . Distal MCA: 115  Proximal KRISSY: 43.5 . Proximal KRISSY: 103  Mid KRISSY: 51.2 . Mid KRISSY: 105  PCA: 32.7 . PCA: 39.7  T ICA: 42.7                           T ICA: 33.9  Submandibular Approach. Submandibular Approach. D ICA: 33.1 . D ICA: 30.8  Transorbital Approach. Transorbital Approach. Siphon: 50.0 . Siphon: 51.2  Transforamenal Approach. Transforamenal Approach. Vertebral: 38.1 . Vertebral: 36.2   BASILAR:56.2  Risk Factors History +---------+----------+-----------------------------------------------------+ ! Diagnosis! Date      ! Comments ! +---------+----------+-----------------------------------------------------+ ! Other    !04/18/2022! ACOMM A. ANEURYSM 3MM X 2MM                          ! +---------+----------+-----------------------------------------------------+ ! Other    !04/27/2022! Hematocrit 43.1; ICP-10                              ! +---------+----------+-----------------------------------------------------+ ! Other    !04/28/2022! Hematocrit 40.5; ICP 14                              ! +---------+----------+-----------------------------------------------------+   - The patient's risk factor(s) include: arterial hypertension. VL TRANSCRANIAL DOPPLER COMPLETE    Result Date: 4/27/2022    [facility]  Vascular Transcranial Procedure   Patient Name   Josh Byrd     Date of Study           04/27/2022                 LINDA KING   Date of Birth  1990   Gender                  Female   Age            29 year(s)   Race                       Room Number    5274         Height:                 62 inch, 157.48 cm   Corporate ID # [id number]     Weight:                 161 pounds, 73 kg   Patient Acct # [de-identified]    BSA:        1.74 m^2    BMI:      29.45 kg/m^2   MR #           [id number]      Sonographer             Alanis Sam RVT   Accession #    [id number]   Interpreting Physician  Augustine Valencia   Referring                   Referring Physician     Emanuel Gomes  Nurse  Practitioner  Procedure Type of Study:   Cerebral: Transcranial.  Indications for Study:Vasospasm post-subarachnoid hemorrhage. Patient Status: In Patient. Conclusions   Summary   No evidence of vasospasm in the bilateral MCAs. Mild basilar artery spasm  noted.    Signature   ----------------------------------------------------------------  Electronically signed by Alanis Sam RVT(Sonographer) on  04/27/2022 01:53 PM  ----------------------------------------------------------------   ---------------------------------------------------------------- Electronically signed by Jannice Shape Reyes,Arthur(Interpreting  physician) on 04/27/2022 08:32 PM  ----------------------------------------------------------------  Findings:   Right Impression:                     Left Impression:  Right Lindegaard Ratio: 2.63          Left Lindegaard Ratio: 2.52  MEAN VELOCITIES:                      MEAN VELOCITIES:  Transtemporal Approach. Transtemporal Approach. Proximal MCA: 97.8 . Proximal MCA: 110  Mid MCA: 116 . Mid MCA: 124  Distal MCA: 110 . Distal MCA: 71.6  Proximal KRISSY: 82.4 . Proximal KRISSY: 103  Mid KRISSY: 67.4 . Mid KRISSY: 106  PCA: 38.1 . PCA: 48.9  T ICA: 59.3 . T ICA: 56.6  Submandibular Approach. Submandibular Approach. D ICA: 43.9 . D ICA: 49.3  Transorbital Approach. Transorbital Approach. Siphon: 67.0 . Siphon: 52.7  Transforamenal Approach. Transforamenal Approach. Vertebral: 31.2 . Vertebral: 44.7   BASILAR:69.3  Risk Factors History +---------+----------+-----------------------------------------------------+ ! Diagnosis! Date      ! Comments                                             ! +---------+----------+-----------------------------------------------------+ ! Other    !04/18/2022! ACOMM A. ANEURYSM 3MM X 2MM                          ! +---------+----------+-----------------------------------------------------+ ! Other    !04/27/2022! Hematocrit 43.1; ICP-10                              ! +---------+----------+-----------------------------------------------------+   - The patient's risk factor(s) include: arterial hypertension. Labs and Images reviewed with:  [] Dr. Jens Valdovinos. [place]    [] Dr. Elizabeth Garcia  [x] Dr. Krista Garcia  [] There are no new interval images to review.      Review of Systems   Constitutional: Negative for fever. HENT: Negative for rhinorrhea and sore throat. Eyes: Negative for redness. Respiratory: Negative for shortness of breath. Cardiovascular: Negative for chest pain. Gastrointestinal: Positive for nausea. Negative for abdominal pain and vomiting. Genitourinary: Negative for difficulty urinating and flank pain. Skin: Negative for rash. Allergic/Immunologic: Negative for environmental allergies and food allergies. Neurological: Positive for dizziness and headaches. Negative for syncope, speech difficulty and numbness. Psychiatric/Behavioral: Negative for agitation and confusion. PHYSICAL EXAM     GENERAL:  Well developed, well nourished, in no acute distress. Nontoxic. No dysarthria. No aphasia. GCS 15  HENT: normocephalic , nose normal, perrla  EYES: no occular discharge, no scleral icterus  NECK: no JVD, no tracheal deviation  CV: Normal S1 S2, no MRG  PULM / CHEST: CTA Bilaterally all fields, no WRR  ABDOMEN: soft, no rigidity  MSK: no gross deformity, no edema, no TTP  NEURO: neuro intact  SKIN: no rash, no erythema, cap refill < 2 sec  ---------------------------------------------------------------------------------------------------------------------------------------------------------------------------------------------------------------------------------------------    DRAINS:  [x] There are no drains for Neuro Critical Care to monitor at this time. ASSESSMENT AND PLAN:       This is a 32 y.o. female with no significant medical history who initially presented to [facility] ED with thunderclap headache and was found to have diffuse subarachnoid hemorrhage. Underwent emergent EVD placement at bedside 4/18/2022. 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 Neuro ICU course complicated by cerebral salt wasting and mild right MCA vasospasm.     NEUROLOGIC:  - Diffuse SAH with IVH secondary to ruptured ACOM aneurysm  - POD/PBD #11 s/p coil embolization of the ruptured ACOM aneurysm  - Continue Nimodipine 60mg Q4h  -naloxegol 25 daily. Last dose today  Melatonin 5 hs  topamax 100 bid  - Mild R MCA vasospasm ? symptomatic with worsening headache, no focal deficits  - Milrinone to 0.5mcg/kg/min  - TCD 4/28 stable  - Goal -220, avoid symptomatic hypotension  - Obstructive hydrocephalus s/p EVD placement 4/18  - Failed EVD clamp trial 4/24 due to elevated ICH (25) and head pressure  - EVD set at 15mmHg  - Headache management;  Completed 48h course of MS contin 15mg BID last night (monitor off for now), continue Topamax 100mg BID, Fentanyl to 25mcg Q8h PRN, Toradol 30mg BID PRN, Magnesium boluses PRN  - Neuro checks per protocol  -icp 5-16  5-14 csf out  143 out of ventriculostomy drain  -no change in neuro exam  -tcd today, if normal/stable will consider reaching out to nsg to increase evd from 20 from 15  - dc'd Depakote, dc APAP, increase Tegretol, ibuprofen 800, Verapamil, possibly keppra 1 gram for headache     CARDIOVASCULAR:  - Goal -220, avoid symptomatic hypotension  - Echo EF 65%, negative bubble  -lipitor 80   - Continue telemetry     PULMONARY:  - Maintaining O2 sats on room air  - Incentive spirometry     RENAL/FLUID/ELECTROLYTE:  - Normal renal functioning  - BUN5/ Creatinine 0.75  - Monitor I&O; 1891.7/2596 (-704.4)  - Cerebral salt wasting secondary to SAH  - Goal eunatremia, Q8h sodium checks, sodium 134 this AM  - Continue salt tabs 1g TID  - IVF: [email or web address]   - Goal Mag>2.5; Mag level 2.7, continue Magnesium IV 4 grams BID to help with headaches  - Replace electrolytes PRN  - Daily BMP     GI/NUTRITION:  NUTRITION: General Diet  - Tolerating diet well  -Leija in place  - Consider enema later today  - Bowel regimen: Continue Dulcolax PO, Glycolax, Senokot-S daily and Milk of Mag PRN  - GI prophylaxis: N/A      ID:  - One time fever yesterday evening (38.6C)  - Leukocytosis mildly worsened today, WBC 14.1  - CSF culture 4/27 negative to date. Many neutrophils no bacteria as of this am  - UA 4/27 suggestive of UTI with moderate leukocyte esterase and many bacteria  - Started on Rocephin 1 gram daily for UTI x 5 days, completes 5/3  - Daily CBC     HEME:   - H&H 13/38.1, stable  - Platelets 299  - Daily CBC     ENDOCRINE:  - Continue to monitor blood glucose, goal <180     OTHER:  - PT/OT/ST     PROPHYLAXIS:   Stress ulcer: N/A     DVT PROPHYLAXIS:  - SCD sleeves - Thigh High   - Lovenox 40 daily     DISPOSITION:  [x]? To remain ICU for close neurological monitoring, EVD management. We will continue to follow along. For any changes in exam or patient status please contact Neuro Critical Care.       Fauzia Sahu DO  Neuro Critical Care  Pager [phone]  4/29/2022     6:28 AM

## 2022-04-29 NOTE — PROGRESS NOTES
Neurosurgery LILLY/Resident    Daily Progress Note   CC:  Chief Complaint   Patient presents with    Altered Mental Status     4/29/2022  6:58 AM    Chart reviewed. No acute events overnight. No new complaints. Patient received 1 extra dose fentanyl over night for headache.  Reporting headache this morning, denies nausea and vomiting, does not appear as bright and cheerful as Wednesday, afebrile, ICP 6-16    Vitals:    04/29/22 0300 04/29/22 0400 04/29/22 0500 04/29/22 0600   BP: (!) 145/104 (!) 156/102 (!) 152/100 (!) 147/98   Pulse: 94 88 85 92   Resp: 19 14 15 18   Temp:  99.1 °F (37.3 °C)     TempSrc:  Axillary     SpO2: 96% 100% 97% 98%   Weight:       Height:           PE:   AOx3   PERRL, EOMI  Cranial Nerves:    II: Visual acuity:  normal  III: Pupils:  equal, round, reactive to light  III,IV,VI: Extra Ocular Movements:intact  V: Facial sensation:  intact  VII: Facial strength: intact  VIII: Hearing:  intact  IX: Palate:  intact  XI: Shoulder shrug: intact  XII: Tongue movement: intact      Motor   L deltoid 5/5; R deltoid 5/5  L biceps 5/5; R biceps 5/5  L triceps 5/5; R triceps 5/5  L wrist extension 5/5; R wrist extension 5/5  L intrinsics 5/5; R intrinsics 5/5      L iliopsoas 5/5 , R iliopsoas 5/5  L quadriceps 5/5; R quadriceps 5/5  L Dorsiflexion 5/5; R dorsiflexion 5/5  L Plantarflexion 5/5; R plantarflexion 5/5  L EHL 5/5; R EHL 5/5    Sensation: intact     Drain output: 143ml/24 hours   Incision: clean dry intact       Lab Results   Component Value Date    WBC 14.1 (H) 04/29/2022    HGB 13.0 04/29/2022    HCT 38.1 04/29/2022     04/29/2022    CHOL 182 04/18/2022    TRIG 60 04/18/2022    HDL 74 04/18/2022    ALT 16 04/20/2022    AST 15 04/20/2022     (L) 04/29/2022    K 3.5 (L) 04/29/2022     04/29/2022    CREATININE 0.77 04/29/2022    BUN 5 (L) 04/29/2022    CO2 16 (L) 04/29/2022    TSH 2.18 04/20/2022    INR 0.9 04/18/2022    LABA1C 5.5 04/18/2022    CRP <3.0 04/19/2022 A/P  29 y.o. female who presents with intraventricular hemorrhage, aneurysmal subarachnoid hemorrhage from [facility] s/p coiling, hydrocephalus with EVD placement 4/18, post bleed day 11     - Neuro checks per floor protocol  - Keep EVD open at 15mmHG monitor ICP hourly and record       Please contact neurosurgery with any changes in patients neurologic status.        Eva Vee CNP  4/29/22  6:58 AM

## 2022-04-29 NOTE — PROGRESS NOTES
Physical Therapy  Facility/Department: [address]  Physical Therapy Daily Treatment Note    Name: Eliana Luque  : 1990  MRN: [id number]  Date of Service: 2022    Discharge Recommendations:  Patient would benefit from continued therapy after discharge   PT Equipment Recommendations  Equipment Needed: Yes  Mobility Devices: Shannan New: Rolling      Assessment   Body Structures, Functions, Activity Limitations Requiring Skilled Therapeutic Intervention: Decreased functional mobility ; Increased pain;Decreased balance;Decreased endurance  Assessment: Pt amb 70 ft wtih RW, CGA initially, to MOD A x2 due to LOB and poor safety awarness as pain and muscle spasms increase. At this time, pt unsafe to return to prior living arrangements. Recommend aggressive PT after d/c to address deficits  Therapy Prognosis: Good  Requires PT Follow-Up: Yes  Activity Tolerance  Activity Tolerance: Patient limited by pain     Plan   Plan  Plan:  (5-6x)  Current Treatment Recommendations: Balance training,ROM,Strengthening,Functional mobility training,Transfer training,Stair training,Gait training,Endurance training,Home exercise program,Safety education & training,Patient/Caregiver education & training  Safety Devices  Type of Devices: Call light within reach,Gait belt,Nurse notified,Left in bed  Restraints  Restraints Initially in Place: No     Restrictions  Restrictions/Precautions  Restrictions/Precautions: Up as Tolerated  Required Braces or Orthoses?: No  Position Activity Restriction  Other position/activity restrictions: up as tolerated; EVD; BP<140; s/p angiogram      Subjective   General  Patient assessed for rehabilitation services?: Yes  Response To Previous Treatment: Patient with no complaints from previous session. Family / Caregiver Present: No  Follows Commands: Within Functional Limits  Subjective  Subjective: Pt resting in bed upon arrival with RN present, EVD clamped prior to mobility.  Pt c/o contiuous severe headache and muscle spasms, however states she wants to walk              Cognition   Orientation  Overall Orientation Status: Within Functional Limits     Objective      Bed mobility  Rolling to Right: Stand by assistance  Supine to Sit: Moderate assistance  Sit to Supine: Moderate assistance  Scooting: Stand by assistance  Comment: MOD A for bed mobiltiy this date for trunk and LE support, due to significant increased pain and muscle spasms to B thighs, with multiple post LOB  Transfers  Sit to Stand: Contact guard assistance  Stand to sit: Moderate Assistance  Comment: MOD A to return to bed due to decreased balance and increased tone with increased c/o muscle spasms  Ambulation  Surface: level tile  Device: Rolling Walker  Assistance: Contact guard assistance; Moderate assistance;2 Person assistance  Quality of Gait: extremely slow maddy, multiple extended standing rest breaks due to pain and muscle spasms, walking at toes, yelling and crying throughout  Distance: 70 ft  Comments: significnat increased time to complete. Amb initially CGA, to MOD A x2 as pt lets go of walker, and attempts to  sit without warning, while crying in pain.   More Ambulation?: No     Balance  Posture: Good  Sitting - Static: Good;-  Sitting - Dynamic: Fair;- (multiple post LOB due to pain .)  Standing - Static: Fair  Standing - Dynamic: Fair;-  Comments: assessed wtih RW         AM-PAC Score  AM-PAC Inpatient Mobility Raw Score : 21 (04/22/22 1453)  AM-PAC Inpatient T-Scale Score : 50.25 (04/22/22 1453)  Mobility Inpatient CMS 0-100% Score: 28.97 (04/22/22 1453)  Mobility Inpatient CMS G-Code Modifier : CJ (04/22/22 1453)          Goals  Short Term Goals  Time Frame for Short term goals: 14  Short term goal 1: Pt to perform bed mobility from flat surface independently  Short term goal 2: Demonstrate functional transfers independently  Short term goal 3: Ambulate 300ft w/ no AD independently  Short term goal 4: Ascend/descend 2 stairs with MELANY colón independently  Patient Goals   Patient goals :  To go home       Therapy Time   Individual Concurrent Group Co-treatment   Time In 1146         Time Out 1225         Minutes 39         Timed Code Treatment Minutes: [address], [place]

## 2022-04-29 NOTE — PLAN OF CARE
Problem: Falls - Risk of:  Goal: Will remain free from falls  Description: Will remain free from falls  Outcome: Progressing  Goal: Absence of physical injury  Description: Absence of physical injury  Outcome: Progressing     Problem: Anxiety/Stress:  Goal: Level of anxiety will decrease  Description: Level of anxiety will decrease  Outcome: Progressing     Problem: Mental Status - Impaired:  Goal: Mental status will be restored to baseline  Description: Mental status will be restored to baseline  Outcome: Progressing     Problem: Pain:  Goal: Pain level will decrease  Description: Pain level will decrease  Outcome: Progressing  Goal: Recognizes and communicates pain  Description: Recognizes and communicates pain  Outcome: Progressing  Goal: Control of acute pain  Description: Control of acute pain  Outcome: Progressing  Goal: Control of chronic pain  Description: Control of chronic pain  Outcome: Progressing     Problem: Skin Integrity - Impaired:  Goal: Will show no infection signs and symptoms  Description: Will show no infection signs and symptoms  Outcome: Progressing  Goal: Absence of new skin breakdown  Description: Absence of new skin breakdown  Outcome: Progressing     Problem: HEMODYNAMIC STATUS  Goal: Patient has stable vital signs and fluid balance  Outcome: Progressing     Problem: ACTIVITY INTOLERANCE/IMPAIRED MOBILITY  Goal: Mobility/activity is maintained at optimum level for patient  Outcome: Progressing     Problem: COMMUNICATION IMPAIRMENT  Goal: Ability to express needs and understand communication  Outcome: Progressing     Problem: Pain:  Goal: Pain level will decrease  Description: Pain level will decrease  Outcome: Progressing  Goal: Control of acute pain  Description: Control of acute pain  Outcome: Progressing  Goal: Control of chronic pain  Description: Control of chronic pain  Outcome: Progressing     Problem: ABCDS Injury Assessment  Goal: Absence of physical injury  Outcome: Progressing

## 2022-04-29 NOTE — PROGRESS NOTES
Endovascular Neurosurgery Progress Note    SUBJECTIVE:   No reported events overnight. This am pt continues to have headache, pain is worse than yesterday. Review of Systems:  CONSTITUTIONAL:  negative for fevers, chills, fatigue and malaise    EYES:  negative for double vision, blurred vision and photophobia     HEENT:  negative for tinnitus, epistaxis and sore throat    RESPIRATORY:  negative for cough, shortness of breath, wheezing    CARDIOVASCULAR:  negative for chest pain, palpitations, syncope, edema    GASTROINTESTINAL:  negative for nausea, vomiting    GENITOURINARY:  negative for incontinence    MUSCULOSKELETAL:  negative for neck or back pain    NEUROLOGICAL:  Negative for weakness and tingling  negative for headaches and dizziness    PSYCHIATRIC:  negative for anxiety      Review of systems otherwise negative. OBJECTIVE:     Vitals:    04/29/22 0600   BP: (!) 147/98   Pulse: 92   Resp: 18   Temp:    SpO2: 98%        General:  Gen: normal habitus, in discomfort  HEENT: EVD in place, open to 20cm H2o. mucosa moist  Cvs: RRR, S1 S2 normal  Resp: symmetric unlabored breathing  Abd: s/nd/nt  Ext: no edema  Skin: no lesions seen, warm and dry    Neuro: on milrinone 0.2 gtt  Gen: awake and alert, oriented x3. Lang/speech: no aphasia. No dysarthria. Follows commands. CN: PERRL, EOMI, VFF, V1-3 intact, face symmetric, hearing intact, shoulder shrug symmetric, tongue midline  Motor: grossly 5/5 UE and LE b/l  Sense: LT intact in all 4 ext. Coord: FTN and HTS intact b/l  DTR: deferred  Gait: deferred    NIH Stroke Scale:   1a  Level of consciousness: 0 - alert; keenly responsive   1b. LOC questions:  0 - answers both questions correctly   1c. LOC commands: 0 - performs both tasks correctly   2. Best Gaze: 0 - normal   3. Visual: 0 - no visual loss   4. Facial Palsy: 0 - normal symmetric movement   5a. Motor left arm: 0 - no drift, limb holds 90 (or 45) degrees for full 10 seconds   5b.   Motor right arm: 0 - no drift, limb holds 90 (or 45) degrees for full 10 seconds   6a. Motor left le - no drift; leg holds 30 degree position for full 5 seconds   6b  Motor right le - no drift; leg holds 30 degree position for full 5 seconds   7. Limb Ataxia: 0 - absent   8. Sensory: 0 - normal; no sensory loss   9. Best Language:  0 - no aphasia, normal   10. Dysarthria: 0 - normal   11. Extinction and Inattention: 0 - no abnormality         Total:   0     MRS: 0      LABS:   Reviewed. Lab Results   Component Value Date    HGB 13.0 2022    WBC 14.1 (H) 2022     2022     (L) 2022    BUN 5 (L) 2022    CREATININE 0.77 2022    AST 15 2022    ALT 16 2022    MG 2.7 (H) 2022    APTT 22.6 2022    INR 0.9 2022      Lab Results   Component Value Date    COVID19 Not Detected 2022    COVID19 Not Detected 2022       RADIOLOGY:   Images were personally reviewed including:   tcd 2022  Mild vasospasm L 's, R 's LR ~3.5 b/l. Stable. CT head 2022:   Significant reduction with trace residual scattered subarachnoid hemorrhage.       Stable positioning of a right frontal EVD, as well as stable caliber and   configuration of the ventricles. 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 --the above treated with Smart coil embolization x3, Vu 1. Smart coil x1 opened but not detached. --Prominent musculocutaneous branch from the left V2 segment that anastomoses to the left occipital artery.     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 PBD: 11    Patient complains of moderate to severe headaches, non-focal neuro exam. 4/24/2022 clamp trial failed. 4/24/2022 CT head stable, reduced SAH. 4/27/2022 TCD mild vasospasm b/l MCA. Issues of urine retention/uti. PLAN:   --SBP goal 120-220 mm hg   --SAH management with daily TCDs, Nimodipine, statin, milrinone  Mg level 3-4 last Mg 2.7  --f/up with Dr. Sherrie Robles in 2 weeks after discharge and f/up with Dr. Pippa Odom in 3 months after discharge    Case discussed with Dr. Pippa Odom attending.     Melissa Saini MD  Stroke, [facility]  [id number] [place]  Electronically signed 4/29/2022 at 8:23 AM

## 2022-04-30 LAB
ANION GAP SERPL CALCULATED.3IONS-SCNC: 13 MMOL/L (ref 9–17)
BUN BLDV-MCNC: 5 MG/DL (ref 6–20)
CALCIUM SERPL-MCNC: 8.4 MG/DL (ref 8.6–10.4)
CHLORIDE BLD-SCNC: 106 MMOL/L (ref 98–107)
CO2: 17 MMOL/L (ref 20–31)
CREAT SERPL-MCNC: 0.69 MG/DL (ref 0.5–0.9)
CULTURE: ABNORMAL
CULTURE: NO GROWTH
DIRECT EXAM: ABNORMAL
GFR AFRICAN AMERICAN: >60 ML/MIN
GFR NON-AFRICAN AMERICAN: >60 ML/MIN
GFR SERPL CREATININE-BSD FRML MDRD: ABNORMAL ML/MIN/{1.73_M2}
GLUCOSE BLD-MCNC: 119 MG/DL (ref 70–99)
HCT VFR BLD CALC: 39.5 % (ref 36.3–47.1)
HEMOGLOBIN: 13.5 G/DL (ref 11.9–15.1)
MAGNESIUM: 3.7 MG/DL (ref 1.6–2.6)
MCH RBC QN AUTO: 29.6 PG (ref 25.2–33.5)
MCHC RBC AUTO-ENTMCNC: 34.2 G/DL (ref 28.4–34.8)
MCV RBC AUTO: 86.6 FL (ref 82.6–102.9)
NRBC AUTOMATED: 0 PER 100 WBC
PDW BLD-RTO: 11.9 % (ref 11.8–14.4)
PLATELET # BLD: 309 K/UL (ref 138–453)
PMV BLD AUTO: 10.2 FL (ref 8.1–13.5)
POTASSIUM SERPL-SCNC: 3.1 MMOL/L (ref 3.7–5.3)
RBC # BLD: 4.56 M/UL (ref 3.95–5.11)
SODIUM BLD-SCNC: 136 MMOL/L (ref 135–144)
SPECIMEN DESCRIPTION: ABNORMAL
SPECIMEN DESCRIPTION: NORMAL
WBC # BLD: 12.3 K/UL (ref 3.5–11.3)

## 2022-04-30 PROCEDURE — 6370000000 HC RX 637 (ALT 250 FOR IP): Performed by: STUDENT IN AN ORGANIZED HEALTH CARE EDUCATION/TRAINING PROGRAM

## 2022-04-30 PROCEDURE — 80048 BASIC METABOLIC PNL TOTAL CA: CPT

## 2022-04-30 PROCEDURE — 6360000002 HC RX W HCPCS: Performed by: NURSE PRACTITIONER

## 2022-04-30 PROCEDURE — 6360000002 HC RX W HCPCS: Performed by: STUDENT IN AN ORGANIZED HEALTH CARE EDUCATION/TRAINING PROGRAM

## 2022-04-30 PROCEDURE — 99233 SBSQ HOSP IP/OBS HIGH 50: CPT | Performed by: PSYCHIATRY & NEUROLOGY

## 2022-04-30 PROCEDURE — 93886 INTRACRANIAL COMPLETE STUDY: CPT

## 2022-04-30 PROCEDURE — 2000000003 HC NEURO ICU R&B

## 2022-04-30 PROCEDURE — 2500000003 HC RX 250 WO HCPCS: Performed by: NURSE PRACTITIONER

## 2022-04-30 PROCEDURE — 2580000003 HC RX 258: Performed by: INTERNAL MEDICINE

## 2022-04-30 PROCEDURE — 94761 N-INVAS EAR/PLS OXIMETRY MLT: CPT

## 2022-04-30 PROCEDURE — 97116 GAIT TRAINING THERAPY: CPT

## 2022-04-30 PROCEDURE — 6370000000 HC RX 637 (ALT 250 FOR IP): Performed by: NURSE PRACTITIONER

## 2022-04-30 PROCEDURE — 85027 COMPLETE CBC AUTOMATED: CPT

## 2022-04-30 PROCEDURE — 2580000003 HC RX 258: Performed by: STUDENT IN AN ORGANIZED HEALTH CARE EDUCATION/TRAINING PROGRAM

## 2022-04-30 PROCEDURE — 6370000000 HC RX 637 (ALT 250 FOR IP): Performed by: PSYCHIATRY & NEUROLOGY

## 2022-04-30 PROCEDURE — 2580000003 HC RX 258: Performed by: ANESTHESIOLOGY

## 2022-04-30 PROCEDURE — 99291 CRITICAL CARE FIRST HOUR: CPT | Performed by: PSYCHIATRY & NEUROLOGY

## 2022-04-30 PROCEDURE — 36415 COLL VENOUS BLD VENIPUNCTURE: CPT

## 2022-04-30 PROCEDURE — 83735 ASSAY OF MAGNESIUM: CPT

## 2022-04-30 PROCEDURE — 97530 THERAPEUTIC ACTIVITIES: CPT

## 2022-04-30 RX ORDER — ORPHENADRINE CITRATE 30 MG/ML
60 INJECTION INTRAMUSCULAR; INTRAVENOUS ONCE
Status: COMPLETED | OUTPATIENT
Start: 2022-04-30 | End: 2022-04-30

## 2022-04-30 RX ORDER — IBUPROFEN 800 MG/1
800 TABLET ORAL EVERY 8 HOURS PRN
Status: DISCONTINUED | OUTPATIENT
Start: 2022-04-30 | End: 2022-05-06 | Stop reason: HOSPADM

## 2022-04-30 RX ORDER — ORPHENADRINE CITRATE 30 MG/ML
60 INJECTION INTRAMUSCULAR; INTRAVENOUS EVERY 12 HOURS
Status: DISCONTINUED | OUTPATIENT
Start: 2022-04-30 | End: 2022-05-01

## 2022-04-30 RX ADMIN — IBUPROFEN 800 MG: 400 TABLET, FILM COATED ORAL at 12:47

## 2022-04-30 RX ADMIN — FENTANYL CITRATE 25 MCG: 50 INJECTION, SOLUTION INTRAMUSCULAR; INTRAVENOUS at 09:14

## 2022-04-30 RX ADMIN — MILRINONE LACTATE 0.7 MCG/KG/MIN: 0.2 INJECTION, SOLUTION INTRAVENOUS at 05:21

## 2022-04-30 RX ADMIN — NIMODIPINE 60 MG: 30 CAPSULE, LIQUID FILLED ORAL at 09:18

## 2022-04-30 RX ADMIN — NIMODIPINE 60 MG: 30 CAPSULE, LIQUID FILLED ORAL at 13:02

## 2022-04-30 RX ADMIN — ORPHENADRINE CITRATE 60 MG: 30 INJECTION INTRAMUSCULAR; INTRAVENOUS at 10:55

## 2022-04-30 RX ADMIN — BUTALBITAL, ACETAMINOPHEN AND CAFFEINE 1 TABLET: 50; 325; 40 TABLET ORAL at 01:29

## 2022-04-30 RX ADMIN — ENOXAPARIN SODIUM 40 MG: 100 INJECTION SUBCUTANEOUS at 09:18

## 2022-04-30 RX ADMIN — VERAPAMIL HYDROCHLORIDE 40 MG: 40 TABLET ORAL at 20:33

## 2022-04-30 RX ADMIN — MAGNESIUM SULFATE IN WATER 4000 MG: 40 INJECTION, SOLUTION INTRAVENOUS at 20:26

## 2022-04-30 RX ADMIN — MILRINONE LACTATE 0.7 MCG/KG/MIN: 0.2 INJECTION, SOLUTION INTRAVENOUS at 21:36

## 2022-04-30 RX ADMIN — NIMODIPINE 60 MG: 30 CAPSULE, LIQUID FILLED ORAL at 17:29

## 2022-04-30 RX ADMIN — IBUPROFEN 800 MG: 400 TABLET, FILM COATED ORAL at 20:33

## 2022-04-30 RX ADMIN — SODIUM CHLORIDE TAB 1 GM 1 G: 1 TAB at 12:48

## 2022-04-30 RX ADMIN — POLYETHYLENE GLYCOL 3350 17 G: 17 POWDER, FOR SOLUTION ORAL at 09:19

## 2022-04-30 RX ADMIN — Medication 5 MG: at 20:34

## 2022-04-30 RX ADMIN — BUTALBITAL, ACETAMINOPHEN AND CAFFEINE 1 TABLET: 50; 325; 40 TABLET ORAL at 15:10

## 2022-04-30 RX ADMIN — VERAPAMIL HYDROCHLORIDE 40 MG: 40 TABLET ORAL at 13:47

## 2022-04-30 RX ADMIN — ATORVASTATIN CALCIUM 80 MG: 80 TABLET, FILM COATED ORAL at 20:33

## 2022-04-30 RX ADMIN — MAGNESIUM SULFATE IN WATER 4000 MG: 40 INJECTION, SOLUTION INTRAVENOUS at 10:02

## 2022-04-30 RX ADMIN — CEFTRIAXONE SODIUM 1000 MG: 1 INJECTION, POWDER, FOR SOLUTION INTRAMUSCULAR; INTRAVENOUS at 17:30

## 2022-04-30 RX ADMIN — FENTANYL CITRATE 25 MCG: 50 INJECTION, SOLUTION INTRAMUSCULAR; INTRAVENOUS at 23:00

## 2022-04-30 RX ADMIN — TOPIRAMATE 150 MG: 100 TABLET, FILM COATED ORAL at 09:17

## 2022-04-30 RX ADMIN — MILRINONE LACTATE 0.7 MCG/KG/MIN: 0.2 INJECTION, SOLUTION INTRAVENOUS at 13:48

## 2022-04-30 RX ADMIN — BUTALBITAL, ACETAMINOPHEN AND CAFFEINE 1 TABLET: 50; 325; 40 TABLET ORAL at 07:26

## 2022-04-30 RX ADMIN — SODIUM CHLORIDE TAB 1 GM 1 G: 1 TAB at 09:17

## 2022-04-30 RX ADMIN — BISACODYL 10 MG: 5 TABLET, COATED ORAL at 09:18

## 2022-04-30 RX ADMIN — NIMODIPINE 60 MG: 30 CAPSULE, LIQUID FILLED ORAL at 06:01

## 2022-04-30 RX ADMIN — POTASSIUM BICARBONATE 40 MEQ: 782 TABLET, EFFERVESCENT ORAL at 15:07

## 2022-04-30 RX ADMIN — TOPIRAMATE 150 MG: 100 TABLET, FILM COATED ORAL at 21:07

## 2022-04-30 RX ADMIN — DOCUSATE SODIUM 50 MG AND SENNOSIDES 8.6 MG 2 TABLET: 8.6; 5 TABLET, FILM COATED ORAL at 09:18

## 2022-04-30 RX ADMIN — NIMODIPINE 60 MG: 30 CAPSULE, LIQUID FILLED ORAL at 01:29

## 2022-04-30 RX ADMIN — SODIUM CHLORIDE, PRESERVATIVE FREE 5 ML: 5 INJECTION INTRAVENOUS at 09:17

## 2022-04-30 RX ADMIN — POTASSIUM BICARBONATE 40 MEQ: 782 TABLET, EFFERVESCENT ORAL at 09:19

## 2022-04-30 RX ADMIN — NIMODIPINE 60 MG: 30 CAPSULE, LIQUID FILLED ORAL at 20:33

## 2022-04-30 RX ADMIN — SODIUM CHLORIDE, PRESERVATIVE FREE 10 ML: 5 INJECTION INTRAVENOUS at 20:35

## 2022-04-30 RX ADMIN — ORPHENADRINE CITRATE 60 MG: 30 INJECTION INTRAMUSCULAR; INTRAVENOUS at 23:44

## 2022-04-30 RX ADMIN — SODIUM CHLORIDE TAB 1 GM 1 G: 1 TAB at 17:29

## 2022-04-30 RX ADMIN — POTASSIUM BICARBONATE 40 MEQ: 782 TABLET, EFFERVESCENT ORAL at 11:01

## 2022-04-30 RX ADMIN — SODIUM CHLORIDE, PRESERVATIVE FREE 5 ML: 5 INJECTION INTRAVENOUS at 09:18

## 2022-04-30 RX ADMIN — VERAPAMIL HYDROCHLORIDE 40 MG: 40 TABLET ORAL at 06:01

## 2022-04-30 ASSESSMENT — PAIN SCALES - GENERAL
PAINLEVEL_OUTOF10: 7
PAINLEVEL_OUTOF10: 6
PAINLEVEL_OUTOF10: 8
PAINLEVEL_OUTOF10: 10
PAINLEVEL_OUTOF10: 7
PAINLEVEL_OUTOF10: 8

## 2022-04-30 ASSESSMENT — ENCOUNTER SYMPTOMS
VOMITING: 0
RHINORRHEA: 0
SHORTNESS OF BREATH: 0
SORE THROAT: 0
NAUSEA: 1
EYE REDNESS: 0
ABDOMINAL PAIN: 0

## 2022-04-30 ASSESSMENT — PAIN DESCRIPTION - ORIENTATION
ORIENTATION: RIGHT;LEFT

## 2022-04-30 ASSESSMENT — PAIN DESCRIPTION - LOCATION
LOCATION: HEAD;LEG

## 2022-04-30 ASSESSMENT — PAIN DESCRIPTION - DESCRIPTORS
DESCRIPTORS: CRAMPING
DESCRIPTORS: CRAMPING;POUNDING
DESCRIPTORS: CRAMPING;POUNDING

## 2022-04-30 NOTE — PROGRESS NOTES
Physical Therapy  Facility/Department: [address]  Physical Therapy Daily Treatment Note    Name: Royal Castanon  : 1990  MRN: [id number]  Date of Service: 2022    Discharge Recommendations:  Patient would benefit from continued therapy after discharge   PT Equipment Recommendations  Equipment Needed: Yes  Jatin Malhotra: Rolling      Patient Diagnosis(es): The encounter diagnosis was SAH (subarachnoid hemorrhage) ([address] Assessment   Body Structures, Functions, Activity Limitations Requiring Skilled Therapeutic Intervention: Decreased functional mobility ; Increased pain;Decreased balance;Decreased endurance  Assessment: Pt amb 100 ft with RW with CGA x 2 assist as pt has had previous LOB due to pain and muscle spasms. At this time, pt unsafe to return to prior living arrangements. Recommend aggressive PT after d/c to address deficits  Therapy Prognosis: Good  Requires PT Follow-Up: Yes  Activity Tolerance  Activity Tolerance: Patient limited by pain; Patient limited by fatigue;Patient limited by endurance     Plan   Plan  Plan:  (5-6x/wk)  Current Treatment Recommendations: Balance training,ROM,Strengthening,Functional mobility training,Transfer training,Stair training,Gait training,Endurance training,Home exercise program,Safety education & training,Patient/Caregiver education & training  Safety Devices  Type of Devices: Gait belt,Nurse notified,Left in bed (RN present upon exiting)  Restraints  Restraints Initially in Place: No     Restrictions  Restrictions/Precautions  Restrictions/Precautions: Up as Tolerated,Seizure  Required Braces or Orthoses?: No  Position Activity Restriction  Other position/activity restrictions: up as tolerated; EVD; s/p angiogram ; -220     Subjective   General  Chart Reviewed: Yes  Response To Previous Treatment: Patient with no complaints from previous session. Family / Caregiver Present: No  General Comment  Comments: EVD clamped prior to mobility.  Pt retired to bed with RN present  Subjective  Subjective: Pt resting in bed upon arrival with RN present, EVD clamped prior to mobility. Pt continues to have headaches and muscles spasms, but is willing to ambulate. Cognition   Orientation  Overall Orientation Status: Within Functional Limits  Cognition  Overall Cognitive Status: WFL     Objective   Bed mobility  Supine to Sit: Minimal assistance  Sit to Supine: Minimal assistance  Scooting: Stand by assistance  Comment: Assessed with HOB slightly elevated. Pt began to have muscle spasms as soon as she sat EOB, but subsided after a few minutes focusing on breathing and relaxation. Transfers  Sit to Stand: Contact guard assistance;2 Person Assistance  Stand to sit: Contact guard assistance;2 Person Assistance  Comment: Assessed to RW. Assistance x 2 for safety as pt has had LOB in previous sessions. Ambulation  Surface: level tile  Device: Rolling Walker  Assistance: Contact guard assistance;2 Person assistance  Quality of Gait: extremely slow maddy, multiple extended standing rest breaks  Gait Deviations: Slow Maddy;Decreased step length;Decreased step height  Distance: 40 ft, seated rest period x ~5 minutes, 100 ft  Comments: significatn increased time to complete. Amb with 2 assist for safety as pt has had LOB in previous session. Pt is very motivated to progress.      Balance  Posture: Good  Sitting - Static: Good;-  Sitting - Dynamic: Fair  Standing - Static: Fair  Standing - Dynamic: Fair  Comments: Assessed with RW           OutComes Score     AM-PAC Score  AM-PAC Inpatient Mobility Raw Score : 21 (04/30/22 1507)  AM-PAC Inpatient T-Scale Score : 50.25 (04/30/22 1507)  Mobility Inpatient CMS 0-100% Score: 28.97 (04/30/22 1507)  Mobility Inpatient CMS G-Code Modifier : CJ (04/30/22 1507)          Goals  Short Term Goals  Time Frame for Short term goals: 14  Short term goal 1: Pt to perform bed mobility from flat surface independently  Short term goal 2: Demonstrate functional transfers independently  Short term goal 3: Ambulate 300ft w/ no AD independently  Short term goal 4: Ascend/descend 2 stairs with R rail independently  Patient Goals   Patient goals :  To go home       Therapy Time   Individual Concurrent Group Co-treatment   Time In 0079         Time Out 1504         Minutes 47         Timed Code Treatment Minutes: [facility]

## 2022-04-30 NOTE — PROGRESS NOTES
Endovascular Neurosurgery Progress Note    SUBJECTIVE:   No reported events overnight. This am pt continues to have headache, pt has new complaint of LE cramping. Review of Systems:  CONSTITUTIONAL:  negative for fevers, chills, fatigue and malaise    EYES:  negative for double vision, blurred vision and photophobia     HEENT:  negative for tinnitus, epistaxis and sore throat    RESPIRATORY:  negative for cough, shortness of breath, wheezing    CARDIOVASCULAR:  negative for chest pain, palpitations, syncope, edema    GASTROINTESTINAL:  negative for nausea, vomiting    GENITOURINARY:  negative for incontinence    MUSCULOSKELETAL:  negative for neck or back pain    NEUROLOGICAL:  Negative for weakness and tingling  negative for headaches and dizziness    PSYCHIATRIC:  negative for anxiety      Review of systems otherwise negative. OBJECTIVE:     Vitals:    04/30/22 0900   BP: (!) 131/92   Pulse: 79   Resp:    Temp:    SpO2: 98%        General:  Gen: normal habitus, NAD  HEENT: EVD in place, open to 20cm H2o. mucosa moist  Cvs: RRR, S1 S2 normal  Resp: symmetric unlabored breathing  Abd: s/nd/nt  Ext: no edema  Skin: no lesions seen, warm and dry    Neuro: on milrinone 0.7 gtt  Gen: awake and alert, oriented x3. Lang/speech: no aphasia. No dysarthria. Follows commands. CN: PERRL, EOMI, VFF, V1-3 intact, face symmetric, hearing intact, shoulder shrug symmetric, tongue midline  Motor: grossly 5/5 UE and LE b/l  Sense: LT intact in all 4 ext. Coord: FTN and HTS intact b/l  DTR: deferred  Gait: deferred    NIH Stroke Scale:   1a  Level of consciousness: 0 - alert; keenly responsive   1b. LOC questions:  0 - answers both questions correctly   1c. LOC commands: 0 - performs both tasks correctly   2. Best Gaze: 0 - normal   3. Visual: 0 - no visual loss   4. Facial Palsy: 0 - normal symmetric movement   5a. Motor left arm: 0 - no drift, limb holds 90 (or 45) degrees for full 10 seconds   5b.   Motor right arm: 0 - no drift, limb holds 90 (or 45) degrees for full 10 seconds   6a. Motor left le - no drift; leg holds 30 degree position for full 5 seconds   6b  Motor right le - no drift; leg holds 30 degree position for full 5 seconds   7. Limb Ataxia: 0 - absent   8. Sensory: 0 - normal; no sensory loss   9. Best Language:  0 - no aphasia, normal   10. Dysarthria: 0 - normal   11. Extinction and Inattention: 0 - no abnormality         Total:   0     MRS: 0      LABS:   Reviewed. Lab Results   Component Value Date    HGB 13.5 2022    WBC 12.3 (H) 2022     2022     2022    BUN 5 (L) 2022    CREATININE 0.69 2022    AST 15 2022    ALT 16 2022    MG 3.7 (H) 2022    APTT 22.6 2022    INR 0.9 2022      Lab Results   Component Value Date    COVID19 Not Detected 2022    COVID19 Not Detected 2022       RADIOLOGY:   Images were personally reviewed including:   tcd 2022  Mild vasospasm L 's, R MCA 90's LR 4.6 b/l. Mildly worse. CT head 2022:   Significant reduction with trace residual scattered subarachnoid hemorrhage.       Stable positioning of a right frontal EVD, as well as stable caliber and   configuration of the ventricles. 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 --the above treated with Smart coil embolization x3, Vu 1. Smart coil x1 opened but not detached. --Prominent musculocutaneous branch from the left V2 segment that anastomoses to the left occipital artery.     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 PBD: 12    Patient complains of moderate to severe headaches, non-focal neuro exam. 4/24/2022 clamp trial failed. 4/24/2022 CT head stable, reduced SAH. 4/29/2022 TCD mild vasospasm b/l MCA. Issues of urine retention/uti. PLAN:   --SBP goal 120-220 mm hg   --SAH management with daily TCDs, Nimodipine, statin, milrinone  --Mg level 3-4 last Mg 3.7  --f/up with Dr. Mehrdad Keller in 2 weeks after discharge and f/up with Dr. Sharon Bennett in 3 months after discharge    Case discussed with Dr. Sharon Bennett attending.     Ladonna Knox MD  Stroke, [facility]  [id number] [place]  Electronically signed 4/30/2022 at 10:08 AM

## 2022-04-30 NOTE — PLAN OF CARE
Problem: Falls - Risk of:  Goal: Will remain free from falls  Description: Will remain free from falls  4/30/2022 0236 by Juancho Alvarez RN  Outcome: Progressing  4/29/2022 1910 by Jasson Jung RN  Outcome: Progressing  Goal: Absence of physical injury  Description: Absence of physical injury  4/30/2022 0236 by Juancho Alvarez RN  Outcome: Progressing  4/29/2022 1910 by Jasson Jung RN  Outcome: Progressing     Problem: Anxiety/Stress:  Goal: Level of anxiety will decrease  Description: Level of anxiety will decrease  4/30/2022 0236 by Juancho Alvarez RN  Outcome: Progressing  4/29/2022 1910 by Jasson Jung RN  Outcome: Progressing     Problem: Mental Status - Impaired:  Goal: Mental status will be restored to baseline  Description: Mental status will be restored to baseline  4/30/2022 0236 by Juancho Alvarez RN  Outcome: Progressing  4/29/2022 1910 by Jasson Jung RN  Outcome: Progressing     Problem: Pain:  Description: Pain management should include both nonpharmacologic and pharmacologic interventions.   Goal: Pain level will decrease  Description: Pain level will decrease  4/30/2022 0236 by Juancho Alvarez RN  Outcome: Progressing  4/29/2022 1910 by Jasson Jung RN  Outcome: Progressing  Goal: Recognizes and communicates pain  Description: Recognizes and communicates pain  4/30/2022 0236 by Juancho Alvarez RN  Outcome: Progressing  4/29/2022 1910 by Jasson Jung RN  Outcome: Progressing  Goal: Control of acute pain  Description: Control of acute pain  4/30/2022 0236 by Juancho Alvarez RN  Outcome: Progressing  4/29/2022 1910 by Jasson Jung RN  Outcome: Progressing  Goal: Control of chronic pain  Description: Control of chronic pain  4/30/2022 0236 by Juancho Alvarez RN  Outcome: Progressing  4/29/2022 1910 by Jasson Jung RN  Outcome: Progressing     Problem: Skin Integrity - Impaired:  Goal: Will show no infection signs and symptoms  Description: Will show no infection signs and symptoms  4/30/2022 0236 by Andrzej Sun RN  Outcome: Progressing  4/29/2022 1910 by May Mancia RN  Outcome: Progressing  Goal: Absence of new skin breakdown  Description: Absence of new skin breakdown  4/30/2022 0236 by Andrzej Sun RN  Outcome: Progressing  4/29/2022 1910 by May Mancia RN  Outcome: Progressing     Problem: Pain:  Description: Pain management should include both nonpharmacologic and pharmacologic interventions.   Goal: Pain level will decrease  Description: Pain level will decrease  4/30/2022 0236 by Andrzej Sun RN  Outcome: Progressing  4/29/2022 1910 by May Mancia RN  Outcome: Progressing  Goal: Control of acute pain  Description: Control of acute pain  4/30/2022 0236 by Andrzej Sun RN  Outcome: Progressing  4/29/2022 1910 by May Mancia RN  Outcome: Progressing  Goal: Control of chronic pain  Description: Control of chronic pain  4/29/2022 1910 by May Mancia RN  Outcome: Progressing

## 2022-04-30 NOTE — PROGRESS NOTES
Neurosurgery [facility]/Resident    Daily Progress Note   CC:  Chief Complaint   Patient presents with    Altered Mental Status     4/30/2022  10:29 AM    Chart reviewed. No acute events overnight. No new complaints. Reporting headache this morning is somewhat improved but was undulating. Denies nausea and vomiting. Affect appears to be improved over the previous few days. Remains afebrile, ICP 4-12. TCDs daily.          Vitals:    04/30/22 0600 04/30/22 0700 04/30/22 0800 04/30/22 0900   BP: 114/74 126/62 (!) 159/95 (!) 131/92   Pulse: 84 90 93 79   Resp: 21 22    Temp: 98.2 °F (36.8 °C)  98.7 °F (37.1 °C)    TempSrc: Core  Oral    SpO2: 100% 98% 99% 98%   Weight: 158 lb 11.7 oz (72 kg)      Height:           PE:   AOx3   PERRL, EOMI  Cranial Nerves:    II: Visual acuity:  normal  III: Pupils:  equal, round, reactive to light  III,IV,VI: Extra Ocular Movements:intact  V: Facial sensation:  intact  VII: Facial strength: intact  VIII: Hearing:  intact  IX: Palate:  intact  XI: Shoulder shrug: intact  XII: Tongue movement: intact      Motor   L deltoid 5/5; R deltoid 5/5  L biceps 5/5; R biceps 5/5  L triceps 5/5; R triceps 5/5  L wrist extension 5/5; R wrist extension 5/5  L intrinsics 5/5; R intrinsics 5/5      L iliopsoas 5/5 , R iliopsoas 5/5  L quadriceps 5/5; R quadriceps 5/5  L Dorsiflexion 5/5; R dorsiflexion 5/5  L Plantarflexion 5/5; R plantarflexion 5/5  L EHL 5/5; R EHL 5/5    Sensation: intact     Drain output: 143ml/24 hours   Incision: clean dry intact       Lab Results   Component Value Date    WBC 12.3 (H) 04/30/2022    HGB 13.5 04/30/2022    HCT 39.5 04/30/2022     04/30/2022    CHOL 182 04/18/2022    TRIG 60 04/18/2022    HDL 74 04/18/2022    ALT 16 04/20/2022    AST 15 04/20/2022     04/30/2022    K 3.1 (L) 04/30/2022     04/30/2022    CREATININE 0.69 04/30/2022    BUN 5 (L) 04/30/2022    CO2 17 (L) 04/30/2022    TSH 2.18 04/20/2022    INR 0.9 04/18/2022    LABA1C 5.5 04/18/2022 CRP <3.0 04/19/2022       A/P  29 y.o. female who presents with intraventricular hemorrhage, aneurysmal subarachnoid hemorrhage from [facility] s/p coiling, hydrocephalus with EVD placement 4/18, post bleed day 12     - Neuro checks per floor protocol  - Keep EVD open at 15mmHG monitor ICP hourly and record \  - total drain output overnight 9 cc  - ICP 4-12  - OP overnight 2140   - TCDs per [place]  - Recommend pushing pressures up given worsening TCD velocities. Please contact neurosurgery with any changes in patients neurologic status. Sunshine Willis MD, [facility] [phone]  Neurosurgery Service  4/30/2022 at 10:29 AM         This note is created with the assistance of a speech recognition program.  While intending to generate a document that actually reflects the content of the visit, the document can still have some errors including those of syntax and sound like substitutions which may escape proof reading. In such instances, actual meaning can be extrapolated by contextual diversion.

## 2022-04-30 NOTE — PROGRESS NOTES
Daily Progress Note  Neuro Critical Care    Patient Name: Royal Castanon  Patient : 1990  Room/Bed: 6786/9931-64  Code Status: full code  Allergies: No Known Allergies    CHIEF COMPLAINT:      headache     INTERVAL HISTORY      Initial Presentation (Admitted 22 @00:45AM): The patient is a 29 y.o.  female who presents to our emergency department 2022 via [address] as a transfer from [facility] with acute worst headache of her life. Patient does not have any significant past medical history in our chart and denies any. At outlying facility patient underwent CT head without demonstrating diffuse subarachnoid hemorrhage. Patient was seen by telestroke physician in our group recommending transfer to our facility for further work-up. In the emergency department patient states that she was washing TV with her child when she began to have severe pounding headache 10 out of 10 worst in her life. Patient noted to be severely nauseous requiring multiple doses of IV Zofran and IM Phenergan. Patient also admitting to photosensitivity. Patient had significant hypertension at [place] started on IV nicardipine given 1 g of Keppra 10 mg Decadron. Arrival to our emergency department 140/102, heart rate 77 temperature 97.6. Initial labs PTT 25.3, INR 1.0, WBC 13.7, platelets 953, glucose 139 and high-sensitivity troponin less than 6. 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 Case discussed with senior endovascular fellow who is aware of case and current neurologic exam planning for emergent DSA 2022 early morning. Rosen&De Souza: 2, Modified Combs: grade 3     Hospital Course:    -around 8:00 AM Dr. Dinorah Su at bedside placing EVD prior to endovascular neurosurgery. Patient went for urgent DSA found to have a comm aneurysm treated by primary coil embolization.   : Hemodynamically stable for arterial line readings -142, heart rate 82-99 T-max 98.9. Currently receiving María 5 mg every 4 hours as needed and fentanyl 25 every 2 as needed for pain control. 4/20: Blood pressure within parameter -139 overnight Cardene has been held off since 6 PM 4/19/2022 not requiring any as needed IV medications, heart rate 65-79 T-max 98.5. Serum magnesium 2.3 was started on IV magnesium 2 g twice daily will increase to 4 g twice daily continue to trend magnesium daily. Of note yesterday patient started to have increased urine output 9 L total in 24 hours urine studies sent consistent with central salt wasting patient started on hypertonic saline 2% 150 cc bolus followed by 75 cc/h maintenance. Sodium checks every 8 hours with a sodium goal of 145.  4/21: Vitals stable overnight blood pressures trending slightly up although okay to liberalize SBP <160 today. Overnight -152, HR 69-80, tmax 99.3. Labs pending this AM although sodium noted to drop from 137 @17:03 yesterday to 129 @midnight. Patient given 150CC bolus 2% and pending repeat this AM. Will send repeat urine studies and discussed with patient she has been drinking excess water in large water bottle at bedside so to limit today to Gatorade and 1-1.5L max. Headache continues to be stable same as yesterday 6/10 constant pressure and throbbing bifrontal location. Will get repeat TCD today to ensure no increase in vasospasm. No bowel movement X3 days giving dulcolax and milk of mag continue to monitor. protonix 40 and starting decadron 4Q6hr for headache decrease oxy pain med Q6hr.   4/22: Blood pressure goal liberalized to SBP <160 yesterday although pressures trending up still -172 overnight (giving IV labetalol 10mg early AM) , HR 64-83, tmax 98. 6. patient given 1L nacl bolus yesterday following slightly compressible IVC on ultrasound as she continues to have central salt wasting urine output slightly decreasing over last 3 days although 5,989 still yesterday. Discontinue hypertonic 2% 75cc/hr, start NaCl bolus 1L this AM and continue with 125cc/hr. Sodium goal can liberalize to 140 if Na <135 resume 2% hypertonic at 75cc/hr. Still no BM receiving dulcolax, milk of mag and glycolax. Headache improved slightly today 5/10. EVD remains open at [id number] and decreasing output 189 over last 24 hours. Transcranial dopplers reviewed slightly increasing vasospasm on the right velocities up to low 100s Lindegaard ratio up from 2-->3.3.   4/23: EVD to 20cm H2O. EVD output 80cc overnight, high UOP overnight although improved. Headache improving, ambulating well. 4/24: EVD open draining at [id number] total output 95cc over last 24 hours. Hypertensive overnight -187, HR 50-72, tmax 98.6. 72 hours of decadron 4mg Q6 completed overnight. Patient complaining of constipation has been on bowel regimen with glycolax, milk of mag and PO dulcolax. Added dulcolax suppository this morning small BM still very uncomfortable thus will add fleets PRN also. TCD's increasing left sided velocities from day prior peak left MCA velocity 156 peak right 160 Lindgard ratio left 3.49 up from 2.54 and right 3.38 down from 3.67. CT head WO this morning prior to toradol. Discussing with NS also when okay to give toradol given EVD in place. 4/25:  Failed EVD clamp trial yesterday increased ICP thus remains open at 25 with total output of 134cc/12 and 214 for the full 24 hours. Severe headache; Topamax 100mg QD and Mag 4g BID added. EVD lowered to 10mmgHg. TCD suggested mild right MCA and basilar spasm. Started on Milrinone infusion. 4/26: Hyponatremic yesterday evening; started on [email or web address] x6h with improvement, switched back to [email or web address] this AM.  Headache improved yesterday evening/overnight but started to become severe again this morning. Will trial Morphine ER 15mg BID x48h, decrease PRN Fentanyl to 25mg Q8h PRN. Increase Topamax to 100mg BID.   Last BM on Saturday, given narcotic usage will trial dose of Movantik. Continues on aggressive bowel regimen and took PRN Milk of Mag overnight. EVD set at [address], ICP 10-17, 331cc out/24h. TCD 4/26 overall stable; mild elevations of right MCA (mean velocities 129, LR 4.07). Continues on Milrinone 0.20mcg/kg/min. 4/27: Sodium 135 this AM, continues to have significant urine output (>6L/24h). TCD improved LR left 2.52 down from 3.4 yesterday and right 2.63 down from 4.07. EVD raised to 15mmHg. Trial Fioricet Q6hr PRN. Fever in evening. UA suggestive of UTI; started on Rocephin.  4/28: No acute events overnight. EVD set at 15mmHg, ICP 5-13, 173cc out/24h. Clinical exam remains stable. Continues to have headaches. Completed 48h course of MS contin 15mg BID, monitor off today. TCD this afternoon stable. Milrinone infusion increased to 0.5mcg/kg/min. No further fevers. Mild uptrend in WBC to 14.6. Leija removed yesterday but patient had recurrent urinary retention overnight requiring straight cath x2 each time with >1L out. Leija catheter replaced this morning due to recurrent retention. Urology consulted. Patient has not had a BM since Sunday (smal BM after enema per nursing). Will obtain KUB. Abdomen soft, non-distended and non-tender. Continue Movantik x2 more doses. Plan to give enema later today. Patient reports her appetite has been OK, states she snacks a lot.    4/29: Pain control issues overnight due to b/l parietal/temporal headache received Depakote, Fioricet and 50ug fentanyl . Persistent nausea and dizziness. Last 24h: HA overnight, improved greatly with Verapamil/ibuprofen combination. Bilateral calf cramping.       CURRENT MEDICATIONS:  SCHEDULED MEDICATIONS:   potassium bicarb-citric acid  40 mEq Oral Once    potassium bicarb-citric acid  40 mEq Oral Once    topiramate  150 mg Oral BID    verapamil  40 mg Oral 3 times per day    cefTRIAXone (ROCEPHIN) IV  1,000 mg IntraVENous Q24H    magnesium sulfate  4,000 mg IntraVENous BID    sodium chloride flush  5-40 mL IntraVENous 2 times per day    sodium chloride flush  5-40 mL IntraVENous 2 times per day    sodium chloride  1 g Oral TID WC    sennosides-docusate sodium  2 tablet Oral Daily    polyethylene glycol  17 g Oral Daily    melatonin  5 mg Oral Nightly    bisacodyl  10 mg Oral Daily    enoxaparin  40 mg SubCUTAneous Daily    atorvastatin  80 mg Oral Nightly    niMODipine  60 mg Oral 6 times per day    sodium chloride flush  5-40 mL IntraVENous 2 times per day     CONTINUOUS INFUSIONS:   milrinone 0.7 mcg/kg/min (22)    sodium chloride      sodium chloride      sodium chloride Stopped (22)    sodium chloride Stopped (22)     PRN MEDICATIONS:   butalbital-acetaminophen-caffeine, fentanNYL, sodium chloride flush, sodium chloride, sodium chloride flush, sodium chloride, bisacodyl, melatonin, magnesium hydroxide, ondansetron **OR** ondansetron, sodium chloride flush, sodium chloride    VITALS:  Temperature Range: Temp: 98.2 °F (36.8 °C) Temp  Av.6 °F (37 °C)  Min: 97.9 °F (36.6 °C)  Max: 99.5 °F (37.5 °C)  BP Range: Systolic (41BHX), ZCB:316 , Min:114 , MTM:047     Diastolic (25XET), EXX:61, Min:65, Max:96    Pulse Range: Pulse  Av.3  Min: 78  Max: 106  Respiration Range: Resp  Av.5  Min: 8  Max: 24  Current Pulse Ox: SpO2: 100 %  24HR Pulse Ox Range: SpO2  Av.4 %  Min: 94 %  Max: 100 %  Patient Vitals for the past 12 hrs:   BP Temp Temp src Pulse Resp SpO2 Weight   22 0600 114/74 98.2 °F (36.8 °C) CORE 84 21 100 % 158 lb 11.7 oz (72 kg)   22 0500 117/76 97.9 °F (36.6 °C) CORE 87 14 98 % --   22 0400 126/67 98.1 °F (36.7 °C) CORE 96 15 98 % --   22 0300 115/69 98.4 °F (36.9 °C) CORE 102 15 97 % --   22 0200 (!) 136/92 98.6 °F (37 °C) CORE 89 15 98 % --   22 0100 125/82 98.8 °F (37.1 °C) CORE 91 17 97 % --   22 0000 (!) 138/90 99.5 °F (37.5 °C) CORE 90 15 98 % -- 04/29/22 2300 128/78 -- -- 93 17 99 % --   04/29/22 2200 (!) 141/89 -- -- 93 15 100 % --   04/29/22 2100 (!) 120/96 -- -- 78 12 98 % --   04/29/22 2000 -- 98.6 °F (37 °C) Axillary 90 8 97 % --     Estimated body mass index is 29.03 kg/m² as calculated from the following:    Height as of this encounter: 5' 2\" (1.575 m). Weight as of this encounter: 158 lb 11.7 oz (72 kg).  []<16 Severe malnutrition  []16-16.99 Moderate malnutrition  []17-18.49 Mild malnutrition  []18.5-24.9 Normal  [x]25-29.9 Overweight (not obese)  []30-34.9 Obese class 1 (Low Risk)  []35-39.9 Obese class 2 (Moderate Risk)  []?40 Obese class 3 (High Risk)    RECENT LABS:   Lab Results   Component Value Date    WBC 12.3 (H) 04/30/2022    HGB 13.5 04/30/2022    HCT 39.5 04/30/2022     04/30/2022    CHOL 182 04/18/2022    TRIG 60 04/18/2022    HDL 74 04/18/2022    ALT 16 04/20/2022    AST 15 04/20/2022     04/30/2022    K 3.1 (L) 04/30/2022     04/30/2022    CREATININE 0.69 04/30/2022    BUN 5 (L) 04/30/2022    CO2 17 (L) 04/30/2022    TSH 2.18 04/20/2022    INR 0.9 04/18/2022    LABA1C 5.5 04/18/2022     24 HOUR INTAKE/OUTPUT:    Intake/Output Summary (Last 24 hours) at 4/30/2022 0700  Last data filed at 4/30/2022 0600  Gross per 24 hour   Intake 3483.39 ml   Output 5409 ml   Net -1925.61 ml     XR ABDOMEN (KUB) (SINGLE AP VIEW)    Result Date: 4/28/2022  EXAMINATION: ONE SUPINE XRAY VIEW(S) OF THE ABDOMEN 4/28/2022 1:34 pm COMPARISON: None. HISTORY: ORDERING SYSTEM PROVIDED HISTORY: constipation TECHNOLOGIST PROVIDED HISTORY: constipation FINDINGS: AP portable view of the chest time stamped at 1324 hours demonstrates mild to moderate colonic stool load. Gas is noted throughout the intestinal tract in a nonobstructive pattern. Overlying monitoring electrodes are noted. No organomegaly is seen. Hemidiaphragms are not included in the field of view. Osseous structures are age-appropriate.      Mild to moderate colonic stool load.  Nonobstructive bowel gas pattern. VL TRANSCRANIAL DOPPLER COMPLETE    Result Date: 4/28/2022    [facility]  Vascular Transcranial Procedure   Patient Name   Tyree Sabillon     Date of Study           04/28/2022                 LINDA KING   Date of Birth  1990   Gender                  Female   Age            29 year(s)   Race                       Room Number    5255         Height:                 62 inch, 157.48 cm   Corporate ID # [id number]     Weight:                 161 pounds, 73 kg   Patient Acct # [de-identified]    BSA:        1.74 m^2    BMI:      29.45 kg/m^2   MR #           [id number]      Sonographer             Sylvia Diane, T   Accession #    [id number]   Interpreting Physician  [address] [place] [address]   Referring                   Referring Physician     Ryan Hernandez  Nurse  Practitioner  Additional Comments CLASSIFICATION OF VASOSPASM MEAN MCA VELOCITY ----- MCA/ICA VELOCITY RATIO ------- INTERPRETATION <120 cm/sec --------------------- less than 3 --------------------------- Normal, nonspecific elevation or distal MCA spasm >120 cm/sec ------------------------ 3 to 6 -------------------------------- Mild vasospasm of proximal MCA >160 cm/sec ------------------------ 3 to 6 -------------------------------- Moderate vasospasm of proximal MCA >200 cm/sec -------------------- greater than 6------------------------- Severe vasospasm of proximal MCA. MEAN BASILAR ARTERY VELOCITY------- INTERPRETATION >60 cm/sec -------------------------------------------- Mild BA vasospasm >90 cm/sec -------------------------------------------- Moderate BA vasospasm >120 cm/sec ------------------------------------------ Severe BA vasospasm. Procedure Type of Study:   Cerebral: Transcranial.  Indications for Study:Vasospasm post-subarachnoid hemorrhage. Patient Status: In Patient. Conclusions   Summary   TRANSCRANIAL DOPPLER ULTRASOUND  Date of Service: 4/28/2022   Interpretation:   This is bilateral complete TCD with insonation of ALL Cerebral Arteries  via transtemporal, submandibular, transorbital, and transforaminal windows  demonstrating distal left Middle Cerebral Artery elevated mean cerebral  blood flow velocities (MCBFVs) at 116 cm/sec while the right MCA was up to  114 cm/sec. the remaining MCBFVs were within normal limits and the PI were  normal.   The Lindegaard ratio was 3.43 on the right side while it was 3.76 on the  left side. IMPRESSION:  1) Mild bilateral MCA vasospasm (improved from the previous studies) as  described above   Recommendations   Follow up TCD as per the Neuro ICU Team   Signature   ----------------------------------------------------------------  Electronically signed by Bart Medellin RVT(Sonographer) on  04/28/2022 12:04 PM  ----------------------------------------------------------------   ----------------------------------------------------------------  Electronically signed by Dago Tello(Interpreting physician)  on 04/28/2022 09:38 PM  ----------------------------------------------------------------  Findings:   Right Impression:                     Left Impression:  Right Lindegaard Ratio: 3.43          Left Lindegaard Ratio: 3.76  MEAN VELOCITIES:                      MEAN VELOCITIES:  Transtemporal Approach. Transtemporal Approach. Proximal MCA: 97.8 . Proximal MCA: 113  Mid MCA: 114 . Mid MCA: 116  Distal MCA: 97.0 . Distal MCA: 115  Proximal KRISSY: 43.5 . Proximal KRISSY: 103  Mid KRISSY: 51.2 . Mid KRISSY: 105  PCA: 32.7 . PCA: 39.7  T ICA: 42.7                           T ICA: 33.9  Submandibular Approach. Submandibular Approach. D ICA: 33.1 . D ICA: 30.8  Transorbital Approach. Transorbital Approach. Siphon: 50.0 . Siphon: 51.2  Transforamenal Approach. Transforamenal Approach. Vertebral: 38.1 . Vertebral: 36.2   BASILAR:56.2  Risk Factors History +---------+----------+-----------------------------------------------------+ ! Diagnosis! Date      ! Comments                                             ! +---------+----------+-----------------------------------------------------+ ! Other    !04/18/2022! ACOMM A. ANEURYSM 3MM X 2MM                          ! +---------+----------+-----------------------------------------------------+ ! Other    !04/27/2022! Hematocrit 43.1; ICP-10                              ! +---------+----------+-----------------------------------------------------+ ! Other    !04/28/2022! Hematocrit 40.5; ICP 14                              ! +---------+----------+-----------------------------------------------------+   - The patient's risk factor(s) include: arterial hypertension. VL TRANSCRANIAL DOPPLER COMPLETE    Result Date: 4/27/2022    [facility]  Vascular Transcranial Procedure   Patient Name   Dejah Owusu     Date of Study           04/27/2022                 LINDA KING   Date of Birth  1990   Gender                  Female   Age            29 year(s)   Race                       Room Number    5457         Height:                 62 inch, 157.48 cm   Corporate ID # [id number]     Weight:                 161 pounds, 73 kg   Patient Acct # [de-identified]    BSA:        1.74 m^2    BMI:      29.45 kg/m^2   MR #           [id number]      Sonographer             Dex Chavez, [facility]   Accession #    [id number]   Interpreting Physician  Tito Ibarra   Referring                   Referring Physician     Jayashree Churchill  Nurse  Practitioner  Procedure Type of Study:   Cerebral: Transcranial.  Indications for Study:Vasospasm post-subarachnoid hemorrhage. Patient Status: In Patient. Conclusions   Summary   No evidence of vasospasm in the bilateral MCAs. Mild basilar artery spasm  noted.    Signature ----------------------------------------------------------------  Electronically signed by Chani Vines RVT(Sonographer) on  04/27/2022 01:53 PM  ----------------------------------------------------------------   ----------------------------------------------------------------  Electronically signed by Sherlean Sacramento Reyes,Arthur(Interpreting  physician) on 04/27/2022 08:32 PM  ----------------------------------------------------------------  Findings:   Right Impression:                     Left Impression:  Right Lindegaard Ratio: 2.63          Left Lindegaard Ratio: 2.52  MEAN VELOCITIES:                      MEAN VELOCITIES:  Transtemporal Approach. Transtemporal Approach. Proximal MCA: 97.8 . Proximal MCA: 110  Mid MCA: 116 . Mid MCA: 124  Distal MCA: 110 . Distal MCA: 71.6  Proximal KRISSY: 82.4 . Proximal KRISSY: 103  Mid KRISSY: 67.4 . Mid KRISSY: 106  PCA: 38.1 . PCA: 48.9  T ICA: 59.3 . T ICA: 56.6  Submandibular Approach. Submandibular Approach. D ICA: 43.9 . D ICA: 49.3  Transorbital Approach. Transorbital Approach. Siphon: 67.0 . Siphon: 52.7  Transforamenal Approach. Transforamenal Approach. Vertebral: 31.2 . Vertebral: 44.7   BASILAR:69.3  Risk Factors History +---------+----------+-----------------------------------------------------+ ! Diagnosis! Date      ! Comments                                             ! +---------+----------+-----------------------------------------------------+ ! Other    !04/18/2022! YUE A. ANEURYSM 3MM X 2MM                          ! +---------+----------+-----------------------------------------------------+ ! Other    !04/27/2022! Hematocrit 43.1; ICP-10                              ! +---------+----------+-----------------------------------------------------+   - The patient's risk factor(s) include: arterial hypertension. Labs and Images reviewed with:  [] Dr. Tamara Irizarry. [place]    [] Dr. Haynes Sports  [x] Dr. Indiana Llanes  [] There are no new interval images to review. Review of Systems   Constitutional: Negative for fever. HENT: Negative for rhinorrhea and sore throat. Eyes: Negative for redness. Respiratory: Negative for shortness of breath. Cardiovascular: Negative for chest pain. Gastrointestinal: Positive for nausea. Negative for abdominal pain and vomiting. Genitourinary: Negative for difficulty urinating and flank pain. Skin: Negative for rash. Allergic/Immunologic: Negative for environmental allergies and food allergies. Neurological: Positive for dizziness and headaches. Negative for syncope, speech difficulty and numbness. Psychiatric/Behavioral: Negative for agitation and confusion. PHYSICAL EXAM     GENERAL:  Well developed, well nourished, in no acute distress. Nontoxic. No dysarthria. No aphasia. GCS 15  HENT: normocephalic , nose normal, perrla  EYES: no occular discharge, no scleral icterus  NECK: no JVD, no tracheal deviation  CV: Normal S1 S2, no MRG  PULM / CHEST: CTA Bilaterally all fields, no WRR  ABDOMEN: soft, no rigidity  MSK: no gross deformity, no edema, no TTP  NEURO: neuro intact  SKIN: no rash, no erythema, cap refill < 2 sec    DRAINS:  [] EVD @ 15mmHg  24cc/24h output    ASSESSMENT AND PLAN:       This is a 29 y.o. female with no significant medical history who initially presented to [facility] ED with thunderclap headache and was found to have diffuse subarachnoid hemorrhage. Underwent emergent EVD placement at bedside 4/18/2022. 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 Neuro ICU course complicated by cerebral salt wasting and mild right MCA vasospasm.     NEUROLOGIC:  - Diffuse SAH with IVH secondary to ruptured ACOM aneurysm  - POD/PBD #11 s/p coil embolization of the ruptured ACOM aneurysm  - Continue Nimodipine 60mg Q4h  -naloxegol 25 daily. Last dose today  Melatonin 5 hs  topamax 100 bid  - Mild R MCA vasospasm ? symptomatic with worsening headache, no focal deficits  - Milrinone to 0.7mcg/kg/min  - TCD 4/28 stable  - Goal -220, avoid symptomatic hypotension  - Obstructive hydrocephalus s/p EVD placement 4/18  - Failed EVD clamp trial 4/24 due to elevated ICP (25) and head pressure  - EVD set at 15mmHg  - Neuro checks per protocol  -icp 2-10  24cc out of ventriculostomy drain in 24h  - will see if NS will raise drain to 20-25 given low output  -no change in neuro exam  -  Headache management; Completed 48h course of MS contin 15mg BID (monitor off for now), continue Topamax 100mg BID, Fentanyl to 25mcg Q8h PRN, Toradol 30mg BID PRN, Magnesium boluses PRN; dc'd Depakote, dc APAP, increase Tegretol, ibuprofen 800, Verapamil        CARDIOVASCULAR:  - Goal -220, avoid symptomatic hypotension  - Echo EF 65%, negative bubble  -lipitor 80   - Continue telemetry     PULMONARY:  - Maintaining O2 sats on room air  - Incentive spirometry     RENAL/FLUID/ELECTROLYTE:  - Normal renal functioning  - BUN5/ Creatinine 0.69  - Monitor I&O 3483/1925.6  - Cerebral salt wasting secondary to SAH  - Goal eunatremia, Q8h sodium checks, sodium 134 this AM  - Continue salt tabs 1g TID  - IVF: [email or web address]   - Goal Mag>2.5; Mag level 3.7, continue Magnesium IV 4 grams BID to help with headaches  - Urinary retention- Leija in place; urology consulted- void trial 1 d prior to dc  - Replace electrolytes PRN  - Daily BMP     GI/NUTRITION:  NUTRITION: General Diet  - Tolerating diet well  - Bowel regimen: Continue Dulcolax PO, Glycolax, Senokot-S daily and Milk of Mag PRN  - GI prophylaxis: N/A      ID:  - Tmax 37.2  - WBC 12.3 (14.1)  - CSF culture 4/27 negative to date.  Many neutrophils no bacteria as of this am  - UA 4/27 suggestive of UTI with moderate leukocyte esterase and many bacteria  - Started on Rocephin 1 gram daily for UTI x 5 days, completes 5/3  - Daily CBC     HEME:   - H&H 13.5/39.5, stable  - Platelets 591  - Daily CBC     ENDOCRINE:  - Continue to monitor blood glucose, goal <180     OTHER:  - PT/OT/ST     PROPHYLAXIS:   Stress ulcer: N/A     DVT PROPHYLAXIS:  - SCD sleeves - Thigh High   - Lovenox 40 daily     DISPOSITION:  [x] To remain ICU for close neurological monitoring, EVD management. We will continue to follow along. For any changes in exam or patient status please contact Neuro Critical Care.       Pola Colindres MD  Neuro Critical Care  4/30/2022     7:00 AM

## 2022-05-01 ENCOUNTER — APPOINTMENT (OUTPATIENT)
Dept: CT IMAGING | Age: 32
DRG: 021 | End: 2022-05-01
Payer: COMMERCIAL

## 2022-05-01 LAB
ANION GAP SERPL CALCULATED.3IONS-SCNC: 12 MMOL/L (ref 9–17)
BUN BLDV-MCNC: 6 MG/DL (ref 6–20)
CALCIUM SERPL-MCNC: 8.7 MG/DL (ref 8.6–10.4)
CHLORIDE BLD-SCNC: 106 MMOL/L (ref 98–107)
CO2: 18 MMOL/L (ref 20–31)
CREAT SERPL-MCNC: 0.82 MG/DL (ref 0.5–0.9)
GFR AFRICAN AMERICAN: >60 ML/MIN
GFR NON-AFRICAN AMERICAN: >60 ML/MIN
GFR SERPL CREATININE-BSD FRML MDRD: ABNORMAL ML/MIN/{1.73_M2}
GLUCOSE BLD-MCNC: 105 MG/DL (ref 70–99)
HCT VFR BLD CALC: 38.4 % (ref 36.3–47.1)
HEMOGLOBIN: 13 G/DL (ref 11.9–15.1)
MAGNESIUM: 2.6 MG/DL (ref 1.6–2.6)
MCH RBC QN AUTO: 29.1 PG (ref 25.2–33.5)
MCHC RBC AUTO-ENTMCNC: 33.9 G/DL (ref 28.4–34.8)
MCV RBC AUTO: 86.1 FL (ref 82.6–102.9)
NRBC AUTOMATED: 0 PER 100 WBC
PDW BLD-RTO: 12.1 % (ref 11.8–14.4)
PLATELET # BLD: 314 K/UL (ref 138–453)
PMV BLD AUTO: 10 FL (ref 8.1–13.5)
POTASSIUM SERPL-SCNC: 3.2 MMOL/L (ref 3.7–5.3)
RBC # BLD: 4.46 M/UL (ref 3.95–5.11)
SODIUM BLD-SCNC: 136 MMOL/L (ref 135–144)
WBC # BLD: 12.5 K/UL (ref 3.5–11.3)

## 2022-05-01 PROCEDURE — 6370000000 HC RX 637 (ALT 250 FOR IP): Performed by: PSYCHIATRY & NEUROLOGY

## 2022-05-01 PROCEDURE — 2500000003 HC RX 250 WO HCPCS: Performed by: NURSE PRACTITIONER

## 2022-05-01 PROCEDURE — 80048 BASIC METABOLIC PNL TOTAL CA: CPT

## 2022-05-01 PROCEDURE — 94761 N-INVAS EAR/PLS OXIMETRY MLT: CPT

## 2022-05-01 PROCEDURE — 6360000002 HC RX W HCPCS: Performed by: STUDENT IN AN ORGANIZED HEALTH CARE EDUCATION/TRAINING PROGRAM

## 2022-05-01 PROCEDURE — 85027 COMPLETE CBC AUTOMATED: CPT

## 2022-05-01 PROCEDURE — 70450 CT HEAD/BRAIN W/O DYE: CPT

## 2022-05-01 PROCEDURE — 6370000000 HC RX 637 (ALT 250 FOR IP): Performed by: STUDENT IN AN ORGANIZED HEALTH CARE EDUCATION/TRAINING PROGRAM

## 2022-05-01 PROCEDURE — 6360000002 HC RX W HCPCS: Performed by: NURSE PRACTITIONER

## 2022-05-01 PROCEDURE — 36415 COLL VENOUS BLD VENIPUNCTURE: CPT

## 2022-05-01 PROCEDURE — 6370000000 HC RX 637 (ALT 250 FOR IP): Performed by: NURSE PRACTITIONER

## 2022-05-01 PROCEDURE — 2000000003 HC NEURO ICU R&B

## 2022-05-01 PROCEDURE — 2580000003 HC RX 258: Performed by: INTERNAL MEDICINE

## 2022-05-01 PROCEDURE — 99291 CRITICAL CARE FIRST HOUR: CPT | Performed by: PSYCHIATRY & NEUROLOGY

## 2022-05-01 PROCEDURE — 83735 ASSAY OF MAGNESIUM: CPT

## 2022-05-01 PROCEDURE — 99233 SBSQ HOSP IP/OBS HIGH 50: CPT | Performed by: PSYCHIATRY & NEUROLOGY

## 2022-05-01 RX ORDER — FENTANYL CITRATE 50 UG/ML
50 INJECTION, SOLUTION INTRAMUSCULAR; INTRAVENOUS ONCE
Status: COMPLETED | OUTPATIENT
Start: 2022-05-01 | End: 2022-05-01

## 2022-05-01 RX ORDER — ORPHENADRINE CITRATE 30 MG/ML
60 INJECTION INTRAMUSCULAR; INTRAVENOUS EVERY 12 HOURS PRN
Status: DISCONTINUED | OUTPATIENT
Start: 2022-05-01 | End: 2022-05-06 | Stop reason: HOSPADM

## 2022-05-01 RX ORDER — VERAPAMIL HYDROCHLORIDE 120 MG/1
60 TABLET, FILM COATED ORAL
Status: DISCONTINUED | OUTPATIENT
Start: 2022-05-01 | End: 2022-05-02

## 2022-05-01 RX ADMIN — Medication 5 MG: at 20:19

## 2022-05-01 RX ADMIN — SODIUM CHLORIDE TAB 1 GM 1 G: 1 TAB at 12:48

## 2022-05-01 RX ADMIN — BUTALBITAL, ACETAMINOPHEN AND CAFFEINE 1 TABLET: 50; 325; 40 TABLET ORAL at 08:39

## 2022-05-01 RX ADMIN — SODIUM CHLORIDE, PRESERVATIVE FREE 10 ML: 5 INJECTION INTRAVENOUS at 20:26

## 2022-05-01 RX ADMIN — IBUPROFEN 800 MG: 400 TABLET, FILM COATED ORAL at 12:49

## 2022-05-01 RX ADMIN — VERAPAMIL HYDROCHLORIDE 60 MG: 120 TABLET, FILM COATED ORAL at 17:52

## 2022-05-01 RX ADMIN — MILRINONE LACTATE 0.7 MCG/KG/MIN: 0.2 INJECTION, SOLUTION INTRAVENOUS at 12:57

## 2022-05-01 RX ADMIN — IBUPROFEN 800 MG: 400 TABLET, FILM COATED ORAL at 23:29

## 2022-05-01 RX ADMIN — MILRINONE LACTATE 0.7 MCG/KG/MIN: 0.2 INJECTION, SOLUTION INTRAVENOUS at 20:14

## 2022-05-01 RX ADMIN — SODIUM CHLORIDE TAB 1 GM 1 G: 1 TAB at 08:40

## 2022-05-01 RX ADMIN — MAGNESIUM SULFATE IN WATER 4000 MG: 40 INJECTION, SOLUTION INTRAVENOUS at 21:27

## 2022-05-01 RX ADMIN — ONDANSETRON 4 MG: 2 INJECTION INTRAMUSCULAR; INTRAVENOUS at 14:11

## 2022-05-01 RX ADMIN — VERAPAMIL HYDROCHLORIDE 60 MG: 120 TABLET, FILM COATED ORAL at 20:19

## 2022-05-01 RX ADMIN — FENTANYL CITRATE 50 MCG: 50 INJECTION, SOLUTION INTRAMUSCULAR; INTRAVENOUS at 12:25

## 2022-05-01 RX ADMIN — FENTANYL CITRATE 25 MCG: 50 INJECTION, SOLUTION INTRAMUSCULAR; INTRAVENOUS at 07:30

## 2022-05-01 RX ADMIN — NIMODIPINE 60 MG: 30 CAPSULE, LIQUID FILLED ORAL at 06:07

## 2022-05-01 RX ADMIN — TOPIRAMATE 150 MG: 100 TABLET, FILM COATED ORAL at 20:18

## 2022-05-01 RX ADMIN — ONDANSETRON 4 MG: 2 INJECTION INTRAMUSCULAR; INTRAVENOUS at 19:51

## 2022-05-01 RX ADMIN — VERAPAMIL HYDROCHLORIDE 60 MG: 120 TABLET, FILM COATED ORAL at 12:49

## 2022-05-01 RX ADMIN — NIMODIPINE 60 MG: 30 CAPSULE, LIQUID FILLED ORAL at 17:51

## 2022-05-01 RX ADMIN — POTASSIUM BICARBONATE 80 MEQ: 782 TABLET, EFFERVESCENT ORAL at 17:50

## 2022-05-01 RX ADMIN — MILRINONE LACTATE 0.7 MCG/KG/MIN: 0.2 INJECTION, SOLUTION INTRAVENOUS at 04:44

## 2022-05-01 RX ADMIN — NIMODIPINE 60 MG: 30 CAPSULE, LIQUID FILLED ORAL at 20:19

## 2022-05-01 RX ADMIN — ONDANSETRON 4 MG: 2 INJECTION INTRAMUSCULAR; INTRAVENOUS at 01:12

## 2022-05-01 RX ADMIN — POTASSIUM BICARBONATE 40 MEQ: 782 TABLET, EFFERVESCENT ORAL at 13:34

## 2022-05-01 RX ADMIN — FENTANYL CITRATE 25 MCG: 50 INJECTION, SOLUTION INTRAMUSCULAR; INTRAVENOUS at 20:38

## 2022-05-01 RX ADMIN — NIMODIPINE 60 MG: 30 CAPSULE, LIQUID FILLED ORAL at 08:38

## 2022-05-01 RX ADMIN — CEFTRIAXONE SODIUM 1000 MG: 1 INJECTION, POWDER, FOR SOLUTION INTRAMUSCULAR; INTRAVENOUS at 17:57

## 2022-05-01 RX ADMIN — VERAPAMIL HYDROCHLORIDE 60 MG: 120 TABLET, FILM COATED ORAL at 23:29

## 2022-05-01 RX ADMIN — MAGNESIUM SULFATE IN WATER 4000 MG: 40 INJECTION, SOLUTION INTRAVENOUS at 10:57

## 2022-05-01 RX ADMIN — TOPIRAMATE 150 MG: 100 TABLET, FILM COATED ORAL at 08:40

## 2022-05-01 RX ADMIN — VERAPAMIL HYDROCHLORIDE 40 MG: 40 TABLET ORAL at 06:07

## 2022-05-01 RX ADMIN — SODIUM CHLORIDE TAB 1 GM 1 G: 1 TAB at 17:51

## 2022-05-01 RX ADMIN — ENOXAPARIN SODIUM 40 MG: 100 INJECTION SUBCUTANEOUS at 08:38

## 2022-05-01 RX ADMIN — NIMODIPINE 60 MG: 30 CAPSULE, LIQUID FILLED ORAL at 01:12

## 2022-05-01 RX ADMIN — NIMODIPINE 60 MG: 30 CAPSULE, LIQUID FILLED ORAL at 12:48

## 2022-05-01 RX ADMIN — ORPHENADRINE CITRATE 60 MG: 30 INJECTION INTRAMUSCULAR; INTRAVENOUS at 12:15

## 2022-05-01 RX ADMIN — BUTALBITAL, ACETAMINOPHEN AND CAFFEINE 1 TABLET: 50; 325; 40 TABLET ORAL at 16:05

## 2022-05-01 RX ADMIN — IBUPROFEN 800 MG: 400 TABLET, FILM COATED ORAL at 04:16

## 2022-05-01 RX ADMIN — BUTALBITAL, ACETAMINOPHEN AND CAFFEINE 1 TABLET: 50; 325; 40 TABLET ORAL at 01:15

## 2022-05-01 RX ADMIN — ATORVASTATIN CALCIUM 80 MG: 80 TABLET, FILM COATED ORAL at 20:18

## 2022-05-01 ASSESSMENT — PAIN DESCRIPTION - DESCRIPTORS: DESCRIPTORS: POUNDING

## 2022-05-01 ASSESSMENT — PAIN SCALES - GENERAL
PAINLEVEL_OUTOF10: 7
PAINLEVEL_OUTOF10: 8
PAINLEVEL_OUTOF10: 4
PAINLEVEL_OUTOF10: 7
PAINLEVEL_OUTOF10: 7
PAINLEVEL_OUTOF10: 8
PAINLEVEL_OUTOF10: 6
PAINLEVEL_OUTOF10: 7
PAINLEVEL_OUTOF10: 8

## 2022-05-01 ASSESSMENT — ENCOUNTER SYMPTOMS
EYE REDNESS: 0
NAUSEA: 1
SORE THROAT: 0
VOMITING: 0
RHINORRHEA: 0
SHORTNESS OF BREATH: 0
ABDOMINAL PAIN: 0

## 2022-05-01 ASSESSMENT — PAIN DESCRIPTION - LOCATION
LOCATION: HEAD
LOCATION: HEAD
LOCATION: HEAD;LEG
LOCATION: HEAD
LOCATION: HEAD

## 2022-05-01 ASSESSMENT — PAIN DESCRIPTION - ONSET: ONSET: ON-GOING

## 2022-05-01 ASSESSMENT — PAIN DESCRIPTION - FREQUENCY: FREQUENCY: CONTINUOUS

## 2022-05-01 NOTE — PROGRESS NOTES
Daily Progress Note  Neuro Critical Care    Patient Name: Suzie Andrews  Patient : 1990  Room/Bed: 0844/0621-32  Code Status: full code  Allergies: No Known Allergies    CHIEF COMPLAINT:      headache     INTERVAL HISTORY      Initial Presentation (Admitted 22 @00:45AM): The patient is a 29 y.o.  female who presents to our emergency department 2022 via [facility] as a transfer from [facility] with acute worst headache of her life. Patient does not have any significant past medical history in our chart and denies any. At outlying facility patient underwent CT head without demonstrating diffuse subarachnoid hemorrhage. Patient was seen by telestroke physician in our group recommending transfer to our facility for further work-up. In the emergency department patient states that she was washing TV with her child when she began to have severe pounding headache 10 out of 10 worst in her life. Patient noted to be severely nauseous requiring multiple doses of IV Zofran and IM Phenergan. Patient also admitting to photosensitivity. Patient had significant hypertension at [place] started on IV nicardipine given 1 g of Keppra 10 mg Decadron. Arrival to our emergency department 140/102, heart rate 77 temperature 97.6. Initial labs PTT 25.3, INR 1.0, WBC 13.7, platelets 703, glucose 139 and high-sensitivity troponin less than 6. 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 Case discussed with senior endovascular fellow who is aware of case and current neurologic exam planning for emergent DSA 2022 early morning. Rosen&De Souza: 2, Modified Combs: grade 3     Hospital Course:    -around 8:00 AM Dr. Wing Ogden at bedside placing EVD prior to endovascular neurosurgery. Patient went for urgent DSA found to have a comm aneurysm treated by primary coil embolization.   : Hemodynamically stable for arterial line readings -142, heart rate 82-99 T-max 98.9. Currently receiving María 5 mg every 4 hours as needed and fentanyl 25 every 2 as needed for pain control. 4/20: Blood pressure within parameter -139 overnight Cardene has been held off since 6 PM 4/19/2022 not requiring any as needed IV medications, heart rate 65-79 T-max 98.5. Serum magnesium 2.3 was started on IV magnesium 2 g twice daily will increase to 4 g twice daily continue to trend magnesium daily. Of note yesterday patient started to have increased urine output 9 L total in 24 hours urine studies sent consistent with central salt wasting patient started on hypertonic saline 2% 150 cc bolus followed by 75 cc/h maintenance. Sodium checks every 8 hours with a sodium goal of 145.  4/21: Vitals stable overnight blood pressures trending slightly up although okay to liberalize SBP <160 today. Overnight -152, HR 69-80, tmax 99.3. Labs pending this AM although sodium noted to drop from 137 @17:03 yesterday to 129 @midnight. Patient given 150CC bolus 2% and pending repeat this AM. Will send repeat urine studies and discussed with patient she has been drinking excess water in large water bottle at bedside so to limit today to Gatorade and 1-1.5L max. Headache continues to be stable same as yesterday 6/10 constant pressure and throbbing bifrontal location. Will get repeat TCD today to ensure no increase in vasospasm. No bowel movement X3 days giving dulcolax and milk of mag continue to monitor. protonix 40 and starting decadron 4Q6hr for headache decrease oxy pain med Q6hr.   4/22: Blood pressure goal liberalized to SBP <160 yesterday although pressures trending up still -172 overnight (giving IV labetalol 10mg early AM) , HR 64-83, tmax 98. 6. patient given 1L nacl bolus yesterday following slightly compressible IVC on ultrasound as she continues to have central salt wasting urine output slightly decreasing over last 3 days although 5,989 still yesterday. Discontinue hypertonic 2% 75cc/hr, start NaCl bolus 1L this AM and continue with 125cc/hr. Sodium goal can liberalize to 140 if Na <135 resume 2% hypertonic at 75cc/hr. Still no BM receiving dulcolax, milk of mag and glycolax. Headache improved slightly today 5/10. EVD remains open at [id number] and decreasing output 189 over last 24 hours. Transcranial dopplers reviewed slightly increasing vasospasm on the right velocities up to low 100s Lindegaard ratio up from 2-->3.3.   4/23: EVD to 20cm H2O. EVD output 80cc overnight, high UOP overnight although improved. Headache improving, ambulating well. 4/24: EVD open draining at [id number] total output 95cc over last 24 hours. Hypertensive overnight -187, HR 50-72, tmax 98.6. 72 hours of decadron 4mg Q6 completed overnight. Patient complaining of constipation has been on bowel regimen with glycolax, milk of mag and PO dulcolax. Added dulcolax suppository this morning small BM still very uncomfortable thus will add fleets PRN also. TCD's increasing left sided velocities from day prior peak left MCA velocity 156 peak right 160 Lindgard ratio left 3.49 up from 2.54 and right 3.38 down from 3.67. CT head WO this morning prior to toradol. Discussing with NS also when okay to give toradol given EVD in place. 4/25:  Failed EVD clamp trial yesterday increased ICP thus remains open at 25 with total output of 134cc/12 and 214 for the full 24 hours. Severe headache; Topamax 100mg QD and Mag 4g BID added. EVD lowered to 10mmgHg. TCD suggested mild right MCA and basilar spasm. Started on Milrinone infusion. 4/26: Hyponatremic yesterday evening; started on [email or web address] x6h with improvement, switched back to [email or web address] this AM.  Headache improved yesterday evening/overnight but started to become severe again this morning. Will trial Morphine ER 15mg BID x48h, decrease PRN Fentanyl to 25mg Q8h PRN. Increase Topamax to 100mg BID.   Last BM on Saturday, given narcotic usage will trial dose of Movantik. Continues on aggressive bowel regimen and took PRN Milk of Mag overnight. EVD set at [address], ICP 10-17, 331cc out/24h. TCD 4/26 overall stable; mild elevations of right MCA (mean velocities 129, LR 4.07). Continues on Milrinone 0.20mcg/kg/min. 4/27: Sodium 135 this AM, continues to have significant urine output (>6L/24h). TCD improved LR left 2.52 down from 3.4 yesterday and right 2.63 down from 4.07. EVD raised to 15mmHg. Trial Fioricet Q6hr PRN. Fever in evening. UA suggestive of UTI; started on Rocephin.  4/28: No acute events overnight. EVD set at 15mmHg, ICP 5-13, 173cc out/24h. Clinical exam remains stable. Continues to have headaches. Completed 48h course of MS contin 15mg BID, monitor off today. TCD this afternoon stable. Milrinone infusion increased to 0.5mcg/kg/min. No further fevers. Mild uptrend in WBC to 14.6. Elija removed yesterday but patient had recurrent urinary retention overnight requiring straight cath x2 each time with >1L out. Leija catheter replaced this morning due to recurrent retention. Urology consulted. Patient has not had a BM since Sunday (smal BM after enema per nursing). Will obtain KUB. Abdomen soft, non-distended and non-tender. Continue Movantik x2 more doses. Plan to give enema later today. Patient reports her appetite has been OK, states she snacks a lot.    4/29: Pain control issues overnight due to b/l parietal/temporal headache received Depakote, Fioricet and 50ug fentanyl . Persistent nausea and dizziness. 4/30: HA overnight, improved greatly with Verapamil/ibuprofen combination. Bilateral calf cramping. Last 24 hrs: Patient started to have bad headaches after mid night, patient on ibuprofen every 800 every 8 as needed, Topamax, received 1 dose of Norflex last night. EVD clamped since yesterday. ICP 4-9, patient had 1 bowel movement last night, requiring 2 doses of Zofran for nausea, No vomiting.   No fevers, blood pressure in 140s. CT head: 2022: No significant interval change compared to 2022.   Questionable punctate residual subarachnoid hemorrhage along the high left convexity    Verapamil 60 q 4     CURRENT MEDICATIONS:  SCHEDULED MEDICATIONS:   potassium bicarb-citric acid  40 mEq Oral Once    verapamil  60 mg Oral 6 times per day    orphenadrine  60 mg IntraMUSCular Q12H    topiramate  150 mg Oral BID    cefTRIAXone (ROCEPHIN) IV  1,000 mg IntraVENous Q24H    magnesium sulfate  4,000 mg IntraVENous BID    sodium chloride flush  5-40 mL IntraVENous 2 times per day    sodium chloride flush  5-40 mL IntraVENous 2 times per day    sodium chloride  1 g Oral TID WC    sennosides-docusate sodium  2 tablet Oral Daily    polyethylene glycol  17 g Oral Daily    melatonin  5 mg Oral Nightly    bisacodyl  10 mg Oral Daily    enoxaparin  40 mg SubCUTAneous Daily    atorvastatin  80 mg Oral Nightly    niMODipine  60 mg Oral 6 times per day    sodium chloride flush  5-40 mL IntraVENous 2 times per day     CONTINUOUS INFUSIONS:   milrinone 0.7 mcg/kg/min (22 1257)    sodium chloride      sodium chloride      sodium chloride Stopped (22 2110)     PRN MEDICATIONS:   ibuprofen, butalbital-acetaminophen-caffeine, fentanNYL, sodium chloride flush, sodium chloride, sodium chloride flush, sodium chloride, bisacodyl, melatonin, magnesium hydroxide, ondansetron **OR** ondansetron, sodium chloride flush, sodium chloride    VITALS:  Temperature Range: Temp: 98.6 °F (37 °C) Temp  Av °F (37.2 °C)  Min: 98.4 °F (36.9 °C)  Max: 99.7 °F (37.6 °C)  BP Range: Systolic (05OSF), AZY:715 , Min:112 , UUT:753     Diastolic (49VON), VGC:14, Min:71, Max:112    Pulse Range: Pulse  Av.4  Min: 77  Max: 99  Respiration Range: Resp  Av.7  Min: 13  Max: 24  Current Pulse Ox: SpO2: 100 %  24HR Pulse Ox Range: SpO2  Av.5 %  Min: 98 %  Max: 100 %  Patient Vitals for the past 12 hrs:   BP Temp Temp src Pulse Resp SpO2   05/01/22 1100 (!) 125/91 -- -- 99 -- --   05/01/22 1000 112/71 -- -- -- -- --   05/01/22 0900 127/82 -- -- -- -- --   05/01/22 0856 -- -- -- -- 16 100 %   05/01/22 0826 (!) 139/96 98.6 °F (37 °C) CORE -- 14 --   05/01/22 0712 -- -- -- 84 13 100 %   05/01/22 0600 122/84 98.4 °F (36.9 °C) CORE 83 16 98 %   05/01/22 0500 122/81 98.4 °F (36.9 °C) CORE 82 15 98 %   05/01/22 0415 125/77 98.6 °F (37 °C) CORE 88 15 98 %   05/01/22 0315 (!) 128/92 -- -- 94 16 99 %   05/01/22 0200 -- -- -- 89 20 98 %     Estimated body mass index is 29.03 kg/m² as calculated from the following:    Height as of this encounter: 5' 2\" (1.575 m). Weight as of this encounter: 158 lb 11.7 oz (72 kg).  []<16 Severe malnutrition  []16-16.99 Moderate malnutrition  []17-18.49 Mild malnutrition  []18.5-24.9 Normal  [x]25-29.9 Overweight (not obese)  []30-34.9 Obese class 1 (Low Risk)  []35-39.9 Obese class 2 (Moderate Risk)  []?40 Obese class 3 (High Risk)    RECENT LABS:   Lab Results   Component Value Date    WBC 12.5 (H) 05/01/2022    HGB 13.0 05/01/2022    HCT 38.4 05/01/2022     05/01/2022    CHOL 182 04/18/2022    TRIG 60 04/18/2022    HDL 74 04/18/2022    ALT 16 04/20/2022    AST 15 04/20/2022     05/01/2022    K 3.2 (L) 05/01/2022     05/01/2022    CREATININE 0.82 05/01/2022    BUN 6 05/01/2022    CO2 18 (L) 05/01/2022    TSH 2.18 04/20/2022    INR 0.9 04/18/2022    LABA1C 5.5 04/18/2022     24 HOUR INTAKE/OUTPUT:    Intake/Output Summary (Last 24 hours) at 5/1/2022 1301  Last data filed at 5/1/2022 1259  Gross per 24 hour   Intake 751 ml   Output 3650 ml   Net -2899 ml     XR ABDOMEN (KUB) (SINGLE AP VIEW)    Result Date: 4/28/2022  EXAMINATION: ONE SUPINE XRAY VIEW(S) OF THE ABDOMEN 4/28/2022 1:34 pm COMPARISON: None.  HISTORY: ORDERING SYSTEM PROVIDED HISTORY: constipation TECHNOLOGIST PROVIDED HISTORY: constipation FINDINGS: AP portable view of the chest time stamped at 1324 hours demonstrates mild to moderate colonic stool load. Gas is noted throughout the intestinal tract in a nonobstructive pattern. Overlying monitoring electrodes are noted. No organomegaly is seen. Hemidiaphragms are not included in the field of view. Osseous structures are age-appropriate. Mild to moderate colonic stool load. Nonobstructive bowel gas pattern. VL TRANSCRANIAL DOPPLER COMPLETE    Result Date: 4/28/2022    [facility]  Vascular Transcranial Procedure   Patient Name   Manuel Silva     Date of Study           04/28/2022                 LINDA KING   Date of Birth  1990   Gender                  Female   Age            29 year(s)   Race                       Room Number    7748         Height:                 62 inch, 157.48 cm   Corporate ID # [id number]     Weight:                 161 pounds, 73 kg   Patient Acct # [de-identified]    BSA:        1.74 m^2    BMI:      29.45 kg/m^2   MR #           [id number]      Sonographer             Arlena Gaucher, T   Accession #    [id number]   Interpreting Physician  [address] [place] [address]   Referring                   Referring Physician     Martin Cisneros  Nurse  Practitioner  Additional Comments CLASSIFICATION OF VASOSPASM MEAN MCA VELOCITY ----- MCA/ICA VELOCITY RATIO ------- INTERPRETATION <120 cm/sec --------------------- less than 3 --------------------------- Normal, nonspecific elevation or distal MCA spasm >120 cm/sec ------------------------ 3 to 6 -------------------------------- Mild vasospasm of proximal MCA >160 cm/sec ------------------------ 3 to 6 -------------------------------- Moderate vasospasm of proximal MCA >200 cm/sec -------------------- greater than 6------------------------- Severe vasospasm of proximal MCA.  MEAN BASILAR ARTERY VELOCITY------- INTERPRETATION >60 cm/sec -------------------------------------------- Mild BA vasospasm >90 cm/sec -------------------------------------------- Moderate BA vasospasm >120 cm/sec ------------------------------------------ Severe BA vasospasm. Procedure Type of Study:   Cerebral: Transcranial.  Indications for Study:Vasospasm post-subarachnoid hemorrhage. Patient Status: In Patient. Conclusions   Summary   TRANSCRANIAL DOPPLER ULTRASOUND  Date of Service: 4/28/2022   Interpretation: This is bilateral complete TCD with insonation of ALL Cerebral Arteries  via transtemporal, submandibular, transorbital, and transforaminal windows  demonstrating distal left Middle Cerebral Artery elevated mean cerebral  blood flow velocities (MCBFVs) at 116 cm/sec while the right MCA was up to  114 cm/sec. the remaining MCBFVs were within normal limits and the PI were  normal.   The Lindegaard ratio was 3.43 on the right side while it was 3.76 on the  left side. IMPRESSION:  1) Mild bilateral MCA vasospasm (improved from the previous studies) as  described above   Recommendations   Follow up TCD as per the Neuro ICU Team   Signature   ----------------------------------------------------------------  Electronically signed by Camden Major RVT(Sonographer) on  04/28/2022 12:04 PM  ----------------------------------------------------------------   ----------------------------------------------------------------  Electronically signed by Dago Tello(Interpreting physician)  on 04/28/2022 09:38 PM  ----------------------------------------------------------------  Findings:   Right Impression:                     Left Impression:  Right Lindegaard Ratio: 3.43          Left Lindegaard Ratio: 3.76  MEAN VELOCITIES:                      MEAN VELOCITIES:  Transtemporal Approach. Transtemporal Approach. Proximal MCA: 97.8 . Proximal MCA: 113  Mid MCA: 114 . Mid MCA: 116  Distal MCA: 97.0 . Distal MCA: 115  Proximal KRISSY: 43.5 . Proximal KRISSY: 103  Mid KRISSY: 51.2 . Mid KRISSY: 105  PCA: 32.7 . PCA: 39.7  T ICA: 42.7                           T ICA: 33.9  Submandibular Approach. Submandibular Approach. D ICA: 33.1 . D ICA: 30.8  Transorbital Approach. Transorbital Approach. Siphon: 50.0 . Siphon: 51.2  Transforamenal Approach. Transforamenal Approach. Vertebral: 38.1 . Vertebral: 36.2   BASILAR:56.2  Risk Factors History +---------+----------+-----------------------------------------------------+ ! Diagnosis! Date      ! Comments                                             ! +---------+----------+-----------------------------------------------------+ ! Other    !04/18/2022! ACOMM A. ANEURYSM 3MM X 2MM                          ! +---------+----------+-----------------------------------------------------+ ! Other    !04/27/2022! Hematocrit 43.1; ICP-10                              ! +---------+----------+-----------------------------------------------------+ ! Other    !04/28/2022! Hematocrit 40.5; ICP 14                              ! +---------+----------+-----------------------------------------------------+   - The patient's risk factor(s) include: arterial hypertension.     VL TRANSCRANIAL DOPPLER COMPLETE    Result Date: 4/27/2022    [facility]  Vascular Transcranial Procedure   Patient Name   Tere Uribe     Date of Study           04/27/2022                 LINDA KING   Date of Birth  1990   Gender                  Female   Age            29 year(s)   Race                       Room Number    9573         Height:                 62 inch, 157.48 cm   Corporate ID # [id number]     Weight:                 161 pounds, 73 kg   Patient Acct # [de-identified]    BSA:        1.74 m^2    BMI:      29.45 kg/m^2   MR #           [id number]      Sonographer             Cornelio Titus, T   Accession #    [id number]   Interpreting Physician  Estrella Dove   Referring Referring Physician     Bernarda Nolan  Nurse  Practitioner  Procedure Type of Study:   Cerebral: Transcranial.  Indications for Study:Vasospasm post-subarachnoid hemorrhage. Patient Status: In Patient. Conclusions   Summary   No evidence of vasospasm in the bilateral MCAs. Mild basilar artery spasm  noted. Signature   ----------------------------------------------------------------  Electronically signed by Keith Carver RVT(Sonographer) on  04/27/2022 01:53 PM  ----------------------------------------------------------------   ----------------------------------------------------------------  Electronically signed by Margret Mares Reyes,Arthur(Interpreting  physician) on 04/27/2022 08:32 PM  ----------------------------------------------------------------  Findings:   Right Impression:                     Left Impression:  Right Lindegaard Ratio: 2.63          Left Lindegaard Ratio: 2.52  MEAN VELOCITIES:                      MEAN VELOCITIES:  Transtemporal Approach. Transtemporal Approach. Proximal MCA: 97.8 . Proximal MCA: 110  Mid MCA: 116 . Mid MCA: 124  Distal MCA: 110 . Distal MCA: 71.6  Proximal KRISSY: 82.4 . Proximal KRISSY: 103  Mid KRISSY: 67.4 . Mid KRISSY: 106  PCA: 38.1 . PCA: 48.9  T ICA: 59.3 . T ICA: 56.6  Submandibular Approach. Submandibular Approach. D ICA: 43.9 . D ICA: 49.3  Transorbital Approach. Transorbital Approach. Siphon: 67.0 . Siphon: 52.7  Transforamenal Approach. Transforamenal Approach. Vertebral: 31.2 . Vertebral: 44.7   BASILAR:69.3  Risk Factors History +---------+----------+-----------------------------------------------------+ ! Diagnosis! Date      ! Comments                                             ! +---------+----------+-----------------------------------------------------+ ! Other    !04/18/2022! ACOMM A. ANEURYSM 3MM X 2MM                          ! +---------+----------+-----------------------------------------------------+ ! Other    !04/27/2022! Hematocrit 43.1; ICP-10                              ! +---------+----------+-----------------------------------------------------+   - The patient's risk factor(s) include: arterial hypertension. Labs and Images reviewed with:  [] Dr. Jericho Jeffries. [place]    [] Dr. Osiel Brady  [x] Dr. Danae Romero  [] There are no new interval images to review. Review of Systems   Constitutional: Negative for fever. HENT: Negative for rhinorrhea and sore throat. Eyes: Negative for redness. Respiratory: Negative for shortness of breath. Cardiovascular: Negative for chest pain. Gastrointestinal: Positive for nausea. Negative for abdominal pain and vomiting. Genitourinary: Negative for difficulty urinating and flank pain. Skin: Negative for rash. Allergic/Immunologic: Negative for environmental allergies and food allergies. Neurological: Positive for dizziness and headaches. Negative for syncope, speech difficulty and numbness. Psychiatric/Behavioral: Negative for agitation and confusion. PHYSICAL EXAM     GENERAL:  Well developed, well nourished, in no acute distress. Nontoxic. No dysarthria. No aphasia.  GCS 15  HENT: normocephalic , nose normal, perrla  EYES: no occular discharge, no scleral icterus  NECK: no JVD, no tracheal deviation  CV: Normal S1 S2, no MRG  PULM / CHEST: CTA Bilaterally all fields, no WRR  ABDOMEN: soft, no rigidity  MSK: no gross deformity, no edema, no TTP  NEURO: neuro intact  SKIN: no rash, no erythema, cap refill < 2 sec    DRAINS:  [] EVD @ 15mmHg  24cc/24h output    ASSESSMENT AND PLAN:       This is a 29 y.o. female with no significant medical history who initially presented to [facility] ED with belinda headache and was found to have diffuse subarachnoid hemorrhage. Underwent emergent EVD placement at bedside 4/18/2022. 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 Neuro ICU course complicated by cerebral salt wasting and mild right MCA vasospasm. NEUROLOGIC:  -CT head: 5/1/2022: No significant interval change compared to 4/24/2022. Questionable punctate residual subarachnoid hemorrhage along the high left convexity  - Diffuse SAH with IVH secondary to ruptured ACOM aneurysm  - POD/PBD #12 s/p coil embolization of the ruptured ACOM aneurysm  - Continue Nimodipine 60mg Q4h  -naloxegol 25 daily. Last dose  yesterday  Melatonin 5 hs  topamax 150 bid  - Mild R MCA vasospasm ? symptomatic with worsening headache, no focal deficits  - Milrinone to 0.7mcg/kg/min  - TCD 4/28 stable  - TCD 4/30: Mild vasospasm noted in left MCA  - Goal -220, avoid symptomatic hypotension  - Obstructive hydrocephalus s/p EVD placement 4/18  - Failed EVD clamp trial 4/24 due to elevated ICP (25) and head pressure  - EVD clamped 4/30  - Neuro checks per protocol  -icp 4-9   -no change in neuro exam  -  Headache management;  Completed 48h course of MS contin 15mg BID (monitor off for now), continue Topamax 150mg BID, Fentanyl to 25mcg Q8h PRN, Toradol 30mg BID PRN, Magnesium boluses PRN; dc'd Depakote, dc APAP, increase Tegretol, ibuprofen 800, Verapamil 60 Q 4        CARDIOVASCULAR:  - Goal -220, avoid symptomatic hypotension  - Echo EF 65%, negative bubble  -lipitor 80   - Continue telemetry     PULMONARY:  - Maintaining O2 sats on room air  - Incentive spirometry     RENAL/FLUID/ELECTROLYTE:  - Normal renal functioning  - BUN5/ Creatinine 0.82  - Monitor I&O 3483/1925.6  - Cerebral salt wasting secondary to [facility]  - sodium 136 this AM  - Continue salt tabs 1g TID  - Goal Mag>2.5; Mag level 2.6, continue Magnesium IV 4 grams BID to help with headaches  - Urinary retention- Leija in place; urology consulted- void trial 1 d prior to dc  - Replace electrolytes PRN  - Daily BMP     GI/NUTRITION:  NUTRITION: General Diet  - Tolerating diet well  - Bowel regimen: Continue Dulcolax PO, Glycolax, Senokot-S daily and Milk of Mag PRN  - GI prophylaxis: N/A      ID:  - Tmax 37.2  - WBC 12.5 (12.1)  - CSF culture 4/27 negative to date. Many neutrophils no bacteria as of this am  - UA 4/27 suggestive of UTI with moderate leukocyte esterase and many bacteria  - Started on Rocephin 1 gram daily for UTI x 5 days, completes 5/3  - Daily CBC     HEME:   - H&H 12.5/39.5, stable  - Platelets 240  - Daily CBC     ENDOCRINE:  - Continue to monitor blood glucose, goal <180     OTHER:  - PT/OT/ST     PROPHYLAXIS:   Stress ulcer: N/A     DVT PROPHYLAXIS:  - SCD sleeves - Thigh High   - Lovenox 40 daily     DISPOSITION:  [x] To remain ICU for close neurological monitoring, EVD management. We will continue to follow along. For any changes in exam or patient status please contact Neuro Critical Care.       Gianni Polanco MD  Neuro Critical Care  5/1/2022     1:01 PM

## 2022-05-01 NOTE — PROGRESS NOTES
Called into room by patient's family. Patient covering eyes, moaning in pain. Reposition attempted. Patient started yelling out in pain. PRN ibuprofen OK to give, however [facility]. Perfect serve sent to Dr. Araseli Tate requesting alternative pain medication. Dr. Araseli Tate at bedside. One time dose of fentanyl 50 mcg IVP ordered.

## 2022-05-01 NOTE — PROGRESS NOTES
Endovascular Neurosurgery Progress Note    SUBJECTIVE:   No reported events overnight. Pt not seen this am, off of floor for CT head. Review of Systems:  CONSTITUTIONAL:  negative for fevers, chills, fatigue and malaise    EYES:  negative for double vision, blurred vision and photophobia     HEENT:  negative for tinnitus, epistaxis and sore throat    RESPIRATORY:  negative for cough, shortness of breath, wheezing    CARDIOVASCULAR:  negative for chest pain, palpitations, syncope, edema    GASTROINTESTINAL:  negative for nausea, vomiting    GENITOURINARY:  negative for incontinence    MUSCULOSKELETAL:  negative for neck or back pain    NEUROLOGICAL:  Negative for weakness and tingling  negative for headaches and dizziness    PSYCHIATRIC:  negative for anxiety      Review of systems otherwise negative. OBJECTIVE:     Vitals:    05/01/22 0856   BP:    Pulse:    Resp: 16   Temp:    SpO2: 100%      Pt not seen this am. Per prior note 4/30/2022:    General:  Gen: normal habitus, NAD  HEENT: EVD in place. mucosa moist  Cvs: RRR, S1 S2 normal  Resp: symmetric unlabored breathing  Abd: s/nd/nt  Ext: no edema  Skin: no lesions seen, warm and dry    Neuro: on milrinone 0.7 gtt  Gen: awake and alert, oriented x3. Lang/speech: no aphasia. No dysarthria. Follows commands. CN: PERRL, EOMI, VFF, V1-3 intact, face symmetric, hearing intact, shoulder shrug symmetric, tongue midline  Motor: grossly 5/5 UE and LE b/l  Sense: LT intact in all 4 ext. Coord: FTN and HTS intact b/l  DTR: deferred  Gait: deferred    NIH Stroke Scale:   1a  Level of consciousness: 0 - alert; keenly responsive   1b. LOC questions:  0 - answers both questions correctly   1c. LOC commands: 0 - performs both tasks correctly   2. Best Gaze: 0 - normal   3. Visual: 0 - no visual loss   4. Facial Palsy: 0 - normal symmetric movement   5a. Motor left arm: 0 - no drift, limb holds 90 (or 45) degrees for full 10 seconds   5b.   Motor right arm: 0 - no drift, limb holds 90 (or 45) degrees for full 10 seconds   6a. Motor left le - no drift; leg holds 30 degree position for full 5 seconds   6b  Motor right le - no drift; leg holds 30 degree position for full 5 seconds   7. Limb Ataxia: 0 - absent   8. Sensory: 0 - normal; no sensory loss   9. Best Language:  0 - no aphasia, normal   10. Dysarthria: 0 - normal   11. Extinction and Inattention: 0 - no abnormality         Total:   0     MRS: 0      LABS:   Reviewed. Lab Results   Component Value Date    HGB 13.0 2022    WBC 12.5 (H) 2022     2022     2022    BUN 6 2022    CREATININE 0.82 2022    AST 15 2022    ALT 16 2022    MG 2.6 2022    APTT 22.6 2022    INR 0.9 2022      Lab Results   Component Value Date    COVID19 Not Detected 2022    COVID19 Not Detected 2022       RADIOLOGY:   Images were personally reviewed including:   CT head wo con 2022  1. No significant interval change compared to 2021.  Again seen right   frontal approach ventriculostomy with similar-appearing ventricular system   size and morphology. 2. Questionable punctate residual subarachnoid hemorrhage along the high left   frontal convexity. tcd 2022  Mild vasospasm L 's, R 's LR 4.3 b/l. Stable. CT head 2022:   Significant reduction with trace residual scattered subarachnoid hemorrhage.       Stable positioning of a right frontal EVD, as well as stable caliber and   configuration of the ventricles. 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 --the above treated with Smart coil embolization x3, Vu 1. Smart coil x1 opened but not detached. --Prominent musculocutaneous branch from the left V2 segment that anastomoses to the left occipital artery.     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 PBD: 13    Patient complains of moderate to severe headaches, non-focal neuro exam. 4/24/2022 clamp trial failed. 4/24/2022 CT head stable, reduced SAH. 4/30/2022 TCD mild vasospasm b/l MCA. Issues of urine retention/uti, b/l leg cramping. 4/30/2022 clamp trial. Ct head 5/1/2022 stable, likely plan to remove evd. PLAN:   --SBP goal 120-220 mm hg   --SAH management with daily TCDs, Nimodipine, statin, milrinone  --Mg level 3-4 last Mg 2.6  --f/up with Dr. Thu Yanez in 2 weeks after discharge and f/up with Dr. Pierre Nguyen in 3 months after discharge    Case discussed with Dr. Pierre Nguyen attending.     Darvin Colvin MD  Stroke, [facility]  [id number]  Electronically signed 5/1/2022 at 10:49 AM

## 2022-05-01 NOTE — PROGRESS NOTES
Neurosurgery LILLY/Resident    Daily Progress Note   CC:  Chief Complaint   Patient presents with    Altered Mental Status     5/1/2022  11:01 AM    Chart reviewed. No acute events overnight. No new complaints. Still has a headache and nausea, not worse. Overall she states she feels better. EVD has been clamped since yesterday afternoon. Repeat CTH stable. Plan to d/c drain later today.          Vitals:    05/01/22 0415 05/01/22 0500 05/01/22 0600 05/01/22 0856   BP: 125/77 122/81 122/84    Pulse: 88 82 83    Resp: 15 15 16 16   Temp: 98.6 °F (37 °C) 98.4 °F (36.9 °C) 98.4 °F (36.9 °C)    TempSrc: Core Core Core    SpO2: 98% 98% 98% 100%   Weight:       Height:           PE:   AOx3   PERRL, EOMI  Cranial Nerves:    II: Visual acuity:  normal  III: Pupils:  equal, round, reactive to light  III,IV,VI: Extra Ocular Movements:intact  V: Facial sensation:  intact  VII: Facial strength: intact  VIII: Hearing:  intact  IX: Palate:  intact  XI: Shoulder shrug: intact  XII: Tongue movement: intact      Motor   L deltoid 5/5; R deltoid 5/5  L biceps 5/5; R biceps 5/5  L triceps 5/5; R triceps 5/5  L wrist extension 5/5; R wrist extension 5/5  L intrinsics 5/5; R intrinsics 5/5      L iliopsoas 5/5 , R iliopsoas 5/5  L quadriceps 5/5; R quadriceps 5/5  L Dorsiflexion 5/5; R dorsiflexion 5/5  L Plantarflexion 5/5; R plantarflexion 5/5  L EHL 5/5; R EHL 5/5    Sensation: intact     Incision: clean dry intact       Lab Results   Component Value Date    WBC 12.5 (H) 05/01/2022    HGB 13.0 05/01/2022    HCT 38.4 05/01/2022     05/01/2022    CHOL 182 04/18/2022    TRIG 60 04/18/2022    HDL 74 04/18/2022    ALT 16 04/20/2022    AST 15 04/20/2022     05/01/2022    K 3.2 (L) 05/01/2022     05/01/2022    CREATININE 0.82 05/01/2022    BUN 6 05/01/2022    CO2 18 (L) 05/01/2022    TSH 2.18 04/20/2022    INR 0.9 04/18/2022    LABA1C 5.5 04/18/2022    CRP <3.0 04/19/2022       A/P  29 y.o. female who presents with intraventricular hemorrhage, aneurysmal subarachnoid hemorrhage from [facility] s/p coiling, hydrocephalus with EVD placement 4/18, post bleed day 13     - Neuro checks per floor protocol  - EVD clamped since 1600 4/30/22.   - repeat CTH stable  - ICP 4-9  - TCDs per [place]  - EVD was to be d/c this afternoon, however she received a dose of Lovenox this AM. Defer to pulling tomorrow 5/2 in the afternoon. Lovenox HELD in the system. Please contact neurosurgery with any changes in patients neurologic status. Paty Obando MD, TIM  Neurosurgery Service  5/1/2022 at 11:01 AM         This note is created with the assistance of a speech recognition program.  While intending to generate a document that actually reflects the content of the visit, the document can still have some errors including those of syntax and sound like substitutions which may escape proof reading. In such instances, actual meaning can be extrapolated by contextual diversion.

## 2022-05-01 NOTE — PLAN OF CARE
Problem: Anxiety/Stress:  Goal: Level of anxiety will decrease  Description: Level of anxiety will decrease  Outcome: Progressing

## 2022-05-02 LAB
ANION GAP SERPL CALCULATED.3IONS-SCNC: 14 MMOL/L (ref 9–17)
APPEARANCE CSF: CLEAR
BUN BLDV-MCNC: 9 MG/DL (ref 6–20)
CALCIUM SERPL-MCNC: 8.8 MG/DL (ref 8.6–10.4)
CHLORIDE BLD-SCNC: 107 MMOL/L (ref 98–107)
CO2: 17 MMOL/L (ref 20–31)
CREAT SERPL-MCNC: 0.9 MG/DL (ref 0.5–0.9)
GFR AFRICAN AMERICAN: >60 ML/MIN
GFR NON-AFRICAN AMERICAN: >60 ML/MIN
GFR SERPL CREATININE-BSD FRML MDRD: ABNORMAL ML/MIN/{1.73_M2}
GLUCOSE BLD-MCNC: 113 MG/DL (ref 70–99)
GLUCOSE, CSF: 78 MG/DL (ref 40–70)
HCT VFR BLD CALC: 38.3 % (ref 36.3–47.1)
HEMOGLOBIN: 13.2 G/DL (ref 11.9–15.1)
MAGNESIUM: 3.2 MG/DL (ref 1.6–2.6)
MCH RBC QN AUTO: 29.8 PG (ref 25.2–33.5)
MCHC RBC AUTO-ENTMCNC: 34.5 G/DL (ref 28.4–34.8)
MCV RBC AUTO: 86.5 FL (ref 82.6–102.9)
NRBC AUTOMATED: 0 PER 100 WBC
PDW BLD-RTO: 12.1 % (ref 11.8–14.4)
PLATELET # BLD: 330 K/UL (ref 138–453)
PMV BLD AUTO: 10.3 FL (ref 8.1–13.5)
POTASSIUM SERPL-SCNC: 3.5 MMOL/L (ref 3.7–5.3)
PROTEIN CSF: 17.7 MG/DL (ref 15–45)
RBC # BLD: 4.43 M/UL (ref 3.95–5.11)
RBC CSF: 300 /MM3
SODIUM BLD-SCNC: 138 MMOL/L (ref 135–144)
TUBE NUMBER CSF: ABNORMAL
VOLUME CSF: 0.3
WBC # BLD: 12.6 K/UL (ref 3.5–11.3)
WBC CSF: 10 /MM3
XANTHOCHROMIA: ABNORMAL

## 2022-05-02 PROCEDURE — 6370000000 HC RX 637 (ALT 250 FOR IP): Performed by: NURSE PRACTITIONER

## 2022-05-02 PROCEDURE — 99291 CRITICAL CARE FIRST HOUR: CPT | Performed by: PSYCHIATRY & NEUROLOGY

## 2022-05-02 PROCEDURE — 2580000003 HC RX 258: Performed by: NURSE PRACTITIONER

## 2022-05-02 PROCEDURE — 87070 CULTURE OTHR SPECIMN AEROBIC: CPT

## 2022-05-02 PROCEDURE — 2580000003 HC RX 258: Performed by: STUDENT IN AN ORGANIZED HEALTH CARE EDUCATION/TRAINING PROGRAM

## 2022-05-02 PROCEDURE — 6370000000 HC RX 637 (ALT 250 FOR IP): Performed by: PSYCHIATRY & NEUROLOGY

## 2022-05-02 PROCEDURE — 2500000003 HC RX 250 WO HCPCS: Performed by: NURSE PRACTITIONER

## 2022-05-02 PROCEDURE — 97110 THERAPEUTIC EXERCISES: CPT

## 2022-05-02 PROCEDURE — 93886 INTRACRANIAL COMPLETE STUDY: CPT

## 2022-05-02 PROCEDURE — 6360000002 HC RX W HCPCS: Performed by: STUDENT IN AN ORGANIZED HEALTH CARE EDUCATION/TRAINING PROGRAM

## 2022-05-02 PROCEDURE — 99233 SBSQ HOSP IP/OBS HIGH 50: CPT | Performed by: PSYCHIATRY & NEUROLOGY

## 2022-05-02 PROCEDURE — 6360000002 HC RX W HCPCS: Performed by: NURSE PRACTITIONER

## 2022-05-02 PROCEDURE — APPNB30 APP NON BILLABLE TIME 0-30 MINS: Performed by: REGISTERED NURSE

## 2022-05-02 PROCEDURE — 97116 GAIT TRAINING THERAPY: CPT

## 2022-05-02 PROCEDURE — 83735 ASSAY OF MAGNESIUM: CPT

## 2022-05-02 PROCEDURE — 6370000000 HC RX 637 (ALT 250 FOR IP): Performed by: STUDENT IN AN ORGANIZED HEALTH CARE EDUCATION/TRAINING PROGRAM

## 2022-05-02 PROCEDURE — 97530 THERAPEUTIC ACTIVITIES: CPT

## 2022-05-02 PROCEDURE — 80048 BASIC METABOLIC PNL TOTAL CA: CPT

## 2022-05-02 PROCEDURE — 87205 SMEAR GRAM STAIN: CPT

## 2022-05-02 PROCEDURE — 2000000003 HC NEURO ICU R&B

## 2022-05-02 PROCEDURE — 36415 COLL VENOUS BLD VENIPUNCTURE: CPT

## 2022-05-02 PROCEDURE — 89051 BODY FLUID CELL COUNT: CPT

## 2022-05-02 PROCEDURE — 84157 ASSAY OF PROTEIN OTHER: CPT

## 2022-05-02 PROCEDURE — 2580000003 HC RX 258: Performed by: ANESTHESIOLOGY

## 2022-05-02 PROCEDURE — 85027 COMPLETE CBC AUTOMATED: CPT

## 2022-05-02 PROCEDURE — 82945 GLUCOSE OTHER FLUID: CPT

## 2022-05-02 RX ORDER — ACETAMINOPHEN 325 MG/1
650 TABLET ORAL EVERY 4 HOURS PRN
Status: DISCONTINUED | OUTPATIENT
Start: 2022-05-02 | End: 2022-05-06 | Stop reason: HOSPADM

## 2022-05-02 RX ORDER — METHYLPREDNISOLONE 4 MG/1
4 TABLET ORAL
Status: COMPLETED | OUTPATIENT
Start: 2022-05-03 | End: 2022-05-04

## 2022-05-02 RX ORDER — METHYLPREDNISOLONE 4 MG/1
4 TABLET ORAL
Status: DISCONTINUED | OUTPATIENT
Start: 2022-05-03 | End: 2022-05-06 | Stop reason: HOSPADM

## 2022-05-02 RX ORDER — VERAPAMIL HYDROCHLORIDE 40 MG/1
40 TABLET ORAL EVERY 8 HOURS SCHEDULED
Status: DISCONTINUED | OUTPATIENT
Start: 2022-05-02 | End: 2022-05-02

## 2022-05-02 RX ORDER — 0.9 % SODIUM CHLORIDE 0.9 %
1000 INTRAVENOUS SOLUTION INTRAVENOUS ONCE
Status: COMPLETED | OUTPATIENT
Start: 2022-05-02 | End: 2022-05-02

## 2022-05-02 RX ORDER — FENTANYL CITRATE 50 UG/ML
50 INJECTION, SOLUTION INTRAMUSCULAR; INTRAVENOUS ONCE
Status: COMPLETED | OUTPATIENT
Start: 2022-05-02 | End: 2022-05-02

## 2022-05-02 RX ORDER — METHYLPREDNISOLONE 4 MG/1
8 TABLET ORAL NIGHTLY
Status: COMPLETED | OUTPATIENT
Start: 2022-05-03 | End: 2022-05-03

## 2022-05-02 RX ORDER — OXYCODONE HYDROCHLORIDE 5 MG/1
5 TABLET ORAL EVERY 6 HOURS PRN
Status: DISCONTINUED | OUTPATIENT
Start: 2022-05-02 | End: 2022-05-06 | Stop reason: HOSPADM

## 2022-05-02 RX ORDER — MAGNESIUM SULFATE HEPTAHYDRATE 40 MG/ML
4000 INJECTION, SOLUTION INTRAVENOUS ONCE
Status: COMPLETED | OUTPATIENT
Start: 2022-05-02 | End: 2022-05-02

## 2022-05-02 RX ORDER — OXYCODONE HYDROCHLORIDE 5 MG/1
10 TABLET ORAL EVERY 6 HOURS PRN
Status: DISCONTINUED | OUTPATIENT
Start: 2022-05-02 | End: 2022-05-06 | Stop reason: HOSPADM

## 2022-05-02 RX ORDER — MAGNESIUM SULFATE HEPTAHYDRATE 40 MG/ML
4000 INJECTION, SOLUTION INTRAVENOUS DAILY
Status: DISCONTINUED | OUTPATIENT
Start: 2022-05-03 | End: 2022-05-04

## 2022-05-02 RX ORDER — METHYLPREDNISOLONE 4 MG/1
4 TABLET ORAL
Status: COMPLETED | OUTPATIENT
Start: 2022-05-03 | End: 2022-05-05

## 2022-05-02 RX ORDER — SODIUM CHLORIDE 9 MG/ML
INJECTION, SOLUTION INTRAVENOUS CONTINUOUS
Status: DISCONTINUED | OUTPATIENT
Start: 2022-05-02 | End: 2022-05-06 | Stop reason: HOSPADM

## 2022-05-02 RX ORDER — GABAPENTIN 300 MG/1
300 CAPSULE ORAL NIGHTLY
Status: DISCONTINUED | OUTPATIENT
Start: 2022-05-02 | End: 2022-05-04

## 2022-05-02 RX ORDER — METHYLPREDNISOLONE 4 MG/1
4 TABLET ORAL NIGHTLY
Status: DISCONTINUED | OUTPATIENT
Start: 2022-05-04 | End: 2022-05-06 | Stop reason: HOSPADM

## 2022-05-02 RX ADMIN — METHYLPREDNISOLONE 24 MG: 16 TABLET ORAL at 10:07

## 2022-05-02 RX ADMIN — SODIUM CHLORIDE TAB 1 GM 1 G: 1 TAB at 08:08

## 2022-05-02 RX ADMIN — POTASSIUM BICARBONATE 40 MEQ: 782 TABLET, EFFERVESCENT ORAL at 05:01

## 2022-05-02 RX ADMIN — OXYCODONE HYDROCHLORIDE 10 MG: 5 TABLET ORAL at 17:23

## 2022-05-02 RX ADMIN — SODIUM CHLORIDE 1000 ML: 9 INJECTION, SOLUTION INTRAVENOUS at 09:53

## 2022-05-02 RX ADMIN — ACETAMINOPHEN 650 MG: 325 TABLET ORAL at 20:03

## 2022-05-02 RX ADMIN — MILRINONE LACTATE 0.7 MCG/KG/MIN: 0.2 INJECTION, SOLUTION INTRAVENOUS at 08:35

## 2022-05-02 RX ADMIN — SODIUM CHLORIDE, PRESERVATIVE FREE 10 ML: 5 INJECTION INTRAVENOUS at 20:02

## 2022-05-02 RX ADMIN — CEFTRIAXONE SODIUM 1000 MG: 1 INJECTION, POWDER, FOR SOLUTION INTRAMUSCULAR; INTRAVENOUS at 17:22

## 2022-05-02 RX ADMIN — SODIUM CHLORIDE, PRESERVATIVE FREE 10 ML: 5 INJECTION INTRAVENOUS at 09:35

## 2022-05-02 RX ADMIN — TOPIRAMATE 150 MG: 100 TABLET, FILM COATED ORAL at 08:08

## 2022-05-02 RX ADMIN — Medication 5 MG: at 20:01

## 2022-05-02 RX ADMIN — MAGNESIUM HYDROXIDE 30 ML: 400 SUSPENSION ORAL at 21:05

## 2022-05-02 RX ADMIN — NIMODIPINE 60 MG: 30 CAPSULE, LIQUID FILLED ORAL at 17:23

## 2022-05-02 RX ADMIN — BUTALBITAL, ACETAMINOPHEN AND CAFFEINE 1 TABLET: 50; 325; 40 TABLET ORAL at 06:49

## 2022-05-02 RX ADMIN — TOPIRAMATE 150 MG: 100 TABLET, FILM COATED ORAL at 20:01

## 2022-05-02 RX ADMIN — IBUPROFEN 800 MG: 400 TABLET, FILM COATED ORAL at 16:46

## 2022-05-02 RX ADMIN — SODIUM CHLORIDE: 9 INJECTION, SOLUTION INTRAVENOUS at 20:00

## 2022-05-02 RX ADMIN — NIMODIPINE 60 MG: 30 CAPSULE, LIQUID FILLED ORAL at 13:45

## 2022-05-02 RX ADMIN — POTASSIUM BICARBONATE 40 MEQ: 782 TABLET, EFFERVESCENT ORAL at 08:08

## 2022-05-02 RX ADMIN — VERAPAMIL HYDROCHLORIDE 60 MG: 120 TABLET, FILM COATED ORAL at 08:09

## 2022-05-02 RX ADMIN — NIMODIPINE 60 MG: 30 CAPSULE, LIQUID FILLED ORAL at 09:16

## 2022-05-02 RX ADMIN — SODIUM CHLORIDE TAB 1 GM 1 G: 1 TAB at 13:45

## 2022-05-02 RX ADMIN — NIMODIPINE 60 MG: 30 CAPSULE, LIQUID FILLED ORAL at 21:02

## 2022-05-02 RX ADMIN — BISACODYL 10 MG: 5 TABLET, COATED ORAL at 08:08

## 2022-05-02 RX ADMIN — ATORVASTATIN CALCIUM 80 MG: 80 TABLET, FILM COATED ORAL at 20:01

## 2022-05-02 RX ADMIN — FENTANYL CITRATE 50 MCG: 50 INJECTION, SOLUTION INTRAMUSCULAR; INTRAVENOUS at 07:02

## 2022-05-02 RX ADMIN — NIMODIPINE 60 MG: 30 CAPSULE, LIQUID FILLED ORAL at 01:54

## 2022-05-02 RX ADMIN — MAGNESIUM SULFATE IN WATER 4000 MG: 40 INJECTION, SOLUTION INTRAVENOUS at 13:42

## 2022-05-02 RX ADMIN — GABAPENTIN 300 MG: 300 CAPSULE ORAL at 21:02

## 2022-05-02 RX ADMIN — FENTANYL CITRATE 25 MCG: 50 INJECTION, SOLUTION INTRAMUSCULAR; INTRAVENOUS at 05:01

## 2022-05-02 RX ADMIN — SODIUM CHLORIDE TAB 1 GM 1 G: 1 TAB at 17:23

## 2022-05-02 RX ADMIN — POTASSIUM BICARBONATE 40 MEQ: 782 TABLET, EFFERVESCENT ORAL at 13:45

## 2022-05-02 RX ADMIN — MILRINONE LACTATE 0.7 MCG/KG/MIN: 0.2 INJECTION, SOLUTION INTRAVENOUS at 01:54

## 2022-05-02 RX ADMIN — VERAPAMIL HYDROCHLORIDE 60 MG: 120 TABLET, FILM COATED ORAL at 05:01

## 2022-05-02 RX ADMIN — OXYCODONE HYDROCHLORIDE 10 MG: 5 TABLET ORAL at 11:13

## 2022-05-02 RX ADMIN — DOCUSATE SODIUM 50 MG AND SENNOSIDES 8.6 MG 2 TABLET: 8.6; 5 TABLET, FILM COATED ORAL at 08:08

## 2022-05-02 RX ADMIN — NIMODIPINE 60 MG: 30 CAPSULE, LIQUID FILLED ORAL at 05:01

## 2022-05-02 RX ADMIN — MILRINONE LACTATE 0.5 MCG/KG/MIN: 0.2 INJECTION, SOLUTION INTRAVENOUS at 17:27

## 2022-05-02 RX ADMIN — SODIUM CHLORIDE: 9 INJECTION, SOLUTION INTRAVENOUS at 09:16

## 2022-05-02 ASSESSMENT — ENCOUNTER SYMPTOMS
RHINORRHEA: 0
SHORTNESS OF BREATH: 0
ABDOMINAL PAIN: 0
EYE REDNESS: 0
SORE THROAT: 0
NAUSEA: 1
VOMITING: 0

## 2022-05-02 ASSESSMENT — PAIN SCALES - GENERAL
PAINLEVEL_OUTOF10: 7
PAINLEVEL_OUTOF10: 8
PAINLEVEL_OUTOF10: 8
PAINLEVEL_OUTOF10: 7
PAINLEVEL_OUTOF10: 4
PAINLEVEL_OUTOF10: 4
PAINLEVEL_OUTOF10: 7

## 2022-05-02 ASSESSMENT — PAIN DESCRIPTION - LOCATION
LOCATION: HEAD

## 2022-05-02 NOTE — PROGRESS NOTES
Endovascular Neurosurgery Progress Note    SUBJECTIVE:   No reported events overnight. Patient was complaining of headache this am, 5/10. Review of Systems:  CONSTITUTIONAL:  negative for fevers, chills, fatigue and malaise    EYES:  negative for double vision, blurred vision and photophobia     HEENT:  negative for tinnitus, epistaxis and sore throat    RESPIRATORY:  negative for cough, shortness of breath, wheezing    CARDIOVASCULAR:  negative for chest pain, palpitations, syncope, edema    GASTROINTESTINAL:  negative for nausea, vomiting    GENITOURINARY:  negative for incontinence    MUSCULOSKELETAL:  negative for neck or back pain    NEUROLOGICAL:  Negative for weakness and tingling  negative for headaches and dizziness    PSYCHIATRIC:  negative for anxiety      Review of systems otherwise negative. OBJECTIVE:     Vitals:    05/02/22 0600   BP: 119/87   Pulse: 88   Resp: 16   Temp:    SpO2: 96%      Pt not seen this am. Per prior note 4/30/2022:    General:  Gen: normal habitus, NAD  HEENT: EVD in place. mucosa moist  Cvs: RRR, S1 S2 normal  Resp: symmetric unlabored breathing  Abd: s/nd/nt  Ext: no edema  Skin: no lesions seen, warm and dry    Neuro: on milrinone 0.7 gtt  Gen: awake and alert, oriented x3. Lang/speech: no aphasia. No dysarthria. Follows commands. CN: PERRL, EOMI, VFF, V1-3 intact, face symmetric, hearing intact, shoulder shrug symmetric, tongue midline  Motor: grossly 5/5 UE and LE b/l  Sense: LT intact in all 4 ext. Coord: FTN and HTS intact b/l  DTR: deferred  Gait: deferred    NIH Stroke Scale:   1a  Level of consciousness: 0 - alert; keenly responsive   1b. LOC questions:  0 - answers both questions correctly   1c. LOC commands: 0 - performs both tasks correctly   2. Best Gaze: 0 - normal   3. Visual: 0 - no visual loss   4. Facial Palsy: 0 - normal symmetric movement   5a. Motor left arm: 0 - no drift, limb holds 90 (or 45) degrees for full 10 seconds   5b.   Motor right arm: 0 - no drift, limb holds 90 (or 45) degrees for full 10 seconds   6a. Motor left le - no drift; leg holds 30 degree position for full 5 seconds   6b  Motor right le - no drift; leg holds 30 degree position for full 5 seconds   7. Limb Ataxia: 0 - absent   8. Sensory: 0 - normal; no sensory loss   9. Best Language:  0 - no aphasia, normal   10. Dysarthria: 0 - normal   11. Extinction and Inattention: 0 - no abnormality         Total:   0     MRS: 0      LABS:   Reviewed. Lab Results   Component Value Date    HGB 13.2 2022    WBC 12.6 (H) 2022     2022     2022    BUN 9 2022    CREATININE 0.90 2022    AST 15 2022    ALT 16 2022    MG 3.2 (H) 2022    APTT 22.6 2022    INR 0.9 2022      Lab Results   Component Value Date    COVID19 Not Detected 2022    COVID19 Not Detected 2022       RADIOLOGY:   Images were personally reviewed including:   CT head wo con 2022  1. No significant interval change compared to 2021.  Again seen right   frontal approach ventriculostomy with similar-appearing ventricular system   size and morphology. 2. Questionable punctate residual subarachnoid hemorrhage along the high left   frontal convexity. tcd 2022  Mild vasospasm L 's, R 's LR 4.3 b/l. Stable. CT head 2022:   Significant reduction with trace residual scattered subarachnoid hemorrhage.       Stable positioning of a right frontal EVD, as well as stable caliber and   configuration of the ventricles. 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 --the above treated with Smart coil embolization x3, Vu 1. Smart coil x1 opened but not detached. --Prominent musculocutaneous branch from the left V2 segment that anastomoses to the left occipital artery.     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 PBD: 14    Patient complains of moderate to severe headaches, non-focal neuro exam. 4/24/2022 clamp trial failed. 4/24/2022 CT head stable, reduced SAH. 4/30/2022 TCD mild vasospasm b/l MCA. Issues of urine retention/uti, b/l leg cramping. 4/30/2022 clamp trial. Ct head 5/1/2022 stable, likely plan to remove evd. PLAN:   --SBP goal 120-220 mm hg   --SAH management with daily TCDs, Nimodipine, statin, milrinone  --Mg level 3-4 last Mg 3.2  --f/up with Dr. Mehrdad Keller in 2 weeks after discharge and f/up with Dr. Sharon Bennett in 3 months after discharge    Case discussed with Dr. Sharon Bennett attending.     Atif Gan MD  Stroke, [facility]  [id number]  Electronically signed 5/2/2022 at 8:56 AM

## 2022-05-02 NOTE — PROGRESS NOTES
Physical Therapy        Physical Therapy Cancel Note      DATE: 2022    NAME: Hansa Bradley  MRN: [id number]   : 1990      Patient not seen this date for Physical Therapy due to:    Patient Declined: Pt refusing to participate in PT at this time due to c/o of increased pain due to headache, Will check back this afternoon.       Electronically signed by Mason Sethi PTA on 2022 at 8:59 AM

## 2022-05-02 NOTE — PROGRESS NOTES
Physician Progress Note      Diana LANDEROS #:                  [id number]  :                       1990  ADMIT DATE:       2022 12:19 AM  DISCH DATE:  RESPONDING  PROVIDER #:        Maryjane Falk CNP          QUERY TEXT:    Pt admitted with [address] Pt noted to have UTI and stevenson catheter was placed   . If possible, please document in the progress notes and discharge summary   if you are evaluating and/or treating any of the following: The medical record reflects the following:  Risk Factors: urinary retention  Clinical Indicators:  Op note; Stevenson catheter placed per RN, clear yellow   urine obtained without complication. ,   prog note; Stevenson removed   yesterday but patient had recurrent urinary retention overnight requiring   straight cath x2 each time with >1L out. Stevenson catheter replaced this morning   due to recurrent retention.  Uro Consult note; She had her Stevenson catheter   removed yesterday after it was found that she had a UTI. Treatment: Rocephin IV, Removal of Stevenson catheter, monitoring I/O ,Lab and   Vitals    Thank you, Please call if questions  Abbi Emanuel RN [facility]  [phone]  Options provided:  -- UTI due to  indwelling Stevenson urinary catheter  -- UTI not due to  indwelling Stevenson urinary catheter  -- Other - I will add my own diagnosis  -- Disagree - Not applicable / Not valid  -- Disagree - Clinically unable to determine / Unknown  -- Refer to Clinical Documentation Reviewer    PROVIDER RESPONSE TEXT:    Provider is clinically unable to determine a response to this query.     Query created by: Maricarmen Peña on 2022 12:03 PM      Electronically signed by:  Maryjane Falk CNP 2022 5:56 AM

## 2022-05-02 NOTE — PROGRESS NOTES
Physical Therapy  Facility/Department: [address]  Daily Treatment Note     Name: Claude Michael  : 1990  MRN: [id number]  Date of Service: 2022    Discharge Recommendations:  Patient would benefit from continued therapy after discharge   PT Equipment Recommendations  Equipment Needed: Yes  Mobility Devices: Kimberley Tonygo: Rolling      Patient Diagnosis(es): The encounter diagnosis was SAH (subarachnoid hemorrhage) ([facility] [address] Assessment   Body Structures, Functions, Activity Limitations Requiring Skilled Therapeutic Intervention: Decreased functional mobility ; Increased pain;Decreased balance;Decreased endurance  Assessment: Pt ambulated 140 ft. with RW with CGA. Pt required increased time and effort to compelte due to slow maddy. Pt unsafe to return to prior living arrangements. Recommend aggressive PT after d/c to address deficits  Therapy Prognosis: Good  Decision Making: High Complexity  Requires PT Follow-Up: Yes  Activity Tolerance  Activity Tolerance: Patient limited by pain; Patient limited by fatigue;Patient limited by endurance     Plan   Plan  Plan:  (5-6x/wk)  Current Treatment Recommendations: Balance training,ROM,Strengthening,Functional mobility training,Transfer training,Stair training,Gait training,Endurance training,Home exercise program,Safety education & training,Patient/Caregiver education & training  Safety Devices  Type of Devices: Gait belt,Nurse notified,Left in bed  Restraints  Restraints Initially in Place: No     Restrictions  Restrictions/Precautions  Restrictions/Precautions: Up as Tolerated,Seizure  Required Braces or Orthoses?: No  Position Activity Restriction  Other position/activity restrictions: up as tolerated; EVD; s/p angiogram ; -220     Subjective   General  Chart Reviewed: Yes  Response To Previous Treatment: Patient with no complaints from previous session.   Family / Caregiver Present: No  Follows Commands: Within Functional Limits  General Comment  Comments: Pt retired to bed with RN present. Subjective  Subjective: Pt resting in bed upon arrival with RN present. Pt continues to have headaches and muscles spasms, but is willing to ambulate. Cognition   Orientation  Overall Orientation Status: Within Functional Limits  Cognition  Overall Cognitive Status: WFL     Objective   Pulse: 73  Heart Rate Source: Monitor  BP: 133/86  MAP (Calculated): 101.67  Resp: 16  SpO2: 99 %   Bed mobility  Supine to Sit: Stand by assistance  Sit to Supine: Stand by assistance  Scooting: Stand by assistance  Transfers  Sit to Stand: Contact guard assistance  Stand to sit: Contact guard assistance  Ambulation  Surface: level tile  Device: Rolling Walker  Assistance: Contact guard assistance  Quality of Gait: extremely slow maddy, multiple extended standing rest breaks  Gait Deviations: Slow Maddy;Decreased step length;Decreased step height  Distance: 140 ft. Comments: significant increased time to complete. Pt is very motivated to progress.   More Ambulation?: No  Stairs/Curb  Stairs?: No     Balance  Posture: Good  Sitting - Static: Good;-  Sitting - Dynamic: Fair  Standing - Static: Fair  Standing - Dynamic: Fair  Comments: Assessed with RW  A/AROM Exercises: LAQs- x10 BLE, Ankle pumps- x10 BLE, Hip flexion- x10 BLE, Gastroc stretch x2 (30 seconds)        AM-PAC Score  AM-PAC Inpatient Mobility Raw Score : 21 (05/02/22 1544)  AM-PAC Inpatient T-Scale Score : 50.25 (05/02/22 1544)  Mobility Inpatient CMS 0-100% Score: 28.97 (05/02/22 1544)  Mobility Inpatient CMS G-Code Modifier : CJ (05/02/22 1544)          Goals  Short Term Goals  Time Frame for Short term goals: 14  Short term goal 1: Pt to perform bed mobility from flat surface independently  Short term goal 2: Demonstrate functional transfers independently  Short term goal 3: Ambulate 300ft w/ no AD independently  Short term goal 4: Ascend/descend 2 stairs with R rail independently  Patient Goals Patient goals :  To go home       Therapy Time   Individual Concurrent Group Co-treatment   Time In 1446         Time Out 1524         Minutes 38         Timed Code Treatment Minutes: [address], [place]

## 2022-05-02 NOTE — CARE COORDINATION
Transitional planning  Late entry 1500  Discussed plan with NP for EVC drain removal and d/c home, patient not available to discuss at this time

## 2022-05-02 NOTE — PROGRESS NOTES
Neurosurgery LILLY/Resident    Daily Progress Note   Chief Complaint   Patient presents with    Altered Mental Status     5/2/2022  2:31 PM    Chart reviewed. Leg cramps and headache overnight. ICP <20 x48 hours. Vitals:    05/02/22 0900 05/02/22 1000 05/02/22 1100 05/02/22 1200   BP: (!) 128/90 121/89 131/81 113/77   Pulse: 82 83 87 82   Resp:       Temp:  98.8 °F (37.1 °C)  98.6 °F (37 °C)   TempSrc:  Core  Core   SpO2: 95% 99% 99%    Weight:       Height:             PE: AOx3   CNII-XII intact   PERRL, EOMI   Motor   L deltoid 5/5; R deltoid 5/5  L biceps 5/5; R biceps 5/5  L triceps 5/5; R triceps 5/5  L intrinsics 5/5; R intrinsics 5/5      L iliopsoas 5/5 , R iliopsoas 5/5  L quadriceps 5/5; R quadriceps 5/5  L Dorsiflexion 5/5; R dorsiflexion 5/5  L Plantarflexion 5/5; R plantarflexion 5/5    Sensation intact    EVD clamped x48 hours      Lab Results   Component Value Date    WBC 12.6 (H) 05/02/2022    HGB 13.2 05/02/2022    HCT 38.3 05/02/2022     05/02/2022    CHOL 182 04/18/2022    TRIG 60 04/18/2022    HDL 74 04/18/2022    ALT 16 04/20/2022    AST 15 04/20/2022     05/02/2022    K 3.5 (L) 05/02/2022     05/02/2022    CREATININE 0.90 05/02/2022    BUN 9 05/02/2022    CO2 17 (L) 05/02/2022    TSH 2.18 04/20/2022    INR 0.9 04/18/2022    LABA1C 5.5 04/18/2022    CRP <3.0 04/19/2022    SEDRATE 2 04/18/2022         A/P  29 y.o. female who presents with intraventricular hemorrhage, aneurysmal subarachnoid hemorrhage from [facility] s/p coiling, hydrocephalus with EVD placement 4/18, post bleed day 14     - remove EVD today  - okay resume lovenox in AM  - please call with any questions or concerns    Please contact neurosurgery with any changes in patients neurologic status.        Mino Rosa CNP  5/2/22  2:31 PM

## 2022-05-02 NOTE — PROGRESS NOTES
Daily Progress Note  Neuro Critical Care    Patient Name: Ryan Lee  Patient : 1990  Room/Bed: 0575/6145-10  Code Status: full code  Allergies: No Known Allergies    CHIEF COMPLAINT:      headache     INTERVAL HISTORY      Initial Presentation (Admitted 22 @00:45AM): The patient is a 29 y.o.  female who presents to our emergency department 2022 via Counts include 234 beds at the [facility] as a transfer from [facility] with acute worst headache of her life. Patient does not have any significant past medical history in our chart and denies any. At outlying facility patient underwent CT head without demonstrating diffuse subarachnoid hemorrhage. Patient was seen by telestroke physician in our group recommending transfer to our facility for further work-up. In the emergency department patient states that she was washing TV with her child when she began to have severe pounding headache 10 out of 10 worst in her life. Patient noted to be severely nauseous requiring multiple doses of IV Zofran and IM Phenergan. Patient also admitting to photosensitivity. Patient had significant hypertension at [place] started on IV nicardipine given 1 g of Keppra 10 mg Decadron. Arrival to our emergency department 140/102, heart rate 77 temperature 97.6. Initial labs PTT 25.3, INR 1.0, WBC 13.7, platelets 711, glucose 139 and high-sensitivity troponin less than 6. 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 Case discussed with senior endovascular fellow who is aware of case and current neurologic exam planning for emergent DSA 2022 early morning. Rosen&De Souza: 2, Modified Combs: grade 3     Hospital Course:    -around 8:00 AM Dr. Mike Campbell at bedside placing EVD prior to endovascular neurosurgery. Patient went for urgent DSA found to have a comm aneurysm treated by primary coil embolization.   : Hemodynamically stable for arterial line readings -142, heart rate 82-99 T-max 98.9. Currently receiving María 5 mg every 4 hours as needed and fentanyl 25 every 2 as needed for pain control. 4/20: Blood pressure within parameter -139 overnight Cardene has been held off since 6 PM 4/19/2022 not requiring any as needed IV medications, heart rate 65-79 T-max 98.5. Serum magnesium 2.3 was started on IV magnesium 2 g twice daily will increase to 4 g twice daily continue to trend magnesium daily. Of note yesterday patient started to have increased urine output 9 L total in 24 hours urine studies sent consistent with central salt wasting patient started on hypertonic saline 2% 150 cc bolus followed by 75 cc/h maintenance. Sodium checks every 8 hours with a sodium goal of 145.  4/21: Vitals stable overnight blood pressures trending slightly up although okay to liberalize SBP <160 today. Overnight -152, HR 69-80, tmax 99.3. Labs pending this AM although sodium noted to drop from 137 @17:03 yesterday to 129 @midnight. Patient given 150CC bolus 2% and pending repeat this AM. Will send repeat urine studies and discussed with patient she has been drinking excess water in large water bottle at bedside so to limit today to Gatorade and 1-1.5L max. Headache continues to be stable same as yesterday 6/10 constant pressure and throbbing bifrontal location. Will get repeat TCD today to ensure no increase in vasospasm. No bowel movement X3 days giving dulcolax and milk of mag continue to monitor. protonix 40 and starting decadron 4Q6hr for headache decrease oxy pain med Q6hr.   4/22: Blood pressure goal liberalized to SBP <160 yesterday although pressures trending up still -172 overnight (giving IV labetalol 10mg early AM) , HR 64-83, tmax 98. 6. patient given 1L nacl bolus yesterday following slightly compressible IVC on ultrasound as she continues to have central salt wasting urine output slightly decreasing over last 3 days although 5,989 still yesterday. Discontinue hypertonic 2% 75cc/hr, start NaCl bolus 1L this AM and continue with 125cc/hr. Sodium goal can liberalize to 140 if Na <135 resume 2% hypertonic at 75cc/hr. Still no BM receiving dulcolax, milk of mag and glycolax. Headache improved slightly today 5/10. EVD remains open at 98ibQ40 and decreasing output 189 over last 24 hours. Transcranial dopplers reviewed slightly increasing vasospasm on the right velocities up to low 100s Lindegaard ratio up from 2-->3.3.   4/23: EVD to 20cm H2O. EVD output 80cc overnight, high UOP overnight although improved. Headache improving, ambulating well. 4/24: EVD open draining at 47cle61 total output 95cc over last 24 hours. Hypertensive overnight -187, HR 50-72, tmax 98.6. 72 hours of decadron 4mg Q6 completed overnight. Patient complaining of constipation has been on bowel regimen with glycolax, milk of mag and PO dulcolax. Added dulcolax suppository this morning small BM still very uncomfortable thus will add fleets PRN also. TCD's increasing left sided velocities from day prior peak left MCA velocity 156 peak right 160 Lindgard ratio left 3.49 up from 2.54 and right 3.38 down from 3.67. CT head WO this morning prior to toradol. Discussing with NS also when okay to give toradol given EVD in place. 4/25:  Failed EVD clamp trial yesterday increased ICP thus remains open at 25 with total output of 134cc/12 and 214 for the full 24 hours. Severe headache; Topamax 100mg QD and Mag 4g BID added. EVD lowered to 10mmgHg. TCD suggested mild right MCA and basilar spasm. Started on Milrinone infusion. 4/26: Hyponatremic yesterday evening; started on [email or web address] x6h with improvement, switched back to [email or web address] this AM.  Headache improved yesterday evening/overnight but started to become severe again this morning. Will trial Morphine ER 15mg BID x48h, decrease PRN Fentanyl to 25mg Q8h PRN. Increase Topamax to 100mg BID.   Last BM on Saturday, given narcotic usage will trial dose of Movantik. Continues on aggressive bowel regimen and took PRN Milk of Mag overnight. EVD set at [address], ICP 10-17, 331cc out/24h. TCD 4/26 overall stable; mild elevations of right MCA (mean velocities 129, LR 4.07). Continues on Milrinone 0.20mcg/kg/min. 4/27: Sodium 135 this AM, continues to have significant urine output (>6L/24h). TCD improved LR left 2.52 down from 3.4 yesterday and right 2.63 down from 4.07. EVD raised to 15mmHg. Trial Fioricet Q6hr PRN. Fever in evening. UA suggestive of UTI; started on Rocephin.  4/28: No acute events overnight. EVD set at 15mmHg, ICP 5-13, 173cc out/24h. Clinical exam remains stable. Continues to have headaches. Completed 48h course of MS contin 15mg BID, monitor off today. TCD this afternoon stable. Milrinone infusion increased to 0.5mcg/kg/min. No further fevers. Mild uptrend in WBC to 14.6. Leija removed yesterday but patient had recurrent urinary retention overnight requiring straight cath x2 each time with >1L out. Leija catheter replaced this morning due to recurrent retention. Urology consulted. Patient has not had a BM since Sunday (smal BM after enema per nursing). Will obtain KUB. Abdomen soft, non-distended and non-tender. Continue Movantik x2 more doses. Plan to give enema later today. Patient reports her appetite has been OK, states she snacks a lot.    4/29: Pain control issues overnight due to b/l parietal/temporal headache received Depakote, Fioricet and 50ug fentanyl . Persistent nausea and dizziness. 4/30: HA overnight, improved greatly with Verapamil/ibuprofen combination. Bilateral calf cramping. 5/1:Patient started to have bad headaches after mid night, patient on ibuprofen every 800 every 8 as needed, Topamax, received 1 dose of Norflex last night. EVD clamped since yesterday. ICP 4-9, patient had 1 bowel movement last night, requiring 2 doses of Zofran for nausea, No vomiting.   No fevers, blood pressure in 140s.  CT head: 5/1/2022: No significant interval change compared to 4/24/2022. Questionable punctate residual subarachnoid hemorrhage along the high left convexity, Verapamil 60 q 4        Last 24 hrs:   Overnight patient did not have any acute events. This morning patient complained of headache 10/10 intensity, received 1 dose of 50 fentanyl, which helped to relieve the pain. Patient walkedd to the restroom last night. Replacing potassium and magnesium. No fever, blood pressure within range.   Lovenox dose has been held for anticipation of EVD removal.  K- 3.5   Mag 3.2  Wbc 12.6      CURRENT MEDICATIONS:  SCHEDULED MEDICATIONS:   [START ON 5/3/2022] methylPREDNISolone  4 mg Oral QAM AC    [START ON 5/3/2022] methylPREDNISolone  4 mg Oral Lunch    [START ON 5/3/2022] methylPREDNISolone  4 mg Oral Dinner    [START ON 5/3/2022] methylPREDNISolone  8 mg Oral Nightly    [START ON 5/4/2022] methylPREDNISolone  4 mg Oral Nightly    [START ON 5/3/2022] magnesium sulfate  4,000 mg IntraVENous Daily    gabapentin  300 mg Oral Nightly    magnesium sulfate  4,000 mg IntraVENous Once    topiramate  150 mg Oral BID    cefTRIAXone (ROCEPHIN) IV  1,000 mg IntraVENous Q24H    sodium chloride flush  5-40 mL IntraVENous 2 times per day    sodium chloride flush  5-40 mL IntraVENous 2 times per day    sodium chloride  1 g Oral TID WC    sennosides-docusate sodium  2 tablet Oral Daily    polyethylene glycol  17 g Oral Daily    melatonin  5 mg Oral Nightly    bisacodyl  10 mg Oral Daily    [Held by provider] enoxaparin  40 mg SubCUTAneous Daily    atorvastatin  80 mg Oral Nightly    niMODipine  60 mg Oral 6 times per day    sodium chloride flush  5-40 mL IntraVENous 2 times per day     CONTINUOUS INFUSIONS:   sodium chloride 100 mL/hr at 05/02/22 0916    milrinone 0.5 mcg/kg/min (05/02/22 0916)    sodium chloride      sodium chloride      sodium chloride Stopped (04/19/22 2110)     PRN MEDICATIONS: oxyCODONE **OR** oxyCODONE, acetaminophen, orphenadrine, ibuprofen, sodium chloride flush, sodium chloride, sodium chloride flush, sodium chloride, bisacodyl, melatonin, magnesium hydroxide, ondansetron **OR** ondansetron, sodium chloride flush, sodium chloride    VITALS:  Temperature Range: Temp: 98.6 °F (37 °C) Temp  Av.8 °F (37.1 °C)  Min: 98.6 °F (37 °C)  Max: 99.1 °F (37.3 °C)  BP Range: Systolic (82ZZW), ZKK:653 , Min:107 , NAM:799     Diastolic (73INP), HYF:20, Min:53, Max:112    Pulse Range: Pulse  Av.4  Min: 81  Max: 94  Respiration Range: Resp  Av.3  Min: 14  Max: 18  Current Pulse Ox: SpO2: 99 %  24HR Pulse Ox Range: SpO2  Av.2 %  Min: 95 %  Max: 99 %  Patient Vitals for the past 12 hrs:   BP Temp Temp src Pulse Resp SpO2   22 1200 113/77 98.6 °F (37 °C) CORE 82 -- --   22 1100 131/81 -- -- 87 -- 99 %   22 1000 121/89 98.8 °F (37.1 °C) CORE 83 -- 99 %   22 0900 (!) 128/90 -- -- 82 -- 95 %   22 0800 121/81 98.6 °F (37 °C) CORE 81 -- 95 %   22 0600 119/87 -- -- 88 16 96 %   22 0500 (!) 135/102 98.8 °F (37.1 °C) CORE 91 14 99 %   22 0400 133/80 -- -- 89 -- 96 %   22 0300 124/70 -- -- 94 -- 97 %     Estimated body mass index is 29.03 kg/m² as calculated from the following:    Height as of this encounter: 5' 2\" (1.575 m).     Weight as of this encounter: 158 lb 11.7 oz (72 kg).  []<16 Severe malnutrition  []16-16.99 Moderate malnutrition  []17-18.49 Mild malnutrition  []18.5-24.9 Normal  [x]25-29.9 Overweight (not obese)  []30-34.9 Obese class 1 (Low Risk)  []35-39.9 Obese class 2 (Moderate Risk)  []?40 Obese class 3 (High Risk)    RECENT LABS:   Lab Results   Component Value Date    WBC 12.6 (H) 2022    HGB 13.2 2022    HCT 38.3 2022     2022    CHOL 182 2022    TRIG 60 2022    HDL 74 2022    ALT 16 2022    AST 15 2022     2022    K 3.5 (L) 2022     05/02/2022    CREATININE 0.90 05/02/2022    BUN 9 05/02/2022    CO2 17 (L) 05/02/2022    TSH 2.18 04/20/2022    INR 0.9 04/18/2022    LABA1C 5.5 04/18/2022     24 HOUR INTAKE/OUTPUT:    Intake/Output Summary (Last 24 hours) at 5/2/2022 1420  Last data filed at 5/2/2022 1000  Gross per 24 hour   Intake 397.71 ml   Output 3901 ml   Net -3503.29 ml     XR ABDOMEN (KUB) (SINGLE AP VIEW)    Result Date: 4/28/2022  EXAMINATION: ONE SUPINE XRAY VIEW(S) OF THE ABDOMEN 4/28/2022 1:34 pm COMPARISON: None. HISTORY: ORDERING SYSTEM PROVIDED HISTORY: constipation TECHNOLOGIST PROVIDED HISTORY: constipation FINDINGS: AP portable view of the chest time stamped at 1324 hours demonstrates mild to moderate colonic stool load. Gas is noted throughout the intestinal tract in a nonobstructive pattern. Overlying monitoring electrodes are noted. No organomegaly is seen. Hemidiaphragms are not included in the field of view. Osseous structures are age-appropriate. Mild to moderate colonic stool load. Nonobstructive bowel gas pattern.      VL TRANSCRANIAL DOPPLER COMPLETE    Result Date: 4/28/2022    [facility]  Vascular Transcranial Procedure   Patient Name   Bea Cabrera     Date of Study           04/28/2022                 LINDA KING   Date of Birth  1990   Gender                  Female   Age            29 year(s)   Race                       Room Number    4883         Height:                 62 inch, 157.48 cm   Corporate ID # [id number]     Weight:                 161 pounds, 73 kg   Patient Acct # [de-identified]    BSA:        1.74 m^2    BMI:      29.45 kg/m^2   MR #           [id number]      Sonographer             Lazarus Myrtle, RVT   Accession #    [id number]   Interpreting Physician  Dom Conteh   Referring                   Referring Physician     Oj De La Rosa  Nurse  Practitioner  Additional Comments CLASSIFICATION OF VASOSPASM MEAN MCA VELOCITY ----- MCA/ICA VELOCITY RATIO ------- INTERPRETATION <120 cm/sec --------------------- less than 3 --------------------------- Normal, nonspecific elevation or distal MCA spasm >120 cm/sec ------------------------ 3 to 6 -------------------------------- Mild vasospasm of proximal MCA >160 cm/sec ------------------------ 3 to 6 -------------------------------- Moderate vasospasm of proximal MCA >200 cm/sec -------------------- greater than 6------------------------- Severe vasospasm of proximal MCA. MEAN BASILAR ARTERY VELOCITY------- INTERPRETATION >60 cm/sec -------------------------------------------- Mild BA vasospasm >90 cm/sec -------------------------------------------- Moderate BA vasospasm >120 cm/sec ------------------------------------------ Severe BA vasospasm. Procedure Type of Study:   Cerebral: Transcranial.  Indications for Study:Vasospasm post-subarachnoid hemorrhage. Patient Status: In Patient. Conclusions   Summary   TRANSCRANIAL DOPPLER ULTRASOUND  Date of Service: 4/28/2022   Interpretation: This is bilateral complete TCD with insonation of ALL Cerebral Arteries  via transtemporal, submandibular, transorbital, and transforaminal windows  demonstrating distal left Middle Cerebral Artery elevated mean cerebral  blood flow velocities (MCBFVs) at 116 cm/sec while the right MCA was up to  114 cm/sec. the remaining MCBFVs were within normal limits and the PI were  normal.   The Lindegaard ratio was 3.43 on the right side while it was 3.76 on the  left side.    IMPRESSION:  1) Mild bilateral MCA vasospasm (improved from the previous studies) as  described above   Recommendations   Follow up TCD as per the Neuro ICU Team   Signature   ----------------------------------------------------------------  Electronically signed by Sasha Peralta RVT(Sonographer) on  04/28/2022 12:04 PM  ----------------------------------------------------------------   ----------------------------------------------------------------  Electronically signed by Carson TelloInterpreting physician)  on 04/28/2022 09:38 PM  ----------------------------------------------------------------  Findings:   Right Impression:                     Left Impression:  Right Lindegaard Ratio: 3.43          Left Lindegaard Ratio: 3.76  MEAN VELOCITIES:                      MEAN VELOCITIES:  Transtemporal Approach. Transtemporal Approach. Proximal MCA: 97.8 . Proximal MCA: 113  Mid MCA: 114 . Mid MCA: 116  Distal MCA: 97.0 . Distal MCA: 115  Proximal KRISSY: 43.5 . Proximal KRISSY: 103  Mid RKISSY: 51.2 . Mid KRISSY: 105  PCA: 32.7 . PCA: 39.7  T ICA: 42.7                           T ICA: 33.9  Submandibular Approach. Submandibular Approach. D ICA: 33.1 . D ICA: 30.8  Transorbital Approach. Transorbital Approach. Siphon: 50.0 . Siphon: 51.2  Transforamenal Approach. Transforamenal Approach. Vertebral: 38.1 . Vertebral: 36.2   BASILAR:56.2  Risk Factors History +---------+----------+-----------------------------------------------------+ ! Diagnosis! Date      ! Comments                                             ! +---------+----------+-----------------------------------------------------+ ! Other    !04/18/2022! DARIUSOMFERNANDO A. ANEURYSM 3MM X 2MM                          ! +---------+----------+-----------------------------------------------------+ ! Other    !04/27/2022! Hematocrit 43.1; ICP-10                              ! +---------+----------+-----------------------------------------------------+ ! Other    !04/28/2022! Hematocrit 40.5; ICP 14                              ! +---------+----------+-----------------------------------------------------+   - The patient's risk factor(s) include: arterial hypertension.     VL TRANSCRANIAL DOPPLER COMPLETE    Result Date: 4/27/2022    [facility]  Vascular Transcranial Procedure   Patient Name   Jose Leyva     Date of Study           04/27/2022                 LINDA KING   Date of Birth  1990   Gender                  Female   Age            29 year(s)   Race                       Room Number    1383         Height:                 62 inch, 157.48 cm   Corporate ID # [id number]     Weight:                 161 pounds, 73 kg   Patient Acct # [de-identified]    BSA:        1.74 m^2    BMI:      29.45 kg/m^2   MR #           [id number]      Sonographer             Leopold Henry, RVT   Accession #    [id number]   Interpreting Physician  Cecilia Crow   Referring                   Referring Physician     Kathy Ashby  Nurse  Practitioner  Procedure Type of Study:   Cerebral: Transcranial.  Indications for Study:Vasospasm post-subarachnoid hemorrhage. Patient Status: In Patient. Conclusions   Summary   No evidence of vasospasm in the bilateral MCAs. Mild basilar artery spasm  noted. Signature   ----------------------------------------------------------------  Electronically signed by Leopold Henry, RVT(Sonographer) on  04/27/2022 01:53 PM  ----------------------------------------------------------------   ----------------------------------------------------------------  Electronically signed by Cherylann Mody Reyes,Arthur(Interpreting  physician) on 04/27/2022 08:32 PM  ----------------------------------------------------------------  Findings:   Right Impression:                     Left Impression:  Right Lindegaard Ratio: 2.63          Left Lindegaard Ratio: 2.52  MEAN VELOCITIES:                      MEAN VELOCITIES:  Transtemporal Approach. Transtemporal Approach. Proximal MCA: 97.8 . Proximal MCA: 110  Mid MCA: 116 . Mid MCA: 124  Distal MCA: 110 . Distal MCA: 71.6  Proximal KRISSY: 82.4 . Proximal KRISSY: 103  Mid KRISSY: 67.4 .                        Mid KRISSY: 106 PCA: 38.1 . PCA: 48.9  T ICA: 59.3 . T ICA: 56.6  Submandibular Approach. Submandibular Approach. D ICA: 43.9 . D ICA: 49.3  Transorbital Approach. Transorbital Approach. Siphon: 67.0 . Siphon: 52.7  Transforamenal Approach. Transforamenal Approach. Vertebral: 31.2 . Vertebral: 44.7   BASILAR:69.3  Risk Factors History +---------+----------+-----------------------------------------------------+ ! Diagnosis! Date      ! Comments                                             ! +---------+----------+-----------------------------------------------------+ ! Other    !04/18/2022! ACOMM A. ANEURYSM 3MM X 2MM                          ! +---------+----------+-----------------------------------------------------+ ! Other    !04/27/2022! Hematocrit 43.1; ICP-10                              ! +---------+----------+-----------------------------------------------------+   - The patient's risk factor(s) include: arterial hypertension. Labs and Images reviewed with:  [] Dr. Maryam Toney. Osmel    [] Dr. Kailey Mukherjee  [x] Dr. Gillie Cullens  [] There are no new interval images to review. Review of Systems   Constitutional: Negative for fever. HENT: Negative for rhinorrhea and sore throat. Eyes: Negative for redness. Respiratory: Negative for shortness of breath. Cardiovascular: Negative for chest pain. Gastrointestinal: Positive for nausea. Negative for abdominal pain and vomiting. Genitourinary: Negative for difficulty urinating and flank pain. Skin: Negative for rash. Allergic/Immunologic: Negative for environmental allergies and food allergies. Neurological: Positive for dizziness and headaches. Negative for syncope, speech difficulty and numbness. Psychiatric/Behavioral: Negative for agitation and confusion.            PHYSICAL EXAM     GENERAL: Well developed, well nourished, in no acute distress. Nontoxic. No dysarthria. No aphasia. GCS 15  HENT: normocephalic , nose normal, perrla  EYES: no occular discharge, no scleral icterus  NECK: no JVD, no tracheal deviation  CV: Normal S1 S2, no MRG  PULM / CHEST: CTA Bilaterally all fields, no WRR  ABDOMEN: soft, no rigidity  MSK: no gross deformity, no edema, no TTP  NEURO: neuro intact  SKIN: no rash, no erythema, cap refill < 2 sec    DRAINS:  [] EVD @ 15mmHg  24cc/24h output    ASSESSMENT AND PLAN:       This is a 29 y.o. female with no significant medical history who initially presented to [facility] ED with thunderclap headache and was found to have diffuse subarachnoid hemorrhage. Underwent emergent EVD placement at bedside 4/18/2022. 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 Neuro ICU course complicated by cerebral salt wasting and mild right MCA vasospasm. NEUROLOGIC:  -CT head: 5/1/2022: No significant interval change compared to 4/24/2022. Questionable punctate residual subarachnoid hemorrhage along the high left convexity  - Diffuse SAH with IVH secondary to ruptured ACOM aneurysm  - POD/PBD #13 s/p coil embolization of the ruptured ACOM aneurysm  - Continue Nimodipine 60mg Q4h  -naloxegol 25 daily. Last dose  yesterday  Melatonin 5 hs  topamax 150 bid  - Mild R MCA vasospasm ? symptomatic with worsening headache, no focal deficits  - Milrinone to 0.7mcg/kg/min  - TCD 4/28 stable  - TCD 4/30: Mild vasospasm noted in left MCA  - Goal -220, avoid symptomatic hypotension  - Obstructive hydrocephalus s/p EVD placement 4/18  - Failed EVD clamp trial 4/24 due to elevated ICP (25) and head pressure  - EVD clamped 4/30  -EVD drain will be removed today as per neurosurgery, Lovenox has been held.   Neurontin 300 at bedtime  Medrol Dosepak  Oxycodone 5 every 6 as needed  -CSF: WBC 10, , glucose 78       CARDIOVASCULAR:  - Goal -220, avoid symptomatic hypotension  - Echo EF 65%, negative bubble  -lipitor 80   -Milrinone 0.5   -Discontinue verapamil  - Continue telemetry     PULMONARY:  - Maintaining O2 sats on room air  - Incentive spirometry     RENAL/FLUID/ELECTROLYTE:  - Normal renal functioning  - BUN5/ Creatinine 0.82  - Monitor I&O -3.5   - Cerebral salt wasting secondary to [address]  - sodium 136 this AM  - Continue salt tabs 1g TID  - Goal Mag>2.5; Mag level 3.5, continue Magnesium IV 4 grams daily   - Urinary retention- Leija in place; urology consulted- void trial 1 d prior to dc  - Replace electrolytes PRN  - Daily BMP  -1 L fluid bolus  -Normal saline 100 mL/h     GI/NUTRITION:  NUTRITION: General Diet  - Tolerating diet well  - Bowel regimen: Continue Dulcolax PO, Glycolax, Senokot-S daily and Milk of Mag PRN  - GI prophylaxis: N/A      ID:  - Tmax 37.2  - WBC 12.5 (12.1)  - CSF culture 4/27 negative to date. Many neutrophils no bacteria as of this am  - UA 4/27 suggestive of UTI with moderate leukocyte esterase and many bacteria  - Started on Rocephin 1 gram daily for UTI x 5 days, completes 5/3  - Daily CBC     HEME:   - H&H 12.5/39.5, stable  - Platelets 065  - Daily CBC     ENDOCRINE:  - Continue to monitor blood glucose, goal <180     OTHER:  - PT/OT/ST     PROPHYLAXIS:   Stress ulcer: N/A     DVT PROPHYLAXIS:  - SCD sleeves - Thigh High   - Lovenox 40 daily     DISPOSITION:  [x] To remain ICU for close neurological monitoring, EVD management. We will continue to follow along. For any changes in exam or patient status please contact Neuro Critical Care.       Ayesha Rubi MD  Neuro Critical Care  5/2/2022     2:20 PM

## 2022-05-02 NOTE — PLAN OF CARE
Drain Removal Note    []Subdural Drain   []Subgaleal Drain  [x]Ventriculostomy Drain    EVD clamped, prepped with betadine. Drain suture cut, staples removed and drain removed. 2 staples placed over drain hole with hemostasis. 7 staples removed from EVD naomi hole site. No complications, patient tolerated procedure well.     --  Jason Wan CNP  2:56 PM EDT

## 2022-05-03 LAB
ANION GAP SERPL CALCULATED.3IONS-SCNC: 13 MMOL/L (ref 9–17)
BUN BLDV-MCNC: 8 MG/DL (ref 6–20)
CALCIUM SERPL-MCNC: 8.9 MG/DL (ref 8.6–10.4)
CHLORIDE BLD-SCNC: 110 MMOL/L (ref 98–107)
CO2: 18 MMOL/L (ref 20–31)
CREAT SERPL-MCNC: 0.81 MG/DL (ref 0.5–0.9)
GFR AFRICAN AMERICAN: >60 ML/MIN
GFR NON-AFRICAN AMERICAN: >60 ML/MIN
GFR SERPL CREATININE-BSD FRML MDRD: ABNORMAL ML/MIN/{1.73_M2}
GLUCOSE BLD-MCNC: 89 MG/DL (ref 70–99)
HCT VFR BLD CALC: 33.7 % (ref 36.3–47.1)
HEMOGLOBIN: 11.6 G/DL (ref 11.9–15.1)
LYMPHS CSF: 56 %
MAGNESIUM: 2.2 MG/DL (ref 1.6–2.6)
MCH RBC QN AUTO: 29.8 PG (ref 25.2–33.5)
MCHC RBC AUTO-ENTMCNC: 34.4 G/DL (ref 28.4–34.8)
MCV RBC AUTO: 86.6 FL (ref 82.6–102.9)
NEUTROPHILS, CSF: 7 %
NRBC AUTOMATED: 0 PER 100 WBC
OTHER CELLS FLUID: NORMAL %
PDW BLD-RTO: 11.9 % (ref 11.8–14.4)
PLATELET # BLD: 283 K/UL (ref 138–453)
PMV BLD AUTO: 10 FL (ref 8.1–13.5)
POTASSIUM SERPL-SCNC: 3.3 MMOL/L (ref 3.7–5.3)
RBC # BLD: 3.89 M/UL (ref 3.95–5.11)
SODIUM BLD-SCNC: 141 MMOL/L (ref 135–144)
WBC # BLD: 11.9 K/UL (ref 3.5–11.3)

## 2022-05-03 PROCEDURE — 6370000000 HC RX 637 (ALT 250 FOR IP): Performed by: STUDENT IN AN ORGANIZED HEALTH CARE EDUCATION/TRAINING PROGRAM

## 2022-05-03 PROCEDURE — 6370000000 HC RX 637 (ALT 250 FOR IP): Performed by: NURSE PRACTITIONER

## 2022-05-03 PROCEDURE — 83735 ASSAY OF MAGNESIUM: CPT

## 2022-05-03 PROCEDURE — 6370000000 HC RX 637 (ALT 250 FOR IP): Performed by: PSYCHIATRY & NEUROLOGY

## 2022-05-03 PROCEDURE — 6360000002 HC RX W HCPCS: Performed by: NURSE PRACTITIONER

## 2022-05-03 PROCEDURE — 97110 THERAPEUTIC EXERCISES: CPT

## 2022-05-03 PROCEDURE — 2500000003 HC RX 250 WO HCPCS: Performed by: NURSE PRACTITIONER

## 2022-05-03 PROCEDURE — 97530 THERAPEUTIC ACTIVITIES: CPT

## 2022-05-03 PROCEDURE — 85027 COMPLETE CBC AUTOMATED: CPT

## 2022-05-03 PROCEDURE — 6360000002 HC RX W HCPCS: Performed by: STUDENT IN AN ORGANIZED HEALTH CARE EDUCATION/TRAINING PROGRAM

## 2022-05-03 PROCEDURE — 2580000003 HC RX 258: Performed by: NURSE PRACTITIONER

## 2022-05-03 PROCEDURE — 99233 SBSQ HOSP IP/OBS HIGH 50: CPT | Performed by: PSYCHIATRY & NEUROLOGY

## 2022-05-03 PROCEDURE — 2000000003 HC NEURO ICU R&B

## 2022-05-03 PROCEDURE — 36415 COLL VENOUS BLD VENIPUNCTURE: CPT

## 2022-05-03 PROCEDURE — 80048 BASIC METABOLIC PNL TOTAL CA: CPT

## 2022-05-03 PROCEDURE — 93886 INTRACRANIAL COMPLETE STUDY: CPT

## 2022-05-03 RX ORDER — SODIUM CHLORIDE 1000 MG
1 TABLET, SOLUBLE MISCELLANEOUS 2 TIMES DAILY WITH MEALS
Status: DISCONTINUED | OUTPATIENT
Start: 2022-05-03 | End: 2022-05-04

## 2022-05-03 RX ORDER — MAGNESIUM SULFATE HEPTAHYDRATE 40 MG/ML
4000 INJECTION, SOLUTION INTRAVENOUS ONCE
Status: COMPLETED | OUTPATIENT
Start: 2022-05-03 | End: 2022-05-03

## 2022-05-03 RX ORDER — MILRINONE LACTATE 0.2 MG/ML
0.25 INJECTION, SOLUTION INTRAVENOUS CONTINUOUS
Status: DISCONTINUED | OUTPATIENT
Start: 2022-05-03 | End: 2022-05-04

## 2022-05-03 RX ADMIN — SODIUM CHLORIDE TAB 1 GM 1 G: 1 TAB at 17:14

## 2022-05-03 RX ADMIN — METHYLPREDNISOLONE 4 MG: 4 TABLET ORAL at 08:54

## 2022-05-03 RX ADMIN — TOPIRAMATE 150 MG: 100 TABLET, FILM COATED ORAL at 08:53

## 2022-05-03 RX ADMIN — ACETAMINOPHEN 650 MG: 325 TABLET ORAL at 08:58

## 2022-05-03 RX ADMIN — Medication 5 MG: at 21:00

## 2022-05-03 RX ADMIN — TOPIRAMATE 150 MG: 100 TABLET, FILM COATED ORAL at 21:00

## 2022-05-03 RX ADMIN — OXYCODONE HYDROCHLORIDE 10 MG: 5 TABLET ORAL at 02:07

## 2022-05-03 RX ADMIN — OXYCODONE HYDROCHLORIDE 10 MG: 5 TABLET ORAL at 12:09

## 2022-05-03 RX ADMIN — ENOXAPARIN SODIUM 40 MG: 100 INJECTION SUBCUTANEOUS at 08:53

## 2022-05-03 RX ADMIN — NIMODIPINE 60 MG: 30 CAPSULE, LIQUID FILLED ORAL at 13:45

## 2022-05-03 RX ADMIN — SODIUM CHLORIDE TAB 1 GM 1 G: 1 TAB at 08:53

## 2022-05-03 RX ADMIN — ATORVASTATIN CALCIUM 80 MG: 80 TABLET, FILM COATED ORAL at 21:00

## 2022-05-03 RX ADMIN — IBUPROFEN 800 MG: 400 TABLET, FILM COATED ORAL at 18:17

## 2022-05-03 RX ADMIN — POTASSIUM BICARBONATE 40 MEQ: 782 TABLET, EFFERVESCENT ORAL at 08:56

## 2022-05-03 RX ADMIN — METHYLPREDNISOLONE 8 MG: 4 TABLET ORAL at 21:00

## 2022-05-03 RX ADMIN — SODIUM CHLORIDE: 9 INJECTION, SOLUTION INTRAVENOUS at 18:37

## 2022-05-03 RX ADMIN — MAGNESIUM SULFATE IN WATER 4000 MG: 40 INJECTION, SOLUTION INTRAVENOUS at 12:12

## 2022-05-03 RX ADMIN — MAGNESIUM SULFATE IN WATER 4000 MG: 40 INJECTION, SOLUTION INTRAVENOUS at 05:06

## 2022-05-03 RX ADMIN — POTASSIUM BICARBONATE 40 MEQ: 782 TABLET, EFFERVESCENT ORAL at 12:10

## 2022-05-03 RX ADMIN — NIMODIPINE 60 MG: 30 CAPSULE, LIQUID FILLED ORAL at 17:14

## 2022-05-03 RX ADMIN — POTASSIUM BICARBONATE 40 MEQ: 782 TABLET, EFFERVESCENT ORAL at 06:44

## 2022-05-03 RX ADMIN — OXYCODONE HYDROCHLORIDE 10 MG: 5 TABLET ORAL at 21:07

## 2022-05-03 RX ADMIN — MILRINONE LACTATE IN DEXTROSE 0.25 MCG/KG/MIN: 200 INJECTION, SOLUTION INTRAVENOUS at 20:58

## 2022-05-03 RX ADMIN — METHYLPREDNISOLONE 4 MG: 4 TABLET ORAL at 12:09

## 2022-05-03 RX ADMIN — NIMODIPINE 60 MG: 30 CAPSULE, LIQUID FILLED ORAL at 02:00

## 2022-05-03 RX ADMIN — CEFTRIAXONE SODIUM 1000 MG: 1 INJECTION, POWDER, FOR SOLUTION INTRAMUSCULAR; INTRAVENOUS at 18:24

## 2022-05-03 RX ADMIN — NIMODIPINE 60 MG: 30 CAPSULE, LIQUID FILLED ORAL at 06:24

## 2022-05-03 RX ADMIN — IBUPROFEN 800 MG: 400 TABLET, FILM COATED ORAL at 06:53

## 2022-05-03 RX ADMIN — MILRINONE LACTATE 0.5 MCG/KG/MIN: 0.2 INJECTION, SOLUTION INTRAVENOUS at 02:00

## 2022-05-03 RX ADMIN — NIMODIPINE 60 MG: 30 CAPSULE, LIQUID FILLED ORAL at 08:53

## 2022-05-03 RX ADMIN — GABAPENTIN 300 MG: 300 CAPSULE ORAL at 21:00

## 2022-05-03 RX ADMIN — NIMODIPINE 60 MG: 30 CAPSULE, LIQUID FILLED ORAL at 21:00

## 2022-05-03 RX ADMIN — METHYLPREDNISOLONE 4 MG: 4 TABLET ORAL at 17:13

## 2022-05-03 ASSESSMENT — PAIN DESCRIPTION - LOCATION
LOCATION: HEAD
LOCATION: HEAD

## 2022-05-03 ASSESSMENT — ENCOUNTER SYMPTOMS
SORE THROAT: 0
SHORTNESS OF BREATH: 0
VOMITING: 0
ABDOMINAL PAIN: 0
NAUSEA: 1
RHINORRHEA: 0
EYE REDNESS: 0

## 2022-05-03 ASSESSMENT — PAIN SCALES - GENERAL
PAINLEVEL_OUTOF10: 7
PAINLEVEL_OUTOF10: 4
PAINLEVEL_OUTOF10: 7

## 2022-05-03 ASSESSMENT — PAIN - FUNCTIONAL ASSESSMENT: PAIN_FUNCTIONAL_ASSESSMENT: ACTIVITIES ARE NOT PREVENTED

## 2022-05-03 ASSESSMENT — PAIN DESCRIPTION - DESCRIPTORS: DESCRIPTORS: ACHING

## 2022-05-03 NOTE — PROGRESS NOTES
Physical Therapy  Facility/Department: [address]  Daily Treatment Note  NAME: Markell Soto  : 1990  MRN: [id number]    Date of Service: 5/3/2022    Discharge Recommendations:  Home independently   PT Equipment Recommendations  Equipment Needed: No    Patient Diagnosis(es): The encounter diagnosis was SAH (subarachnoid hemorrhage) ([facility] [address] Assessment   Activity Tolerance: Patient tolerated treatment well  Equipment Needed: No     Plan    Plan  Plan: Discharge     Restrictions  Restrictions/Precautions  Restrictions/Precautions: Up as Tolerated,Seizure  Required Braces or Orthoses?: No  Position Activity Restriction  Subjective    Subjective  Subjective: c/o L thigh weakness, but not noted throughout PT session  Pain: no c/o pain  Orientation  Overall Orientation Status: Within Normal Limits  Cognition  Overall Cognitive Status: WFL     Objective   Vitals  WFL     Bed Mobility Training  Bed Mobility Training: Yes  Overall Level of Assistance: Independent  Rolling: Independent  Supine to Sit: Independent  Sit to Supine: Independent  Scooting: Independent  Balance  Sitting: Intact  Standing: Intact  Transfer Training  Transfer Training: Yes  Overall Level of Assistance: Independent  Sit to Stand: Independent  Stand to Sit: Independent  Stand Pivot Transfers: Independent  Gait Training  Gait Training: Yes  Gait  Overall Level of Assistance: Independent  Base of Support: Center of gravity altered  Distance (ft): 400 Feet  Rail Use: None  Stairs - Level of Assistance: Independent  Number of Stairs Trained: 20x2 with rest break in between; she initially used the HR for the first 10 steps (the first time), progressed to no HR; able to go foot over foot to ascend and descend, no buckling noted throughout entire PT session. PT Exercises  Exercise Treatment: standing ex x 15 reps, no UE support, occasional LOB which pt could self correct. No rest breaks needed.   A/P lunges x 15 reps, independently, no LOB     Safety Devices  Type of Devices:  (pt still standing at [facility] at end of PT session--she wanted to get a drink, then returning to bed; RN notified)  Restraints  Restraints Initially in Place: No     Patient Education  Education Given To: Patient  Education Provided: Role of Therapy;Plan of Care  Education Method: Verbal  Barriers to Learning: None  Education Outcome: Verbalized understanding;Demonstrated understanding    Goals  Goals met; DC PT. Short Term Goals  Time Frame for Short term goals: 15  Short term goal 1: Pt to perform bed mobility from flat surface independently  Short term goal 2: Demonstrate functional transfers independently  Short term goal 3: Ambulate 300ft w/ no AD independently  Short term goal 4: Ascend/descend 2 stairs with R rail independently  Patient Goals   Patient goals :  To go home      Therapy Time   Individual Concurrent Group Co-treatment   Time In 1130         Time Out 1159         Minutes 29                 Renetta Jackson, [place]

## 2022-05-03 NOTE — FLOWSHEET NOTE
SPIRITUAL CARE DEPARTMENT - [facility]  PROGRESS NOTE    Shift date: 5/3/2022  Shift day: Tuesday    Shift # 2     Room # 7390/1328-14   Name: Dom Vázquez            Age: 29 y.o. Gender: female          Religious: Non-Latter-day   Place of Pentecostalism:      Referral: Routine Visit    Admit Date & Time: 4/18/2022 12:19 AM    PATIENT/EVENT DESCRIPTION:  Dom Vázquez is a 29 y.o. female           SPIRITUAL ASSESSMENT/INTERVENTION:   attempted provide spiritual\emotional support but patient was about to shower. 05/03/22 1745   Encounter Summary   Encounter Overview/Reason  Attempted Encounter   Service Provided For: Patient   Referral/Consult From: Family   Support System Family members   Last Encounter  05/03/22   Complexity of Encounter Low   Begin Time 1700  (APROX)   End Time  1710   Total Time Calculated 10 min   Encounter    Type Initial Screen/Assessment   Assessment/Intervention/Outcome   Assessment Calm   Plan and Referrals   Plan/Referrals Continue to visit, (comment)       SPIRITUAL CARE FOLLOW-UP PLAN:  Chaplains will remain available to offer spiritual and emotional support as needed.          Electronically signed by Tesfaye Marmolejo Resident Lucy Ochoa, [address] 5/3/2022 at 5:50 PM   [facility]  [phone]

## 2022-05-03 NOTE — PROGRESS NOTES
Endovascular Neurosurgery Progress Note    SUBJECTIVE:   Headaches overnight. Patient was complaining of headache this am, 5/10. Review of Systems:  CONSTITUTIONAL:  negative for fevers, chills, fatigue and malaise    EYES:  negative for double vision, blurred vision and photophobia     HEENT:  negative for tinnitus, epistaxis and sore throat    RESPIRATORY:  negative for cough, shortness of breath, wheezing    CARDIOVASCULAR:  negative for chest pain, palpitations, syncope, edema    GASTROINTESTINAL:  negative for nausea, vomiting    GENITOURINARY:  negative for incontinence    MUSCULOSKELETAL:  negative for neck or back pain    NEUROLOGICAL:  Negative for weakness and tingling  negative for headaches and dizziness    PSYCHIATRIC:  negative for anxiety      Review of systems otherwise negative. OBJECTIVE:     Vitals:    05/03/22 1215   BP: (!) 136/96   Pulse: 98   Resp:    Temp: 98.6 °F (37 °C)   SpO2: 97%        General:  Gen: normal habitus, NAD  HEENT: EVD in place. mucosa moist  Cvs: RRR, S1 S2 normal  Resp: symmetric unlabored breathing  Abd: s/nd/nt  Ext: no edema  Skin: no lesions seen, warm and dry    Neuro: on milrinone weaned down to 0.25 mcg/kg/min. Gen: awake and alert, oriented x3. Lang/speech: no aphasia. No dysarthria. Follows commands. CN: PERRL, EOMI, VFF, V1-3 intact, face symmetric, hearing intact, shoulder shrug symmetric, tongue midline  Motor: grossly 5/5 UE and LE b/l  Sense: LT intact in all 4 ext. Coord: FTN and HTS intact b/l  DTR: deferred  Gait: deferred    NIH Stroke Scale:   1a  Level of consciousness: 0 - alert; keenly responsive   1b. LOC questions:  0 - answers both questions correctly   1c. LOC commands: 0 - performs both tasks correctly   2. Best Gaze: 0 - normal   3. Visual: 0 - no visual loss   4. Facial Palsy: 0 - normal symmetric movement   5a. Motor left arm: 0 - no drift, limb holds 90 (or 45) degrees for full 10 seconds   5b.   Motor right arm: 0 - no drift, limb holds 90 (or 45) degrees for full 10 seconds   6a. Motor left le - no drift; leg holds 30 degree position for full 5 seconds   6b  Motor right le - no drift; leg holds 30 degree position for full 5 seconds   7. Limb Ataxia: 0 - absent   8. Sensory: 0 - normal; no sensory loss   9. Best Language:  0 - no aphasia, normal   10. Dysarthria: 0 - normal   11. Extinction and Inattention: 0 - no abnormality         Total:   0     MRS: 0      LABS:   Reviewed. Lab Results   Component Value Date    HGB 11.6 (L) 2022    WBC 11.9 (H) 2022     2022     2022    BUN 8 2022    CREATININE 0.81 2022    AST 15 2022    ALT 16 2022    MG 2.2 2022    APTT 22.6 2022    INR 0.9 2022      Lab Results   Component Value Date    COVID19 Not Detected 2022    COVID19 Not Detected 2022       RADIOLOGY:   Images were personally reviewed including:   CT head wo con 2022  1. No significant interval change compared to 2021.  Again seen right   frontal approach ventriculostomy with similar-appearing ventricular system   size and morphology. 2. Questionable punctate residual subarachnoid hemorrhage along the high left   frontal convexity. tcd 2022  Mild vasospasm L 's, R 's LR 4.3 b/l. Stable. CT head 2022:   Significant reduction with trace residual scattered subarachnoid hemorrhage.       Stable positioning of a right frontal EVD, as well as stable caliber and   configuration of the ventricles. 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 --the above treated with Smart coil embolization x3, Vu 1. Smart coil x1 opened but not detached. --Prominent musculocutaneous branch from the left V2 segment that anastomoses to the left occipital artery.     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 PBD: 15    Patient complains of moderate to severe headaches, non-focal neuro exam. 4/24/2022 clamp trial failed. 4/24/2022 CT head stable, reduced SAH. 4/30/2022 TCD mild vasospasm b/l MCA. Issues of urine retention/uti, b/l leg cramping. 4/30/2022 clamp trial. Ct head 5/1/2022 stable. EVD removed on 05/02. PLAN:   --SBP goal 120-220 mm hg   --SAH management with Nimodipine, statin, milrinone  --Mg level 3-4 last Mg 2.2  --f/up with Dr. Humberto Lyon in 2 weeks after discharge and f/up with Dr. Linda Martinez in 3 months after discharge    Case discussed with Dr. Linda Martinez attending.     Lady Meneses MD  Stroke, [facility]  [id number]  Electronically signed 5/3/2022 at 3:25 PM

## 2022-05-03 NOTE — PROGRESS NOTES
Comprehensive Nutrition Assessment    Type and Reason for Visit:  Reassess    Nutrition Recommendations/Plan:   1. Encourage PO intake as tolerated  2. Send Magic Cup ONS twice daily     Malnutrition Assessment:  Malnutrition Status: At risk for malnutrition (Comment) (05/03/22 5089)    Context:  Acute Illness     Findings of the 6 clinical characteristics of malnutrition:  Energy Intake:  75% or less of estimated energy requirements for 7 or more days  Weight Loss:  No significant weight loss     Body Fat Loss:  No significant body fat loss     Muscle Mass Loss:  No significant muscle mass loss    Fluid Accumulation:  No significant fluid accumulation     Strength:  Not Performed    Nutrition Assessment:    Pt not in room x 2 attempted visits today. Upon first visit, breakfast tray in room untouched. Second visit lunch tray was present and pt consumed less than or equal to 25%. RN documented 1-25% x 2 yesterday. Nutrition Related Findings:    meds/labs reviewed Wound Type: Surgical Incision       Current Nutrition Intake & Therapies:    Average Meal Intake: 1-25%  Average Supplements Intake: None Ordered  ADULT DIET; Regular    Anthropometric Measures:  Height: 5' 2\" (157.5 cm)  Ideal Body Weight (IBW): 110 lbs (50 kg)    Admission Body Weight: 161 lb 13.1 oz (73.4 kg)  Current Body Weight: 158 lb 11.7 oz (72 kg), 144.3 % IBW. Weight Source: Bed Scale  Current BMI (kg/m2): 29  Usual Body Weight: 160 lb 6.4 oz (72.8 kg) (2/22/22 bed scale per chart review)  % Weight Change (Calculated): -1.7                    BMI Categories: Overweight (BMI 25.0-29. 9)    Estimated Daily Nutrient Needs:  Energy Requirements Based On: Kcal/kg  Weight Used for Energy Requirements: Current  Energy (kcal/day): 8303-9678 kcals/day  Weight Used for Protein Requirements: Current  Protein (g/day): 1.2-1.4 gm/kg =  gm pro/day  Method Used for Fluid Requirements: Other (Comment)  Fluid (ml/day): per MD    Nutrition Diagnosis: · Inadequate oral intake related to  (appetite, headache, dislike of provided foods) as evidenced by intake 0-25%      Nutrition Interventions:   Food and/or Nutrient Delivery: Continue Current Diet,Start Oral Nutrition Supplement  Nutrition Education/Counseling: No recommendation at this time  Coordination of Nutrition Care: Continue to monitor while inpatient       Goals:  Previous Goal Met: No Progress toward Goal(s)  Goals: PO intake 50% or greater,prior to discharge       Nutrition Monitoring and Evaluation:   Behavioral-Environmental Outcomes: None Identified  Food/Nutrient Intake Outcomes: Food and Nutrient Intake,Supplement Intake  Physical Signs/Symptoms Outcomes: Weight,Biochemical Data,Nutrition Focused Physical Findings    Discharge Planning:     Too soon to determine     Sheba Esposito MS, RD, LD  Contact: [phone]

## 2022-05-03 NOTE — PROGRESS NOTES
Daily Progress Note  Neuro Critical Care    Patient Name: Logan Darby  Patient : 1990  Room/Bed: 2934/0207-97  Code Status: full code  Allergies: No Known Allergies    CHIEF COMPLAINT:      headache     INTERVAL HISTORY      Initial Presentation (Admitted 22 @00:45AM): The patient is a 29 y.o.  female who presents to our emergency department 2022 via [facility] as a transfer from [facility] with acute worst headache of her life. Patient does not have any significant past medical history in our chart and denies any. At outlying facility patient underwent CT head without demonstrating diffuse subarachnoid hemorrhage. Patient was seen by telestroke physician in our group recommending transfer to our facility for further work-up. In the emergency department patient states that she was washing TV with her child when she began to have severe pounding headache 10 out of 10 worst in her life. Patient noted to be severely nauseous requiring multiple doses of IV Zofran and IM Phenergan. Patient also admitting to photosensitivity. Patient had significant hypertension at [facility] started on IV nicardipine given 1 g of Keppra 10 mg Decadron. Arrival to our emergency department 140/102, heart rate 77 temperature 97.6. Initial labs PTT 25.3, INR 1.0, WBC 13.7, platelets 969, glucose 139 and high-sensitivity troponin less than 6. 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 Case discussed with senior endovascular fellow who is aware of case and current neurologic exam planning for emergent DSA 2022 early morning. Rosen&De Souza: 2, Modified Combs: grade 3     Hospital Course:    -around 8:00 AM Dr. Jerome Leavitt at bedside placing EVD prior to endovascular neurosurgery. Patient went for urgent DSA found to have a comm aneurysm treated by primary coil embolization.   : Hemodynamically stable for arterial line readings -142, heart rate 82-99 T-max 98.9. Currently receiving María 5 mg every 4 hours as needed and fentanyl 25 every 2 as needed for pain control. 4/20: Blood pressure within parameter -139 overnight Cardene has been held off since 6 PM 4/19/2022 not requiring any as needed IV medications, heart rate 65-79 T-max 98.5. Serum magnesium 2.3 was started on IV magnesium 2 g twice daily will increase to 4 g twice daily continue to trend magnesium daily. Of note yesterday patient started to have increased urine output 9 L total in 24 hours urine studies sent consistent with central salt wasting patient started on hypertonic saline 2% 150 cc bolus followed by 75 cc/h maintenance. Sodium checks every 8 hours with a sodium goal of 145.  4/21: Vitals stable overnight blood pressures trending slightly up although okay to liberalize SBP <160 today. Overnight -152, HR 69-80, tmax 99.3. Labs pending this AM although sodium noted to drop from 137 @17:03 yesterday to 129 @midnight. Patient given 150CC bolus 2% and pending repeat this AM. Will send repeat urine studies and discussed with patient she has been drinking excess water in large water bottle at bedside so to limit today to Gatorade and 1-1.5L max. Headache continues to be stable same as yesterday 6/10 constant pressure and throbbing bifrontal location. Will get repeat TCD today to ensure no increase in vasospasm. No bowel movement X3 days giving dulcolax and milk of mag continue to monitor. protonix 40 and starting decadron 4Q6hr for headache decrease oxy pain med Q6hr.   4/22: Blood pressure goal liberalized to SBP <160 yesterday although pressures trending up still -172 overnight (giving IV labetalol 10mg early AM) , HR 64-83, tmax 98. 6. patient given 1L nacl bolus yesterday following slightly compressible IVC on ultrasound as she continues to have central salt wasting urine output slightly decreasing over last 3 days although 5,989 still yesterday. Discontinue hypertonic 2% 75cc/hr, start NaCl bolus 1L this AM and continue with 125cc/hr. Sodium goal can liberalize to 140 if Na <135 resume 2% hypertonic at 75cc/hr. Still no BM receiving dulcolax, milk of mag and glycolax. Headache improved slightly today 5/10. EVD remains open at 99cjH50 and decreasing output 189 over last 24 hours. Transcranial dopplers reviewed slightly increasing vasospasm on the right velocities up to low 100s Lindegaard ratio up from 2-->3.3.   4/23: EVD to 20cm H2O. EVD output 80cc overnight, high UOP overnight although improved. Headache improving, ambulating well. 4/24: EVD open draining at 59tml63 total output 95cc over last 24 hours. Hypertensive overnight -187, HR 50-72, tmax 98.6. 72 hours of decadron 4mg Q6 completed overnight. Patient complaining of constipation has been on bowel regimen with glycolax, milk of mag and PO dulcolax. Added dulcolax suppository this morning small BM still very uncomfortable thus will add fleets PRN also. TCD's increasing left sided velocities from day prior peak left MCA velocity 156 peak right 160 Lindgard ratio left 3.49 up from 2.54 and right 3.38 down from 3.67. CT head WO this morning prior to toradol. Discussing with NS also when okay to give toradol given EVD in place. 4/25:  Failed EVD clamp trial yesterday increased ICP thus remains open at 25 with total output of 134cc/12 and 214 for the full 24 hours. Severe headache; Topamax 100mg QD and Mag 4g BID added. EVD lowered to 10mmgHg. TCD suggested mild right MCA and basilar spasm. Started on Milrinone infusion. 4/26: Hyponatremic yesterday evening; started on [email or web address] x6h with improvement, switched back to [email or web address] this AM.  Headache improved yesterday evening/overnight but started to become severe again this morning. Will trial Morphine ER 15mg BID x48h, decrease PRN Fentanyl to 25mg Q8h PRN. Increase Topamax to 100mg BID.   Last BM on Saturday, given narcotic usage will trial dose of Movantik. Continues on aggressive bowel regimen and took PRN Milk of Mag overnight. EVD set at [address], ICP 10-17, 331cc out/24h. TCD 4/26 overall stable; mild elevations of right MCA (mean velocities 129, LR 4.07). Continues on Milrinone 0.20mcg/kg/min. 4/27: Sodium 135 this AM, continues to have significant urine output (>6L/24h). TCD improved LR left 2.52 down from 3.4 yesterday and right 2.63 down from 4.07. EVD raised to 15mmHg. Trial Fioricet Q6hr PRN. Fever in evening. UA suggestive of UTI; started on Rocephin.  4/28: No acute events overnight. EVD set at 15mmHg, ICP 5-13, 173cc out/24h. Clinical exam remains stable. Continues to have headaches. Completed 48h course of MS contin 15mg BID, monitor off today. TCD this afternoon stable. Milrinone infusion increased to 0.5mcg/kg/min. No further fevers. Mild uptrend in WBC to 14.6. Leija removed yesterday but patient had recurrent urinary retention overnight requiring straight cath x2 each time with >1L out. Leija catheter replaced this morning due to recurrent retention. Urology consulted. Patient has not had a BM since Sunday (smal BM after enema per nursing). Will obtain KUB. Abdomen soft, non-distended and non-tender. Continue Movantik x2 more doses. Plan to give enema later today. Patient reports her appetite has been OK, states she snacks a lot.    4/29: Pain control issues overnight due to b/l parietal/temporal headache received Depakote, Fioricet and 50ug fentanyl . Persistent nausea and dizziness. 4/30: HA overnight, improved greatly with Verapamil/ibuprofen combination. Bilateral calf cramping. 5/1:Patient started to have bad headaches after mid night, patient on ibuprofen every 800 every 8 as needed, Topamax, received 1 dose of Norflex last night. EVD clamped since yesterday. ICP 4-9, patient had 1 bowel movement last night, requiring 2 doses of Zofran for nausea, No vomiting.   No fevers, blood pressure in 140s.  CT head: 5/1/2022: No significant interval change compared to 4/24/2022. Questionable punctate residual subarachnoid hemorrhage along the high left convexity, Verapamil 60 q 4      5/2:Overnight patient did not have any acute events. This morning patient complained of headache 10/10 intensity, received 1 dose of 50 fentanyl, which helped to relieve the pain. EVD removed. Milrione decreased, medrol dose pack for headache, Neurontin 300 HS    Last 24 hrs:  No acute events overnight. We will resume Lovenox, as per Neuro surgery. DC Liss, encourage ambulation, magnesium goal greater than 2, potassium up to 4, salt tablet 3 times daily to twice daily, milrinone weaned down to 0.25.     CURRENT MEDICATIONS:  SCHEDULED MEDICATIONS:   potassium bicarb-citric acid  40 mEq Oral Once    sodium chloride  1 g Oral BID WC    methylPREDNISolone  4 mg Oral QAM AC    methylPREDNISolone  4 mg Oral Lunch    methylPREDNISolone  4 mg Oral Dinner    methylPREDNISolone  8 mg Oral Nightly    [START ON 5/4/2022] methylPREDNISolone  4 mg Oral Nightly    magnesium sulfate  4,000 mg IntraVENous Daily    gabapentin  300 mg Oral Nightly    topiramate  150 mg Oral BID    cefTRIAXone (ROCEPHIN) IV  1,000 mg IntraVENous Q24H    sodium chloride flush  5-40 mL IntraVENous 2 times per day    sodium chloride flush  5-40 mL IntraVENous 2 times per day    sennosides-docusate sodium  2 tablet Oral Daily    polyethylene glycol  17 g Oral Daily    melatonin  5 mg Oral Nightly    bisacodyl  10 mg Oral Daily    enoxaparin  40 mg SubCUTAneous Daily    atorvastatin  80 mg Oral Nightly    niMODipine  60 mg Oral 6 times per day    sodium chloride flush  5-40 mL IntraVENous 2 times per day     CONTINUOUS INFUSIONS:   milrinone      sodium chloride Stopped (05/03/22 0506)    sodium chloride      sodium chloride      sodium chloride Stopped (04/19/22 2110)     PRN MEDICATIONS:   oxyCODONE **OR** oxyCODONE, acetaminophen, orphenadrine, ibuprofen, sodium chloride flush, sodium chloride, sodium chloride flush, sodium chloride, bisacodyl, melatonin, magnesium hydroxide, ondansetron **OR** ondansetron, sodium chloride flush, sodium chloride    VITALS:  Temperature Range: Temp: 98 °F (36.7 °C) Temp  Av.5 °F (36.9 °C)  Min: 97.9 °F (36.6 °C)  Max: 99.2 °F (37.3 °C)  BP Range: Systolic (46ZYD), QRS:332 , Min:113 , USV:591     Diastolic (66AKB), JHE:46, Min:77, Max:107    Pulse Range: Pulse  Av.8  Min: 69  Max: 96  Respiration Range: Resp  Avg: 15  Min: 14  Max: 16  Current Pulse Ox: SpO2: 97 %  24HR Pulse Ox Range: SpO2  Av.4 %  Min: 91 %  Max: 99 %  Patient Vitals for the past 12 hrs:   BP Temp Temp src Pulse Resp SpO2   22 0902 (!) 142/107 -- -- 75 -- 97 %   22 0833 (!) 124/93 98 °F (36.7 °C) Oral -- -- 98 %   22 0800 -- -- -- 75 -- --   22 0600 (!) 128/91 -- -- 71 -- 97 %   22 0500 122/88 -- -- 70 -- 96 %   22 0400 130/87 97.9 °F (36.6 °C) CORE 72 -- 96 %   22 0300 (!) 140/92 -- -- 77 -- 91 %   22 0200 (!) 128/90 -- -- 69 -- 96 %   22 0100 (!) 139/93 -- -- 77 -- 97 %   22 0000 (!) 135/92 98.1 °F (36.7 °C) CORE 76 16 96 %   22 2300 124/78 -- -- 70 -- 97 %     Estimated body mass index is 29.03 kg/m² as calculated from the following:    Height as of this encounter: 5' 2\" (1.575 m).     Weight as of this encounter: 158 lb 11.7 oz (72 kg).  []<16 Severe malnutrition  []16-16.99 Moderate malnutrition  []17-18.49 Mild malnutrition  []18.5-24.9 Normal  [x]25-29.9 Overweight (not obese)  []30-34.9 Obese class 1 (Low Risk)  []35-39.9 Obese class 2 (Moderate Risk)  []?40 Obese class 3 (High Risk)    RECENT LABS:   Lab Results   Component Value Date    WBC 11.9 (H) 2022    HGB 11.6 (L) 2022    HCT 33.7 (L) 2022     2022    CHOL 182 2022    TRIG 60 2022    HDL 74 2022    ALT 16 2022    AST 15 2022     05/03/2022    K 3.3 (L) 05/03/2022     (H) 05/03/2022    CREATININE 0.81 05/03/2022    BUN 8 05/03/2022    CO2 18 (L) 05/03/2022    TSH 2.18 04/20/2022    INR 0.9 04/18/2022    LABA1C 5.5 04/18/2022     24 HOUR INTAKE/OUTPUT:    Intake/Output Summary (Last 24 hours) at 5/3/2022 1002  Last data filed at 5/3/2022 0946  Gross per 24 hour   Intake 3649.33 ml   Output 3725 ml   Net -75.67 ml     XR ABDOMEN (KUB) (SINGLE AP VIEW)    Result Date: 4/28/2022  EXAMINATION: ONE SUPINE XRAY VIEW(S) OF THE ABDOMEN 4/28/2022 1:34 pm COMPARISON: None. HISTORY: ORDERING SYSTEM PROVIDED HISTORY: constipation TECHNOLOGIST PROVIDED HISTORY: constipation FINDINGS: AP portable view of the chest time stamped at 1324 hours demonstrates mild to moderate colonic stool load. Gas is noted throughout the intestinal tract in a nonobstructive pattern. Overlying monitoring electrodes are noted. No organomegaly is seen. Hemidiaphragms are not included in the field of view. Osseous structures are age-appropriate. Mild to moderate colonic stool load. Nonobstructive bowel gas pattern.      VL TRANSCRANIAL DOPPLER COMPLETE    Result Date: 4/28/2022    [facility]  Vascular Transcranial Procedure   Patient Name   Manuel Silva     Date of Study           04/28/2022                 LINDA KING   Date of Birth  1990   Gender                  Female   Age            29 year(s)   Race                       Room Number    0412         Height:                 62 inch, 157.48 cm   Corporate ID # [id number]     Weight:                 161 pounds, 73 kg   Patient Acct # [de-identified]    BSA:        1.74 m^2    BMI:      29.45 kg/m^2   MR #           [id number]      Sonographer             Arlena Gaucher, T   Accession #    [id number]   Interpreting Physician  Eve Zuniga   Referring                   Referring Physician     Martin Cisneros  Nurse  Practitioner  Additional Comments CLASSIFICATION OF VASOSPASM MEAN MCA VELOCITY ----- MCA/ICA VELOCITY RATIO ------- INTERPRETATION <120 cm/sec --------------------- less than 3 --------------------------- Normal, nonspecific elevation or distal MCA spasm >120 cm/sec ------------------------ 3 to 6 -------------------------------- Mild vasospasm of proximal MCA >160 cm/sec ------------------------ 3 to 6 -------------------------------- Moderate vasospasm of proximal MCA >200 cm/sec -------------------- greater than 6------------------------- Severe vasospasm of proximal MCA. MEAN BASILAR ARTERY VELOCITY------- INTERPRETATION >60 cm/sec -------------------------------------------- Mild BA vasospasm >90 cm/sec -------------------------------------------- Moderate BA vasospasm >120 cm/sec ------------------------------------------ Severe BA vasospasm. Procedure Type of Study:   Cerebral: Transcranial.  Indications for Study:Vasospasm post-subarachnoid hemorrhage. Patient Status: In Patient. Conclusions   Summary   TRANSCRANIAL DOPPLER ULTRASOUND  Date of Service: 4/28/2022   Interpretation: This is bilateral complete TCD with insonation of ALL Cerebral Arteries  via transtemporal, submandibular, transorbital, and transforaminal windows  demonstrating distal left Middle Cerebral Artery elevated mean cerebral  blood flow velocities (MCBFVs) at 116 cm/sec while the right MCA was up to  114 cm/sec. the remaining MCBFVs were within normal limits and the PI were  normal.   The Lindegaard ratio was 3.43 on the right side while it was 3.76 on the  left side.    IMPRESSION:  1) Mild bilateral MCA vasospasm (improved from the previous studies) as  described above   Recommendations   Follow up TCD as per the Neuro ICU Team   Signature   ----------------------------------------------------------------  Electronically signed by Tad Woody RVT(Sonographer) on  04/28/2022 12:04 PM  ----------------------------------------------------------------   ---------------------------------------------------------------- Electronically signed by Dago Tello(Interpreting physician)  on 04/28/2022 09:38 PM  ----------------------------------------------------------------  Findings:   Right Impression:                     Left Impression:  Right Lindegaard Ratio: 3.43          Left Lindegaard Ratio: 3.76  MEAN VELOCITIES:                      MEAN VELOCITIES:  Transtemporal Approach. Transtemporal Approach. Proximal MCA: 97.8 . Proximal MCA: 113  Mid MCA: 114 . Mid MCA: 116  Distal MCA: 97.0 . Distal MCA: 115  Proximal KRISSY: 43.5 . Proximal KRISSY: 103  Mid KRISSY: 51.2 . Mid KRISSY: 105  PCA: 32.7 . PCA: 39.7  T ICA: 42.7                           T ICA: 33.9  Submandibular Approach. Submandibular Approach. D ICA: 33.1 . D ICA: 30.8  Transorbital Approach. Transorbital Approach. Siphon: 50.0 . Siphon: 51.2  Transforamenal Approach. Transforamenal Approach. Vertebral: 38.1 . Vertebral: 36.2   BASILAR:56.2  Risk Factors History +---------+----------+-----------------------------------------------------+ ! Diagnosis! Date      ! Comments                                             ! +---------+----------+-----------------------------------------------------+ ! Other    !04/18/2022! DARIUSOMM A. ANEURYSM 3MM X 2MM                          ! +---------+----------+-----------------------------------------------------+ ! Other    !04/27/2022! Hematocrit 43.1; ICP-10                              ! +---------+----------+-----------------------------------------------------+ ! Other    !04/28/2022! Hematocrit 40.5; ICP 14                              ! +---------+----------+-----------------------------------------------------+   - The patient's risk factor(s) include: arterial hypertension.     VL TRANSCRANIAL DOPPLER COMPLETE    Result Date: 4/27/2022    [facility]  Vascular Transcranial Procedure   Patient Name   Giuliana Snider     Date of Study           04/27/2022                 LINDA KING   Date of Birth  1990   Gender                  Female   Age            29 year(s)   Race                       Room Number    1367         Height:                 62 inch, 157.48 cm   Corporate ID # [id number]     Weight:                 161 pounds, 73 kg   Patient Acct # [de-identified]    BSA:        1.74 m^2    BMI:      29.45 kg/m^2   MR #           [id number]      Sonographer             Bart Medellin RVT   Accession #    [id number]   Interpreting Physician  Melida Mahoney   Referring                   Referring Physician     Delmer Sykes  Nurse  Practitioner  Procedure Type of Study:   Cerebral: Transcranial.  Indications for Study:Vasospasm post-subarachnoid hemorrhage. Patient Status: In Patient. Conclusions   Summary   No evidence of vasospasm in the bilateral MCAs. Mild basilar artery spasm  noted. Signature   ----------------------------------------------------------------  Electronically signed by Bart Medellin RVT(Sonographer) on  04/27/2022 01:53 PM  ----------------------------------------------------------------   ----------------------------------------------------------------  Electronically signed by Michaele Gimenez Reyes,Arthur(Interpreting  physician) on 04/27/2022 08:32 PM  ----------------------------------------------------------------  Findings:   Right Impression:                     Left Impression:  Right Lindegaard Ratio: 2.63          Left Lindegaard Ratio: 2.52  MEAN VELOCITIES:                      MEAN VELOCITIES:  Transtemporal Approach. Transtemporal Approach. Proximal MCA: 97.8 . Proximal MCA: 110  Mid MCA: 116 . Mid MCA: 124  Distal MCA: 110 . Distal MCA: 71.6  Proximal KRISSY: 82.4 .                   Proximal KRISSY: 103  Mid KRISSY: 67.4 . Mid KRISSY: 106  PCA: 38.1 . PCA: 48.9  T ICA: 59.3 . T ICA: 56.6  Submandibular Approach. Submandibular Approach. D ICA: 43.9 . D ICA: 49.3  Transorbital Approach. Transorbital Approach. Siphon: 67.0 . Siphon: 52.7  Transforamenal Approach. Transforamenal Approach. Vertebral: 31.2 . Vertebral: 44.7   BASILAR:69.3  Risk Factors History +---------+----------+-----------------------------------------------------+ ! Diagnosis! Date      ! Comments                                             ! +---------+----------+-----------------------------------------------------+ ! Other    !04/18/2022! ACOMM A. ANEURYSM 3MM X 2MM                          ! +---------+----------+-----------------------------------------------------+ ! Other    !04/27/2022! Hematocrit 43.1; ICP-10                              ! +---------+----------+-----------------------------------------------------+   - The patient's risk factor(s) include: arterial hypertension. Labs and Images reviewed with:  [] Dr. Pablo Wade. [facility]    [] Dr. Stephanie Mcfadden  [x] Dr. Rohan Pérez  [] There are no new interval images to review. Review of Systems   Constitutional: Negative for fever. HENT: Negative for rhinorrhea and sore throat. Eyes: Negative for redness. Respiratory: Negative for shortness of breath. Cardiovascular: Negative for chest pain. Gastrointestinal: Positive for nausea. Negative for abdominal pain and vomiting. Genitourinary: Negative for difficulty urinating and flank pain. Skin: Negative for rash. Allergic/Immunologic: Negative for environmental allergies and food allergies. Neurological: Positive for dizziness and headaches. Negative for syncope, speech difficulty and numbness.    Psychiatric/Behavioral: Negative for agitation and confusion. PHYSICAL EXAM     GENERAL:  Well developed, well nourished, in no acute distress. Nontoxic. No dysarthria. No aphasia. GCS 15  HENT: normocephalic , nose normal, perrla  EYES: no occular discharge, no scleral icterus  NECK: no JVD, no tracheal deviation  CV: Normal S1 S2, no MRG  PULM / CHEST: CTA Bilaterally all fields, no WRR  ABDOMEN: soft, no rigidity  MSK: no gross deformity, no edema, no TTP  NEURO: neuro intact  SKIN: no rash, no erythema, cap refill < 2 sec    NEUROLOGIC:  Mental Status:  A & O x3,awake             Cranial Nerves:    cranial nerves II-XII are grossly intact    Motor Exam:    Drift:  absent  Tone:  normal    Motor exam is symmetrical 5 out of 5 all extremities bilaterally    Sensory:    Touch:    Right Upper Extremity:  normal  Left Upper Extremity:  normal  Right Lower Extremity:  normal  Left Lower Extremity:  normal    Deep Tendon Reflexes:    Right Bicep:  2+  Left Bicep:  2+  Right Knee:  2+  Left Knee:  2+    Plantar Response:  Right:  downgoing  Left:  downgoing    Clonus:  absent  Andino's:  absent    Coordination/Dysmetria:  Heel to Shin:  Right:  normal  Left:  normal  Finger to Nose:   Right:  normal  Left:  normal   Dysdiadochokinesia:  absent    Gait:  normal   SKIN:  no rash      DRAINS:  EVD removed on 5/2/2022    ASSESSMENT AND PLAN:       This is a 29 y.o. female with no significant medical history who initially presented to [facility] ED with thunderclap headache and was found to have diffuse subarachnoid hemorrhage. Underwent emergent EVD placement at bedside 4/18/2022. 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 Neuro ICU course complicated by cerebral salt wasting and mild right MCA vasospasm. NEUROLOGIC:  -CT head: 5/1/2022: No significant interval change compared to 4/24/2022.   Questionable punctate residual subarachnoid hemorrhage along the high left convexity  - Diffuse SAH with IVH secondary to ruptured ACOM aneurysm  - POD/PBD #14 s/p coil embolization of the ruptured ACOM aneurysm  -Continue Nimodipine 60mg Q4h  Melatonin 5 hs   topamax 150 bid  - Mild R MCA vasospasm ? symptomatic with worsening headache, no focal deficits  - Milrinone to 0.25mcg/kg/min  - TCD 4/28 stable  - TCD 4/30: Mild vasospasm noted in left MCA  - Goal -220, avoid symptomatic hypotension  - Obstructive hydrocephalus s/p EVD placement 4/18  - Failed EVD clamp trial 4/24 due to elevated ICP (25) and head pressure  - EVD clamped 4/30  -EVD drain removed  5/2   Lovenox resume  Neurontin 300 at bedtime  Medrol Dosepak  Oxycodone 5 every 6 as needed  -CSF: WBC 10, , glucose 78       CARDIOVASCULAR:  - Goal SBP <180, avoid symptomatic hypotension  - Echo EF 65%, negative bubble  -lipitor 80   -Milrinone 0.25   -Discontinue verapamil  - Continue telemetry     PULMONARY:  - Maintaining O2 sats on room air  - Incentive spirometry     RENAL/FLUID/ELECTROLYTE:  - Normal renal functioning  - BUN5/ Creatinine 0.82    Intake/Output Summary (Last 24 hours) at 5/3/2022 1002  Last data filed at 5/3/2022 0946  Gross per 24 hour   Intake 3649.33 ml   Output 3725 ml   Net -75.67 ml     - Cerebral salt wasting secondary to [facility]  - sodium 141 this AM  - Continue salt tabs 1g BID  - Goal Mag>2.; Mag level 2.2 continue Magnesium IV 4 grams daily   - D/C stevenson  - Replace electrolytes PRN  - Daily BMP  -1 L fluid bolus  -Normal saline 100 mL/h     GI/NUTRITION:  NUTRITION: General Diet  - Tolerating diet well  - Bowel regimen: Continue Dulcolax PO, Glycolax, Senokot-S daily and Milk of Mag PRN  - GI prophylaxis: N/A      ID:  -Tmax 37.2  -WBC 11.9  (12.5)  -CSF culture 4/27 negative to date.  Many neutrophils no bacteria as of this am  - UA 4/27 suggestive of UTI with moderate leukocyte esterase and many bacteria  -Started on Rocephin 1 gram daily for UTI x 5 days, completes 5/3  -Daily CBC     HEME:   - H&H 11.6/39.5, stable  - Platelets 457  - Daily CBC     ENDOCRINE:  - Continue to monitor blood glucose, goal <180     OTHER:  - PT/OT/ST     PROPHYLAXIS:   Stress ulcer: N/A     DVT PROPHYLAXIS:  - SCD sleeves - Thigh High   - Lovenox 40 daily     DISPOSITION:  [x] To remain ICU for close neurological monitoring, EVD management. We will continue to follow along. For any changes in exam or patient status please contact Neuro Critical Care.       Fior Shay MD  Neuro Critical Care  5/3/2022     10:02 AM

## 2022-05-03 NOTE — FLOWSHEET NOTE
SPIRITUAL CARE DEPARTMENT - [facility]  PROGRESS NOTE    Shift date: 5/3/2022  Shift day: Tuesday    Shift # 1     Room # 8154/7621-72   Name: Royal Castanon            Age: 29 y.o. Gender: female          Cheondoism: Non-Christian   Place of Alevism:      Referral: Routine Visit    Admit Date & Time: 4/18/2022 12:19 AM    PATIENT/EVENT DESCRIPTION:  Royal Castanon is a 29 y.o. female           SPIRITUAL ASSESSMENT/INTERVENTION:  Pt's father came ot [place] office asking about ACP paperwork and how it works.  discussed documents and concluded with a prayer. Father very grateful for care. 05/03/22 1400   Encounter Summary   Encounter Overview/Reason  Initial Encounter   Service Provided For: Family   Referral/Consult From: Family   Support System Family members   Last Encounter  05/03/22   Complexity of Encounter Moderate   Begin Time 1400  (APROX)   End Time  1430   Total Time Calculated 30 min   Encounter    Type Family Care   Spiritual/Emotional needs   Type Spiritual Support   Advance Care Planning   Type ACP conversation   Assessment/Intervention/Outcome   Assessment Anxious;Stress overload;Fearful   Intervention Active listening;Discussed belief system/Scientology practices/haydee;Prayer (assurance of)/[place]   Outcome Expressed Gratitude;Expressed feelings of Marquita, Peace and/or Love;Peace   Plan and Referrals   Plan/Referrals Continue to visit, (comment)       SPIRITUAL CARE FOLLOW-UP PLAN:  Chaplains will remain available to offer spiritual and emotional support as needed.          Electronically signed by [address] [place] [address] Resident Zaid Davila, [address] [place] [address] 5/3/2022 at 5:48 PM   [facility]  [phone]

## 2022-05-03 NOTE — CARE COORDINATION
[place] [facility] [place] [facility] Flow/Interdisciplinary Rounds Progress Note    Quality Flow Rounds held on May 3, 2022 at [address] Attending:  Bedside Nurse,  and Nursing Unit Leadership    Barriers to Discharge: clearance    Anticipated Discharge Date:       Anticipated Discharge Disposition:    Readmission Risk              Risk of Unplanned Readmission:  14           Discussed patient goal for the day, patient clinical progression, and barriers to discharge.   The following Goal(s) of the Day/Commitment(s) have been identified:  Activity Progression  per RN EVD removed pt still with complaints of HA, plan is increased activity for today, PT to do treatment, Leija out today, goal is home with family      Nishant Carter RN  May 3, 2022

## 2022-05-04 LAB
ANION GAP SERPL CALCULATED.3IONS-SCNC: 11 MMOL/L (ref 9–17)
BUN BLDV-MCNC: 8 MG/DL (ref 6–20)
CALCIUM SERPL-MCNC: 9.1 MG/DL (ref 8.6–10.4)
CHLORIDE BLD-SCNC: 112 MMOL/L (ref 98–107)
CO2: 19 MMOL/L (ref 20–31)
CREAT SERPL-MCNC: 0.79 MG/DL (ref 0.5–0.9)
GFR AFRICAN AMERICAN: >60 ML/MIN
GFR NON-AFRICAN AMERICAN: >60 ML/MIN
GFR SERPL CREATININE-BSD FRML MDRD: ABNORMAL ML/MIN/{1.73_M2}
GLUCOSE BLD-MCNC: 102 MG/DL (ref 70–99)
HCT VFR BLD CALC: 36.4 % (ref 36.3–47.1)
HEMOGLOBIN: 12.2 G/DL (ref 11.9–15.1)
MAGNESIUM: 2 MG/DL (ref 1.6–2.6)
MCH RBC QN AUTO: 30 PG (ref 25.2–33.5)
MCHC RBC AUTO-ENTMCNC: 33.5 G/DL (ref 28.4–34.8)
MCV RBC AUTO: 89.4 FL (ref 82.6–102.9)
NRBC AUTOMATED: 0 PER 100 WBC
PDW BLD-RTO: 12.1 % (ref 11.8–14.4)
PLATELET # BLD: 299 K/UL (ref 138–453)
PMV BLD AUTO: 10.2 FL (ref 8.1–13.5)
POTASSIUM SERPL-SCNC: 4.3 MMOL/L (ref 3.7–5.3)
RBC # BLD: 4.07 M/UL (ref 3.95–5.11)
SODIUM BLD-SCNC: 142 MMOL/L (ref 135–144)
WBC # BLD: 10.6 K/UL (ref 3.5–11.3)

## 2022-05-04 PROCEDURE — 36415 COLL VENOUS BLD VENIPUNCTURE: CPT

## 2022-05-04 PROCEDURE — 6370000000 HC RX 637 (ALT 250 FOR IP): Performed by: PSYCHIATRY & NEUROLOGY

## 2022-05-04 PROCEDURE — 2060000000 HC ICU INTERMEDIATE R&B

## 2022-05-04 PROCEDURE — 6370000000 HC RX 637 (ALT 250 FOR IP): Performed by: STUDENT IN AN ORGANIZED HEALTH CARE EDUCATION/TRAINING PROGRAM

## 2022-05-04 PROCEDURE — 2580000003 HC RX 258: Performed by: ANESTHESIOLOGY

## 2022-05-04 PROCEDURE — 2580000003 HC RX 258: Performed by: STUDENT IN AN ORGANIZED HEALTH CARE EDUCATION/TRAINING PROGRAM

## 2022-05-04 PROCEDURE — 93886 INTRACRANIAL COMPLETE STUDY: CPT | Performed by: PSYCHIATRY & NEUROLOGY

## 2022-05-04 PROCEDURE — 99233 SBSQ HOSP IP/OBS HIGH 50: CPT | Performed by: PSYCHIATRY & NEUROLOGY

## 2022-05-04 PROCEDURE — 99232 SBSQ HOSP IP/OBS MODERATE 35: CPT | Performed by: PSYCHIATRY & NEUROLOGY

## 2022-05-04 PROCEDURE — 6360000002 HC RX W HCPCS: Performed by: NURSE PRACTITIONER

## 2022-05-04 PROCEDURE — 83735 ASSAY OF MAGNESIUM: CPT

## 2022-05-04 PROCEDURE — 6370000000 HC RX 637 (ALT 250 FOR IP): Performed by: NURSE PRACTITIONER

## 2022-05-04 PROCEDURE — 93886 INTRACRANIAL COMPLETE STUDY: CPT

## 2022-05-04 PROCEDURE — 6360000002 HC RX W HCPCS: Performed by: STUDENT IN AN ORGANIZED HEALTH CARE EDUCATION/TRAINING PROGRAM

## 2022-05-04 PROCEDURE — 80048 BASIC METABOLIC PNL TOTAL CA: CPT

## 2022-05-04 PROCEDURE — 85027 COMPLETE CBC AUTOMATED: CPT

## 2022-05-04 PROCEDURE — 2580000003 HC RX 258: Performed by: INTERNAL MEDICINE

## 2022-05-04 RX ORDER — GABAPENTIN 300 MG/1
600 CAPSULE ORAL NIGHTLY
Status: DISCONTINUED | OUTPATIENT
Start: 2022-05-04 | End: 2022-05-06 | Stop reason: HOSPADM

## 2022-05-04 RX ORDER — LANOLIN ALCOHOL/MO/W.PET/CERES
400 CREAM (GRAM) TOPICAL DAILY
Status: DISCONTINUED | OUTPATIENT
Start: 2022-05-04 | End: 2022-05-06 | Stop reason: HOSPADM

## 2022-05-04 RX ADMIN — SODIUM CHLORIDE, PRESERVATIVE FREE 10 ML: 5 INJECTION INTRAVENOUS at 22:00

## 2022-05-04 RX ADMIN — GABAPENTIN 600 MG: 300 CAPSULE ORAL at 21:58

## 2022-05-04 RX ADMIN — MAGNESIUM GLUCONATE 500 MG ORAL TABLET 400 MG: 500 TABLET ORAL at 09:29

## 2022-05-04 RX ADMIN — METHYLPREDNISOLONE 4 MG: 4 TABLET ORAL at 07:47

## 2022-05-04 RX ADMIN — ACETAMINOPHEN 650 MG: 325 TABLET ORAL at 21:58

## 2022-05-04 RX ADMIN — SODIUM CHLORIDE, PRESERVATIVE FREE 10 ML: 5 INJECTION INTRAVENOUS at 08:04

## 2022-05-04 RX ADMIN — Medication 5 MG: at 21:58

## 2022-05-04 RX ADMIN — ACETAMINOPHEN 650 MG: 325 TABLET ORAL at 07:46

## 2022-05-04 RX ADMIN — METHYLPREDNISOLONE 4 MG: 4 TABLET ORAL at 13:11

## 2022-05-04 RX ADMIN — ENOXAPARIN SODIUM 40 MG: 100 INJECTION SUBCUTANEOUS at 07:48

## 2022-05-04 RX ADMIN — METHYLPREDNISOLONE 4 MG: 4 TABLET ORAL at 21:58

## 2022-05-04 RX ADMIN — NIMODIPINE 60 MG: 30 CAPSULE, LIQUID FILLED ORAL at 17:38

## 2022-05-04 RX ADMIN — OXYCODONE HYDROCHLORIDE 5 MG: 5 TABLET ORAL at 13:11

## 2022-05-04 RX ADMIN — NIMODIPINE 60 MG: 30 CAPSULE, LIQUID FILLED ORAL at 21:59

## 2022-05-04 RX ADMIN — SODIUM CHLORIDE, PRESERVATIVE FREE 10 ML: 5 INJECTION INTRAVENOUS at 08:03

## 2022-05-04 RX ADMIN — METHYLPREDNISOLONE 4 MG: 4 TABLET ORAL at 17:38

## 2022-05-04 RX ADMIN — NIMODIPINE 60 MG: 30 CAPSULE, LIQUID FILLED ORAL at 00:40

## 2022-05-04 RX ADMIN — NIMODIPINE 60 MG: 30 CAPSULE, LIQUID FILLED ORAL at 13:11

## 2022-05-04 RX ADMIN — TOPIRAMATE 150 MG: 100 TABLET, FILM COATED ORAL at 21:58

## 2022-05-04 RX ADMIN — NIMODIPINE 60 MG: 30 CAPSULE, LIQUID FILLED ORAL at 06:14

## 2022-05-04 RX ADMIN — SODIUM CHLORIDE, PRESERVATIVE FREE 5 ML: 5 INJECTION INTRAVENOUS at 08:04

## 2022-05-04 RX ADMIN — SODIUM CHLORIDE, PRESERVATIVE FREE 10 ML: 5 INJECTION INTRAVENOUS at 21:59

## 2022-05-04 RX ADMIN — BISACODYL 10 MG: 5 TABLET, COATED ORAL at 07:47

## 2022-05-04 RX ADMIN — ATORVASTATIN CALCIUM 80 MG: 80 TABLET, FILM COATED ORAL at 21:58

## 2022-05-04 RX ADMIN — NIMODIPINE 60 MG: 30 CAPSULE, LIQUID FILLED ORAL at 09:28

## 2022-05-04 ASSESSMENT — ENCOUNTER SYMPTOMS
RHINORRHEA: 0
SORE THROAT: 0
ABDOMINAL PAIN: 0
VOMITING: 0
EYE REDNESS: 0
SHORTNESS OF BREATH: 0

## 2022-05-04 ASSESSMENT — PAIN DESCRIPTION - LOCATION
LOCATION: HEAD
LOCATION: HEAD

## 2022-05-04 ASSESSMENT — PAIN SCALES - GENERAL
PAINLEVEL_OUTOF10: 6
PAINLEVEL_OUTOF10: 2
PAINLEVEL_OUTOF10: 1
PAINLEVEL_OUTOF10: 5

## 2022-05-04 ASSESSMENT — PAIN DESCRIPTION - DESCRIPTORS
DESCRIPTORS: THROBBING
DESCRIPTORS: THROBBING

## 2022-05-04 NOTE — PROGRESS NOTES
Daily Progress Note  Neuro Critical Care    Patient Name: Gabriela Winters  Patient : 1990  Room/Bed: 5986/5185-92  Code Status: full code  Allergies: No Known Allergies    CHIEF COMPLAINT:      headache     INTERVAL HISTORY      Initial Presentation (Admitted 22 @00:45AM): The patient is a 29 y.o.  female who presents to our emergency department 2022 via [address] as a transfer from [facility] with acute worst headache of her life. Patient does not have any significant past medical history in our chart and denies any. At outlying facility patient underwent CT head without demonstrating diffuse subarachnoid hemorrhage. Patient was seen by telestroke physician in our group recommending transfer to our facility for further work-up. In the emergency department patient states that she was washing TV with her child when she began to have severe pounding headache 10 out of 10 worst in her life. Patient noted to be severely nauseous requiring multiple doses of IV Zofran and IM Phenergan. Patient also admitting to photosensitivity. Patient had significant hypertension at [place] started on IV nicardipine given 1 g of Keppra 10 mg Decadron. Arrival to our emergency department 140/102, heart rate 77 temperature 97.6. Initial labs PTT 25.3, INR 1.0, WBC 13.7, platelets 846, glucose 139 and high-sensitivity troponin less than 6. 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 Case discussed with senior endovascular fellow who is aware of case and current neurologic exam planning for emergent DSA 2022 early morning. Rosen&De Souza: 2, Modified Combs: grade 3     Hospital Course:    -around 8:00 AM Dr. Denae Downing at bedside placing EVD prior to endovascular neurosurgery. Patient went for urgent DSA found to have a comm aneurysm treated by primary coil embolization.   : Hemodynamically stable for arterial line readings -142, heart rate 82-99 T-max 98.9. Currently receiving María 5 mg every 4 hours as needed and fentanyl 25 every 2 as needed for pain control. 4/20: Blood pressure within parameter -139 overnight Cardene has been held off since 6 PM 4/19/2022 not requiring any as needed IV medications, heart rate 65-79 T-max 98.5. Serum magnesium 2.3 was started on IV magnesium 2 g twice daily will increase to 4 g twice daily continue to trend magnesium daily. Of note yesterday patient started to have increased urine output 9 L total in 24 hours urine studies sent consistent with central salt wasting patient started on hypertonic saline 2% 150 cc bolus followed by 75 cc/h maintenance. Sodium checks every 8 hours with a sodium goal of 145.  4/21: Vitals stable overnight blood pressures trending slightly up although okay to liberalize SBP <160 today. Overnight -152, HR 69-80, tmax 99.3. Labs pending this AM although sodium noted to drop from 137 @17:03 yesterday to 129 @midnight. Patient given 150CC bolus 2% and pending repeat this AM. Will send repeat urine studies and discussed with patient she has been drinking excess water in large water bottle at bedside so to limit today to Gatorade and 1-1.5L max. Headache continues to be stable same as yesterday 6/10 constant pressure and throbbing bifrontal location. Will get repeat TCD today to ensure no increase in vasospasm. No bowel movement X3 days giving dulcolax and milk of mag continue to monitor. protonix 40 and starting decadron 4Q6hr for headache decrease oxy pain med Q6hr.   4/22: Blood pressure goal liberalized to SBP <160 yesterday although pressures trending up still -172 overnight (giving IV labetalol 10mg early AM) , HR 64-83, tmax 98. 6. patient given 1L nacl bolus yesterday following slightly compressible IVC on ultrasound as she continues to have central salt wasting urine output slightly decreasing over last 3 days although 5,989 still yesterday. Discontinue hypertonic 2% 75cc/hr, start NaCl bolus 1L this AM and continue with 125cc/hr. Sodium goal can liberalize to 140 if Na <135 resume 2% hypertonic at 75cc/hr. Still no BM receiving dulcolax, milk of mag and glycolax. Headache improved slightly today 5/10. EVD remains open at [id number] and decreasing output 189 over last 24 hours. Transcranial dopplers reviewed slightly increasing vasospasm on the right velocities up to low 100s Lindegaard ratio up from 2-->3.3.   4/23: EVD to 20cm H2O. EVD output 80cc overnight, high UOP overnight although improved. Headache improving, ambulating well. 4/24: EVD open draining at 60tad20 total output 95cc over last 24 hours. Hypertensive overnight -187, HR 50-72, tmax 98.6. 72 hours of decadron 4mg Q6 completed overnight. Patient complaining of constipation has been on bowel regimen with glycolax, milk of mag and PO dulcolax. Added dulcolax suppository this morning small BM still very uncomfortable thus will add fleets PRN also. TCD's increasing left sided velocities from day prior peak left MCA velocity 156 peak right 160 Lindgard ratio left 3.49 up from 2.54 and right 3.38 down from 3.67. CT head WO this morning prior to toradol. Discussing with NS also when okay to give toradol given EVD in place. 4/25:  Failed EVD clamp trial yesterday increased ICP thus remains open at 25 with total output of 134cc/12 and 214 for the full 24 hours. Severe headache; Topamax 100mg QD and Mag 4g BID added. EVD lowered to 10mmgHg. TCD suggested mild right MCA and basilar spasm. Started on Milrinone infusion. 4/26: Hyponatremic yesterday evening; started on [email or web address] x6h with improvement, switched back to [email or web address] this AM.  Headache improved yesterday evening/overnight but started to become severe again this morning. Will trial Morphine ER 15mg BID x48h, decrease PRN Fentanyl to 25mg Q8h PRN. Increase Topamax to 100mg BID.   Last BM on Saturday, given narcotic usage will trial dose of Movantik. Continues on aggressive bowel regimen and took PRN Milk of Mag overnight. EVD set at [address] [place], ICP 10-17, 331cc out/24h. TCD 4/26 overall stable; mild elevations of right MCA (mean velocities 129, LR 4.07). Continues on Milrinone 0.20mcg/kg/min. 4/27: Sodium 135 this AM, continues to have significant urine output (>6L/24h). TCD improved LR left 2.52 down from 3.4 yesterday and right 2.63 down from 4.07. EVD raised to 15mmHg. Trial Fioricet Q6hr PRN. Fever in evening. UA suggestive of UTI; started on Rocephin.  4/28: No acute events overnight. EVD set at 15mmHg, ICP 5-13, 173cc out/24h. Clinical exam remains stable. Continues to have headaches. Completed 48h course of MS contin 15mg BID, monitor off today. TCD this afternoon stable. Milrinone infusion increased to 0.5mcg/kg/min. No further fevers. Mild uptrend in WBC to 14.6. Leija removed yesterday but patient had recurrent urinary retention overnight requiring straight cath x2 each time with >1L out. Leija catheter replaced this morning due to recurrent retention. Urology consulted. Patient has not had a BM since Sunday (smal BM after enema per nursing). Will obtain KUB. Abdomen soft, non-distended and non-tender. Continue Movantik x2 more doses. Plan to give enema later today. Patient reports her appetite has been OK, states she snacks a lot.    4/29: Pain control issues overnight due to b/l parietal/temporal headache received Depakote, Fioricet and 50ug fentanyl . Persistent nausea and dizziness. 4/30: HA overnight, improved greatly with Verapamil/ibuprofen combination. Bilateral calf cramping. 5/1:Patient started to have bad headaches after mid night, patient on ibuprofen every 800 every 8 as needed, Topamax, received 1 dose of Norflex last night. EVD clamped since yesterday. ICP 4-9, patient had 1 bowel movement last night, requiring 2 doses of Zofran for nausea, No vomiting.   No fevers, blood pressure in 140s.  CT head: 5/1/2022: No significant interval change compared to 4/24/2022. Questionable punctate residual subarachnoid hemorrhage along the high left convexity, Verapamil 60 q 4      5/2:Overnight patient did not have any acute events. This morning patient complained of headache 10/10 intensity, received 1 dose of 50 fentanyl, which helped to relieve the pain. EVD removed. Milrione decreased, medrol dose pack for headache, Neurontin 300 HS  5/3: We will resume Lovenox, as per Neuro surgery. DC Leija, encourage ambulation, magnesium goal greater than 2, potassium up to 4, salt tablet 3 times daily to twice daily, milrinone weaned down to 0.25. Last 24 hrs:  No acute events overnight.   DC milrinone, decrease fluid to 70, Topamax 150 at bedtime, Neurontin 600 at bedtime, magnesium oxide, limited use of oxycodone      CURRENT MEDICATIONS:  SCHEDULED MEDICATIONS:   topiramate  150 mg Oral Nightly    gabapentin  600 mg Oral Nightly    magnesium oxide  400 mg Oral Daily    methylPREDNISolone  4 mg Oral QAM AC    methylPREDNISolone  4 mg Oral Lunch    methylPREDNISolone  4 mg Oral Dinner    methylPREDNISolone  4 mg Oral Nightly    sodium chloride flush  5-40 mL IntraVENous 2 times per day    sodium chloride flush  5-40 mL IntraVENous 2 times per day    sennosides-docusate sodium  2 tablet Oral Daily    polyethylene glycol  17 g Oral Daily    melatonin  5 mg Oral Nightly    bisacodyl  10 mg Oral Daily    enoxaparin  40 mg SubCUTAneous Daily    atorvastatin  80 mg Oral Nightly    niMODipine  60 mg Oral 6 times per day    sodium chloride flush  5-40 mL IntraVENous 2 times per day     CONTINUOUS INFUSIONS:   sodium chloride 100 mL/hr at 05/03/22 2200    sodium chloride      sodium chloride      sodium chloride Stopped (04/19/22 2110)     PRN MEDICATIONS:   oxyCODONE **OR** oxyCODONE, acetaminophen, orphenadrine, ibuprofen, sodium chloride flush, sodium chloride, sodium chloride flush, sodium chloride, bisacodyl, melatonin, magnesium hydroxide, ondansetron **OR** ondansetron, sodium chloride flush, sodium chloride    VITALS:  Temperature Range: Temp: 98.3 °F (36.8 °C) Temp  Av.4 °F (36.9 °C)  Min: 98.3 °F (36.8 °C)  Max: 98.6 °F (37 °C)  BP Range: Systolic (65CAT), GDA:803 , Min:120 , SPO:884     Diastolic (65QPY), PXH:42, Min:71, Max:107    Pulse Range: Pulse  Av.5  Min: 63  Max: 98  Respiration Range: No data recorded  Current Pulse Ox: SpO2: 100 %  24HR Pulse Ox Range: SpO2  Av.3 %  Min: 94 %  Max: 100 %  Patient Vitals for the past 12 hrs:   BP Temp Temp src Pulse SpO2   22 0800 (!) 137/90 98.3 °F (36.8 °C) Oral 80 100 %   22 0700 123/77 -- -- 91 98 %   22 0600 (!) 134/93 -- -- 74 97 %   22 0500 132/87 -- -- 71 97 %   22 0400 135/84 98.5 °F (36.9 °C) Oral 63 97 %   22 0300 (!) 131/90 -- -- 65 96 %   22 0200 120/85 -- -- 67 96 %   22 0100 (!) 132/96 -- -- 66 --   22 0000 (!) 132/99 -- -- 64 98 %   22 2300 125/71 -- -- 65 98 %   22 2200 126/81 -- -- 70 97 %   22 2100 -- -- -- 79 100 %     Estimated body mass index is 29.03 kg/m² as calculated from the following:    Height as of this encounter: 5' 2\" (1.575 m).     Weight as of this encounter: 158 lb 11.7 oz (72 kg).  []<16 Severe malnutrition  []16-16.99 Moderate malnutrition  []17-18.49 Mild malnutrition  []18.5-24.9 Normal  [x]25-29.9 Overweight (not obese)  []30-34.9 Obese class 1 (Low Risk)  []35-39.9 Obese class 2 (Moderate Risk)  []?40 Obese class 3 (High Risk)    RECENT LABS:   Lab Results   Component Value Date    WBC 10.6 2022    HGB 12.2 2022    HCT 36.4 2022     2022    CHOL 182 2022    TRIG 60 2022    HDL 74 2022    ALT 16 2022    AST 15 2022     2022    K 4.3 2022     (H) 2022    CREATININE 0.79 2022    BUN 8 2022    CO2 19 (L) 2022    TSH 2.18 04/20/2022    INR 0.9 04/18/2022    LABA1C 5.5 04/18/2022     24 HOUR INTAKE/OUTPUT:    Intake/Output Summary (Last 24 hours) at 5/4/2022 0849  Last data filed at 5/3/2022 2200  Gross per 24 hour   Intake 2286.85 ml   Output 1250 ml   Net 1036.85 ml     XR ABDOMEN (KUB) (SINGLE AP VIEW)    Result Date: 4/28/2022  EXAMINATION: ONE SUPINE XRAY VIEW(S) OF THE ABDOMEN 4/28/2022 1:34 pm COMPARISON: None. HISTORY: ORDERING SYSTEM PROVIDED HISTORY: constipation TECHNOLOGIST PROVIDED HISTORY: constipation FINDINGS: AP portable view of the chest time stamped at 1324 hours demonstrates mild to moderate colonic stool load. Gas is noted throughout the intestinal tract in a nonobstructive pattern. Overlying monitoring electrodes are noted. No organomegaly is seen. Hemidiaphragms are not included in the field of view. Osseous structures are age-appropriate. Mild to moderate colonic stool load. Nonobstructive bowel gas pattern.      VL TRANSCRANIAL DOPPLER COMPLETE    Result Date: 4/28/2022    [facility]  Vascular Transcranial Procedure   Patient Name   Karthikeyan Felipe     Date of Study           04/28/2022                 LINDA KING   Date of Birth  1990   Gender                  Female   Age            29 year(s)   Race                       Room Number    3823         Height:                 62 inch, 157.48 cm   Corporate ID # [id number]     Weight:                 161 pounds, 73 kg   Patient Acct # [de-identified]    BSA:        1.74 m^2    BMI:      29.45 kg/m^2   MR #           [id number]      Sonographer             Keith Carver, T   Accession #    [id number]   Interpreting Physician  [address] [place] [address]   Referring                   Referring Physician     Bernarda Nolan  Nurse  Practitioner  Additional Comments CLASSIFICATION OF VASOSPASM MEAN MCA VELOCITY ----- MCA/ICA VELOCITY RATIO ------- INTERPRETATION <120 cm/sec --------------------- less than 3 --------------------------- Normal, nonspecific elevation or distal MCA spasm >120 cm/sec ------------------------ 3 to 6 -------------------------------- Mild vasospasm of proximal MCA >160 cm/sec ------------------------ 3 to 6 -------------------------------- Moderate vasospasm of proximal MCA >200 cm/sec -------------------- greater than 6------------------------- Severe vasospasm of proximal MCA. MEAN BASILAR ARTERY VELOCITY------- INTERPRETATION >60 cm/sec -------------------------------------------- Mild BA vasospasm >90 cm/sec -------------------------------------------- Moderate BA vasospasm >120 cm/sec ------------------------------------------ Severe BA vasospasm. Procedure Type of Study:   Cerebral: Transcranial.  Indications for Study:Vasospasm post-subarachnoid hemorrhage. Patient Status: In Patient. Conclusions   Summary   TRANSCRANIAL DOPPLER ULTRASOUND  Date of Service: 4/28/2022   Interpretation: This is bilateral complete TCD with insonation of ALL Cerebral Arteries  via transtemporal, submandibular, transorbital, and transforaminal windows  demonstrating distal left Middle Cerebral Artery elevated mean cerebral  blood flow velocities (MCBFVs) at 116 cm/sec while the right MCA was up to  114 cm/sec. the remaining MCBFVs were within normal limits and the PI were  normal.   [facility] ratio was 3.43 on the right side while it was 3.76 on the  left side.    IMPRESSION:  1) Mild bilateral MCA vasospasm (improved from the previous studies) as  described above   Recommendations   Follow up TCD as per the Neuro ICU Team   Signature   ----------------------------------------------------------------  Electronically signed by Keith Carver RVT(Sonographer) on  04/28/2022 12:04 PM  ----------------------------------------------------------------   ----------------------------------------------------------------  Electronically signed by Dago Tello(Interpreting physician)  on 04/28/2022 09:38 PM  ---------------------------------------------------------------- Findings:   Right Impression:                     Left Impression:  Right Lindegaard Ratio: 3.43          Left Lindegaard Ratio: 3.76  MEAN VELOCITIES:                      MEAN VELOCITIES:  Transtemporal Approach. Transtemporal Approach. Proximal MCA: 97.8 . Proximal MCA: 113  Mid MCA: 114 . Mid MCA: 116  Distal MCA: 97.0 . Distal MCA: 115  Proximal KRISSY: 43.5 . Proximal KRISSY: 103  Mid KRISSY: 51.2 . Mid KRISSY: 105  PCA: 32.7 . PCA: 39.7  T ICA: 42.7                           T ICA: 33.9  Submandibular Approach. Submandibular Approach. D ICA: 33.1 . D ICA: 30.8  Transorbital Approach. Transorbital Approach. Siphon: 50.0 . Siphon: 51.2  Transforamenal Approach. Transforamenal Approach. Vertebral: 38.1 . Vertebral: 36.2   BASILAR:56.2  Risk Factors History +---------+----------+-----------------------------------------------------+ ! Diagnosis! Date      ! Comments                                             ! +---------+----------+-----------------------------------------------------+ ! Other    !04/18/2022! ACOMM A. ANEURYSM 3MM X 2MM                          ! +---------+----------+-----------------------------------------------------+ ! Other    !04/27/2022! Hematocrit 43.1; ICP-10                              ! +---------+----------+-----------------------------------------------------+ ! Other    !04/28/2022! Hematocrit 40.5; ICP 14                              ! +---------+----------+-----------------------------------------------------+   - The patient's risk factor(s) include: arterial hypertension.     VL TRANSCRANIAL DOPPLER COMPLETE    Result Date: 4/27/2022    [facility]  Vascular Transcranial Procedure   Patient Name   Bea Cabrera     Date of Study 04/27/2022                 LINDA KING   Date of Birth  1990   Gender                  Female   Age            29 year(s)   Race                       Room Number    0528         Height:                 62 inch, 157.48 cm   Corporate ID # [id number]     Weight:                 161 pounds, 73 kg   Patient Acct # [de-identified]    BSA:        1.74 m^2    BMI:      29.45 kg/m^2   MR #           [id number]      Sonographer             Paul Morris RVT   Accession #    [id number]   Interpreting Physician  Lyudmila Becerril   Referring                   Referring Physician     Sueann Skiff  Nurse  Practitioner  Procedure Type of Study:   Cerebral: Transcranial.  Indications for Study:Vasospasm post-subarachnoid hemorrhage. Patient Status: In Patient. Conclusions   Summary   No evidence of vasospasm in the bilateral MCAs. Mild basilar artery spasm  noted. Signature   ----------------------------------------------------------------  Electronically signed by Paul Morris RVT(Sonographer) on  04/27/2022 01:53 PM  ----------------------------------------------------------------   ----------------------------------------------------------------  Electronically signed by Florance Seip Reyes,Arthur(Interpreting  physician) on 04/27/2022 08:32 PM  ----------------------------------------------------------------  Findings:   Right Impression:                     Left Impression:  Right Lindegaard Ratio: 2.63          Left Lindegaard Ratio: 2.52  MEAN VELOCITIES:                      MEAN VELOCITIES:  Transtemporal Approach. Transtemporal Approach. Proximal MCA: 97.8 . Proximal MCA: 110  Mid MCA: 116 . Mid MCA: 124  Distal MCA: 110 . Distal MCA: 71.6  Proximal KRISSY: 82.4 . Proximal KRISSY: 103  Mid KRISSY: 67.4 . Mid KRISSY: 106  PCA: 38.1 . PCA: 48.9  T ICA: 59.3 .                          T ICA: 56.6 Submandibular Approach. Submandibular Approach. D ICA: 43.9 . D ICA: 49.3  Transorbital Approach. Transorbital Approach. Siphon: 67.0 . Siphon: 52.7  Transforamenal Approach. Transforamenal Approach. Vertebral: 31.2 . Vertebral: 44.7   BASILAR:69.3  Risk Factors History +---------+----------+-----------------------------------------------------+ ! Diagnosis! Date      ! Comments                                             ! +---------+----------+-----------------------------------------------------+ ! Other    !04/18/2022! ACOMM A. ANEURYSM 3MM X 2MM                          ! +---------+----------+-----------------------------------------------------+ ! Other    !04/27/2022! Hematocrit 43.1; ICP-10                              ! +---------+----------+-----------------------------------------------------+   - The patient's risk factor(s) include: arterial hypertension. Labs and Images reviewed with:  [] Dr. Jericho Jeffries. [place]    [] Dr. Osiel Brady  [x] Dr. Danae Romero  [] There are no new interval images to review. Review of Systems   Constitutional: Negative for fever. HENT: Negative for rhinorrhea and sore throat. Eyes: Negative for redness. Respiratory: Negative for shortness of breath. Cardiovascular: Negative for chest pain. Gastrointestinal: Negative for abdominal pain and vomiting. Genitourinary: Negative for difficulty urinating and flank pain. Skin: Negative for rash. Allergic/Immunologic: Negative for environmental allergies and food allergies. Neurological: Positive for headaches. Negative for syncope, speech difficulty and numbness. Psychiatric/Behavioral: Negative for agitation and confusion. PHYSICAL EXAM     GENERAL:  Well developed, well nourished, in no acute distress. Nontoxic. No dysarthria. No aphasia.  GCS 15  HENT: normocephalic , nose normal, perrla  EYES: no occular discharge, no scleral icterus  NECK: no JVD, no tracheal deviation  CV: Normal S1 S2, no MRG  PULM / CHEST: CTA Bilaterally all fields, no WRR  ABDOMEN: soft, no rigidity  MSK: no gross deformity, no edema, no TTP  NEURO: neuro intact  SKIN: no rash, no erythema, cap refill < 2 sec    NEUROLOGIC:  Mental Status:  A & O x3,awake             Cranial Nerves:    cranial nerves II-XII are grossly intact    Motor Exam:    Drift:  absent  Tone:  normal    Motor exam is symmetrical 5 out of 5 all extremities bilaterally    Sensory:    Touch:    Right Upper Extremity:  normal  Left Upper Extremity:  normal  Right Lower Extremity:  normal  Left Lower Extremity:  normal    Deep Tendon Reflexes:    Right Bicep:  2+  Left Bicep:  2+  Right Knee:  2+  Left Knee:  2+    Plantar Response:  Right:  downgoing  Left:  downgoing    Clonus:  absent  Andino's:  absent    Coordination/Dysmetria:  Heel to Shin:  Right:  normal  Left:  normal  Finger to Nose:   Right:  normal  Left:  normal   Dysdiadochokinesia:  absent    Gait:  normal   SKIN:  no rash      DRAINS:  EVD removed on 5/2/2022    ASSESSMENT AND PLAN:       This is a 29 y.o. female with no significant medical history who initially presented to [facility] ED with thunderclap headache and was found to have diffuse subarachnoid hemorrhage. Underwent emergent EVD placement at bedside 4/18/2022. 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 Neuro ICU course complicated by cerebral salt wasting and mild right MCA vasospasm. NEUROLOGIC:  -CT head: 5/1/2022: No significant interval change compared to 4/24/2022.   Questionable punctate residual subarachnoid hemorrhage along the high left convexity  - Diffuse SAH with IVH secondary to ruptured ACOM aneurysm  - POD/PBD #15 s/p coil embolization of the ruptured ACOM aneurysm  -Continue Nimodipine 60mg Q4h  Melatonin 5 hs   topamax 150 HS   - Mild R MCA vasospasm ?symptomatic with worsening headache, no focal deficits  - TCD 4/28 stable  - TCD 4/30: Mild vasospasm noted in left MCA  - Obstructive hydrocephalus s/p EVD placement 4/18  - Failed EVD clamp trial 4/24 due to elevated ICP (25) and head pressure  - EVD clamped 4/30  -EVD drain removed  5/2   Lovenox resume  Neurontin 600 at bedtime  Medrol Dosepak  Oxycodone 5 every 6 as needed, try to limit the use   -CSF: WBC 10, , glucose 78       CARDIOVASCULAR:  - Goal SBP <180, avoid symptomatic hypotension  - Echo EF 65%, negative bubble  -lipitor 80   -Milrinone  DC  -Discontinue verapamil  - Continue telemetry     PULMONARY:  - Maintaining O2 sats on room air  - Incentive spirometry     RENAL/FLUID/ELECTROLYTE:  - Normal renal functioning  - BUN 8/ Creatinine 0.79    Intake/Output Summary (Last 24 hours) at 5/4/2022 0849  Last data filed at 5/3/2022 2200  Gross per 24 hour   Intake 2286.85 ml   Output 1250 ml   Net 1036.85 ml     - Cerebral salt wasting secondary to [facility]  - sodium 142 this AM  - Continue salt tabs 1g BID  - Goal Mag>2.; Mag level 2.2   - D/C stevenson on 5/3  - Replace electrolytes PRN  - Daily BMP  -1 L fluid bolus  -Normal saline 75 mL/h     GI/NUTRITION:  NUTRITION: General Diet  - Tolerating diet well  - Bowel regimen: Continue Dulcolax PO, Glycolax, Senokot-S daily and Milk of Mag PRN  - GI prophylaxis: N/A      ID:  -Tmax 37.2  -WBC 11.9  (12.5)  -CSF culture 4/27 negative to date.  Many neutrophils no bacteria as of this am  - UA 4/27 suggestive of UTI with moderate leukocyte esterase and many bacteria  -Started on Rocephin 1 gram daily for UTI x 5 days, completes 5/3  -Daily CBC     HEME:   - H&H 12.2/36.5, stable  - Platelets 338  - Daily CBC     ENDOCRINE:  - Continue to monitor blood glucose, goal <180     OTHER:  - PT/OT/ST     PROPHYLAXIS:   Stress ulcer: N/A     DVT PROPHYLAXIS:  - SCD sleeves - Thigh High   - Lovenox 40 daily     DISPOSITION:  [x] To remain ICU for close neurological monitoring, EVD management. We will continue to follow along. For any changes in exam or patient status please contact Neuro Critical Care.       Corrina Cox MD  Neuro Critical Care  5/4/2022     8:49 AM

## 2022-05-04 NOTE — PROGRESS NOTES
Endovascular Neurosurgery Progress Note    SUBJECTIVE:   Headaches overnight. Patient was complaining of headache this am, 5/10. Review of Systems:  CONSTITUTIONAL:  negative for fevers, chills, fatigue and malaise    EYES:  negative for double vision, blurred vision and photophobia     HEENT:  negative for tinnitus, epistaxis and sore throat    RESPIRATORY:  negative for cough, shortness of breath, wheezing    CARDIOVASCULAR:  negative for chest pain, palpitations, syncope, edema    GASTROINTESTINAL:  negative for nausea, vomiting    GENITOURINARY:  negative for incontinence    MUSCULOSKELETAL:  negative for neck or back pain    NEUROLOGICAL:  Negative for weakness and tingling  negative for headaches and dizziness    PSYCHIATRIC:  negative for anxiety      Review of systems otherwise negative. OBJECTIVE:     Vitals:    05/04/22 1518   BP: 132/78   Pulse: 62   Resp:    Temp:    SpO2:         General:  Gen: normal habitus, NAD  HEENT: EVD in place. mucosa moist  Cvs: RRR, S1 S2 normal  Resp: symmetric unlabored breathing  Abd: s/nd/nt  Ext: no edema  Skin: no lesions seen, warm and dry    Neuro:   Gen: awake and alert, oriented x3. Lang/speech: no aphasia. No dysarthria. Follows commands. CN: PERRL, EOMI, VFF, V1-3 intact, face symmetric, hearing intact, shoulder shrug symmetric, tongue midline  Motor: grossly 5/5 UE and LE b/l  Sense: LT intact in all 4 ext. Coord: FTN and HTS intact b/l  DTR: deferred  Gait: deferred    NIH Stroke Scale:   1a  Level of consciousness: 0 - alert; keenly responsive   1b. LOC questions:  0 - answers both questions correctly   1c. LOC commands: 0 - performs both tasks correctly   2. Best Gaze: 0 - normal   3. Visual: 0 - no visual loss   4. Facial Palsy: 0 - normal symmetric movement   5a. Motor left arm: 0 - no drift, limb holds 90 (or 45) degrees for full 10 seconds   5b. Motor right arm: 0 - no drift, limb holds 90 (or 45) degrees for full 10 seconds   6a.  Motor left le - no drift; leg holds 30 degree position for full 5 seconds   6b  Motor right le - no drift; leg holds 30 degree position for full 5 seconds   7. Limb Ataxia: 0 - absent   8. Sensory: 0 - normal; no sensory loss   9. Best Language:  0 - no aphasia, normal   10. Dysarthria: 0 - normal   11. Extinction and Inattention: 0 - no abnormality         Total:   0     MRS: 0      LABS:   Reviewed. Lab Results   Component Value Date    HGB 12.2 2022    WBC 10.6 2022     2022     2022    BUN 8 2022    CREATININE 0.79 2022    AST 15 2022    ALT 16 2022    MG 2.0 2022    APTT 22.6 2022    INR 0.9 2022      Lab Results   Component Value Date    COVID19 Not Detected 2022    COVID19 Not Detected 2022       RADIOLOGY:   Images were personally reviewed including:   CT head wo con 2022  1. No significant interval change compared to 2021.  Again seen right   frontal approach ventriculostomy with similar-appearing ventricular system   size and morphology. 2. Questionable punctate residual subarachnoid hemorrhage along the high left   frontal convexity. tcd 2022  Mild vasospasm L 's, R 's LR 4.3 b/l. Stable. CT head 2022:   Significant reduction with trace residual scattered subarachnoid hemorrhage.       Stable positioning of a right frontal EVD, as well as stable caliber and   configuration of the ventricles. 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 --the above treated with Smart coil embolization del3Vu 1. Smart coil x1 opened but not detached. --Prominent musculocutaneous branch from the left V2 segment that anastomoses to the left occipital artery.     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 PBD: 16    Patient complains of moderate to severe headaches, non-focal neuro exam. 4/24/2022 clamp trial failed. 4/24/2022 CT head stable, reduced SAH. 4/30/2022 TCD mild vasospasm b/l MCA. Issues of urine retention/uti, b/l leg cramping. 4/30/2022 clamp trial. Ct head 5/1/2022 stable. EVD removed on 05/02. Milrinone stopped, Medrol pack for headache  PLAN:   --SBP goal 120-220 mm hg   --SAH management with Nimodipine, statin, milrinone  --Mg level 3-4 last Mg 2.2  --f/up with Dr. Tadeo John in 2 weeks after discharge and f/up with Dr. Terri Art in 3 months after discharge    Case discussed with Dr. Terri Art attending.     Deysi Rodriguez MD  Stroke, [facility]  [address]  Electronically signed 5/4/2022 at 5:12 PM

## 2022-05-05 LAB
ANION GAP SERPL CALCULATED.3IONS-SCNC: 10 MMOL/L (ref 9–17)
BUN BLDV-MCNC: 11 MG/DL (ref 6–20)
CALCIUM SERPL-MCNC: 9.2 MG/DL (ref 8.6–10.4)
CHLORIDE BLD-SCNC: 109 MMOL/L (ref 98–107)
CO2: 20 MMOL/L (ref 20–31)
CREAT SERPL-MCNC: 0.77 MG/DL (ref 0.5–0.9)
CULTURE: NORMAL
DIRECT EXAM: NORMAL
GFR AFRICAN AMERICAN: >60 ML/MIN
GFR NON-AFRICAN AMERICAN: >60 ML/MIN
GFR SERPL CREATININE-BSD FRML MDRD: ABNORMAL ML/MIN/{1.73_M2}
GLUCOSE BLD-MCNC: 103 MG/DL (ref 70–99)
HCT VFR BLD CALC: 37.9 % (ref 36.3–47.1)
HEMOGLOBIN: 12.6 G/DL (ref 11.9–15.1)
MAGNESIUM: 2 MG/DL (ref 1.6–2.6)
MCH RBC QN AUTO: 29.6 PG (ref 25.2–33.5)
MCHC RBC AUTO-ENTMCNC: 33.2 G/DL (ref 28.4–34.8)
MCV RBC AUTO: 89.2 FL (ref 82.6–102.9)
NRBC AUTOMATED: 0 PER 100 WBC
PDW BLD-RTO: 12.2 % (ref 11.8–14.4)
PLATELET # BLD: 309 K/UL (ref 138–453)
PMV BLD AUTO: 9.9 FL (ref 8.1–13.5)
POTASSIUM SERPL-SCNC: 3.9 MMOL/L (ref 3.7–5.3)
RBC # BLD: 4.25 M/UL (ref 3.95–5.11)
SODIUM BLD-SCNC: 139 MMOL/L (ref 135–144)
SPECIMEN DESCRIPTION: NORMAL
WBC # BLD: 11.4 K/UL (ref 3.5–11.3)

## 2022-05-05 PROCEDURE — 36415 COLL VENOUS BLD VENIPUNCTURE: CPT

## 2022-05-05 PROCEDURE — 6370000000 HC RX 637 (ALT 250 FOR IP): Performed by: STUDENT IN AN ORGANIZED HEALTH CARE EDUCATION/TRAINING PROGRAM

## 2022-05-05 PROCEDURE — 2580000003 HC RX 258: Performed by: ANESTHESIOLOGY

## 2022-05-05 PROCEDURE — 2060000000 HC ICU INTERMEDIATE R&B

## 2022-05-05 PROCEDURE — 6360000002 HC RX W HCPCS: Performed by: NURSE PRACTITIONER

## 2022-05-05 PROCEDURE — 80048 BASIC METABOLIC PNL TOTAL CA: CPT

## 2022-05-05 PROCEDURE — 99233 SBSQ HOSP IP/OBS HIGH 50: CPT | Performed by: PSYCHIATRY & NEUROLOGY

## 2022-05-05 PROCEDURE — 2580000003 HC RX 258: Performed by: STUDENT IN AN ORGANIZED HEALTH CARE EDUCATION/TRAINING PROGRAM

## 2022-05-05 PROCEDURE — 93886 INTRACRANIAL COMPLETE STUDY: CPT

## 2022-05-05 PROCEDURE — 99232 SBSQ HOSP IP/OBS MODERATE 35: CPT | Performed by: PSYCHIATRY & NEUROLOGY

## 2022-05-05 PROCEDURE — 6370000000 HC RX 637 (ALT 250 FOR IP): Performed by: PSYCHIATRY & NEUROLOGY

## 2022-05-05 PROCEDURE — 85027 COMPLETE CBC AUTOMATED: CPT

## 2022-05-05 PROCEDURE — 6370000000 HC RX 637 (ALT 250 FOR IP): Performed by: NURSE PRACTITIONER

## 2022-05-05 PROCEDURE — 2580000003 HC RX 258: Performed by: INTERNAL MEDICINE

## 2022-05-05 PROCEDURE — 83735 ASSAY OF MAGNESIUM: CPT

## 2022-05-05 PROCEDURE — 97535 SELF CARE MNGMENT TRAINING: CPT

## 2022-05-05 RX ADMIN — METHYLPREDNISOLONE 4 MG: 4 TABLET ORAL at 21:13

## 2022-05-05 RX ADMIN — Medication 5 MG: at 21:13

## 2022-05-05 RX ADMIN — MAGNESIUM GLUCONATE 500 MG ORAL TABLET 400 MG: 500 TABLET ORAL at 09:42

## 2022-05-05 RX ADMIN — NIMODIPINE 60 MG: 30 CAPSULE, LIQUID FILLED ORAL at 01:34

## 2022-05-05 RX ADMIN — ATORVASTATIN CALCIUM 80 MG: 80 TABLET, FILM COATED ORAL at 21:13

## 2022-05-05 RX ADMIN — METHYLPREDNISOLONE 4 MG: 4 TABLET ORAL at 13:16

## 2022-05-05 RX ADMIN — SODIUM CHLORIDE, PRESERVATIVE FREE 10 ML: 5 INJECTION INTRAVENOUS at 21:18

## 2022-05-05 RX ADMIN — NIMODIPINE 60 MG: 30 CAPSULE, LIQUID FILLED ORAL at 13:16

## 2022-05-05 RX ADMIN — SODIUM CHLORIDE, PRESERVATIVE FREE 10 ML: 5 INJECTION INTRAVENOUS at 09:46

## 2022-05-05 RX ADMIN — ACETAMINOPHEN 650 MG: 325 TABLET ORAL at 09:42

## 2022-05-05 RX ADMIN — SODIUM CHLORIDE, PRESERVATIVE FREE 10 ML: 5 INJECTION INTRAVENOUS at 21:19

## 2022-05-05 RX ADMIN — ACETAMINOPHEN 650 MG: 325 TABLET ORAL at 19:16

## 2022-05-05 RX ADMIN — NIMODIPINE 60 MG: 30 CAPSULE, LIQUID FILLED ORAL at 17:46

## 2022-05-05 RX ADMIN — TOPIRAMATE 150 MG: 100 TABLET, FILM COATED ORAL at 21:14

## 2022-05-05 RX ADMIN — NIMODIPINE 60 MG: 30 CAPSULE, LIQUID FILLED ORAL at 09:42

## 2022-05-05 RX ADMIN — GABAPENTIN 600 MG: 300 CAPSULE ORAL at 21:13

## 2022-05-05 RX ADMIN — SODIUM CHLORIDE, PRESERVATIVE FREE 10 ML: 5 INJECTION INTRAVENOUS at 09:49

## 2022-05-05 RX ADMIN — NIMODIPINE 60 MG: 30 CAPSULE, LIQUID FILLED ORAL at 05:45

## 2022-05-05 RX ADMIN — NIMODIPINE 60 MG: 30 CAPSULE, LIQUID FILLED ORAL at 22:20

## 2022-05-05 RX ADMIN — METHYLPREDNISOLONE 4 MG: 4 TABLET ORAL at 09:42

## 2022-05-05 ASSESSMENT — PAIN - FUNCTIONAL ASSESSMENT: PAIN_FUNCTIONAL_ASSESSMENT: ACTIVITIES ARE NOT PREVENTED

## 2022-05-05 ASSESSMENT — PAIN DESCRIPTION - LOCATION
LOCATION: HEAD
LOCATION: HEAD

## 2022-05-05 ASSESSMENT — PAIN SCALES - GENERAL
PAINLEVEL_OUTOF10: 5
PAINLEVEL_OUTOF10: 5
PAINLEVEL_OUTOF10: 0

## 2022-05-05 ASSESSMENT — PAIN DESCRIPTION - DESCRIPTORS
DESCRIPTORS: ACHING
DESCRIPTORS: DISCOMFORT

## 2022-05-05 NOTE — PROGRESS NOTES
Daily Progress Note  Neuro Critical Care    Patient Name: Misti Jimenez  Patient : 1990  Room/Bed: 0534/0343-91  Code Status: Full Code  Allergies: No Known Allergies    CHIEF COMPLAINT:      headache     INTERVAL HISTORY    Initial Presentation (Admitted 22): The patient is a 32 y.o.  female who presents to our emergency department 2022 via [address] as a transfer from [facility] acute worst headache of her life. Sana Martin does not have any significant past medical history in our chart and denies any.  At outlying facility patient underwent CT head without demonstrating diffuse subarachnoid hemorrhage.  Patient was seen by telestroke physician in our group recommending transfer to our facility for further work-up.  In the emergency department patient states that she was washing TV with her child when she began to have severe pounding headache 10 out of 10 worst in her life. Sana Martin noted to be severely nauseous requiring multiple doses of IV Zofran and IM Phenergan.  Patient also admitting to photosensitivity.  Patient had significant hypertension at [place] started on IV nicardipine given 1 g of Keppra 10 mg Decadron.     Arrival to our emergency department 140/102, heart rate 77 temperature 97. 6.  Initial labs PTT 25.3, INR 1.0, WBC 13.7, platelets 183, glucose 139 and high-sensitivity troponin less than 6.  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  Case discussed with senior endovascular fellow who is aware of case and current neurologic exam planning for emergent DSA 2022 early morning.     Rosen&De Souza: 2, Modified Combs: grade 3     Hospital Course:   -around 8:00 AM Dr. Elena Cruz bedside placing EVD prior to endovascular neurosurgery. Sana Martin went for urgent DSA found to have a comm aneurysm treated by primary coil embolization.   : Hemodynamically stable for arterial line readings -142, heart rate 82-99 T-max 98. 9.  Currently receiving María 5 mg every 4 hours as needed and fentanyl 25 every 2 as needed for pain control. 4/20: Blood pressure within parameter -139 overnight Cardene has been held off since 6 PM 4/19/2022 not requiring any as needed IV medications, heart rate 65-79 T-max 98.5.  Serum magnesium 2.3 was started on IV magnesium 2 g twice daily will increase to 4 g twice daily continue to trend magnesium daily.  Of note yesterday patient started to have increased urine output 9 L total in 24 hours urine studies sent consistent with central salt wasting patient started on hypertonic saline 2% 150 cc bolus followed by 75 cc/h maintenance.  Sodium checks every 8 hours with a sodium goal of 145.  4/21: Vitals stable overnight blood pressures trending slightly up although okay to liberalize SBP <160 today. Overnight -152, HR 69-80, tmax 99.3. Labs pending this AM although sodium noted to drop from 137 @17:03 yesterday to 129 @midnight. Patient given 150CC bolus 2% and pending repeat this AM. Will send repeat urine studies and discussed with patient she has been drinking excess water in large water bottle at bedside so to limit today to Gatorade and 1-1.5L max. Headache continues to be stable same as yesterday 6/10 constant pressure and throbbing bifrontal location. Will get repeat TCD today to ensure no increase in vasospasm. No bowel movement X3 days giving dulcolax and milk of mag continue to monitor. protonix 40 and starting decadron 4Q6hr for headache decrease oxy pain med Q6hr.   4/22: Blood pressure goal liberalized to SBP <160 yesterday although pressures trending up still -172 overnight (giving IV labetalol 10mg early AM) , HR 64-83, tmax 98. 6.  patient given 1L nacl bolus yesterday following slightly compressible IVC on ultrasound as she continues to have central salt wasting urine output slightly decreasing over last 3 days although 5,989 still yesterday. Discontinue hypertonic 2% 75cc/hr, start NaCl bolus 1L this AM and continue with 125cc/hr. Sodium goal can liberalize to 140 if Na <135 resume 2% hypertonic at 75cc/hr. Still no BM receiving dulcolax, milk of mag and glycolax. Headache improved slightly today 5/10. EVD remains open at [id number] and decreasing output 189 over last 24 hours. Transcranial dopplers reviewed slightly increasing vasospasm on the right velocities up to low 100s Lindegaard ratio up from 2-->3. 3.   4/23: EVD to 20cm H2O. EVD output 80cc overnight, high UOP overnight although improved. Headache improving, ambulating well. 4/24: EVD open draining at [id number] total output 95cc over last 24 hours. Hypertensive overnight -187, HR 50-72, tmax 98.6. 72 hours of decadron 4mg Q6 completed overnight. Patient complaining of constipation has been on bowel regimen with glycolax, milk of mag and PO dulcolax. Added dulcolax suppository this morning small BM still very uncomfortable thus will add fleets PRN also. TCD's increasing left sided velocities from day prior peak left MCA velocity 156 peak right 160 Lindgard ratio left 3.49 up from 2.54 and right 3.38 down from 3.67. CT head WO this morning prior to toradol. Discussing with NS also when okay to give toradol given EVD in place.   4/25:  Failed EVD clamp trial yesterday increased ICP thus remains open at 25 with total output of 134cc/12 and 214 for the full 24 hours. Severe headache; Topamax 100mg QD and Mag 4g BID added.  EVD lowered to 10mmgHg.  TCD suggested mild right MCA and basilar spasm.  Started on Milrinone infusion. 4/26: Hyponatremic yesterday evening; started on [email or web address] x6h with improvement, switched back to [email or web address] this AM.  Headache improved yesterday evening/overnight but started to become severe again this morning.  Will trial Morphine ER 15mg BID x48h, decrease PRN Fentanyl to 25mg Q8h PRN.   Increase Topamax to 100mg BID.  Last BM on Saturday, given narcotic usage will trial dose of Movantik.  Continues on aggressive bowel regimen and took PRN Milk of Mag overnight.  EVD set at [id number], ICP 10-17, 331cc out/24h.  TCD 4/26 overall stable; mild elevations of right MCA (mean velocities 129, LR 4.07).  Continues on Milrinone 0.20mcg/kg/min. 4/27: Sodium 135 this AM, continues to have significant urine output (>6L/24h).   TCD improved LR left 2.52 down from 3.4 yesterday and right 2.63 down from 4.07. EVD raised to 15mmHg.  Trial Fioricet Q6hr PRN.  Fever in evening. [place] suggestive of UTI; started on Rocephin.  4/28: No acute events overnight.  EVD set at 15mmHg, ICP 5-13, 173cc out/24h.  Clinical exam remains stable.  Continues to have headaches.  Completed 48h course of MS contin 15mg BID, monitor off today.  TCD this afternoon stable.  Milrinone infusion increased to 0.5mcg/kg/min.  No further fevers.  Mild uptrend in WBC to 14. 6.  Leija removed yesterday but patient had recurrent urinary retention overnight requiring straight cath x2 each time with >1L out.  Leija catheter replaced this morning due to recurrent retention.  Urology consulted. Babs Chambers has not had a BM since Sunday (smal BM after enema per nursing).  Will obtain KUB.  Abdomen soft, non-distended and non-tender.  Continue Movantik x2 more doses.  Plan to give enema later today. Patient reports her appetite has been OK, states she snacks a lot.    4/29: Pain control issues overnight due to b/l parietal/temporal headache received Depakote, Fioricet and 50ug fentanyl . Persistent nausea and dizziness. 4/30: HA overnight, improved greatly with Verapamil/ibuprofen combination. Bilateral calf cramping.     5/1:Patient started to have bad headaches after mid night, patient on ibuprofen every 800 every 8 as needed, Topamax, received 1 dose of Norflex last night. EVD clamped since yesterday. ICP 4-9, patient had 1 bowel movement last night, requiring 2 doses of Zofran for nausea, No vomiting.   No fevers, blood pressure in 140s. CT head: 5/1/2022: No significant interval change compared to 4/24/2022. Questionable punctate residual subarachnoid hemorrhage along the high left convexity, Verapamil 60 q 4       5/2:Overnight patient did not have any acute events. This morning patient complained of headache 10/10 intensity, received 1 dose of 50 fentanyl, which helped to relieve the pain. EVD removed. Milrione decreased, medrol dose pack for headache, Neurontin 300 HS  5/3: We will resume Lovenox, as per Neuro surgery. DC Leija, encourage ambulation, magnesium goal greater than 2, potassium up to 4, salt tablet 3 times daily to twice daily, milrinone weaned down to 0.25.     5/3:  No acute events overnight. DC milrinone, decrease fluid to 70, Topamax 150 at bedtime, Neurontin 600 at bedtime, magnesium oxide, limited use of oxycodone    Last 24h:    No acute events overnight. Reports that her headache is now an 8 out of 10. Patient reports that she did not have headache like this before. Denies any nausea or vomiting, reports that she is tolerating her diet.     CURRENT MEDICATIONS:  SCHEDULED MEDICATIONS:   topiramate  150 mg Oral Nightly    gabapentin  600 mg Oral Nightly    magnesium oxide  400 mg Oral Daily    methylPREDNISolone  4 mg Oral QAM AC    methylPREDNISolone  4 mg Oral Lunch    methylPREDNISolone  4 mg Oral Nightly    sodium chloride flush  5-40 mL IntraVENous 2 times per day    sodium chloride flush  5-40 mL IntraVENous 2 times per day    sennosides-docusate sodium  2 tablet Oral Daily    polyethylene glycol  17 g Oral Daily    melatonin  5 mg Oral Nightly    bisacodyl  10 mg Oral Daily    enoxaparin  40 mg SubCUTAneous Daily    atorvastatin  80 mg Oral Nightly    niMODipine  60 mg Oral 6 times per day    sodium chloride flush  5-40 mL IntraVENous 2 times per day     CONTINUOUS INFUSIONS:   sodium chloride 75 mL/hr at 05/04/22 0932    sodium chloride      sodium chloride      sodium chloride Stopped (22)     PRN MEDICATIONS:   oxyCODONE **OR** oxyCODONE, acetaminophen, orphenadrine, ibuprofen, sodium chloride flush, sodium chloride, sodium chloride flush, sodium chloride, bisacodyl, melatonin, magnesium hydroxide, ondansetron **OR** ondansetron, sodium chloride flush, sodium chloride    VITALS:  Temperature Range: Temp: 97.9 °F (36.6 °C) Temp  Av.1 °F (36.7 °C)  Min: 97.8 °F (36.6 °C)  Max: 98.3 °F (36.8 °C)  BP Range: Systolic (33YGD), ROJ:010 , Min:117 , LKW:365     Diastolic (97MVG), MWQ:51, Min:76, Max:111    Pulse Range: Pulse  Av  Min: 60  Max: 92  Respiration Range: Resp  Av.2  Min: 14  Max: 18  Current Pulse Ox: SpO2: 98 %  24HR Pulse Ox Range: SpO2  Av.8 %  Min: 96 %  Max: 100 %  Patient Vitals for the past 12 hrs:   BP Temp Temp src Pulse Resp SpO2   22 0405 117/83 97.9 °F (36.6 °C) Oral 60 14 98 %   22 0028 124/76 98.3 °F (36.8 °C) Oral 64 16 97 %   22 (!) 126/92 98.1 °F (36.7 °C) Oral 70 14 98 %         RECENT LABS:   Lab Results   Component Value Date    WBC 11.4 (H) 2022    HGB 12.6 2022    HCT 37.9 2022     2022    CHOL 182 2022    TRIG 60 2022    HDL 74 2022    ALT 16 2022    AST 15 2022     2022    K 3.9 2022     (H) 2022    CREATININE 0.77 2022    BUN 11 2022    CO2 20 2022    TSH 2.18 2022    INR 0.9 2022    LABA1C 5.5 2022     24 HOUR INTAKE/OUTPUT:No intake or output data in the 24 hours ending 22 0646    Labs and Images reviewed with:  [] Dr. Emanuel Rosado. [place]    [x] Dr. Maria Guadalupe Marcos  [] Dr. Mary Jo Salazar  [] There are no new interval images to review. PHYSICAL EXAM       GENERAL:  Well developed, well nourished, in no acute distress. Nontoxic. No dysarthria. No aphasia.  GCS 15  HENT: normocephalic , nose normal, perrla  EYES: no occular discharge, no scleral icterus  NECK: no JVD, no tracheal deviation  CV: Normal S1 S2, no MRG  PULM / CHEST: CTA Bilaterally all fields, no WRR  ABDOMEN: soft, no rigidity  MSK: no gross deformity, no edema, no TTP  NEURO: neuro intact  SKIN: no rash, no erythema, cap refill < 2 sec     NEUROLOGIC:  Mental Status:  A & O x3,awake             Cranial Nerves:    cranial nerves II-XII are grossly intact     Motor Exam:    Drift:  absent  Tone:  normal     Motor exam is symmetrical 5 out of 5 all extremities bilaterally     Sensory:    Touch:    Right Upper Extremity:  normal  Left Upper Extremity:  normal  Right Lower Extremity:  normal  Left Lower Extremity:  normal     Deep Tendon Reflexes:    Right Bicep:  2+  Left Bicep:  2+  Right Knee:  2+  Left Knee:  2+     Plantar Response:  Right:  downgoing  Left:  downgoing     Clonus:  absent  Andino's:  absent     Coordination/Dysmetria:  Heel to Shin:  Right:  normal  Left:  normal  Finger to Nose:   Right:  normal  Left:  normal   Dysdiadochokinesia:  absent     Gait:  normal   SKIN:  no rash       DRAINS:  EVD removed on 5/2/2022    ASSESSMENT AND PLAN:       This is a 32 y.o. female with no significant medical history who initially presented to [facility] ED with thunderclap headache and was found to have diffuse subarachnoid hemorrhage.  Underwent emergent EVD placement at bedside 4/18/2022.  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  Neuro ICU course complicated by cerebral salt wasting and mild right MCA vasospasm.     NEUROLOGIC:  -CT head: 5/1/2022: No significant interval change compared to 4/24/2022. Questionable punctate residual subarachnoid hemorrhage along the high left convexity  - Diffuse SAH with IVH secondary to ruptured ACOM aneurysm  - POD/PBD #14 s/p coil embolization of the ruptured ACOM aneurysm  -Continue Nimodipine 60mg Q4h  Melatonin 5 hs   topamax 150 bid  - Mild R MCA vasospasm ? symptomatic with worsening headache, no focal deficits  - Milrinone to 0.25mcg/kg/min  - TCD 4/28 stable  - TCD 4/30: Mild vasospasm noted in left MCA  -TCD 5/4: b/l vasospasm of MCA R>L  - Goal -220, avoid symptomatic hypotension  - Obstructive hydrocephalus s/p EVD placement 4/18  - Failed EVD clamp trial 4/24 due to elevated ICP (25) and head pressure  - EVD clamped 4/30  -EVD drain removed  5/2   Lovenox resume  Neurontin 300 at bedtime  Medrol Dosepak  Oxycodone 5 every 6 as needed  -CSF: WBC 10, , glucose 78        CARDIOVASCULAR:  - Goal SBP <180, avoid symptomatic hypotension  - Echo EF 65%, negative bubble  -lipitor 80   -Milrinone 0.25   -Discontinue verapamil  - Continue telemetry     PULMONARY:  - Maintaining O2 sats on room air  - Incentive spirometry     RENAL/FLUID/ELECTROLYTE:  - Normal renal functioning  - BUN11/ Creatinine 0.77  - monitor I/Os  - Cerebral salt wasting secondary to [facility]  - sodium 139 this AM from 141 yesterday  - Continue salt tabs 1g BID  - Goal Mag>2.; Mag level 2.0 continue Magnesium IV 4 grams daily   - Replace electrolytes PRN  - Daily BMP  -no IVF     GI/NUTRITION:  NUTRITION: General Diet  - Tolerating diet well  - Bowel regimen: Continue Dulcolax PO, Glycolax, Senokot-S daily and Milk of Mag PRN  - GI prophylaxis: N/A      ID:  -Tmax 98.1  -WBC 12.9  -CSF culture 4/27 negative to date. Many neutrophils no bacteria as of this am  - UA 4/27 suggestive of UTI with moderate leukocyte esterase and many bacteria  -Started on Rocephin 1 gram daily for UTI x 5 days, completed 5/3  -Daily CBC     HEME:   - H&H 12.6/37.9, stable  - Platelets 309  - Daily CBC     ENDOCRINE:  - Continue to monitor blood glucose, goal <180     OTHER:  - PT/OT/ST     PROPHYLAXIS:   Stress ulcer: N/A     DVT PROPHYLAXIS:  - SCD sleeves - Thigh High   - Lovenox 40 daily     DISPOSITION:  [x]? ? To remain ICU for close neurological monitoring, TCD    We will continue to follow along.      For any changes in exam or patient status please contact Neuro Critical Care.       Jennie Singer MD  Neuro Critical Care  Pager [phone]  5/5/2022     6:46 AM

## 2022-05-05 NOTE — PROGRESS NOTES
Occupational Therapy  Facility/Department: [address] STEP DOWN  Occupational Therapy Daily Treatment Note    Name: Suzie Andrews  : 1990  MRN: [id number]  Date of Service: 2022    Discharge Recommendations: Pt. Is I, no need for continued OT intervention. Patient would benefit from continued therapy after discharge  OT Equipment Recommendations  Equipment Needed: No   Collaborated with OTR to d/c from OT services d/t all goals met. Patient Diagnosis(es): The encounter diagnosis was SAH (subarachnoid hemorrhage) ([facility] [address] Past Medical History:  has no past medical history on file. Past Surgical History:  has a past surgical history that includes salpingectomy (Bilateral, 3/21/2022); ovarian cyst removal (Right, 3/21/2022); Dilation and curettage of uterus (N/A, 3/21/2022); other surgical history (2022); and picc insertion vascular access team (2022). Assessment   Assessment: Pt. doing well, pt. reports I with all ADLs  Prognosis: Good  Decision Making: Medium Complexity  REQUIRES OT FOLLOW-UP: No (Pt. is Independent)  Activity Tolerance  Activity Tolerance: Patient Tolerated treatment well      Pain Assessment  Pain Assessment: 0-10  Pain Level: 5/10  Pain Type: throbing  Pain Location: Head  Non-Pharmaceutical Pain Intervention(s): Repositioned; Therapeutic presence;Distraction; Ambulation/Increased Activity, RN aware      Plan   Plan  Times per Week: 3-4x/wk  Current Treatment Recommendations: Safety Education & Training,Balance Training,Patient/Caregiver Education & Training,Self-Care / ADL,Functional Mobility Training,Equipment Evaluation, Education, & procurement     Restrictions  Restrictions/Precautions  Restrictions/Precautions: Up as Tolerated,Seizure  Required Braces or Orthoses?: No  Position Activity Restriction  Other position/activity restrictions: up as tolerated; s/p angiogram ; -220    Subjective   General  Patient assessed for rehabilitation services?: Yes  Family / Caregiver Present: No  General Comment  Comments: RN ok'd pt for OT tx this date. Pt pleasant/cooperative throughout session. Objective                 Bed Mobility Training  Bed Mobility Training: Yes  Overall Level of Assistance: Independent  Rolling: Independent  Supine to Sit: Independent  Sit to Supine: Independent  Scooting: Independent  Balance  Sitting: Intact  Standing: Intact  Transfer Training  Transfer Training: Yes  Overall Level of Assistance: Independent  Sit to Stand: Independent  Stand to Sit: Independent  Stand Pivot Transfers: Independent  Bed to Chair: Independent  Toilet Transfer: Independent  Gait  Overall Level of Assistance: Independent (No AE)  Distance (ft): 100 Feet (House hold distances)        ADL  Grooming: Independent  Grooming Skilled Clinical Factors: I grooming standing at sink  UE Dressing: Independent  UE Dressing Skilled Clinical Factors: I to doff/don coat, standing  LE Dressing: Independent  LE Dressing Skilled Clinical Factors: I to doff/kirk slippers and B socks. Toileting: Independent  Toileting Skilled Clinical Factors: Toilet transfer, hyg I, no AE                 Cognition  Overall Cognitive Status: WFL                  Education Given To: Patient  Education Provided Comments: Pt. educated on importance of continued movement to enhance strength and endurace, pt. rodolfo G carryover. Education Method: Verbal  Barriers to Learning: None  Education Outcome: Verbalized understanding                    AM-PAC Score        AM-PAC Inpatient Daily Activity Raw Score: 24 (05/05/22 1100)  AM-PAC Inpatient ADL T-Scale Score : 57.54 (05/05/22 1100)  ADL Inpatient CMS 0-100% Score: 0 (05/05/22 1100)  ADL Inpatient CMS G-Code Modifier : [address] (05/05/22 1100)    Goals  Short Term Goals  Time Frame for Short term goals: By discharge, pt will:  Short Term Goal 1: Demo bed mobility sit<>supine with Mod. IND and HOB flat to mimic home setup.   Short Term Goal 2: Demo functional sit<>stand transfers and functional mobility simulating household distance with SBA and LRD PRN  Short Term Goal 3: Demo UB ADLs with SUP and setup  Short Term Goal 4: Demo LB ADLs with SUP and setup  Short Term Goal 5: Demo 8+ min of dynamic standing and reaching during functional task with SBA for improved balance during ADLs/IADLs       Therapy Time   Individual Concurrent Group Co-treatment   Time In 0940         Time Out 0953         Minutes 13         Timed Code Treatment Minutes: [address], REARDON/L

## 2022-05-05 NOTE — PROGRESS NOTES
Endovascular Neurosurgery Progress Note    SUBJECTIVE:   Headaches overnight. Patient was complaining of headache this am 5/10. Review of Systems:  CONSTITUTIONAL:  negative for fevers, chills, fatigue and malaise    EYES:  negative for double vision, blurred vision and photophobia     HEENT:  negative for tinnitus, epistaxis and sore throat    RESPIRATORY:  negative for cough, shortness of breath, wheezing    CARDIOVASCULAR:  negative for chest pain, palpitations, syncope, edema    GASTROINTESTINAL:  negative for nausea, vomiting    GENITOURINARY:  negative for incontinence    MUSCULOSKELETAL:  negative for neck or back pain    NEUROLOGICAL:  Negative for weakness and tingling  negative for headaches and dizziness    PSYCHIATRIC:  negative for anxiety      Review of systems otherwise negative. OBJECTIVE:     Vitals:    05/05/22 1525   BP: (!) 139/106   Pulse: 64   Resp: 16   Temp: 98 °F (36.7 °C)   SpO2:         General:  Gen: normal habitus, NAD  HEENT: EVD in place. mucosa moist  Cvs: RRR, S1 S2 normal  Resp: symmetric unlabored breathing  Abd: s/nd/nt  Ext: no edema  Skin: no lesions seen, warm and dry    Neuro:   Gen: awake and alert, oriented x3. Lang/speech: no aphasia. No dysarthria. Follows commands. CN: PERRL, EOMI, VFF, V1-3 intact, face symmetric, hearing intact, shoulder shrug symmetric, tongue midline  Motor: grossly 5/5 UE and LE b/l  Sense: LT intact in all 4 ext. Coord: FTN and HTS intact b/l  DTR: deferred  Gait: deferred    NIH Stroke Scale:   1a  Level of consciousness: 0 - alert; keenly responsive   1b. LOC questions:  0 - answers both questions correctly   1c. LOC commands: 0 - performs both tasks correctly   2. Best Gaze: 0 - normal   3. Visual: 0 - no visual loss   4. Facial Palsy: 0 - normal symmetric movement   5a. Motor left arm: 0 - no drift, limb holds 90 (or 45) degrees for full 10 seconds   5b.   Motor right arm: 0 - no drift, limb holds 90 (or 45) degrees for full 10 seconds   6a. Motor left le - no drift; leg holds 30 degree position for full 5 seconds   6b  Motor right le - no drift; leg holds 30 degree position for full 5 seconds   7. Limb Ataxia: 0 - absent   8. Sensory: 0 - normal; no sensory loss   9. Best Language:  0 - no aphasia, normal   10. Dysarthria: 0 - normal   11. Extinction and Inattention: 0 - no abnormality         Total:   0     MRS: 0      LABS:   Reviewed. Lab Results   Component Value Date    HGB 12.6 2022    WBC 11.4 (H) 2022     2022     2022    BUN 11 2022    CREATININE 0.77 2022    AST 15 2022    ALT 16 2022    MG 2.0 2022    APTT 22.6 2022    INR 0.9 2022      Lab Results   Component Value Date    COVID19 Not Detected 2022    COVID19 Not Detected 2022       RADIOLOGY:   Images were personally reviewed including:   CT head wo con 2022  1. No significant interval change compared to 2021.  Again seen right   frontal approach ventriculostomy with similar-appearing ventricular system   size and morphology. 2. Questionable punctate residual subarachnoid hemorrhage along the high left   frontal convexity. tcd 2022  Mild vasospasm L 's, R 's LR 4.3 b/l. Stable. CT head 2022:   Significant reduction with trace residual scattered subarachnoid hemorrhage.       Stable positioning of a right frontal EVD, as well as stable caliber and   configuration of the ventricles. 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 --the above treated with Smart coil embolization del3, Vu 1. Smart coil x1 opened but not detached. --Prominent musculocutaneous branch from the left V2 segment that anastomoses to the left occipital artery.     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 PBD: 17  Patient complains of moderate to severe headaches, non-focal neuro exam. 4/24/2022 clamp trial failed. 4/24/2022 CT head stable, reduced SAH. 4/30/2022 TCD mild vasospasm b/l MCA. Issues of urine retention/uti, b/l leg cramping. 4/30/2022 clamp trial. Ct head 5/1/2022 stable. EVD removed on 05/02. Milrinone stopped, Medrol pack for headache, TCDs mild vasospasm. PLAN:   --SBP goal 120-220 mm hg   --SAH management with Nimodipine, statin  --f/up with Dr. Humberto Loyn in 2 weeks after discharge and f/up with Dr. Linda Martinez in 3 months after discharge    Case discussed with Dr. Linda Martinez attending.     Lady Meneses MD  Stroke, [facility]  [id number] [address]  Electronically signed 5/5/2022 at 5:23 PM

## 2022-05-05 NOTE — PLAN OF CARE
Problem: Falls - Risk of:  Goal: Will remain free from falls  Description: Will remain free from falls  Outcome: Progressing     Problem: Falls - Risk of:  Goal: Absence of physical injury  Description: Absence of physical injury  Outcome: Progressing     Problem: Anxiety/Stress:  Goal: Level of anxiety will decrease  Description: Level of anxiety will decrease  Outcome: Progressing     Problem: Mental Status - Impaired:  Goal: Mental status will be restored to baseline  Description: Mental status will be restored to baseline  Outcome: Progressing     Problem: Pain:  Goal: Pain level will decrease  Description: Pain level will decrease  Outcome: Progressing     Problem: Pain:  Goal: Recognizes and communicates pain  Description: Recognizes and communicates pain  Outcome: Progressing     Problem: Pain:  Goal: Control of acute pain  Description: Control of acute pain  Outcome: Progressing     Problem: Pain:  Goal: Control of chronic pain  Description: Control of chronic pain  Outcome: Progressing     Problem: Skin Integrity - Impaired:  Goal: Will show no infection signs and symptoms  Description: Will show no infection signs and symptoms  Outcome: Progressing     Problem: Skin Integrity - Impaired:  Goal: Absence of new skin breakdown  Description: Absence of new skin breakdown  Outcome: Progressing     Problem: HEMODYNAMIC STATUS  Goal: Patient has stable vital signs and fluid balance  Outcome: Progressing     Problem: ACTIVITY INTOLERANCE/IMPAIRED MOBILITY  Goal: Mobility/activity is maintained at optimum level for patient  Outcome: Progressing     Problem: COMMUNICATION IMPAIRMENT  Goal: Ability to express needs and understand communication  Outcome: Progressing     Problem: Swallowing - Impaired:  Goal: Able to swallow without choking  Description: Able to swallow without choking  Outcome: Progressing     Problem: Swallowing - Impaired:  Goal: Absence of aspiration  Description: Absence of aspiration  Outcome: Progressing     Problem: Pain:  Goal: Pain level will decrease  Description: Pain level will decrease  Outcome: Progressing     Problem: Pain:  Goal: Control of acute pain  Description: Control of acute pain  Outcome: Progressing     Problem: Discharge Planning  Goal: Discharge to home or other facility with appropriate resources  Outcome: Progressing     Problem: ABCDS Injury Assessment  Goal: Absence of physical injury  Outcome: Progressing

## 2022-05-06 VITALS
OXYGEN SATURATION: 98 % | WEIGHT: 158.73 LBS | DIASTOLIC BLOOD PRESSURE: 92 MMHG | SYSTOLIC BLOOD PRESSURE: 127 MMHG | BODY MASS INDEX: 29.21 KG/M2 | HEART RATE: 64 BPM | HEIGHT: 62 IN | RESPIRATION RATE: 16 BRPM | TEMPERATURE: 97.9 F

## 2022-05-06 PROBLEM — I60.9 SUBARACHNOID HEMORRHAGE (HCC): Status: RESOLVED | Noted: 2022-04-19 | Resolved: 2022-05-06

## 2022-05-06 PROBLEM — I67.1 ANEURYSM OF ANTERIOR COMMUNICATING ARTERY: Status: RESOLVED | Noted: 2022-04-18 | Resolved: 2022-05-06

## 2022-05-06 LAB
ANION GAP SERPL CALCULATED.3IONS-SCNC: 11 MMOL/L (ref 9–17)
BUN BLDV-MCNC: 15 MG/DL (ref 6–20)
CALCIUM SERPL-MCNC: 9.3 MG/DL (ref 8.6–10.4)
CHLORIDE BLD-SCNC: 110 MMOL/L (ref 98–107)
CO2: 17 MMOL/L (ref 20–31)
CREAT SERPL-MCNC: 0.83 MG/DL (ref 0.5–0.9)
GFR AFRICAN AMERICAN: >60 ML/MIN
GFR NON-AFRICAN AMERICAN: >60 ML/MIN
GFR SERPL CREATININE-BSD FRML MDRD: ABNORMAL ML/MIN/{1.73_M2}
GLUCOSE BLD-MCNC: 108 MG/DL (ref 70–99)
HCT VFR BLD CALC: 38.7 % (ref 36.3–47.1)
HEMOGLOBIN: 12.6 G/DL (ref 11.9–15.1)
MAGNESIUM: 2 MG/DL (ref 1.6–2.6)
MCH RBC QN AUTO: 29.8 PG (ref 25.2–33.5)
MCHC RBC AUTO-ENTMCNC: 32.6 G/DL (ref 28.4–34.8)
MCV RBC AUTO: 91.5 FL (ref 82.6–102.9)
NRBC AUTOMATED: 0 PER 100 WBC
PDW BLD-RTO: 12.2 % (ref 11.8–14.4)
PLATELET # BLD: 316 K/UL (ref 138–453)
PMV BLD AUTO: 10.5 FL (ref 8.1–13.5)
POTASSIUM SERPL-SCNC: 4 MMOL/L (ref 3.7–5.3)
RBC # BLD: 4.23 M/UL (ref 3.95–5.11)
SODIUM BLD-SCNC: 138 MMOL/L (ref 135–144)
WBC # BLD: 13.1 K/UL (ref 3.5–11.3)

## 2022-05-06 PROCEDURE — 99232 SBSQ HOSP IP/OBS MODERATE 35: CPT | Performed by: PSYCHIATRY & NEUROLOGY

## 2022-05-06 PROCEDURE — 6370000000 HC RX 637 (ALT 250 FOR IP): Performed by: STUDENT IN AN ORGANIZED HEALTH CARE EDUCATION/TRAINING PROGRAM

## 2022-05-06 PROCEDURE — 83735 ASSAY OF MAGNESIUM: CPT

## 2022-05-06 PROCEDURE — 6370000000 HC RX 637 (ALT 250 FOR IP): Performed by: NURSE PRACTITIONER

## 2022-05-06 PROCEDURE — 93886 INTRACRANIAL COMPLETE STUDY: CPT

## 2022-05-06 PROCEDURE — 93886 INTRACRANIAL COMPLETE STUDY: CPT | Performed by: PSYCHIATRY & NEUROLOGY

## 2022-05-06 PROCEDURE — 85027 COMPLETE CBC AUTOMATED: CPT

## 2022-05-06 PROCEDURE — 2580000003 HC RX 258: Performed by: ANESTHESIOLOGY

## 2022-05-06 PROCEDURE — 36415 COLL VENOUS BLD VENIPUNCTURE: CPT

## 2022-05-06 PROCEDURE — 99233 SBSQ HOSP IP/OBS HIGH 50: CPT | Performed by: PSYCHIATRY & NEUROLOGY

## 2022-05-06 PROCEDURE — 6360000002 HC RX W HCPCS: Performed by: NURSE PRACTITIONER

## 2022-05-06 PROCEDURE — 6370000000 HC RX 637 (ALT 250 FOR IP): Performed by: PSYCHIATRY & NEUROLOGY

## 2022-05-06 PROCEDURE — 80048 BASIC METABOLIC PNL TOTAL CA: CPT

## 2022-05-06 RX ORDER — IBUPROFEN 800 MG/1
800 TABLET ORAL EVERY 8 HOURS PRN
Qty: 30 TABLET | Refills: 0 | Status: SHIPPED | OUTPATIENT
Start: 2022-05-06 | End: 2022-08-31

## 2022-05-06 RX ORDER — LANOLIN ALCOHOL/MO/W.PET/CERES
400 CREAM (GRAM) TOPICAL DAILY
Qty: 30 TABLET | Refills: 1 | Status: SHIPPED | OUTPATIENT
Start: 2022-05-07

## 2022-05-06 RX ORDER — GABAPENTIN 300 MG/1
600 CAPSULE ORAL NIGHTLY
Qty: 60 CAPSULE | Refills: 1 | Status: SHIPPED | OUTPATIENT
Start: 2022-05-06 | End: 2022-08-31

## 2022-05-06 RX ORDER — TOPIRAMATE 50 MG/1
150 TABLET, FILM COATED ORAL NIGHTLY
Qty: 60 TABLET | Refills: 3 | Status: SHIPPED | OUTPATIENT
Start: 2022-05-06 | End: 2022-08-31

## 2022-05-06 RX ORDER — NIMODIPINE 30 MG/1
60 CAPSULE, LIQUID FILLED ORAL EVERY 4 HOURS
Qty: 36 CAPSULE | Refills: 0 | Status: SHIPPED | OUTPATIENT
Start: 2022-05-06 | End: 2022-08-31

## 2022-05-06 RX ADMIN — ACETAMINOPHEN 650 MG: 325 TABLET ORAL at 15:11

## 2022-05-06 RX ADMIN — MAGNESIUM GLUCONATE 500 MG ORAL TABLET 400 MG: 500 TABLET ORAL at 08:06

## 2022-05-06 RX ADMIN — SODIUM CHLORIDE, PRESERVATIVE FREE 10 ML: 5 INJECTION INTRAVENOUS at 08:06

## 2022-05-06 RX ADMIN — NIMODIPINE 60 MG: 30 CAPSULE, LIQUID FILLED ORAL at 01:23

## 2022-05-06 RX ADMIN — ACETAMINOPHEN 650 MG: 325 TABLET ORAL at 05:26

## 2022-05-06 RX ADMIN — NIMODIPINE 60 MG: 30 CAPSULE, LIQUID FILLED ORAL at 10:30

## 2022-05-06 RX ADMIN — NIMODIPINE 60 MG: 30 CAPSULE, LIQUID FILLED ORAL at 05:22

## 2022-05-06 RX ADMIN — METHYLPREDNISOLONE 4 MG: 4 TABLET ORAL at 08:05

## 2022-05-06 RX ADMIN — NIMODIPINE 60 MG: 30 CAPSULE, LIQUID FILLED ORAL at 13:21

## 2022-05-06 ASSESSMENT — PAIN SCALES - WONG BAKER: WONGBAKER_NUMERICALRESPONSE: 0

## 2022-05-06 ASSESSMENT — PAIN SCALES - GENERAL
PAINLEVEL_OUTOF10: 5
PAINLEVEL_OUTOF10: 3
PAINLEVEL_OUTOF10: 0

## 2022-05-06 ASSESSMENT — PAIN DESCRIPTION - LOCATION
LOCATION: HEAD
LOCATION: HEAD

## 2022-05-06 ASSESSMENT — PAIN DESCRIPTION - DESCRIPTORS: DESCRIPTORS: ACHING

## 2022-05-06 ASSESSMENT — PAIN - FUNCTIONAL ASSESSMENT: PAIN_FUNCTIONAL_ASSESSMENT: PREVENTS OR INTERFERES SOME ACTIVE ACTIVITIES AND ADLS

## 2022-05-06 NOTE — PROGRESS NOTES
Comprehensive Nutrition Assessment    Type and Reason for Visit:  Reassess    Nutrition Recommendations/Plan:   1. Continue current diet, regular  2. Will d/c Magic Cup per pt request  3. Monitor/encourage PO intake     Malnutrition Assessment:  Malnutrition Status:  No malnutrition (05/06/22 1154)    Context:  Acute Illness       Nutrition Assessment:    Per pt, good appetite. Generally not eating hospital food, but visitors are bring her outside food. PO intake as much or more than what she typically eats. Pt does not like Magic Cup, will d/c. Labs/meds reviewed. Nutrition Related Findings:    Labs/meds reviewed. LBM 5/3. No edema noted. Wound Type: Surgical Incision       Current Nutrition Intake & Therapies:    Average Meal Intake: % (Outside food)  Average Supplements Intake: Refusing to take  ADULT DIET; Regular    Anthropometric Measures:  Height: 5' 2\" (157.5 cm)  Ideal Body Weight (IBW): 110 lbs (50 kg)    Admission Body Weight: 161 lb 13.1 oz (73.4 kg)  Current Body Weight: 158 lb 11.7 oz (72 kg), 144.3 % IBW. Weight Source: Bed Scale  Current BMI (kg/m2): 29  Usual Body Weight: 160 lb 6.4 oz (72.8 kg) (2/22/22 bed scale per chart review)  % Weight Change (Calculated): -1.7                    BMI Categories: Overweight (BMI 25.0-29. 9)    Estimated Daily Nutrient Needs:  Energy Requirements Based On: Kcal/kg  Weight Used for Energy Requirements: Current  Energy (kcal/day): 4146-8901 kcals/day  Weight Used for Protein Requirements: Current  Protein (g/day): 1.2-1.4 gm/kg =  gm pro/day  Method Used for Fluid Requirements: Other (Comment)  Fluid (ml/day): per MD    Nutrition Diagnosis:   · No nutrition diagnosis at this time,Inadequate oral intake related to  (appetite, headache, dislike of provided foods) as evidenced by  (Recent poor PO)      Nutrition Interventions:   Food and/or Nutrient Delivery: Continue Current Diet,Discontinue Oral Nutrition Supplement  Nutrition Education/Counseling: No recommendation at this time  Coordination of Nutrition Care: Continue to monitor while inpatient       Goals:  Previous Goal Met: Goal(s) Achieved  Goals: PO intake 50% or greater,prior to discharge       Nutrition Monitoring and Evaluation:   Behavioral-Environmental Outcomes: None Identified  Food/Nutrient Intake Outcomes: Food and Nutrient Intake  Physical Signs/Symptoms Outcomes: Biochemical Data,Weight    Discharge Planning:    No discharge needs at this time     Ramona Alvarado MS, RD, LD  Contact: [phone]

## 2022-05-06 NOTE — PROGRESS NOTES
Daily Progress Note  Neuro Critical Care    Patient Name: Chelsi Yee  Patient : 1990  Room/Bed: 1677/4389-13  Code Status: Full Code  Allergies: No Known Allergies    CHIEF COMPLAINT:      headache     INTERVAL HISTORY    Initial Presentation (Admitted 22): The patient is a 32 y.o.  female who presents to our emergency department 2022 via [address] as a transfer from [facility] acute worst headache of her life. Luis Antonio Neff does not have any significant past medical history in our chart and denies any.  At outlying facility patient underwent CT head without demonstrating diffuse subarachnoid hemorrhage.  Patient was seen by telestroke physician in our group recommending transfer to our facility for further work-up.  In the emergency department patient states that she was washing TV with her child when she began to have severe pounding headache 10 out of 10 worst in her life. Luis Antonio Neff noted to be severely nauseous requiring multiple doses of IV Zofran and IM Phenergan.  Patient also admitting to photosensitivity.  Patient had significant hypertension at [facility] started on IV nicardipine given 1 g of Keppra 10 mg Decadron.     Arrival to our emergency department 140/102, heart rate 77 temperature 97. 6.  Initial labs PTT 25.3, INR 1.0, WBC 13.7, platelets 510, glucose 139 and high-sensitivity troponin less than 6.  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  Case discussed with senior endovascular fellow who is aware of case and current neurologic exam planning for emergent DSA 2022 early morning.     Rosen&De Souza: 2, Modified Combs: grade 3      Hospital Course:   -around 8:00 AM Dr. Sanjiv Ramos bedside placing EVD prior to endovascular neurosurgery. Luis Antonio Neff went for urgent DSA found to have a comm aneurysm treated by primary coil embolization.   : Hemodynamically stable for arterial line readings -142, heart rate 82-99 T-max 98. 9.  Currently receiving María 5 mg every 4 hours as needed and fentanyl 25 every 2 as needed for pain control. 4/20: Blood pressure within parameter -139 overnight Cardene has been held off since 6 PM 4/19/2022 not requiring any as needed IV medications, heart rate 65-79 T-max 98.5.  Serum magnesium 2.3 was started on IV magnesium 2 g twice daily will increase to 4 g twice daily continue to trend magnesium daily.  Of note yesterday patient started to have increased urine output 9 L total in 24 hours urine studies sent consistent with central salt wasting patient started on hypertonic saline 2% 150 cc bolus followed by 75 cc/h maintenance.  Sodium checks every 8 hours with a sodium goal of 145.  4/21: Vitals stable overnight blood pressures trending slightly up although okay to liberalize SBP <160 today. Overnight -152, HR 69-80, tmax 99.3. Labs pending this AM although sodium noted to drop from 137 @17:03 yesterday to 129 @midnight. Patient given 150CC bolus 2% and pending repeat this AM. Will send repeat urine studies and discussed with patient she has been drinking excess water in large water bottle at bedside so to limit today to Gatorade and 1-1.5L max. Headache continues to be stable same as yesterday 6/10 constant pressure and throbbing bifrontal location. Will get repeat TCD today to ensure no increase in vasospasm. No bowel movement X3 days giving dulcolax and milk of mag continue to monitor. protonix 40 and starting decadron 4Q6hr for headache decrease oxy pain med Q6hr.   4/22: Blood pressure goal liberalized to SBP <160 yesterday although pressures trending up still -172 overnight (giving IV labetalol 10mg early AM) , HR 64-83, tmax 98. 6.  patient given 1L nacl bolus yesterday following slightly compressible IVC on ultrasound as she continues to have central salt wasting urine output slightly decreasing over last 3 days although 5,989 still yesterday. Discontinue hypertonic 2% 75cc/hr, start NaCl bolus 1L this AM and continue with 125cc/hr. Sodium goal can liberalize to 140 if Na <135 resume 2% hypertonic at 75cc/hr. Still no BM receiving dulcolax, milk of mag and glycolax. Headache improved slightly today 5/10. EVD remains open at [id number] and decreasing output 189 over last 24 hours. Transcranial dopplers reviewed slightly increasing vasospasm on the right velocities up to low 100s Lindegaard ratio up from 2-->3. 3.   4/23: EVD to 20cm H2O. EVD output 80cc overnight, high UOP overnight although improved. Headache improving, ambulating well. 4/24: EVD open draining at [id number] total output 95cc over last 24 hours. Hypertensive overnight -187, HR 50-72, tmax 98.6. 72 hours of decadron 4mg Q6 completed overnight. Patient complaining of constipation has been on bowel regimen with glycolax, milk of mag and PO dulcolax. Added dulcolax suppository this morning small BM still very uncomfortable thus will add fleets PRN also. TCD's increasing left sided velocities from day prior peak left MCA velocity 156 peak right 160 Lindgard ratio left 3.49 up from 2.54 and right 3.38 down from 3.67. CT head WO this morning prior to toradol. Discussing with NS also when okay to give toradol given EVD in place.   4/25:  Failed EVD clamp trial yesterday increased ICP thus remains open at 25 with total output of 134cc/12 and 214 for the full 24 hours. Severe headache; Topamax 100mg QD and Mag 4g BID added.  EVD lowered to 10mmgHg.  TCD suggested mild right MCA and basilar spasm.  Started on Milrinone infusion. 4/26: Hyponatremic yesterday evening; started on [email or web address] x6h with improvement, switched back to [email or web address] this AM.  Headache improved yesterday evening/overnight but started to become severe again this morning.  Will trial Morphine ER 15mg BID x48h, decrease PRN Fentanyl to 25mg Q8h PRN.   Increase Topamax to 100mg BID.  Last BM on Saturday, given narcotic usage will trial dose of Movantik.  Continues on aggressive bowel regimen and took PRN Milk of Mag overnight.  EVD set at [id number], ICP 10-17, 331cc out/24h.  TCD 4/26 overall stable; mild elevations of right MCA (mean velocities 129, LR 4.07).  Continues on Milrinone 0.20mcg/kg/min. 4/27: Sodium 135 this AM, continues to have significant urine output (>6L/24h).   TCD improved LR left 2.52 down from 3.4 yesterday and right 2.63 down from 4.07. EVD raised to 15mmHg.  Trial Fioricet Q6hr PRN.  Fever in evening. Alex Tazewell suggestive of UTI; started on Rocephin.  4/28: No acute events overnight.  EVD set at 15mmHg, ICP 5-13, 173cc out/24h.  Clinical exam remains stable.  Continues to have headaches.  Completed 48h course of MS contin 15mg BID, monitor off today.  TCD this afternoon stable.  Milrinone infusion increased to 0.5mcg/kg/min.  No further fevers.  Mild uptrend in WBC to 14. 6.  Leija removed yesterday but patient had recurrent urinary retention overnight requiring straight cath x2 each time with >1L out.  Leija catheter replaced this morning due to recurrent retention.  Urology consulted. Reynaldo Reveles has not had a BM since Sunday (smal BM after enema per nursing).  Will obtain KUB.  Abdomen soft, non-distended and non-tender.  Continue Movantik x2 more doses.  Plan to give enema later today. Patient reports her appetite has been OK, states she snacks a lot.    4/29: Pain control issues overnight due to b/l parietal/temporal headache received Depakote, Fioricet and 50ug fentanyl . Persistent nausea and dizziness. 4/30: HA overnight, improved greatly with Verapamil/ibuprofen combination. Bilateral calf cramping.     5/1:Patient started to have bad headaches after mid night, patient on ibuprofen every 800 every 8 as needed, Topamax, received 1 dose of Norflex last night.   EVD clamped since yesterday.   ICP 4-9, patient had 1 bowel movement last night, requiring 2 doses of Zofran for nausea, No vomiting.  No fevers, blood pressure in 140s. CT head: 5/1/2022: No significant interval change compared to 4/24/2022.  Questionable punctate residual subarachnoid hemorrhage along the high left convexity, Verapamil 60 q 4       5/2:Overnight patient did not have any acute events.  This morning patient complained of headache 10/10 intensity, received 1 dose of 50 fentanyl, which helped to relieve the pain. EVD removed. Milrione decreased, medrol dose pack for headache, Neurontin 300 HS  5/3: We will resume Lovenox, as per Neuro surgery.  DC Leija, encourage ambulation, magnesium goal greater than 2, potassium up to 4, salt tablet 3 times daily to twice daily, milrinone weaned down to 0.25.     5/3:  No acute events overnight.  DC milrinone, decrease fluid to 70, Topamax 150 at bedtime, Neurontin 600 at bedtime, magnesium oxide, limited use of oxycodone     5/5  No acute events overnight. Reports that her headache is now an 8 out of 10. Patient reports that she did not have headache like this before. Denies any nausea or vomiting, reports that she is tolerating her diet.     Last 24h:    No acute events overnight. When I came into patient's room this morning, she was doing yoga. Patient reports she has no complaints today. Reports that her headache is improving. Patient reports that she would like to go home.     CURRENT MEDICATIONS:  SCHEDULED MEDICATIONS:   topiramate  150 mg Oral Nightly    gabapentin  600 mg Oral Nightly    magnesium oxide  400 mg Oral Daily    methylPREDNISolone  4 mg Oral QAM AC    methylPREDNISolone  4 mg Oral Nightly    sodium chloride flush  5-40 mL IntraVENous 2 times per day    sodium chloride flush  5-40 mL IntraVENous 2 times per day    sennosides-docusate sodium  2 tablet Oral Daily    polyethylene glycol  17 g Oral Daily    melatonin  5 mg Oral Nightly    bisacodyl  10 mg Oral Daily    enoxaparin  40 mg SubCUTAneous Daily    atorvastatin  80 mg Oral Nightly    niMODipine  60 mg Oral 6 times per day    sodium chloride flush  5-40 mL IntraVENous 2 times per day     CONTINUOUS INFUSIONS:   sodium chloride 75 mL/hr at 22 0932    sodium chloride      sodium chloride      sodium chloride Stopped (22)     PRN MEDICATIONS:   oxyCODONE **OR** oxyCODONE, acetaminophen, orphenadrine, ibuprofen, sodium chloride flush, sodium chloride, sodium chloride flush, sodium chloride, bisacodyl, melatonin, magnesium hydroxide, ondansetron **OR** ondansetron, sodium chloride flush, sodium chloride    VITALS:  Temperature Range: Temp: 98.2 °F (36.8 °C) Temp  Av.2 °F (36.8 °C)  Min: 98 °F (36.7 °C)  Max: 98.8 °F (37.1 °C)  BP Range: Systolic (85WOT), ZFT:617 , Min:123 , WYW:337     Diastolic (02MOO), NZF:36, Min:70, Max:106    Pulse Range: Pulse  Av.4  Min: 58  Max: 68  Respiration Range: Resp  Av.4  Min: 16  Max: 20  Current Pulse Ox: SpO2: 98 %  24HR Pulse Ox Range: No data recorded  Patient Vitals for the past 12 hrs:   BP Temp Temp src Pulse Resp   22 0410 128/70 98.2 °F (36.8 °C) Oral 60 20   22 2358 123/78 98.1 °F (36.7 °C) Oral 58 18   22 (!) 136/102 98.8 °F (37.1 °C) Oral 68 20         RECENT LABS:   Lab Results   Component Value Date    WBC 13.1 (H) 2022    HGB 12.6 2022    HCT 38.7 2022     2022    CHOL 182 2022    TRIG 60 2022    HDL 74 2022    ALT 16 2022    AST 15 2022     2022    K 4.0 2022     (H) 2022    CREATININE 0.83 2022    BUN 15 2022    CO2 17 (L) 2022    TSH 2.18 2022    INR 0.9 2022    LABA1C 5.5 2022     24 HOUR INTAKE/OUTPUT:No intake or output data in the 24 hours ending 22 0744    Labs and Images reviewed with:  [] Dr. Nerissa Hamilton. Osmel    [x] Dr. Anderson People  [] Dr. Siva Snyder  [] There are no new interval images to review.      PHYSICAL EXAM       CONSTITUTIONAL:  Well developed, well nourished, alert and oriented x 3, in no acute distress. GCS 15. Nontoxic. No dysarthria. No aphasia. HEAD:  normocephalic, atraumatic, presence of 2 staples over top of scalp, wound is clean, dry, intact   EYES:  PERRLA, EOMI. Visual Acuity and Peripheral vision in tact b/l   ENT:  moist mucous membranes   NECK:  supple, symmetric   LUNGS:  Equal air entry bilaterally   CARDIOVASCULAR:  normal s1 / s2, RRR, distal pulses intact   ABDOMEN:  Soft, no rigidity   NEUROLOGIC:  Mental Status:  A & O x3,awake             Cranial Nerves:    II: Visual acuity:  normal  II: Visual fields:  normal  III: Pupils:  equal, round, reactive to light  III,IV,VI: Extra Ocular Movements: intact  V: Facial sensation:  intact  VII: Facial strength: intact  VIII: Hearing:  intact  IX: Palate:  intact  XI: Shoulder shrug:  intact  XII: Tongue movement:  normal    Motor Exam:    Drift:  absent  Tone:  normal    MOTOR:  RUE: +1  LUE: +1  RLE: +1  LLE: +1    Sensory:    Touch:    Right Upper Extremity:  normal  Left Upper Extremity:  normal  Right Lower Extremity:  normal  Left Lower Extremity:  normal    Deep Tendon Reflexes:    Right Bicep:  1+  Left Bicep:  1+  Right Knee:  1+  Left Knee:  1+    Plantar Response:  Right:  upgoing  Left:  upgoing    Clonus:  absent    Coordination/Dysmetria:  Heel to Shin:  Right:  normal  Finger to Nose:   Right:  normal        DRAINS:  [x] There are no drains for Neuro Critical Care to monitor at this time.      ASSESSMENT AND PLAN:       This is a 32 y.o. female with no significant medical history who initially presented to [facility] ED with thunderclap headache and was found to have diffuse subarachnoid hemorrhage.  Underwent emergent EVD placement at bedside 4/18/2022.  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  Neuro ICU course complicated by cerebral salt wasting and mild right MCA vasospasm.     NEUROLOGIC:  -CT head: 5/1/2022: No significant interval change compared to 4/24/2022.  Questionable punctate residual subarachnoid hemorrhage along the high left convexity  - Diffuse SAH with IVH secondary to ruptured ACOM aneurysm  - POD/PBD #14 s/p coil embolization of the ruptured ACOM aneurysm  -Continue Nimodipine 60mg Q4h  Melatonin 5 hs   topamax 150 bid  - Mild R MCA vasospasm ? symptomatic with worsening headache, no focal deficits  - Milrinone to 0.25mcg/kg/min  - TCD 4/28 stable  - TCD 4/30: Mild vasospasm noted in left MCA  -TCD 5/4: b/l vasospasm of MCA R>L  -TCD 5/5: b/l vasospasm (stable)  - Goal -220, avoid symptomatic hypotension  - Obstructive hydrocephalus s/p EVD placement 4/18  - Failed EVD clamp trial 4/24 due to elevated ICP (25) and head pressure  - EVD clamped 4/30  -EVD drain removed  5/2   Lovenox resume  Neurontin 600 at bedtime  Medrol Dosepak  Oxycodone 5 every 6 as needed        CARDIOVASCULAR:  - Goal SBP <180, avoid symptomatic hypotension  - Echo EF 65%, negative bubble  -lipitor 80   -Milrinone discontinued      PULMONARY:  - Maintaining O2 sats on room air  - Incentive spirometry     RENAL/FLUID/ELECTROLYTE:  - Normal renal functioning  - BUN11/ Creatinine 0.77  - monitor I/Os  - Cerebral salt wasting secondary to [address]  - sodium 138 this AM from 139 yesterday (stable)  - salt tabs discontinued  - Goal Mag>2.; Mag level 2.0 continue Magnesium IV 4 grams daily   - Replace electrolytes PRN  - Daily BMP  -no IVF     GI/NUTRITION:  NUTRITION: General Diet  - Tolerating diet well  - Bowel regimen: Continue Dulcolax PO, Glycolax, Senokot-S daily and Milk of Mag PRN  - GI prophylaxis: N/A      ID:  -Tmax 98.8  -WBC 12.9 > 13.1 (pt on medrol pack)  -Daily CBC     HEME:   - H&H 12.6/38.7, stable  - Platelets 316  - Daily CBC     ENDOCRINE:  - Continue to monitor blood glucose, goal <180     OTHER:  - PT/OT/ST     PROPHYLAXIS:   Stress ulcer: N/A     DVT PROPHYLAXIS:  - SCD sleeves - Thigh High   - Lovenox 40 daily    DISPOSITION:  Likely discharge patient      We will continue to follow along. For any changes in exam or patient status please contact Neuro Critical Care.       Elle Kerr MD  Neuro Critical Care  Pager [phone]  5/6/2022     7:44 AM

## 2022-05-06 NOTE — PROGRESS NOTES
Endovascular Neurosurgery Progress Note    SUBJECTIVE:   Patient still complained on 5/10 headache. No weakness, numbness or dizziness    Review of Systems:  CONSTITUTIONAL:  negative for fevers, chills, fatigue and malaise    EYES:  negative for double vision, blurred vision and photophobia     HEENT:  negative for tinnitus, epistaxis and sore throat    RESPIRATORY:  negative for cough, shortness of breath, wheezing    CARDIOVASCULAR:  negative for chest pain, palpitations, syncope, edema    GASTROINTESTINAL:  negative for nausea, vomiting    GENITOURINARY:  negative for incontinence    MUSCULOSKELETAL:  negative for neck or back pain    NEUROLOGICAL:  Negative for weakness and tingling  negative for headaches and dizziness    PSYCHIATRIC:  negative for anxiety      Review of systems otherwise negative. OBJECTIVE:     Vitals:    05/06/22 1236   BP: (!) 127/92   Pulse: 64   Resp: 16   Temp: 97.9 °F (36.6 °C)   SpO2:         General:  Gen: normal habitus, NAD  HEENT: EVD in place. mucosa moist  Cvs: RRR, S1 S2 normal  Resp: symmetric unlabored breathing  Abd: s/nd/nt  Ext: no edema  Skin: no lesions seen, warm and dry    Neuro:   Gen: awake and alert, oriented x3. Lang/speech: no aphasia. No dysarthria. Follows commands. CN: PERRL, EOMI, VFF, V1-3 intact, face symmetric, hearing intact, shoulder shrug symmetric, tongue midline  Motor: grossly 5/5 UE and LE b/l  Sense: LT intact in all 4 ext. Coord: FTN and HTS intact b/l  DTR: deferred  Gait: deferred    NIH Stroke Scale:   1a  Level of consciousness: 0 - alert; keenly responsive   1b. LOC questions:  0 - answers both questions correctly   1c. LOC commands: 0 - performs both tasks correctly   2. Best Gaze: 0 - normal   3. Visual: 0 - no visual loss   4. Facial Palsy: 0 - normal symmetric movement   5a. Motor left arm: 0 - no drift, limb holds 90 (or 45) degrees for full 10 seconds   5b.   Motor right arm: 0 - no drift, limb holds 90 (or 45) degrees for full 10 seconds   6a. Motor left le - no drift; leg holds 30 degree position for full 5 seconds   6b  Motor right le - no drift; leg holds 30 degree position for full 5 seconds   7. Limb Ataxia: 0 - absent   8. Sensory: 0 - normal; no sensory loss   9. Best Language:  0 - no aphasia, normal   10. Dysarthria: 0 - normal   11. Extinction and Inattention: 0 - no abnormality         Total:   0     MRS: 0      LABS:   Reviewed. Lab Results   Component Value Date    HGB 12.6 2022    WBC 13.1 (H) 2022     2022     2022    BUN 15 2022    CREATININE 0.83 2022    AST 15 2022    ALT 16 2022    MG 2.0 2022    APTT 22.6 2022    INR 0.9 2022      Lab Results   Component Value Date    COVID19 Not Detected 2022    COVID19 Not Detected 2022       RADIOLOGY:   Images were personally reviewed including:   CT head wo con 2022  1. No significant interval change compared to 2021.  Again seen right   frontal approach ventriculostomy with similar-appearing ventricular system   size and morphology. 2. Questionable punctate residual subarachnoid hemorrhage along the high left   frontal convexity. tcd 2022  Mild vasospasm L 's, R 's LR 4.3 b/l. Stable. CT head 2022:   Significant reduction with trace residual scattered subarachnoid hemorrhage.       Stable positioning of a right frontal EVD, as well as stable caliber and   configuration of the ventricles. 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 --the above treated with Smart coil embolization del3, Vu 1. Smart coil x1 opened but not detached. --Prominent musculocutaneous branch from the left V2 segment that anastomoses to the left occipital artery.     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 PBD: 18  Patient complains of moderate to severe headaches, non-focal neuro exam. 4/24/2022 clamp trial failed. 4/24/2022 CT head stable, reduced SAH. 4/30/2022 TCD mild vasospasm b/l MCA. Issues of urine retention/uti, b/l leg cramping. 4/30/2022 clamp trial. Ct head 5/1/2022 stable. EVD removed on 05/02. Milrinone stopped, Medrol pack for headache, TCDs mild vasospasm. PLAN:   --SBP goal 120-220 mm hg   --SAH management with Nimodipine 21 days course, Statins   --Patient was counseled on maintaining good hydration. --Patient was stable for discharge from neuro endovascular stand point. --f/up with Dr. Caitlin Mayfield in 2 weeks after discharge and f/up with Dr. Mikki Beltran in 3 months after discharge    Case discussed with Dr. Mikki Beltran attending.     Dennie Sally, MD  Stroke, [facility]  [id number] [place]  Electronically signed 5/6/2022 at 4:52 PM

## 2022-05-06 NOTE — DISCHARGE INSTRUCTIONS
Follow up as recommended. Take medications as prescribed. Call 911 or go to your nearest emergency department for any concerning symptoms.

## 2022-05-06 NOTE — PROGRESS NOTES
CLINICAL PHARMACY NOTE: MEDS TO BEDS    Total # of Prescriptions Filled: 5   The following medications were delivered to the patient:  · GABAPENTIN  · NIMODIPINE  · MAGNESIUM OXIDE  · MOTRIN  · TOPIMAX    Additional Documentation:    DELIVERED BY OKSANA SANTA

## 2022-05-06 NOTE — CARE COORDINATION
Discharge [facility] Case Management Department  Written by: Andre Mullins RN    Patient Name: Kam Rivas  Attending Provider: Marta Ness MD  Admit Date: 2022 12:19 AM  MRN: [id number]  Account: [de-identified]                     : 1990  Discharge Date:  22        Disposition: home with family; has transportation. Declined HC needs.      Andre Mullins RN

## 2022-05-06 NOTE — PLAN OF CARE
Problem: Falls - Risk of:  Goal: Will remain free from falls  Description: Will remain free from falls  5/6/2022 0514 by Mary Dias RN  Outcome: Progressing  5/5/2022 1815 by Shweta Stein RN  Outcome: Progressing  Goal: Absence of physical injury  Description: Absence of physical injury  5/6/2022 0514 by Mary Dias RN  Outcome: Progressing  5/5/2022 1815 by Shweta Stein RN  Outcome: Progressing     Problem: Anxiety/Stress:  Goal: Level of anxiety will decrease  Description: Level of anxiety will decrease  5/6/2022 0514 by Mary Dias RN  Outcome: Progressing  5/5/2022 1815 by Shweta Stein RN  Outcome: Progressing     Problem: Mental Status - Impaired:  Goal: Mental status will be restored to baseline  Description: Mental status will be restored to baseline  5/6/2022 0514 by Mary Dias RN  Outcome: Progressing  5/5/2022 1815 by Shweta Stein RN  Outcome: Progressing     Problem: Pain:  Description: Pain management should include both nonpharmacologic and pharmacologic interventions.   Goal: Pain level will decrease  Description: Pain level will decrease  5/6/2022 0514 by Mary Dias RN  Outcome: Progressing  5/5/2022 1815 by Shweta Stein RN  Outcome: Progressing  Goal: Recognizes and communicates pain  Description: Recognizes and communicates pain  5/6/2022 0514 by Mary Dias RN  Outcome: Progressing  5/5/2022 1815 by Shweta Stein RN  Outcome: Progressing  Goal: Control of acute pain  Description: Control of acute pain  5/6/2022 0514 by Mary Dias RN  Outcome: Progressing  5/5/2022 1815 by Shweta Stein RN  Outcome: Progressing  Goal: Control of chronic pain  Description: Control of chronic pain  5/6/2022 0514 by Mary Dias RN  Outcome: Progressing  5/5/2022 1815 by Shweta Stein RN  Outcome: Progressing     Problem: Skin Integrity - Impaired:  Goal: Will show no infection signs and symptoms  Description: Will show no infection signs and symptoms  5/6/2022 0514 by Stefan Goodwin RN  Outcome: Progressing  5/5/2022 1815 by Wiliam Paredes RN  Outcome: Progressing  Goal: Absence of new skin breakdown  Description: Absence of new skin breakdown  5/6/2022 0514 by Stefan Goodwin RN  Outcome: Progressing  5/5/2022 1815 by Wiliam Paredes RN  Outcome: Progressing     Problem: HEMODYNAMIC STATUS  Goal: Patient has stable vital signs and fluid balance  5/6/2022 0514 by Stefan Goodwin RN  Outcome: Progressing  5/5/2022 1815 by Wiliam Paredes RN  Outcome: Progressing     Problem: ACTIVITY INTOLERANCE/IMPAIRED MOBILITY  Goal: Mobility/activity is maintained at optimum level for patient  5/6/2022 0514 by Stefan Goodwin RN  Outcome: Progressing  5/5/2022 1815 by Wiliam Paredes RN  Outcome: Progressing     Problem: COMMUNICATION IMPAIRMENT  Goal: Ability to express needs and understand communication  5/6/2022 0514 by Stefan Goodwin RN  Outcome: Progressing  5/5/2022 1815 by Wiliam Paredes RN  Outcome: Progressing     Problem: Swallowing - Impaired:  Goal: Able to swallow without choking  Description: Able to swallow without choking  5/6/2022 0514 by Stefan Goodwin RN  Outcome: Progressing  5/5/2022 1815 by Wiliam Paredes RN  Outcome: Progressing  Goal: Absence of aspiration  Description: Absence of aspiration  5/6/2022 0514 by Stefan Goodwin RN  Outcome: Progressing  5/5/2022 1815 by Wiliam Paredes RN  Outcome: Progressing     Problem: Pain:  Description: Pain management should include both nonpharmacologic and pharmacologic interventions.   Goal: Pain level will decrease  Description: Pain level will decrease  5/6/2022 0514 by Stefan Goodwin RN  Outcome: Progressing  5/5/2022 1815 by Wiliam Paredes RN  Outcome: Progressing  Goal: Control of acute pain  Description: Control of acute pain  5/6/2022 0514 by Stefan Goodwin RN  Outcome: Progressing  5/5/2022 1815 by Wiliam Paredes RN  Outcome: Progressing  Goal: Control of chronic pain  Description: Control of chronic pain  5/6/2022 0514 by Ismael Valdivia RN  Outcome: Progressing  5/5/2022 1815 by Shen Jaime RN  Outcome: Progressing     Problem: Discharge Planning  Goal: Discharge to home or other facility with appropriate resources  5/6/2022 0514 by Ismael Valdivia RN  Outcome: Progressing  5/5/2022 1815 by Shen Jaime RN  Outcome: Progressing     Problem: ABCDS Injury Assessment  Goal: Absence of physical injury  5/6/2022 0514 by Ismael Valdivia RN  Outcome: Progressing  5/5/2022 1815 by Shen Jaime RN  Outcome: Progressing

## 2022-05-09 NOTE — CARE COORDINATION
.    Stroke Follow up Phone Call    Heltierra this is Nia Veliz from [facility] stroke team calling to see how you are doing since your d/c. Medication List      START taking these medications    gabapentin 300 MG capsule  Commonly known as: NEURONTIN  Take 2 capsules by mouth nightly for 30 days. ibuprofen 800 MG tablet  Commonly known as: ADVIL;MOTRIN  Take 1 tablet by mouth every 8 hours as needed for Pain     magnesium oxide 400 (240 Mg) MG tablet  Commonly known as: MAG-OX  Take 1 tablet by mouth daily     niMODipine 30 MG capsule  Commonly known as: NIMOTOP  Take 2 capsules by mouth every 4 hours for 3 days     topiramate 50 MG tablet  Commonly known as: TOPAMAX  Take 3 tablets by mouth nightly        CONTINUE taking these medications    ferrous sulfate 325 (65 Fe) MG tablet  Commonly known as: IRON 325        STOP taking these medications    azithromycin 250 MG tablet  Commonly known as: Zithromax     ketorolac 10 MG tablet  Commonly known as: TORADOL           Where to Get Your Medications      These medications were sent to [facility] [address] [facility], [address] [address]    Phone: [phone]   · gabapentin 300 MG capsule  · ibuprofen 800 MG tablet  · magnesium oxide 400 (240 Mg) MG tablet  · niMODipine 30 MG capsule  · topiramate 50 MG tablet         Can you tell me what medications you are taking? [x]   Yes  []   No    Do you have any questions about your medications? []   Yes  [x]   No    All medications reviewed with patient   []  Pt provided Pharmacist contact information [phone]     Do you have a follow up apt. With the neurologist?   [x]   Yes  []   No  Provided number to [facility] [phone]    Do you now your risk factors for stroke? [x]   Yes  []   No    Can you tell me the signs and symptoms of stroke?   [x]   Yes  []   No  FAST instructions provided    Do if you know what to do if you were having signs/symptoms of stroke? [x]   Yes  []   No  Instructions to call 911 provided    Our goal is to provide the very best stroke care, on a scale of 1 to 10 with 10 as the very best who would you rank the care you received? Good    What can we do better?       Electronically signed by Cruz Sy RN on 5/9/22 at 10:21 AM EDT

## 2022-05-13 NOTE — DISCHARGE SUMMARY
Neuro Critical Care   Discharge Summary      PATIENT NAME: Godfrey Courtney  YOB: 1990  MEDICAL RECORD NO. [id number]  DATE: 5/12/2022  PRIMARY CARE PHYSICIAN: Kyleigh Seals, APRN - CNP  DISCHARGE DATE:  5/6/2022  DISCHARGE DIAGNOSIS:   Patient Active Problem List   Diagnosis Code    Tobacco use Z72.0    Gastroesophageal reflux disease K21.9    Pelvic adhesions N73.6    Menorrhagia with regular cycle N92.0    Complex cyst of right ovary N83.291    SAH (subarachnoid hemorrhage) (HCC) I60.9    MRI-safe endovascular aneurysm coil present Z95.828    Cerebral vasospasm I67.848       HOSPITAL COURSE     The patient is a 33 y.o.  female who presents to our emergency department 4/18/2022 via [address] as a transfer from [facility] acute worst headache of her life. Nelsy Alvarado does not have any significant past medical history in our chart and denies any.  At outlying facility patient underwent CT head without demonstrating diffuse subarachnoid hemorrhage.  Patient was seen by telestroke physician in our group recommending transfer to our facility for further work-up.  In the emergency department patient states that she was washing TV with her child when she began to have severe pounding headache 10 out of 10 worst in her life.  Patient noted to be severely nauseous requiring multiple doses of IV Zofran and IM Phenergan.  Patient also admitting to photosensitivity.  Patient had significant hypertension at [place] started on IV nicardipine given 1 g of Keppra 10 mg Decadron.     Arrival to our emergency department 140/102, heart rate 77 temperature 97. 6.  Initial labs PTT 25.3, INR 1.0, WBC 13.7, platelets 641, glucose 139 and high-sensitivity troponin less than 6.  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  Case discussed with senior endovascular fellow who is aware of case and current neurologic exam planning for emergent DSA 4/18/2022 early morning.     Rosen&De Souza: 2, Modified Combs: grade 3      Hospital Course:   4/18-around 8:00 AM Dr. Griffin Alves bedside placing EVD prior to endovascular neurosurgery. Danelle Romano went for urgent DSA found to have a comm aneurysm treated by primary coil embolization. 4/19: Hemodynamically stable for arterial line readings -142, heart rate 82-99 T-max 98. 9.  Currently receiving [place] 5 mg every 4 hours as needed and fentanyl 25 every 2 as needed for pain control. 4/20: Blood pressure within parameter -139 overnight Cardene has been held off since 6 PM 4/19/2022 not requiring any as needed IV medications, heart rate 65-79 T-max 98.5.  Serum magnesium 2.3 was started on IV magnesium 2 g twice daily will increase to 4 g twice daily continue to trend magnesium daily.  Of note yesterday patient started to have increased urine output 9 L total in 24 hours urine studies sent consistent with central salt wasting patient started on hypertonic saline 2% 150 cc bolus followed by 75 cc/h maintenance.  Sodium checks every 8 hours with a sodium goal of 145.  4/21: Vitals stable overnight blood pressures trending slightly up although okay to liberalize SBP <160 today. Overnight -152, HR 69-80, tmax 99.3. Labs pending this AM although sodium noted to drop from 137 @17:03 yesterday to 129 @midnight. Patient given 150CC bolus 2% and pending repeat this AM. Will send repeat urine studies and discussed with patient she has been drinking excess water in large water bottle at bedside so to limit today to Gatorade and 1-1.5L max. Headache continues to be stable same as yesterday 6/10 constant pressure and throbbing bifrontal location. Will get repeat TCD today to ensure no increase in vasospasm. No bowel movement X3 days giving dulcolax and milk of mag continue to monitor.  protonix 40 and starting decadron 4Q6hr for headache decrease oxy pain med Q6hr.   4/22: Blood pressure goal liberalized to SBP <160 yesterday although pressures trending up still -172 overnight (giving IV labetalol 10mg early AM) , HR 64-83, tmax 98. 6. patient given 1L nacl bolus yesterday following slightly compressible IVC on ultrasound as she continues to have central salt wasting urine output slightly decreasing over last 3 days although 5,989 still yesterday. Discontinue hypertonic 2% 75cc/hr, start NaCl bolus 1L this AM and continue with 125cc/hr. Sodium goal can liberalize to 140 if Na <135 resume 2% hypertonic at 75cc/hr. Still no BM receiving dulcolax, milk of mag and glycolax. Headache improved slightly today 5/10. EVD remains open at 38nwY14 and decreasing output 189 over last 24 hours. Transcranial dopplers reviewed slightly increasing vasospasm on the right velocities up to low 100s Lindegaard ratio up from 2-->3. 3.   4/23: EVD to 20cm H2O. EVD output 80cc overnight, high UOP overnight although improved. Headache improving, ambulating well. 4/24: EVD open draining at [id number] total output 95cc over last 24 hours. Hypertensive overnight -187, HR 50-72, tmax 98.6. 72 hours of decadron 4mg Q6 completed overnight. Patient complaining of constipation has been on bowel regimen with glycolax, milk of mag and PO dulcolax. Added dulcolax suppository this morning small BM still very uncomfortable thus will add fleets PRN also. TCD's increasing left sided velocities from day prior peak left MCA velocity 156 peak right 160 Lindgard ratio left 3.49 up from 2.54 and right 3.38 down from 3.67. CT head WO this morning prior to toradol. Discussing with NS also when okay to give toradol given EVD in place.   4/25:  Failed EVD clamp trial yesterday increased ICP thus remains open at 25 with total output of 134cc/12 and 214 for the full 24 hours. Severe headache; Topamax 100mg QD and Mag 4g BID added.  EVD lowered to 10mmgHg.  TCD suggested mild right MCA and basilar spasm.  Started on Milrinone infusion.   4/26: Hyponatremic yesterday evening; started on [email or web address] x6h with improvement, switched back to [email or web address] this AM.  Headache improved yesterday evening/overnight but started to become severe again this morning.  Will trial Morphine ER 15mg BID x48h, decrease PRN Fentanyl to 25mg Q8h PRN.  Increase Topamax to 100mg BID.  Last BM on Saturday, given narcotic usage will trial dose of Movantik.  Continues on aggressive bowel regimen and took PRN Milk of Mag overnight.  EVD set at 80xfE39, ICP 10-17, 331cc out/24h.  TCD 4/26 overall stable; mild elevations of right MCA (mean velocities 129, LR 4.07).  Continues on Milrinone 0.20mcg/kg/min. 4/27: Sodium 135 this AM, continues to have significant urine output (>6L/24h).   TCD improved LR left 2.52 down from 3.4 yesterday and right 2.63 down from 4.07. EVD raised to 15mmHg.  Trial Fioricet Q6hr PRN.  Fever in evening. Sheldon Hopkins suggestive of UTI; started on Rocephin.  4/28: No acute events overnight.  EVD set at 15mmHg, ICP 5-13, 173cc out/24h.  Clinical exam remains stable.  Continues to have headaches.  Completed 48h course of MS contin 15mg BID, monitor off today.  TCD this afternoon stable.  Milrinone infusion increased to 0.5mcg/kg/min.  No further fevers.  Mild uptrend in WBC to 14. 6.  Leija removed yesterday but patient had recurrent urinary retention overnight requiring straight cath x2 each time with >1L out.  Leija catheter replaced this morning due to recurrent retention.  Urology consulted. Lester Strickland has not had a BM since Sunday (smal BM after enema per nursing).  Will obtain KUB.  Abdomen soft, non-distended and non-tender.  Continue Movantik x2 more doses.  Plan to give enema later today. Patient reports her appetite has been OK, states she snacks a lot.    4/29: Pain control issues overnight due to b/l parietal/temporal headache received Depakote, Fioricet and 50ug fentanyl . Persistent nausea and dizziness. 4/30: HA overnight, improved greatly with Verapamil/ibuprofen combination.  Bilateral calf cramping.     5/1:Patient started to have bad headaches after mid night, patient on ibuprofen every 800 every 8 as needed, Topamax, received 1 dose of Norflex last night.   EVD clamped since yesterday.  ICP 4-9, patient had 1 bowel movement last night, requiring 2 doses of Zofran for nausea, No vomiting.  No fevers, blood pressure in 140s. CT head: 5/1/2022: No significant interval change compared to 4/24/2022.  Questionable punctate residual subarachnoid hemorrhage along the high left convexity, Verapamil 60 q 4       5/2:Overnight patient did not have any acute events.  This morning patient complained of headache 10/10 intensity, received 1 dose of 50 fentanyl, which helped to relieve the pain. EVD removed. Milrione decreased, medrol dose pack for headache, Neurontin 300 HS  5/3: We will resume Lovenox, as per Neuro surgery.  DC Leija, encourage ambulation, magnesium goal greater than 2, potassium up to 4, salt tablet 3 times daily to twice daily, milrinone weaned down to 0.25.     5/3:  No acute events overnight.  DC milrinone, decrease fluid to 70, Topamax 150 at bedtime, Neurontin 600 at bedtime, magnesium oxide, limited use of oxycodone     5/5  No acute events overnight.  Reports that her headache is now an 8 out of 10.  Patient reports that she did not have headache like this before.  Denies any nausea or vomiting, reports that she is tolerating her diet. PROCEDURES:    External Ventricular Drain    PHYSICAL EXAMINATION        CONSTITUTIONAL:  Well developed, well nourished, alert and oriented x 3, in no acute distress. GCS 15. Nontoxic. No dysarthria. No aphasia. HEAD:  normocephalic, atraumatic, presence of 2 staples over top of scalp, wound is clean, dry, intact   EYES:  PERRLA, EOMI.  Visual Acuity and Peripheral vision in tact b/l   ENT:  moist mucous membranes   NECK:  supple, symmetric   LUNGS:  Equal air entry bilaterally   CARDIOVASCULAR:  normal s1 / s2, RRR, distal pulses intact   ABDOMEN: Soft, no rigidity   NEUROLOGIC:  Mental Status:  A & O x3,awake             Cranial Nerves:    II: Visual acuity:  normal  II: Visual fields:  normal  III: Pupils:  equal, round, reactive to light  III,IV,VI: Extra Ocular Movements: intact  V: Facial sensation:  intact  VII: Facial strength: intact  VIII: Hearing:  intact  IX: Palate:  intact  XI: Shoulder shrug:  intact  XII: Tongue movement:  normal     Motor Exam:    Drift:  absent  Tone:  normal     MOTOR:  RUE: +1  LUE: +1  RLE: +1  LLE: +1     Sensory:    Touch:    Right Upper Extremity:  normal  Left Upper Extremity:  normal  Right Lower Extremity:  normal  Left Lower Extremity:  normal     Deep Tendon Reflexes:    Right Bicep:  1+  Left Bicep:  1+  Right Knee:  1+  Left Knee:  1+     Plantar Response:  Right:  upgoing  Left:  upgoing     Clonus:  absent     Coordination/Dysmetria:  Heel to Shin:  Right:  normal  Finger to Nose:   Right:  normal           Discharge Vitals:  height is 5' 2\" (1.575 m) and weight is 158 lb 11.7 oz (72 kg). Her oral temperature is 97.9 °F (36.6 °C). Her blood pressure is 127/92 (abnormal) and her pulse is 64. Her respiration is 16 and oxygen saturation is 98%. NIH score 0    LABS/IMAGING     No results for input(s): WBC, HGB, HCT, PLT, NA, K, CL, CO2, BUN, CREATININE in the last 72 hours.     ECHO Complete 2D W Doppler W Color    Result Date: 4/21/2022  Transthoracic Echocardiography Report (TTE)  Patient Name Mehul Aguilar    Date of Study                 04/21/2022               LIDNA KING   Date of      1990  Gender                        Female  Birth   Age          29 year(s)  Race                             Room Number  516         Height:                       62 inch, 157.48 cm   Corporate ID [id number]    Weight:                       161 pounds, 73 kg  #   Patient Acct [de-identified]   BSA:           1.74 m^2       BMI:      29.45  #                                                                kg/m^2   MR # Dori Barroso   Accession #  [id number]  Interpreting Physician        [address] [place] [address]   Fellow                   Referring Nurse Practitioner   Interpreting             Referring Physician           Luanne Hoang MD  Fellow  Type of Study   TTE procedure:2D Echocardiogram, M-Mode, Doppler, Color Doppler, Bubble  Study. Procedure Date Date: 04/21/2022 Start: 06:37 PM Study Location: [facility] Indications:CVA. History / Tech. Comments: SAH Patient Status: Inpatient Height: 62 inches Weight: 161 pounds BSA: 1.74 m^2 BMI: 29.45 kg/m^2 HR: 72 bpm CONCLUSIONS Summary Negative bubble study. (no right to left intra-cardiac shunt via agitated saline ). Left ventricle is normal in size with normal systolic function globally. Calculated ejection fraction is 65% Mitral valve structure is normal. Mild mitral regurgitation. Tricuspid valve structure is normal. Mild tricuspid regurgitation. Estimated right ventricular systolic pressure is 31 mmHg. Signature ----------------------------------------------------------------------------  Electronically signed by Dori Gaines(Sonographer) on 04/21/2022 08:07  PM ---------------------------------------------------------------------------- ----------------------------------------------------------------------------  Electronically signed by Mack Pearce(Interpreting physician) on 04/21/2022  09:43 PM ---------------------------------------------------------------------------- FINDINGS Left Atrium Left atrium is normal in size. Left Ventricle Left ventricle is normal in size with normal systolic function globally. Calculated ejection fraction is 65% Right Atrium Right atrium is normal in size. Right Ventricle Normal right ventricular size and function. TAPSE measures 2.5 cm Mitral Valve Mitral valve structure is normal. Mild mitral regurgitation. Aortic Valve Aortic valve is trileaflet and opens normally. No aortic insufficiency. Tricuspid Valve Tricuspid valve structure is normal. Mild tricuspid regurgitation. Estimated right ventricular systolic pressure is 31 mmHg. Pulmonic Valve The pulmonic valve is normal in structure. Pericardial Effusion No significant pericardial effusion is seen. Miscellaneous Normal aortic root dimension. IVC diameter and inspiratory collapse is normal. E/E' average = 10. Bubble study was negative.  M-mode / 2D Measurements & Calculations:   LVIDd:4.6 cm(3.7 - 5.6 cm)       Diastolic WWOQO ml  MXGBK:0.15 cm(2.2 - 4.0 cm)      Systolic YCYHGP:41 ml  JIZV:5.5 cm(0.6 - 1.1 cm)        Aortic Root:2.9 cm(2.0 - 3.7 cm)  LVPWd:0.9 cm(0.6 - 1.1 cm)       LA Dimension: 3.03 cm(1.9 - 4.0 cm)  Fractional Shortenin.09 %    LA volume/Index: 27.5 ml /16m^2  Calculated LVEF (%): 65.74 %     LVOT:2 cm                                   RVDd:1.9 cm   Mitral:                                 Aortic   Valve Area (P1/2-Time): 3.24 cm^2       Peak Velocity: 1.35 m/s  Peak E-Wave: 0.75 m/s                   Mean Velocity: 0.87 m/s  Peak A-Wave: 0.68 m/s                   Peak Gradient: 7.29 mmHg  E/A Ratio: 1.11                         Mean Gradient: 4 mmHg  Peak Gradient: 2.27 mmHg  Mean Gradient: 2 mmHg  Deceleration Time: 267 msec             Area (continuity): 2.35 cm^2  P1/2t: 68 msec                          AV VTI: 26.9 cm   Area (continuity): 1.94 cm^2  Mean Velocity: 0.65 m/s   Tricuspid:                              Pulmonic:   Estimated RVSP: 31 mmHg                 Peak Velocity: 0.98 m/s  Peak TR Velocity: 2.33 m/s              Peak Gradient: 3.82 mmHg  Peak TR Gradient: 21.7156 mmHg  Estimated RA Pressure: 10 mmHg                                           Estimated PASP: 31.72 mmHg  Diastology / Tissue Doppler Septal Wall E' velocity:0.06 m/s Septal Wall E/E':12 Lateral Wall E' velocity:0.11 m/s Lateral Wall E/E':7    XR ABDOMEN (KUB) (SINGLE AP VIEW)    Result Date: 2022  EXAMINATION: ONE SUPINE XRAY VIEW(S) OF THE ABDOMEN 4/28/2022 1:34 pm COMPARISON: None. HISTORY: ORDERING SYSTEM PROVIDED HISTORY: constipation TECHNOLOGIST PROVIDED HISTORY: constipation FINDINGS: AP portable view of the chest time stamped at 1324 hours demonstrates mild to moderate colonic stool load. Gas is noted throughout the intestinal tract in a nonobstructive pattern. Overlying monitoring electrodes are noted. No organomegaly is seen. Hemidiaphragms are not included in the field of view. Osseous structures are age-appropriate. Mild to moderate colonic stool load. Nonobstructive bowel gas pattern. CT HEAD WO CONTRAST    Result Date: 5/1/2022  EXAMINATION: CT OF THE HEAD WITHOUT CONTRAST  5/1/2022 8:02 am TECHNIQUE: CT of the head was performed without the administration of intravenous contrast. Dose modulation, iterative reconstruction, and/or weight based adjustment of the mA/kV was utilized to reduce the radiation dose to as low as reasonably achievable. COMPARISON: 04/24/2022 HISTORY: ORDERING SYSTEM PROVIDED HISTORY: s/p SAH, EVD clamp x18h TECHNOLOGIST PROVIDED HISTORY: s/p SAH, EVD clamp x18h Is the patient pregnant?->No Reason for Exam: hx SAH,evd clamp FINDINGS: BRAIN/VENTRICLES: Similar-appearing right frontal approach ventriculostomy, tip in the region of the 3rd ventricle left of midline. Ventricular system size morphology is not significantly changed compared to 04/24/2022. No ventriculomegaly. Questionable punctate residual subarachnoid hemorrhage along the high left frontal convexity. No new intracranial hemorrhage, mass effect, or midline shift. No large territorial hypodensities. The sella/suprasellar region appears within normal limits. ORBITS: The visualized portion of the orbits demonstrate no acute abnormality. SINUSES: The visualized paranasal sinuses and mastoid air cells demonstrate no acute abnormality. SOFT TISSUES/SKULL:  No acute abnormality of the visualized skull or soft tissues.      1. No significant interval change compared to 04/24/2021. Again seen right frontal approach ventriculostomy with similar-appearing ventricular system size and morphology. 2. Questionable punctate residual subarachnoid hemorrhage along the high left frontal convexity. CT HEAD WO CONTRAST    Result Date: 4/24/2022  EXAMINATION: CT OF THE HEAD WITHOUT CONTRAST  4/24/2022 1:01 pm TECHNIQUE: CT of the head was performed without the administration of intravenous contrast. Dose modulation, iterative reconstruction, and/or weight based adjustment of the mA/kV was utilized to reduce the radiation dose to as low as reasonably achievable. COMPARISON: 04/19/2022 HISTORY: ORDERING SYSTEM PROVIDED HISTORY: SAH  with EVD in place FINDINGS: BRAIN/VENTRICLES: Stable positioning of a right frontal approach external ventricular drain with the tip terminating within vicinity of the 3rd ventricle to left of midline. Stable caliber and configuration of the ventricles. 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 Trace residual scattered subarachnoid hemorrhage. No large territory acute cortical infarct. No significant mass effect or midline shift. ORBITS: The visualized portion of the orbits demonstrate no acute abnormality. SINUSES: The visualized paranasal sinuses and mastoid air cells demonstrate no acute abnormality. SOFT TISSUES/SKULL:  No acute abnormality of the visualized skull or soft tissues. Again seen is a right frontal naomi hole defect. Significant reduction with trace residual scattered subarachnoid hemorrhage. Stable positioning of a right frontal EVD, as well as stable caliber and configuration of the ventricles.      CT HEAD WO CONTRAST    Result Date: 4/19/2022  EXAMINATION: CT OF THE HEAD WITHOUT CONTRAST  4/19/2022 11:32 am TECHNIQUE: CT of the head was performed without the administration of intravenous contrast. Dose modulation, iterative reconstruction, and/or weight based adjustment of the mA/kV was utilized to reduce the radiation dose to as low as reasonably achievable. COMPARISON: CT brain performed 04/17/2022. HISTORY: ORDERING SYSTEM PROVIDED HISTORY: sah TECHNOLOGIST PROVIDED HISTORY: sah Is the patient pregnant?->No Reason for Exam: sah FINDINGS: BRAIN/VENTRICLES: There is redemonstration of scattered subarachnoid hemorrhage that is decreased compared to prior. There has been interval KRISSY aneurysm coiling. The ventricular structures are unremarkable. There is a shunt tube from a right frontal approach. The infratentorial structures are unremarkable. ORBITS: The visualized portion of the orbits demonstrate no acute abnormality. SINUSES: The visualized paranasal sinuses and mastoid air cells demonstrate no acute abnormality. SOFT TISSUES/SKULL:  No acute abnormality of the visualized skull or soft tissues. Scattered subarachnoid hemorrhage that is decreased compared to prior examination. Interval KRISSY aneurysm coiling. RECOMMENDATIONS: Unavailable     CT Head WO Contrast    Result Date: 4/17/2022  EXAMINATION: CT OF THE HEAD WITHOUT CONTRAST  4/17/2022 10:30 pm TECHNIQUE: CT of the head was performed without the administration of intravenous contrast. Dose modulation, iterative reconstruction, and/or weight based adjustment of the mA/kV was utilized to reduce the radiation dose to as low as reasonably achievable. COMPARISON: None. HISTORY: Acute headache. FINDINGS: BRAIN/VENTRICLES: Diffuse subarachnoid hemorrhage bilaterally. No midline shift. The gray-white differentiation is maintained without evidence of an acute infarct. No hydrocephalus. ORBITS: The visualized portion of the orbits demonstrate no acute abnormality. SINUSES: The visualized paranasal sinuses and mastoid air cells demonstrate no acute abnormality.  SOFT TISSUES/SKULL:  No acute abnormality of the visualized skull or soft tissues. Diffuse subarachnoid hemorrhage. Recommend CTA for further evaluation. Findings were given to Dr. Andres Saul in the ED on 4/17/2022 at 10:48 p.m. IR ANGIOGRAM CAROTID C EREBRAL BILATERAL    Result Date: 4/20/2022  Date of Service: 4/18/2022 Patient arrived to the angio suite at: 0743 Anesthesia/sedation initiated at: 0848 Puncture obtained at: 0853 Vascular access was removed at: 1037 Manual pressure for minutes: 10 Patient wheeled out of the angio suite at: 1050 Diagnosis: Ruptured left anterior communicating artery aneurysm Total anesthesia/sedation: 122 mins. Procedures: 1. Right ultrasound guided femoral artery access 2. Selective right common carotid artery (CCA) cerebral angiogram 3. Selective right internal carotid artery (ICA) cerebral angiogram 4. Selective left common carotid artery (CCA) cerebral angiogram 5. Selective left internal carotid artery (ICA) cerebral angiogram 6. Selective left vertebral artery (VA) cerebral angiogram 7. Left internal carotid artery 3D rotational angiography with reconstruction on an external work station 8. Transarterial coil embolization of the left anterior communicating artery (AComA) aneurysm 9. Continuous IA nicardipine infusion 10. Right common femoral artery (CFA) angiogram Neurointerventionalist: Adele Pérez MD [place]: Marisel Lee MD PhD Elayne Welch MD Contrast: 96 cc of Visipaque-270. Fluoroscopy time: 38.8 minutes Access: Right common femoral artery. Comparison: None Consent: After explaining the risks and benefits to the patient and the patient's family, including but not limited to hemorrhagic and ischemic stroke, coma, death, vessel injury, dissection, tear, occlusion, and X-ray dye allergic type reaction, a signed consent form was obtained from sister.  Indication and Clinical History: 29year old female with past medical history of chronic smoking, marijuana abuse presented with diffuse subarachnoid hemorrhage on ct head at [place] due to anterior communicating artery aneurysm rupture. Rosen and almeida 3 Modified Biggs 3. EVD placed in suite after intubation. Plan for emergent cerebral angiogram and likely coil embolization. Status post extraventricular drain placement by Dr. Dinorah Su. Anesthesia: Local anesthesia with lidocaine. General Anesthesia. Description and findings: The patient's right groin was prepped and draped in standard sterile fashion and under general anesthesia. Ultrasound was used to insonate the right common femoral artery, which was then accessed with a micropuncture technique, and a 6 Indonesian 10cm short intravascular sheath was placed within the right common femoral artery, establishing arterial access using Seldinger technique. No heparin bolus was given due to acute bleed. A 6 Indonesian Neuron 070 intermediate catheter (IC) was then advanced over a 5F select catheter and a stiff glidewire guide wire to the level of the aortic arch. Right CCA technique: The right common carotid artery was selectively catheterized under fluoroscopic guidance and digital subtraction angiography images were obtained in biplane projections of the right cervical carotid artery. Interpretation: The right common carotid artery injection demonstrates normal antegrade flow into the external and internal carotid arteries with normal filling of the external carotid artery branches. Course and caliber of the cervical portion of the right internal carotid artery are unremarkable. Further inspection demonstrates no evidence of dissection, stenosis, aneurysm, or other vascular abnormality within the right CCA into the cervical segment of the right ICA. Right ICA technique: The right internal carotid artery was then selectively catheterized, and digital subtraction angiography of the intracranial right internal carotid circulation was performed in frontal, and lateral projections.  Interpretation: There is normal antegrade filling of the distal internal carotid artery, ophthalmic artery, anterior cerebral artery, middle cerebral artery and the distal branches. There is a prominent right posterior communicating artery with a functionally fetal right posterior cerebral artery. There is a left AComA aneurysm that is better visualized and opacified on the left ICA run. Inspection of the remaining right internal carotid circulation revealed no other evidence of cerebral aneurysm, arteriovenous malformation, or arterial stenosis. Capillary and venous phase images were also unremarkable, with no evidence of veno-occlusive disease. Left CCA technique: The left common carotid artery was selectively catheterized under fluoroscopic guidance and digital subtraction angiography images were obtained in biplane projections of the left cervical common carotid artery. Interpretation: The left common carotid artery injection demonstrates normal antegrade flow into the external and internal carotid arteries with normal filling of the external carotid artery branches. Caliber and lumen contour of the cervical portion of the left internal carotid artery are unremarkable. Further inspection demonstrates no other evidence of dissection, stenosis, aneurysm, or other vascular abnormality within the left CCA into the cervical segment of the left ICA. Left ICA technique: The left internal carotid artery was then selectively catheterized, and digital subtraction angiography of the intracranial left internal carotid artery circulation was performed in frontal and lateral projections. Interpretation: There is normal antegrade filling of the distal internal carotid artery, ophthalmic artery, anterior cerebral artery, middle cerebral artery and distal branches. There is a prominent left posterior communicating artery. The left A1 anterior cerebral artery is dominant.  There is a left wide necked inferiorly and anteriorly oriented ruptured AComA aneurysm, dimensions neck 2.93mm, height 2.82mm, width 3.13mm, length 2.78mm. Inspection of the remaining left internal carotid artery circulation revealed no other evidence of cerebral aneurysm, arteriovenous malformation, or arterial stenosis. Capillary and venous phase images were also unremarkable, with no evidence of veno-occlusive disease. Left VA technique: The left subclavian artery was then selectively catheterized and the left vertebral artery was selectively catheterized with roadmap guidance. Digital subtraction angiography of the intracranial left vertebrobasilar run-off was obtained in frontal and lateral projections. Interpretation: The left vertebral artery injection demonstrates normal antegrade opacification of the left vertebral artery, including the cervical segment, basilar artery, and respective branches. The right VA is dominant. There is a prominent musculocutaneous branch from the left V2 segment that anastomoses to the left occipital artery. Retrograde contrast opacification of the contralateral right V4 segment was achieved. The right P1 posterior cerebral artery is hypoplastic or absent. There is a wide base infundibulum at the right P1 origin. There was no evidence of cerebral aneurysm, arteriovenous malformation, or arterial stenosis. Capillary and venous phase images were unremarkable. 3-D rotational left internal carotid cerebral angiogram: A 3-D rotation of cerebral angiogram was performed with a left ICA injection using a power injector to better visualize the aneurysmal morphology and for surgical planning. This was interpreted on a separate workstation. Information obtained from the 3-D  rotational angiogram could not be obtained from other noninvasive diagnostic studies done prior to the procedure. AComA aneurysm embolization: The select catheter was removed and an Excelsior SL-10 microcatheter was advanced over a Synchro2 014 microwire into the aneurysm sac and the microwire was removed.  The following coils were successfully deployed: --Penumbra Smart coil 2.5mm x 4cm extra soft --Penumbra Smart coil 1mm x 3cm wave extra soft --Penumbra Smart coil 1mm x 2cm wave extra soft The following coils were opened but not detached: --Penumbra Smart coil 1.5mm x 3cm wave extra soft Interpretation: There was complete cessation of flow to the aneurysm, Vu score 1. Intracranial imaging showed no interval new occlusions or embolizations. After reviewing the final images, the microcatheter and IC were removed under fluoroscopic guidance. Right CFA technique: A right common femoral artery angiogram was performed and demonstrated arterial catheterization proximal to the bifurcation. There was no evidence of dissection or occlusion within the right common femoral artery. A 6/7F Vascade closure device was used to establish hemostasis at the right common femoral artery access site. No immediate complications were experienced. Patient was extubated and re-examined after the procedure with no change noted in their neurologic  examination, with distal pulses present. The patient was subsequently discharged from the neurointerventional suite to the neurointensive care unit. 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 --The above treated with Penumbra Smartcoil embolization x 3, Vu 1. Smart coil x1 opened but not detached. --Prominent musculocutaneous branch from the left V2 segment that anastomoses to the left occipital artery. --S/p EVD in the Suite with Dr. 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 Dr. Dominik Hernández dictated this invasive procedure. Dr Sepideh Correa was present for all procedural and imaging components of this case. Examination was reviewed and reported findings confirmed and evaluated by Dr. Sepideh Correa.  Sepideh Correa MD Final report electronically signed by Jason Douglas M.D. on 4/20/2022 3:25 PM    XR CHEST PORTABLE    Result Date: 4/18/2022  EXAMINATION: ONE XRAY VIEW OF THE CHEST 4/18/2022 4:09 am COMPARISON: Chest radiograph performed 07/06/2020. HISTORY: ORDERING SYSTEM PROVIDED HISTORY: leukocytosis baseline CXR TECHNOLOGIST PROVIDED HISTORY: leukocytosis baseline CXR Reason for Exam: supine FINDINGS: There is no acute consolidation or effusion. There is no pneumothorax. The mediastinal structures are unremarkable. The upper abdomen is unremarkable. The extrathoracic soft tissues are unremarkable. There is no acute osseous abnormality. No acute cardiopulmonary process. CTA HEAD NECK W CONTRAST    Result Date: 4/18/2022  EXAMINATION: CTA OF THE HEAD AND NECK WITH CONTRAST 4/18/2022 12:50 am: TECHNIQUE: CTA of the head and neck was performed with the administration of intravenous contrast. Multiplanar reformatted images are provided for review. MIP images are provided for review. Stenosis of the internal carotid arteries measured using NASCET criteria. Dose modulation, iterative reconstruction, and/or weight based adjustment of the mA/kV was utilized to reduce the radiation dose to as low as reasonably achievable. COMPARISON: None. HISTORY: ORDERING SYSTEM PROVIDED HISTORY: acute headache TECHNOLOGIST PROVIDED HISTORY: acute headache Decision Support Exception - unselect if not a suspected or confirmed emergency medical condition->Emergency Medical Condition (MA) FINDINGS: CTA NECK: AORTIC ARCH/ARCH VESSELS: No dissection or arterial injury. No significant stenosis of the brachiocephalic or subclavian arteries. CAROTID ARTERIES: No dissection, arterial injury, or hemodynamically significant stenosis by NASCET criteria. VERTEBRAL ARTERIES: No dissection, arterial injury, or significant stenosis. SOFT TISSUES: The lung apices are clear. No cervical or superior mediastinal lymphadenopathy. The larynx and pharynx are unremarkable.   No acute abnormality of the salivary and thyroid glands. BONES: No acute osseous abnormality. CTA HEAD: ANTERIOR CIRCULATION: No significant stenosis of the intracranial internal carotid, anterior cerebral, or middle cerebral arteries. 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 POSTERIOR CIRCULATION: No significant stenosis of the vertebral, basilar, or posterior cerebral arteries. No aneurysm. OTHER: No dural venous sinus thrombosis on this non-dedicated study. BRAIN: No mass effect or midline shift. No extra-axial fluid collection. The gray-white differentiation is maintained. Aneurysm arising from the anterior communicating artery measuring 3 mm x 2 mm. Findings were discussed with Dr. El Osborn At 1:13 am on 4/18/2022.      VL TRANSCRANIAL DOPPLER COMPLETE    Result Date: 5/6/2022    [facility]  Vascular Transcranial Procedure   Patient Name  Jeff Trejo    Date of Study         05/06/2022                LINDA KING   Date of Birth 1990  Gender                Female   Age           29 year(s)  Race                     Room Number   0142        Height:               62 inch, 157.48 cm   Corporate ID  [id number]    Weight:               161 pounds, 73 kg  #   Patient Acct  [de-identified]   BSA:       1.74 m^2   BMI:         29.45 kg/m^2  #   MR #          [id number]     Sonographer           Moon Lewis RVT, [facility]   Accession #   [id number]  Interpreting          Laureen Juarez                            Physician   Referring                 Referring Physician   Anna Valenzuela  Nurse  Practitioner  Additional Comments CLASSIFICATION OF VASOSPASM MEAN MCA VELOCITY ----- MCA/ICA VELOCITY RATIO ------- INTERPRETATION <120 cm/sec --------------------- less than 3 --------------------------- Normal, nonspecific elevation or distal MCA spasm >120 cm/sec ------------------------ 3 to 6 -------------------------------- Mild vasospasm of proximal MCA >160 cm/sec ------------------------ 3 to 6 -------------------------------- Moderate vasospasm of proximal MCA >200 cm/sec -------------------- greater than 6------------------------- Severe vasospasm of proximal MCA. MEAN BASILAR ARTERY VELOCITY------- INTERPRETATION >60 cm/sec -------------------------------------------- Mild BA vasospasm >90 cm/sec -------------------------------------------- Moderate BA vasospasm >120 cm/sec ------------------------------------------ Severe BA vasospasm. Procedure Type of Study:   Cerebral: Transcranial.  Indications for Study:Subarachnoid hemorrhage. Patient Status: In Patient. Conclusions   Summary   TRANSCRANIAL DOPPLER ULTRASOUND  Date of Service: 05/06/2022   Interpretation: This is bilateral complete TCD with insonation of ALL Cerebral Arteries  via transtemporal, submandibular, transorbital, and transforaminal windows  demonstrating distal left Middle Cerebral Artery elevated mean cerebral  blood flow velocities (MCBFVs) at 115 cm/sec while the right MCA was up to  135 cm/sec. and slight increase in MCBFVs on the KRISSY (the left 109 cm/sec  and right 110 cm/sec); the remaining MCBFVs were within normal limits and  the PI were normal.  The Lindegaard ratio was 2.84 on the left side while it was 3.84 on the  right side.    IMPRESSION:  1) Mild diffuse cerebral arterial cerebral vasospasm; worse on the right  KRISSY and MCA than left as described above   Recommendations   Follow up TCD as per the Primary Team   Signature   ----------------------------------------------------------------  Electronically signed by Manfred Centeno RVT, RDMS(Sonographer) on 05/06/2022 04:42 PM  ----------------------------------------------------------------   ----------------------------------------------------------------  Electronically signed by Carson TelloInterpreting physician)  on 05/06/2022 09:14 PM  ----------------------------------------------------------------  Findings:   Right Impression:                     Left Impression:  Right Lindegaard Ratio = 3.82         Left Lindegaard Ratio = 2.84  MEAN VELOCITIES:                      MEAN VELOCITIES:  Transtemporal Approach                Transtemporal Approach  Proximal MCA: 134                     Proximal MCA: 104  Mid MCA: 135                          Mid MCA: 115  Distal MCA: 110                       Distal MCA: 102  Proximal KRISSY: 101                     Proximal KRISSY: 109  Mid KRISSY: 110                          Mid KRISSY: 80.8  PCA: 44.7                             PCA: 39  T ICA: 105                            T ICA: 80.1  Submandibular Approach                Submandibular Approach  D ICA: 35.4                           D ICA: 40.4  Transorbital Approach                 Transorbital Approach  Siphon: 15.4                          Siphon: 47.7  Transforamenal Approach               Transforamenal Approach  Vertebral: 30                         Vertebral: 59.7  Risk Factors History +---------+----------+-----------------------------------------------------+ ! Diagnosis! Date      ! Comments                                             ! +---------+----------+-----------------------------------------------------+ ! Other    !04/18/2022! [facility] A. ANEURYSM 3MM X 2MM                          ! +---------+----------+-----------------------------------------------------+ ! Other    !05/06/2022! Hematocrit 38.7                                      ! +---------+----------+-----------------------------------------------------+   - The patient's risk factor(s) include: arterial hypertension. Velocities are measured in cm/s ; Diameters are measured in cm Right Transcranial Duplex Measurements Right Transforamenal Approach +-----------+------+-------+--------+-------+-------+--------+-------------+ ! Location   ! PSV   ! EDV    ! Vmean   ! RI     ! PI     ! Depth   ! Direction    ! +-----------+------+-------+--------+-------+-------+--------+-------------+ ! Basilar ! 116   !67.9   !83.93   !0.41   !0.52   !        !             ! +-----------+------+-------+--------+-------+-------+--------+-------------+ Left Transcranial Duplex Measurements Left Transforamenal Approach +-----------+------+-------+--------+-------+-------+--------+-------------+ ! Location   ! PSV   ! EDV    ! Vmean   ! RI     ! PI     ! Depth   ! Direction    ! +-----------+------+-------+--------+-------+-------+--------+-------------+ ! Basilar    ! 116   !67.9   !83.93   !0.41   !0.52   !        !             ! +-----------+------+-------+--------+-------+-------+--------+-------------+    VL TRANSCRANIAL DOPPLER COMPLETE    Result Date: 5/5/2022    [facility]  Vascular Transcranial Procedure   Patient Name   Anila Prabhakar     Date of Study           05/05/2022                 LINDA KING   Date of Birth  1990   Gender                  Female   Age            29 year(s)   Race                       Room Number    0142         Height:                 62 inch, 157.48 cm   Corporate ID # [id number]     Weight:                 161 pounds, 73 kg   Patient Acct # [de-identified]    BSA:        1.74 m^2    BMI:      29.45 kg/m^2   MR #           [id number]      Sonographer             Gricel Toth RVT   Accession #    [id number]   Interpreting Physician  [facility]      Referring Physician  Nurse          [place] Reyn[place], [place]  Practitioner  Procedure Type of Study:   Cerebral: Transcranial.  Indications for Study:Subarachnoid hemorrhage. Patient Status: In Patient. Conclusions   Summary   Moderate vasospasm noted in the right MCA. Mild vasospasm noted in the  basilar artery.    Signature   ----------------------------------------------------------------  Electronically signed by Gricel Toth RVT(Sonographer) on  05/05/2022 11:36 AM  ----------------------------------------------------------------   ----------------------------------------------------------------  Electronically signed by Thomasenia Starr Reyes,Arthur(Interpreting  physician) on 05/05/2022 07:31 PM  ----------------------------------------------------------------  Findings:   Right Impression:                     Left Impression:  Right Lindegaard Ratio = 3.44         Left Lindegaard Ratio = 2.70  MEAN VELOCITIES:                      MEAN VELOCITIES:  Transtemporal Approach                Transtemporal Approach  Proximal MCA: 141                     Proximal MCA: 111  Mid MCA: 131                          Mid MCA: 96.6  Distal MCA: 122                       Distal MCA: 94.3  Proximal KRISSY: 77                      Proximal KRISSY: 61.2  Mid KRISSY: 80.8                         Mid KRISSY: 57.4  T ICA: 38.5                           PCA: 50.4  PCA: 21.6                             T ICA: 59.7  Submandibular Approach                Submandibular Approach  D ICA: 42.0                           D ICA: 41.2  Transorbital Approach                 Transorbital Approach  Siphon: 22.7                          Siphon: 24.3  Transforamenal Approach               Transforamental Approach  Vertebral: 81.6                       Vertebral: 68.1                                        Basilar: 88.5  Risk Factors History +---------+----------+-----------------------------------------------------+ ! Diagnosis! Date      ! Comments                                             ! +---------+----------+-----------------------------------------------------+ ! Other    !04/18/2022! [facility] A. ANEURYSM 3MM X 2MM                          ! +---------+----------+-----------------------------------------------------+ ! Other    !04/27/2022! Hematocrit 43.1; ICP-10                              ! +---------+----------+-----------------------------------------------------+ ! Other    !04/28/2022! Hematocrit 40.5; ICP 14                              ! +---------+----------+-----------------------------------------------------+ ! Other    !04/29/2022! Hematocrit: 38.1 /81 ! +---------+----------+-----------------------------------------------------+ ! Other    !04/30/2022! Hematocrit 39.5                                      ! +---------+----------+-----------------------------------------------------+   - The patient's risk factor(s) include: arterial hypertension. VL TRANSCRANIAL DOPPLER COMPLETE    Result Date: [address] [facility] [address]  Vascular Transcranial Procedure   Patient Name   Hasmukh Naylor    Date of Study           05/04/2022                 LINDA KING   Date of Birth  1990  Gender                  Female   Age            29 year(s)  Race                       Room Number    0142        Height:                 62 inch, 157.48 cm   Corporate ID # [id number]    Weight:                 161 pounds, 73 kg   Patient Acct # [de-identified]   BSA:        1.74 m^2    BMI:       29.45 kg/m^2   MR #           [id number]     Cynthia aDigle, [facility]   Accession #    [id number]  Interpreting Physician  Ton Vargas   Referring                  Referring Physician     Alessandro Garcia  Nurse  Practitioner  Procedure Type of Study:   Cerebral: Transcranial.  Indications for Study:Subarachnoid hemorrhage. Patient Status: In Patient. Technical Quality:Adequate visualization. Conclusions   Summary   TRANSCRANIAL DOPPLER ULTRASOUND  Date of Service: 05.04.2022   Interpretation: This is bilateral complete TCD with insonation of ALL Cerebral Arteries  via transtemporal, submandibular, transorbital, and transforaminal windows  demonstrating distal left Middle Cerebral Artery elevated mean cerebral  blood flow velocities (MCBFVs) at 117 cm/sec while the right MCA was up to  134 cm/sec. the remaining MCBFVs were within normal limits and the PI were  normal.   The Lindegaard ratio was 4.98 on the left side while it was 4.92 on the  right side.    IMPRESSION:  1) Mild bilateral MCA vasospasm as described above, right worse than left   Recommendations   FOllow up TCD as per the primary team   Signature   ----------------------------------------------------------------  Electronically signed by Cony Dial RVT(Sonographer) on  05/04/2022 12:15 PM  ----------------------------------------------------------------   ----------------------------------------------------------------  Electronically signed by Dago Tello(Interpreting physician)  on 05/04/2022 09:28 PM  ----------------------------------------------------------------  Findings:   Right Impression:                     Left Impression:  Right Lindegaard Ratio = 4.92         Left Lindegaard Ratio = 4.98  MEAN VELOCITIES:                      MEAN VELOCITIES:  Transtemporal Approach                Transtemporal Approach  Proximal MCA: 126                     Proximal MCA: 110  Mid MCA: 134                          Mid MCA: 117  Distal MCA: 128                       Distal MCA: 110  Proximal KRISSY: 73.9                    Proximal KRISSY: 84.7  Mid KRISSY: 62                           Mid KRISSY: 88.9  PCA: 28.1                             PCA: 25.8  T ICA: 82                             T ICA: 58.5  Submandibular Approach                Submandibular Approach  D ICA: 27.3                           D ICA: 23.5  Transorbital Approach                 Transorbital Approach  Siphon: 25.8                          Siphon: 27.3  Transforamenal Approach               Transforamenal Approach  Vertebral: 41.2                       Vertebral: 37  Basilar: 65.5  Risk Factors History +---------+----------+-----------------------------------------------------+ ! Diagnosis! Date      ! Comments                                             ! +---------+----------+-----------------------------------------------------+ ! Other    !04/18/2022! [facility] A. ANEURYSM 3MM X 2MM                          ! +---------+----------+-----------------------------------------------------+ ! Other    !04/27/2022! Hematocrit 43.1; ICP-10                              ! +---------+----------+-----------------------------------------------------+ ! Other    !04/28/2022! Hematocrit 40.5; ICP 14                              ! +---------+----------+-----------------------------------------------------+ ! Other    !04/29/2022! Hematocrit: 38.1 /81                           ! +---------+----------+-----------------------------------------------------+ ! Other    !04/30/2022! Hematocrit 39.5                                      ! +---------+----------+-----------------------------------------------------+   - The patient's risk factor(s) include: arterial hypertension.     VL TRANSCRANIAL DOPPLER COMPLETE    Result Date: [address] [facility] [address]  Vascular Transcranial Procedure   Patient Name Jose Leyva    Date of Study           05/03/2022               LINDA KING   Date of      1990  Gender                  Female  Birth   Age          29 year(s)  Race                       Room Number  6505        Height:                 62 inch, 157.48 cm   Corporate ID [id number]    Weight:                 161 pounds, 73 kg  #   Patient Acct [de-identified]   BSA:        1.74 m^2    BMI:        29.45 kg/m^2  #   MR #         [id number]     SonographAlina Mesa   Accession #  [id number]  Interpreting Physician  Nilesh Powell   Referring                Referring Physician     Verna Burgos  Nurse  Practitioner  Additional Comments CLASSIFICATION OF VASOSPASM MEAN MCA VELOCITY ----- MCA/ICA VELOCITY RATIO ------- INTERPRETATION <120 cm/sec --------------------- less than 3 --------------------------- Normal, nonspecific elevation or distal MCA spasm >120 cm/sec ------------------------ 3 to 6 -------------------------------- Mild vasospasm of proximal MCA >160 cm/sec ------------------------ 3 to 6 -------------------------------- Moderate vasospasm of proximal MCA >200 cm/sec -------------------- greater than 6------------------------- Severe vasospasm of proximal MCA. MEAN BASILAR ARTERY VELOCITY------- INTERPRETATION >60 cm/sec -------------------------------------------- Mild BA vasospasm >90 cm/sec -------------------------------------------- Moderate BA vasospasm >120 cm/sec ------------------------------------------ Severe BA vasospasm. Procedure Type of Study:   Cerebral: Transcranial.  Indications for Study:Subarachnoid hemorrhage. Patient Status: In Patient. Conclusions   Summary   Nonspecific elevation of the bilateral MCA velocities may represent mild  vasopasm. Mild vasospasm noted in the basilar artery.    Signature   ----------------------------------------------------------------  Electronically signed by Alina Geronimo(Sonographer) on  05/03/2022 11:35 AM  ----------------------------------------------------------------   ----------------------------------------------------------------  Electronically signed by Marlowe Gardener Reyes,Arthur(Interpreting  physician) on 05/03/2022 05:09 PM  ----------------------------------------------------------------  Findings:   Right Impression:                     Left Impression:  Right Lindegaard Ratio = 4.28         Left Lindegaard Ratio = 3.59  MEAN VELOCITIES:                      MEAN VELOCITIES:  Transtemporal Approach                Transtemporal Approach  Proximal MCA: 112                     Proximal MCA: 119  Mid MCA: 103                          Mid MCA: 100  Distal MCA: 117                       Distal MCA: 100  Proximal KRISSY: 80                      Proximal KRISSY: 108  Mid KRISSY: 83                           Mid KRISSY: 109  PCA: 32                               PCA: 28  T ICA: 52                             T ICA: 45  Submandibular Approach                Submandibular Approach  D ICA: 27                             D ICA: 33  Transorbital Approach                 Transorbital Approach  Siphon: 39                            Siphon: 45  Transforamenal Approach               Transforamenal Approach  Vertebral: 60 Vertebral: 50  Basilar: 63.4                         Basilar: 63.4  Risk Factors History +---------+----------+-----------------------------------------------------+ ! Diagnosis! Date      ! Comments                                             ! +---------+----------+-----------------------------------------------------+ ! Other    !04/18/2022! [facility] A. ANEURYSM 3MM X 2MM                          ! +---------+----------+-----------------------------------------------------+ ! Other    !04/27/2022! Hematocrit 43.1; ICP-10                              ! +---------+----------+-----------------------------------------------------+ ! Other    !04/28/2022! Hematocrit 40.5; ICP 14                              ! +---------+----------+-----------------------------------------------------+ ! Other    !04/29/2022! Hematocrit: 38.1 /81                           ! +---------+----------+-----------------------------------------------------+ ! Other    !04/30/2022! Hematocrit 39.5                                      ! +---------+----------+-----------------------------------------------------+   - The patient's risk factor(s) include: arterial hypertension.     VL TRANSCRANIAL DOPPLER COMPLETE    Result Date: [address] [facility] [address]  Vascular Transcranial Procedure   Patient Name   Renate Marshall    Date of Study           05/02/2022                 LINDA KING   Date of Birth  1990  Gender                  Female   Age            29 year(s)  Race                       Room Number    0513        Height:                 62 inch, 157.48 cm   Corporate ID # [id number]    Weight:                 161 pounds, 73 kg   Patient Acct # [de-identified]   BSA:        1.74 m^2    BMI:       29.45 kg/m^2   MR #           [id number]     Navi Morrison, [facility]   Accession #    [id number]  Interpreting Physician  Melodie Badillo   Referring                  Referring Physician     Petrona Malloy Nurse  Practitioner  Additional Comments CLASSIFICATION OF VASOSPASM MEAN MCA VELOCITY ----- MCA/ICA VELOCITY RATIO ------- INTERPRETATION <120 cm/sec --------------------- less than 3 --------------------------- Normal, nonspecific elevation or distal MCA spasm >120 cm/sec ------------------------ 3 to 6 -------------------------------- Mild vasospasm of proximal MCA >160 cm/sec ------------------------ 3 to 6 -------------------------------- Moderate vasospasm of proximal MCA >200 cm/sec -------------------- greater than 6------------------------- Severe vasospasm of proximal MCA. MEAN BASILAR ARTERY VELOCITY------- INTERPRETATION >60 cm/sec -------------------------------------------- Mild BA vasospasm >90 cm/sec -------------------------------------------- Moderate BA vasospasm >120 cm/sec ------------------------------------------ Severe BA vasospasm. Procedure Type of Study:   Cerebral: Transcranial.  Indications for Study:Subarachnoid hemorrhage. Patient Status: In Patient. Technical Quality:Adequate visualization. Conclusions   Summary   Mild vasospasm of the right MCA and basilar artery.    Signature   ----------------------------------------------------------------  Electronically signed by Jerry Fry RVT(Sonographer) on  05/02/2022 03:18 PM  ----------------------------------------------------------------   ----------------------------------------------------------------  Electronically signed by Genene Cota Reyes,Arthur(Interpreting  physician) on 05/03/2022 05:07 PM  ----------------------------------------------------------------  Findings:   Right Impression:                     Left Impression:  Right Lindegaard Ratio = 4.20         Left Lindegaard Ratio = 4.49  MEAN VELOCITIES:                      MEAN VELOCITIES:  Transtemporal Approach                Transtemporal Approach  Proximal MCA: 131                     Proximal MCA: 111  Mid MCA: 102                          Mid MCA: 110  Distal MCA: 107 Distal MCA: 112  Proximal KRISSY: 37                      Proximal KRISSY: 45  Mid KRISSY: 40                           Mid KRISSY: 60  PCA: 22                               PCA: 39  T ICA: 40                             T ICA: 39  Submandibular Approach                Submandibular Approach  D ICA: 40                             D ICA: 25  Transorbital Approach                 Transorbital Approach  Siphon: 20                            Siphon: 39  Transforamenal Approach               Transforamenal Approach  Vertebral: 39                         Vertebral: 32  Basilar: 74  Risk Factors History +---------+----------+-----------------------------------------------------+ ! Diagnosis! Date      ! Comments                                             ! +---------+----------+-----------------------------------------------------+ ! Other    !04/18/2022! [facility] A. ANEURYSM 3MM X 2MM                          ! +---------+----------+-----------------------------------------------------+ ! Other    !04/27/2022! Hematocrit 43.1; ICP-10                              ! +---------+----------+-----------------------------------------------------+ ! Other    !04/28/2022! Hematocrit 40.5; ICP 14                              ! +---------+----------+-----------------------------------------------------+ ! Other    !04/29/2022! Hematocrit: 38.1 /81                           ! +---------+----------+-----------------------------------------------------+ ! Other    !04/30/2022! Hematocrit 39.5                                      ! +---------+----------+-----------------------------------------------------+   - The patient's risk factor(s) include: arterial hypertension.     VL TRANSCRANIAL DOPPLER COMPLETE    Result Date: 4/30/2022    [facility]  Vascular Transcranial Procedure   Patient Name  Polly Abraham    Date of Study           04/30/2022                LINDA KING   Date of Birth 1990  Gender                  Female Age           29 year(s)  Race                       Room Number   0751        Height:                 62 inch, 157.48 cm   Corporate ID  [id number]    Weight:                 161 pounds, 73 kg  #   Patient Acct  [de-identified]   BSA:        1.74 m^2    BMI:       29.45 kg/m^2  #   MR #          [id number]     KERI Chávez   Accession #   [id number]  Interpreting Physician  Bryant Cevallos   Referring                 Referring Physician     David Couch MD  Nurse  Practitioner  Additional Comments CLASSIFICATION OF VASOSPASM MEAN MCA VELOCITY ----- MCA/ICA VELOCITY RATIO ------- INTERPRETATION <120 cm/sec --------------------- less than 3 --------------------------- Normal, nonspecific elevation or distal MCA spasm >120 cm/sec ------------------------ 3 to 6 -------------------------------- Mild vasospasm of proximal MCA >160 cm/sec ------------------------ 3 to 6 -------------------------------- Moderate vasospasm of proximal MCA >200 cm/sec -------------------- greater than 6------------------------- Severe vasospasm of proximal MCA. MEAN BASILAR ARTERY VELOCITY------- INTERPRETATION >60 cm/sec -------------------------------------------- Mild BA vasospasm >90 cm/sec -------------------------------------------- Moderate BA vasospasm >120 cm/sec ------------------------------------------ Severe BA vasospasm. Procedure Type of Study:   Cerebral: Transcranial.  Indications for Study:Subarachnoid hemorrhage. Patient Status: In Patient. Conclusions   Summary   Mild vasospasm noted in the left MCA.    Signature   ----------------------------------------------------------------  Electronically signed by KERI Zladivar(Sonographer) on  04/30/2022 01:38 PM  ----------------------------------------------------------------   ----------------------------------------------------------------  Electronically signed by Sula Ping Reyes,Arthur(Interpreting  physician) on 04/30/2022 03:36 PM ----------------------------------------------------------------  Findings:   Right Impression:                    Left Impression:  Right Lindegaard Ratio = 4.5         Left Lindegaard Ratio = 4.27  MEAN VELOCITIES:                     MEAN VELOCITIES:  Transtemporal Approach               Transtemporal Approach  Proximal MCA: 103                    Proximal MCA: 120  Mid MCA: 111                         Mid MCA: 128  Distal MCA: 97                       Distal MCA: 122  Proximal KRISSY: 80.1                   Proximal KRISSY: 93.6  Mid KRISSY: 84.3                        Mid KRISSY: 85.5  PCA: 40                              PCA: 33.1  T ICA: 55.8                          T ICA: 53.1  Submandibular Approach               Submandibular Approach  D ICA: 24.6                          D ICA: 30  Transorbital Approach                Transorbital Approach  Siphon: 22.3                         Siphon: 29.3  Transforamenal Approach              Transforamenal Approach  Vertebral: 33.9                      Vertebral: 43.1  Risk Factors History +---------+----------+-----------------------------------------------------+ ! Diagnosis! Date      ! Comments                                             ! +---------+----------+-----------------------------------------------------+ ! Other    !04/18/2022! [facility] A. ANEURYSM 3MM X 2MM                          ! +---------+----------+-----------------------------------------------------+ ! Other    !04/27/2022! Hematocrit 43.1; ICP-10                              ! +---------+----------+-----------------------------------------------------+ ! Other    !04/28/2022! Hematocrit 40.5; ICP 14                              ! +---------+----------+-----------------------------------------------------+ ! Other    !04/29/2022! Hematocrit: 38.1 /81                           ! +---------+----------+-----------------------------------------------------+ ! Other    !04/30/2022! Hematocrit 39.5 ! +---------+----------+-----------------------------------------------------+   - The patient's risk factor(s) include: arterial hypertension. Velocities are measured in cm/s ; Diameters are measured in cm Right Transcranial Duplex Measurements Right Transforamenal Approach +-----------+-------+------+--------+-----+-----+--------+-----------------+ ! Location   ! PSV    ! EDV   ! Vmean   ! RI   ! PI   ! Depth   ! Direction        ! +-----------+-------+------+--------+-----+-----+--------+-----------------+ ! Basilar    !56.6   !      !18.87   !     !     !        !                 ! +-----------+-------+------+--------+-----+-----+--------+-----------------+ Left Transcranial Duplex Measurements Left Transforamenal Approach +-----------+-------+------+--------+-----+-----+--------+-----------------+ ! Location   ! PSV    ! EDV   ! Vmean   ! RI   ! PI   ! Depth   ! Direction        ! +-----------+-------+------+--------+-----+-----+--------+-----------------+ ! Basilar    !56.6   !      !18.87   !     !     !        !                 ! +-----------+-------+------+--------+-----+-----+--------+-----------------+    VL TRANSCRANIAL DOPPLER COMPLETE    Result Date: 4/29/2022    [facility]  Vascular Transcranial Procedure   Patient Name Giuliana Snider    Date of Study           04/29/2022               LINDA KING   Date of      1990  Gender                  Female  Birth   Age          29 year(s)  Race                       Room Number  6738        Height:                 62 inch, 157.48 cm   Corporate ID [id number]    Weight:                 161 pounds, 73 kg  #   Patient Acct [de-identified]   BSA:        1.74 m^2    BMI:        29.45 kg/m^2  #   MR #         [id number]     Sonographer             Alina Geronimo   Accession #  [id number]  Interpreting Physician  Richard Cuellar   Referring                Referring Physician     Delmer Sykes  Nurse  Practitioner  Additional Comments CLASSIFICATION OF VASOSPASM MEAN MCA VELOCITY ----- MCA/ICA VELOCITY RATIO ------- INTERPRETATION <120 cm/sec --------------------- less than 3 --------------------------- Normal, nonspecific elevation or distal MCA spasm >120 cm/sec ------------------------ 3 to 6 -------------------------------- Mild vasospasm of proximal MCA >160 cm/sec ------------------------ 3 to 6 -------------------------------- Moderate vasospasm of proximal MCA >200 cm/sec -------------------- greater than 6------------------------- Severe vasospasm of proximal MCA. MEAN BASILAR ARTERY VELOCITY------- INTERPRETATION >60 cm/sec -------------------------------------------- Mild BA vasospasm >90 cm/sec -------------------------------------------- Moderate BA vasospasm >120 cm/sec ------------------------------------------ Severe BA vasospasm. Procedure Type of Study:   Cerebral: Transcranial.  Indications for Study:Subarachnoid hemorrhage. Patient Status: In Patient. Conclusions   Summary   TRANSCRANIAL DOPPLER ULTRASOUND  Date of Service: 04.29.2022   Interpretation: This is bilateral complete TCD with insonation of ALL Cerebral Arteries  via transtemporal, submandibular, transorbital, and transforaminal windows  demonstrating distal left Middle Cerebral Artery elevated mean cerebral  blood flow velocities (MCBFVs) at 145 cm/sec while the right MCA was up to  102 cm/sec. the remaining MCBFVs were within normal limits and the PI were  normal.   The Lindegaard ratio was 4.64 on the left side while it was 4.51 on the  right side. IMPRESSION:  1) Mild to Moderate bilateral MCA vasospasm (left worse than right) as  described above.   2) Overall increase in the MCBFVs in all insonated arteries   Recommendations   Follow up TCD as per the Primary Team   Signature   ----------------------------------------------------------------  Electronically signed by Alina Geronimo(Sonographer) on  04/29/2022 11:27 AM ----------------------------------------------------------------   ----------------------------------------------------------------  Electronically signed by Carson TelloInterpreting physician)  on 04/29/2022 09:00 PM  ----------------------------------------------------------------  Findings:   Right Impression:                    Left Impression:  Right Lindegaard Ratio =4.51         Left Lindegaard Ratio = 4.64  MEAN VELOCITIES:                     MEAN VELOCITIES:  Transtemporal Approach               Transtemporal Approach  Proximal MCA: 102                    Proximal MCA: 119  Mid MCA: 97                          Mid MCA: 125  Distal MCA: 100                      Distal MCA: 145  Proximal KRISSY: 60                     Proximal KRISSY: 99  Mid KRISSY: 79                          Mid KRISSY: 97  PCA: 42                              PCA: 35  T ICA: 28                            T ICA: 37  Submandibular Approach               Submandibular Approach  D ICA: 23                            D ICA: 31  Transorbital Approach                Transorbital Approach  Siphon: 40                           Siphon: 61  Transforamenal Approach              Transforamenal Approach  Vertebral: 59                        Vertebral: 50   BASILAR: 60                          BASILAR: 60  Risk Factors History +---------+----------+-----------------------------------------------------+ ! Diagnosis! Date      ! Comments                                             ! +---------+----------+-----------------------------------------------------+ ! Other    !04/18/2022! [facility] RAINEY 3MM X 2MM                          ! +---------+----------+-----------------------------------------------------+ ! Other    !04/27/2022! Hematocrit 43.1; ICP-10                              ! +---------+----------+-----------------------------------------------------+ ! Other    !04/28/2022! Hematocrit 40.5; ICP 14                              ! +---------+----------+-----------------------------------------------------+ ! Other    !04/29/2022! Hematocrit: 38.1 /81                           ! +---------+----------+-----------------------------------------------------+   - The patient's risk factor(s) include: arterial hypertension. VL TRANSCRANIAL DOPPLER COMPLETE    Result Date: 4/28/2022    [facility]  Vascular Transcranial Procedure   Patient Name   Romulo Arenas     Date of Study           04/28/2022                 LINDA KING   Date of Birth  1990   Gender                  Female   Age            29 year(s)   Race                       Room Number    3617         Height:                 62 inch, 157.48 cm   Corporate ID # [id number]     Weight:                 161 pounds, 73 kg   Patient Acct # [de-identified]    BSA:        1.74 m^2    BMI:      29.45 kg/m^2   MR #           [id number]      Sonographer             Areli Bailey, T   Accession #    [id number]   Interpreting Physician  [address] [place] [address]   Referring                   Referring Physician     Erasmo Estrada  Nurse  Practitioner  Additional Comments CLASSIFICATION OF VASOSPASM MEAN MCA VELOCITY ----- MCA/ICA VELOCITY RATIO ------- INTERPRETATION <120 cm/sec --------------------- less than 3 --------------------------- Normal, nonspecific elevation or distal MCA spasm >120 cm/sec ------------------------ 3 to 6 -------------------------------- Mild vasospasm of proximal MCA >160 cm/sec ------------------------ 3 to 6 -------------------------------- Moderate vasospasm of proximal MCA >200 cm/sec -------------------- greater than 6------------------------- Severe vasospasm of proximal MCA. MEAN BASILAR ARTERY VELOCITY------- INTERPRETATION >60 cm/sec -------------------------------------------- Mild BA vasospasm >90 cm/sec -------------------------------------------- Moderate BA vasospasm >120 cm/sec ------------------------------------------ Severe BA vasospasm.  Procedure Type of Study:   Cerebral: Transcranial.  Indications for Study:Vasospasm post-subarachnoid hemorrhage. Patient Status: In Patient. Conclusions   Summary   TRANSCRANIAL DOPPLER ULTRASOUND  Date of Service: 4/28/2022   Interpretation: This is bilateral complete TCD with insonation of ALL Cerebral Arteries  via transtemporal, submandibular, transorbital, and transforaminal windows  demonstrating distal left Middle Cerebral Artery elevated mean cerebral  blood flow velocities (MCBFVs) at 116 cm/sec while the right MCA was up to  114 cm/sec. the remaining MCBFVs were within normal limits and the PI were  normal.   The Lindegaard ratio was 3.43 on the right side while it was 3.76 on the  left side. IMPRESSION:  1) Mild bilateral MCA vasospasm (improved from the previous studies) as  described above   Recommendations   Follow up TCD as per the Neuro ICU Team   Signature   ----------------------------------------------------------------  Electronically signed by Ariadna Gonzalez RVT(Sonographer) on  04/28/2022 12:04 PM  ----------------------------------------------------------------   ----------------------------------------------------------------  Electronically signed by Dago Tello(Interpreting physician)  on 04/28/2022 09:38 PM  ----------------------------------------------------------------  Findings:   Right Impression:                     Left Impression:  Right Lindegaard Ratio: 3.43          Left Lindegaard Ratio: 3.76  MEAN VELOCITIES:                      MEAN VELOCITIES:  Transtemporal Approach. Transtemporal Approach. Proximal MCA: 97.8 . Proximal MCA: 113  Mid MCA: 114 . Mid MCA: 116  Distal MCA: 97.0 . Distal MCA: 115  Proximal KRISSY: 43.5 . Proximal KRISSY: 103  Mid KRISSY: 51.2 . Mid KRISSY: 105  PCA: 32.7 .                            PCA: 39.7  T ICA: 42.7                           T ICA: 33.9  Submandibular Approach. Submandibular Approach. D ICA: 33.1 . D ICA: 30.8  Transorbital Approach. Transorbital Approach. Siphon: 50.0 . Siphon: 51.2  Transforamenal Approach. Transforamenal Approach. Vertebral: 38.1 . Vertebral: 36.2   BASILAR:56.2  Risk Factors History +---------+----------+-----------------------------------------------------+ ! Diagnosis! Date      ! Comments                                             ! +---------+----------+-----------------------------------------------------+ ! Other    !04/18/2022! [facility] A. ANEURYSM 3MM X 2MM                          ! +---------+----------+-----------------------------------------------------+ ! Other    !04/27/2022! Hematocrit 43.1; ICP-10                              ! +---------+----------+-----------------------------------------------------+ ! Other    !04/28/2022! Hematocrit 40.5; ICP 14                              ! +---------+----------+-----------------------------------------------------+   - The patient's risk factor(s) include: arterial hypertension.     VL TRANSCRANIAL DOPPLER COMPLETE    Result Date: 4/27/2022    [facility]  Vascular Transcranial Procedure   Patient Name  Cynthia Snider    Date of Study         04/26/2022                LINDA KING   Date of Birth 1990  Gender                Female   Age           29 year(s)  Race                     Room Number   5504        Height:               62 inch, 157.48 cm   Corporate ID  [id number]    Weight:               161 pounds, 73 kg  #   Patient Acct  [de-identified]   BSA:       1.74 m^2   BMI:         29.45 kg/m^2  #   MR #          [id number]     Sonographer           Manfred Centeno RVT, [facility]   Accession #   [id number]  Interpreting          Hamilton Weiss                            Physician   Referring                 Referring Physician   Kathy Ashby  Nurse  Practitioner  Additional Comments CLASSIFICATION OF VASOSPASM MEAN MCA VELOCITY ----- MCA/ICA VELOCITY RATIO ------- INTERPRETATION <120 cm/sec --------------------- less than 3 --------------------------- Normal, nonspecific elevation or distal MCA spasm >120 cm/sec ------------------------ 3 to 6 -------------------------------- Mild vasospasm of proximal MCA >160 cm/sec ------------------------ 3 to 6 -------------------------------- Moderate vasospasm of proximal MCA >200 cm/sec -------------------- greater than 6------------------------- Severe vasospasm of proximal MCA. MEAN BASILAR ARTERY VELOCITY------- INTERPRETATION >60 cm/sec -------------------------------------------- Mild BA vasospasm >90 cm/sec -------------------------------------------- Moderate BA vasospasm >120 cm/sec ------------------------------------------ Severe BA vasospasm. Procedure Type of Study:   Cerebral: Transcranial.  Indications for Study:Subarachnoid hemorrhage. Patient Status: In Patient. Conclusions   Summary   TRANSCRANIAL DOPPLER ULTRASOUND  Date of Service: 4/26/2022   Interpretation: This is bilateral complete TCD with insonation of ALL Cerebral Arteries  via transtemporal, submandibular, transorbital, and transforaminal windows  demonstrating distal left Middle Cerebral Artery elevated mean cerebral  blood flow velocities (MCBFVs) at 115 cm/sec while the right MCA was up to  129 cm/sec. the remaining MCBFVs were within normal limits and the PI were  normal.  The Lindegaard ratio was 4.07 on the right side while it was 3.4 on the  left side.    IMPRESSION:  1) Mild bilateral MCA vasospasm (right worse than left) as described above   Recommendations   Follow up TCD as per the Neuro ICU Team   Signature   ----------------------------------------------------------------  Electronically signed by Dago Tello(Interpreting physician)  on 04/27/2022 11:21 PM  ----------------------------------------------------------------  Findings:   Right Impression: Left Impression:  Right Lindegaard Ratio = 4.07          Left Lindegaard Ratio = 3.4  MEAN VELOCITIES:                       MEAN VELOCITIES:  Transtemporal Approach                 Transtemporal Approach  Proximal MCA: 129                      Proximal MCA: 118  Mid MCA: 111                           Mid MCA: 119  Distal MCA: 103                        Distal MCA: 115  Proximal KRISSY: 99.3                     Proximal KRISSY: 112  Mid KRISSY: 96.3                          Mid KRISSY: 100  PCA: 55.8                              PCA: 43.1  T ICA: 92.8                            T ICA: 92.4  Submandibular Approach                 Submandibular Approach  D ICA: 31.6                            D ICA: 35  Transorbital Approach                  Transorbital Approach  Siphon: 28.1                           Siphon: 27.3  Transforamenal Approach                Transforamenal Approach  Vertebral: 38.5                        Vertebral: 52.7  Risk Factors History +---------+----------+-----------------------------------------------------+ ! Diagnosis! Date      ! Comments                                             ! +---------+----------+-----------------------------------------------------+ ! Other    !04/18/2022! [facility] A. ANEURYSM 3MM X 2MM                          ! +---------+----------+-----------------------------------------------------+ ! Other    !04/27/2022! Hematocrit 43.1; ICP-10                              ! +---------+----------+-----------------------------------------------------+   - The patient's risk factor(s) include: arterial hypertension. Velocities are measured in cm/s ; Diameters are measured in cm Right Transcranial Duplex Measurements Right Transforamenal Approach +-----------+------+------+--------+-------+-------+--------+--------------+ ! Location   ! PSV   ! EDV   ! Vmean   ! RI     ! PI     ! Depth   ! Direction     ! +-----------+------+------+--------+-------+-------+--------+--------------+ ! Basilar ! 100   !57    !71.33   !0.43   !0.55   !        !              ! +-----------+------+------+--------+-------+-------+--------+--------------+ Left Transcranial Duplex Measurements Left Transforamenal Approach +-----------+------+------+--------+-------+-------+--------+--------------+ ! Location   ! PSV   ! EDV   ! Vmean   ! RI     ! PI     ! Depth   ! Direction     ! +-----------+------+------+--------+-------+-------+--------+--------------+ ! Basilar    ! 100   !57    !71.33   !0.43   !0.55   !        !              ! +-----------+------+------+--------+-------+-------+--------+--------------+    VL TRANSCRANIAL DOPPLER COMPLETE    Result Date: 4/27/2022    [facility]  Vascular Transcranial Procedure   Patient Name   Josh Byrd     Date of Study           04/27/2022                 LINDA KING   Date of Birth  1990   Gender                  Female   Age            29 year(s)   Race                       Room Number    9491         Height:                 62 inch, 157.48 cm   Corporate ID # [id number]     Weight:                 161 pounds, 73 kg   Patient Acct # [de-identified]    BSA:        1.74 m^2    BMI:      29.45 kg/m^2   MR #           [id number]      Sonographer             Alanis Sam RVT   Accession #    [id number]   Interpreting Physician  Augustine Valencia   Referring                   Referring Physician     Emanuel Gomes  Nurse  Practitioner  Procedure Type of Study:   Cerebral: Transcranial.  Indications for Study:Vasospasm post-subarachnoid hemorrhage. Patient Status: In Patient. Conclusions   Summary   No evidence of vasospasm in the bilateral MCAs. Mild basilar artery spasm  noted.    Signature   ----------------------------------------------------------------  Electronically signed by Alanis Sam RVT(Sonographer) on  04/27/2022 01:53 PM  ----------------------------------------------------------------   ----------------------------------------------------------------  Electronically signed by Horace Davenport Reyes,Arthur(Interpreting  physician) on 04/27/2022 08:32 PM  ----------------------------------------------------------------  Findings:   Right Impression:                     Left Impression:  Right Lindegaard Ratio: 2.63          Left Lindegaard Ratio: 2.52  MEAN VELOCITIES:                      MEAN VELOCITIES:  Transtemporal Approach. Transtemporal Approach. Proximal MCA: 97.8 . Proximal MCA: 110  Mid MCA: 116 . Mid MCA: 124  Distal MCA: 110 . Distal MCA: 71.6  Proximal KRISSY: 82.4 . Proximal KRISSY: 103  Mid KRISSY: 67.4 . Mid KRISSY: 106  PCA: 38.1 . PCA: 48.9  T ICA: 59.3 . T ICA: 56.6  Submandibular Approach. Submandibular Approach. D ICA: 43.9 . D ICA: 49.3  Transorbital Approach. Transorbital Approach. Siphon: 67.0 . Siphon: 52.7  Transforamenal Approach. Transforamenal Approach. Vertebral: 31.2 . Vertebral: 44.7   BASILAR:69.3  Risk Factors History +---------+----------+-----------------------------------------------------+ ! Diagnosis! Date      ! Comments                                             ! +---------+----------+-----------------------------------------------------+ ! Other    !04/18/2022! [facility] A. ANEURYSM 3MM X 2MM                          ! +---------+----------+-----------------------------------------------------+ ! Other    !04/27/2022! Hematocrit 43.1; ICP-10                              ! +---------+----------+-----------------------------------------------------+   - The patient's risk factor(s) include: arterial hypertension.     VL TRANSCRANIAL DOPPLER COMPLETE    Result Date: 4/25/2022    [facility]  Vascular Transcranial Procedure   Patient Name  Salinas Cordova    Date of Study         04/25/2022 LINDA KING   Date of Birth 1990  Gender                Female   Age           29 year(s)  Race                     Room Number   8547        Height:               62 inch, 157.48 cm   Corporate ID  [id number]    Weight:               161 pounds, 73 kg  #   Patient Acct  [de-identified]   BSA:       1.74 m^2   BMI:         29.45 kg/m^2  #   MR #          [id number]     Sonographer           Iraida Barkley, T, [facility]   Accession #   [id number]  Interpreting          Delos Reyes,Arthur                            Physician   Referring                 Referring Physician   Tiffanie Navarro  Nurse  Practitioner  Additional Comments CLASSIFICATION OF VASOSPASM MEAN MCA VELOCITY ----- MCA/ICA VELOCITY RATIO ------- INTERPRETATION <120 cm/sec --------------------- less than 3 --------------------------- Normal, nonspecific elevation or distal MCA spasm >120 cm/sec ------------------------ 3 to 6 -------------------------------- Mild vasospasm of proximal MCA >160 cm/sec ------------------------ 3 to 6 -------------------------------- Moderate vasospasm of proximal MCA >200 cm/sec -------------------- greater than 6------------------------- Severe vasospasm of proximal MCA. MEAN BASILAR ARTERY VELOCITY------- INTERPRETATION >60 cm/sec -------------------------------------------- Mild BA vasospasm >90 cm/sec -------------------------------------------- Moderate BA vasospasm >120 cm/sec ------------------------------------------ Severe BA vasospasm. Procedure Type of Study:   Cerebral: Transcranial.  Indications for Study:Subarachnoid hemorrhage. Patient Status: In Patient. Conclusions   Summary   Mild vasospasm noted in the right MCA and basilar artery.    Signature   ----------------------------------------------------------------  Electronically signed by Merlin Jacobo RVT,  MICHAEL(Sonographer) on 04/25/2022 03:12 PM  ---------------------------------------------------------------- ----------------------------------------------------------------  Electronically signed by Randell Huntington Reyes,Arthur(Interpreting  physician) on 04/25/2022 08:56 PM  ----------------------------------------------------------------  Findings:   Right Impression:                     Left Impression:  Right Lindegaard Ratio = 4.16         Left Lindegaard Ratio = 3.16  MEAN VELOCITIES:                      MEAN VELOCITIES:  Transtemporal Approach                Transtemporal Approach  Proximal MCA: 118                     Proximal MCA: 117  Mid MCA: 127                          Mid MCA: 104  Distal MCA: 107                       Distal MCA: 105  Proximal KRISSY: 99.3                    Proximal KRISSY: 88.2  Mid KRISSY: 83.5                         Mid KRISSY: 92.4  PCA: 13.9                             PCA: 28.9  T ICA: 77.4                           T ICA: 70.1  Submandibular Approach                Submandibular Approach  D ICA: 30.4                           D ICA: 37  Transorbital Approach                 Transorbital Approach  Siphon: 66.6                          Siphon: 25.8  Transforamenal Approach               Transforamenal Approach  Vertebral: 22.7                       Vertebral: 54.3  Risk Factors History +---------+----------+-----------------------------------------------------+ ! Diagnosis! Date      ! Comments                                             ! +---------+----------+-----------------------------------------------------+ ! Other    !04/18/2022! [facility] A. ANEURYSM 3MM X 2MM                          ! +---------+----------+-----------------------------------------------------+ ! Other    !04/25/2022! Hematocrit 39.8; /95                           !  +---------+----------+-----------------------------------------------------+ Velocities are measured in cm/s ; Diameters are measured in cm Right Transcranial Duplex Measurements Right Transforamenal Approach +-----------+------+-------+--------+-------+-------+--------+-------------+ ! Location   ! PSV   ! EDV    ! Vmean   ! RI     ! PI     ! Depth   ! Direction    ! +-----------+------+-------+--------+-------+-------+--------+-------------+ ! Basilar    ! 104   !52.7   !69.8    !0.49   !0.65   !        !             ! +-----------+------+-------+--------+-------+-------+--------+-------------+ Left Transcranial Duplex Measurements Left Transforamenal Approach +-----------+------+-------+--------+-------+-------+--------+-------------+ ! Location   ! PSV   ! EDV    ! Vmean   ! RI     ! PI     ! Depth   ! Direction    ! +-----------+------+-------+--------+-------+-------+--------+-------------+ ! Basilar    ! 104   !52.7   !69.8    !0.49   !0.65   !        !             ! +-----------+------+-------+--------+-------+-------+--------+-------------+    VL TRANSCRANIAL DOPPLER COMPLETE    Result Date: 4/25/2022    [facility]  Vascular Transcranial Procedure   Patient Name   Tryee Sabillon     Date of Study           04/21/2022                 LINDA KING   Date of Birth  1990   Gender                  Female   Age            29 year(s)   Race                       Room Number    6818         Height:                 62 inch, 157.48 cm   Corporate ID # [id number]     Weight:                 161 pounds, 73 kg   Patient Acct # [de-identified]    BSA:        1.74 m^2    BMI:      29.45 kg/m^2   MR #           [id number]      Sonographer             Darnell Harley, [facility]   Accession #    [id number]   Interpreting Physician  Glenda Smith   Referring                   Referring Physician     Emma Mak  Nurse  Practitioner  Procedure Type of Study:   Cerebral: Transcranial.  Indications for Study:Subarachnoid hemorrhage. Patient Status: In Patient. Conclusions   Summary   Mild vasospasm of the right MCA and basilar artery.    Signature   ----------------------------------------------------------------  Electronically signed by Gabriela Li Haritha Ortiz RVT(Sonographer) on  04/21/2022 02:52 PM  ----------------------------------------------------------------   ----------------------------------------------------------------  Electronically signed by Malissa Huntsman Reyes,Arthur(Interpreting  physician) on 04/25/2022 12:57 PM  ----------------------------------------------------------------  Findings:   Right Impression:                     Left Impression:  Right Lindegaard Ratio = 3.38         Left Lindegaard Ratio = 2.10  MEAN VELOCITIES:                      MEAN VELOCITIES:  Transtemporal Approach                Transtemporal Approach  Proximal MCA: 120                     Proximal MCA: 90.5  Mid MCA: 109                          Mid MCA: 83.9  Distal MCA: 103                       Distal MCA: 83.5  Proximal KRISSY: 78.9                    Proximal KRISSY: 104  Mid KRISSY: 81.6                         Mid KRISSY: 96.6  PCA: 53.9                             PCA: 60.4  T ICA: 58.1                           T ICA: 48.9  Submandibular Approach                Submandibular Approach  D ICA: 35.4                           D ICA: 43.1  Transorbital Approach                 Transorbital Approach  Siphon: 49.3                          Siphon: 26.2  Transforamenal Approach               Transforamental Approach  Vertebral: 62.8                       Vertebral: 51.6                                        Basilar: 63.9  Risk Factors History +---------+----------+-----------------------------------------------------+ ! Diagnosis! Date      ! Comments                                             ! +---------+----------+-----------------------------------------------------+ ! Other    !04/18/2022! [facility] ANEURYSM 3MM X 2MM                          ! +---------+----------+-----------------------------------------------------+ ! Other    !04/19/2022! Hematocrit: 39.3                                     ! +---------+----------+-----------------------------------------------------+ ! Other ! 04/23/2022! Hematocrit: 38                                       ! +---------+----------+-----------------------------------------------------+ ! Other    !04/24/2022! Hematocrit 37.3                                      ! +---------+----------+-----------------------------------------------------+    VL TRANSCRANIAL DOPPLER COMPLETE    Result Date: 4/25/2022    [facility]  Vascular Transcranial Procedure   Patient Name Jose Leyva    Date of Study           04/19/2022               LINDA KING   Date of      1990  Gender                  Female  Birth   Age          29 year(s)  Race                       Room Number  5545        Height:                 62 inch, 157.48 cm   Corporate ID [id number]    Weight:                 161 pounds, 73 kg  #   Patient Acct [de-identified]   BSA:        1.74 m^2    BMI:        29.45 kg/m^2  #   MR #         [id number]     Leonel Jo   Accession #  [id number]  Interpreting Physician  Cecilia Crow   Referring                Referring Physician     Betzy Wright MD  Nurse  Practitioner  Additional Comments CLASSIFICATION OF VASOSPASM MEAN MCA VELOCITY ----- MCA/ICA VELOCITY RATIO ------- INTERPRETATION <120 cm/sec --------------------- less than 3 --------------------------- Normal, nonspecific elevation or distal MCA spasm >120 cm/sec ------------------------ 3 to 6 -------------------------------- Mild vasospasm of proximal MCA >160 cm/sec ------------------------ 3 to 6 -------------------------------- Moderate vasospasm of proximal MCA >200 cm/sec -------------------- greater than 6------------------------- Severe vasospasm of proximal MCA. MEAN BASILAR ARTERY VELOCITY------- INTERPRETATION >60 cm/sec -------------------------------------------- Mild BA vasospasm >90 cm/sec -------------------------------------------- Moderate BA vasospasm >120 cm/sec ------------------------------------------ Severe BA vasospasm.  Procedure Type of Study:   Cerebral: Transcranial.  Indications for Study:Subarachnoid hemorrhage. Patient Status: In Patient. Conclusions   Summary   No evidence of vasospasm in the bilateral MCAs and basilar artery. Signature   ----------------------------------------------------------------  Electronically signed by Alina Geronimo(Sonographer) on  04/19/2022 08:29 AM  ----------------------------------------------------------------   ----------------------------------------------------------------  Electronically signed by Margret Mares Reyes,Arthur(Interpreting  physician) on 04/25/2022 12:54 PM  ----------------------------------------------------------------  Findings:   Right Impression:                     Left Impression:  Right Lindegaard Ratio = 2.22         Left Lindegaard Ratio = 2.07  MEAN VELOCITIES:                      MEAN VELOCITIES:  Transtemporal Approach                Transtemporal Approach  Proximal MCA: 53                      Proximal MCA: 45  Mid MCA: 59                           Mid MCA: 44  Distal MCA: 48                        Distal MCA: 48  Proximal KRISSY: 48                      Proximal KRISSY: 53  Mid KRISSY: 52                           Mid KRISSY: 52  PCA: 35                               PCA: 25  T ICA: 39                             T ICA: 20  Submandibular Approach                Submandibular Approach  D ICA: 27                             D ICA: 24  Transorbital Approach                 Transorbital Approach  Siphon: 32                            Siphon: 26  Transforamenal Approach               Transforamenal Approach  Vertebral: 47                         Vertebral: 41                                        BASILAR: 46  Risk Factors History +---------+----------+-----------------------------------------------------+ ! Diagnosis! Date      ! Comments                                             ! +---------+----------+-----------------------------------------------------+ ! Other    !04/18/2022! [facility]O ANEURYSM 3MM X 2MM                          ! +---------+----------+-----------------------------------------------------+ ! Other    !04/19/2022! Hematocrit: 39.3                                     ! +---------+----------+-----------------------------------------------------+ ! Other    !04/23/2022! Hematocrit: 38                                       ! +---------+----------+-----------------------------------------------------+ ! Other    !04/24/2022! Hematocrit 37.3                                      !  +---------+----------+-----------------------------------------------------+    VL TRANSCRANIAL DOPPLER COMPLETE    Result Date: 4/25/2022    [facility]  Vascular Transcranial Procedure   Patient Name   Nuno Parada     Date of Study           04/23/2022                 LINDA KING   Date of Birth  1990   Gender                  Female   Age            29 year(s)   Race                       Room Number    8544         Height:                 62 inch, 157.48 cm   Corporate ID # [id number]     Weight:                 161 pounds, 73 kg   Patient Acct # [de-identified]    BSA:        1.74 m^2    BMI:      29.45 kg/m^2   MR #           [id number]      Sonographer             Yin King   Accession #    [id number]   Interpreting Physician  Nilesh Powell   Referring                   Referring Physician     Verna Burgos  Nurse  Practitioner  Additional Comments CLASSIFICATION OF VASOSPASM MEAN MCA VELOCITY ----- MCA/ICA VELOCITY RATIO ------- INTERPRETATION <120 cm/sec --------------------- less than 3 --------------------------- Normal, nonspecific elevation or distal MCA spasm >120 cm/sec ------------------------ 3 to 6 -------------------------------- Mild vasospasm of proximal MCA >160 cm/sec ------------------------ 3 to 6 -------------------------------- Moderate vasospasm of proximal MCA >200 cm/sec -------------------- greater than 6------------------------- Severe vasospasm of proximal MCA. MEAN BASILAR ARTERY VELOCITY------- INTERPRETATION >60 cm/sec -------------------------------------------- Mild BA vasospasm >90 cm/sec -------------------------------------------- Moderate BA vasospasm >120 cm/sec ------------------------------------------ Severe BA vasospasm. Procedure Type of Study:   Cerebral: Transcranial.  Indications for Study:Subarachnoid hemorrhage. Patient Status: In Patient. Conclusions   Summary   No evidence of vasospasm in the bilateral MCAs and basilar artery.    Signature   ----------------------------------------------------------------  Electronically signed by KERI Mehta(Sonographer) on  04/23/2022 10:05 AM  ----------------------------------------------------------------   ----------------------------------------------------------------  Electronically signed by Cherylann Mody Reyes,Arthur(Interpreting  physician) on 04/25/2022 12:52 PM  ----------------------------------------------------------------  Findings:   Right Impression:                     Left Impression:  Right Lindegaard Ratio = 3.38         Left Lindegaard Ratio = 3.49  MEAN VELOCITIES:                      MEAN VELOCITIES:  Transtemporal Approach                Transtemporal Approach  Proximal MCA: 109                     Proximal MCA: 105  Mid MCA: 102                          Mid MCA: 102  Distal MCA: 96.3                      Distal MCA: 100  Proximal KRISSY: 80.8                    Proximal KRISSY: 66.6  Mid KRISSY: 72.4                         Mid KRISSY: 64.7  PCA: 32.3                             PCA: 31.2  T ICA: 48.5                           T ICA: 52.7  Submandibular Approach                Submandibular Approach  D ICA: 32.3                           D ICA: 30  Transorbital Approach                 Transorbital Approach  Siphon: 39.7                          Siphon: 24.6  Transforamenal Approach               Transforamenal Approach  Vertebral: 43.1                       Vertebral: 51.6  Risk Factors History +---------+----------+-----------------------------------------------------+ ! Diagnosis! Date      ! Comments                                             ! +---------+----------+-----------------------------------------------------+ ! Other    !04/18/2022! [facility] ROMO ANEURYSM 3MM X 2MM                          ! +---------+----------+-----------------------------------------------------+ ! Other    !04/19/2022! Hematocrit: 39.3                                     ! +---------+----------+-----------------------------------------------------+ ! Other    !04/23/2022! Hematocrit: 38                                       ! +---------+----------+-----------------------------------------------------+ ! Other    !04/24/2022! Hematocrit 37.3                                      ! +---------+----------+-----------------------------------------------------+ Velocities are measured in cm/s ; Diameters are measured in cm Right Transcranial Duplex Measurements Right Transforamenal Approach +-----------+-------+------+--------+-----+-----+--------+-----------------+ ! Location   ! PSV    ! EDV   ! Vmean   ! RI   ! PI   ! Depth   ! Direction        ! +-----------+-------+------+--------+-----+-----+--------+-----------------+ ! Basilar    !73.5   !      !24.5    !     !     !        !                 ! +-----------+-------+------+--------+-----+-----+--------+-----------------+ Left Transcranial Duplex Measurements Left Transforamenal Approach +-----------+-------+------+--------+-----+-----+--------+-----------------+ ! Location   ! PSV    ! EDV   ! Vmean   ! RI   ! PI   ! Depth   ! Direction        ! +-----------+-------+------+--------+-----+-----+--------+-----------------+ ! Basilar    !73.5   !      !24.5    !     !     !        !                 ! +-----------+-------+------+--------+-----+-----+--------+-----------------+    VL TRANSCRANIAL DOPPLER COMPLETE    Result Date: 4/25/2022    [facility]  Vascular Transcranial Procedure   Patient Name  Jose Leyva    Date of Study         04/22/2022                LINDA KING   Date of Birth 1990  Gender                Female   Age           29 year(s)  Race                     Room Number   9619        Height:               62 inch, 157.48 cm   Corporate ID  [id number]    Weight:               161 pounds, 73 kg  #   Patient Acct  [de-identified]   BSA:       1.74 m^2   BMI:         29.45 kg/m^2  #   MR #          [id number]     Sonographer           Penny Parker RVT, [facility]   Accession #   [id number]  Interpreting          Delos Reyes,Arthur                            Physician   Referring                 Referring Physician   Ryan Hernandez  Nurse  Practitioner  Additional Comments CLASSIFICATION OF VASOSPASM MEAN MCA VELOCITY ----- MCA/ICA VELOCITY RATIO ------- INTERPRETATION <120 cm/sec --------------------- less than 3 --------------------------- Normal, nonspecific elevation or distal MCA spasm >120 cm/sec ------------------------ 3 to 6 -------------------------------- Mild vasospasm of proximal MCA >160 cm/sec ------------------------ 3 to 6 -------------------------------- Moderate vasospasm of proximal MCA >200 cm/sec -------------------- greater than 6------------------------- Severe vasospasm of proximal MCA. MEAN BASILAR ARTERY VELOCITY------- INTERPRETATION >60 cm/sec -------------------------------------------- Mild BA vasospasm >90 cm/sec -------------------------------------------- Moderate BA vasospasm >120 cm/sec ------------------------------------------ Severe BA vasospasm. Procedure Type of Study:   Cerebral: Transcranial.  Indications for Study:Subarachnoid hemorrhage. Patient Status: In Patient. Conclusions   Summary   No evidence of vasospasm in the bilateral MCAs. Mild basilar artery spasm  noted.    Signature   ----------------------------------------------------------------  Electronically signed by Penny Parker RVT, RDMS(Sonographer) on 04/22/2022 03:48 PM ----------------------------------------------------------------   ----------------------------------------------------------------  Electronically signed by Simmie Laurel Reyes,Arthur(Interpreting  physician) on 04/25/2022 12:52 PM  ----------------------------------------------------------------  Findings:   Right Impression:                     Left Impression:  Right Lindegaard Ratio = 3.67         Left Lindegaard Ratio = 2.54  MEAN VELOCITIES:                      MEAN VELOCITIES:  Transtemporal Approach                Transtemporal Approach  Proximal MCA: 109                     Proximal MCA: 93.9  Mid MCA: 113                          Mid MCA: 103  Distal MCA: 102                       Distal MCA: 98.2  Proximal KRISSY: 90.9                    Proximal KRISSY: 86.7  Mid KRISSY: 82                           Mid KRISSY: 84.7  PCA: 24.6                             PCA: 26.9  T ICA: 87.4                           T ICA: 69.3  Submandibular Approach                Submandibular Approach  D ICA: 30.8                           D ICA: 40.4  Transorbital Approach                 Transorbital Approach  Siphon: 28.1                          Siphon: 29.6  Transforamenal Approach               Transforamenal Approach  Vertebral: 18.1                       Vertebral: 47.4  Risk Factors History +---------+----------+-----------------------------------------------------+ ! Diagnosis! Date      ! Comments                                             ! +---------+----------+-----------------------------------------------------+ ! Other    !04/18/2022! YUE A. ANEURYSM 3MM X 2MM                          ! +---------+----------+-----------------------------------------------------+ ! Other    !04/19/2022! Hematocrit: 39.3                                     ! +---------+----------+-----------------------------------------------------+ ! Other    !04/23/2022! Hematocrit: 38                                       ! +---------+----------+-----------------------------------------------------+ ! Other    !04/24/2022! Hematocrit 37.3                                      ! +---------+----------+-----------------------------------------------------+ Velocities are measured in cm/s ; Diameters are measured in cm Right Transcranial Duplex Measurements Right Transforamenal Approach +-----------+------+-------+--------+-------+-------+--------+-------------+ ! Location   ! PSV   ! EDV    ! Vmean   ! RI     ! PI     ! Depth   ! Direction    ! +-----------+------+-------+--------+-------+-------+--------+-------------+ ! Basilar    ! 105   !51.2   !69.13   !0.51   !0.69   !        !             ! +-----------+------+-------+--------+-------+-------+--------+-------------+ Left Transcranial Duplex Measurements Left Transforamenal Approach +-----------+------+-------+--------+-------+-------+--------+-------------+ ! Location   ! PSV   ! EDV    ! Vmean   ! RI     ! PI     ! Depth   ! Direction    ! +-----------+------+-------+--------+-------+-------+--------+-------------+ ! Basilar    ! 105   !51.2   !69.13   !0.51   !0.69   !        !             ! +-----------+------+-------+--------+-------+-------+--------+-------------+    VL TRANSCRANIAL DOPPLER COMPLETE    Result Date: 4/25/2022    [facility]  Vascular Transcranial Procedure   Patient Name   Dontrell Lira     Date of Study           04/24/2022                 LINDA KING   Date of Birth  1990   Gender                  Female   Age            29 year(s)   Race                       Room Number    8112         Height:                 62 inch, 157.48 cm   Corporate ID # [id number]     Weight:                 161 pounds, 73 kg   Patient Acct # [de-identified]    BSA:        1.74 m^2    BMI:      29.45 kg/m^2   MR #           [id number]      Nolan Baker TRACI Zapata   Accession #    [id number]   Interpreting Physician  Patricio Valle   Referring                   Referring Physician     Jarek Stauffer  Nurse  Practitioner  Additional Comments CLASSIFICATION OF VASOSPASM MEAN MCA VELOCITY ----- MCA/ICA VELOCITY RATIO ------- INTERPRETATION <120 cm/sec --------------------- less than 3 --------------------------- Normal, nonspecific elevation or distal MCA spasm >120 cm/sec ------------------------ 3 to 6 -------------------------------- Mild vasospasm of proximal MCA >160 cm/sec ------------------------ 3 to 6 -------------------------------- Moderate vasospasm of proximal MCA >200 cm/sec -------------------- greater than 6------------------------- Severe vasospasm of proximal MCA. MEAN BASILAR ARTERY VELOCITY------- INTERPRETATION >60 cm/sec -------------------------------------------- Mild BA vasospasm >90 cm/sec -------------------------------------------- Moderate BA vasospasm >120 cm/sec ------------------------------------------ Severe BA vasospasm. Procedure Type of Study:   Cerebral: Transcranial.  Indications for Study:Subarachnoid hemorrhage. Patient Status: In Patient. Conclusions   Summary   No evidence of vasospasm in the bilateral MCAs and basilar artery.    Signature   ----------------------------------------------------------------  Electronically signed by KERI Hernandez(Sonographer) on  04/24/2022 11:21 AM  ----------------------------------------------------------------   ----------------------------------------------------------------  Electronically signed by Madlyn Kindle Reyes,Arthur(Interpreting  physician) on 04/25/2022 12:51 PM  ----------------------------------------------------------------  Findings:   Right Impression:                     Left Impression:  Right Lindegaard Ratio = 3.2          Left Lindegaard Ratio = 3.2  MEAN VELOCITIES:                      MEAN VELOCITIES:  Transtemporal Approach                Transtemporal Approach  Proximal MCA: 104                     Proximal MCA: 107  Mid MCA: 99.7                         Mid MCA: 105  Distal MCA: 83.5 Distal MCA: 100  Proximal KRISSY: 83.2                    Proximal KRISSY: 93.6  Mid KRISSY: 71.2                         Mid KRISSY: 98.2  PCA: 35.8                             PCA: 28  T ICA: 48                             T ICA: 59.7  Submandibular Approach                Submandibular Approach  D ICA: 32.3                           D ICA: 33.5  Transorbital Approach                 Transorbital Approach  Siphon: 52.4                          Siphon: 24.6  Transforamenal Approach               Transforamenal Approach  Vertebral: 34.6                       Vertebral: 48.1  Risk Factors History +---------+----------+-----------------------------------------------------+ ! Diagnosis! Date      ! Comments                                             ! +---------+----------+-----------------------------------------------------+ ! Other    !04/18/2022! [facility] A. ANEURYSM 3MM X 2MM                          ! +---------+----------+-----------------------------------------------------+ ! Other    !04/19/2022! Hematocrit: 39.3                                     ! +---------+----------+-----------------------------------------------------+ ! Other    !04/23/2022! Hematocrit: 38                                       ! +---------+----------+-----------------------------------------------------+ ! Other    !04/24/2022! Hematocrit 37.3                                      ! +---------+----------+-----------------------------------------------------+ Velocities are measured in cm/s ; Diameters are measured in cm Right Transcranial Duplex Measurements Right Transforamenal Approach +-----------+-------+------+--------+-----+-----+--------+-----------------+ ! Location   ! PSV    ! EDV   ! Vmean   ! RI   ! PI   ! Depth   ! Direction        ! +-----------+-------+------+--------+-----+-----+--------+-----------------+ ! Basilar    !73.9   !      !24.63   !     !     !        !                 ! +-----------+-------+------+--------+-----+-----+--------+-----------------+ Left Transcranial Duplex Measurements Left Transforamenal Approach +-----------+-------+------+--------+-----+-----+--------+-----------------+ ! Location   ! PSV    ! EDV   ! Vmean   ! RI   ! PI   ! Depth   ! Direction        ! +-----------+-------+------+--------+-----+-----+--------+-----------------+ ! Basilar    !73.9   !      !24.63   !     !     !        !                 ! +-----------+-------+------+--------+-----+-----+--------+-----------------+        DISCHARGE INSTRUCTIONS     Discharge Medications:        Medication List      START taking these medications    gabapentin 300 MG capsule  Commonly known as: NEURONTIN  Take 2 capsules by mouth nightly for 30 days.      ibuprofen 800 MG tablet  Commonly known as: ADVIL;MOTRIN  Take 1 tablet by mouth every 8 hours as needed for Pain     magnesium oxide 400 (240 Mg) MG tablet  Commonly known as: MAG-OX  Take 1 tablet by mouth daily     niMODipine 30 MG capsule  Commonly known as: NIMOTOP  Take 2 capsules by mouth every 4 hours for 3 days     topiramate 50 MG tablet  Commonly known as: TOPAMAX  Take 3 tablets by mouth nightly        CONTINUE taking these medications    ferrous sulfate 325 (65 Fe) MG tablet  Commonly known as: IRON 325        STOP taking these medications    azithromycin 250 MG tablet  Commonly known as: Zithromax     ketorolac 10 MG tablet  Commonly known as: TORADOL           Where to Get Your Medications      These medications were sent to [facility] [address], [address] [place] [address]    Phone: [phone]   · gabapentin 300 MG capsule  · ibuprofen 800 MG tablet  · magnesium oxide 400 (240 Mg) MG tablet  · niMODipine 30 MG capsule  · topiramate 50 MG tablet       Diet: No diet orders on file diet as tolerated  Activity: Full ambulation  Follow-up:  [facility] within 4 weeks  Time Spent for discharge: 30 minutes    Mariya Dominguez MD  Neuro Critical Care  5/12/2022, 8:09 PM

## 2022-05-16 ENCOUNTER — OFFICE VISIT (OUTPATIENT)
Dept: FAMILY MEDICINE CLINIC | Age: 32
End: 2022-05-16
Payer: COMMERCIAL

## 2022-05-16 VITALS
SYSTOLIC BLOOD PRESSURE: 116 MMHG | DIASTOLIC BLOOD PRESSURE: 82 MMHG | OXYGEN SATURATION: 100 % | HEIGHT: 62 IN | BODY MASS INDEX: 28.71 KG/M2 | HEART RATE: 89 BPM | TEMPERATURE: 98.8 F | WEIGHT: 156 LBS | RESPIRATION RATE: 14 BRPM

## 2022-05-16 DIAGNOSIS — I67.848 CEREBRAL VASOSPASM: Primary | ICD-10-CM

## 2022-05-16 DIAGNOSIS — Z95.828 MRI-SAFE ENDOVASCULAR ANEURYSM COIL PRESENT: ICD-10-CM

## 2022-05-16 DIAGNOSIS — I60.9 SAH (SUBARACHNOID HEMORRHAGE) (HCC): ICD-10-CM

## 2022-05-16 PROCEDURE — 99215 OFFICE O/P EST HI 40 MIN: CPT | Performed by: NURSE PRACTITIONER

## 2022-05-16 ASSESSMENT — PATIENT HEALTH QUESTIONNAIRE - PHQ9
2. FEELING DOWN, DEPRESSED OR HOPELESS: 0
SUM OF ALL RESPONSES TO PHQ9 QUESTIONS 1 & 2: 0
1. LITTLE INTEREST OR PLEASURE IN DOING THINGS: 0
SUM OF ALL RESPONSES TO PHQ QUESTIONS 1-9: 0

## 2022-05-16 ASSESSMENT — ENCOUNTER SYMPTOMS
SHORTNESS OF BREATH: 0
SINUS PRESSURE: 0
SORE THROAT: 1
SINUS PAIN: 0

## 2022-05-16 NOTE — PATIENT INSTRUCTIONS
SURVEY:    You may be receiving a survey from [employer] regarding your visit today. Please complete the survey to enable us to provide the highest quality of care to you and your family. If you cannot score us a very good on any question, please call the office to discuss how we could of made your experience a very good one. Thank you.

## 2022-05-16 NOTE — PROGRESS NOTES
Name: Leatha Frias  : 1990         Chief Complaint:     Chief Complaint   Patient presents with    Follow-Up from Hospital     patient was admitted for Subarachnoid hemorrhage due to cerebral aneurysm  on . today she states she is dong well. apt coming up this friday with her neurologist. still has consistant headaches. states she was informed they would gradually go away. History of Present Illness:      Leatha Frias is a 29 y.o.  female who presents with Follow-Up from Hospital (patient was admitted for Subarachnoid hemorrhage due to cerebral aneurysm  on . today she states she is dong well. apt coming up this friday with her neurologist. still has consistant headaches. states she was informed they would gradually go away. )      HPI     The patient presents for hospital follow up. She was admitted to [facility] 22 via [facility] as transfer from [facility] for headache. She was treated at [facility] and discharged 2022. Hospital course per EMR noted below:     \"The patient is M 7 y.o.  female who presents to our emergency department 2022 via [address] as a transfer from [facility] acute worst headache of her life. Reynaldo Reveles does not have any significant past medical history in our chart and denies any.  At outlying facility patient underwent CT head without demonstrating diffuse subarachnoid hemorrhage.  Patient was seen by telestroke physician in our group recommending transfer to our facility for further work-up.  In the emergency department patient states that she was washing TV with her child when she began to have severe pounding headache 10 out of 10 worst in her life.  Patient noted to be severely nauseous requiring multiple doses of IV Zofran and IM Phenergan.  Patient also admitting to photosensitivity.  Patient had significant hypertension at [facility] started on IV nicardipine given 1 g of Keppra 10 mg Decadron.     Arrival to our emergency department 140/102, heart rate 77 temperature 97. 6.  Initial labs PTT 25.3, INR 1.0, WBC 13.7, platelets 105, glucose 139 and high-sensitivity troponin less than 6.  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  Case discussed with senior endovascular fellow who is aware of case and current neurologic exam planning for emergent DSA 4/18/2022 early morning.     Rosen&De Souza: 2, Modified Combs: grade 3      Hospital Course:   4/18-around 8:00 AM Dr. Michelle Teresa bedside placing EVD prior to endovascular neurosurgery. Kimberly Morejon went for urgent DSA found to have a comm aneurysm treated by primary coil embolization. 4/19: Hemodynamically stable for arterial line readings -142, heart rate 82-99 T-max 98. 9.  Currently receiving Hachita 5 mg every 4 hours as needed and fentanyl 25 every 2 as needed for pain control. 4/20: Blood pressure within parameter -139 overnight Cardene has been held off since 6 PM 4/19/2022 not requiring any as needed IV medications, heart rate 65-79 T-max 98.5.  Serum magnesium 2.3 was started on IV magnesium 2 g twice daily will increase to 4 g twice daily continue to trend magnesium daily.  Of note yesterday patient started to have increased urine output 9 L total in 24 hours urine studies sent consistent with central salt wasting patient started on hypertonic saline 2% 150 cc bolus followed by 75 cc/h maintenance.  Sodium checks every 8 hours with a sodium goal of 145.  4/21: Vitals stable overnight blood pressures trending slightly up although okay to liberalize SBP <160 today. Overnight -152, HR 69-80, tmax 99.3. Labs pending this AM although sodium noted to drop from 137 @17:03 yesterday to 129 @midnight.  Patient given 150CC bolus 2% and pending repeat this AM. Will send repeat urine studies and discussed with patient she has been drinking excess water in large water bottle at bedside so to limit today to Gatorade and 1-1.5L max. Headache continues to be stable same as yesterday 6/10 constant pressure and throbbing bifrontal location. Will get repeat TCD today to ensure no increase in vasospasm. No bowel movement X3 days giving dulcolax and milk of mag continue to monitor. protonix 40 and starting decadron 4Q6hr for headache decrease oxy pain med Q6hr.   4/22: Blood pressure goal liberalized to SBP <160 yesterday although pressures trending up still -172 overnight (giving IV labetalol 10mg early AM) , HR 64-83, tmax 98. 6. patient given 1L nacl bolus yesterday following slightly compressible IVC on ultrasound as she continues to have central salt wasting urine output slightly decreasing over last 3 days although 5,989 still yesterday. Discontinue hypertonic 2% 75cc/hr, start NaCl bolus 1L this AM and continue with 125cc/hr. Sodium goal can liberalize to 140 if Na <135 resume 2% hypertonic at 75cc/hr. Still no BM receiving dulcolax, milk of mag and glycolax. Headache improved slightly today 5/10. EVD remains open at [id number] and decreasing output 189 over last 24 hours. Transcranial dopplers reviewed slightly increasing vasospasm on the right velocities up to low 100s Lindegaard ratio up from 2-->3. 3.   4/23: EVD to 20cm H2O. EVD output 80cc overnight, high UOP overnight although improved. Headache improving, ambulating well. 4/24: EVD open draining at [id number] total output 95cc over last 24 hours. Hypertensive overnight -187, HR 50-72, tmax 98.6. 72 hours of decadron 4mg Q6 completed overnight. Patient complaining of constipation has been on bowel regimen with glycolax, milk of mag and PO dulcolax. Added dulcolax suppository this morning small BM still very uncomfortable thus will add fleets PRN also. TCD's increasing left sided velocities from day prior peak left MCA velocity 156 peak right 160 Lindgard ratio left 3.49 up from 2.54 and right 3.38 down from 3.67. CT head WO this morning prior to toradol.  Discussing with NS also when okay to give toradol given EVD in place.   4/25:  Failed EVD clamp trial yesterday increased ICP thus remains open at 25 with total output of 134cc/12 and 214 for the full 24 hours. Severe headache; Topamax 100mg QD and Mag 4g BID added.  EVD lowered to 10mmgHg.  TCD suggested mild right MCA and basilar spasm.  Started on Milrinone infusion. 4/26: Hyponatremic yesterday evening; started on [email or web address] x6h with improvement, switched back to [email or web address] this AM.  Headache improved yesterday evening/overnight but started to become severe again this morning.  Will trial Morphine ER 15mg BID x48h, decrease PRN Fentanyl to 25mg Q8h PRN.  Increase Topamax to 100mg BID.  Last BM on Saturday, given narcotic usage will trial dose of Movantik.  Continues on aggressive bowel regimen and took PRN Milk of Mag overnight.  EVD set at [id number], ICP 10-17, 331cc out/24h.  TCD 4/26 overall stable; mild elevations of right MCA (mean velocities 129, LR 4.07).  Continues on Milrinone 0.20mcg/kg/min. 4/27: Sodium 135 this AM, continues to have significant urine output (>6L/24h).   TCD improved LR left 2.52 down from 3.4 yesterday and right 2.63 down from 4.07. EVD raised to 15mmHg.  Trial Fioricet Q6hr PRN.  Fever in evening. Deannie Lawrence suggestive of UTI; started on Rocephin.  4/28: No acute events overnight.  EVD set at 15mmHg, ICP 5-13, 173cc out/24h.  Clinical exam remains stable.  Continues to have headaches.  Completed 48h course of MS contin 15mg BID, monitor off today.  TCD this afternoon stable.  Milrinone infusion increased to 0.5mcg/kg/min.  No further fevers.  Mild uptrend in WBC to 14. 6.  Leija removed yesterday but patient had recurrent urinary retention overnight requiring straight cath x2 each time with >1L out.  Leija catheter replaced this morning due to recurrent retention.  Urology consulted. Damion Bates has not had a BM since Mo[place] (smal BM after enema per nursing).  Will obtain KUB.  Abdomen soft, non-distended and non-tender.  Continue Movantik x2 more doses.  Plan to give enema later today. Patient reports her appetite has been OK, states she snacks a lot.    4/29: Pain control issues overnight due to b/l parietal/temporal headache received Depakote, Fioricet and 50ug fentanyl . Persistent nausea and dizziness. 4/30: HA overnight, improved greatly with Verapamil/ibuprofen combination. Bilateral calf cramping.     5/1:Patient started to have bad headaches after mid night, patient on ibuprofen every 800 every 8 as needed, Topamax, received 1 dose of Norflex last night.   EVD clamped since yesterday.  ICP 4-9, patient had 1 bowel movement last night, requiring 2 doses of Zofran for nausea, No vomiting.  No fevers, blood pressure in 140s. CT head: 5/1/2022: No significant interval change compared to 4/24/2022.  Questionable punctate residual subarachnoid hemorrhage along the high left convexity, Verapamil 60 q 4       5/2:Overnight patient did not have any acute events.  This morning patient complained of headache 10/10 intensity, received 1 dose of 50 fentanyl, which helped to relieve the pain. EVD removed.  Milrione decreased, medrol dose pack for headache, Neurontin 300 HS  5/3: We will resume Lovenox, as per Neuro surgery.  DC Liss, encourage ambulation, magnesium goal greater than 2, potassium up to 4, salt tablet 3 times daily to twice daily, milrinone weaned down to 0.25.     5/3:  No acute events overnight.  DC milrinone, decrease fluid to 70, Topamax 150 at bedtime, Neurontin 600 at bedtime, magnesium oxide, limited use of oxycodone     5/5  No acute events overnight.  Reports that her headache is now an 8 out of 10.  Patient reports that she did not have headache like this before.  Denies any nausea or vomiting, reports that she is tolerating her diet.     PROCEDURES:    External Ventricular Drain\"    Today, 5/16/22, she confirms she was initiated on gabapentin 300 mg 2 tablets QHS, magnesium oxide 400mg QD, and topiramate 150mg QHS. Reviewed neurology note from 5/9/22 Dr. Atif Gan who discusses systolic blood pressure goal of 120-220, SAH management with nimodipine 21-day course. She states she has completed this 21 day course. Patient was encouraged to remain well-hydrated and follow-up with Dr. Mehrdad Keller in 2 weeks and Dr. Sharon Bennett in 3 months. Today, she states she \"feels fine\". She is continuing to have headaches. She is unable to specify if is chronic or intermittent. Headache pain located left aspect of head. Pain is described as achy. She is also taking ibuprofen 800mg PRN, usually in the morning or in the middle of the night. She averages this usually once per day which has benefit. She is back at home. 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 Denies concerns with speech, movements, vision, or weakness. She has not had fever or chills since she has been home. Past Medical History:     No past medical history on file. Reviewed all health maintenance requirements and ordered appropriate tests  There are no preventive care reminders to display for this patient. Past Surgical History:     Past Surgical History:   Procedure Laterality Date    DILATION AND CURETTAGE OF UTERUS N/A 3/21/2022    DILATATION AND CURETTAGE HYSTEROSCOPY CAUTERY ABLATION-NOVASURE ENDOMETRIAL ABLATION performed by William Davis DO at [facility]  04/18/2022     IR ANGIOGRAM CAROTID CEREBRAL BILATERAL    OVARIAN CYST REMOVAL Right 3/21/2022    LAPAROSCOPIC- RIGHT OOPHERECTOMY performed by William Davis DO at [address] [facility]  4/25/2022         SALPINGECTOMY Bilateral 3/21/2022    SALPINGECTOMY LAPAROSCOPIC performed by William Davis DO at [address]        Medications:       Prior to Admission medications    Medication Sig Start Date End Date Taking?  Authorizing Provider   ibuprofen (ADVIL;MOTRIN) 800 MG tablet Take 1 tablet by mouth every 8 hours as needed for Pain 5/6/22  Yes Bill Saltness, APRN - CNP   gabapentin (NEURONTIN) 300 MG capsule Take 2 capsules by mouth nightly for 30 days. 5/6/22 6/5/22 Yes Bill Saltness, APRN - CNP   topiramate (TOPAMAX) 50 MG tablet Take 3 tablets by mouth nightly 5/6/22  Yes Bill Saltness, APRN - CNP   magnesium oxide (MAG-OX) 400 (240 Mg) MG tablet Take 1 tablet by mouth daily 5/7/22  Yes Bill Saltness, APRN - CNP   niMODipine (NIMOTOP) 30 MG capsule Take 2 capsules by mouth every 4 hours for 3 days 5/6/22 5/9/22  Bill Saltness, APRN - CNP   ferrous sulfate (IRON 325) 325 (65 Fe) MG tablet Take 325 mg by mouth daily (with breakfast)  Patient not taking: Reported on 5/16/2022    Historical Provider, MD        Allergies:       Patient has no known allergies. Social History:     Tobacco:    reports that she quit smoking about 3 months ago. Her smoking use included cigarettes. She has a 7.00 pack-year smoking history. She has never used smokeless tobacco.  Alcohol:      reports current alcohol use. Drug Use:  reports no history of drug use. Family History:     No family history on file. Review of Systems:     Positive and Negative as described in HPI    Review of Systems   Constitutional: Negative for chills and fever. HENT: Positive for sore throat. Negative for ear pain, sinus pressure and sinus pain. Admits sore throat and swollen tonsils that started 3 days ago. She has noticed \"white spots\" on her tonsils as well. She has used salt water gargles. Respiratory: Negative for shortness of breath. Cardiovascular: Negative for chest pain and palpitations. Neurological: Positive for headaches. Negative for dizziness, speech difficulty, weakness and light-headedness. Psychiatric/Behavioral: Negative for sleep disturbance.        Physical Exam:   Vitals:  /82   Pulse 89   Temp 98.8 °F (37.1 °C)   Resp 14   Ht 5' 2\" (1.575 m)   Wt 156 lb (70.8 kg)   SpO2 100%   BMI 28.53 kg/m²     Physical Exam  Constitutional:       General: She is not in acute distress. Appearance: Normal appearance. She is normal weight. She is not ill-appearing or toxic-appearing. HENT:      Head: Normocephalic. Comments: 2 staples present frontal aspect of scalp without surrounding erythema, drainage, or bleeding     Right Ear: Tympanic membrane, ear canal and external ear normal. There is no impacted cerumen. Left Ear: Tympanic membrane, ear canal and external ear normal. There is no impacted cerumen. Nose: Nose normal. No congestion or rhinorrhea. Mouth/Throat:      Mouth: Mucous membranes are moist.      Pharynx: No oropharyngeal exudate or posterior oropharyngeal erythema. Comments: Right tonsil 2+, erythematous. No exudate. Cardiovascular:      Rate and Rhythm: Normal rate and regular rhythm. Heart sounds: Normal heart sounds. No murmur heard. Pulmonary:      Effort: Pulmonary effort is normal. No respiratory distress. Breath sounds: Normal breath sounds. No stridor. No wheezing, rhonchi or rales. Musculoskeletal:      Cervical back: Neck supple. Lymphadenopathy:      Cervical: No cervical adenopathy. Skin:     General: Skin is warm. Neurological:      Mental Status: She is alert and oriented to person, place, and time. Cranial Nerves: No dysarthria or facial asymmetry. Motor: No weakness (5/5 upper and lower extremity strength billaterally), tremor or pronator drift. Coordination: Coordination normal. Finger-Nose-Finger Test normal.   Psychiatric:         Mood and Affect: Mood normal.         Behavior: Behavior normal.         Thought Content:  Thought content normal.         Judgment: Judgment normal.         Data:     Lab Results   Component Value Date     05/06/2022    K 4.0 05/06/2022     05/06/2022    CO2 17 05/06/2022    BUN 15 05/06/2022    CREATININE 0.83 05/06/2022    GLUCOSE 108 05/06/2022    PROT brain injuries. He recommended initiating Flexeril or amitriptyline nightly to assist with restful sleep as needed. We will discuss this with the patient further. This is a high complexity visit. Completed Refills   Requested Prescriptions      No prescriptions requested or ordered in this encounter       Orders Placed This Encounter   Procedures    CBC with Auto Differential     Standing Status:   Future     Standing Expiration Date:   5/16/2023    Basic Metabolic Panel     Standing Status:   Future     Standing Expiration Date:   5/16/2023    Magnesium     Standing Status:   Future     Standing Expiration Date:   5/16/2023        No results found for this visit on 05/16/22. Return in about 4 months (around 9/16/2022), or if symptoms worsen or fail to improve, for routine f/u .     Electronically signed by DEEDEE Ho CNP on 05/19/22 at 12:56 PM.

## 2022-05-19 ENCOUNTER — TELEPHONE (OUTPATIENT)
Dept: FAMILY MEDICINE CLINIC | Age: 32
End: 2022-05-19

## 2022-05-19 NOTE — TELEPHONE ENCOUNTER
Please notify patient that I have reviewed her most recent office visit with Dr. Efrem Holder who emphasizes the importance of restful sleep after brain injuries. Please inquire about the patient's sleep. If she sleeping well? How many hours on average is she sleeping nightly? Does she feel rested in the morning? Does she take anything currently for sleep?

## 2022-05-20 ENCOUNTER — OFFICE VISIT (OUTPATIENT)
Dept: NEUROLOGY | Age: 32
End: 2022-05-20
Payer: COMMERCIAL

## 2022-05-20 VITALS
HEART RATE: 74 BPM | OXYGEN SATURATION: 98 % | DIASTOLIC BLOOD PRESSURE: 76 MMHG | SYSTOLIC BLOOD PRESSURE: 124 MMHG | HEIGHT: 62 IN | WEIGHT: 156 LBS | BODY MASS INDEX: 28.71 KG/M2

## 2022-05-20 DIAGNOSIS — Z95.828 MRI-SAFE ENDOVASCULAR ANEURYSM COIL PRESENT: Primary | ICD-10-CM

## 2022-05-20 DIAGNOSIS — I67.1 ANEURYSM OF ANTERIOR COMMUNICATING ARTERY: ICD-10-CM

## 2022-05-20 PROCEDURE — 99215 OFFICE O/P EST HI 40 MIN: CPT | Performed by: PSYCHIATRY & NEUROLOGY

## 2022-05-20 PROCEDURE — G8427 DOCREV CUR MEDS BY ELIG CLIN: HCPCS | Performed by: PSYCHIATRY & NEUROLOGY

## 2022-05-20 PROCEDURE — 1111F DSCHRG MED/CURRENT MED MERGE: CPT | Performed by: PSYCHIATRY & NEUROLOGY

## 2022-05-20 PROCEDURE — 1036F TOBACCO NON-USER: CPT | Performed by: PSYCHIATRY & NEUROLOGY

## 2022-05-20 PROCEDURE — G8419 CALC BMI OUT NRM PARAM NOF/U: HCPCS | Performed by: PSYCHIATRY & NEUROLOGY

## 2022-05-20 NOTE — TELEPHONE ENCOUNTER
She states she is sleeping great. Getting 8+ hours nightly and 1 hour naps twice daily. She does feel rested in the morning and is not currently taking anything for it. She saw neuro today and they removed the staples and she follows up next week and took restrictions off and said to take it easy.

## 2022-05-22 NOTE — PROGRESS NOTES
Endovascular Neurosurgery Clinic Note      Reason for evaluation: [facility] secondary to ruptured ACOM aneurysm s/p coil embolization     SUBJECTIVE:   History of Chief Complaint:    Ms. Emilie Cordova is a 30 y/o f with pmh of chronic smoking, marijuana abuse, recent [facility] secondary to ruptured ACOM aneurysm s/p coil embolization on 04/19/22 presented today for first hospital follow up. Briefly, Ms. Johanny Workman presented to [facility] a a transfer from [facility] after she was found to have diffuse SAH. HH-03, MFS 03. Patient was found to have ACOM aneurysm, [facility] 3 penumbra smart coils. Patient had prolonged hospital course with on and off headaches. EVD was placed and removed prior to discharge  Since discharge, patient has been doing well. Patient has occasional headaches. No focal weakness or numbness. Allergies  has No Known Allergies. Medications  Prior to Admission medications    Medication Sig Start Date End Date Taking? Authorizing Provider   ibuprofen (ADVIL;MOTRIN) 800 MG tablet Take 1 tablet by mouth every 8 hours as needed for Pain 5/6/22  Yes Humaira Padron APRN - CNP   gabapentin (NEURONTIN) 300 MG capsule Take 2 capsules by mouth nightly for 30 days. 5/6/22 6/5/22 Yes Humaira Padron APRN - CNP   topiramate (TOPAMAX) 50 MG tablet Take 3 tablets by mouth nightly 5/6/22  Yes Humaira Padron APRN - CNP   magnesium oxide (MAG-OX) 400 (240 Mg) MG tablet Take 1 tablet by mouth daily 5/7/22  Yes Humaira Padron APRN - CNP   niMODipine (NIMOTOP) 30 MG capsule Take 2 capsules by mouth every 4 hours for 3 days 5/6/22 5/9/22  Humaira Padron APRN - CNP   ferrous sulfate (IRON 325) 325 (65 Fe) MG tablet Take 325 mg by mouth daily (with breakfast)  Patient not taking: Reported on 5/16/2022    Historical Provider, MD    Scheduled Meds:  Continuous Infusions:  PRN Meds:.  Past Medical History   has no past medical history on file.   Past Surgical History   has a past surgical history that includes salpingectomy (Bilateral, 3/21/2022); ovarian cyst removal (Right, 3/21/2022); Dilation and curettage of uterus (N/A, 3/21/2022); other surgical history (04/18/2022); and picc insertion vascular access team (4/25/2022). Social History   reports that she quit smoking about 4 months ago. Her smoking use included cigarettes. She has a 7.00 pack-year smoking history. She has never used smokeless tobacco.   reports current alcohol use. reports no history of drug use. Family History  family history is not on file. Review of Systems:  CONSTITUTIONAL:  negative for fevers, chills, fatigue and malaise    EYES:  negative for double vision, blurred vision and photophobia     HEENT:  negative for tinnitus, epistaxis and sore throat    RESPIRATORY:  negative for cough, shortness of breath, wheezing    CARDIOVASCULAR:  negative for chest pain, palpitations, syncope, edema    GASTROINTESTINAL:  negative for nausea, vomiting    GENITOURINARY:  negative for incontinence    MUSCULOSKELETAL:  negative for neck or back pain    NEUROLOGICAL:  Negative for weakness and tingling  negative for headaches and dizziness    PSYCHIATRIC:  negative for anxiety      Review of systems otherwise negative. OBJECTIVE:     Vitals:    05/20/22 1315   BP: 124/76   Pulse: 74   SpO2: 98%        General:  Gen: normal habitus, NAD  HEENT: NCAT, mucosa moist  Cvs: RRR, S1 S2 normal  Resp: symmetric unlabored breathing  Abd: s/nd/nt  Ext: no edema  Skin: no lesions seen, warm and dry    Neuro:  Gen: awake and alert, oriented x3. Lang/speech: no aphasia or dysarthria. Follows commands. CN: PERRL, EOMI, VFF, V1-3 intact, face symmetric, hearing intact, shoulder shrug symmetric, tongue midline  Motor: grossly 5/5 UE and LE b/l  Sense: LT intact in all 4 ext.   Coord: FTN and HTS intact b/l  DTR: deferred  Gait: narrow base gait    NIH Stroke Scale:   1a  Level of consciousness: 0 - alert; keenly responsive   1b. LOC questions:  0 - answers both questions correctly   1c. LOC commands: 0 - performs both tasks correctly   2. Best Gaze: 0 - normal   3. Visual: 0 - no visual loss   4. Facial Palsy: 0 - normal symmetric movement   5a. Motor left arm: 0 - no drift, limb holds 90 (or 45) degrees for full 10 seconds   5b. Motor right arm: 0 - no drift, limb holds 90 (or 45) degrees for full 10 seconds   6a. Motor left le - no drift; leg holds 30 degree position for full 5 seconds   6b  Motor right le - no drift; leg holds 30 degree position for full 5 seconds   7. Limb Ataxia: 0 - absent   8. Sensory: 0 - normal; no sensory loss   9. Best Language:  0 - no aphasia, normal   10. Dysarthria: 0 - normal   11. Extinction and Inattention: 0 - no abnormality         Total:   0     MRS: 0      LABS:   Reviewed. Lab Results   Component Value Date    HGB 12.6 2022    WBC 13.1 (H) 2022     2022     2022    BUN 15 2022    CREATININE 0.83 2022    AST 15 2022    ALT 16 2022    MG 2.0 2022    APTT 22.6 2022    INR 0.9 2022      Lab Results   Component Value Date    COVID19 Not Detected 2022    COVID19 Not Detected 2022       RADIOLOGY:   Images were personally reviewed including:  CT head :   Scattered subarachnoid hemorrhage that is decreased compared to prior   examination.       Interval KRISSY aneurysm coiling.            IR :   --left wide necked inferiorly and anteriorly oriented ruptured AComA aneurysm, dimensions neck 2.93mm, height 2.82mm, width 3.13mm, length 2.78mm. --the above treated with Smart coil embolization x3, Vu 1. Smart coil x1 opened but not detached. --Prominent musculocutaneous branch from the left V2 segment that anastomoses to the left occipital artery.     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              CT head 04/17:   Diffuse subarachnoid hemorrhage.     CTA head and neck 04/18: Aneurysm arising from the anterior communicating artery measuring 3 mm x 2 mm. ASSESSMENT:   28 y/o f with pmh of chronic smoking, marijuana abuse, recent [facility] secondary to ruptured ACOM aneurysm s/p coil embolization on 04/19/22 presented today for first hospital follow up. Neuro exam was non focal, patient has two loose staple in the skull at the site of EVD placement. EVD was removed on 05/02. Staples were carefully removed, no bleeding. PLAN:   --SBP goal 100-140 mm hg   --Headache: instructed to slowly franklin off of gabapentin   --c/w Topamax and Magnesium oxide for now  --Follow up MRA head without contrast in 3 months, ordered   --Follow up with Dr. Catrina Brunson on 08/31, imaging to be completed prior to the follow up visit. Case discussed with Dr. Catrina Brunson attending. Edgard Oliver MD    Stroke, [facility]  [id number]  Electronically signed 5/22/2022 at 8:47 AM        I reviewed the Fellow's note and agree with the documented findings and plan of care. Any areas of disagreement are noted on the chart. I agree with the chief complaint, past medical history, past surgical history, allergies, medications, social and family history as documented unless otherwise noted below. I have personally seen and evaluated the patient and images. I find the patient's history and physical exam are consistent with the [address] [place] [address] documentation. I agree with the care provided, treatment rendered, disposition and follow-up plan. Late Entry Note for Date of Endovascular Neurosurgery/Stroke Service rendered on 5-.    30 yo woman with successful April 18, 2022 coil embolization of the ruptured acom aneurysm with EVD removal and discharge from the hospital with stable headaches.   Currently on topamax and magnesium oxide with plan to wean off gabapentin. Followup with Dr. Das Stands Aug 31, 2022 with MRA head without kendall prior to visit    42 minutes with greater than 50% of time spent face to face, examining patient, counseling, coordinating care, obtaining history, reviewing images, labs, and other notes personally. Luigi Kwan MD  Office [phone].  Cell [phone]  Stroke, [facility]  [facility]

## 2022-05-23 NOTE — PROGRESS NOTES
Postop Progress Note    Subjective    Aby Alves presents to the office for postop follow up. Chief Complaint   Patient presents with    Post-Op Check     Patient is being seen for post op 3/21/22 ablation, bilateral salpingectomy and right oophorectomy. She notes light spotting off and on. She complains of urine leakage since her surgery. Objective    Vitals:    04/05/22 0949   BP: 124/76     General: alert, cooperative and no distress  Incision: healing well    Assessment  Doing well postoperatively. Disc'd expectant management of cycles    Plan  1. Continue any current medications  2. Wound care discussed  3. Pt is to increase activities as tolerated  4. Usual diet  5.  Follow up: as needed, annual    Electronically signed by Faith Burkett DO on 5/23/2022 at 1:08 PM

## 2022-05-26 ENCOUNTER — PATIENT MESSAGE (OUTPATIENT)
Dept: NEUROLOGY | Age: 32
End: 2022-05-26

## 2022-06-06 NOTE — TELEPHONE ENCOUNTER
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

## 2022-06-08 ENCOUNTER — TELEPHONE (OUTPATIENT)
Dept: NEUROLOGY | Facility: CLINIC | Age: 32
End: 2022-06-08

## 2022-06-08 NOTE — TELEPHONE ENCOUNTER
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

## 2022-08-31 ENCOUNTER — OFFICE VISIT (OUTPATIENT)
Dept: NEUROLOGY | Age: 32
End: 2022-08-31
Payer: COMMERCIAL

## 2022-08-31 VITALS
HEART RATE: 67 BPM | SYSTOLIC BLOOD PRESSURE: 134 MMHG | HEIGHT: 62 IN | TEMPERATURE: 97 F | DIASTOLIC BLOOD PRESSURE: 87 MMHG | OXYGEN SATURATION: 94 % | BODY MASS INDEX: 30.18 KG/M2 | WEIGHT: 164 LBS

## 2022-08-31 DIAGNOSIS — I67.1 ANEURYSM OF ANTERIOR COMMUNICATING ARTERY: ICD-10-CM

## 2022-08-31 DIAGNOSIS — Z95.828 MRI-SAFE ENDOVASCULAR ANEURYSM COIL PRESENT: Primary | ICD-10-CM

## 2022-08-31 PROCEDURE — G8419 CALC BMI OUT NRM PARAM NOF/U: HCPCS | Performed by: PSYCHIATRY & NEUROLOGY

## 2022-08-31 PROCEDURE — 99215 OFFICE O/P EST HI 40 MIN: CPT | Performed by: PSYCHIATRY & NEUROLOGY

## 2022-08-31 PROCEDURE — G8427 DOCREV CUR MEDS BY ELIG CLIN: HCPCS | Performed by: PSYCHIATRY & NEUROLOGY

## 2022-08-31 PROCEDURE — 1036F TOBACCO NON-USER: CPT | Performed by: PSYCHIATRY & NEUROLOGY

## 2022-08-31 NOTE — PROGRESS NOTES
Endovascular Neurosurgery - [address], [address] [facility], [address] [place] [address]  P: [phone]  F: [phone]      Dear DEEDEE Pena -CNP    HPI:     ALBERT    Sandor Canavan is a 29 y.o. female who presents today with prior history of smoking with ruptured acom aneurysm and coil embolization 2022. No headaches but has been noticing days where she has trouble remembering where she was in a conversation. Will keep an eye on short term memory, still occ days but gradually getting better    No Known Allergies  Current Outpatient Medications   Medication Sig Dispense Refill    magnesium oxide (MAG-OX) 400 (240 Mg) MG tablet Take 1 tablet by mouth daily 30 tablet 1     No current facility-administered medications for this visit. No past medical history on file. Past Surgical History:   Procedure Laterality Date    DILATION AND CURETTAGE OF UTERUS N/A 3/21/2022    DILATATION AND CURETTAGE HYSTEROSCOPY CAUTERY ABLATION-NOVASURE ENDOMETRIAL ABLATION performed by Landon Lacy DO at [address]  2022     IR ANGIOGRAM CAROTID CEREBRAL BILATERAL    OVARIAN CYST REMOVAL Right 3/21/2022    LAPAROSCOPIC- RIGHT OOPHERECTOMY [facility]  2022         SALPINGECTOMY Bilateral 3/21/2022    SALPINGECTOMY LAPAROSCOPIC performed by Landon Lacy DO at [address]     No family history on file.   Social History     Tobacco Use    Smoking status: Former     Packs/day: 0.50     Years: 14.00     Pack years: 7.00     Types: Cigarettes     Quit date: 2022     Years since quittin.6    Smokeless tobacco: Never   Substance Use Topics    Alcohol use: Yes     Comment: occ        Subjective:     Review of Systems      Objective:     /87   Pulse 67   Temp 97 °F (36.1 °C) (Temporal)   Ht 5' 2\" (1.575 m)   Wt 164 lb (74.4 kg)   SpO2 94%   BMI 30.00 kg/m² Physical Exam  Gen: Sitting in chair, nad  CV:RRR  NEURO: alert and oriented x 3 intact language attention and knowledge  CN: eomi, perrl, v1-v3 intact no facial asymmetry, midline tongue  Motor 5/5 bue/ble  COORD: no dysmetria      2022 angigoram      Assessment:        Nighat Manjarrez is a 29 y.o. female who had successful 2022 coil embolization of the ruptured acom aneurysm with EVD removal and discharge from the hospital with no further headaches   Diagnosis Orders   1. MRI-safe endovascular aneurysm coil present  MRA HEAD WO CONTRAST      2. Aneurysm of anterior communicating artery  MRA HEAD WO CONTRAST          Recommendations:      Return in about 2 months (around 11/15/2022) for cerebral angiogram.  Orders Placed This Encounter   Procedures    MRA HEAD WO CONTRAST     Standing Status:   Future     Standing Expiration Date:   2024     Order Specific Question:   Reason for exam:     Answer:   followup ruptured coil embolized acom aneurysm       Cerebral angiogram 2022  Mra head without kendall in the next month for followup  Sbp <130  Smoking cessation        Established Patient Visit Time: 42 minutes  Time Spent in Counselin minutes  Greater than 50% of the time in this visit was spent counseling and coordinating care of this patient. Discussed use, benefit, and side effects of prescribed medications. Personally reviewed imaging with patients and all questions answered. Pt voiced understanding. Patient agreed with treatment plan. Follow up as directed above. If you have any questions, please do not hesitate to call me.   I look forward to following Nighat Manjarrez      Sincerely,        Hernán Smith MD, MD  Electronically signed by Hernán Smith MD, MD on 2022 at 10:30 AM

## 2022-09-21 ENCOUNTER — OFFICE VISIT (OUTPATIENT)
Dept: OBGYN | Age: 32
End: 2022-09-21
Payer: COMMERCIAL

## 2022-09-21 VITALS
WEIGHT: 164 LBS | HEIGHT: 62 IN | SYSTOLIC BLOOD PRESSURE: 122 MMHG | DIASTOLIC BLOOD PRESSURE: 76 MMHG | BODY MASS INDEX: 30.18 KG/M2

## 2022-09-21 DIAGNOSIS — N92.6 IRREGULAR MENSES: ICD-10-CM

## 2022-09-21 DIAGNOSIS — L70.9 ACNE, UNSPECIFIED ACNE TYPE: ICD-10-CM

## 2022-09-21 DIAGNOSIS — R63.5 WEIGHT GAIN: Primary | ICD-10-CM

## 2022-09-21 DIAGNOSIS — R53.83 FATIGUE, UNSPECIFIED TYPE: ICD-10-CM

## 2022-09-21 PROBLEM — Z72.0 TOBACCO USE: Status: RESOLVED | Noted: 2022-01-04 | Resolved: 2022-09-21

## 2022-09-21 PROCEDURE — 99213 OFFICE O/P EST LOW 20 MIN: CPT

## 2022-09-21 PROCEDURE — 1036F TOBACCO NON-USER: CPT

## 2022-09-21 PROCEDURE — G8419 CALC BMI OUT NRM PARAM NOF/U: HCPCS

## 2022-09-21 PROCEDURE — G8427 DOCREV CUR MEDS BY ELIG CLIN: HCPCS

## 2022-09-21 ASSESSMENT — ENCOUNTER SYMPTOMS: ROS SKIN COMMENTS: ACNE

## 2022-09-21 NOTE — PROGRESS NOTES
DATE OF VISIT:  22    PATIENT NAME:  Dayana Hassan     YOB: 1990    REASON FOR VISIT:    Chief Complaint   Patient presents with    Weight Gain     Patient states that she has noticed her eating habits have changed and she states that she has gained 14 pounds since April. She feels like she has more craving. She complains of acne on her face, chest and low back. She states that she continues to have cycles since her ablation in March. HISTORY OF PRESENT ILLNESS:  Patient presents to discuss bothersome symptoms since ablation. She reports weight gain/increased cravings, acne on face/chest/low back, fatigue, continued cycles although they have improved since ablation. She does report that she was in the hospital for about 6 weeks after ablation for a ruptured aneurysm - has been off meds from this since . Discussed options for managing symptoms - will need to research safe cycle control options for this new medical hx if she wishes to pursue. Will also check labs to assess hormonal levels, thyroid, iron. She will return for fasting labs and is aware that we will call with recommendations. Patient's last menstrual period was 2022. Vitals:    22 0858   BP: 122/76   Weight: 164 lb (74.4 kg)   Height: 5' 2\" (1.575 m)     Body mass index is 30 kg/m². No Known Allergies  Current Outpatient Medications   Medication Sig Dispense Refill    magnesium oxide (MAG-OX) 400 (240 Mg) MG tablet Take 1 tablet by mouth daily 30 tablet 1     No current facility-administered medications for this visit.      Social History     Socioeconomic History    Marital status: Single     Spouse name: None    Number of children: None    Years of education: None    Highest education level: None   Tobacco Use    Smoking status: Former     Packs/day: 0.50     Years: 14.00     Pack years: 7.00     Types: Cigarettes     Quit date: 2022     Years since quittin.6    Smokeless tobacco: Never   Vaping Use    Vaping Use: Some days    Substances: Nicotine   Substance and Sexual Activity    Alcohol use: Yes     Comment: occ    Drug use: No    Sexual activity: Yes     Partners: Male     Birth control/protection: Surgical     Social Determinants of Health     Financial Resource Strain: Low Risk     Difficulty of Paying Living Expenses: Not hard at all   Food Insecurity: No Food Insecurity    Worried About [address] in the Last Year: Never true    [facility] in the Last Year: Never true       REVIEW OF SYSTEMS:  Review of Systems   Constitutional:  Positive for fatigue and unexpected weight change (weight gain). Negative for chills and fever. Genitourinary:  Positive for menstrual problem (irregular). Negative for dysuria, pelvic pain, vaginal bleeding and vaginal discharge. Skin:         acne     PHYSICAL EXAM:  /76   Ht 5' 2\" (1.575 m)   Wt 164 lb (74.4 kg)   LMP 09/20/2022   BMI 30.00 kg/m²   Physical Exam  Constitutional:       Appearance: Normal appearance. HENT:      Head: Normocephalic and atraumatic. Mouth/Throat:      Mouth: Mucous membranes are moist.   Eyes:      Extraocular Movements: Extraocular movements intact. Musculoskeletal:         General: Normal range of motion. Cervical back: Normal range of motion. Neurological:      General: No focal deficit present. Mental Status: She is alert and oriented to person, place, and time. Skin:     General: Skin is warm and dry. Psychiatric:         Mood and Affect: Mood normal.         Behavior: Behavior normal.         Thought Content: Thought content normal.     The patient, Shakira Harper is a 29 y.o. female, was seen with a total time spent of 20 minutes for the visit on this date of service by the E/M provider. The time component had both face to face and non face to face time spent in determining the total time component.   Counseling and education regarding her diagnosis listed

## 2022-09-28 ENCOUNTER — PREP FOR PROCEDURE (OUTPATIENT)
Dept: NEUROLOGY | Age: 32
End: 2022-09-28

## 2022-09-30 ENCOUNTER — HOSPITAL ENCOUNTER (OUTPATIENT)
Dept: MRI IMAGING | Age: 32
Discharge: HOME | End: 2022-10-02
Payer: COMMERCIAL

## 2022-09-30 ENCOUNTER — HOSPITAL ENCOUNTER (OUTPATIENT)
Age: 32
Discharge: HOME | End: 2022-09-30
Payer: COMMERCIAL

## 2022-09-30 DIAGNOSIS — I67.1 ANEURYSM OF ANTERIOR COMMUNICATING ARTERY: ICD-10-CM

## 2022-09-30 DIAGNOSIS — I67.848 CEREBRAL VASOSPASM: ICD-10-CM

## 2022-09-30 DIAGNOSIS — I60.9 SAH (SUBARACHNOID HEMORRHAGE) (HCC): ICD-10-CM

## 2022-09-30 DIAGNOSIS — R53.83 FATIGUE, UNSPECIFIED TYPE: ICD-10-CM

## 2022-09-30 DIAGNOSIS — Z95.828 MRI-SAFE ENDOVASCULAR ANEURYSM COIL PRESENT: ICD-10-CM

## 2022-09-30 DIAGNOSIS — R63.5 WEIGHT GAIN: ICD-10-CM

## 2022-09-30 DIAGNOSIS — N92.6 IRREGULAR MENSES: ICD-10-CM

## 2022-09-30 DIAGNOSIS — L70.9 ACNE, UNSPECIFIED ACNE TYPE: ICD-10-CM

## 2022-09-30 LAB
ABSOLUTE EOS #: 0.27 K/UL (ref 0–0.44)
ABSOLUTE IMMATURE GRANULOCYTE: 0.05 K/UL (ref 0–0.3)
ABSOLUTE LYMPH #: 2.46 K/UL (ref 1.1–3.7)
ABSOLUTE MONO #: 0.45 K/UL (ref 0.1–1.2)
ANION GAP SERPL CALCULATED.3IONS-SCNC: 10 MMOL/L (ref 9–17)
BASOPHILS # BLD: 2 % (ref 0–2)
BASOPHILS ABSOLUTE: 0.13 K/UL (ref 0–0.2)
BUN BLDV-MCNC: 9 MG/DL (ref 6–20)
BUN/CREAT BLD: 13 (ref 9–20)
CALCIUM SERPL-MCNC: 9.8 MG/DL (ref 8.6–10.4)
CHLORIDE BLD-SCNC: 102 MMOL/L (ref 98–107)
CO2: 28 MMOL/L (ref 20–31)
CREAT SERPL-MCNC: 0.7 MG/DL (ref 0.5–0.9)
EOSINOPHILS RELATIVE PERCENT: 3 % (ref 1–4)
FERRITIN: 81 NG/ML (ref 13–150)
FOLLICLE STIMULATING HORMONE: 12.5 MIU/ML (ref 1.7–21.5)
GFR AFRICAN AMERICAN: >60 ML/MIN
GFR NON-AFRICAN AMERICAN: >60 ML/MIN
GFR SERPL CREATININE-BSD FRML MDRD: ABNORMAL ML/MIN/{1.73_M2}
GFR SERPL CREATININE-BSD FRML MDRD: ABNORMAL ML/MIN/{1.73_M2}
GLUCOSE BLD-MCNC: 104 MG/DL (ref 70–99)
HCT VFR BLD CALC: 45.6 % (ref 36.3–47.1)
HEMOGLOBIN: 14.9 G/DL (ref 11.9–15.1)
IMMATURE GRANULOCYTES: 1 %
INSULIN REFERENCE RANGE:: NORMAL
INSULIN: 13.2 MU/L
IRON SATURATION: 50 % (ref 20–55)
IRON: 151 UG/DL (ref 37–145)
LH: 68.3 MIU/ML (ref 1–95.6)
LYMPHOCYTES # BLD: 29 % (ref 24–43)
MAGNESIUM: 2.2 MG/DL (ref 1.6–2.6)
MCH RBC QN AUTO: 28.7 PG (ref 25.2–33.5)
MCHC RBC AUTO-ENTMCNC: 32.7 G/DL (ref 28.4–34.8)
MCV RBC AUTO: 87.7 FL (ref 82.6–102.9)
MONOCYTES # BLD: 5 % (ref 3–12)
NRBC AUTOMATED: 0 PER 100 WBC
PDW BLD-RTO: 12.4 % (ref 11.8–14.4)
PLATELET # BLD: 292 K/UL (ref 138–453)
PMV BLD AUTO: 10.8 FL (ref 8.1–13.5)
POTASSIUM SERPL-SCNC: 4.3 MMOL/L (ref 3.7–5.3)
RBC # BLD: 5.2 M/UL (ref 3.95–5.11)
SEG NEUTROPHILS: 60 % (ref 36–65)
SEGMENTED NEUTROPHILS ABSOLUTE COUNT: 5.05 K/UL (ref 1.5–8.1)
SODIUM BLD-SCNC: 140 MMOL/L (ref 135–144)
TOTAL IRON BINDING CAPACITY: 305 UG/DL (ref 250–450)
TSH SERPL DL<=0.05 MIU/L-ACNC: 1.36 UIU/ML (ref 0.3–5)
UNSATURATED IRON BINDING CAPACITY: 154 UG/DL (ref 112–347)
WBC # BLD: 8.4 K/UL (ref 3.5–11.3)

## 2022-09-30 PROCEDURE — 80048 BASIC METABOLIC PNL TOTAL CA: CPT

## 2022-09-30 PROCEDURE — 85025 COMPLETE CBC W/AUTO DIFF WBC: CPT

## 2022-09-30 PROCEDURE — 36415 COLL VENOUS BLD VENIPUNCTURE: CPT

## 2022-09-30 PROCEDURE — 83002 ASSAY OF GONADOTROPIN (LH): CPT

## 2022-09-30 PROCEDURE — 83540 ASSAY OF IRON: CPT

## 2022-09-30 PROCEDURE — 83001 ASSAY OF GONADOTROPIN (FSH): CPT

## 2022-09-30 PROCEDURE — 84443 ASSAY THYROID STIM HORMONE: CPT

## 2022-09-30 PROCEDURE — 82728 ASSAY OF FERRITIN: CPT

## 2022-09-30 PROCEDURE — 70544 MR ANGIOGRAPHY HEAD W/O DYE: CPT

## 2022-09-30 PROCEDURE — 83735 ASSAY OF MAGNESIUM: CPT

## 2022-09-30 PROCEDURE — 83525 ASSAY OF INSULIN: CPT

## 2022-09-30 PROCEDURE — 83550 IRON BINDING TEST: CPT

## 2022-11-08 ENCOUNTER — HOSPITAL ENCOUNTER (OUTPATIENT)
Dept: INTERVENTIONAL RADIOLOGY/VASCULAR | Age: 32
Discharge: HOME | End: 2022-11-10
Payer: COMMERCIAL

## 2022-11-08 VITALS
HEIGHT: 62 IN | RESPIRATION RATE: 12 BRPM | BODY MASS INDEX: 30.18 KG/M2 | HEART RATE: 70 BPM | TEMPERATURE: 98.1 F | DIASTOLIC BLOOD PRESSURE: 88 MMHG | WEIGHT: 164 LBS | SYSTOLIC BLOOD PRESSURE: 133 MMHG | OXYGEN SATURATION: 98 %

## 2022-11-08 DIAGNOSIS — I67.1 CEREBRAL ANEURYSM: ICD-10-CM

## 2022-11-08 LAB
EGFR, POC: >60 ML/MIN/1.73M2
GLUCOSE BLD-MCNC: 93 MG/DL (ref 74–100)
POC BUN: 12 MG/DL (ref 8–26)
POC CHLORIDE: 109 MMOL/L (ref 98–107)
POC CREATININE: 0.84 MG/DL (ref 0.51–1.19)
POC HEMATOCRIT: 47 % (ref 36–46)
POC HEMOGLOBIN: 16 G/DL (ref 12–16)
POC IONIZED CALCIUM: 1.22 MMOL/L (ref 1.15–1.33)
POC POTASSIUM: 4.3 MMOL/L (ref 3.5–4.5)
POC SODIUM: 143 MMOL/L (ref 138–146)

## 2022-11-08 PROCEDURE — 82330 ASSAY OF CALCIUM: CPT

## 2022-11-08 PROCEDURE — 6370000000 HC RX 637 (ALT 250 FOR IP)

## 2022-11-08 PROCEDURE — 6370000000 HC RX 637 (ALT 250 FOR IP): Performed by: PSYCHIATRY & NEUROLOGY

## 2022-11-08 PROCEDURE — 84132 ASSAY OF SERUM POTASSIUM: CPT

## 2022-11-08 PROCEDURE — C1769 GUIDE WIRE: HCPCS

## 2022-11-08 PROCEDURE — 76937 US GUIDE VASCULAR ACCESS: CPT

## 2022-11-08 PROCEDURE — C1760 CLOSURE DEV, VASC: HCPCS

## 2022-11-08 PROCEDURE — 76937 US GUIDE VASCULAR ACCESS: CPT | Performed by: PSYCHIATRY & NEUROLOGY

## 2022-11-08 PROCEDURE — 7100000000 HC PACU RECOVERY - FIRST 15 MIN

## 2022-11-08 PROCEDURE — 84520 ASSAY OF UREA NITROGEN: CPT

## 2022-11-08 PROCEDURE — 99153 MOD SED SAME PHYS/QHP EA: CPT

## 2022-11-08 PROCEDURE — 82947 ASSAY GLUCOSE BLOOD QUANT: CPT

## 2022-11-08 PROCEDURE — 36224 PLACE CATH CAROTD ART: CPT | Performed by: PSYCHIATRY & NEUROLOGY

## 2022-11-08 PROCEDURE — 99152 MOD SED SAME PHYS/QHP 5/>YRS: CPT | Performed by: PSYCHIATRY & NEUROLOGY

## 2022-11-08 PROCEDURE — C1894 INTRO/SHEATH, NON-LASER: HCPCS

## 2022-11-08 PROCEDURE — 85014 HEMATOCRIT: CPT

## 2022-11-08 PROCEDURE — 82565 ASSAY OF CREATININE: CPT

## 2022-11-08 PROCEDURE — 36226 PLACE CATH VERTEBRAL ART: CPT

## 2022-11-08 PROCEDURE — 36224 PLACE CATH CAROTD ART: CPT

## 2022-11-08 PROCEDURE — 2580000003 HC RX 258: Performed by: PSYCHIATRY & NEUROLOGY

## 2022-11-08 PROCEDURE — 2709999900 HC NON-CHARGEABLE SUPPLY

## 2022-11-08 PROCEDURE — 82435 ASSAY OF BLOOD CHLORIDE: CPT

## 2022-11-08 PROCEDURE — C1887 CATHETER, GUIDING: HCPCS

## 2022-11-08 PROCEDURE — 7100000001 HC PACU RECOVERY - ADDTL 15 MIN

## 2022-11-08 PROCEDURE — 99152 MOD SED SAME PHYS/QHP 5/>YRS: CPT

## 2022-11-08 PROCEDURE — 6360000004 HC RX CONTRAST MEDICATION: Performed by: PSYCHIATRY & NEUROLOGY

## 2022-11-08 PROCEDURE — 36226 PLACE CATH VERTEBRAL ART: CPT | Performed by: PSYCHIATRY & NEUROLOGY

## 2022-11-08 PROCEDURE — 6360000002 HC RX W HCPCS: Performed by: PSYCHIATRY & NEUROLOGY

## 2022-11-08 PROCEDURE — 84295 ASSAY OF SERUM SODIUM: CPT

## 2022-11-08 PROCEDURE — 99214 OFFICE O/P EST MOD 30 MIN: CPT | Performed by: PSYCHIATRY & NEUROLOGY

## 2022-11-08 PROCEDURE — 6360000002 HC RX W HCPCS: Performed by: STUDENT IN AN ORGANIZED HEALTH CARE EDUCATION/TRAINING PROGRAM

## 2022-11-08 RX ORDER — IODIXANOL 320 MG/ML
100 INJECTION, SOLUTION INTRAVASCULAR
Status: COMPLETED | OUTPATIENT
Start: 2022-11-08 | End: 2022-11-08

## 2022-11-08 RX ORDER — SODIUM CHLORIDE 0.9 % (FLUSH) 0.9 %
5-40 SYRINGE (ML) INJECTION PRN
Status: DISCONTINUED | OUTPATIENT
Start: 2022-11-08 | End: 2022-11-11 | Stop reason: HOSPADM

## 2022-11-08 RX ORDER — HEPARIN SODIUM (PORCINE) LOCK FLUSH IV SOLN 100 UNIT/ML 100 UNIT/ML
SOLUTION INTRAVENOUS
Status: COMPLETED | OUTPATIENT
Start: 2022-11-08 | End: 2022-11-08

## 2022-11-08 RX ORDER — ONDANSETRON 4 MG/1
4 TABLET, ORALLY DISINTEGRATING ORAL EVERY 8 HOURS PRN
Status: DISCONTINUED | OUTPATIENT
Start: 2022-11-08 | End: 2022-11-11 | Stop reason: HOSPADM

## 2022-11-08 RX ORDER — FENTANYL CITRATE 50 UG/ML
INJECTION, SOLUTION INTRAMUSCULAR; INTRAVENOUS
Status: COMPLETED | OUTPATIENT
Start: 2022-11-08 | End: 2022-11-08

## 2022-11-08 RX ORDER — SODIUM CHLORIDE 9 MG/ML
INJECTION, SOLUTION INTRAVENOUS CONTINUOUS
Status: DISCONTINUED | OUTPATIENT
Start: 2022-11-08 | End: 2022-11-11 | Stop reason: HOSPADM

## 2022-11-08 RX ORDER — MIDAZOLAM HYDROCHLORIDE 2 MG/2ML
INJECTION, SOLUTION INTRAMUSCULAR; INTRAVENOUS
Status: COMPLETED | OUTPATIENT
Start: 2022-11-08 | End: 2022-11-08

## 2022-11-08 RX ORDER — SODIUM CHLORIDE 0.9 % (FLUSH) 0.9 %
5-40 SYRINGE (ML) INJECTION EVERY 12 HOURS SCHEDULED
Status: DISCONTINUED | OUTPATIENT
Start: 2022-11-08 | End: 2022-11-11 | Stop reason: HOSPADM

## 2022-11-08 RX ORDER — ACETAMINOPHEN 325 MG/1
650 TABLET ORAL EVERY 4 HOURS PRN
Status: DISCONTINUED | OUTPATIENT
Start: 2022-11-08 | End: 2022-11-11 | Stop reason: HOSPADM

## 2022-11-08 RX ORDER — SODIUM CHLORIDE 9 MG/ML
INJECTION, SOLUTION INTRAVENOUS PRN
Status: DISCONTINUED | OUTPATIENT
Start: 2022-11-08 | End: 2022-11-11 | Stop reason: HOSPADM

## 2022-11-08 RX ORDER — ONDANSETRON 2 MG/ML
4 INJECTION INTRAMUSCULAR; INTRAVENOUS EVERY 6 HOURS PRN
Status: DISCONTINUED | OUTPATIENT
Start: 2022-11-08 | End: 2022-11-11 | Stop reason: HOSPADM

## 2022-11-08 RX ADMIN — Medication 2000 MG: at 09:34

## 2022-11-08 RX ADMIN — ACETAMINOPHEN 650 MG: 325 TABLET ORAL at 14:24

## 2022-11-08 RX ADMIN — HEPARIN 2 ML: 100 SYRINGE at 09:58

## 2022-11-08 RX ADMIN — FENTANYL CITRATE 25 MCG: 50 INJECTION, SOLUTION INTRAMUSCULAR; INTRAVENOUS at 10:59

## 2022-11-08 RX ADMIN — FENTANYL CITRATE 50 MCG: 50 INJECTION, SOLUTION INTRAMUSCULAR; INTRAVENOUS at 09:46

## 2022-11-08 RX ADMIN — MIDAZOLAM HYDROCHLORIDE 1 MG: 1 INJECTION, SOLUTION INTRAMUSCULAR; INTRAVENOUS at 09:47

## 2022-11-08 RX ADMIN — IODIXANOL 49 ML: 320 INJECTION, SOLUTION INTRAVASCULAR at 11:08

## 2022-11-08 RX ADMIN — SODIUM CHLORIDE: 9 INJECTION, SOLUTION INTRAVENOUS at 08:12

## 2022-11-08 ASSESSMENT — PAIN DESCRIPTION - LOCATION
LOCATION: GROIN
LOCATION: GROIN

## 2022-11-08 ASSESSMENT — PAIN DESCRIPTION - ORIENTATION
ORIENTATION: RIGHT
ORIENTATION: RIGHT

## 2022-11-08 ASSESSMENT — PAIN SCALES - GENERAL
PAINLEVEL_OUTOF10: 4
PAINLEVEL_OUTOF10: 5

## 2022-11-08 NOTE — OR NURSING
Dressing on. Groin soft. Pulses palpable. Neuro assessment unchanged per Dr Vania Hart. Report called.  Patient to [facility]

## 2022-11-08 NOTE — DISCHARGE INSTRUCTIONS
Discharge Instructions for angiogram  [address] 63 to shower in AM. Discontinue band aid in AM.   Do not apply further band aids. Keep clean, dry and open to air. No powder or lotion. Do not soak in a pool or tub and do not swim for one week. If there is any bleeding at the catheter site, apply firm pressure with your hands until the bleeding stops. If bleeding continues after 3 minutes call 911. If there is any swelling or firm areas at your puncture site, this could be bleeding under the skin(hematoma), and if you have any concerns seek help immediately. .     If there is any bleeding at the catheter site, apply firm pressure with your hands until the bleeding stops. If bleeding continues after 3 minutes call 911. Drink plenty of fluids after the test. This will flush the x-ray dye from your system. Return to your normal diet. The sedative will make you sleepy. Rest until the effects have worn off. Ask your doctor when you will be able to return to work. Avoid heavy lifting objects greater than 10  pounds, physically demanding activities, and sexual activity for 5 days. Your activity will also depend upon where the catheter was inserted: Do not sit for long periods of time. Try to change positions frequently. Medications   If advised by your doctor, resume taking your normal medicines. Use acetaminophen (Tylenol) for pain relief. Do not take metformin (Glucophage) or glyburide and metformin (Glucovance) for 48 hours after the test.    If you had to stop taking these medications before the procedure, ask your doctor when you can resume taking them:   If youAnti-inflammatory drugs (eg, ibuprofen )   Blood thinners, such as warfarin (Coumadin)   Clopidogrel (Plavix)   If you are taking medicines, follow these general guidelines:   Take your medicine as directed. Do not change the amount or the schedule. Do not stop taking them without talking to your doctor.    Do not share them.   Know what the results and side effects. Report them to your doctor. Some drugs can be dangerous when mixed. Talk to a doctor or pharmacist if you are taking more than one drug. This includes over-the-counter medicine and herb or dietary supplements. Plan ahead for refills so you don't run out. Call Your Doctor If Any of the Following Occurs     :   Signs of infection, including fever and chills   Redness, swelling, increasing pain, excessive bleeding, or any discharge from the catheter insertion site   CALL 911 if you have symptoms including:   Drooping facial muscles   Changes in vision or speech   Difficulty walking or using your limbs   Change in sensation to affected leg, including numbness, feeling cold, or change in color   Extreme sweating, nausea or vomiting   Dizziness or lightheadedness   Chest pain   Rapid, irregular heartbeat   Palpitations   Cough, shortness of breath, or difficulty breathing   Weakness or fainting   If you think you have an emergency, CALL 911 . Discharge Instructions      SEDATION / ANALGESIA INFORMATION / HOME GOING ADVICE  You have received the sedation/analgesia medication during your visit    Sedation/analgesia is used during short medical procedures under controlled supervision. The medication will produce a strong relaxation. You will be able to hear, speak and follow instructions, but your memory and alertness will be decreased. You will be able to swallow and breathe on your own. During sedation/analgesia your blood pressure, heart and breathing will be watched closely. After the procedure, you may not remember what was said or done. You may have the following effects from the medication. \" Drowsiness, dizziness, sleepiness or confusion. \" Difficulty remembering or delayed reaction times. \" Loss of fine muscle control or difficulty with your balance especially while walking. \" Difficulty focusing or blurred vision.   You may not be aware of slight changes in your behavior and/or your reaction time because of the medication used during the procedure. Therefore you should follow these instructions. \" Have someone responsible help you with your care. \" Do not drive for 24 hours. \" Do not operate equipment for 24 hours (lawnmowers, power tools, kitchen accessories, stove). \" Do not drink any alcoholic beverages for a minimum of 24 hours. \" Do not make important personal, legal or business decisions for 24 hours. \" You may experience dizziness or lightheadedness. Move slowly and carefully, do not make sudden position changes. \" Drink extra amounts of fluids today. \" Increase your diet as tolerated (unless you have received specific instructions from your doctor). \" If you feel nauseated, continue with liquids until the nausea is gone. \" Notify your physician if you have not urinated within 8 hours after the procedure. \" Resume your medications unless otherwise instructed        Brain Angiogram  What is a brain angiogram?  A brain angiogram (cerebral angiogram) is a test (also called a procedure) that looks for problems with blood vessels and blow flow in the brain. These problems may include a bulge in a blood vessel (aneurysm), a narrowing or blockage of a blood vessel, or bleeding in the brain. The test may be used to check other symptoms, such as unusual headaches, or to check problems found during a different test.  The doctor puts a thin, flexible tube (catheter) into a blood vessel in your groin. Or the doctor may put the catheter in a blood vessel in your arm. During the procedure, the doctor moves the catheter through the blood vessel into your brain. Then he or she injects a dye into the catheter. The dye flows into the blood vessel. The dye makes the blood vessels show up on a video screen. A picture of the blood vessel in the brain can be seen on a video screen.  The doctor can look at the screen to see any problems with the blood vessels or blood flow. Follow-up care is a key part of your treatment and safety. Be sure to make and go to all appointments, and call your doctor if you are having problems. It's also a good idea to know your test results and keep a list of the medicines you take.

## 2022-11-08 NOTE — H&P
Endovascular Neurosurgery History and Physical      Reason for evaluation: DSA for s/p Acomm rupture aneruysm coiling    SUBJECTIVE:   History of Chief Complaint:      29 y.o. 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 No headaches but has been noticing days where she has trouble remembering where she was in a conversation. Patient came in for a DSA to check the aneurysm post treatment    Patient quitted smoking but still use nicotine gums a lot. Patient stated that she is very stressed out. She came with a friend today. Patient is not taking any blood thinner. Allergies  has No Known Allergies. Medications  Prior to Admission medications    Medication Sig Start Date End Date Taking? Authorizing Provider   magnesium oxide (MAG-OX) 400 (240 Mg) MG tablet Take 1 tablet by mouth daily 5/7/22   DEEDEE Castle - CNP    Scheduled Meds:  Continuous Infusions:  PRN Meds:.  Past Medical History   has no past medical history on file. Past Surgical History   has a past surgical history that includes salpingectomy (Bilateral, 3/21/2022); ovarian cyst removal (Right, 3/21/2022); Dilation and curettage of uterus (N/A, 3/21/2022); other surgical history (04/18/2022); and picc insertion vascular access team (4/25/2022). Social History   reports that she quit smoking about 9 months ago. Her smoking use included cigarettes. She has a 7.00 pack-year smoking history. She has never used smokeless tobacco.   reports current alcohol use. reports no history of drug use. Family History  family history is not on file.     Review of Systems:  CONSTITUTIONAL:  negative for fevers, chills, fatigue and malaise    EYES:  negative for double vision, blurred vision and photophobia     HEENT:  negative for tinnitus, epistaxis and sore throat    RESPIRATORY:  negative for cough, shortness of breath, wheezing    CARDIOVASCULAR:  negative for chest 2022     2022    BUN 9 2022    CREATININE 0.70 2022    AST 15 2022    ALT 16 2022    MG 2.2 2022    APTT 22.6 2022    INR 0.9 2022      Lab Results   Component Value Date/Time    COVID19 Not Detected 2022 03:40 AM    COVID19 Not Detected 2022 09:58 AM       RADIOLOGY:   Images were personally reviewed includin2022 angigoram    ASSESSMENT:     Tiana Green is a 29 y.o. female who had successful 2022 coil embolization of the ruptured acom aneurysm with EVD removal and discharge from the hospital with no further headaches, came back today for aneurysm surveillance with DSA     PLAN:     - will proceed with DSA    Risks and benefits discussed, risks include but are not limited to bruising, stroke, subarachnoid hemorrhage, death, retroperitoneal hematoma, pseudoaneurysm, lower extremity/renal/peripheral vascular compromise, informed consent obtained. Case discussed with Dr. Ben Owens attending.     Lacey Spencer MD  Stroke, [facility]  [id number]  Electronically signed 2022 at 7:35 AM

## 2022-11-08 NOTE — PROGRESS NOTES
Received post erebral angio procedure to [facility] room 11. Assessment obtained. Restrictions reviewed with patient. Post procedure pathway initiated. Rt. groin site soft , dressing dry and intact. No hematoma noted. Family at side. Patient without complaints. Head of bed flat with Right leg straight.

## 2022-11-08 NOTE — BRIEF OP NOTE
Brief Postoperative Note                  [facility]    NEUROENDOVASCULAR SERVICE: POST-OP NOTE: 11/8/2022    Pt Name: Tiana Green  MRN: [id number]  [place]: 1990  Date of Procedure: 11/8/2022  Primary Care Physician: Margarita Breaux, APRN - CNP        Pre-Procedural Diagnosis: Ruptured ACOM aneurysm with prior coil embolization   Post-Procedural Diagnosis:same as above       Procedure Performed:Diagnostic Cerebral Angiogram    Surgeon:   Macho Dee MD    Fellow:  Jamila Horton MD and Lacey Spencer MD     Assisting Tech:  Compa Olson    PRE-PROCEDURAL EXAM:  Prestroke baseline mRS MODIFIED BRENDA SCORE: 0 - No symptoms at all. Neurological exam performed and unchanged from initial H&P or consult      Anesthesia: IV Moderate Sedation  Complications: none    Intra-Operative EXAM:  Neurological exam performed and unchanged from initial H&P or consult    EBL: < less than 100       Cc            Specimens: Were not Obtained  Contrast:     Visipaque 320 low osmolar 49 Cc             Fluoro: 13.8 min    Findings:  Please see dictated Radiology note for further details  Ruptured ACOM aneurysm with prior coil embolization  Coil mass appears to have compaction with noted recanalization of the aneurysm measuring neck 1.80 mm, length 2.26 mm, width 3.02 mm, height 2.44 mm   Vu score -03   Fetal right PCA         POST-PROCEDURAL EXAM :   Stable neurological Exam  Neurological exam performed and unchanged from initial H&P or consult    Closure:  right [place] 5   F        POST-PROCEDURAL MONITORING : see orders  Disposition:  [address]      Recommendations:  Back to  [address]  Do not bend right leg for 3 hours. Groin checks per protocol. Peripheral pulse checks per protocol.   SBP goal 100-140 mm Hg  Stent assisted coil embolization of the ACOM aneurysm most likely on Nov 21 right radial access   Follow up with Jamlia Horton MD  2 weeks after discharge and Dr. Ben Owens 3 months after discharge.         Governor MD Keyanna Metz MD   Pager [phone]  Stroke, [facility]  [address]  Electronically signed 11/8/2022 at 11:00 AM

## 2022-11-08 NOTE — PROGRESS NOTES
Patient admitted, consent signed and questions answered. Patient ready for procedure. Call light to reach with side rails up 2 of 2. Family at bedside with patient.

## 2022-11-08 NOTE — OR NURSING
Supine on table. Monitors and strap on. Groins are prepped and draped. Patient alert and oriented. Moved without issues.  Pulses palpable

## 2022-11-10 ENCOUNTER — TELEPHONE (OUTPATIENT)
Dept: NEUROSURGERY | Age: 32
End: 2022-11-10

## 2022-11-10 DIAGNOSIS — I67.1 ANEURYSM OF ANTERIOR COMMUNICATING ARTERY: ICD-10-CM

## 2022-11-10 DIAGNOSIS — Z95.828 MRI-SAFE ENDOVASCULAR ANEURYSM COIL PRESENT: Primary | ICD-10-CM

## 2022-11-10 RX ORDER — ASPIRIN 325 MG
325 TABLET, DELAYED RELEASE (ENTERIC COATED) ORAL DAILY
Qty: 30 TABLET | Refills: 3 | Status: ON HOLD | OUTPATIENT
Start: 2022-11-10 | End: 2022-11-22 | Stop reason: HOSPADM

## 2022-11-10 RX ORDER — CLOPIDOGREL BISULFATE 75 MG/1
75 TABLET ORAL DAILY
Qty: 30 TABLET | Refills: 3 | Status: SHIPPED | OUTPATIENT
Start: 2022-11-10

## 2022-11-20 ENCOUNTER — ANESTHESIA EVENT (OUTPATIENT)
Dept: INTERVENTIONAL RADIOLOGY/VASCULAR | Age: 32
End: 2022-11-20

## 2022-11-21 ENCOUNTER — HOSPITAL ENCOUNTER (INPATIENT)
Dept: INTERVENTIONAL RADIOLOGY/VASCULAR | Age: 32
LOS: 1 days | Discharge: HOME | DRG: 027 | End: 2022-11-22
Attending: PSYCHIATRY & NEUROLOGY | Admitting: PSYCHIATRY & NEUROLOGY
Payer: COMMERCIAL

## 2022-11-21 ENCOUNTER — ANESTHESIA (OUTPATIENT)
Dept: INTERVENTIONAL RADIOLOGY/VASCULAR | Age: 32
End: 2022-11-21

## 2022-11-21 DIAGNOSIS — I72.9 ANEURYSM (HCC): ICD-10-CM

## 2022-11-21 PROBLEM — I66.9 CEREBRAL ARTERIAL EMBOLISM: Status: ACTIVE | Noted: 2022-11-21

## 2022-11-21 LAB
ABO/RH: NORMAL
ACTIVATED CLOTTING TIME: 139 SEC (ref 79–149)
ACTIVATED CLOTTING TIME: 267 SEC (ref 79–149)
ACTIVATED CLOTTING TIME: 271 SEC (ref 79–149)
ACTIVATED CLOTTING TIME: 275 SEC (ref 79–149)
ACTIVATED CLOTTING TIME: 275 SEC (ref 79–149)
ACTIVATED CLOTTING TIME: 280 SEC (ref 79–149)
ANTIBODY SCREEN: NEGATIVE
ARM BAND NUMBER: NORMAL
COLLAGEN ADENOSINE-5'-DIPHOSPHATE (ADP) TIME: >300 SEC (ref 67–112)
COLLAGEN EPINEPHRINE TIME: >300 SEC (ref 85–172)
EGFR, POC: >60 ML/MIN/1.73M2
EXPIRATION DATE: NORMAL
GLUCOSE BLD-MCNC: 101 MG/DL (ref 74–100)
HCG, PREGNANCY URINE (POC): NEGATIVE
PLATELET FUNCTION INTERP: ABNORMAL
POC BUN: 9 MG/DL (ref 8–26)
POC CHLORIDE: 109 MMOL/L (ref 98–107)
POC CREATININE: 0.84 MG/DL (ref 0.51–1.19)
POC IONIZED CALCIUM: 1.22 MMOL/L (ref 1.15–1.33)
POC POTASSIUM: 3.6 MMOL/L (ref 3.5–4.5)
POC SODIUM: 142 MMOL/L (ref 138–146)

## 2022-11-21 PROCEDURE — 2580000003 HC RX 258: Performed by: PSYCHIATRY & NEUROLOGY

## 2022-11-21 PROCEDURE — 3700000001 HC ADD 15 MINUTES (ANESTHESIA)

## 2022-11-21 PROCEDURE — 82947 ASSAY GLUCOSE BLOOD QUANT: CPT

## 2022-11-21 PROCEDURE — 75898 FOLLOW-UP ANGIOGRAPHY: CPT

## 2022-11-21 PROCEDURE — 6360000002 HC RX W HCPCS: Performed by: STUDENT IN AN ORGANIZED HEALTH CARE EDUCATION/TRAINING PROGRAM

## 2022-11-21 PROCEDURE — 76377 3D RENDER W/INTRP POSTPROCES: CPT

## 2022-11-21 PROCEDURE — 84132 ASSAY OF SERUM POTASSIUM: CPT

## 2022-11-21 PROCEDURE — 6370000000 HC RX 637 (ALT 250 FOR IP): Performed by: PSYCHIATRY & NEUROLOGY

## 2022-11-21 PROCEDURE — 7100000001 HC PACU RECOVERY - ADDTL 15 MIN

## 2022-11-21 PROCEDURE — 3700000000 HC ANESTHESIA ATTENDED CARE

## 2022-11-21 PROCEDURE — 2709999900 HC NON-CHARGEABLE SUPPLY

## 2022-11-21 PROCEDURE — 82330 ASSAY OF CALCIUM: CPT

## 2022-11-21 PROCEDURE — 84520 ASSAY OF UREA NITROGEN: CPT

## 2022-11-21 PROCEDURE — 75710 ARTERY X-RAYS ARM/LEG: CPT

## 2022-11-21 PROCEDURE — 36224 PLACE CATH CAROTD ART: CPT

## 2022-11-21 PROCEDURE — 82435 ASSAY OF BLOOD CHLORIDE: CPT

## 2022-11-21 PROCEDURE — C1769 GUIDE WIRE: HCPCS

## 2022-11-21 PROCEDURE — C1889 IMPLANT/INSERT DEVICE, NOC: HCPCS

## 2022-11-21 PROCEDURE — 82565 ASSAY OF CREATININE: CPT

## 2022-11-21 PROCEDURE — B3171ZZ FLUOROSCOPY OF LEFT INTERNAL CAROTID ARTERY USING LOW OSMOLAR CONTRAST: ICD-10-PCS | Performed by: PSYCHIATRY & NEUROLOGY

## 2022-11-21 PROCEDURE — B3141ZZ FLUOROSCOPY OF LEFT COMMON CAROTID ARTERY USING LOW OSMOLAR CONTRAST: ICD-10-PCS | Performed by: PSYCHIATRY & NEUROLOGY

## 2022-11-21 PROCEDURE — B31R1ZZ FLUOROSCOPY OF INTRACRANIAL ARTERIES USING LOW OSMOLAR CONTRAST: ICD-10-PCS | Performed by: PSYCHIATRY & NEUROLOGY

## 2022-11-21 PROCEDURE — 86850 RBC ANTIBODY SCREEN: CPT

## 2022-11-21 PROCEDURE — 85347 COAGULATION TIME ACTIVATED: CPT

## 2022-11-21 PROCEDURE — 75894 X-RAYS TRANSCATH THERAPY: CPT

## 2022-11-21 PROCEDURE — 61624 TCAT PERM OCCLS/EMBOLJ CNS: CPT

## 2022-11-21 PROCEDURE — 2000000000 HC ICU R&B

## 2022-11-21 PROCEDURE — 86901 BLOOD TYPING SEROLOGIC RH(D): CPT

## 2022-11-21 PROCEDURE — 6360000004 HC RX CONTRAST MEDICATION: Performed by: PSYCHIATRY & NEUROLOGY

## 2022-11-21 PROCEDURE — C1887 CATHETER, GUIDING: HCPCS

## 2022-11-21 PROCEDURE — 85576 BLOOD PLATELET AGGREGATION: CPT

## 2022-11-21 PROCEDURE — 7100000000 HC PACU RECOVERY - FIRST 15 MIN

## 2022-11-21 PROCEDURE — 84295 ASSAY OF SERUM SODIUM: CPT

## 2022-11-21 PROCEDURE — C1894 INTRO/SHEATH, NON-LASER: HCPCS

## 2022-11-21 PROCEDURE — 81025 URINE PREGNANCY TEST: CPT

## 2022-11-21 PROCEDURE — C1874 STENT, COATED/COV W/DEL SYS: HCPCS

## 2022-11-21 PROCEDURE — APPNB60 APP NON BILLABLE TIME 46-60 MINS: Performed by: NURSE PRACTITIONER

## 2022-11-21 PROCEDURE — 99223 1ST HOSP IP/OBS HIGH 75: CPT | Performed by: PSYCHIATRY & NEUROLOGY

## 2022-11-21 PROCEDURE — 86900 BLOOD TYPING SEROLOGIC ABO: CPT

## 2022-11-21 PROCEDURE — 2580000003 HC RX 258: Performed by: NURSE PRACTITIONER

## 2022-11-21 PROCEDURE — 03VG3DZ RESTRICTION OF INTRACRANIAL ARTERY WITH INTRALUMINAL DEVICE, PERCUTANEOUS APPROACH: ICD-10-PCS | Performed by: PSYCHIATRY & NEUROLOGY

## 2022-11-21 RX ORDER — ACETAMINOPHEN 650 MG/1
650 SUPPOSITORY RECTAL EVERY 6 HOURS PRN
Status: DISCONTINUED | OUTPATIENT
Start: 2022-11-21 | End: 2022-11-22 | Stop reason: HOSPADM

## 2022-11-21 RX ORDER — HEPARIN SODIUM 1000 [USP'U]/ML
INJECTION, SOLUTION INTRAVENOUS; SUBCUTANEOUS PRN
Status: DISCONTINUED | OUTPATIENT
Start: 2022-11-21 | End: 2022-11-21 | Stop reason: SDUPTHER

## 2022-11-21 RX ORDER — ONDANSETRON 2 MG/ML
INJECTION INTRAMUSCULAR; INTRAVENOUS PRN
Status: DISCONTINUED | OUTPATIENT
Start: 2022-11-21 | End: 2022-11-21 | Stop reason: SDUPTHER

## 2022-11-21 RX ORDER — SODIUM CHLORIDE 0.9 % (FLUSH) 0.9 %
5-40 SYRINGE (ML) INJECTION EVERY 12 HOURS SCHEDULED
Status: DISCONTINUED | OUTPATIENT
Start: 2022-11-21 | End: 2022-11-21

## 2022-11-21 RX ORDER — ROCURONIUM BROMIDE 10 MG/ML
INJECTION, SOLUTION INTRAVENOUS PRN
Status: DISCONTINUED | OUTPATIENT
Start: 2022-11-21 | End: 2022-11-21 | Stop reason: SDUPTHER

## 2022-11-21 RX ORDER — PROPOFOL 10 MG/ML
INJECTION, EMULSION INTRAVENOUS PRN
Status: DISCONTINUED | OUTPATIENT
Start: 2022-11-21 | End: 2022-11-21 | Stop reason: SDUPTHER

## 2022-11-21 RX ORDER — DEXAMETHASONE SODIUM PHOSPHATE 10 MG/ML
INJECTION INTRAMUSCULAR; INTRAVENOUS PRN
Status: DISCONTINUED | OUTPATIENT
Start: 2022-11-21 | End: 2022-11-21 | Stop reason: SDUPTHER

## 2022-11-21 RX ORDER — SODIUM CHLORIDE 0.9 % (FLUSH) 0.9 %
5-40 SYRINGE (ML) INJECTION PRN
Status: DISCONTINUED | OUTPATIENT
Start: 2022-11-21 | End: 2022-11-22 | Stop reason: HOSPADM

## 2022-11-21 RX ORDER — SODIUM CHLORIDE 9 MG/ML
INJECTION, SOLUTION INTRAVENOUS PRN
Status: DISCONTINUED | OUTPATIENT
Start: 2022-11-21 | End: 2022-11-22 | Stop reason: HOSPADM

## 2022-11-21 RX ORDER — MORPHINE SULFATE 2 MG/ML
2 INJECTION, SOLUTION INTRAMUSCULAR; INTRAVENOUS EVERY 5 MIN PRN
Status: DISCONTINUED | OUTPATIENT
Start: 2022-11-21 | End: 2022-11-21

## 2022-11-21 RX ORDER — IODIXANOL 320 MG/ML
100 INJECTION, SOLUTION INTRAVASCULAR
Status: COMPLETED | OUTPATIENT
Start: 2022-11-21 | End: 2022-11-21

## 2022-11-21 RX ORDER — LIDOCAINE HYDROCHLORIDE 10 MG/ML
INJECTION, SOLUTION EPIDURAL; INFILTRATION; INTRACAUDAL; PERINEURAL PRN
Status: DISCONTINUED | OUTPATIENT
Start: 2022-11-21 | End: 2022-11-21 | Stop reason: SDUPTHER

## 2022-11-21 RX ORDER — FENTANYL CITRATE 50 UG/ML
25 INJECTION, SOLUTION INTRAMUSCULAR; INTRAVENOUS EVERY 5 MIN PRN
Status: DISCONTINUED | OUTPATIENT
Start: 2022-11-21 | End: 2022-11-21

## 2022-11-21 RX ORDER — SODIUM CHLORIDE 9 MG/ML
INJECTION, SOLUTION INTRAVENOUS CONTINUOUS
Status: DISCONTINUED | OUTPATIENT
Start: 2022-11-21 | End: 2022-11-22

## 2022-11-21 RX ORDER — PHENYLEPHRINE HCL IN 0.9% NACL 1 MG/10 ML
SYRINGE (ML) INTRAVENOUS PRN
Status: DISCONTINUED | OUTPATIENT
Start: 2022-11-21 | End: 2022-11-21 | Stop reason: SDUPTHER

## 2022-11-21 RX ORDER — LABETALOL HYDROCHLORIDE 5 MG/ML
10 INJECTION, SOLUTION INTRAVENOUS
Status: DISCONTINUED | OUTPATIENT
Start: 2022-11-21 | End: 2022-11-22 | Stop reason: HOSPADM

## 2022-11-21 RX ORDER — CLOPIDOGREL BISULFATE 75 MG/1
75 TABLET ORAL DAILY
Status: DISCONTINUED | OUTPATIENT
Start: 2022-11-22 | End: 2022-11-22 | Stop reason: HOSPADM

## 2022-11-21 RX ORDER — ONDANSETRON 2 MG/ML
4 INJECTION INTRAMUSCULAR; INTRAVENOUS
Status: ACTIVE | OUTPATIENT
Start: 2022-11-21 | End: 2022-11-22

## 2022-11-21 RX ORDER — PROCHLORPERAZINE EDISYLATE 5 MG/ML
10 INJECTION INTRAMUSCULAR; INTRAVENOUS ONCE
Status: COMPLETED | OUTPATIENT
Start: 2022-11-21 | End: 2022-11-21

## 2022-11-21 RX ORDER — POLYETHYLENE GLYCOL 3350 17 G/17G
17 POWDER, FOR SOLUTION ORAL DAILY PRN
Status: DISCONTINUED | OUTPATIENT
Start: 2022-11-21 | End: 2022-11-22 | Stop reason: HOSPADM

## 2022-11-21 RX ORDER — DIPHENHYDRAMINE HYDROCHLORIDE 50 MG/ML
INJECTION INTRAMUSCULAR; INTRAVENOUS PRN
Status: DISCONTINUED | OUTPATIENT
Start: 2022-11-21 | End: 2022-11-21 | Stop reason: SDUPTHER

## 2022-11-21 RX ORDER — DIPHENHYDRAMINE HYDROCHLORIDE 50 MG/ML
12.5 INJECTION INTRAMUSCULAR; INTRAVENOUS
Status: DISCONTINUED | OUTPATIENT
Start: 2022-11-21 | End: 2022-11-21

## 2022-11-21 RX ORDER — ASPIRIN 81 MG/1
81 TABLET, CHEWABLE ORAL DAILY
Status: DISCONTINUED | OUTPATIENT
Start: 2022-11-22 | End: 2022-11-22 | Stop reason: HOSPADM

## 2022-11-21 RX ORDER — SODIUM CHLORIDE, SODIUM LACTATE, POTASSIUM CHLORIDE, CALCIUM CHLORIDE 600; 310; 30; 20 MG/100ML; MG/100ML; MG/100ML; MG/100ML
INJECTION, SOLUTION INTRAVENOUS CONTINUOUS PRN
Status: DISCONTINUED | OUTPATIENT
Start: 2022-11-21 | End: 2022-11-21 | Stop reason: SDUPTHER

## 2022-11-21 RX ORDER — ACETAMINOPHEN 325 MG/1
650 TABLET ORAL EVERY 6 HOURS PRN
Status: DISCONTINUED | OUTPATIENT
Start: 2022-11-21 | End: 2022-11-22 | Stop reason: HOSPADM

## 2022-11-21 RX ORDER — DIPHENHYDRAMINE HYDROCHLORIDE 50 MG/ML
25 INJECTION INTRAMUSCULAR; INTRAVENOUS ONCE
Status: COMPLETED | OUTPATIENT
Start: 2022-11-21 | End: 2022-11-21

## 2022-11-21 RX ORDER — SODIUM CHLORIDE 0.9 % (FLUSH) 0.9 %
5-40 SYRINGE (ML) INJECTION EVERY 12 HOURS SCHEDULED
Status: DISCONTINUED | OUTPATIENT
Start: 2022-11-21 | End: 2022-11-22 | Stop reason: HOSPADM

## 2022-11-21 RX ORDER — FENTANYL CITRATE 50 UG/ML
INJECTION, SOLUTION INTRAMUSCULAR; INTRAVENOUS PRN
Status: DISCONTINUED | OUTPATIENT
Start: 2022-11-21 | End: 2022-11-21 | Stop reason: SDUPTHER

## 2022-11-21 RX ADMIN — HEPARIN SODIUM 3000 UNITS: 1000 INJECTION, SOLUTION INTRAVENOUS; SUBCUTANEOUS at 09:51

## 2022-11-21 RX ADMIN — LIDOCAINE HYDROCHLORIDE 50 MG: 10 INJECTION, SOLUTION EPIDURAL; INFILTRATION; INTRACAUDAL; PERINEURAL at 08:59

## 2022-11-21 RX ADMIN — DIPHENHYDRAMINE HYDROCHLORIDE 25 MG: 50 INJECTION, SOLUTION INTRAMUSCULAR; INTRAVENOUS at 20:29

## 2022-11-21 RX ADMIN — SODIUM CHLORIDE, SODIUM LACTATE, POTASSIUM CHLORIDE, CALCIUM CHLORIDE: 600; 310; 30; 20 INJECTION, SOLUTION INTRAVENOUS at 09:09

## 2022-11-21 RX ADMIN — DEXAMETHASONE SODIUM PHOSPHATE 10 MG: 10 INJECTION INTRAMUSCULAR; INTRAVENOUS at 09:20

## 2022-11-21 RX ADMIN — SODIUM CHLORIDE, PRESERVATIVE FREE 10 ML: 5 INJECTION INTRAVENOUS at 20:31

## 2022-11-21 RX ADMIN — ROCURONIUM BROMIDE 20 MG: 10 INJECTION, SOLUTION INTRAVENOUS at 10:11

## 2022-11-21 RX ADMIN — Medication 50 MCG: at 10:23

## 2022-11-21 RX ADMIN — Medication 50 MCG: at 10:19

## 2022-11-21 RX ADMIN — ROCURONIUM BROMIDE 40 MG: 10 INJECTION, SOLUTION INTRAVENOUS at 08:59

## 2022-11-21 RX ADMIN — FENTANYL CITRATE 25 MCG: 50 INJECTION, SOLUTION INTRAMUSCULAR; INTRAVENOUS at 12:36

## 2022-11-21 RX ADMIN — SODIUM CHLORIDE, SODIUM LACTATE, POTASSIUM CHLORIDE, CALCIUM CHLORIDE: 600; 310; 30; 20 INJECTION, SOLUTION INTRAVENOUS at 08:55

## 2022-11-21 RX ADMIN — DIPHENHYDRAMINE HYDROCHLORIDE 12.5 MG: 50 INJECTION INTRAMUSCULAR; INTRAVENOUS at 12:40

## 2022-11-21 RX ADMIN — ROCURONIUM BROMIDE 10 MG: 10 INJECTION, SOLUTION INTRAVENOUS at 10:49

## 2022-11-21 RX ADMIN — IODIXANOL 70 ML: 320 INJECTION, SOLUTION INTRAVASCULAR at 12:36

## 2022-11-21 RX ADMIN — Medication 100 MCG: at 10:28

## 2022-11-21 RX ADMIN — ACETAMINOPHEN 650 MG: 325 TABLET ORAL at 17:05

## 2022-11-21 RX ADMIN — Medication 50 MCG: at 10:38

## 2022-11-21 RX ADMIN — FENTANYL CITRATE 50 MCG: 50 INJECTION, SOLUTION INTRAMUSCULAR; INTRAVENOUS at 08:59

## 2022-11-21 RX ADMIN — Medication 50 MCG: at 10:32

## 2022-11-21 RX ADMIN — SODIUM CHLORIDE: 9 INJECTION, SOLUTION INTRAVENOUS at 16:34

## 2022-11-21 RX ADMIN — PROPOFOL 150 MG: 10 INJECTION, EMULSION INTRAVENOUS at 08:59

## 2022-11-21 RX ADMIN — Medication 50 MCG: at 09:53

## 2022-11-21 RX ADMIN — PROCHLORPERAZINE EDISYLATE 10 MG: 5 INJECTION INTRAMUSCULAR; INTRAVENOUS at 20:29

## 2022-11-21 RX ADMIN — SODIUM CHLORIDE, PRESERVATIVE FREE 10 ML: 5 INJECTION INTRAVENOUS at 15:12

## 2022-11-21 RX ADMIN — SODIUM CHLORIDE, SODIUM LACTATE, POTASSIUM CHLORIDE, CALCIUM CHLORIDE: 600; 310; 30; 20 INJECTION, SOLUTION INTRAVENOUS at 08:59

## 2022-11-21 RX ADMIN — ROCURONIUM BROMIDE 10 MG: 10 INJECTION, SOLUTION INTRAVENOUS at 09:45

## 2022-11-21 RX ADMIN — ONDANSETRON 4 MG: 2 INJECTION INTRAMUSCULAR; INTRAVENOUS at 12:09

## 2022-11-21 RX ADMIN — FENTANYL CITRATE 25 MCG: 50 INJECTION, SOLUTION INTRAMUSCULAR; INTRAVENOUS at 12:08

## 2022-11-21 RX ADMIN — Medication 50 MCG: at 09:51

## 2022-11-21 ASSESSMENT — LIFESTYLE VARIABLES: SMOKING_STATUS: 0

## 2022-11-21 ASSESSMENT — PAIN DESCRIPTION - ORIENTATION: ORIENTATION: ANTERIOR

## 2022-11-21 ASSESSMENT — PAIN SCALES - GENERAL: PAINLEVEL_OUTOF10: 3

## 2022-11-21 ASSESSMENT — PAIN - FUNCTIONAL ASSESSMENT: PAIN_FUNCTIONAL_ASSESSMENT: NONE - DENIES PAIN

## 2022-11-21 ASSESSMENT — PAIN DESCRIPTION - DESCRIPTORS: DESCRIPTORS: ACHING;DULL

## 2022-11-21 ASSESSMENT — PAIN DESCRIPTION - LOCATION: LOCATION: HEAD

## 2022-11-21 NOTE — H&P
Neuro ICU History & Physical    Patient Name: Yunier Bates  Patient : 1990  Room/Bed: 0130/0130-01  Code Status: FULL  Allergies: No Known Allergies    CHIEF COMPLAINT     Cerebral aneurysm    HPI    History Obtained From: Patient, EMR    The patient is a 29 y.o. female with a history of [address] secondary to ruptured ACOM aneurysm (2022) who presented to [address] [place] [address] on 22 for re-treatment of a known ACOM aneurysm. Patient was admitted to [address] [place] [address] in 2022 for [address] secondary to ruptured ACOM aneurysm. Underwent coil embolization. Patient had an EVD for obstructive hydrocephalus taht was able to be removed. ICU course complicated by headaches, salt wasting and vasospasm treated with Milrinone infusion. Patient followed with Neuro Endovascular outpatient post discharge. She underwent diagnostic cerebral angiogram 22 which revealed compaction of the coil mass with recanalization of the aneurysm. Patient was subsequently set up for elective re-treatment of the aneurysm. Underwent cerebral angiogram 22 showing recanalized ACOM aneurysm treated with stent assisted coil embolization. Admitted to the Neuro ICU post procedure for close neurological monitoring. Arrives to the Neuro ICU doing well. No focal deficit on exam.  Right radial TR band in place. Reports mild headache rated \"2\" on a 0/10 scale. Patient reports she has had a mild, constant headache since her admission in April.     Admitted to ICU From: Angio   Reason for ICU Admission: Post aneurysm treatment       PATIENT HISTORY   Past Medical History:        Diagnosis Date    Aneurysm ([address] 2022    brain    Wellness examination     Shirley Deacon, CNP, PCP       Past Surgical History:        Procedure Laterality Date    CEREBRAL ANGIOGRAM  2022    CEREBRAL ANGIOGRAM Bilateral 2022    IR ANGIOGRAM CAROTID CEREBRAL BILATERAL, 1 stent, 2 coils    DILATION AND CURETTAGE OF UTERUS N/A 2022 DILATATION AND CURETTAGE HYSTEROSCOPY CAUTERY ABLATION-NOVASURE ENDOMETRIAL ABLATION performed by Carlos Eduardo Ayon, DO at [facility]  2022     IR ANGIOGRAM CAROTID CEREBRAL BILATERAL    OVARIAN CYST REMOVAL Right 2022    LAPAROSCOPIC- RIGHT OOPHERECTOMY performed by Carlos Eduardo Ayon DO at [facility] [address]  2022         SALPINGECTOMY Bilateral 2022    SALPINGECTOMY LAPAROSCOPIC performed by [facility], DO at [address] [place] [address] History:   Social History     Socioeconomic History    Marital status: Single     Spouse name: Not on file    Number of children: Not on file    Years of education: Not on file    Highest education level: Not on file   Occupational History    Not on file   Tobacco Use    Smoking status: Former     Packs/day: 0.50     Years: 14.00     Pack years: 7.00     Types: Cigarettes     Quit date: 2022     Years since quittin.8    Smokeless tobacco: Current    Tobacco comments:     occasional   Vaping Use    Vaping Use: Some days    Substances: Nicotine   Substance and Sexual Activity    Alcohol use: Not Currently    Drug use: No    Sexual activity: Yes     Partners: Male     Birth control/protection: Surgical   Other Topics Concern    Not on file   Social History Narrative    Not on file     Social Determinants of Health     Financial Resource Strain: Low Risk     Difficulty of Paying Living Expenses: Not hard at all   Food Insecurity: No Food Insecurity    Worried About Running Out of Food in the Last Year: Never true    Ran Out of Food in the Last Year: Never true   Transportation Needs: Not on file   Physical Activity: Not on file   Stress: Not on file   Social Connections: Not on file   Intimate Partner Violence: Not on file   Housing Stability: Not on file       Family History:   History reviewed. No pertinent family history.     Allergies:    Patient has no known allergies. Medications Prior to Admission:    No medications prior to admission. Current Medications:  Current Facility-Administered Medications: sodium chloride flush 0.9 % injection 5-40 mL, 5-40 mL, IntraVENous, 2 times per day  sodium chloride flush 0.9 % injection 5-40 mL, 5-40 mL, IntraVENous, PRN  0.9 % sodium chloride infusion, , IntraVENous, PRN  fentaNYL (SUBLIMAZE) injection 25 mcg, 25 mcg, IntraVENous, Q5 Min PRN  morphine (PF) injection 2 mg, 2 mg, IntraVENous, Q5 Min PRN  ondansetron (ZOFRAN) injection 4 mg, 4 mg, IntraVENous, Once PRN  diphenhydrAMINE (BENADRYL) injection 12.5 mg, 12.5 mg, IntraVENous, Once PRN  sodium chloride flush 0.9 % injection 5-40 mL, 5-40 mL, IntraVENous, 2 times per day  sodium chloride flush 0.9 % injection 5-40 mL, 5-40 mL, IntraVENous, PRN  0.9 % sodium chloride infusion, , IntraVENous, PRN  polyethylene glycol (GLYCOLAX) packet 17 g, 17 g, Oral, Daily PRN  acetaminophen (TYLENOL) tablet 650 mg, 650 mg, Oral, Q6H PRN **OR** acetaminophen (TYLENOL) suppository 650 mg, 650 mg, Rectal, Q6H PRN    REVIEW OF SYSTEMS     CONSTITUTIONAL: negative for fatigue and malaise   EYES: negative for double vision and photophobia    HEENT: negative for tinnitus and sore throat   RESPIRATORY: negative for cough, shortness of breath   CARDIOVASCULAR: negative for chest pain, palpitations, or syncope   GASTROINTESTINAL: negative for abdominal pain, nausea, vomiting, diarrhea, or constipation    GENITOURINARY: negative for incontinence or retention    MUSCULOSKELETAL: Bilateral arm discomfort over arterial sites. negative for neck or back pain, negative for extremity pain   NEUROLOGICAL: Positive for mild headache.   Negative for seizures, weakness, numbness, confusion, aphasia, dysarthria    PSYCHIATRIC: negative for agitation, hallucination, SI/HI   SKIN Negative for spontaneous contusions, rashes, or lesions      PHYSICAL EXAM:     /78   Pulse 77   Temp 96.8 °F (36 °C) (Temporal)   Resp 16   Ht 5' 2\" (1.575 m)   Wt 160 lb (72.6 kg)   LMP 2022 (Approximate) Comment: had ablation and ovary removal - urine preg neg  SpO2 97%   BMI 29.26 kg/m²     PHYSICAL EXAM:  CONSTITUTIONAL:  Well developed, well nourished. Alert and oriented x 3, in no acute distress. GCS 15. Nontoxic. No dysarthria. No aphasia. HEAD:  normocephalic, atraumatic    EYES:  PERRL, EOMI   ENT:  moist mucous membranes   LUNGS:  Equal air entry bilaterally, clear   CARDIOVASCULAR:  normal s1 / s2, RRR, distal pulses intact. Right radial TR band in place. ABDOMEN:  Soft, no rigidity, normal bowel sounds   NECK supple, symmetric   EXTREMITIES Normal ROM with no deformities   NEUROLOGIC:  Mental Status:  Alert and oriented x3. Following commands. Cranial Nerves:    II: Visual fields:  normal  III: Pupils:  equal, round, reactive to light  III,IV,VI: Extra Ocular Movements: intact  V: Facial sensation:  intact  VII: Facial strength: intact    Motor Exam:    Drift:  absent  Tone:  normal    Motor exam is symmetrical 5 out of 5 all extremities bilaterally    Sensory:    Touch:    Right Upper Extremity:  normal  Left Upper Extremity:  normal  Right Lower Extremity:  normal  Left Lower Extremity:  normal     SKIN No obvious ecchymosis, rashes, or lesions    NIH Stroke Scale Total (if not done complete detailed one below):    1a.  Level of consciousness:  0 - alert; keenly responsive  1b. Level of consciousness questions:  0 - answers both questions correctly  1c. Level of consciousness questions:  0 - performs both tasks correctly  2. Best Gaze:  0 - normal  3. Visual:  0 - no visual loss  4. Facial Palsy:  0 - normal symmetric movement  5a. Motor left arm:  0 - no drift, limb holds 90 (or 45) degrees for full 10 seconds  5b. Motor right arm:  0 - no drift, limb holds 90 (or 45) degrees for full 10 seconds  6a.   Motor left le - no drift; leg holds 30 degree position for full 5 seconds  6b. Motor right le - no drift; leg holds 30 degree position for full 5 seconds  7. Limb Ataxia:  0 - absent  8. Sensory:  0 - normal; no sensory loss  9. Best Language:  0 - no aphasia, normal  10. Dysarthria:  0 - normal  11. Extinction and Inattention:  0 - no abnormality    LABS AND IMAGING:     RECENT LABS:  CBC with Differential:    Lab Results   Component Value Date/Time    WBC 8.4 2022 09:38 AM    RBC 5.20 2022 09:38 AM    RBC 4.35 2011 02:40 PM    HGB 14.9 2022 09:38 AM    HCT 45.6 2022 09:38 AM     2022 09:38 AM     2011 02:40 PM    MCV 87.7 2022 09:38 AM    MCH 28.7 2022 09:38 AM    MCHC 32.7 2022 09:38 AM    RDW 12.4 2022 09:38 AM    LYMPHOPCT 29 2022 09:38 AM    MONOPCT 5 2022 09:38 AM    BASOPCT 2 2022 09:38 AM    MONOSABS 0.45 2022 09:38 AM    LYMPHSABS 2.46 2022 09:38 AM    EOSABS 0.27 2022 09:38 AM    BASOSABS 0.13 2022 09:38 AM    DIFFTYPE NOT REPORTED 2022 01:20 PM     BMP:    Lab Results   Component Value Date/Time     2022 09:38 AM    K 4.3 2022 09:38 AM     2022 09:38 AM    CO2 28 2022 09:38 AM    BUN 9 2022 09:38 AM    LABALBU 3.5 2022 05:27 AM    CREATININE 0.84 2022 08:30 AM    CREATININE 0.70 2022 09:38 AM    CALCIUM 9.8 2022 09:38 AM    GFRAA >60 2022 09:38 AM    LABGLOM >60 2022 09:38 AM    GLUCOSE 104 2022 09:38 AM       RADIOLOGY:   N/A    Labs and Images reviewed with:    [] Zelda Hoang MD    [x] Brice Dance, MD  [] Selina Artis MD  --[] there are no new interval images to review. ASSESSMENT AND PLAN:       This is a 29 y.o. female with a history of SAH secondary to ruptured ACOM aneurysm (2022) who presented to [address] [place] [address] on 22 for re-treatment of a known ACOM aneurysm.       Patient care will be discussed with attending, will reevaluate patient along with attending. NEUROLOGIC:  - Recanalized ACOM aneurysm  - POD #0 s/p treatment of above aneurysm with stent assisted coil embolization  - Continue Aspirin 81mg QD and Plavix 75mg QD from tomorrow  - Goal SBP   - Neuro checks per protocol    CARDIOVASCULAR:  - Goal SBP   - PRN Labetalol  - Continue telemetry    PULMONARY:  - Maintaining O2 sats on room air  - Incentive spirometry     RENAL/FLUID/ELECTROLYTE:  - Normal renal functioning  - BUN 9/ Creatinine 0.84  - Monitor I&O  - IVF: [email or web address]  - Replace electrolytes PRN  - Daily BMP    GI/NUTRITION:  NUTRITION:  ADULT DIET; Regular  - Bowel regimen: Glycolax PRN  - GI prophylaxis: N/A    ID:  - Afebrile  - No signs/symptoms of infection  - Monitor off antibiotics   - Daily CBC    HEME:   - Monitor for signs of bleeding  - Daily CBC    ENDOCRINE:  - Continue to monitor blood glucose, goal <180    OTHER:  - PT/OT/ST     PROPHYLAXIS:  Stress ulcer: N/A    DVT PROPHYLAXIS:  - SCD sleeves - Thigh High   - No chemoprophylaxis anticoagulation at this time. DISPOSITION: Admit to the Neuro ICU for close monitoring post aneurysm coiling.       Ethan Chua, APRN - [id number] [facility]  Neuro Critical Care Service   Pager [phone]  11/21/2022     1:37 PM

## 2022-11-21 NOTE — BRIEF OP NOTE
Brief Postoperative Note                  [facility]    NEUROENDOVASCULAR SERVICE: POST-OP NOTE: 11/21/2022    Pt Name: Ginny Comer  MRN: [id number]  [place]: 1990  Date of Procedure: 11/21/2022  Primary Care Physician: DEEDEE Angulo - RAMSES        Pre-Procedural Diagnosis: Recanalization of previously ruptured ACOM artery wide-necked aneurysm   Post-Procedural Diagnosis:Same as above       Procedure Performed:cerebral angiogram with stent assisted coil embolization of the recanalized ACOM artery aneurysm     Surgeon:   Sherly Hi MD    Fellow:  Helen Almodovar MD     Assisting Tech:  Shea Cassandra    PRE-PROCEDURAL EXAM:  Prestroke baseline mRS MODIFIED BRENDA SCORE: 0 - No symptoms at all. Neurological exam performed and unchanged from initial H&P or consult      Anesthesia: General Anesthesia  Complications: none    Intra-Operative EXAM:  Neurological exam performed and unchanged from initial H&P or consult    EBL: < less than 100       Cc            Specimens: Were not Obtained  Contrast:     Visipaque 320 low osmolar 70 Cc             Fluoro: 65.0 min    Findings:  Please see dictated Radiology note for further details  Coil mass compaction in the previously treated ruptured ACOM aneurysm with noted recanalized ACOM wide necked aneurysm measuring length 2.22 mm, width 3.02 mm, height 2.65 mm, with neck 1.80 mm, grossly unchanged from prior exam.   The above aneurysm was treated with right radial access using 7 fr Rist catheter, neuroform Olney stent 3.0 mm x 21 mm, Penumbra coils 1.5 mm x 3 cm, and wave extra soft 1 mm x 2 cm.    Stent was noted to be well apposed to parent vessel wall, extending from right KRISSY A-2 segment to left KRISSY A-1 segment   Vu score Class I   No evidence of distal embolization         POST-PROCEDURAL EXAM :   Stable neurological Exam  Neurological exam performed and unchanged from initial H&P or consult    Closure:  right TR Band 7 F        POST-PROCEDURAL MONITORING : see orders  Disposition: Neuro ICU      Recommendations:  Back to Neuro ICU  Peripheral pulse checks per protocol. SBP goal  mm Hg  Resume ASA 81 mg and Plavix 75 mg from tomorrow   Follow up with Jim Neri MD  2 weeks after discharge and Dr. Dee Dee Beasley 3 months after discharge.       MD Ngoc Jerez MD   Pager [phone]  Stroke, [facility]  [address]  Electronically signed 11/21/2022 at 12:50 PM

## 2022-11-21 NOTE — H&P
Endovascular Neurosurgery History and Physical      Reason for evaluation: Recanalization of the previously treated ruptured ACOM aneurysm     SUBJECTIVE:   History of Chief Complaint:    Ms. 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 Patient was therefore recommended to have stent assisted coil embolization of the recanalized portion. Patient has been taking  mg and Plavix 75 mg daily for last 6 days. Patient denied any new headache. Allergies  has No Known Allergies. Medications  Prior to Admission medications    Medication Sig Start Date End Date Taking? Authorizing Provider   aspirin 325 MG EC tablet Take 1 tablet by mouth daily 11/10/22 11/10/23  Jori Diane MD   clopidogrel (PLAVIX) 75 MG tablet Take 1 tablet by mouth daily 11/10/22   Jori Diane MD   Probiotic Product (PROBIOTIC BLEND PO) Take by mouth daily    Historical Provider, MD   magnesium oxide (MAG-OX) 400 (240 Mg) MG tablet Take 1 tablet by mouth daily  Patient not taking: Reported on 11/21/2022 5/7/22   Glenda Dang, APRN - CNP    Scheduled Meds:  Continuous Infusions:  PRN Meds:.  Past Medical History   has a past medical history of Aneurysm ([address] and Wellness examination. Past Surgical History   has a past surgical history that includes salpingectomy (Bilateral, 03/21/2022); ovarian cyst removal (Right, 03/21/2022); Dilation and curettage of uterus (N/A, 03/21/2022); other surgical history (04/18/2022); picc insertion vascular access team (04/25/2022); and Cerebral angiogram (11/08/2022). Social History   reports that she quit smoking about 10 months ago. Her smoking use included cigarettes. She has a 7.00 pack-year smoking history. She uses smokeless tobacco.   reports that she does not currently use alcohol.    reports no history of drug use. Family History  family history is not on file. Review of Systems:  CONSTITUTIONAL:  negative for fevers, chills, fatigue and malaise    EYES:  negative for double vision, blurred vision and photophobia     HEENT:  negative for tinnitus, epistaxis and sore throat    RESPIRATORY:  negative for cough, shortness of breath, wheezing    CARDIOVASCULAR:  negative for chest pain, palpitations, syncope, edema    GASTROINTESTINAL:  negative for nausea, vomiting    GENITOURINARY:  negative for incontinence    MUSCULOSKELETAL:  negative for neck or back pain    NEUROLOGICAL:  Negative for weakness and tingling  negative for headaches and dizziness    PSYCHIATRIC:  negative for anxiety      Review of systems otherwise negative. OBJECTIVE:     Vitals:    22 0738   BP: (!) 144/97   Pulse: 68   Resp: 18   Temp: 99 °F (37.2 °C)   SpO2: 97%        General:  Gen: normal habitus, NAD  HEENT: NCAT, mucosa moist  Cvs: RRR, S1 S2 normal  Resp: symmetric unlabored breathing  Abd: s/nd/nt  Ext: no edema  Skin: no lesions seen, warm and dry    Neuro:  Gen: awake and alert, oriented x3. Lang/speech: no aphasia or dysarthria. Follows commands. CN: PERRL, EOMI, VFF, V1-3 intact, face symmetric, hearing intact, shoulder shrug symmetric, tongue midline  Motor: grossly 5/5 UE and LE b/l  Sense: LT intact in all 4 ext. Coord: FTN and HTS intact b/l  DTR: deferred  Gait: narrow base gait    NIH Stroke Scale:   1a  Level of consciousness: 0 - alert; keenly responsive   1b. LOC questions:  0 - answers both questions correctly   1c. LOC commands: 0 - performs both tasks correctly   2. Best Gaze: 0 - normal   3. Visual: 0 - no visual loss   4. Facial Palsy: 0 - normal symmetric movement   5a. Motor left arm: 0 - no drift, limb holds 90 (or 45) degrees for full 10 seconds   5b. Motor right arm: 0 - no drift, limb holds 90 (or 45) degrees for full 10 seconds   6a.  Motor left le - no drift; leg holds 30 degree position for full 5 seconds   6b  Motor right le - no drift; leg holds 30 degree position for full 5 seconds   7. Limb Ataxia: 0 - absent   8. Sensory: 0 - normal; no sensory loss   9. Best Language:  0 - no aphasia, normal   10. Dysarthria: 0 - normal   11. Extinction and Inattention: 0 - no abnormality         Total:   0     MRS: 0      LABS:   Reviewed. Lab Results   Component Value Date    HGB 14.9 2022    WBC 8.4 2022     2022     2022    BUN 9 2022    CREATININE 0.84 2022    AST 15 2022    ALT 16 2022    MG 2.2 2022    APTT 22.6 2022    INR 0.9 2022      Lab Results   Component Value Date/Time    COVID19 Not Detected 2022 03:40 AM    COVID19 Not Detected 2022 09:58 AM       RADIOLOGY:   Images were personally reviewed including:  IR :   1. Previously ruptured anterior communicating artery aneurysm with prior coil embolization   2. Coil mass appears to have compaction with noted recanalization of the aneurysm measuring neck 1.80 mm, length 2.26 mm, width 3.02 mm, height 2.44 mm with Vu score 3    3. Functional fetal right PCA       ASSESSMENT:   28 y/o f with with pmh significant for [facility] secondary to ruptured ACOM aneurysm s/p prior coil embolization on 2022, s/p DSA on  which showed recanalization of the aneurysm and compaction of the coil mass. Patient was therefore recommended to have stent assisted coil embolization of the recanalized portion. Patient is on ASA and Plavix   Risks and benefits discussed, risks include but are not limited to bruising, stroke, subarachnoid hemorrhage, death, retroperitoneal hematoma, pseudoaneurysm, lower extremity/renal/peripheral vascular compromise, informed consent obtained.     PLAN:   --Endovascular stent assisted coil embolization of the ACOM aneurysm with GA  --Right radial access  --SBP goal 100-140 mm Hg   --ICU admission post procedure   --c/w ASA and Plavix     Case discussed with Dr. Kwasi Lugo attending.     Jose Patel MD    Stroke, [facility]  [id number]  Electronically signed 11/21/2022 at 8:24 AM

## 2022-11-21 NOTE — ANESTHESIA PRE PROCEDURE
Department of Anesthesiology  Preprocedure Note       Name:  Gabi Underwood   Age:  29 y.o.  :  1990                                          MRN:  [id number]         Date:  2022      Surgeon: Dr. Kaci Hancock    Procedure:  [facility] [address]      Department of Anesthesiology  Pre-Anesthesia Evaluation/Consultation         Name:  Gabi Underwood                                         Age:  29 y.o. MRN:  [id number]             Medications  Current Outpatient Medications   Medication Sig Dispense Refill    aspirin 325 MG EC tablet Take 1 tablet by mouth daily 30 tablet 3    clopidogrel (PLAVIX) 75 MG tablet Take 1 tablet by mouth daily 30 tablet 3    Probiotic Product (PROBIOTIC BLEND PO) Take by mouth daily      magnesium oxide (MAG-OX) 400 (240 Mg) MG tablet Take 1 tablet by mouth daily (Patient not taking: Reported on 2022) 30 tablet 1     No current facility-administered medications for this encounter.        No Known Allergies  Patient Active Problem List   Diagnosis    Gastroesophageal reflux disease    Pelvic adhesions    Menorrhagia with regular cycle    Complex cyst of right ovary    [facility]) [facility]    Aneurysm of anterior communicating artery    MRI-safe endovascular aneurysm coil present    Cerebral vasospasm     Past Medical History:   Diagnosis Date    Aneurysm ([facility] 2022    brain    Wellness examination     Bradley Amaral, CNP, PCP     Past Surgical History:   Procedure Laterality Date    CEREBRAL ANGIOGRAM  2022    DILATION AND CURETTAGE OF UTERUS N/A 2022    DILATATION AND CURETTAGE HYSTEROSCOPY CAUTERY ABLATION-NOVASURE ENDOMETRIAL ABLATION performed by Trinh Boss DO at [address] [place] [address]  2022     IR ANGIOGRAM CAROTID CEREBRAL BILATERAL    OVARIAN CYST REMOVAL Right 2022    LAPAROSCOPIC- RIGHT OOPHERECTOMY performed by Trinh Boss DO at [facility] VASCULAR ACCESS TEAM  2022         SALPINGECTOMY Bilateral 2022    SALPINGECTOMY LAPAROSCOPIC performed by Mohinder Kowalski DO at [address] [place] [address] History     Tobacco Use    Smoking status: Former     Packs/day: 0.50     Years: 14.00     Pack years: 7.00     Types: Cigarettes     Quit date: 2022     Years since quittin.8    Smokeless tobacco: Current    Tobacco comments:     occasional   Vaping Use    Vaping Use: Some days    Substances: Nicotine   Substance Use Topics    Alcohol use: Not Currently    Drug use: No         Vital Signs (Current)   Vitals:    22   BP: (!) 144/97   Pulse: 68   Resp: 18   Temp: 99 °F (37.2 °C)   SpO2: 97%     Vital Signs Statistics (for past 48 hrs)     Temp  Av °F (37.2 °C)  Min: 99 °F (37.2 °C)   Min taken time: 22  Max: 99 °F (37.2 °C)   Max taken time: 22  Pulse  Av  Min: 68   Min taken time: 22  Max: 68   Max taken time: 22  Resp  Av  Min: 18   Min taken time: 22  Max: 18   Max taken time: 22  BP  Min: 144/97   Min taken time: 22  Max: 144/97   Max taken time: 22  SpO2  Av %  Min: 97 %   Min taken time: 22  Max: 97 %   Max taken time: 22  BP Readings from Last 3 Encounters:   22 (!) 144/97   22 133/88   22 122/76       BMI  Body mass index is 29.26 kg/m².     CBC   Lab Results   Component Value Date/Time    WBC 8.4 2022 09:38 AM    RBC 5.20 2022 09:38 AM    RBC 4.35 2011 02:40 PM    HGB 14.9 2022 09:38 AM    HCT 45.6 2022 09:38 AM    MCV 87.7 2022 09:38 AM    RDW 12.4 2022 09:38 AM     2022 09:38 AM     2011 02:40 PM       CMP    Lab Results   Component Value Date/Time     2022 09:38 AM    K 4.3 2022 09:38 AM     2022 09:38 AM    CO2 28 2022 09:38 AM    BUN 9 2022 09:38 AM CREATININE 0.84 11/08/2022 07:57 AM    CREATININE 0.70 09/30/2022 09:38 AM    GFRAA >60 09/30/2022 09:38 AM    LABGLOM >60 09/30/2022 09:38 AM    GLUCOSE 104 09/30/2022 09:38 AM    PROT 5.6 04/20/2022 05:27 AM    CALCIUM 9.8 09/30/2022 09:38 AM    BILITOT 0.20 04/20/2022 05:27 AM    ALKPHOS 57 04/20/2022 05:27 AM    AST 15 04/20/2022 05:27 AM    ALT 16 04/20/2022 05:27 AM       BMP    Lab Results   Component Value Date/Time     09/30/2022 09:38 AM    K 4.3 09/30/2022 09:38 AM     09/30/2022 09:38 AM    CO2 28 09/30/2022 09:38 AM    BUN 9 09/30/2022 09:38 AM    CREATININE 0.84 11/08/2022 07:57 AM    CREATININE 0.70 09/30/2022 09:38 AM    CALCIUM 9.8 09/30/2022 09:38 AM    GFRAA >60 09/30/2022 09:38 AM    LABGLOM >60 09/30/2022 09:38 AM    GLUCOSE 104 09/30/2022 09:38 AM       POC Testing  No results for input(s): POCGLU, POCNA, POCK, POCCL, POCBUN, POCHEMO, POCHCT in the last 72 hours. Coags    Lab Results   Component Value Date/Time    PROTIME 10.2 04/18/2022 01:07 AM    INR 0.9 04/18/2022 01:07 AM    APTT 22.6 04/18/2022 01:07 AM       HCG (If Applicable)   Lab Results   Component Value Date    PREGTESTUR NEGATIVE 03/21/2022    HCGQUANT <1 02/19/2022        ABGs No results found for: PHART, PO2ART, NWK7TVQ, BCB1ZGV, BEART, N6DMKVXX     Type & Screen (If Applicable)  No results found for: LABABO, [address]    Radiology (If Applicable)    Cardiac Testing (If Applicable) EF 43%    EKG (If Applicable) nl          Medications prior to admission:   Prior to Admission medications    Medication Sig Start Date End Date Taking?  Authorizing Provider   aspirin 325 MG EC tablet Take 1 tablet by mouth daily 11/10/22 11/10/23  Marcia Calvillo MD   clopidogrel (PLAVIX) 75 MG tablet Take 1 tablet by mouth daily 11/10/22   Marcia Calvillo MD   Probiotic Product (PROBIOTIC BLEND PO) Take by mouth daily    Historical Provider, MD   magnesium oxide (MAG-OX) 400 (240 Mg) MG tablet Take 1 tablet by mouth daily  Patient not taking: Reported on 11/21/2022 5/7/22   DEEDEE Gil - CNP       Current medications:    Current Outpatient Medications   Medication Sig Dispense Refill    aspirin 325 MG EC tablet Take 1 tablet by mouth daily 30 tablet 3    clopidogrel (PLAVIX) 75 MG tablet Take 1 tablet by mouth daily 30 tablet 3    Probiotic Product (PROBIOTIC BLEND PO) Take by mouth daily      magnesium oxide (MAG-OX) 400 (240 Mg) MG tablet Take 1 tablet by mouth daily (Patient not taking: Reported on 11/21/2022) 30 tablet 1     No current facility-administered medications for this visit.        Allergies:  No Known Allergies    Problem List:    Patient Active Problem List   Diagnosis Code    Gastroesophageal reflux disease K21.9    Pelvic adhesions N73.6    Menorrhagia with regular cycle N92.0    Complex cyst of right ovary N83.291    SAH ([facility]arachnoid hemorrhage) ([facility] I60.9    Aneurysm of anterior communicating artery I67.1    MRI-safe endovascular aneurysm coil present Z95.828    Cerebral vasospasm I67.848       Past Medical History:        Diagnosis Date    Aneurysm ([facility] 04/2022    brain    Wellness examination     Jose Head, CNP, PCP       Past Surgical History:        Procedure Laterality Date    CEREBRAL ANGIOGRAM  11/08/2022    DILATION AND CURETTAGE OF UTERUS N/A 03/21/2022    DILATATION AND CURETTAGE HYSTEROSCOPY CAUTERY ABLATION-NOVASURE ENDOMETRIAL ABLATION performed by Mohinder Kowalski DO at [facility]  04/18/2022     IR ANGIOGRAM CAROTID CEREBRAL BILATERAL    OVARIAN CYST REMOVAL Right 03/21/2022    LAPAROSCOPIC- RIGHT OOPHERECTOMY performed by Mohinder Kowalski DO at [facility]  04/25/2022         SALPINGECTOMY Bilateral 03/21/2022    SALPINGECTOMY LAPAROSCOPIC performed by Mohinder Kowalski DO at [facility] History:    Social History     Tobacco Use    Smoking status: Former     Packs/day: 0.50     Years: 14.00 Pack years: 7.00     Types: Cigarettes     Quit date: 2022     Years since quittin.8    Smokeless tobacco: Current    Tobacco comments:     occasional   Substance Use Topics    Alcohol use: Not Currently                                Ready to quit: Not Answered  Counseling given: Not Answered  Tobacco comments: occasional      Vital Signs (Current): There were no vitals filed for this visit. BP Readings from Last 3 Encounters:   22 (!) 144/97   22 133/88   22 122/76       NPO Status:  MN                                                                               BMI:   Wt Readings from Last 3 Encounters:   22 160 lb (72.6 kg)   22 164 lb (74.4 kg)   22 164 lb (74.4 kg)     There is no height or weight on file to calculate BMI.    CBC:   Lab Results   Component Value Date/Time    WBC 8.4 2022 09:38 AM    RBC 5.20 2022 09:38 AM    RBC 4.35 2011 02:40 PM    HGB 14.9 2022 09:38 AM    HCT 45.6 2022 09:38 AM    MCV 87.7 2022 09:38 AM    RDW 12.4 2022 09:38 AM     2022 09:38 AM     2011 02:40 PM       CMP:   Lab Results   Component Value Date/Time     2022 09:38 AM    K 4.3 2022 09:38 AM     2022 09:38 AM    CO2 28 2022 09:38 AM    BUN 9 2022 09:38 AM    CREATININE 0.84 2022 07:57 AM    CREATININE 0.70 2022 09:38 AM    GFRAA >60 2022 09:38 AM    LABGLOM >60 2022 09:38 AM    GLUCOSE 104 2022 09:38 AM    PROT 5.6 2022 05:27 AM    CALCIUM 9.8 2022 09:38 AM    BILITOT 0.20 2022 05:27 AM    ALKPHOS 57 2022 05:27 AM    AST 15 2022 05:27 AM    ALT 16 2022 05:27 AM       POC Tests: No results for input(s): POCGLU, POCNA, POCK, POCCL, POCBUN, POCHEMO, POCHCT in the last 72 hours.     Coags:   Lab Results   Component Value Date/Time    PROTIME 10.2 2022 01:07 AM INR 0.9 04/18/2022 01:07 AM    APTT 22.6 04/18/2022 01:07 AM       HCG (If Applicable):   Lab Results   Component Value Date    PREGTESTUR NEGATIVE 03/21/2022    HCGQUANT <1 02/19/2022        ABGs: No results found for: PHART, PO2ART, IMK8BRD, KJM7XTK, BEART, T9YXNBYL     Type & Screen (If Applicable):  No results found for: LABABO, LABRH    Drug/Infectious Status (If Applicable):  No results found for: HIV, HEPCAB    COVID-19 Screening (If Applicable):   Lab Results   Component Value Date/Time    COVID19 Not Detected 04/18/2022 03:40 AM    COVID19 Not Detected 03/16/2022 09:58 AM           Anesthesia Evaluation  Patient summary reviewed and Nursing notes reviewed no history of anesthetic complications (first anesthetic.):   Airway: Mallampati: III  TM distance: >3 FB   Neck ROM: full  Mouth opening: > = 3 FB   Dental: normal exam         Pulmonary:normal exam        (-) recent URI and not a current smoker          Patient smoked on day of surgery. Cardiovascular:Negative CV ROS  Exercise tolerance: good (>4 METS),            Beta Blocker:  Not on Beta Blocker         Neuro/Psych:   Negative Neuro/Psych ROS  (+) CVA:, headaches:,    (-) seizures            ROS comment: SAH,cerebral vasospasmoiled aneurysm GI/Hepatic/Renal:   (+) GERD: well controlled,           Endo/Other: Negative Endo/Other ROS       (-) diabetes mellitus               Abdominal:             Vascular: negative vascular ROS. Other Findings:             Anesthesia Plan      general     ASA 3       Induction: intravenous. arterial line    Anesthetic plan and risks discussed with Unable to obtain due to emergent nature. Plan discussed with CRNA.                     Eugenio Dick MD   11/21/2022

## 2022-11-21 NOTE — ANESTHESIA POSTPROCEDURE EVALUATION
Department of Anesthesiology  Postprocedure Note    Patient: Diann Ocampo  MRN: [id number]  YOB: 1990  Date of evaluation: 11/21/2022      Procedure Summary     Date: 11/21/22 Room / Location: [facility] Procedures    Anesthesia Start: 9171 Anesthesia Stop: 0788    Procedure: IR ANGIOGRAM CAROTID CEREBRAL BILATERAL Diagnosis:       Aneurysm ([address]      Aneurysm of unspecified site      Essential (primary) hypertension      Cerebral arterial embolism      Aneurysm ([address]      (embo coiling w/flow diverter)    Scheduled Providers: DEEDEE Rogers - CRNA Responsible Provider: Jace Verdin MD    Anesthesia Type: general ASA Status: 3          Anesthesia Type: No value filed.     Priscilla Phase I: Priscilla Score: 9    Prisclila Phase II:        Anesthesia Post Evaluation    Patient location during evaluation: PACU  Patient participation: complete - patient participated  Level of consciousness: awake and alert  Pain score: 0  Nausea & Vomiting: no nausea  Cardiovascular status: hemodynamically stable  Respiratory status: room air

## 2022-11-21 NOTE — FLOWSHEET NOTE
Post Procedure Transfer from IR Table  [x] Tubes and Lines intact     Post Procedure Neuro-Checks/NIHSS/Vitals  [x] Completed post procedure  [x] Completed bedside handoff  [x] Frequency ordered  [x] Verbal communication of frequency      Post Procedure Puncture Site Checks  [x] Completed post procedure  [x] Completed bedside handoff  [x] Frequency ordered  [x] Verbal communication of frequency    Post Procedure Pulse  Checks  [x] Completed post procedure  [x] Completed bedside handoff  [x] Frequency ordered  [x] Verbal communication of frequency    Order Set  [x] Post Neuro-Endo Procedure  [] Stroke  [] t-PA     B/P control  [x] Verbal Communication   [x] Order in Care Path    Medication Review  [x] Given during procedure  [x] Current drips/meds/fluids    [] Physician Notified of All changes in Assessment    Family  [x] Location Post Procedure     Report given to [facility]

## 2022-11-21 NOTE — PROGRESS NOTES
Patient c/o pain to left wrist at radial artery art line site, patient also c/o tingling to fingers on left hand including thumb and thumb goes white with flushing of line with increased pain. Dr Silverio Clement in to PACU to see patient, states if patient does not need art line in ICU would discontinue it. Spoke with Juhi Bower RN who confirmed that art line not needed at this time and can be discontinued per Neuro Critical staff.

## 2022-11-21 NOTE — SEDATION DOCUMENTATION
200 mcg nitroglycerin  2000 units Heparin  2.5 mg Verapamil    Mixed together in sterile fashion for injection of radial artery access

## 2022-11-21 NOTE — DISCHARGE INSTRUCTIONS
You were admitted to the Neuro ICU after re-treatment of your ACOM aneurysm. This was treated with stent assisted coil embolization. Continue Aspirin 81mg and Plavix 75mg daily. Avoid lifting greater than 10 pounds for 14 days post procedure. Continue medications as prescribed. Do not stop taking medications without speaking with your care team first.  Follow up with Dr. Sho Spangler (Neuro Endovascular fellow) in 2 weeks and Dr. Yazan Weiss (Neuro Endovascular attending) in 3 months. Call 911 anytime you think you may need emergency care. For example, call if:    You passed out (lost consciousness). You have severe trouble breathing. You have sudden chest pain and shortness of breath, or you cough up blood. It is hard to think, move, speak, or see. Your body is jerking or shaking. Call your doctor now or seek immediate medical care if:    You have a fever with a stiff neck or a severe headache. You are bleeding from the area where the catheter was put in your artery. You have a fast-growing, painful lump at the catheter site. You have signs of infection, such as: Increased pain, swelling, warmth, or redness. Red streaks leading from the incision. Pus draining from the incision. Swollen lymph nodes in your neck, armpits, or groin. A fever. Your leg or hand is painful, looks blue or feels cold, numb, or tingly. You have any sudden vision changes. You have new or worse headaches. You fall and hit your head. You are sleeping more than you are awake. You have a headache and you throw up. You have pain that does not get better after you take pain medicine. You have a fever over 100°F. Watch closely for changes in your health, and be sure to contact your doctor if you have any problems.

## 2022-11-22 VITALS
SYSTOLIC BLOOD PRESSURE: 136 MMHG | OXYGEN SATURATION: 97 % | TEMPERATURE: 98.8 F | HEIGHT: 62 IN | RESPIRATION RATE: 18 BRPM | DIASTOLIC BLOOD PRESSURE: 95 MMHG | HEART RATE: 62 BPM | WEIGHT: 163.3 LBS | BODY MASS INDEX: 30.05 KG/M2

## 2022-11-22 LAB
ABSOLUTE EOS #: 0.06 K/UL (ref 0–0.44)
ABSOLUTE IMMATURE GRANULOCYTE: 0.05 K/UL (ref 0–0.3)
ABSOLUTE LYMPH #: 2.18 K/UL (ref 1.1–3.7)
ABSOLUTE MONO #: 0.59 K/UL (ref 0.1–1.2)
ANION GAP SERPL CALCULATED.3IONS-SCNC: 10 MMOL/L (ref 9–17)
BASOPHILS # BLD: 0 % (ref 0–2)
BASOPHILS ABSOLUTE: 0.05 K/UL (ref 0–0.2)
BUN BLDV-MCNC: 9 MG/DL (ref 6–20)
CALCIUM SERPL-MCNC: 8 MG/DL (ref 8.6–10.4)
CHLORIDE BLD-SCNC: 110 MMOL/L (ref 98–107)
CO2: 17 MMOL/L (ref 20–31)
CREAT SERPL-MCNC: 0.68 MG/DL (ref 0.5–0.9)
EOSINOPHILS RELATIVE PERCENT: 1 % (ref 1–4)
GFR SERPL CREATININE-BSD FRML MDRD: >60 ML/MIN/1.73M2
GLUCOSE BLD-MCNC: 154 MG/DL (ref 70–99)
HCT VFR BLD CALC: 36.5 % (ref 36.3–47.1)
HEMOGLOBIN: 11.4 G/DL (ref 11.9–15.1)
IMMATURE GRANULOCYTES: 0 %
LYMPHOCYTES # BLD: 17 % (ref 24–43)
MCH RBC QN AUTO: 28.7 PG (ref 25.2–33.5)
MCHC RBC AUTO-ENTMCNC: 31.2 G/DL (ref 28.4–34.8)
MCV RBC AUTO: 91.9 FL (ref 82.6–102.9)
MONOCYTES # BLD: 5 % (ref 3–12)
NRBC AUTOMATED: 0 PER 100 WBC
PDW BLD-RTO: 12.7 % (ref 11.8–14.4)
PLATELET # BLD: 254 K/UL (ref 138–453)
PMV BLD AUTO: 10.9 FL (ref 8.1–13.5)
POTASSIUM SERPL-SCNC: 3.9 MMOL/L (ref 3.7–5.3)
RBC # BLD: 3.97 M/UL (ref 3.95–5.11)
SEG NEUTROPHILS: 77 % (ref 36–65)
SEGMENTED NEUTROPHILS ABSOLUTE COUNT: 9.67 K/UL (ref 1.5–8.1)
SODIUM BLD-SCNC: 137 MMOL/L (ref 135–144)
WBC # BLD: 12.6 K/UL (ref 3.5–11.3)

## 2022-11-22 PROCEDURE — 94761 N-INVAS EAR/PLS OXIMETRY MLT: CPT

## 2022-11-22 PROCEDURE — 80048 BASIC METABOLIC PNL TOTAL CA: CPT

## 2022-11-22 PROCEDURE — 97161 PT EVAL LOW COMPLEX 20 MIN: CPT

## 2022-11-22 PROCEDURE — 36415 COLL VENOUS BLD VENIPUNCTURE: CPT

## 2022-11-22 PROCEDURE — 2580000003 HC RX 258: Performed by: PSYCHIATRY & NEUROLOGY

## 2022-11-22 PROCEDURE — 99238 HOSP IP/OBS DSCHRG MGMT 30/<: CPT | Performed by: PSYCHIATRY & NEUROLOGY

## 2022-11-22 PROCEDURE — 85025 COMPLETE CBC W/AUTO DIFF WBC: CPT

## 2022-11-22 PROCEDURE — 6370000000 HC RX 637 (ALT 250 FOR IP): Performed by: NURSE PRACTITIONER

## 2022-11-22 PROCEDURE — 97530 THERAPEUTIC ACTIVITIES: CPT

## 2022-11-22 PROCEDURE — 6370000000 HC RX 637 (ALT 250 FOR IP): Performed by: PSYCHIATRY & NEUROLOGY

## 2022-11-22 RX ORDER — ASPIRIN 81 MG/1
81 TABLET, CHEWABLE ORAL DAILY
Qty: 30 TABLET | Refills: 3 | Status: SHIPPED | OUTPATIENT
Start: 2022-11-23

## 2022-11-22 RX ADMIN — CLOPIDOGREL 75 MG: 75 TABLET, FILM COATED ORAL at 08:45

## 2022-11-22 RX ADMIN — ACETAMINOPHEN 650 MG: 325 TABLET ORAL at 09:30

## 2022-11-22 RX ADMIN — ACETAMINOPHEN 650 MG: 325 TABLET ORAL at 16:01

## 2022-11-22 RX ADMIN — SODIUM CHLORIDE, PRESERVATIVE FREE 10 ML: 5 INJECTION INTRAVENOUS at 08:45

## 2022-11-22 RX ADMIN — ASPIRIN 81 MG: 81 TABLET, CHEWABLE ORAL at 08:45

## 2022-11-22 ASSESSMENT — PAIN DESCRIPTION - LOCATION
LOCATION: HEAD

## 2022-11-22 ASSESSMENT — PAIN SCALES - GENERAL
PAINLEVEL_OUTOF10: 3
PAINLEVEL_OUTOF10: 2
PAINLEVEL_OUTOF10: 4

## 2022-11-22 ASSESSMENT — PAIN DESCRIPTION - PAIN TYPE: TYPE: CHRONIC PAIN

## 2022-11-22 ASSESSMENT — PAIN DESCRIPTION - DESCRIPTORS
DESCRIPTORS: SHARP
DESCRIPTORS: ACHING;DULL
DESCRIPTORS: DULL;ACHING

## 2022-11-22 NOTE — PLAN OF CARE
Problem: Discharge Planning  Goal: Discharge to home or other facility with appropriate resources  11/22/2022 0018 by Gómez Aguillon RN  Outcome: Progressing     Problem: Pain  Goal: Verbalizes/displays adequate comfort level or baseline comfort level  11/22/2022 0018 by Gómez Aguillon RN  Outcome: Progressing     Problem: ABCDS Injury Assessment  Goal: Absence of physical injury  11/22/2022 0018 by Gómez Aguillon RN  Outcome: Progressing

## 2022-11-22 NOTE — PROGRESS NOTES
Endovascular Neurosurgery  Progress Note      Reason for evaluation: Recanalization of the previously treated ruptured ACOM aneurysm     SUBJECTIVE:     Overnight:    Doing well, no complaint, no event, wanting to go home        History of Chief Complaint:    Ms. 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 Patient was therefore recommended to have stent assisted coil embolization of the recanalized portion. Patient has been taking  mg and Plavix 75 mg daily for last 6 days. Patient denied any new headache. Review of Systems:  CONSTITUTIONAL:  negative for fevers, chills, fatigue and malaise    EYES:  negative for double vision, blurred vision and photophobia     HEENT:  negative for tinnitus, epistaxis and sore throat    RESPIRATORY:  negative for cough, shortness of breath, wheezing    CARDIOVASCULAR:  negative for chest pain, palpitations, syncope, edema    GASTROINTESTINAL:  negative for nausea, vomiting    GENITOURINARY:  negative for incontinence    MUSCULOSKELETAL:  negative for neck or back pain    NEUROLOGICAL:  Negative for weakness and tingling  negative for headaches and dizziness    PSYCHIATRIC:  negative for anxiety      Review of systems otherwise negative. OBJECTIVE:     Vitals:    11/22/22 0800   BP: 114/72   Pulse: 64   Resp: 16   Temp: 98.6 °F (37 °C)   SpO2: 96%        General:  Gen: normal habitus, NAD  HEENT: NCAT, mucosa moist  Cvs: RRR, S1 S2 normal  Resp: symmetric unlabored breathing  Abd: s/nd/nt  Ext: no edema  Skin: no lesions seen, warm and dry    Neuro:  Gen: awake and alert, oriented x3. Lang/speech: no aphasia or dysarthria. Follows commands.   CN: PERRL, EOMI, VFF, V1-3 intact, face symmetric, hearing intact, shoulder shrug symmetric, tongue midline  Motor: grossly 5/5 UE and LE b/l  Sense: LT intact in all 4 ext. Coord: FTN and HTS intact b/l  DTR: deferred  Gait: narrow base gait    NIH Stroke Scale:   1a  Level of consciousness: 0 - alert; keenly responsive   1b. LOC questions:  0 - answers both questions correctly   1c. LOC commands: 0 - performs both tasks correctly   2. Best Gaze: 0 - normal   3. Visual: 0 - no visual loss   4. Facial Palsy: 0 - normal symmetric movement   5a. Motor left arm: 0 - no drift, limb holds 90 (or 45) degrees for full 10 seconds   5b. Motor right arm: 0 - no drift, limb holds 90 (or 45) degrees for full 10 seconds   6a. Motor left le - no drift; leg holds 30 degree position for full 5 seconds   6b  Motor right le - no drift; leg holds 30 degree position for full 5 seconds   7. Limb Ataxia: 0 - absent   8. Sensory: 0 - normal; no sensory loss   9. Best Language:  0 - no aphasia, normal   10. Dysarthria: 0 - normal   11. Extinction and Inattention: 0 - no abnormality         Total:   0     MRS: 0      LABS:   Reviewed. Lab Results   Component Value Date    HGB 11.4 (L) 2022    WBC 12.6 (H) 2022     2022     2022    BUN 9 2022    CREATININE 0.68 2022    AST 15 2022    ALT 16 2022    MG 2.2 2022    APTT 22.6 2022    INR 0.9 2022      Lab Results   Component Value Date/Time    COVID19 Not Detected 2022 03:40 AM    COVID19 Not Detected 2022 09:58 AM       RADIOLOGY:   Images were personally reviewed including:  IR :   1. Previously ruptured anterior communicating artery aneurysm with prior coil embolization   2. Coil mass appears to have compaction with noted recanalization of the aneurysm measuring neck 1.80 mm, length 2.26 mm, width 3.02 mm, height 2.44 mm with Vu score 3    3.   Functional fetal right PCA       Cerebral angiogram on the 22:  Coil mass compaction in the previously treated ruptured ACOM aneurysm with noted recanalized ACOM aneurysm measuring length 2.22 mm, width 3.02 mm, height 2.65 mm, with neck 1.80 mm, grossly unchanged from prior exam.   The above aneurysm was treated with right radial access using 7 fr Rist catheter, neuroform Kensett stent 3.0 mm x 21 mm, Penumbra coils 1.5 mm x 3 cm, and wave extra soft 1 mm x 2 cm. 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 Patient had stent assisted coil embolization of the recanalized portion on the 11/21/22. PLAN:   -- doing well  -- con't aspirin 81mg daily and plavix 75mg daily  -- ok to be discharged from the endovascular standpoint today    -- f/u with Dr Adonay Love in 2 weeks and Dr. Neris Blake in 3 months    Case discussed with Dr. Neris Blake attending.     Nick Stauffer MD    Stroke, [facility]  [facility]  Electronically signed 11/22/2022 at 12:35 PM

## 2022-11-22 NOTE — PROGRESS NOTES
Discharge instructions given to patient. IV removed room checked for belongings. Pt did leave with a headache she stated it was a 4 and tolerable tylenol was given.  Pts sister picked her up and she was taken to private car via wheelchair

## 2022-11-22 NOTE — PLAN OF CARE
Problem: Discharge Planning  Goal: Discharge to home or other facility with appropriate resources  11/22/2022 0733 by Mya Light RN  Outcome: Progressing  11/22/2022 0018 by Sujey Mahmood RN  Outcome: Progressing  11/21/2022 1913 by Lita Peck RN  Outcome: Radha Pete (Taken 11/21/2022 1400 by Paulette Rosa, RN)  Discharge to home or other facility with appropriate resources: Identify barriers to discharge with patient and caregiver     Problem: Pain  Goal: Verbalizes/displays adequate comfort level or baseline comfort level  11/22/2022 0733 by Mya Light RN  Outcome: Progressing  11/22/2022 0018 by Sujey Mahmood RN  Outcome: Progressing  11/21/2022 1913 by Lita Peck RN  Outcome: Progressing     Problem: ABCDS Injury Assessment  Goal: Absence of physical injury  11/22/2022 0733 by Mya Light RN  Outcome: Progressing  11/22/2022 0018 by Sujey Mahmood RN  Outcome: Progressing  11/21/2022 1913 by Lita Peck RN  Outcome: Progressing  Flowsheets (Taken 11/21/2022 1600 by Paulette Rosa RN)  Absence of Physical Injury: Implement safety measures based on patient assessment     Problem: Skin/Tissue Integrity  Goal: Absence of new skin breakdown  Description: 1. Monitor for areas of redness and/or skin breakdown  2. Assess vascular access sites hourly  3. Every 4-6 hours minimum:  Change oxygen saturation probe site  4. Every 4-6 hours:  If on nasal continuous positive airway pressure, respiratory therapy assess nares and determine need for appliance change or resting period.   Outcome: Progressing

## 2022-11-22 NOTE — DISCHARGE SUMMARY
Neuro Critical Care   Discharge Summary      PATIENT NAME: Ubaldo Dias  YOB: 1990  MEDICAL RECORD NO. [id number]  DATE: 11/22/2022  PRIMARY CARE PHYSICIAN: DEEDEE Ybarra CNP  DISCHARGE DATE:  11/22/2022  DISCHARGE DIAGNOSIS:   Active Problems:    Aneurysm ([address]  Resolved Problems:    * No resolved hospital problems. [id number] [facility] Mariela Goldberg is a 29 y.o. yo female who presented to [facility] on 11/21/2022  2:25 PM with a history of SAH secondary to ruptured ACOM aneurysm (April 2022) who presented to [facility] on 11/21/22 for re-treatment of a known ACOM aneurysm. Patient was admitted to [facility] in April 2022 for [facility] secondary to ruptured ACOM aneurysm. Underwent coil embolization. Patient had an EVD for obstructive hydrocephalus taht was able to be removed. ICU course complicated by headaches, salt wasting and vasospasm treated with Milrinone infusion. Patient followed with Neuro Endovascular outpatient post discharge. She underwent diagnostic cerebral angiogram 11/8/22 which revealed compaction of the coil mass with recanalization of the aneurysm. Patient was subsequently set up for elective re-treatment of the aneurysm. Underwent cerebral angiogram 11/21/22 showing recanalized ACOM aneurysm treated with stent assisted coil embolization. Admitted to the Neuro ICU post procedure for close neurological monitoring. Patient did well overnight. Hemodynamics and labs stable. Will discharge home today, continue DAPT. Follow up with endovascular in 2 weeks. Labs and imaging were followed daily. At time of discharge, Ubaldo Dias was tolerating a ADULT DIET; Regular, having bowel movements, and is in stable condition to be discharged to home. PROCEDURES:    Cerebral angiogram with stent assisted coil embolization. PHYSICAL EXAMINATION        Discharge Vitals:  height is 5' 2\" (1.575 m) and weight is 163 lb 4.8 oz (74.1 kg).  Her oral temperature is 98.6 °F (37 °C). Her blood pressure is 114/72 and her pulse is 64. Her respiration is 16 and oxygen saturation is 96%. CONSTITUTIONAL:  Well developed, well nourished, alert and oriented x 3, in no acute distress. GCS 15. Nontoxic. No dysarthria. No aphasia. HEAD:  normocephalic, atraumatic    EYES:  PERRLA, EOMI.   ENT:  moist mucous membranes   NECK:  supple, symmetric   LUNGS:  Equal air entry bilaterally   CARDIOVASCULAR:  normal s1 / s2, RRR, distal pulses intact   ABDOMEN:  Soft, no rigidity   NEUROLOGIC:  Mental Status:  A & O x3,awake             Cranial Nerves:    cranial nerves II-XII are grossly intact    Motor Exam:    Drift:  absent  Tone:  normal    Motor exam is symmetrical 5 out of 5 all extremities bilaterally    Sensory:    Touch:    Right Upper Extremity:  normal  Left Upper Extremity:  normal  Right Lower Extremity:  normal  Left Lower Extremity:  normal    Gait:  normal       LABS/IMAGING     Recent Labs     11/21/22  0830 11/22/22  0439   WBC  --  12.6*   HGB  --  11.4*   HCT  --  36.5   PLT  --  254   NA  --  137   K  --  3.9   CL  --  110*   CO2  --  17*   BUN  --  9   CREATININE 0.84 0.68       IR ANGIOGRAM CAROTID CEREBRAL BILATERAL    Result Date: 11/14/2022  Date of Service: 11/8/2022 Procedure: Diagnostic cerebral angiogram Patient arrived to the angio suite at: 9:23am Puncture obtained at: 9:55am Vascular access was removed at: 10:56am Manual pressure for minutes: 10 Patient wheeled out of the angio suite at: 11:15am Diagnosis: Ruptured Acom aneurysm with prior coil embolization Procedures: 1. Right ultrasound guided femoral artery access 2. Intravenous moderate sedation for 66 minutes 3. Selective right common carotid artery (CCA) angiogram 4. Selective right internal carotid artery (ICA) cerebral angiogram 5. Selective left common carotid artery (CCA) angiogram 6. Selective left internal carotid artery (ICA) cerebral angiogram 7.  Selective left vertebral artery (VA) cerebral angiogram 8. Right common femoral artery (CFA) angiogram Neurointerventionalist: Antonio Yi MD [place]: Marianne Jaramillo MD Contrast: 99 cc of Visipaque-320. Fluoroscopy time: 13.8 minutes Access: Right common femoral artery. Comparison: cerebral angiogram 4/18/22 Consent: After explaining the risks and benefits to the patient and the patient's family, including but not limited to stroke, coma, death, vessel injury, dissection, tear, occlusion, and X-ray dye allergic type reaction, a signed consent form was obtained. Indication and Clinical History:  Eric Leong is a 29 y.o. female who had successful April 18, 2022 coil embolization of the ruptured acomm aneurysm with EVD removal and discharge from the hospital with no further headaches, came back today for aneurysm surveillance with DSA Anesthesia: Anesthesia/sedation initiated at: 9:50am Anesthesia/sedation completed at: 10:56am Total anesthesia/sedation: 66 mins. Local anesthesia with lidocaine. IV moderate sedation with Versed and Fentanyl IV moderate sedation was supervised by the attending physician. The patient was independently monitored by a registered nurse assigned to the Department of Radiology using automated blood pressure, EKG and pulse oximetry. The detailed Conscious Record is  permanently stored in the [address] Vera Pandya Description and findings: The patient's right groin was prepped and draped in standard sterile fashion and under local anesthesia with conscious sedation. Ultrasound was used to insonate the right common femoral artery and found to be patent and completely compressible. With direct vision then accessed with a micropuncture technique, and a 5 Nepali intravascular sheath was placed within the right common femoral artery, establishing arterial access using modified Seldinger technique. 2000u of heparin IV was administered. Images were captured and stored in patient records.   A 5 [place] Jessica multipurpose catheter was then advanced over a guide wire to the level of the aortic arch. Right CCA technique: The right common carotid artery was selectively catheterized under fluoroscopic guidance and digital subtraction angiography images were obtained in biplane projections of the right cervical carotid artery. Interpretation: The right common carotid artery injection demonstrates normal antegrade flow into the external and internal carotid arteries with normal filling of the external carotid artery branches. Course and caliber of the cervical portion of the right internal carotid artery are unremarkable. Further inspection demonstrates no evidence of dissection, stenosis, aneurysm, or other vascular abnormality within the right CCA into the cervical segment of the right ICA. Right ICA technique: The right internal carotid artery was then selectively catheterized, and digital subtraction angiography of the intracranial right internal carotid circulation was performed in frontal, and lateral projections. Interpretation: There is normal antegrade filling of the distal internal carotid artery, ophthalmic artery, anterior cerebral artery, middle cerebral artery and the distal branches. A functional fetal R PCA is noted. Inspection of the remaining right internal carotid circulation revealed no other evidence of cerebral aneurysm, arteriovenous malformation, or arterial stenosis. Capillary and venous phase images were also unremarkable, with no evidence of veno-occlusive disease. Left CCA technique: The left common carotid artery was selectively catheterized under fluoroscopic guidance and digital subtraction angiography images were obtained in biplane projections of the left cervical common carotid artery. Interpretation: The left common carotid artery injection demonstrates normal antegrade flow into the external and internal carotid arteries with normal filling of the external carotid artery branches. Caliber and lumen contour of the cervical portion of the left internal carotid artery are unremarkable. Further inspection demonstrates no other evidence of dissection, stenosis, aneurysm, or other vascular abnormality within the left CCA into the cervical segment of the left ICA. Left ICA technique: The left internal carotid artery was then selectively catheterized, and digital subtraction angiography of the intracranial left internal carotid artery circulation was performed in frontal and lateral projections. Interpretation: There is normal antegrade filling of the distal internal carotid artery, ophthalmic artery, anterior cerebral artery, middle cerebral artery and distal branches. A prior coil embolization at the anterior communicating artery was noted. The coil mass appears?to have?compaction?with noted? recanalization of the aneurysm measuring neck 1.80 mm, length 2.26 mm, width 3.02 mm, height 2.44 mm with Vu score 3. Inspection of the remaining left internal carotid artery circulation revealed no other evidence of cerebral aneurysm, arteriovenous malformation, or arterial stenosis. Capillary and venous phase images were also unremarkable, with no evidence of veno-occlusive disease. Left VA technique: The left subclavian artery was then selectively catheterized and the left vertebral artery was selectively catheterized with roadmap guidance. Digital subtraction angiography of the intracranial left vertebrobasilar run-off was obtained in frontal and lateral projections. Interpretation: The left vertebral artery injection demonstrates normal antegrade opacification of the left vertebral artery, including the cervical segment, basilar artery, and respective branches. The left V2 muscular branches are noted. There was no evidence of cerebral aneurysm, arteriovenous malformation, or arterial stenosis. Capillary and venous phase images were unremarkable.  Right CFA technique: A right common femoral artery angiogram was performed and demonstrated arterial catheterization proximal to the bifurcation. There was no evidence of dissection or occlusion within the right common femoral artery. Vascade 5F was used to establish hemostasis at the right common femoral artery access site. No immediate complications were experienced. Patient was re-examined after the procedure with no change noted in their neurologic examination, with distal pulses present. The patient was subsequently discharged from the neurointerventional suite. Impression: 1. 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 Coil mass appears to have compaction with noted recanalization of the aneurysm measuring neck 1.80 mm, length 2.26 mm, width 3.02 mm, height 2.44 mm with Vu score 3 3. Functional fetal right PCA Dr. Gabriele Gamez dictated this invasive procedure. Dr. Demetrius Braden was present for all procedural and imaging components of this case. Examination was reviewed and reported findings confirmed and evaluated by Dr. Demetrius Braden.   Demetrius Braden MD Final report electronically signed by Demetrius Braden M.D. on 11/14/2022 4:07 PM        DISCHARGE INSTRUCTIONS     Discharge Medications:        Medication List        START taking these medications      aspirin 81 MG chewable tablet  Take 1 tablet by mouth daily  Start taking on: November 23, 2022  Replaces: aspirin 325 MG EC tablet            CONTINUE taking these medications      clopidogrel 75 MG tablet  Commonly known as: Plavix  Take 1 tablet by mouth daily     PROBIOTIC BLEND PO            STOP taking these medications      aspirin 325 MG EC tablet  Replaced by: aspirin 81 MG chewable tablet     magnesium oxide 400 (240 Mg) MG tablet  Commonly known as: MAG-OX               Where to Get Your Medications        These medications were sent to [place] [address] Phone: [phone]   aspirin 81 MG chewable tablet       Diet: ADULT DIET;  Regular diet as tolerated  Activity:  As tolerated  Follow-up:  Dr. Rigo Pandya in 2 weeks, Dr. Kaci Hancock in 3 months  Time Spent for discharge: > 30 minutes    DEEDEE Amanda - CNP  Neuro Critical Care  11/22/2022, 10:48 AM

## 2022-11-22 NOTE — PROGRESS NOTES
CLINICAL PHARMACY NOTE: MEDS TO BEDS    Total # of Prescriptions Filled: 1   The following medications were delivered to the patient:  ASPIRIN    Additional Documentation:

## 2022-11-22 NOTE — PLAN OF CARE
Problem: Discharge Planning  Goal: Discharge to home or other facility with appropriate resources  Outcome: Progressing  Flowsheets (Taken 11/21/2022 1400 by Joleen Hunt RN)  Discharge to home or other facility with appropriate resources: Identify barriers to discharge with patient and caregiver     Problem: Pain  Goal: Verbalizes/displays adequate comfort level or baseline comfort level  Outcome: Progressing     Problem: ABCDS Injury Assessment  Goal: Absence of physical injury  Outcome: Progressing  Flowsheets (Taken 11/21/2022 1600 by Joleen Hunt RN)  Absence of Physical Injury: Implement safety measures based on patient assessment

## 2022-11-22 NOTE — PROGRESS NOTES
Physical Therapy  Facility/Department: [address] NEURO ICU  Physical Therapy Initial Assessment    Name: Froilan Moran  : 1990  MRN: [id number]  Date of Service: 2022    Discharge Recommendations: No further therapy required at discharge. PT Equipment Recommendations  Equipment Needed: No      Patient Diagnosis(es): The encounter diagnosis was Aneurysm ([address] Past Medical History:  has a past medical history of Aneurysm ([address] and Wellness examination. Past Surgical History:  has a past surgical history that includes salpingectomy (Bilateral, 2022); ovarian cyst removal (Right, 2022); Dilation and curettage of uterus (N/A, 2022); other surgical history (2022); picc insertion vascular access team (2022); Cerebral angiogram (2022); and Cerebral angiogram (Bilateral, 2022). Assessment   Assessment: The pt presents baseline and independent for functional mobility with no acute PT needs. Educated pt on d/c from acute PT and requesting therapy if status changes this admission.  The pt verbalized agreement and understanding, denies concerns returning home  Therapy Prognosis: Excellent  Decision Making: Low Complexity  No Skilled PT: Independent with functional mobility   Requires PT Follow-Up: No  Activity Tolerance  Activity Tolerance: Patient tolerated evaluation without incident;Patient tolerated treatment well     Plan   Physcial Therapy Plan  General Plan: Discharge with evaluation only  Safety Devices  Type of Devices: Nurse notified, Gait belt, Left in bed, Call light within reach  Restraints  Restraints Initially in Place: No     Restrictions  Restrictions/Precautions  Restrictions/Precautions: Up as Tolerated  Required Braces or Orthoses?: No  Position Activity Restriction  Other position/activity restrictions: -130     Subjective   General  Patient assessed for rehabilitation services?: Yes  Response To Previous Treatment: Not applicable  Family / Caregiver Present: No  Follows Commands: Within Functional Limits  Subjective  Subjective: RN and pt agreeable to PT. Pt supine in bed upon arrival, very pleasant and cooperative throughout. Pt reports headache rated 2/10, RN notified of pt's request for tylenol. Social/Functional History  Social/Functional History  Lives With: Family (2 children ages 10 and 6)  Type of Home: House  Home Layout: One level  Home Access: Stairs to enter with rails  Entrance Stairs - Number of Steps: 4  Entrance Stairs - Rails: Left  Bathroom Shower/Tub: Walk-in shower  Bathroom Toilet: Standard  Bathroom Equipment: Grab bars in shower  ADL Assistance: Independent  Homemaking Assistance: Independent  Homemaking Responsibilities: Yes  Ambulation Assistance: Independent  Transfer Assistance: Independent  Active : Yes  Occupation: Full time employment  Type of Occupation: Construction, 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  Additional Comments: Pt reports supportive family/friends able to provide assistance PRN  Vision/Hearing  Vision  Vision: Impaired  Vision Exceptions: Wears glasses at all times  Hearing  Hearing: Within functional limits    Cognition   Orientation  Overall Orientation Status: Within Functional Limits  Cognition  Overall Cognitive Status: WFL     Objective     Gross Assessment  Tone: Normal  Sensation: Intact (pt denies numbness and tingling)     Joint Mobility  Spine:  WNL  ROM RLE: WNL  ROM LLE: WNL  ROM RUE: WNL  ROM LUE: WNL  Strength RLE  Strength RLE: WNL  Strength LLE  Strength LLE: WNL  Strength RUE  Strength RUE: WNL  Strength LUE  Strength LUE: WNL           Bed mobility  Supine to Sit: Independent  Sit to Supine: Independent  Scooting: Independent  Transfers  Sit to Stand: Independent  Stand to Sit: Independent  Ambulation  Surface: Level tile  Device: No Device  Assistance: Independent  Quality of Gait: steady, no LOB, appropriate maddy  Gait Deviations: None  Distance: 300ft  Stairs/Curb  Stairs?: Yes  Stairs  # Steps : 4  Stairs Height: 6\"  Rails: None  Device: No Device  Assistance: Independent  Comment: reciprocal, steady, no LOB     Balance  Posture: Good  Sitting - Static: Good  Sitting - Dynamic: Good  Standing - Static: Good  Standing - Dynamic: Good  Comments: standing balance assessed without AD           AM-PAC Score  AM-PAC Inpatient Mobility Raw Score : 24 (11/22/22 1050)  AM-PAC Inpatient T-Scale Score : 61.14 (11/22/22 1050)  Mobility Inpatient CMS 0-100% Score: 0 (11/22/22 1050)  Mobility Inpatient CMS G-Code Modifier : [address] (11/22/22 1050)           Education  Patient Education  Education Given To: Patient  Education Provided: Role of Therapy;Plan of Care  Education Method: Verbal  Barriers to Learning: None  Education Outcome: Demonstrated understanding;Verbalized understanding      Therapy Time   Individual Concurrent Group Co-treatment   Time In [address] [place] [address]         Time Out 1040         Minutes 11         Timed Code Treatment Minutes: 8 Minutes       Yumiko Monroy, PT

## 2022-11-24 ENCOUNTER — APPOINTMENT (OUTPATIENT)
Dept: CT IMAGING | Age: 32
End: 2022-11-24
Payer: COMMERCIAL

## 2022-11-24 ENCOUNTER — HOSPITAL ENCOUNTER (EMERGENCY)
Age: 32
Discharge: HOME | End: 2022-11-24
Attending: EMERGENCY MEDICINE
Payer: COMMERCIAL

## 2022-11-24 VITALS
SYSTOLIC BLOOD PRESSURE: 137 MMHG | WEIGHT: 160 LBS | OXYGEN SATURATION: 97 % | HEART RATE: 74 BPM | DIASTOLIC BLOOD PRESSURE: 87 MMHG | HEIGHT: 62 IN | BODY MASS INDEX: 29.44 KG/M2

## 2022-11-24 DIAGNOSIS — R51.9 NONINTRACTABLE EPISODIC HEADACHE, UNSPECIFIED HEADACHE TYPE: Primary | ICD-10-CM

## 2022-11-24 PROCEDURE — 96374 THER/PROPH/DIAG INJ IV PUSH: CPT

## 2022-11-24 PROCEDURE — 99284 EMERGENCY DEPT VISIT MOD MDM: CPT

## 2022-11-24 PROCEDURE — 2580000003 HC RX 258: Performed by: EMERGENCY MEDICINE

## 2022-11-24 PROCEDURE — 96375 TX/PRO/DX INJ NEW DRUG ADDON: CPT

## 2022-11-24 PROCEDURE — 70450 CT HEAD/BRAIN W/O DYE: CPT

## 2022-11-24 PROCEDURE — 6360000002 HC RX W HCPCS: Performed by: EMERGENCY MEDICINE

## 2022-11-24 RX ORDER — 0.9 % SODIUM CHLORIDE 0.9 %
1000 INTRAVENOUS SOLUTION INTRAVENOUS ONCE
Status: COMPLETED | OUTPATIENT
Start: 2022-11-24 | End: 2022-11-24

## 2022-11-24 RX ORDER — DIPHENHYDRAMINE HYDROCHLORIDE 50 MG/ML
25 INJECTION INTRAMUSCULAR; INTRAVENOUS ONCE
Status: COMPLETED | OUTPATIENT
Start: 2022-11-24 | End: 2022-11-24

## 2022-11-24 RX ORDER — PROCHLORPERAZINE EDISYLATE 5 MG/ML
10 INJECTION INTRAMUSCULAR; INTRAVENOUS ONCE
Status: COMPLETED | OUTPATIENT
Start: 2022-11-24 | End: 2022-11-24

## 2022-11-24 RX ADMIN — SODIUM CHLORIDE 1000 ML: 9 INJECTION, SOLUTION INTRAVENOUS at 10:02

## 2022-11-24 RX ADMIN — DIPHENHYDRAMINE HYDROCHLORIDE 25 MG: 50 INJECTION, SOLUTION INTRAMUSCULAR; INTRAVENOUS at 10:02

## 2022-11-24 RX ADMIN — PROCHLORPERAZINE EDISYLATE 10 MG: 5 INJECTION INTRAMUSCULAR; INTRAVENOUS at 10:02

## 2022-11-24 ASSESSMENT — PAIN DESCRIPTION - DESCRIPTORS
DESCRIPTORS: ACHING
DESCRIPTORS: SHARP

## 2022-11-24 ASSESSMENT — PAIN DESCRIPTION - LOCATION
LOCATION: HEAD
LOCATION: HEAD

## 2022-11-24 ASSESSMENT — ENCOUNTER SYMPTOMS
SHORTNESS OF BREATH: 0
VOMITING: 0

## 2022-11-24 ASSESSMENT — PAIN SCALES - GENERAL
PAINLEVEL_OUTOF10: 6
PAINLEVEL_OUTOF10: 3

## 2022-11-24 ASSESSMENT — PAIN DESCRIPTION - ORIENTATION: ORIENTATION: LEFT

## 2022-11-24 ASSESSMENT — PAIN - FUNCTIONAL ASSESSMENT: PAIN_FUNCTIONAL_ASSESSMENT: 0-10

## 2022-11-24 NOTE — DISCHARGE INSTRUCTIONS
Please follow-up with your neurosurgeon by phone tomorrow to discuss your ED visit. Return to the ED for worsening headache, vomiting, changes in strength or sensation, vision changes or any other concerns.

## 2022-11-25 ENCOUNTER — HOSPITAL ENCOUNTER (INPATIENT)
Age: 32
LOS: 4 days | Discharge: HOME | DRG: 065 | End: 2022-11-29
Attending: PSYCHIATRY & NEUROLOGY | Admitting: PSYCHIATRY & NEUROLOGY
Payer: COMMERCIAL

## 2022-11-25 ENCOUNTER — APPOINTMENT (OUTPATIENT)
Dept: CT IMAGING | Age: 32
End: 2022-11-25
Payer: COMMERCIAL

## 2022-11-25 ENCOUNTER — APPOINTMENT (OUTPATIENT)
Dept: GENERAL RADIOLOGY | Age: 32
End: 2022-11-25
Payer: COMMERCIAL

## 2022-11-25 ENCOUNTER — HOSPITAL ENCOUNTER (EMERGENCY)
Age: 32
Discharge: HOME | End: 2022-11-25
Attending: EMERGENCY MEDICINE
Payer: COMMERCIAL

## 2022-11-25 ENCOUNTER — TELEPHONE (OUTPATIENT)
Dept: NEUROLOGY | Age: 32
End: 2022-11-25

## 2022-11-25 VITALS
RESPIRATION RATE: 17 BRPM | TEMPERATURE: 98.4 F | BODY MASS INDEX: 29.44 KG/M2 | DIASTOLIC BLOOD PRESSURE: 104 MMHG | HEART RATE: 75 BPM | OXYGEN SATURATION: 98 % | SYSTOLIC BLOOD PRESSURE: 157 MMHG | HEIGHT: 62 IN | WEIGHT: 160 LBS

## 2022-11-25 DIAGNOSIS — I62.9 INTRACRANIAL BLEED (HCC): Primary | ICD-10-CM

## 2022-11-25 LAB
ABO/RH: NORMAL
ABSOLUTE EOS #: 0.2 K/UL (ref 0–0.44)
ABSOLUTE IMMATURE GRANULOCYTE: <0.03 K/UL (ref 0–0.3)
ABSOLUTE LYMPH #: 2.65 K/UL (ref 1.1–3.7)
ABSOLUTE MONO #: 0.62 K/UL (ref 0.1–1.2)
ALBUMIN SERPL-MCNC: 4.5 G/DL (ref 3.5–5.2)
ALBUMIN/GLOBULIN RATIO: 1.6 (ref 1–2.5)
ALP BLD-CCNC: 96 U/L (ref 35–104)
ALT SERPL-CCNC: 18 U/L (ref 5–33)
ANION GAP SERPL CALCULATED.3IONS-SCNC: 12 MMOL/L (ref 9–17)
ANTIBODY SCREEN: NEGATIVE
ARM BAND NUMBER: NORMAL
AST SERPL-CCNC: 15 U/L
BASOPHILS # BLD: 1 % (ref 0–2)
BASOPHILS ABSOLUTE: 0.09 K/UL (ref 0–0.2)
BILIRUB SERPL-MCNC: 0.2 MG/DL (ref 0.3–1.2)
BUN BLDV-MCNC: 9 MG/DL (ref 6–20)
BUN/CREAT BLD: 13 (ref 9–20)
CALCIUM SERPL-MCNC: 9.7 MG/DL (ref 8.6–10.4)
CHLORIDE BLD-SCNC: 102 MMOL/L (ref 98–107)
CHP ED QC CHECK: NORMAL
CO2: 23 MMOL/L (ref 20–31)
CREAT SERPL-MCNC: 0.72 MG/DL (ref 0.5–0.9)
EOSINOPHILS RELATIVE PERCENT: 2 % (ref 1–4)
EXPIRATION DATE: NORMAL
GFR SERPL CREATININE-BSD FRML MDRD: >60 ML/MIN/1.73M2
GLUCOSE BLD-MCNC: 131 MG/DL (ref 70–99)
HCT VFR BLD CALC: 40.1 % (ref 36.3–47.1)
HEMOGLOBIN: 13.6 G/DL (ref 11.9–15.1)
IMMATURE GRANULOCYTES: 0 %
INR BLD: 1
LYMPHOCYTES # BLD: 26 % (ref 24–43)
MCH RBC QN AUTO: 29.1 PG (ref 25.2–33.5)
MCHC RBC AUTO-ENTMCNC: 33.9 G/DL (ref 28.4–34.8)
MCV RBC AUTO: 85.7 FL (ref 82.6–102.9)
MONOCYTES # BLD: 6 % (ref 3–12)
NRBC AUTOMATED: 0 PER 100 WBC
PDW BLD-RTO: 12.2 % (ref 11.8–14.4)
PLATELET # BLD: 296 K/UL (ref 138–453)
PMV BLD AUTO: 10.6 FL (ref 8.1–13.5)
POTASSIUM SERPL-SCNC: 3.7 MMOL/L (ref 3.7–5.3)
PROTHROMBIN TIME: 13 SEC (ref 11.5–14.2)
RBC # BLD: 4.68 M/UL (ref 3.95–5.11)
SEG NEUTROPHILS: 65 % (ref 36–65)
SEGMENTED NEUTROPHILS ABSOLUTE COUNT: 6.75 K/UL (ref 1.5–8.1)
SODIUM BLD-SCNC: 137 MMOL/L (ref 135–144)
TOTAL PROTEIN: 7.3 G/DL (ref 6.4–8.3)
TROPONIN, HIGH SENSITIVITY: <6 NG/L (ref 0–14)
WBC # BLD: 10.3 K/UL (ref 3.5–11.3)

## 2022-11-25 PROCEDURE — 99285 EMERGENCY DEPT VISIT HI MDM: CPT

## 2022-11-25 PROCEDURE — 6370000000 HC RX 637 (ALT 250 FOR IP)

## 2022-11-25 PROCEDURE — 93005 ELECTROCARDIOGRAM TRACING: CPT | Performed by: EMERGENCY MEDICINE

## 2022-11-25 PROCEDURE — 84484 ASSAY OF TROPONIN QUANT: CPT

## 2022-11-25 PROCEDURE — 85025 COMPLETE CBC W/AUTO DIFF WBC: CPT

## 2022-11-25 PROCEDURE — 6360000004 HC RX CONTRAST MEDICATION: Performed by: EMERGENCY MEDICINE

## 2022-11-25 PROCEDURE — 70496 CT ANGIOGRAPHY HEAD: CPT

## 2022-11-25 PROCEDURE — 70450 CT HEAD/BRAIN W/O DYE: CPT

## 2022-11-25 PROCEDURE — 80053 COMPREHEN METABOLIC PANEL: CPT

## 2022-11-25 PROCEDURE — 71045 X-RAY EXAM CHEST 1 VIEW: CPT

## 2022-11-25 PROCEDURE — 85610 PROTHROMBIN TIME: CPT

## 2022-11-25 PROCEDURE — 2580000003 HC RX 258: Performed by: EMERGENCY MEDICINE

## 2022-11-25 PROCEDURE — 82947 ASSAY GLUCOSE BLOOD QUANT: CPT

## 2022-11-25 PROCEDURE — P9073 PLATELETS PHERESIS PATH REDU: HCPCS

## 2022-11-25 PROCEDURE — 2000000000 HC ICU R&B

## 2022-11-25 PROCEDURE — 96365 THER/PROPH/DIAG IV INF INIT: CPT

## 2022-11-25 PROCEDURE — 36415 COLL VENOUS BLD VENIPUNCTURE: CPT

## 2022-11-25 PROCEDURE — 86900 BLOOD TYPING SEROLOGIC ABO: CPT

## 2022-11-25 PROCEDURE — 2500000003 HC RX 250 WO HCPCS: Performed by: EMERGENCY MEDICINE

## 2022-11-25 PROCEDURE — 86850 RBC ANTIBODY SCREEN: CPT

## 2022-11-25 PROCEDURE — 86901 BLOOD TYPING SEROLOGIC RH(D): CPT

## 2022-11-25 PROCEDURE — 96375 TX/PRO/DX INJ NEW DRUG ADDON: CPT

## 2022-11-25 PROCEDURE — 36430 TRANSFUSION BLD/BLD COMPNT: CPT

## 2022-11-25 RX ORDER — LABETALOL HYDROCHLORIDE 5 MG/ML
10 INJECTION, SOLUTION INTRAVENOUS EVERY 4 HOURS PRN
Status: DISCONTINUED | OUTPATIENT
Start: 2022-11-25 | End: 2022-11-29 | Stop reason: HOSPADM

## 2022-11-25 RX ORDER — ONDANSETRON 4 MG/1
4 TABLET, ORALLY DISINTEGRATING ORAL EVERY 8 HOURS PRN
Status: DISCONTINUED | OUTPATIENT
Start: 2022-11-25 | End: 2022-11-29 | Stop reason: HOSPADM

## 2022-11-25 RX ORDER — DEXAMETHASONE 4 MG/1
4 TABLET ORAL 2 TIMES DAILY PRN
Qty: 6 TABLET | Refills: 0 | Status: ON HOLD | OUTPATIENT
Start: 2022-11-25 | End: 2022-11-29 | Stop reason: HOSPADM

## 2022-11-25 RX ORDER — SODIUM CHLORIDE 9 MG/ML
INJECTION, SOLUTION INTRAVENOUS PRN
Status: DISCONTINUED | OUTPATIENT
Start: 2022-11-25 | End: 2022-11-29 | Stop reason: HOSPADM

## 2022-11-25 RX ORDER — ACETAMINOPHEN 325 MG/1
650 TABLET ORAL EVERY 4 HOURS PRN
Status: DISCONTINUED | OUTPATIENT
Start: 2022-11-25 | End: 2022-11-29 | Stop reason: HOSPADM

## 2022-11-25 RX ORDER — PANTOPRAZOLE SODIUM 40 MG/1
40 TABLET, DELAYED RELEASE ORAL
Qty: 3 TABLET | Refills: 0 | Status: SHIPPED | OUTPATIENT
Start: 2022-11-25 | End: 2022-12-02

## 2022-11-25 RX ORDER — LABETALOL HYDROCHLORIDE 5 MG/ML
10 INJECTION, SOLUTION INTRAVENOUS ONCE
Status: COMPLETED | OUTPATIENT
Start: 2022-11-25 | End: 2022-11-25

## 2022-11-25 RX ORDER — ONDANSETRON 2 MG/ML
4 INJECTION INTRAMUSCULAR; INTRAVENOUS EVERY 6 HOURS PRN
Status: DISCONTINUED | OUTPATIENT
Start: 2022-11-25 | End: 2022-11-29 | Stop reason: HOSPADM

## 2022-11-25 RX ADMIN — ACETAMINOPHEN 650 MG: 325 TABLET ORAL at 23:49

## 2022-11-25 RX ADMIN — IOPAMIDOL 75 ML: 755 INJECTION, SOLUTION INTRAVENOUS at 21:04

## 2022-11-25 RX ADMIN — LABETALOL HYDROCHLORIDE 10 MG: 5 INJECTION INTRAVENOUS at 21:18

## 2022-11-25 RX ADMIN — SODIUM CHLORIDE 5 MG/HR: 9 INJECTION, SOLUTION INTRAVENOUS at 21:24

## 2022-11-25 ASSESSMENT — PAIN DESCRIPTION - LOCATION: LOCATION: HEAD

## 2022-11-25 ASSESSMENT — PAIN DESCRIPTION - DESCRIPTORS: DESCRIPTORS: ACHING

## 2022-11-25 ASSESSMENT — PAIN SCALES - GENERAL: PAINLEVEL_OUTOF10: 5

## 2022-11-25 NOTE — ED PROVIDER NOTES
[facility] ED  EMERGENCY DEPARTMENT ENCOUNTER      Pt Name: Sky Ibrahim  MRN: [id number]  [place] 1990  Date of evaluation: 11/24/2022  Provider: Matt Hernandez MD    CHIEF COMPLAINT       Chief Complaint   Patient presents with    Headache    Neck Pain     Had coil put in Monday for brain aneurysm, worsening headache and neck pain, more forgetful         HISTORY OF PRESENT ILLNESS      Sky Ibrahim is a 29 y.o. female who presents to the emergency department for evaluation of a headache. 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 Headache is consistent with priors. Not sudden in onset. Not worst of life. No associated neurologic complaints. No fever. Some nausea, no vomiting. No other complaints. REVIEW OF SYSTEMS       Review of Systems   Constitutional:  Negative for fever. Eyes:  Negative for visual disturbance. Respiratory:  Negative for shortness of breath. Cardiovascular:  Negative for chest pain. Gastrointestinal:  Negative for vomiting. Musculoskeletal:  Negative for neck pain and neck stiffness. Neurological:  Negative for weakness and numbness.        PAST MEDICAL HISTORY     Past Medical History:   Diagnosis Date    Aneurysm ([address] 04/2022    brain    Wellness examination     [place] Catrina, CNP, PCP         SURGICAL HISTORY       Past Surgical History:   Procedure Laterality Date    CEREBRAL ANGIOGRAM  11/08/2022    CEREBRAL ANGIOGRAM Bilateral 11/21/2022    IR ANGIOGRAM CAROTID CEREBRAL BILATERAL, 1 stent, 2 coils    DILATION AND CURETTAGE OF UTERUS N/A 03/21/2022    DILATATION AND CURETTAGE HYSTEROSCOPY CAUTERY ABLATION-NOVASURE END[facility]ETRIAL [facility]  [facility]2022     IR ANGIOGRAM CAROTID CEREBRAL BILATERAL    OVARIAN CYST REMOVAL Right 03/21/2022    LAPAROSCOPIC- RIGHT OOPHERECTOMY performed by Trinh Boss DO at [facility]  2022         SALPINGECTOMY Bilateral 2022    SALPINGECTOMY LAPAROSCOPIC performed by Trinh Boss DO at [facility]       Discharge Medication List as of 2022 11:36 AM        CONTINUE these medications which have NOT CHANGED    Details   aspirin 81 MG chewable tablet Take 1 tablet by mouth daily, Disp-30 tablet, R-3Normal      clopidogrel (PLAVIX) 75 MG tablet Take 1 tablet by mouth daily, Disp-30 tablet, R-3Normal      Probiotic Product (PROBIOTIC BLEND PO) Take by mouth dailyHistorical Med             ALLERGIES       Patient has no known allergies. FAMILY HISTORY       History reviewed. No pertinent family history. SOCIAL HISTORY       Social History     Tobacco Use    Smoking status: Former     Packs/day: 0.50     Years: 14.00     Pack years: 7.00     Types: Cigarettes     Quit date: 2022     Years since quittin.8    Smokeless tobacco: Current    Tobacco comments:     occasional   Vaping Use    Vaping Use: Some days    Substances: Nicotine   Substance Use Topics    Alcohol use: Not Currently    Drug use: No         PHYSICAL EXAM       ED Triage Vitals   BP Temp Temp src Heart Rate Resp SpO2 Height Weight   22 0946 -- -- 22 0946 -- 22 0946 22 0947 22 0947   (!) 155/111   74  98 % 5' 2\" (1.575 m) 160 lb (72.6 kg)       Physical Exam  Vitals reviewed. Constitutional:       General: She is not in acute distress. Appearance: She is not ill-appearing, toxic-appearing or diaphoretic. HENT:      Head: Normocephalic and atraumatic. Nose: Nose normal.      Mouth/Throat:      Mouth: Mucous membranes are moist.      Pharynx: Oropharynx is clear. Eyes:      General: No scleral icterus. Right eye: No discharge. Left eye: No discharge. Extraocular Movements: Extraocular movements intact.       Pupils: Pupils are equal, round, and reactive to light. Neck:      Comments: No meningismus. Pulmonary:      Effort: Pulmonary effort is normal.   Musculoskeletal:      Cervical back: Neck supple. No tenderness. Lymphadenopathy:      Cervical: No cervical adenopathy. Skin:     General: Skin is warm and dry. Coloration: Skin is not jaundiced or pale. Neurological:      General: No focal deficit present. Mental Status: She is alert and oriented to person, place, and time. GCS: GCS eye subscore is 4. GCS verbal subscore is 5. GCS motor subscore is 6. Cranial Nerves: No dysarthria or facial asymmetry. Sensory: No sensory deficit. Motor: No weakness. DIAGNOSTIC RESULTS     RADIOLOGY:     Interpretation per the Radiologist below, if available at the time of this note:    CT Head WO Contrast   Final Result   No acute intracranial abnormality. EMERGENCY DEPARTMENT COURSE and DIFFERENTIAL DIAGNOSIS/MDM:     Known ACOM aneurysm with recent coiling. No deficits on exam or by history. No fever. Exam generally unremarkable. CT head without acute process. No suggestion of ICH. Suspect pain in relation to recent procedure. Doubt venous thrombosis, infectious process or significant post-surgical complication. HA is episodic; has had similar in past not related to aneurysmal hemorrhages. Pain markedly improved and patient comfortable with plan for discharge after Compazine and Benadryl. FINAL IMPRESSION      1.  Nonintractable episodic headache, unspecified headache type          DISPOSITION/PLAN     DISPOSITION Decision To Discharge 11/24/2022 11:35:08 AM        (Please note that portions of this note were completed with a voice recognition program.  Efforts were made to edit the dictations but occasionally words are mis-transcribed.)    Gissell Burkett MD (electronically signed)  Attending Emergency Physician            Gissell Burkett MD  11/24/22 2051

## 2022-11-25 NOTE — TELEPHONE ENCOUNTER
Patient c/o headache and went to the ED, she feels better today, no headache at the time I called her, but feels that it is coming. Likely due to post-coiling post stenting headache. I given her decadron 4mg BID PRN for 3 days and pantoprazole 40mg daily while taking it for 3 days. Prescription sent.        Terrence Howell Satisfactory

## 2022-11-26 ENCOUNTER — APPOINTMENT (OUTPATIENT)
Dept: CT IMAGING | Age: 32
DRG: 065 | End: 2022-11-26
Attending: PSYCHIATRY & NEUROLOGY
Payer: COMMERCIAL

## 2022-11-26 PROBLEM — I61.0 NONTRAUMATIC SUBCORTICAL HEMORRHAGE OF LEFT CEREBRAL HEMISPHERE (HCC): Status: ACTIVE | Noted: 2022-11-26

## 2022-11-26 LAB
ABSOLUTE EOS #: 0.14 K/UL (ref 0–0.44)
ABSOLUTE IMMATURE GRANULOCYTE: 0.06 K/UL (ref 0–0.3)
ABSOLUTE LYMPH #: 1.64 K/UL (ref 1.1–3.7)
ABSOLUTE MONO #: 0.48 K/UL (ref 0.1–1.2)
ANION GAP SERPL CALCULATED.3IONS-SCNC: 13 MMOL/L (ref 9–17)
BASOPHILS # BLD: 1 % (ref 0–2)
BASOPHILS ABSOLUTE: 0.08 K/UL (ref 0–0.2)
BLD PROD TYP BPU: NORMAL
BLOOD BANK BLOOD PRODUCT EXPIRATION DATE: NORMAL
BLOOD BANK ISBT PRODUCT BLOOD TYPE: 5100
BLOOD BANK PRODUCT CODE: NORMAL
BLOOD BANK UNIT TYPE AND RH: NORMAL
BPU ID: NORMAL
BUN BLDV-MCNC: 9 MG/DL (ref 6–20)
CALCIUM SERPL-MCNC: 9 MG/DL (ref 8.6–10.4)
CHLORIDE BLD-SCNC: 104 MMOL/L (ref 98–107)
CO2: 22 MMOL/L (ref 20–31)
CREAT SERPL-MCNC: 0.64 MG/DL (ref 0.5–0.9)
DISPENSE STATUS BLOOD BANK: NORMAL
EKG ATRIAL RATE: 71 BPM
EKG P AXIS: 37 DEGREES
EKG P-R INTERVAL: 150 MS
EKG Q-T INTERVAL: 360 MS
EKG QRS DURATION: 74 MS
EKG QTC CALCULATION (BAZETT): 391 MS
EKG R AXIS: 48 DEGREES
EKG T AXIS: 6 DEGREES
EKG VENTRICULAR RATE: 71 BPM
EOSINOPHILS RELATIVE PERCENT: 1 % (ref 1–4)
GFR SERPL CREATININE-BSD FRML MDRD: >60 ML/MIN/1.73M2
GLUCOSE BLD-MCNC: 127 MG/DL (ref 74–100)
GLUCOSE BLD-MCNC: 138 MG/DL (ref 70–99)
HCT VFR BLD CALC: 37.6 % (ref 36.3–47.1)
HEMOGLOBIN: 12.7 G/DL (ref 11.9–15.1)
IMMATURE GRANULOCYTES: 1 %
LYMPHOCYTES # BLD: 15 % (ref 24–43)
MAGNESIUM: 2.4 MG/DL (ref 1.6–2.6)
MCH RBC QN AUTO: 29.1 PG (ref 25.2–33.5)
MCHC RBC AUTO-ENTMCNC: 33.8 G/DL (ref 28.4–34.8)
MCV RBC AUTO: 86 FL (ref 82.6–102.9)
MONOCYTES # BLD: 4 % (ref 3–12)
NRBC AUTOMATED: 0 PER 100 WBC
PDW BLD-RTO: 12.5 % (ref 11.8–14.4)
PLATELET # BLD: 299 K/UL (ref 138–453)
PMV BLD AUTO: 10.7 FL (ref 8.1–13.5)
POTASSIUM SERPL-SCNC: 3.6 MMOL/L (ref 3.7–5.3)
RBC # BLD: 4.37 M/UL (ref 3.95–5.11)
SEG NEUTROPHILS: 78 % (ref 36–65)
SEGMENTED NEUTROPHILS ABSOLUTE COUNT: 8.42 K/UL (ref 1.5–8.1)
SODIUM BLD-SCNC: 139 MMOL/L (ref 135–144)
TRANSFUSION STATUS: NORMAL
UNIT DIVISION: 0
UNIT ISSUE DATE/TIME: NORMAL
WBC # BLD: 10.8 K/UL (ref 3.5–11.3)

## 2022-11-26 PROCEDURE — 36415 COLL VENOUS BLD VENIPUNCTURE: CPT

## 2022-11-26 PROCEDURE — 92523 SPEECH SOUND LANG COMPREHEN: CPT

## 2022-11-26 PROCEDURE — 6360000002 HC RX W HCPCS: Performed by: STUDENT IN AN ORGANIZED HEALTH CARE EDUCATION/TRAINING PROGRAM

## 2022-11-26 PROCEDURE — 6370000000 HC RX 637 (ALT 250 FOR IP): Performed by: STUDENT IN AN ORGANIZED HEALTH CARE EDUCATION/TRAINING PROGRAM

## 2022-11-26 PROCEDURE — 83036 HEMOGLOBIN GLYCOSYLATED A1C: CPT

## 2022-11-26 PROCEDURE — 2500000003 HC RX 250 WO HCPCS

## 2022-11-26 PROCEDURE — 6360000002 HC RX W HCPCS

## 2022-11-26 PROCEDURE — 85025 COMPLETE CBC W/AUTO DIFF WBC: CPT

## 2022-11-26 PROCEDURE — 99223 1ST HOSP IP/OBS HIGH 75: CPT | Performed by: PSYCHIATRY & NEUROLOGY

## 2022-11-26 PROCEDURE — 80048 BASIC METABOLIC PNL TOTAL CA: CPT

## 2022-11-26 PROCEDURE — 2000000000 HC ICU R&B

## 2022-11-26 PROCEDURE — 93010 ELECTROCARDIOGRAM REPORT: CPT | Performed by: FAMILY MEDICINE

## 2022-11-26 PROCEDURE — 70450 CT HEAD/BRAIN W/O DYE: CPT

## 2022-11-26 PROCEDURE — 83735 ASSAY OF MAGNESIUM: CPT

## 2022-11-26 RX ORDER — DEXAMETHASONE SODIUM PHOSPHATE 10 MG/ML
2 INJECTION INTRAMUSCULAR; INTRAVENOUS EVERY 12 HOURS
Status: COMPLETED | OUTPATIENT
Start: 2022-11-26 | End: 2022-11-27

## 2022-11-26 RX ORDER — DEXAMETHASONE SODIUM PHOSPHATE 10 MG/ML
2 INJECTION INTRAMUSCULAR; INTRAVENOUS EVERY 12 HOURS
Status: DISCONTINUED | OUTPATIENT
Start: 2022-11-26 | End: 2022-11-26

## 2022-11-26 RX ORDER — MAGNESIUM SULFATE IN WATER 40 MG/ML
2000 INJECTION, SOLUTION INTRAVENOUS ONCE
Status: COMPLETED | OUTPATIENT
Start: 2022-11-26 | End: 2022-11-26

## 2022-11-26 RX ORDER — PANTOPRAZOLE SODIUM 40 MG/1
40 TABLET, DELAYED RELEASE ORAL
Status: DISCONTINUED | OUTPATIENT
Start: 2022-11-26 | End: 2022-11-28

## 2022-11-26 RX ORDER — DEXAMETHASONE SODIUM PHOSPHATE 4 MG/ML
2 INJECTION, SOLUTION INTRA-ARTICULAR; INTRALESIONAL; INTRAMUSCULAR; INTRAVENOUS; SOFT TISSUE ONCE
Status: COMPLETED | OUTPATIENT
Start: 2022-11-26 | End: 2022-11-26

## 2022-11-26 RX ORDER — DEXAMETHASONE SODIUM PHOSPHATE 4 MG/ML
2 INJECTION, SOLUTION INTRA-ARTICULAR; INTRALESIONAL; INTRAMUSCULAR; INTRAVENOUS; SOFT TISSUE EVERY 6 HOURS
Status: DISCONTINUED | OUTPATIENT
Start: 2022-11-26 | End: 2022-11-26

## 2022-11-26 RX ADMIN — Medication 10 MG: at 00:07

## 2022-11-26 RX ADMIN — DEXAMETHASONE SODIUM PHOSPHATE 2 MG: 10 INJECTION INTRAMUSCULAR; INTRAVENOUS at 22:47

## 2022-11-26 RX ADMIN — DEXAMETHASONE SODIUM PHOSPHATE 2 MG: 4 INJECTION, SOLUTION INTRA-ARTICULAR; INTRALESIONAL; INTRAMUSCULAR; INTRAVENOUS; SOFT TISSUE at 01:47

## 2022-11-26 RX ADMIN — PANTOPRAZOLE SODIUM 40 MG: 40 TABLET, DELAYED RELEASE ORAL at 12:00

## 2022-11-26 RX ADMIN — MAGNESIUM SULFATE HEPTAHYDRATE 2000 MG: 40 INJECTION, SOLUTION INTRAVENOUS at 13:55

## 2022-11-26 RX ADMIN — DEXAMETHASONE SODIUM PHOSPHATE 2 MG: 10 INJECTION INTRAMUSCULAR; INTRAVENOUS at 13:49

## 2022-11-26 NOTE — ED PROVIDER NOTES
[facility] ED  EMERGENCY DEPARTMENT ENCOUNTER      Pt Name: Gerard Garner  MRN: [id number]  [place] 1990  Date of evaluation: 11/25/2022  Provider: Alberto Garza DO    CHIEF COMPLAINT       Chief Complaint   Patient presents with    Extremity Weakness     Pt reports cutting apples and noticed right leg weakness with onset around 1830 and then weakness to her right arm as well. She does take ASA 81mg and Plavix 75mg. FSBS 127. Pt has history of aneurysm with last coiling and stent on 11/21. HISTORY OF PRESENT ILLNESS   (Location/Symptom, Timing/Onset, Context/Setting, Quality, Duration, Modifying Factors, Severity)  Note limiting factors. Gerard Garner is a 29 y.o. female who presents to the emergency department with complaints of right leg weakness. The patient also has right arm weakness. The patient had a coiling of an aneurysm, this was a repeat coiling on Monday. The aneurysm is actually on the right side as well. The patient reports that the symptoms began suddenly at 630. She is otherwise well, there is no facial asymmetry, no headache. HPI    Nursing Notes were reviewed. REVIEW OF SYSTEMS    (2-9 systems for level 4, 10 or more for level 5)     Review of Systems   Unable to perform ROS: Acuity of condition     Except as noted above the remainder of the review of systems was reviewed and negative.        PAST MEDICAL HISTORY     Past Medical History:   Diagnosis Date    Aneurysm ([address] 04/2022    brain    Wellness examination     Daniela Jimenez CNP, PCP         SURGICAL HISTORY       Past Surgical History:   Procedure Laterality Date    CEREBRAL ANGIOGRAM  11/08/2022    CEREBRAL ANGIOGRAM Bilateral 11/21/2022    IR ANGIOGRAM CAROTID CEREBRAL BILATERAL, 1 stent, 2 coils    DILATION AND CURETTAGE OF UTERUS N/A 03/21/2022    DILATATION AND CURETTAGE HYSTEROSCOPY CAUTERY ABLATION-NOVASURE ENDOMETRIAL ABLATION performed by Sherene Claude, DO at [facility] SURGICAL HISTORY  2022     IR ANGIOGRAM CAROTID CEREBRAL BILATERAL    OVARIAN CYST REMOVAL Right 2022    LAPAROSCOPIC- RIGHT OOPHERECTOMY performed by Niecy De León DO at [address]  2022         SALPINGECTOMY Bilateral 2022    SALPINGECTOMY LAPAROSCOPIC performed by Niecy De León DO at [address] [place] [address] [address]       Discharge Medication List as of 2022  9:50 PM        CONTINUE these medications which have NOT CHANGED    Details   dexamethasone (DECADRON) 4 MG tablet Take 1 tablet by mouth 2 times daily as needed (headache), Disp-6 tablet, R-0Normal      pantoprazole (PROTONIX) 40 MG tablet Take 1 tablet by mouth every morning (before breakfast) for 3 days, Disp-3 tablet, R-0Normal      aspirin 81 MG chewable tablet Take 1 tablet by mouth daily, Disp-30 tablet, R-3Normal      clopidogrel (PLAVIX) 75 MG tablet Take 1 tablet by mouth daily, Disp-30 tablet, R-3Normal      Probiotic Product (PROBIOTIC BLEND PO) Take by mouth dailyHistorical Med             ALLERGIES     Patient has no known allergies. FAMILY HISTORY     History reviewed. No pertinent family history.        SOCIAL HISTORY       Social History     Socioeconomic History    Marital status: Single     Spouse name: None    Number of children: None    Years of education: None    Highest education level: None   Tobacco Use    Smoking status: Former     Packs/day: 0.50     Years: 14.00     Pack years: 7.00     Types: Cigarettes     Quit date: 2022     Years since quittin.8    Smokeless tobacco: Current    Tobacco comments:     Nicotine pouches   Vaping Use    Vaping Use: Some days    Substances: Nicotine   Substance and Sexual Activity    Alcohol use: Not Currently    Drug use: No    Sexual activity: Yes     Partners: Male     Birth control/protection: Surgical     Social Determinants of Health     Financial Resource Strain: Low Risk     Effort: Pulmonary effort is normal.   Abdominal:      Palpations: Abdomen is soft. Tenderness: There is no abdominal tenderness. There is no guarding or rebound. Musculoskeletal:         General: No swelling, tenderness, deformity or signs of injury. Normal range of motion. Cervical back: Neck supple. Skin:     General: Skin is warm and dry. Coloration: Skin is not jaundiced or pale. Findings: No bruising or erythema. Neurological:      Mental Status: She is alert and oriented to person, place, and time. Mental status is at baseline. GCS: GCS eye subscore is 4. GCS verbal subscore is 5. GCS motor subscore is 6. Cranial Nerves: No cranial nerve deficit, dysarthria or facial asymmetry. Sensory: No sensory deficit. Motor: Weakness present. Coordination: Coordination normal.      Gait: Gait abnormal.      Comments: Weakness is noted to the right leg and right arm, right leg more than right arm  There is drift with the right leg, minor drift is noted to the right arm  The left side is completely unaffected       DIAGNOSTIC RESULTS     EKG: All EKG's are interpreted by the Emergency Department Physician who either signs or Co-signs this chart in the absence of a cardiologist.    Normal sinus rhythm, the rate is 71  The normals are normal, no significant changes from prior EKG, no STEMI    RADIOLOGY:   Non-plain film images such as CT, Ultrasound and MRI are read by the radiologist. Plain radiographic images are visualized and preliminarily interpreted by the emergency physician with the below findings:        Interpretation per the Radiologist below, if available at the time of this note:    CTA HEAD W WO CONTRAST   Final Result   Status post aneurysm coiling of anterior communicating artery with no   evidence of discrete residual or recurrent aneurysm.       The metallic density within the left KRISSY A1 segment and the right KRISSY A2   segment were not present on the head CT of 05/01/2022 and MRA of 09/30/2022. However they appear unchanged since head CT of 11/24/2022. .      No hemodynamically significant stenosis. RECOMMENDATIONS:   Unavailable         XR CHEST PORTABLE   Final Result   No acute cardiopulmonary abnormality. CT Head WO Contrast   Final Result   Interval development of 14 mm intraparenchymal hemorrhage within the left   superior frontal gyrus subcortical white matter. Stable post coiling appearance of anterior communicating artery. No acute territorial infarction or intracranial mass lesion. Critical results were called by Dr. Fritz Sifuentes MD to Dr Derek Russell on 11/25/2022   at 21:01. ED BEDSIDE ULTRASOUND:   Performed by ED Physician - none    LABS:  Labs Reviewed   COMPREHENSIVE METABOLIC PANEL W/ REFLEX TO MG FOR LOW K - Abnormal; Notable for the following components:       Result Value    Glucose 131 (*)     Total Bilirubin 0.2 (*)     All other components within normal limits   POCT GLUCOSE - Normal   CBC WITH AUTO DIFFERENTIAL   TROPONIN   PROTIME-INR       All other labs were within normal range or not returned as of this dictation. EMERGENCY DEPARTMENT COURSE and DIFFERENTIAL DIAGNOSIS/MDM:   Vitals:    Vitals:    11/25/22 2030 11/25/22 2045 11/25/22 2115   BP: (!) 168/116 (!) 147/101 (!) 157/104   Pulse: 90 73 75   Resp: 18 21 17   Temp: 98.4 °F (36.9 °C)     TempSrc: Oral     SpO2: 98%     Weight: 160 lb (72.6 kg)     Height: 5' 2\" (1.575 m)             MDM  This is a 47-year-old female who has had a recent intervention, coiling of an aneurysm at [facility]. At 630 she developed sudden right-sided weakness, she reports that she attempted to walk and she nearly fell over. The patient is found to have a spontaneous ICH.   I discussed the case with Dr. Vania Hart who is neuro intensivist at [facility], he is requesting a CTA of the head, to start blood pressure medications to get the systolic below 494 on transfer. REASSESSMENT          CRITICAL CARE TIME   Total Critical Care time was 25 minutes, excluding separately reportable procedures. There was a high probability of clinically significant/life threatening deterioration in the patient's condition which required my urgent intervention. CONSULTS:  IP CONSULT TO PHARMACY  PHARMACY TO CHANGE BASE FLUIDS    PROCEDURES:  Unless otherwise noted below, none     Procedures        FINAL IMPRESSION      1. Intracranial bleed [facility])          DISPOSITION/PLAN   DISPOSITION Decision To Discharge 11/25/2022 09:50:02 PM      PATIENT REFERRED TO:  No follow-up provider specified. DISCHARGE MEDICATIONS:  Discharge Medication List as of 11/25/2022  9:50 PM        Controlled Substances Monitoring:     No flowsheet data found.     (Please note that portions of this note were completed with a voice recognition program.  Efforts were made to edit the dictations but occasionally words are mis-transcribed.)    Connor Mohan DO (electronically signed)  Attending Emergency Physician            Connor Mohan DO  11/26/22 0202

## 2022-11-26 NOTE — PROGRESS NOTES
at all times  Hearing  Hearing: Within functional limits    Assessment:      Diagnosis: Pt presents without cognitive-communication deficits at this time. Pt educated on monitoring for changes in these skills and to seek OP services if necessary. ST no longer needed at this time. Re-consult as appropriate. Recommendations:  Recommendations  Requires SLP Intervention: No  Patient Education: Discussed results with pt. Patient Education Response: Verbalizes understanding        Plan:   Individuals consulted  Consulted and agree with results and recommendations: Patient;RN  RN Name: Nav Louis    Goals:    ST not warranted at this time. Educated pt on receiving orders if OP services are needed after returning to daily activities. No concerns at this time. Patient/family involved in developing goals and treatment plan: yes    Subjective:   Previous level of function and limitations: independent        Vision  Vision: Impaired  Vision Exceptions: Wears glasses at all times  Hearing  Hearing: Within functional limits           Objective:       Oral Motor   Labial: No impairment  Lingual: No impairment    Motor Speech  Apraxic Characteristics: None  Dysarthric Characteristics: None  Intelligibility: No impairment    Auditory Comprehension  Comprehension: Within Functional Limits         Expression  Primary Mode of Expression: Verbal    Verbal Expression  Verbal Expression: Within functional limits            Cognition:      Orientation  Overall Orientation Status: Within Normal Limits  Memory  Memory: Within Functional Limits      Prognosis:  Individuals consulted  Consulted and agree with results and recommendations: Patient;RN  RN Name: Nav Louis    Education:  Patient Education: Discussed results with pt.   Patient Education Response: Verbalizes understanding          Therapy Time:   Individual Concurrent Group Co-treatment   Time In 7385         Time Out 1055         Minutes 9                 Electronically signed by Elliott ROMO  CCC-SLP  on 11/26/2022 at 11:14 AM

## 2022-11-26 NOTE — PLAN OF CARE
Problem: Safety - Adult  Goal: Free from fall injury  Outcome: Progressing  Flowsheets (Taken 11/26/2022 0113)  Free From Fall Injury: Instruct family/caregiver on patient safety     Problem: Pain  Goal: Verbalizes/displays adequate comfort level or baseline comfort level  Outcome: Not Progressing  Flowsheets (Taken 11/26/2022 0113)  Verbalizes/displays adequate comfort level or baseline comfort level:   Encourage patient to monitor pain and request assistance   Assess pain using appropriate pain scale   Administer analgesics based on type and severity of pain and evaluate response   Implement non-pharmacological measures as appropriate and evaluate response   Consider cultural and social influences on pain and pain management   Notify Licensed Independent Practitioner if interventions unsuccessful or patient reports new pain     Problem: Discharge Planning  Goal: Discharge to home or other facility with appropriate resources  Recent Flowsheet Documentation  Taken 11/26/2022 0000 by Philomena Basurto RN  Discharge to home or other facility with appropriate resources: Identify barriers to discharge with patient and caregiver     Problem: ABCDS Injury Assessment  Goal: Absence of physical injury  Flowsheets (Taken 11/26/2022 0113)  Absence of Physical Injury: Implement safety measures based on patient assessment     Problem: Pain  Goal: Verbalizes/displays adequate comfort level or baseline comfort level  Outcome: Not Progressing  Flowsheets (Taken 11/26/2022 0113)  Verbalizes/displays adequate comfort level or baseline comfort level:   Encourage patient to monitor pain and request assistance   Assess pain using appropriate pain scale   Administer analgesics based on type and severity of pain and evaluate response   Implement non-pharmacological measures as appropriate and evaluate response   Consider cultural and social influences on pain and pain management   Notify Licensed Independent Practitioner if interventions unsuccessful or patient reports new pain

## 2022-11-26 NOTE — CARE COORDINATION
Case Management Assessment  Initial Evaluation    Date/Time of Evaluation: 11/26/2022 5:00 PM  Assessment Completed by: Rochelle Theodore RN    If patient is discharged prior to next notation, then this note serves as note for discharge by case management. Patient Name: Nehemiah Blackmon                   YOB: 1990  Diagnosis: Intracranial hemorrhage ([facility] [address] [I62.9]                   Date / Time: 11/25/2022 10:09 PM    Patient Admission Status: Inpatient   Readmission Risk (Low < 19, Mod (19-27), High > 27): Readmission Risk Score: 11    Current PCP: DEEDEE Acosta CNP  PCP verified by CM? Yes (Mino Gudino)    Chart Reviewed: Yes      History Provided by: Patient  Patient Orientation: Alert and Oriented    Patient Cognition: Alert    Hospitalization in the last 30 days (Readmission):  Yes    If yes, Readmission Assessment in CM Navigator will be completed. Advance Directives:      Code Status: Full Code   Patient's Primary Decision Maker is: Legal Next of Kin      Discharge Planning:    Patient lives with: Children Type of Home: House  Primary Care Giver: Self  Patient Support Systems include: Family Members, Children   Current Financial resources:    Current community resources:    Current services prior to admission: None            Current DME:              Type of Home Care services:  None    ADLS  Prior functional level: Independent in ADLs/IADLs  Current functional level: Independent in ADLs/IADLs    PT AM-PAC:   /24  OT AM-PAC:   /24    Family can provide assistance at DC: Yes  Would you like Case Management to discuss the discharge plan with any other family members/significant others, and if so, who?  No  Plans to Return to Present Housing: Yes  Other Identified Issues/Barriers to RETURNING to current housing: cares for two children  Potential Assistance needed at discharge: N/A            Potential DME:    Patient expects to discharge to: [address] [place] [address] for transportation at

## 2022-11-26 NOTE — H&P
Neuro ICU History & Physical    Patient Name: Jesus Dutta  Patient : 1990  Room/Bed: 0119/0119-01  Code Status: Full  Allergies: No Known Allergies    CHIEF COMPLAINT     Right sided weakness and numbness     HPI    History Obtained From: Patient    The patient is a 29 y.o. 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 She was getting her medication prescribed for headache, which she has been complaining of for the last 2 days, pounding in nature, localized to the left parietal area, nonradiating, 7/10, relief with over-the-counter pain medication. She was seen in ED 2 days ago had a CT imaging, no acute changes were found, patient was given a migraine cocktail and was discharged home. Eventually she was seen yesterday at [facility] had a CT head showing a 14 mm IPH of left superior frontal gyrus. CTA head was unremarkable for acute changes. The decision was to transfer patient to [facility]. Her blood pressure at outlying facility was 157/104  ICH score: 0  Of note patient was recently discharged  for a comm aneurysm retreatment. Patient was discharged on aspirin 81 mg and Plavix 75 mg    On evaluation her initial blood pressure on arrival was 136/103  Alert oriented following commands, 4+/5 right upper and lower extremity weakness, decrease sensation to light touch on of right upper and lower extremities.     Admitted to ICU From: [facility] ED  Reason for ICU Admission: IPH       PATIENT HISTORY   Past Medical History:        Diagnosis Date    Aneurysm ([address] 2022    brain    Wellness examination     Lena Johns CNP, PCP       Past Surgical History:        Procedure Laterality Date    CEREBRAL ANGIOGRAM  2022    CEREBRAL ANGIOGRAM Bilateral 2022    IR ANGIOGRAM CAROTID CEREBRAL BILATERAL, 1 stent, 2 coils    DILATION AND CURETTAGE OF UTERUS N/A 2022    DILATATION AND CURETTAGE HYSTEROSCOPY CAUTERY ABLATION-NOVASURE ENDOMETRIAL ABLATION performed by Pro Rondon DO at [address]  2022     IR ANGIOGRAM CAROTID CEREBRAL BILATERAL    OVARIAN CYST REMOVAL Right 2022    LAPAROSCOPIC- RIGHT OOPHERECTOMY performed by Pro Rondon DO at [facility]  2022         SALPINGECTOMY Bilateral 2022    SALPINGECTOMY LAPAROSCOPIC performed by Pro Rondon DO at [facility] History:   Social History     Socioeconomic History    Marital status: Single     Spouse name: Not on file    Number of children: Not on file    Years of education: Not on file    Highest education level: Not on file   Occupational History    Not on file   Tobacco Use    Smoking status: Former     Packs/day: 0.50     Years: 14.00     Pack years: 7.00     Types: Cigarettes     Quit date: 2022     Years since quittin.8    Smokeless tobacco: Current    Tobacco comments:     Nicotine pouches   Vaping Use    Vaping Use: Some days    Substances: Nicotine   Substance and Sexual Activity    Alcohol use: Not Currently    Drug use: No    Sexual activity: Yes     Partners: Male     Birth control/protection: Surgical   Other Topics Concern    Not on file   Social History Narrative    Not on file     Social Determinants of Health     Financial Resource Strain: Low Risk     Difficulty of Paying Living Expenses: Not hard at all   Food Insecurity: No Food Insecurity    Worried About Running Out of Food in the Last Year: Never true    Ran Out of Food in the Last Year: Never true   Transportation Needs: Not on file   Physical Activity: Not on file   Stress: Not on file   Social Connections: Not on file   Intimate Partner Violence: Not on file   Housing Stability: Not on file       Family History:   No family history on file. Allergies:    Patient has no known allergies. Medications Prior to Admission:    Medications Prior to Admission: dexamethasone (DECADRON) 4 MG tablet, Take 1 tablet by mouth 2 times daily as needed (headache)  pantoprazole (PROTONIX) 40 MG tablet, Take 1 tablet by mouth every morning (before breakfast) for 3 days  aspirin 81 MG chewable tablet, Take 1 tablet by mouth daily  clopidogrel (PLAVIX) 75 MG tablet, Take 1 tablet by mouth daily  Probiotic Product (PROBIOTIC BLEND PO), Take by mouth daily    Current Medications:  Current Facility-Administered Medications: dexamethasone (DECADRON) injection 2 mg, 2 mg, IntraVENous, Once  acetaminophen (TYLENOL) tablet 650 mg, 650 mg, Oral, Q4H PRN  ondansetron (ZOFRAN-ODT) disintegrating tablet 4 mg, 4 mg, Oral, Q8H PRN **OR** ondansetron (ZOFRAN) injection 4 mg, 4 mg, IntraVENous, Q6H PRN  0.9 % sodium chloride infusion, , IntraVENous, PRN  labetalol (NORMODYNE;TRANDATE) injection 10 mg, 10 mg, IntraVENous, Q4H PRN    REVIEW OF SYSTEMS     CONSTITUTIONAL: negative for fatigue and malaise   EYES: negative for double vision and photophobia    HEENT: negative for tinnitus and sore throat   RESPIRATORY: negative for cough, shortness of breath   CARDIOVASCULAR: negative for chest pain, palpitations, or syncope   GASTROINTESTINAL: negative for abdominal pain, nausea, vomiting, diarrhea, or constipation    GENITOURINARY: negative for incontinence or retention    MUSCULOSKELETAL: negative for neck or back pain, negative for extremity pain   NEUROLOGICAL: Negative for seizures, confusion, aphasia, dysarthria Positive for headaches, weakness, numbness.    PSYCHIATRIC: negative for agitation, hallucination, SI/HI   SKIN Negative for spontaneous contusions, rashes, or lesions      PHYSICAL EXAM:     BP (!) 122/90   Pulse 64   Temp 98.6 °F (37 °C) (Oral)   Resp 16   Ht 5' 2\" (1.575 m)   Wt 144 lb 1.6 oz (65.4 kg)   LMP 11/03/2022 (Approximate) Comment: had ablation and ovary removal - urine preg neg  SpO2 98%   BMI 26.36 kg/m²     PHYSICAL EXAM:  CONSTITUTIONAL:  Well developed, well nourished, alert and oriented x 3, in no acute distress. GCS 15. Nontoxic. No dysarthria. No aphasia. HEAD:  normocephalic, atraumatic    EYES:  PERRLA, EOMI.   ENT:  moist mucous membranes   LUNGS:  Equal air entry bilaterally   CARDIOVASCULAR:  normal s1 / s2   ABDOMEN:  Soft, no rigidity   NECK supple, symmetric, no midline tenderness to palpation    BACK without midline tenderness, step-offs or deformities    EXTREMITIES Normal ROM with no deformities   NEUROLOGIC:  Mental Status:  A & O x3,awake             Cranial Nerves:    cranial nerves II-XII are grossly intact    Motor Exam:    Drift: Absent  Tone:  normal    Motor exam is 4+ out of 5 right upper and right lower extremities  Motor exam is 5 out of 5 left upper and left lower extremities    Sensory:    Touch:    Decreased sensation to light touch on right upper and lower extremity, normal sensation on the left side. Deep Tendon Reflexes:    +2 all over    Plantar Response:  Right:  downgoing  Left:  downgoing    Clonus:  absent  Andino's:  absent    Coordination/Dysmetria:  Heel to Shin:  Right:  normal  Left:  normal  Finger to Nose:   Right:  normal  Left:  normal   Dysdiadochokinesia:  N/A    Gait:  normal   SKIN No obvious ecchymosis, rashes, or lesions    NIH Stroke Scale Total (if not done complete detailed one below):    1a.  Level of consciousness:  0 - alert; keenly responsive  1b. Level of consciousness questions:  0 - answers both questions correctly  1c. Level of consciousness questions:  0 - performs both tasks correctly  2. Best Gaze:  0 - normal  3. Visual:  0 - no visual loss  4. Facial Palsy:  0 - normal symmetric movement  5a.   Motor left arm:  0 - no drift, limb holds 90 (or 45) degrees for full 10 seconds  5b. Motor right arm:  0 - no drift, limb holds 90 (or 45) degrees for full 10 seconds  6a. Motor left le - no drift; leg holds 30 degree position for full 5 seconds  6b. Motor right le - no drift; leg holds 30 degree position for full 5 seconds  7. Limb Ataxia:  0 - absent  8. Sensory:  1 - mild to moderate sensory loss; patient feels pinprick is less sharp or is dull on the affected side; there is a loss of superficial pain with pinprick but patient is aware of being touched   9. Best Language:  0 - no aphasia, normal  10. Dysarthria:  0 - normal  11. Extinction and Inattention:  0 - no abnormality    LABS AND IMAGING:     RECENT LABS:  CBC with Differential:    Lab Results   Component Value Date/Time    WBC 10.3 2022 08:32 PM    RBC 4.68 2022 08:32 PM    RBC 4.35 2011 02:40 PM    HGB 13.6 2022 08:32 PM    HCT 40.1 2022 08:32 PM     2022 08:32 PM     2011 02:40 PM    MCV 85.7 2022 08:32 PM    MCH 29.1 2022 08:32 PM    MCHC 33.9 2022 08:32 PM    RDW 12.2 2022 08:32 PM    LYMPHOPCT 26 2022 08:32 PM    MONOPCT 6 2022 08:32 PM    BASOPCT 1 2022 08:32 PM    MONOSABS 0.62 2022 08:32 PM    LYMPHSABS 2.65 2022 08:32 PM    EOSABS 0.20 2022 08:32 PM    BASOSABS 0.09 2022 08:32 PM    DIFFTYPE NOT REPORTED 2022 01:20 PM     BMP:    Lab Results   Component Value Date/Time     2022 08:32 PM    K 3.7 2022 08:32 PM     2022 08:32 PM    CO2 23 2022 08:32 PM    BUN 9 2022 08:32 PM    LABALBU 4.5 2022 08:32 PM    CREATININE 0.72 2022 08:32 PM    CALCIUM 9.7 2022 08:32 PM    GFRAA >60 2022 09:38 AM    LABGLOM >60 2022 08:32 PM    GLUCOSE 131 2022 08:32 PM       RADIOLOGY:     CT head            Labs and Images reviewed with:    [] Zelda Garcia MD    [] Sy Holden MD  [x] Tra Alvarado, MD  --[] there are no new interval images to review. ASSESSMENT AND PLAN:         ASSESSMENT:     This is a 29 y.o. female with history of subarachnoid hemorrhage due to a, ruptured, s/p coiling 2022, s/p retreatment 2022, as a transfer from [place] due to right-sided weakness and numbness of upper and lower extremity, increasing headache over the past 2 days. On dual antiplatelet therapy with aspirin and Plavix. CT head showing a 14 mm IPH of the left frontal lobe. Patient care will be discussed with attending, will reevaluate patient along with attending. PLAN/MEDICAL DECISION MAKING:        NEUROLOGIC:  Left frontal lobe IPH  - CT head showing a 14 mm IPH of the left frontal lobe. - CTA head was unremarkable   - Repeat head CT at 2 a.m.  - Hold aspirin and Plavix  -Transfuse 1 unit of platelets  -Tylenol as needed for headache  -1 dose of Decadron 2 mg for headache.  - Goal SBP <140  - Neuro checks per protocol    CARDIOVASCULAR:  - Goal SBP <140  - Continue telemetry  - Labetalol 10 mg Q4 as needed    PULMONARY:  -Maintaining normal O2 saturation on room air    RENAL/FLUID/ELECTROLYTE:  -   BUN   Date Value Ref Range Status   2022 9 6 - 20 mg/dL Final     Creatinine   Date Value Ref Range Status   2022 0.72 0.50 - 0.90 mg/dL Final       - No intake or output data in the 24 hours ending 22 0349  -Monitor urine output  - IVF: None  - Replace electrolytes PRN  - Daily BMP    GI/NUTRITION:  NUTRITION:  ADULT DIET;  Regular  - Bowel regimen: None  - GI prophylaxis: NI    ID:  - Tmax Temp (24hrs), Av.4 °F (36.9 °C), Min:98.1 °F (36.7 °C), Max:98.6 °F (37 °C)  -   WBC   Date Value Ref Range Status   2022 10.3 3.5 - 11.3 k/uL Final   - Continue to monitor for fevers  - Daily CBC        HEME:   -   Hemoglobin   Date Value Ref Range Status   2022 13.6 11.9 - 15.1 g/dL Final     Hematocrit   Date Value Ref Range Status   2022 40.1 36.3 - 47.1 % Final Platelet Count   Date Value Ref Range Status   09/29/2011 289 140 - 450 k/uL Final     Platelets   Date Value Ref Range Status   11/25/2022 296 138 - 453 k/uL Final   - Daily CBC    ENDOCRINE:  - Continue to monitor blood glucose, goal <180      OTHER:  - PT/OT/ST       PROPHYLAXIS:  Stress ulcer: NI    DVT PROPHYLAXIS:  - SCD sleeves - Thigh High   - GERMAINE stockings - Thigh High      DISPOSITION: Neuro ICU      David La MD  Neuro Critical Care Service   Pager [phone]  11/26/2022     3:11 AM

## 2022-11-26 NOTE — ED NOTES
N2N attempted at [phone] Nurse was not available was told they would call me back when they can     Danielle Saldana RN  11/25/22 7196

## 2022-11-26 NOTE — ED NOTES
Patient taken to CT with cardiac monitors and Avni Farnsworth RN at cart side.       Randy Fajardo RN  11/25/22 2049

## 2022-11-26 NOTE — CONSULTS
Endovascular Neurosurgery Consult      Reason for evaluation: L ICH in frontal lobe, post Acomm stent assisted coiling    SUBJECTIVE:   History of Chief Complaint:        29 y.o. female past medical history of subarachnoid hemorrhage due to ruptured a comm aneurysm April 2022 s/p coil embolization. Patient had the remnant of the aneurysm treated with stent assisted coiling on the 11/21/22. Procedure went well and she was discharged uneventfully the next morning. Two days ago (11/24/22) pt c/o headache and went to the ED, she eventually went home when her headache improved. Yesterday, patient was complaining of right-sided leg weakness noticed when she was walking, have not fall down or lost consciousness, then progressed to right arm weakness, associated with right-sided numbness. She was getting her medication prescribed for headache, which she has been complaining of for the last 2 days, pounding in nature, localized to the left parietal area, nonradiating, 7/10, relief with over-the-counter pain medication. In the ED at [place] had a CT head showing a 14 mm IPH of left superior frontal gyrus. CTA head was unremarkable for acute changes. The decision was to transfer patient to [facility]. Her blood pressure at outlying facility was 157/104        ICH score: 0  Of note patient was recently discharged 11/21 for a comm aneurysm retreatment. Patient was discharged on aspirin 81 mg and Plavix 75 mg      Allergies  has No Known Allergies. Medications  Prior to Admission medications    Medication Sig Start Date End Date Taking?  Authorizing Provider   dexamethasone (DECADRON) 4 MG tablet Take 1 tablet by mouth 2 times daily as needed (headache) 11/25/22 11/28/22  Marthe Cheadle, MD   pantoprazole (PROTONIX) 40 MG tablet Take 1 tablet by mouth every morning (before breakfast) for 3 days 11/25/22 11/28/22  Marthe Cheadle, MD   aspirin 81 MG chewable tablet Take 1 tablet by mouth daily 11/23/22   Domenic Mooney Leone Bosworth, APRN - CNP   clopidogrel (PLAVIX) 75 MG tablet Take 1 tablet by mouth daily 11/10/22   Sanjeev Cortes MD   Probiotic Product (PROBIOTIC BLEND PO) Take by mouth daily    Historical Provider, MD    Scheduled Meds:   pantoprazole  40 mg Oral QAM AC    dexamethasone  2 mg Oral Q12H     Continuous Infusions:   sodium chloride       PRN Meds:.acetaminophen, ondansetron **OR** ondansetron, sodium chloride, labetalol  Past Medical History   has a past medical history of Aneurysm ([address] and Wellness examination. Past Surgical History   has a past surgical history that includes salpingectomy (Bilateral, 03/21/2022); ovarian cyst removal (Right, 03/21/2022); Dilation and curettage of uterus (N/A, 03/21/2022); other surgical history (04/18/2022); picc insertion vascular access team (04/25/2022); Cerebral angiogram (11/08/2022); and Cerebral angiogram (Bilateral, 11/21/2022). Social History   reports that she quit smoking about 10 months ago. Her smoking use included cigarettes. She has a 7.00 pack-year smoking history. She uses smokeless tobacco.   reports that she does not currently use alcohol. reports no history of drug use. Family History  family history is not on file. Review of Systems:  CONSTITUTIONAL:  negative for fevers, chills, fatigue and malaise    EYES:  negative for double vision, blurred vision and photophobia     HEENT:  negative for tinnitus, epistaxis and sore throat    RESPIRATORY:  negative for cough, shortness of breath, wheezing    CARDIOVASCULAR:  negative for chest pain, palpitations, syncope, edema    GASTROINTESTINAL:  negative for nausea, vomiting    GENITOURINARY:  negative for incontinence    MUSCULOSKELETAL:  negative for neck or back pain    NEUROLOGICAL:  Negative for weakness and tingling  negative for headaches and dizziness    PSYCHIATRIC:  negative for anxiety      Review of systems otherwise negative.       OBJECTIVE:     Vitals:    11/26/22 1400   BP: (!) 138/91   Pulse: 69 Resp: 22   Temp: 98.1 °F (36.7 °C)   SpO2: 95%        General:  Gen: normal habitus, NAD  HEENT: NCAT, mucosa moist  Cvs: RRR, S1 S2 normal  Resp: symmetric unlabored breathing  Abd: s/nd/nt  Ext: no edema  Skin: no lesions seen, warm and dry    Neuro:  Gen: awake and alert, oriented x3. Lang/speech: no aphasia or dysarthria. Follows commands. CN: PERRL, EOMI, VFF, V1-3 intact, face symmetric, hearing intact, shoulder shrug symmetric, tongue midline  Motor: grossly 5/5 UE and LE b/l  Sense: LT intact in all 4 ext. Coord: FTN and HTS intact b/l  DTR: deferred  Gait: narrow base gait    NIH Stroke Scale:   1a  Level of consciousness: 0 - alert; keenly responsive   1b. LOC questions:  0 - answers both questions correctly   1c. LOC commands: 0 - performs both tasks correctly   2. Best Gaze: 0 - normal   3. Visual: 0 - no visual loss   4. Facial Palsy: 0 - normal symmetric movement   5a. Motor left arm: 0 - no drift, limb holds 90 (or 45) degrees for full 10 seconds   5b. Motor right arm: 0 - no drift, limb holds 90 (or 45) degrees for full 10 seconds   6a. Motor left le - no drift; leg holds 30 degree position for full 5 seconds   6b  Motor right le - no drift; leg holds 30 degree position for full 5 seconds   7. Limb Ataxia: 0 - absent   8. Sensory: 1 - mild to moderate sensory loss; patient feels pinprick is less sharp or is dull on the affected side; there is a loss of superficial pain with pinprick but patient is aware of being touched    9. Best Language:  0 - no aphasia, normal   10. Dysarthria: 0 - normal   11. Extinction and Inattention: 0 - no abnormality         Total:   1     MRS: 1      LABS:   Reviewed.   Lab Results   Component Value Date    HGB 12.7 2022    WBC 10.8 2022     2022     2022    BUN 9 2022    CREATININE 0.64 2022    AST 15 2022    ALT 18 2022    MG 2.4 2022    APTT 22.6 2022    INR 1.0 2022 Lab Results   Component Value Date/Time    COVID19 Not Detected 04/18/2022 03:40 AM    COVID19 Not Detected 03/16/2022 09:58 AM       RADIOLOGY:   Images were personally reviewed including:    CT head on the 11/25/22: left posterior frontal ICH, repeat on the 11/26/22 is stable    ASSESSMENT:     29year old woman with hx of SAH from ruptured acomm in 4/2022, s/p coiling, on the 11/8/22 DSA found to have a remnant of the acomm aneurysm [address]. Patient had it treated with a stent assisted coiling on the 11/21/22 and went home the next day uneventfully. 5 days later, patient c/o right leg weakness and fell CT head showed a small posterior left frontal small ICH. PLAN:     - platelet transfusion given  - hold ASA and plavix  - keep SBP below 140  - repeat head CT tomorrow    - PT OT and rehab consult      Case discussed with Dr. Barbara Buckley attending.  Patient to follow up with Dr. Syed Beltran MD  Stroke, [facility]  [address]  Electronically signed 11/26/2022 at 6:46 PM

## 2022-11-26 NOTE — ED NOTES
[address] for an urgent consult with Dr. Garrick Quach. They will call back with him or the doctor covering on the line.       Татьяна Castro  11/25/22 2036

## 2022-11-26 NOTE — ED NOTES
Patient taken to CT with Barb Dial RN at [facility] and cardiac monitors in place.       Alyse Cutler RN  11/25/22 5065

## 2022-11-26 NOTE — CONSENT
Informed Consent for Blood Component Transfusion Note    I have discussed with the patient the rationale for blood component transfusion; its benefits in treating or preventing fatigue, organ damage, or death; and its risk which includes mild transfusion reactions, rare risk of blood borne infection, or more serious but rare reactions. I have discussed the alternatives to transfusion, including the risk and consequences of not receiving transfusion. The patient had an opportunity to ask questions and had agreed to proceed with transfusion of blood components.     Electronically signed by Prince Valentino MD on 11/26/22 at 3:09 AM EST

## 2022-11-27 ENCOUNTER — APPOINTMENT (OUTPATIENT)
Dept: CT IMAGING | Age: 32
DRG: 065 | End: 2022-11-27
Attending: PSYCHIATRY & NEUROLOGY
Payer: COMMERCIAL

## 2022-11-27 LAB
ABSOLUTE EOS #: <0.03 K/UL (ref 0–0.44)
ABSOLUTE IMMATURE GRANULOCYTE: 0.11 K/UL (ref 0–0.3)
ABSOLUTE LYMPH #: 1.31 K/UL (ref 1.1–3.7)
ABSOLUTE MONO #: 0.32 K/UL (ref 0.1–1.2)
ANION GAP SERPL CALCULATED.3IONS-SCNC: 13 MMOL/L (ref 9–17)
BASOPHILS # BLD: 0 % (ref 0–2)
BASOPHILS ABSOLUTE: 0.04 K/UL (ref 0–0.2)
BUN BLDV-MCNC: 10 MG/DL (ref 6–20)
CALCIUM SERPL-MCNC: 8.7 MG/DL (ref 8.6–10.4)
CHLORIDE BLD-SCNC: 105 MMOL/L (ref 98–107)
CO2: 18 MMOL/L (ref 20–31)
CREAT SERPL-MCNC: 0.54 MG/DL (ref 0.5–0.9)
EOSINOPHILS RELATIVE PERCENT: 0 % (ref 1–4)
ESTIMATED AVERAGE GLUCOSE: 111 MG/DL
GFR SERPL CREATININE-BSD FRML MDRD: >60 ML/MIN/1.73M2
GLUCOSE BLD-MCNC: 118 MG/DL (ref 70–99)
HBA1C MFR BLD: 5.5 % (ref 4–6)
HCT VFR BLD CALC: 39.1 % (ref 36.3–47.1)
HEMOGLOBIN: 12.7 G/DL (ref 11.9–15.1)
IMMATURE GRANULOCYTES: 1 %
LYMPHOCYTES # BLD: 9 % (ref 24–43)
MAGNESIUM: 3.1 MG/DL (ref 1.6–2.6)
MCH RBC QN AUTO: 29.1 PG (ref 25.2–33.5)
MCHC RBC AUTO-ENTMCNC: 32.5 G/DL (ref 28.4–34.8)
MCV RBC AUTO: 89.7 FL (ref 82.6–102.9)
MONOCYTES # BLD: 2 % (ref 3–12)
NRBC AUTOMATED: 0 PER 100 WBC
PDW BLD-RTO: 12.7 % (ref 11.8–14.4)
PLATELET # BLD: 329 K/UL (ref 138–453)
PMV BLD AUTO: 11.1 FL (ref 8.1–13.5)
POTASSIUM SERPL-SCNC: 4.1 MMOL/L (ref 3.7–5.3)
RBC # BLD: 4.36 M/UL (ref 3.95–5.11)
SEG NEUTROPHILS: 88 % (ref 36–65)
SEGMENTED NEUTROPHILS ABSOLUTE COUNT: 13.63 K/UL (ref 1.5–8.1)
SODIUM BLD-SCNC: 136 MMOL/L (ref 135–144)
WBC # BLD: 15.4 K/UL (ref 3.5–11.3)

## 2022-11-27 PROCEDURE — 6370000000 HC RX 637 (ALT 250 FOR IP): Performed by: STUDENT IN AN ORGANIZED HEALTH CARE EDUCATION/TRAINING PROGRAM

## 2022-11-27 PROCEDURE — 83735 ASSAY OF MAGNESIUM: CPT

## 2022-11-27 PROCEDURE — 85025 COMPLETE CBC W/AUTO DIFF WBC: CPT

## 2022-11-27 PROCEDURE — 6360000002 HC RX W HCPCS: Performed by: STUDENT IN AN ORGANIZED HEALTH CARE EDUCATION/TRAINING PROGRAM

## 2022-11-27 PROCEDURE — 94761 N-INVAS EAR/PLS OXIMETRY MLT: CPT

## 2022-11-27 PROCEDURE — 99222 1ST HOSP IP/OBS MODERATE 55: CPT | Performed by: PHYSICAL MEDICINE & REHABILITATION

## 2022-11-27 PROCEDURE — 6370000000 HC RX 637 (ALT 250 FOR IP)

## 2022-11-27 PROCEDURE — 36415 COLL VENOUS BLD VENIPUNCTURE: CPT

## 2022-11-27 PROCEDURE — 2500000003 HC RX 250 WO HCPCS

## 2022-11-27 PROCEDURE — 80048 BASIC METABOLIC PNL TOTAL CA: CPT

## 2022-11-27 PROCEDURE — 70450 CT HEAD/BRAIN W/O DYE: CPT

## 2022-11-27 PROCEDURE — 2000000000 HC ICU R&B

## 2022-11-27 RX ORDER — DEXAMETHASONE SODIUM PHOSPHATE 4 MG/ML
2 INJECTION, SOLUTION INTRA-ARTICULAR; INTRALESIONAL; INTRAMUSCULAR; INTRAVENOUS; SOFT TISSUE ONCE
Status: COMPLETED | OUTPATIENT
Start: 2022-11-27 | End: 2022-11-27

## 2022-11-27 RX ORDER — ASPIRIN 81 MG/1
81 TABLET ORAL DAILY
Status: DISCONTINUED | OUTPATIENT
Start: 2022-11-28 | End: 2022-11-29 | Stop reason: HOSPADM

## 2022-11-27 RX ORDER — DEXAMETHASONE 2 MG/1
2 TABLET ORAL ONCE
Status: DISCONTINUED | OUTPATIENT
Start: 2022-11-27 | End: 2022-11-27

## 2022-11-27 RX ORDER — MAGNESIUM SULFATE IN WATER 40 MG/ML
2000 INJECTION, SOLUTION INTRAVENOUS ONCE
Status: COMPLETED | OUTPATIENT
Start: 2022-11-27 | End: 2022-11-27

## 2022-11-27 RX ORDER — POLYETHYLENE GLYCOL 3350 17 G/17G
17 POWDER, FOR SOLUTION ORAL DAILY PRN
Status: DISCONTINUED | OUTPATIENT
Start: 2022-11-27 | End: 2022-11-29 | Stop reason: HOSPADM

## 2022-11-27 RX ORDER — TOPIRAMATE 25 MG/1
50 TABLET ORAL
Status: COMPLETED | OUTPATIENT
Start: 2022-11-27 | End: 2022-11-27

## 2022-11-27 RX ORDER — TOPIRAMATE 25 MG/1
25 TABLET ORAL NIGHTLY
Status: DISCONTINUED | OUTPATIENT
Start: 2022-11-27 | End: 2022-11-29 | Stop reason: HOSPADM

## 2022-11-27 RX ADMIN — POLYETHYLENE GLYCOL 3350 17 G: 17 POWDER, FOR SOLUTION ORAL at 23:01

## 2022-11-27 RX ADMIN — PANTOPRAZOLE SODIUM 40 MG: 40 TABLET, DELAYED RELEASE ORAL at 08:03

## 2022-11-27 RX ADMIN — DEXAMETHASONE SODIUM PHOSPHATE 2 MG: 4 INJECTION, SOLUTION INTRAMUSCULAR; INTRAVENOUS at 02:10

## 2022-11-27 RX ADMIN — ACETAMINOPHEN 650 MG: 325 TABLET ORAL at 00:02

## 2022-11-27 RX ADMIN — TOPIRAMATE 25 MG: 25 TABLET, FILM COATED ORAL at 20:01

## 2022-11-27 RX ADMIN — MAGNESIUM SULFATE HEPTAHYDRATE 2000 MG: 40 INJECTION, SOLUTION INTRAVENOUS at 01:55

## 2022-11-27 RX ADMIN — DEXAMETHASONE SODIUM PHOSPHATE 2 MG: 10 INJECTION INTRAMUSCULAR; INTRAVENOUS at 23:01

## 2022-11-27 RX ADMIN — ACETAMINOPHEN 650 MG: 325 TABLET ORAL at 21:09

## 2022-11-27 RX ADMIN — TOPIRAMATE 50 MG: 25 TABLET, FILM COATED ORAL at 11:52

## 2022-11-27 RX ADMIN — Medication 10 MG: at 00:03

## 2022-11-27 RX ADMIN — DEXAMETHASONE SODIUM PHOSPHATE 2 MG: 10 INJECTION INTRAMUSCULAR; INTRAVENOUS at 11:52

## 2022-11-27 NOTE — PROGRESS NOTES
Endovascular Neurosurgery  Progress Note      Reason for evaluation: L ICH in frontal lobe, post Acomm stent assisted coiling    SUBJECTIVE:     Doing well, no complaint, feel almost normal    Review of Systems:  CONSTITUTIONAL:  negative for fevers, chills, fatigue and malaise    EYES:  negative for double vision, blurred vision and photophobia     HEENT:  negative for tinnitus, epistaxis and sore throat    RESPIRATORY:  negative for cough, shortness of breath, wheezing    CARDIOVASCULAR:  negative for chest pain, palpitations, syncope, edema    GASTROINTESTINAL:  negative for nausea, vomiting    GENITOURINARY:  negative for incontinence    MUSCULOSKELETAL:  negative for neck or back pain    NEUROLOGICAL:  Negative for weakness and tingling  negative for headaches and dizziness    PSYCHIATRIC:  negative for anxiety      Review of systems otherwise negative. OBJECTIVE:     Vitals:    11/27/22 1028   BP:    Pulse: 69   Resp: 17   Temp:    SpO2: 99%        General:  Gen: normal habitus, NAD  HEENT: NCAT, mucosa moist  Cvs: RRR, S1 S2 normal  Resp: symmetric unlabored breathing  Abd: s/nd/nt  Ext: no edema  Skin: no lesions seen, warm and dry    Neuro:  Gen: awake and alert, oriented x3. Lang/speech: no aphasia or dysarthria. Follows commands. CN: PERRL, EOMI, VFF, V1-3 intact, face symmetric, hearing intact, shoulder shrug symmetric, tongue midline  Motor: grossly 5/5 UE and LE b/l  Sense: LT intact in all 4 ext. Coord: FTN and HTS intact b/l  DTR: deferred  Gait: narrow base gait    NIH Stroke Scale:   1a  Level of consciousness: 0 - alert; keenly responsive   1b. LOC questions:  0 - answers both questions correctly   1c. LOC commands: 0 - performs both tasks correctly   2. Best Gaze: 0 - normal   3. Visual: 0 - no visual loss   4. Facial Palsy: 0 - normal symmetric movement   5a. Motor left arm: 0 - no drift, limb holds 90 (or 45) degrees for full 10 seconds   5b.   Motor right arm: 0 - no drift, limb holds 90 (or 45) degrees for full 10 seconds   6a. Motor left le - no drift; leg holds 30 degree position for full 5 seconds   6b  Motor right le - no drift; leg holds 30 degree position for full 5 seconds   7. Limb Ataxia: 0 - absent   8. Sensory: 0   9. Best Language:  0 - no aphasia, normal   10. Dysarthria: 0 - normal   11. Extinction and Inattention: 0 - no abnormality         Total:   0     MRS: 0      LABS:   Reviewed. Lab Results   Component Value Date    HGB 12.7 2022    WBC 15.4 (H) 2022     2022     2022    BUN 10 2022    CREATININE 0.54 2022    AST 15 2022    ALT 18 2022    MG 3.1 (H) 2022    APTT 22.6 2022    INR 1.0 2022      Lab Results   Component Value Date/Time    COVID19 Not Detected 2022 03:40 AM    COVID19 Not Detected 2022 09:58 AM       RADIOLOGY:   Images were personally reviewed including:    CT head on the 22: left posterior frontal ICH, repeat on the 22 is stable    ASSESSMENT:     29year old woman with hx of SAH from ruptured acomm in 2022, s/p coiling, on the 22 DSA found to have a remnant of the acomm aneurysm [address] 3. Patient had it treated with a stent assisted coiling on the 22 and went home the next day uneventfully. 5 days later, patient c/o right leg weakness and fell CT head showed a small posterior left frontal small ICH. PLAN:     - repeat head CT is stable  - resume ASA 81mg daily today  - keep SBP below 140  - repeat head CT again tomorrow after ASA resumed  - headache improved with decadron and magnesium, it is noted during the ICU stay, patient's headache translate into higher BP    - PT OT and rehab consult      Case discussed with Dr. Gavin Rodriguez attending.  Patient to follow up with Dr. Hilton Joiner MD  Stroke, [facility]  [address]  Electronically signed 11/27/2022 at 6:45 PM

## 2022-11-27 NOTE — PLAN OF CARE
Problem: Discharge Planning  Goal: Discharge to home or other facility with appropriate resources  11/27/2022 0754 by Kimo Israel RN  Outcome: Progressing  11/27/2022 0418 by Luz Young RN  Outcome: Progressing     Problem: ABCDS Injury Assessment  Goal: Absence of physical injury  11/27/2022 0754 by Kimo Israel RN  Outcome: Progressing  11/27/2022 0418 by Luz Young RN  Outcome: Progressing     Problem: Safety - Adult  Goal: Free from fall injury  11/27/2022 0754 by Kimo Israel RN  Outcome: Progressing  11/27/2022 0418 by Luz Young RN  Outcome: Progressing     Problem: Pain  Goal: Verbalizes/displays adequate comfort level or baseline comfort level  11/27/2022 0754 by Kimo Israel RN  Outcome: Progressing  11/27/2022 0418 by Luz Young RN  Outcome: Progressing

## 2022-11-27 NOTE — PROGRESS NOTES
[facility]  PROGRESS NOTE    Shift date: 11/27/2022  Shift day: Sunday   Shift # 1    Room # 9725/8967-15   Name: Isaiah Solo                Rastafari: Unknown   Place of Confucianism: Unknown    Referral: Routine Visit    Admit Date & Time: 11/25/2022 10:09 PM    Assessment:  Isaiah Solo is a 29 y.o. female in the hospital because intracranial hemorrhage. Upon entering the room writer observes patient is sitting on side of bed, wearing a cap. There is a stack of reading materials on the bedside table. Patient is looking down at her phone. Intervention:  Writer introduced self and title as  Writer offered space for patient  to express feelings, needs, and concerns and provided a ministry presence. Patient says looks at  and smiles. Patient says she is doing good and is looking forward to going home to be with her two boys, ages 10 and 6. Outcome:   listened to patient, and was glad for patient doing so well. Patient said there was nothing for the  to do today, but she was grateful for the visit. Plan:  Chaplains will remain available to offer spiritual and emotional support as needed. Electronically signed by Lucinda Dobbins, on 11/27/2022 at 2:35 PM.  Hema Murdock  [phone]       11/27/22 1434   Encounter Summary   Service Provided For: Patient   Referral/Consult From: [facility] Unknown   Last Encounter  11/27/22   Complexity of Encounter Low   Begin Time 1425   End Time  1430   Total Time Calculated 5 min   Assessment/Intervention/Outcome   Assessment Calm;Coping; Hopeful   Intervention Active listening;Sustaining Presence/Ministry of presence   Outcome Connection/Belonging;Coping;Engaged in conversation;Encouraged;Expressed Gratitude

## 2022-11-27 NOTE — CONSULTS
Physical Medicine & Rehabilitation  Consult Note      Admitting Physician: Mack Canas MD    Primary Care Provider: DEEDEE Ho CNP     Reason for Consult:  Acute Inpatient Rehabilitation    Chief Complaint: Right-sided weakness and numbness    History of Present Illness:  Referring Provider is requesting an evaluation for appropriate placement upon discharge from acute care. Ms. Kadi Valadez is a 29 y.o. female who was admitted to [facility] 11/25/2022 with No chief complaint on file. 29-year-old female with history of subarachnoid hemorrhage due to ruptured, comminuted aneurysm in April 2022 status post coil embolization, currently transferred from [place] to the right-sided weakness and numbness recently seen in the ED had CT head no acute changes given migraine cocktail for headaches. Seen yesterday in [place] found to have a 14 mm IPH of the left superior frontal gyrus. TA head was unremarkable transfer [facility]    Endovascular-noted history of subarachnoid hemorrhage with rupture anterior communicating artery in 4/2022 on 11 8/22 DSA found to have a remnant of anterior communicating artery aneurysm treated with a stent and coiling on 11/21/2022 and was discharged home 5 days later began with some right leg weakness fell and found to have a posterior left frontal small intracranial hemorrhage-holding aspirin and Plavix. Follow-up CT      Neuro critical transfuse 1 unit of platelets on Decadron for headaches and mag sulfate    Radiology:  CTA HEAD W WO CONTRAST    Result Date: 11/25/2022  Status post aneurysm coiling of anterior communicating artery with no evidence of discrete residual or recurrent aneurysm. The metallic density within the left KRISSY A1 segment and the right KRISSY A2 segment were not present on the head CT of 05/01/2022 and MRA of 09/30/2022. However they appear unchanged since head CT of 11/24/2022. . No hemodynamically significant stenosis.  RECOMMENDATIONS: Unavailable     CT HEAD WO CONTRAST    Result Date: 11/27/2022  Stable CT appearance of the head compared to study from 11/26/2022. CT head without contrast    Result Date: 11/26/2022  Stable exam compared to 6 hours earlier with small parenchymal hematoma at the posterior left frontal deep white matter. Status post A-comm region aneurysm coiling. CT Head WO Contrast    Result Date: 11/25/2022  Interval development of 14 mm intraparenchymal hemorrhage within the left superior frontal gyrus subcortical white matter. Stable post coiling appearance of anterior communicating artery. No acute territorial infarction or intracranial mass lesion. Critical results were called by Dr. Denver Snowden MD to Dr King Kay on 11/25/2022 at 21:01. CT Head WO Contrast    Result Date: 11/24/2022  No acute intracranial abnormality. XR CHEST PORTABLE    Result Date: 11/25/2022  No acute cardiopulmonary abnormality. Review of Systems:  Constitutional: negative for anorexia, chills, fatigue, fevers, sweats and weight loss  Eyes: negative for redness and visual disturbance  Ears, nose, mouth, throat, and face: negative for earaches, sore throat and tinnitus  Respiratory: negative for cough and shortness of breath  Cardiovascular: negative for chest pain, dyspnea and palpitations  Gastrointestinal: negative for abdominal pain, change in bowel habits, constipation, nausea and vomiting  Genitourinary:negative for dysuria, frequency, hesitancy and urinary incontinence  Integument/breast: negative for pruritus and rash  Musculoskeletal:negative for muscle weakness and stiff joints  Neurological: negative for dizziness, headaches and weakness  Behavioral/Psych: negative for decreased appetite, depression and fatigue    Functional History:  PTA: Independent with all activities. Current:  PT:                                 OT:             ST:  Diagnosis: Pt presents without cognitive-communication deficits at this time.  Pt educated on monitoring for changes in these skills and to seek OP services if necessary. ST no longer needed at this time.  Re-consult as appropriate    Past Medical History:        Diagnosis Date    Aneurysm ([address] 04/2022    brain    Wellness examination     Victor Manuel Bolanos CNP, PCP       Past Surgical History:        Procedure Laterality Date    CEREBRAL ANGIOGRAM  11/08/2022    CEREBRAL ANGIOGRAM Bilateral 11/21/2022    IR ANGIOGRAM CAROTID CEREBRAL BILATERAL, 1 stent, 2 coils    DILATION AND CURETTAGE OF UTERUS N/A 03/21/2022    DILATATION AND CURETTAGE HYSTEROSCOPY CAUTERY ABLATION-NOVASURE ENDOMETRIAL ABLATION performed by Buck Guzman DO at [address]  04/18/2022     IR ANGIOGRAM CAROTID CEREBRAL BILATERAL    OVARIAN CYST REMOVAL Right 03/21/2022    LAPAROSCOPIC- RIGHT OOPHERECTOMY [facility]  04/25/2022         SALPINGECTOMY Bilateral 03/21/2022    SALPINGECTOMY LAPAROSCOPIC performed by Buck Guzman DO at [address] [place] [address] Avenue:    No Known Allergies     Current Medications:   Current Facility-Administered Medications: topiramate (TOPAMAX) tablet 25 mg, 25 mg, Oral, Nightly  pantoprazole (PROTONIX) tablet 40 mg, 40 mg, Oral, QAM AC  dexamethasone (DECADRON) injection 2 mg, 2 mg, Oral, Q12H  acetaminophen (TYLENOL) tablet 650 mg, 650 mg, Oral, Q4H PRN  ondansetron (ZOFRAN-ODT) disintegrating tablet 4 mg, 4 mg, Oral, Q8H PRN **OR** ondansetron (ZOFRAN) injection 4 mg, 4 mg, IntraVENous, Q6H PRN  0.9 % sodium chloride infusion, , IntraVENous, PRN  labetalol (NORMODYNE;TRANDATE) injection 10 mg, 10 mg, IntraVENous, Q4H PRN    Social History:  Social History     Socioeconomic History    Marital status: Single     Spouse name: Not on file    Number of children: Not on file    Years of education: Not on file    Highest education level: Not on file   Occupational History    Not on file   Tobacco Use    Smoking status: Former     Packs/day: 0.50     Years: 14.00     Pack years: 7.00     Types: Cigarettes     Quit date: 2022     Years since quittin.8    Smokeless tobacco: Current    Tobacco comments:     Nicotine pouches   Vaping Use    Vaping Use: Some days    Substances: Nicotine   Substance and Sexual Activity    Alcohol use: Not Currently    Drug use: No    Sexual activity: Yes     Partners: Male     Birth control/protection: Surgical   Other Topics Concern    Not on file   Social History Narrative    Not on file     Social Determinants of Health     Financial Resource Strain: Low Risk     Difficulty of Paying Living Expenses: Not hard at all   Food Insecurity: No Food Insecurity    Worried About Running Out of Food in the Last Year: Never true    Ran Out of Food in the Last Year: Never true   Transportation Needs: Not on file   Physical Activity: Not on file   Stress: Not on file   Social Connections: Not on file   Intimate Partner Violence: Not on file   Housing Stability: Not on file     With kids    Family History:   No family history on file. Physical Exam:    BP (!) 136/106   Pulse 69   Temp 98.2 °F (36.8 °C) (Oral)   Resp 17   Ht 5' 2\" (1.575 m)   Wt 144 lb 1.6 oz (65.4 kg)   LMP 2022 (Approximate) Comment: had ablation and ovary removal - urine preg neg  SpO2 99%   BMI 26.36 kg/m²     General appearance: alert, appears stated age, cooperative, and no distress  HEENT: Normocephalic, without obvious abnormality, atraumatic               Eyes: conjunctivae clear. Throat: tongue normal.               Neck:  symmetrical, trachea midline. Pulm: clear to auscultation bilaterally. Cardiac: regular rate and rhythm, no murmur. Abdomen: soft, non-tender; bowel sounds normal.  MSK: extremities normal, atraumatic, no edema, normal tone.    ROM: Functional range of motion upper and lower extremities  Mental status/Psych: Alert, orientedX3, thought content appropriate. Knew year, president and location, follows commands, names object  Skin:     Neuro:      Sensory: Intact in BUE and BLE to soft and pin sensation. Motor: Muscle tone and bulk are normal bilaterally. No pronator drift. 5/5 right upper and lower extremity 5/5 left upper lower extremity, possible mild dysmetria right-mild        Diagnostics:  CBC   Lab Results   Component Value Date/Time    WBC 15.4 11/27/2022 04:11 AM    RBC 4.36 11/27/2022 04:11 AM    RBC 4.35 09/29/2011 02:40 PM    HGB 12.7 11/27/2022 04:11 AM    HCT 39.1 11/27/2022 04:11 AM    MCV 89.7 11/27/2022 04:11 AM    RDW 12.7 11/27/2022 04:11 AM     11/27/2022 04:11 AM     09/29/2011 02:40 PM     BMP    Lab Results   Component Value Date/Time     11/27/2022 04:11 AM    K 4.1 11/27/2022 04:11 AM     11/27/2022 04:11 AM    CO2 18 11/27/2022 04:11 AM    BUN 10 11/27/2022 04:11 AM     Uric Acid  No components found for: URIC  VITAMIN B12 No components found for: B12  PT/INR  No results found for: PTINR      Impression: Ms. Skyler Ray is a 29 y.o. female with a history of Intracranial hemorrhage ([facility] [address]    Intracranial hemorrhage-aspirin Plavix on hold  . Subarachnoid hemorrhage due to ruptured aneurysm in April 2022 status post DSA and stent placement in 11/21/2022  Migraine headaches-Decadron, Topamax    Recommendations:  1. Diagnosis: Intracranial hemorrhage  2. Therapy: Await therapy evaluation  3. Medical  Necessity: As above  4. Support: Clarify  5. Rehab recommendation:recommendation follow therapy evaluations or she feels she is close to her baseline and doing well feels that she can go home-however recommendations post therapy evaluations  6. DVT proph: None due to intracranial hemorrhage if inpatient rehab will need Doppler screen 24 to 48 hours prior to transfer    It was my pleasure to evaluate Skyler Ray today. Please call with questions. Henrietta Romano. Martha Soto MD          This note is created with the assistance of a speech recognition program.  While intending to generate a document that actually reflects the content of the visit, the document can still have some errors including those of syntax and sound a like substitutions which may escape proof reading.   In such instances, actual meaning can be extrapolated by contextual diversion

## 2022-11-27 NOTE — PROGRESS NOTES
Neuro ICU History & Physical    Patient Name: Abisai Chávez  Patient : 1990  Room/Bed: 0119/0119-01  Code Status: Full  Allergies: No Known Allergies    CHIEF COMPLAINT     Right sided weakness and numbness     HPI    History Obtained From: Patient    The patient is a 29 y.o. 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 She was getting her medication prescribed for headache, which she has been complaining of for the last 2 days, pounding in nature, localized to the left parietal area, nonradiating, 7/10, relief with over-the-counter pain medication. She was seen in ED 2 days ago had a CT imaging, no acute changes were found, patient was given a migraine cocktail and was discharged home. Eventually she was seen yesterday at [place] had a CT head showing a 14 mm IPH of left superior frontal gyrus. CTA head was unremarkable for acute changes. The decision was to transfer patient to [facility]. Her blood pressure at outlying facility was 157/104  ICH score: 0  Of note patient was recently discharged  for a comm aneurysm retreatment. Patient was discharged on aspirin 81 mg and Plavix 75 mg    On evaluation her initial blood pressure on arrival was 136/103  Alert oriented following commands, 4+/5 right upper and lower extremity weakness, decrease sensation to light touch on of right upper and lower extremities. Last 24 hours   Overnight, patient continued to complain of headaches. No change in neurological exam. She received Decadron 2 mg x 1 on top of her scheduled Decadron. Tylenol 650 mg, and Magnesium 2 g IV. BP increased to 155/106  for which she received Labetalol 10 mg.  Repeat CTH today shows stable left frontal IPH.      Admitted to ICU From: [facility]  Reason for ICU Admission: IPH       PATIENT HISTORY   Past Medical History:        Diagnosis Date    Aneurysm ([address] 2022    brain    Wellness examination     Sophie Hernandez, CNP, PCP       Past Surgical History:        Procedure Laterality Date    CEREBRAL ANGIOGRAM  2022    CEREBRAL ANGIOGRAM Bilateral 2022    IR ANGIOGRAM CAROTID CEREBRAL BILATERAL, 1 stent, 2 coils    DILATION AND CURETTAGE OF UTERUS N/A 2022    DILATATION AND CURETTAGE HYSTEROSCOPY CAUTERY ABLATION-NOVASURE ENDOMETRIAL ABLATION performed by Niecy De León DO at [address]  2022     IR ANGIOGRAM CAROTID CEREBRAL BILATERAL    OVARIAN CYST REMOVAL Right 2022    LAPAROSCOPIC- RIGHT OOPHERECTOMY performed by Niecy De León DO at [address]  2022         SALPINGECTOMY Bilateral 2022    SALPINGECTOMY LAPAROSCOPIC performed by Niecy De León DO at [facility] History:   Social History     Socioeconomic History    Marital status: Single     Spouse name: Not on file    Number of children: Not on file    Years of education: Not on file    Highest education level: Not on file   Occupational History    Not on file   Tobacco Use    Smoking status: Former     Packs/day: 0.50     Years: 14.00     Pack years: 7.00     Types: Cigarettes     Quit date: 2022     Years since quittin.8    Smokeless tobacco: Current    Tobacco comments:     Nicotine pouches   Vaping Use    Vaping Use: Some days    Substances: Nicotine   Substance and Sexual Activity    Alcohol use: Not Currently    Drug use: No    Sexual activity: Yes     Partners: Male     Birth control/protection: Surgical   Other Topics Concern    Not on file   Social History Narrative    Not on file     Social Determinants of Health     Financial Resource Strain: Low Risk     Difficulty of Paying Living Expenses: Not hard at all   Food Insecurity: No Food Insecurity    Worried About Running Out of Food in the Last Year: Never true    Ran Out of Food in the Last Year: Never true   Transportation Needs: Not on file   Physical Activity: Not on file   Stress: Not on file   Social Connections: Not on file   Intimate Partner Violence: Not on file   Housing Stability: Not on file       Family History:   No family history on file. Allergies:    Patient has no known allergies.     Medications Prior to Admission:    Medications Prior to Admission: dexamethasone (DECADRON) 4 MG tablet, Take 1 tablet by mouth 2 times daily as needed (headache)  pantoprazole (PROTONIX) 40 MG tablet, Take 1 tablet by mouth every morning (before breakfast) for 3 days  aspirin 81 MG chewable tablet, Take 1 tablet by mouth daily  clopidogrel (PLAVIX) 75 MG tablet, Take 1 tablet by mouth daily  Probiotic Product (PROBIOTIC BLEND PO), Take by mouth daily    Current Medications:  Current Facility-Administered Medications: pantoprazole (PROTONIX) tablet 40 mg, 40 mg, Oral, QAM AC  dexamethasone (DECADRON) injection 2 mg, 2 mg, Oral, Q12H  acetaminophen (TYLENOL) tablet 650 mg, 650 mg, Oral, Q4H PRN  ondansetron (ZOFRAN-ODT) disintegrating tablet 4 mg, 4 mg, Oral, Q8H PRN **OR** ondansetron (ZOFRAN) injection 4 mg, 4 mg, IntraVENous, Q6H PRN  0.9 % sodium chloride infusion, , IntraVENous, PRN  labetalol (NORMODYNE;TRANDATE) injection 10 mg, 10 mg, IntraVENous, Q4H PRN    REVIEW OF SYSTEMS     CONSTITUTIONAL: negative for fatigue and malaise   EYES: negative for double vision and photophobia    HEENT: negative for tinnitus and sore throat   RESPIRATORY: negative for cough, shortness of breath   CARDIOVASCULAR: negative for chest pain, palpitations, or syncope   GASTROINTESTINAL: negative for abdominal pain, nausea, vomiting, diarrhea, or constipation    GENITOURINARY: negative for incontinence or retention    MUSCULOSKELETAL: negative for neck or back pain, negative for extremity pain   NEUROLOGICAL: Negative for seizures, confusion, aphasia, dysarthria Positive for headaches, weakness, numbness. PSYCHIATRIC: negative for agitation, hallucination, SI/HI   SKIN Negative for spontaneous contusions, rashes, or lesions      PHYSICAL EXAM:     /79   Pulse 71   Temp 98.2 °F (36.8 °C) (Oral)   Resp 29   Ht 5' 2\" (1.575 m)   Wt 144 lb 1.6 oz (65.4 kg)   LMP 11/03/2022 (Approximate) Comment: had ablation and ovary removal - urine preg neg  SpO2 97%   BMI 26.36 kg/m²     PHYSICAL EXAM:  CONSTITUTIONAL:  Well developed, well nourished, alert and oriented x 3, in no acute distress. GCS 15. Nontoxic. No dysarthria. No aphasia. HEAD:  normocephalic, atraumatic    EYES:  PERRLA, EOMI.   ENT:  moist mucous membranes   LUNGS:  Equal air entry bilaterally   CARDIOVASCULAR:  normal s1 / s2   ABDOMEN:  Soft, no rigidity   NECK supple, symmetric, no midline tenderness to palpation    BACK without midline tenderness, step-offs or deformities    EXTREMITIES Normal ROM with no deformities   NEUROLOGIC:  Mental Status:  A & O x3,awake             Cranial Nerves:    cranial nerves II-XII are grossly intact    Motor Exam:    Drift: Absent  Tone:  normal    Motor exam is 4+ out of 5 right upper and right lower extremities  Motor exam is 5 out of 5 left upper and left lower extremities    Sensory:    Touch:    Decreased sensation to light touch on right upper and lower extremity, normal sensation on the left side.     Deep Tendon Reflexes:    +2 all over    Plantar Response:  Right:  downgoing  Left:  downgoing    Clonus:  absent  Andino's:  absent    Coordination/Dysmetria:  Heel to Shin:  Right:  normal  Left:  normal  Finger to Nose:   Right:  normal  Left:  normal   Dysdiadochokinesia:  N/A    Gait:  normal   SKIN No obvious ecchymosis, rashes, or lesions    NIH Stroke Scale Total (if not done complete detailed one below):    1a.  Level of consciousness:  0 - alert; keenly responsive  1b. Level of consciousness questions:  0 - answers both questions correctly  1c. Level of consciousness questions:  0 - performs both tasks correctly  2. Best Gaze:  0 - normal  3. Visual:  0 - no visual loss  4. Facial Palsy:  0 - normal symmetric movement  5a. Motor left arm:  0 - no drift, limb holds 90 (or 45) degrees for full 10 seconds  5b. Motor right arm:  0 - no drift, limb holds 90 (or 45) degrees for full 10 seconds  6a. Motor left le - no drift; leg holds 30 degree position for full 5 seconds  6b. Motor right le - no drift; leg holds 30 degree position for full 5 seconds  7. Limb Ataxia:  0 - absent  8. Sensory:  1 - mild to moderate sensory loss; patient feels pinprick is less sharp or is dull on the affected side; there is a loss of superficial pain with pinprick but patient is aware of being touched   9. Best Language:  0 - no aphasia, normal  10. Dysarthria:  0 - normal  11.   Extinction and Inattention:  0 - no abnormality    LABS AND IMAGING:     RECENT LABS:  CBC with Differential:    Lab Results   Component Value Date/Time    WBC 15.4 2022 04:11 AM    RBC 4.36 2022 04:11 AM    RBC 4.35 2011 02:40 PM    HGB 12.7 2022 04:11 AM    HCT 39.1 2022 04:11 AM     2022 04:11 AM     2011 02:40 PM    MCV 89.7 2022 04:11 AM    MCH 29.1 2022 04:11 AM    MCHC 32.5 2022 04:11 AM    RDW 12.7 2022 04:11 AM    LYMPHOPCT 9 2022 04:11 AM    MONOPCT 2 2022 04:11 AM    BASOPCT 0 2022 04:11 AM    MONOSABS 0.32 2022 04:11 AM    LYMPHSABS 1.31 2022 04:11 AM    EOSABS <0.03 2022 04:11 AM    BASOSABS 0.04 2022 04:11 AM    DIFFTYPE NOT REPORTED 2022 01:20 PM     BMP:    Lab Results   Component Value Date/Time     2022 04:11 AM    K 4.1 2022 04:11 AM     2022 04:11 AM    CO2 18 2022 04:11 AM    BUN 10 2022 04:11 AM    LABALBU 4.5 11/25/2022 08:32 PM    CREATININE 0.54 11/27/2022 04:11 AM    CALCIUM 8.7 11/27/2022 04:11 AM    GFRAA >60 09/30/2022 09:38 AM    LABGLOM >60 11/27/2022 04:11 AM    GLUCOSE 118 11/27/2022 04:11 AM       RADIOLOGY:     CT head            Labs and Images reviewed with:    [] Zelda Thomson MD    [] Liz Kelly MD  [x] Bevely Meigs, MD  --[] there are no new interval images to review. ASSESSMENT AND PLAN:         ASSESSMENT:     This is a 29 y.o. female with history of subarachnoid hemorrhage due to a, ruptured, s/p coiling 4/2022, s/p retreatment 11/8/2022, as a transfer from [place] due to right-sided weakness and numbness of upper and lower extremity, increasing headache over the past 2 days. On dual antiplatelet therapy with aspirin and Plavix. CT head showing a 14 mm IPH of the left frontal lobe. Patient care will be discussed with attending, will reevaluate patient along with attending. PLAN/MEDICAL DECISION MAKING:        NEUROLOGIC:  Left frontal lobe IPH  - CT head showing a 14 mm IPH of the left frontal lobe.     - CTA head was unremarkable   - Repeat head CT at 2 a.m.  - Hold aspirin and Plavix  -Transfuse 1 unit of platelets  -Tylenol as needed for headache  -Decadron 2 mg bid for headaches   - Mag sulfate x 2 for headaches  - Goal SBP <140  - Neuro checks per protocol    CARDIOVASCULAR:  - Goal SBP <140  - Continue telemetry  - Labetalol 10 mg Q4 as needed    PULMONARY:  -Maintaining normal O2 saturation on room air    RENAL/FLUID/ELECTROLYTE:  -   BUN   Date Value Ref Range Status   11/27/2022 10 6 - 20 mg/dL Final     Creatinine   Date Value Ref Range Status   11/27/2022 0.54 0.50 - 0.90 mg/dL Final       -   Intake/Output Summary (Last 24 hours) at 11/27/2022 1312  Last data filed at 11/26/2022 1854  Gross per 24 hour   Intake 289.84 ml   Output --   Net 289.84 ml     -Monitor urine output  - IVF: None  - Replace electrolytes PRN  - Daily BMP    GI/NUTRITION:  NUTRITION:  ADULT DIET;  Regular  - Bowel regimen: None  - GI prophylaxis: NI    ID:  - Tmax Temp (24hrs), Av.1 °F (36.7 °C), Min:97.6 °F (36.4 °C), Max:98.7 °F (37.1 °C)  -   WBC   Date Value Ref Range Status   2022 15.4 (H) 3.5 - 11.3 k/uL Final   - Continue to monitor for fevers  - Daily CBC        HEME:   -   Hemoglobin   Date Value Ref Range Status   2022 12.7 11.9 - 15.1 g/dL Final     Hematocrit   Date Value Ref Range Status   2022 39.1 36.3 - 47.1 % Final     Platelet Count   Date Value Ref Range Status   2011 289 140 - 450 k/uL Final     Platelets   Date Value Ref Range Status   2022 329 138 - 453 k/uL Final   - Daily CBC    ENDOCRINE:  - Continue to monitor blood glucose, goal <180      OTHER:  - PT/OT/ST   -PM&R    PROPHYLAXIS:  Stress ulcer: NI    DVT PROPHYLAXIS:  - SCD sleeves - Thigh High         DISPOSITION: Neuro ICU      Jhon Mann MD  Neuro Critical Care Service   Pager [phone]  2022     6:14 AM

## 2022-11-28 ENCOUNTER — APPOINTMENT (OUTPATIENT)
Dept: CT IMAGING | Age: 32
DRG: 065 | End: 2022-11-28
Attending: PSYCHIATRY & NEUROLOGY
Payer: COMMERCIAL

## 2022-11-28 PROBLEM — I61.1 NONTRAUMATIC CORTICAL HEMORRHAGE OF LEFT CEREBRAL HEMISPHERE (HCC): Status: ACTIVE | Noted: 2022-11-28

## 2022-11-28 PROBLEM — Z98.890 HISTORY OF CEREBRAL ANEURYSM REPAIR: Status: ACTIVE | Noted: 2022-11-28

## 2022-11-28 PROBLEM — I61.9 INTRAPARENCHYMAL HEMORRHAGE OF BRAIN (HCC): Status: ACTIVE | Noted: 2022-11-28

## 2022-11-28 PROBLEM — Z86.79 HISTORY OF CEREBRAL ANEURYSM REPAIR: Status: ACTIVE | Noted: 2022-11-28

## 2022-11-28 LAB
ABSOLUTE EOS #: <0.03 K/UL (ref 0–0.44)
ABSOLUTE IMMATURE GRANULOCYTE: 0.12 K/UL (ref 0–0.3)
ABSOLUTE LYMPH #: 1.89 K/UL (ref 1.1–3.7)
ABSOLUTE MONO #: 0.31 K/UL (ref 0.1–1.2)
ANION GAP SERPL CALCULATED.3IONS-SCNC: 10 MMOL/L (ref 9–17)
BASOPHILS # BLD: 0 % (ref 0–2)
BASOPHILS ABSOLUTE: 0.03 K/UL (ref 0–0.2)
BUN BLDV-MCNC: 13 MG/DL (ref 6–20)
CALCIUM SERPL-MCNC: 8.7 MG/DL (ref 8.6–10.4)
CHLORIDE BLD-SCNC: 106 MMOL/L (ref 98–107)
CO2: 18 MMOL/L (ref 20–31)
CREAT SERPL-MCNC: 0.64 MG/DL (ref 0.5–0.9)
EOSINOPHILS RELATIVE PERCENT: 0 % (ref 1–4)
GFR SERPL CREATININE-BSD FRML MDRD: >60 ML/MIN/1.73M2
GLUCOSE BLD-MCNC: 126 MG/DL (ref 70–99)
HCT VFR BLD CALC: 39.9 % (ref 36.3–47.1)
HEMOGLOBIN: 12.9 G/DL (ref 11.9–15.1)
IMMATURE GRANULOCYTES: 1 %
LYMPHOCYTES # BLD: 13 % (ref 24–43)
MCH RBC QN AUTO: 28.9 PG (ref 25.2–33.5)
MCHC RBC AUTO-ENTMCNC: 32.3 G/DL (ref 28.4–34.8)
MCV RBC AUTO: 89.5 FL (ref 82.6–102.9)
MONOCYTES # BLD: 2 % (ref 3–12)
NRBC AUTOMATED: 0 PER 100 WBC
PDW BLD-RTO: 13 % (ref 11.8–14.4)
PLATELET # BLD: 317 K/UL (ref 138–453)
PMV BLD AUTO: 11.1 FL (ref 8.1–13.5)
POTASSIUM SERPL-SCNC: 4.2 MMOL/L (ref 3.7–5.3)
RBC # BLD: 4.46 M/UL (ref 3.95–5.11)
SEG NEUTROPHILS: 84 % (ref 36–65)
SEGMENTED NEUTROPHILS ABSOLUTE COUNT: 12.81 K/UL (ref 1.5–8.1)
SODIUM BLD-SCNC: 134 MMOL/L (ref 135–144)
WBC # BLD: 15.2 K/UL (ref 3.5–11.3)

## 2022-11-28 PROCEDURE — 70450 CT HEAD/BRAIN W/O DYE: CPT

## 2022-11-28 PROCEDURE — 97161 PT EVAL LOW COMPLEX 20 MIN: CPT

## 2022-11-28 PROCEDURE — 99231 SBSQ HOSP IP/OBS SF/LOW 25: CPT | Performed by: PHYSICAL MEDICINE & REHABILITATION

## 2022-11-28 PROCEDURE — 6370000000 HC RX 637 (ALT 250 FOR IP): Performed by: STUDENT IN AN ORGANIZED HEALTH CARE EDUCATION/TRAINING PROGRAM

## 2022-11-28 PROCEDURE — 36415 COLL VENOUS BLD VENIPUNCTURE: CPT

## 2022-11-28 PROCEDURE — 80048 BASIC METABOLIC PNL TOTAL CA: CPT

## 2022-11-28 PROCEDURE — 97530 THERAPEUTIC ACTIVITIES: CPT

## 2022-11-28 PROCEDURE — 99233 SBSQ HOSP IP/OBS HIGH 50: CPT | Performed by: PSYCHIATRY & NEUROLOGY

## 2022-11-28 PROCEDURE — 6360000002 HC RX W HCPCS: Performed by: STUDENT IN AN ORGANIZED HEALTH CARE EDUCATION/TRAINING PROGRAM

## 2022-11-28 PROCEDURE — 2000000000 HC ICU R&B

## 2022-11-28 PROCEDURE — 85025 COMPLETE CBC W/AUTO DIFF WBC: CPT

## 2022-11-28 PROCEDURE — 99232 SBSQ HOSP IP/OBS MODERATE 35: CPT | Performed by: PSYCHIATRY & NEUROLOGY

## 2022-11-28 RX ORDER — DIPHENHYDRAMINE HCL 25 MG
25 TABLET ORAL ONCE
Status: COMPLETED | OUTPATIENT
Start: 2022-11-28 | End: 2022-11-28

## 2022-11-28 RX ORDER — HYDRALAZINE HYDROCHLORIDE 20 MG/ML
10 INJECTION INTRAMUSCULAR; INTRAVENOUS ONCE
Status: DISCONTINUED | OUTPATIENT
Start: 2022-11-28 | End: 2022-11-29 | Stop reason: HOSPADM

## 2022-11-28 RX ORDER — LANOLIN ALCOHOL/MO/W.PET/CERES
3 CREAM (GRAM) TOPICAL ONCE
Status: COMPLETED | OUTPATIENT
Start: 2022-11-28 | End: 2022-11-28

## 2022-11-28 RX ORDER — LANOLIN ALCOHOL/MO/W.PET/CERES
3 CREAM (GRAM) TOPICAL NIGHTLY PRN
Status: DISCONTINUED | OUTPATIENT
Start: 2022-11-28 | End: 2022-11-29 | Stop reason: HOSPADM

## 2022-11-28 RX ORDER — MAGNESIUM SULFATE IN WATER 40 MG/ML
2000 INJECTION, SOLUTION INTRAVENOUS ONCE
Status: COMPLETED | OUTPATIENT
Start: 2022-11-28 | End: 2022-11-28

## 2022-11-28 RX ORDER — PROCHLORPERAZINE MALEATE 5 MG/1
5 TABLET ORAL ONCE
Status: COMPLETED | OUTPATIENT
Start: 2022-11-28 | End: 2022-11-28

## 2022-11-28 RX ADMIN — Medication 3 MG: at 01:39

## 2022-11-28 RX ADMIN — POLYETHYLENE GLYCOL 3350 17 G: 17 POWDER, FOR SOLUTION ORAL at 10:35

## 2022-11-28 RX ADMIN — DIPHENHYDRAMINE HCL 25 MG: 25 TABLET ORAL at 20:47

## 2022-11-28 RX ADMIN — TOPIRAMATE 25 MG: 25 TABLET, FILM COATED ORAL at 20:47

## 2022-11-28 RX ADMIN — MAGNESIUM SULFATE HEPTAHYDRATE 2000 MG: 40 INJECTION, SOLUTION INTRAVENOUS at 00:16

## 2022-11-28 RX ADMIN — Medication 81 MG: at 10:29

## 2022-11-28 RX ADMIN — PROCHLORPERAZINE MALEATE 5 MG: 5 TABLET ORAL at 20:47

## 2022-11-28 ASSESSMENT — PAIN SCALES - GENERAL: PAINLEVEL_OUTOF10: 5

## 2022-11-28 ASSESSMENT — PAIN - FUNCTIONAL ASSESSMENT: PAIN_FUNCTIONAL_ASSESSMENT: PREVENTS OR INTERFERES SOME ACTIVE ACTIVITIES AND ADLS

## 2022-11-28 ASSESSMENT — PAIN DESCRIPTION - PAIN TYPE: TYPE: ACUTE PAIN

## 2022-11-28 ASSESSMENT — PAIN DESCRIPTION - LOCATION: LOCATION: HEAD

## 2022-11-28 ASSESSMENT — PAIN DESCRIPTION - DESCRIPTORS: DESCRIPTORS: THROBBING

## 2022-11-28 ASSESSMENT — PAIN DESCRIPTION - ONSET: ONSET: GRADUAL

## 2022-11-28 ASSESSMENT — PAIN DESCRIPTION - FREQUENCY: FREQUENCY: CONTINUOUS

## 2022-11-28 NOTE — PROGRESS NOTES
Physical Medicine & Rehabilitation  Progress Note    2022 10:42 AM     CC: Ambulatory and ADL dysfunction due to intracranial hemorrhage with history of subarachnoid hemorrhage    Endovascular-resume aspirin today and will consider starting Plavix tomorrow    Subjective:   No complaints. Feels well. States participating with therapies and feels she did very well. ROS:  Denies fevers, chills, sweats. No chest pain, palpitations, lightheadedness. Denies coughing, wheezing or shortness of breath. Denies abdominal pain, nausea, diarrhea or constipation. No new areas of joint pain. Denies new areas of numbness or weakness. Denies new anxiety or depression issues. No new skin problems. Rehabilitation:   PT:                OT:                                                ST:        Objective:  /88   Pulse 65   Temp 97.6 °F (36.4 °C) (Oral)   Resp 13   Ht 5' 2\" (1.575 m)   Wt 144 lb 1.6 oz (65.4 kg)   LMP 2022 (Approximate) Comment: had ablation and ovary removal - urine preg neg  SpO2 97%   BMI 26.36 kg/m²  I Body mass index is 26.36 kg/m². I   Wt Readings from Last 1 Encounters:   22 144 lb 1.6 oz (65.4 kg)      Temp (24hrs), Av.2 °F (36.8 °C), Min:97.6 °F (36.4 °C), Max:98.7 °F (37.1 °C)         GEN: well developed, well nourished, no acute distress  HEENT: Normocephalic atraumatic, EOMI, mucous membranes pink and moist  CV: RRR, no murmurs, rubs or gallops  PULM: CTAB, no rales or rhonchi. Respirations WNL and unlabored  ABD: soft, NT, ND, +BS and equal  NEURO: A&O x3. Sensation intact to light touch. MSK: 4+/5 upper and lower extremities  EXTREMITIES: No calf tenderness to palpation bilaterally. No edema BLEs  SKIN: warm dry and intact with good turgor  PSYCH: appropriately interactive. Affect WNL.   Good spirits        Medications   Scheduled Meds:   topiramate  25 mg Oral Nightly    aspirin  81 mg Oral Daily     Continuous Infusions:   sodium chloride       PRN Meds:.melatonin, polyethylene glycol, acetaminophen, ondansetron **OR** ondansetron, sodium chloride, labetalol     Diagnostics:     CBC:   Recent Labs     11/26/22 0336 11/27/22 0411 11/28/22 0425   WBC 10.8 15.4* 15.2*   RBC 4.37 4.36 4.46   HGB 12.7 12.7 12.9   HCT 37.6 39.1 39.9   MCV 86.0 89.7 89.5   RDW 12.5 12.7 13.0    329 317     BMP:   Recent Labs     11/26/22 0336 11/27/22 0411 11/28/22 0425    136 134*   K 3.6* 4.1 4.2    105 106   CO2 22 18* 18*   BUN 9 10 13   CREATININE 0.64 0.54 0.64     BNP: No results for input(s): BNP in the last 72 hours. PT/INR:   Recent Labs     11/25/22 2032   PROTIME 13.0   INR 1.0     APTT: No results for input(s): APTT in the last 72 hours. CARDIAC ENZYMES: No results for input(s): CKMB, CKMBINDEX, TROPONINT in the last 72 hours. Invalid input(s): CKTOTAL;3  FASTING LIPID PANEL:  Lab Results   Component Value Date    CHOL 182 04/18/2022    HDL 74 04/18/2022    TRIG 60 04/18/2022     LIVER PROFILE:   Recent Labs     11/25/22 2032   AST 15   ALT 18   BILITOT 0.2*   ALKPHOS 96        I/O (24Hr): No intake or output data in the 24 hours ending 11/28/22 1042    Glu last 24 hour  Recent Labs     11/25/22 2029   POCGLU 127*       No results for input(s): CLARITYU, COLORU, PHUR, SPECGRAV, PROTEINU, RBCUA, BLOODU, BACTERIA, NITRU, WBCUA, LEUKOCYTESUR, YEAST, [place]us in the last 72 hours. mpression: Ms. Gabi Underwood is a 29 y.o. female with a history of Intracranial hemorrhage ([facility] [address]     Intracranial hemorrhage-aspirin Plavix on hold  . 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 Diagnosis: Intracranial hemorrhage  2. Therapy: Await therapy evaluation notes had therapy today, awaiting notes  3. Medical  Necessity: As above  4. Support: Clarify  5.  Rehab recommendation:recommendation follow therapy evaluations or she feels she is close to her baseline and doing well feels that she can go home-however recommendations post therapy evaluations  6. DVT proph: None due to intracranial hemorrhage if inpatient rehab will need Doppler screen 24 to 48 hours prior to transfer     It was my pleasure to evaluate Gabi Underwood today. Please call with questions. Fuentes Robles. Fabiola Guerrero MD       This note is created with the assistance of a speech recognition program.  While intending to generate a document that actually reflects the content of the visit, the document can still have some errors including those of syntax and sound a like substitutions which may escape proof reading.   In such instances, actual meaning can be extrapolated by contextual diversion

## 2022-11-28 NOTE — PROGRESS NOTES
Daily Progress Note  Neuro Critical Care    Patient Name: Gabi Underwood  Patient : 1990  Room/Bed: 0119/0119-01  Code Status: Full Code  Allergies: No Known Allergies    CHIEF COMPLAINT:      Right sided weakness and numbness      INTERVAL HISTORY    History Obtained From: Patient    The patient is a 29 y.o. female past medical history of subarachnoid hemorrhage due to ruptured a comm aneurysm 2022 s/p coil embolization, presented as a transfer from [facility], was complaining of right-sided leg weakness noticed when she was walking, have not fall down or lost consciousness, then progressed to right arm weakness, associated with right-sided numbness. She was getting her medication prescribed for headache, which she has been complaining of for the last 2 days, pounding in nature, localized to the left parietal area, nonradiating, 7/10, relief with over-the-counter pain medication. She was seen in ED 2 days ago had a CT imaging, no acute changes were found, patient was given a migraine cocktail and was discharged home. Eventually she was seen yesterday at [place] had a CT head showing a 14 mm IPH of left superior frontal gyrus. CTA head was unremarkable for acute changes. The decision was to transfer patient to [facility]. Her blood pressure at outlying facility was 157/104  ICH score: 0  Of note patient was recently discharged  for a comm aneurysm retreatment. Patient was discharged on aspirin 81 mg and Plavix 75 mg     On evaluation her initial blood pressure on arrival was 136/103  Alert oriented following commands, 4+/5 right upper and lower extremity weakness, decrease sensation to light touch on of right upper and lower extremities. Last 24h:   No acute events overnight.  Patient doing well states headaches have improved    CURRENT MEDICATIONS:  SCHEDULED MEDICATIONS:   topiramate  25 mg Oral Nightly    aspirin  81 mg Oral Daily     CONTINUOUS INFUSIONS:   sodium chloride       PRN MEDICATIONS:   melatonin, polyethylene glycol, acetaminophen, ondansetron **OR** ondansetron, sodium chloride, labetalol    VITALS:  Temperature Range: Temp: 97.6 °F (36.4 °C) Temp  Av.2 °F (36.8 °C)  Min: 97.6 °F (36.4 °C)  Max: 98.7 °F (37.1 °C)  BP Range: Systolic (22DER), JAY:617 , Min:115 , FIE:313     Diastolic (85MGB), AFN:28, Min:79, Max:95    Pulse Range: Pulse  Av.5  Min: 54  Max: 67  Respiration Range: Resp  Av.5  Min: 13  Max: 21  Current Pulse Ox: SpO2: 97 %  24HR Pulse Ox Range: SpO2  Av.5 %  Min: 94 %  Max: 98 %  Patient Vitals for the past 12 hrs:   BP Temp Temp src Pulse Resp SpO2   22 0700 126/88 -- -- 65 13 97 %   22 0600 115/85 -- -- 60 16 94 %   22 0500 125/80 -- -- 60 15 95 %   22 0400 (!) 124/95 97.6 °F (36.4 °C) Oral 64 16 97 %   22 0300 126/87 -- -- 59 17 96 %   22 0200 127/79 -- -- 61 18 97 %   22 0100 (!) 138/94 -- -- 58 21 96 %   22 0000 130/84 98.7 °F (37.1 °C) -- 54 17 98 %   22 2300 133/89 -- -- 59 19 96 %         RECENT LABS:   Lab Results   Component Value Date    WBC 15.2 (H) 2022    HGB 12.9 2022    HCT 39.9 2022     2022    CHOL 182 2022    TRIG 60 2022    HDL 74 2022    ALT 18 2022    AST 15 2022     (L) 2022    K 4.2 2022     2022    CREATININE 0.64 2022    BUN 13 2022    CO2 18 (L) 2022    TSH 1.36 2022    INR 1.0 2022    LABA1C 5.5 2022     24 HOUR INTAKE/OUTPUT:No intake or output data in the 24 hours ending 22 1038    Labs and Images reviewed with:  [] Dr. Sukhdeep Lozano. [place]    [x] Dr. Elif Ontiveros  [] Dr. Jojo Del Rio  [] There are no new interval images to review. PHYSICAL EXAM       CONSTITUTIONAL:  Well developed, well nourished, alert and oriented x 3, in no acute distress. GCS 15. Nontoxic. No dysarthria. No aphasia.    HEAD:  normocephalic, atraumatic    EYES: PERRLA, EOMI. Visual Acuity and Peripheral vision in tact b/l   ENT:  moist mucous membranes   NECK:  supple, symmetric   LUNGS:  Equal air entry bilaterally   CARDIOVASCULAR:  normal s1 / s2, RRR, distal pulses intact   ABDOMEN:  Soft, no rigidity   NEUROLOGIC:  Mental Status:  A & O x3,awake             Cranial Nerves:    II: Visual acuity:  normal  II: Visual fields:  normal  III: Pupils:  equal, round, reactive to light  III,IV,VI: Extra Ocular Movements: intact  V: Facial sensation:  intact  VII: Facial strength: intact  VIII: Hearing:  intact  IX: Palate:  intact  XI: Shoulder shrug:  intact  XII: Tongue movement:  normal    Motor Exam:    Drift:  absent  Tone:  normal    MOTOR:  RUE: 5/5  LUE: 5/5  RLE: 5/5  LLE: 5/5    Sensory:    Touch:    Right Upper Extremity:  normal  Left Upper Extremity:  normal  Right Lower Extremity:  normal  Left Lower Extremity:  normal    Coordination/Dysmetria:  Heel to Shin:  Right:  normal  Left:  normal  Finger to Nose:   Right:  normal  Left:  normal        DRAINS:  [x] There are no drains for Neuro Critical Care to monitor at this time. ASSESSMENT AND PLAN:       NEUROLOGIC:  Left frontal lobe IPH  - CT head showing a 14 mm IPH of the left frontal lobe. - CTA head was unremarkable   - Repeat head CT stable. - Restart aspirin today, Earnest Rack may consider plavix tomorrow  - Transfuse 1 unit of platelets  -Tylenol as needed for headache  - Received Decadron 2 mg bid for headaches x2 days  - Received Mag sulfate x 2 for headaches  - Continue topamax for headaches  - Goal SBP <140  - Neuro checks per protocol       CARDIOVASCULAR:  - Goal SBP <140  - Continue telemetry  - Labetalol 10 mg Q4 as needed     PULMONARY:  -Maintaining normal O2 saturation on room air    RENAL/FLUID/ELECTROLYTE:  - BUN 13/ Creatinine 0.64  - Na/K 134/4.2  - I/O: No intake/output data recorded. - IVF: none    GI/NUTRITION:  NUTRITION:  ADULT DIET;  Regular  - Bowel regimen: glycolax  - GI prophylaxis: protonix    ID:  Tmax: Temp (24hrs), Av.2 °F (36.8 °C), Min:97.6 °F (36.4 °C), Max:98.7 °F (37.1 °C)    Temperature Range: Temp: 97.6 °F (36.4 °C) Temp  Av.2 °F (36.8 °C)  Min: 97.6 °F (36.4 °C)  Max: 98.7 °F (37.1 °C)  - WBC   Lab Results   Component Value Date    WBC 15.2 (H) 2022     - Antimicrobials:  not indicated   - Leukocytosis likely d/t steroids    HEME:   Recent Labs     226 22  0411 22  0425   HGB 12.7 12.7 12.9     - Platelets 096    ENDOCRINE:  - Continue to monitor blood glucose, goal <180  - glucose controlled - most recent BGL is   Recent Labs     226 22  0411 22  0425   GLUCOSE 138* 118* 126*       OTHER:  - PT/OT/ST   - Code Status: Full Code    PROPHYLAXIS:  Stress ulcer: PPI    DVT PROPHYLAXIS:  - SCD sleeves - Thigh High     DISPOSITION:  [x] To remain ICU  [] OK for out of ICU from Neuro Critical Care standpoint    We will continue to follow along. For any changes in exam or patient status please contact Neuro Critical Care.       Letty Parikh DO  Neuro Critical Care  Pager [phone]  2022     10:38 AM

## 2022-11-28 NOTE — PROGRESS NOTES
Endovascular Neurosurgery  Progress Note      Reason for evaluation: L ICH in frontal lobe, post Acomm stent assisted coiling    SUBJECTIVE:   No acute event overnight. No headache this am. Patient was sitting in bed and was comfortable     Review of Systems:  CONSTITUTIONAL:  negative for fevers, chills, fatigue and malaise    EYES:  negative for double vision, blurred vision and photophobia     HEENT:  negative for tinnitus, epistaxis and sore throat    RESPIRATORY:  negative for cough, shortness of breath, wheezing    CARDIOVASCULAR:  negative for chest pain, palpitations, syncope, edema    GASTROINTESTINAL:  negative for nausea, vomiting    GENITOURINARY:  negative for incontinence    MUSCULOSKELETAL:  negative for neck or back pain    NEUROLOGICAL:  Negative for weakness and tingling  negative for headaches and dizziness    PSYCHIATRIC:  negative for anxiety      Review of systems otherwise negative. OBJECTIVE:     Vitals:    11/28/22 0700   BP: 126/88   Pulse: 65   Resp: 13   Temp:    SpO2: 97%        General:  Gen: normal habitus, NAD  HEENT: NCAT, mucosa moist  Cvs: RRR, S1 S2 normal  Resp: symmetric unlabored breathing  Abd: s/nd/nt  Ext: no edema  Skin: no lesions seen, warm and dry    Neuro:  Gen: awake and alert, oriented x3. Lang/speech: no aphasia or dysarthria. Follows commands. CN: PERRL, EOMI, VFF, V1-3 intact, face symmetric, hearing intact, shoulder shrug symmetric, tongue midline  Motor: grossly 5/5 UE and LE b/l  Sense: LT intact in all 4 ext. Coord: FTN and HTS intact b/l  DTR: deferred  Gait: narrow base gait    NIH Stroke Scale:   1a  Level of consciousness: 0 - alert; keenly responsive   1b. LOC questions:  0 - answers both questions correctly   1c. LOC commands: 0 - performs both tasks correctly   2. Best Gaze: 0 - normal   3. Visual: 0 - no visual loss   4. Facial Palsy: 0 - normal symmetric movement   5a.  Motor left arm: 0 - no drift, limb holds 90 (or 45) degrees for full 10 seconds   5b. Motor right arm: 0 - no drift, limb holds 90 (or 45) degrees for full 10 seconds   6a. Motor left le - no drift; leg holds 30 degree position for full 5 seconds   6b  Motor right le - no drift; leg holds 30 degree position for full 5 seconds   7. Limb Ataxia: 0 - absent   8. Sensory: 0   9. Best Language:  0 - no aphasia, normal   10. Dysarthria: 0 - normal   11. Extinction and Inattention: 0 - no abnormality         Total:   0     MRS: 0      LABS:   Reviewed. 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 Patient had it treated with a stent assisted coiling on the 22 and went home the next day uneventfully. 5 days later, patient c/o right leg weakness and fell CT head showed a small posterior left frontal small ICH. Repeat CT head this am stable. No increased ICH. PLAN:     - resume ASA 81mg daily today  - keep SBP below 140  - headache improved with decadron and magnesium, it is noted during the ICU stay, patient's headache translate into higher BP  - PT OT and rehab consult  - Will consider starring Plavix tomorrow      Case discussed with Dr. James Bernal attending.      Idalia Pabon MD  Stroke, [facility] Stroke [address] [place] [address] Comprehensive Stroke [address]  Electronically signed 11/28/2022 at 8:39 AM

## 2022-11-28 NOTE — PLAN OF CARE
Problem: Discharge Planning  Goal: Discharge to home or other facility with appropriate resources  11/28/2022 1747 by Mathieu Kenny RN  Outcome: Progressing  11/28/2022 0409 by Michael Dang RN  Outcome: Progressing     Problem: ABCDS Injury Assessment  Goal: Absence of physical injury  11/28/2022 1747 by Mathieu Kenny RN  Outcome: Progressing  11/28/2022 0409 by Michael Dang RN  Outcome: Progressing     Problem: Safety - Adult  Goal: Free from fall injury  11/28/2022 1747 by Mathieu Kenny RN  Outcome: Progressing  11/28/2022 0409 by Michael Dang RN  Outcome: Progressing     Problem: Pain  Goal: Verbalizes/displays adequate comfort level or baseline comfort level  11/28/2022 1747 by Mathieu Kenny RN  Outcome: Progressing  11/28/2022 0409 by Michael Dang RN  Outcome: Progressing

## 2022-11-28 NOTE — PROGRESS NOTES
Physician Progress Note      PATIENT:               Trey Castillo  CSN #:                  [id number]  :                       1990  ADMIT DATE:       2022 10:09 PM  [address] [place] DATE:  RESPONDING  PROVIDER #:        Ragini BOOTH          QUERY TEXT:    Pt admitted with  and underwent stent assisted coiling of a remnant of the   acomm aneurysm on 22 , noted documentation of Left frontal lobe IPH. ? If   possible, please document in progress notes and discharge summary the   relationship if any between the Left frontal lobe IPH and the surgery: The medical record reflects the following:  Risk Factors: ***  Clinical Indicators: blood pressure at outlying facility was 157/104 , initial   blood pressure on arrival was 136/103. Per H&P; ruptured a comm aneurysm   2022 s/p coil embolization, presented as a transfer from [place], was   complaining of right-sided leg weakness noticed when she was walking, have not   fall down or lost consciousness, then progressed to right arm weakness,   associated with right-sided numbness. She was getting her medication   prescribed for headache, which she has been complaining of for the last 2   days, pounding in nature, localized to the left parietal ar  Treatment: Hold anticoagulation, SBP below 140, Labetalol 10 mg Q4 as needed,   Transfuse 1 unit of platelets, 1 dose of Decadron 2 mg, ICU monitoring    Thank you, Please call if questions  Abbi James RN [facility]  [phone]  Options provided:  -- Left frontal lobe IPH? is due to the procedure  -- Left frontal lobe IPH is not due to the procedure, but is due to HTN And on   Plavix ,ASA (known patient risk factor)  -- Left frontal lobe IPH is not due to the procedure, but is due to other   incidental risk factor, Please specify other incidental risk factor.   -- Other - I will add my own diagnosis  -- Disagree - Not applicable / Not valid  -- Disagree - Clinically unable to determine / Unknown  -- Refer to Clinical Documentation Reviewer    PROVIDER RESPONSE TEXT:    Provider disagreed with this query.     Query created by: Lani Levy on 11/28/2022 12:48 PM      Electronically signed by:  Amelia Thurston 11/28/2022 1:24 PM

## 2022-11-28 NOTE — PROGRESS NOTES
Physical Therapy  Facility/Department: [address] NEURO ICU  Physical Therapy Initial Assessment    Name: Sofya Vega  : 1990  MRN: [id number]  Date of Service: 2022  Obtained from medical chart:  Sofya Vega is a 29 y.o. female who presents to the emergency department with complaints of right leg weakness. The patient also has right arm weakness. The patient had a coiling of an aneurysm, this was a repeat coiling on Monday. The aneurysm is actually on the right side as well. The patient reports that the symptoms began suddenly at 630. She is otherwise well, there is no facial asymmetry, no headache. Discharge Recommendations:  No additional therapy recommended at discharge. ( Pt would possibly benefit from OP PT if mild RLE deficits persist)   PT Equipment Recommendations  Equipment Needed: No      Patient Diagnosis(es): There were no encounter diagnoses. Past Medical History:  has a past medical history of Aneurysm ([address] and Wellness examination. Past Surgical History:  has a past surgical history that includes salpingectomy (Bilateral, 2022); ovarian cyst removal (Right, 2022); Dilation and curettage of uterus (N/A, 2022); other surgical history (2022); picc insertion vascular access team (2022); Cerebral angiogram (2022); and Cerebral angiogram (Bilateral, 2022). Assessment   Assessment: Pt performed well at therapy this date, pt was able to ambulate 250 feet with no device and supervision with good stability. Pt is very near baseline functional mobility, without additional acute care PT needs. Pt is being discharged from PT at this time. PT should be reordered with a change in medical status. Pt is expected to be safe to return to prior living arrangements upon discharge based on today's session.   Therapy Prognosis: Good  Decision Making: Low Complexity  Requires PT Follow-Up: No  Activity Tolerance  Activity Tolerance: Patient tolerated treatment well     Plan   Physcial Therapy Plan  General Plan: Discharge  Safety Devices  Type of Devices: Left in bed, Call light within reach  Restraints  Restraints Initially in Place: No     Restrictions  Restrictions/Precautions  Restrictions/Precautions: Up as Tolerated, Seizure  Required Braces or Orthoses?: No  Position Activity Restriction  Other position/activity restrictions: up with assist, SBP goal <140     Subjective   General  Patient assessed for rehabilitation services?: Yes  Response To Previous Treatment: Not applicable  Family / Caregiver Present: No  Follows Commands: Within Functional Limits  Subjective  Subjective: Pt seated at the EOB and agreeable to therapy, RN agreeable to therapy. Pt pleasant and cooperative throughout today's session. Pt denies pain at rest.         Social/Functional History  Social/Functional History  Lives With: Family (6, 5 YO children)  Type of Home: House  Home Layout: One level  Home Access: Stairs to enter with rails  Entrance Stairs - Number of Steps: 3  Entrance Stairs - Rails: Left  Bathroom Shower/Tub: Walk-in shower  Bathroom Toilet: Standard  Bathroom Equipment: Grab bars in [address] [place] [address]:  (no DME)  Has the patient had two or more falls in the past year or any fall with injury in the past year?: No  ADL Assistance: [address]: Independent  Homemaking Responsibilities: Yes  Ambulation Assistance: Independent  Transfer Assistance: Independent  Active : Yes  Mode of Transportation: Car  Occupation: Full time employment  Type of Occupation: seasonal work, 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   Vision/Hearing  Vision  Vision: Impaired  Vision Exceptions: Wears glasses at all times  Hearing  Hearing: Within functional limits    Cognition   Cognition  Overall Cognitive Status: [facility] Objective                 AROM RLE (degrees)  RLE AROM: WFL  AROM LLE (degrees)  LLE AROM : WFL  AROM RUE (degrees)  RUE AROM : WFL  AROM LUE (degrees)  LUE AROM : WFL  Strength RLE  Strength RLE: Exception  R Hip Flexion: 4+/5  R Knee Extension: 4+/5  R Ankle Dorsiflexion: 4+/5  R Ankle Plantar flexion: 4+/5  Strength LLE  Strength LLE: WFL  Comment: 5/5  Strength RUE  Strength RUE: Exception  R Shoulder Flexion: 4/5  R Elbow Flexion: 4/5  R Elbow Extension: 4/5  Strength LUE  Strength LUE: WFL  Comment: 5/5           Bed mobility  Bed Mobility Comments: Pt began today's session seated at the EOB, retired to sitting at the EOB at the conclusion of today's session. Transfers  Sit to Stand: Independent  Stand to Sit: Independent  Comment: Transfers performed 13x this date. Ambulation  Surface: Level tile  Device: No Device  Assistance: Supervision  Quality of Gait: mild R sided antalgia, grossly stable  Distance: 250 feet  More Ambulation?: No  Stairs/Curb  Stairs?: No     Balance  Posture: Good  Sitting - Static: Good  Sitting - Dynamic: Good  Standing - Static: Good  Standing - Dynamic: Good;-  Comments: standing balance assessed while using no device. AM-PAC Score  AM-PAC Inpatient Mobility Raw Score : 24 (11/28/22 1200)  AM-PAC Inpatient T-Scale Score : 61.14 (11/28/22 1200)  Mobility Inpatient CMS 0-100% Score: 0 (11/28/22 1200)  Mobility Inpatient CMS G-Code Modifier : [facility] (11/28/22 1200)            Goals  Short Term Goals  Time Frame for Short Term Goals: Pt is at baseline functional mobility, without additional acute care PT needs. Pt is being discharged from PT at this time. PT should be reordered with a change in medical status. Education  Patient Education  Education Given To: Patient  Education Provided: Role of Therapy;Transfer Training;Plan of Care; Fall Prevention Strategies  Education Provided Comments: Educated on the importance of using MELANY HAY, GIOVANI Pool, adaptations  Education Method: Verbal  Barriers to Learning: None  Education Outcome: Verbalized understanding      Therapy Time   Individual Concurrent Group Co-treatment   Time In 0940         Time Out 0953         Minutes 13         Timed Code Treatment Minutes: 8 Minutes       Chandni Lunsford, PT

## 2022-11-28 NOTE — PROGRESS NOTES
Patient called out c/o headache. RN received orders for magnesium. Per patients request magnesium has been stopped. Patient also refusing tylenol at this time. RN notified Dr. Yuriy Westbrook.

## 2022-11-29 VITALS
TEMPERATURE: 97.8 F | RESPIRATION RATE: 17 BRPM | DIASTOLIC BLOOD PRESSURE: 95 MMHG | WEIGHT: 144.1 LBS | BODY MASS INDEX: 26.52 KG/M2 | SYSTOLIC BLOOD PRESSURE: 137 MMHG | OXYGEN SATURATION: 99 % | HEART RATE: 62 BPM | HEIGHT: 62 IN

## 2022-11-29 PROBLEM — I61.1 NONTRAUMATIC CORTICAL HEMORRHAGE OF LEFT CEREBRAL HEMISPHERE (HCC): Status: RESOLVED | Noted: 2022-11-28 | Resolved: 2022-11-29

## 2022-11-29 PROBLEM — I61.9 INTRAPARENCHYMAL HEMORRHAGE OF BRAIN (HCC): Status: RESOLVED | Noted: 2022-11-28 | Resolved: 2022-11-29

## 2022-11-29 PROBLEM — I62.9 INTRACRANIAL HEMORRHAGE (HCC): Status: RESOLVED | Noted: 2022-11-25 | Resolved: 2022-11-29

## 2022-11-29 PROBLEM — I61.0 NONTRAUMATIC SUBCORTICAL HEMORRHAGE OF LEFT CEREBRAL HEMISPHERE (HCC): Status: RESOLVED | Noted: 2022-11-26 | Resolved: 2022-11-29

## 2022-11-29 PROCEDURE — 6370000000 HC RX 637 (ALT 250 FOR IP): Performed by: STUDENT IN AN ORGANIZED HEALTH CARE EDUCATION/TRAINING PROGRAM

## 2022-11-29 PROCEDURE — 99233 SBSQ HOSP IP/OBS HIGH 50: CPT | Performed by: PSYCHIATRY & NEUROLOGY

## 2022-11-29 PROCEDURE — 99232 SBSQ HOSP IP/OBS MODERATE 35: CPT | Performed by: PSYCHIATRY & NEUROLOGY

## 2022-11-29 PROCEDURE — 97165 OT EVAL LOW COMPLEX 30 MIN: CPT

## 2022-11-29 PROCEDURE — 97530 THERAPEUTIC ACTIVITIES: CPT

## 2022-11-29 PROCEDURE — 99232 SBSQ HOSP IP/OBS MODERATE 35: CPT | Performed by: PHYSICAL MEDICINE & REHABILITATION

## 2022-11-29 RX ORDER — CLOPIDOGREL BISULFATE 75 MG/1
75 TABLET ORAL DAILY
Status: DISCONTINUED | OUTPATIENT
Start: 2022-11-29 | End: 2022-11-29 | Stop reason: HOSPADM

## 2022-11-29 RX ORDER — TOPIRAMATE 25 MG/1
25 TABLET ORAL NIGHTLY
Qty: 60 TABLET | Refills: 3 | Status: SHIPPED | OUTPATIENT
Start: 2022-11-29

## 2022-11-29 RX ADMIN — CLOPIDOGREL 75 MG: 75 TABLET, FILM COATED ORAL at 08:57

## 2022-11-29 RX ADMIN — Medication 81 MG: at 08:23

## 2022-11-29 NOTE — PROGRESS NOTES
Occupational Therapy  Facility/Department: [address] NEURO ICU  Occupational Therapy Initial Assessment    Name: Roderick Quiñones  : 1990  MRN: [id number]  Date of Service: 2022    Discharge Recommendations: No therapy recommended at discharge. OT Equipment Recommendations  Equipment Needed: No       Patient Diagnosis(es): There were no encounter diagnoses. Past Medical History:  has a past medical history of Aneurysm ([address] and Wellness examination. Past Surgical History:  has a past surgical history that includes salpingectomy (Bilateral, 2022); ovarian cyst removal (Right, 2022); Dilation and curettage of uterus (N/A, 2022); other surgical history (2022); picc insertion vascular access team (2022); Cerebral angiogram (2022); and Cerebral angiogram (Bilateral, 2022). Assessment   Assessment: Based on pt's performance completing the below functional activities, pt is not currently expected to require skilled OT services during their acute hospitalization stay or post discharge. Educated to report to RN/MD if functional changes occur for reevaluation. Pt verbalized a good understanding. Prognosis: Good  Decision Making: Low Complexity  No Skilled OT: No OT goals identified  REQUIRES OT FOLLOW-UP: No  Activity Tolerance  Activity Tolerance: Patient Tolerated treatment well        Restrictions  Restrictions/Precautions  Restrictions/Precautions: Up as Tolerated, Seizure  Required Braces or Orthoses?: No  Position Activity Restriction  Other position/activity restrictions: up with assist, SBP goal <140    Subjective   General  Patient assessed for rehabilitation services?: Yes  Family / Caregiver Present: No  General Comment  Comments: RN ok'd for OT eval this AM. Pt agreeable to session, pleasent/cooperative throughout pain. Pt denies any pain.      Social/Functional History  Social/Functional History  Lives With: Family (6, 10 YO children)  Type of Home: House  Home Layout: One level  Home Access: Stairs to enter with rails  Entrance Stairs - Number of Steps: 3  Entrance Stairs - Rails: Left  Bathroom Shower/Tub: Walk-in shower  Bathroom Toilet: Standard  Bathroom Equipment: Grab bars in shower  Home Equipment:  (no DME)  Has the patient had two or more falls in the past year or any fall with injury in the past year?: No  ADL Assistance: [address]: Independent  Homemaking Responsibilities: Yes  Ambulation Assistance: Independent  Transfer Assistance: Independent  Active : Yes  Mode of Transportation: Car  Occupation: Full time employment  Type of Occupation: seasonal work, 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 Objective     Safety Devices  Type of Devices: Call light within reach (Left seated EOB)  Restraints  Restraints Initially in Place: No    Balance  Sitting: Intact (Independent with all static/dynamic seated activities throughout session)  Standing: Intact (Independent with all static and dynamic standing activties. Retrieved 3 items from high/low surface levels with no LOB/unsteadiness.)    Gait  Overall Level of Assistance: Independent (Pt independently completed mobility around room with no acute deficits observed.)     AROM: Within functional limits  Strength: Within functional limits  Coordination: Generally decreased, functional [facility] [address] to B hands. LUE [facility] WFL. Mild RUE [facility] deficits; however, pt able to functional use UE to complete all tasks required with no difficulties.)  Tone: Normal  Sensation: Intact (Denies any numbness/tingling)    ADL  Feeding: Independent  Grooming: Independent  UE Bathing: Independent  LE Bathing: Independent  UE Dressing: Independent  LE Dressing: Independent  Toileting: Independent  Additional Comments: Declined to need to complete any ADLs at this time. Demonstrated high level balance activity to challenge pt's balance required for ADLs/IADLs. Bed mobility  Bed Mobility Comments: EOB upon arrival and exit. Transfers  Sit to stand: Independent  Stand to sit:  Independent     Cognition  Overall Cognitive Status: WFL    Orientation  Overall Orientation Status: Within Normal Limits        Education Provided Comments: Pt educated on OT role, OT POC and strategies/activity to promote increased FMC/GMC of UEs -good return    LUE AROM (degrees)  LUE AROM : WFL  Left Hand AROM (degrees)  Left Hand AROM: WFL  RUE AROM (degrees)  RUE AROM : WFL  Right Hand AROM (degrees)  Right Hand AROM: [facility]        AM-PAC Score        AM-[facility] Inpatient Daily Activity Raw Score: 24 (11/29/22 1623)  AM-PAC Inpatient ADL T-Scale Score : 57.54 (11/29/22 1623)  ADL Inpatient CMS 0-100% Score: 0 (11/29/22 1623)  ADL Inpatient CMS G-Code Modifier : [address] (11/29/22 1623)      Therapy Time   Individual Concurrent Group Co-treatment   Time In 1013         Time Out 1024         Minutes 11         Timed Code Treatment Minutes: 8 Minutes       Lety Gamma, OTR/L

## 2022-11-29 NOTE — PLAN OF CARE
Problem: Discharge Planning  Goal: Discharge to home or other facility with appropriate resources  11/29/2022 0426 by Roxann Walsh RN  Outcome: Progressing     Problem: ABCDS Injury Assessment  Goal: Absence of physical injury  11/29/2022 0426 by Roxann Walsh RN  Outcome: Progressing     Problem: Safety - Adult  Goal: Free from fall injury  11/29/2022 0426 by Roxann Walsh RN  Outcome: Progressing     Problem: Pain  Goal: Verbalizes/displays adequate comfort level or baseline comfort level  11/29/2022 0426 by Roxann Walsh RN  Outcome: Progressing

## 2022-11-29 NOTE — CARE COORDINATION
PHQ-9  Pt present with right-sided weakness and numbness. Met with pt this date was very pleasant and cooperative. Pt lives with her 2 sons ages 10,10 and her 3year [address] dog. Pt denies having any feelings of depression or suicidal ideations. Patient Health Questionnaire-9 (PHQ-9)    Over the last 2 weeks, how often have you been bothered by any of the following problems? 1. Little Interest or pleasure in doing things? [x] Not at all  [] Several Days  [] More than half the day  []  Nearly every day    2. Feeling down, depressed or hopeless? [x] Not at all  [] Several Days  [] More than half the day  []  Nearly every day    3. Trouble falling or staying asleep, or sleeping too much? [x] Not at all  [] Several Days  [] More than half the day  []  Nearly every day    4. Feeling tired or having little energy? [x] Not at all  [] Several Days  [] More than half the day  []  Nearly every day    5. Poor apettite or overeating? [x] Not at all  [] Several Days  [] More than half the day  []  Nearly every day    6. Feeling bad about yourself-or that you are a failure or have let yourself or your family down? [x] Not at all  [] Several Days  [] More than half the day  []  Nearly every day    7. Trouble concentrating on things, such as reading the newspaper or watching television? [x] Not at all  [] Several Days  [] More than half the day  []  Nearly every day    8. Moving or speaking so slowly that other people could have noticed? Or the opposite-being so fidgety or restless that you have been moving around a lot more than usual?   [x] Not at all  [] Several Days  [] More than half the day  []  Nearly every day    9. Thoughts that you would be better off dead or of hurting yourself in some way?    [x] Not at all  [] Several Days  [] More than half the day  []  Nearly every day    Total Score: 0    If you checked off any problems, how difficult have these problems made it for you to do your work, take care of things at home, or get along with other people?    [x] Not difficult at all  [] Somewhat Difficult  [] Very Difficult  []  Extremely Difficult

## 2022-11-29 NOTE — PROGRESS NOTES
Physical Medicine & Rehabilitation  Progress Note    2022 10:09 AM     CC: Ambulatory and ADL dysfunction due to intracranial hemorrhage with history of subarachnoid hemorrhage    Endovascular-resume aspirin today and will consider starting Plavix tomorrow    Subjective:   No complaints. Feels well. States did well with therapy. ROS:  Denies fevers, chills, sweats. No chest pain, palpitations, lightheadedness. Denies coughing, wheezing or shortness of breath. Denies abdominal pain, nausea, diarrhea or constipation. No new areas of joint pain. Denies new areas of numbness or weakness. Denies new anxiety or depression issues. No new skin problems. Rehabilitation:   PT:  Restrictions/Precautions: Up as Tolerated, Seizure  Other position/activity restrictions: up with assist, SBP goal <140   Transfers  Sit to Stand: Independent  Stand to Sit: Independent  Comment: Transfers performed 13x this date. Ambulation  Surface: Level tile  Device: No Device  Assistance: Supervision  Quality of Gait: mild R sided antalgia, grossly stable  Distance: 250 feet  More Ambulation?: No      OT:                         Bed mobility  Bed Mobility Comments: Pt began today's session seated at the EOB, retired to sitting at the EOB at the conclusion of today's session. ST:        Objective:  /82   Pulse 85   Temp 97.8 °F (36.6 °C) (Oral)   Resp 28   Ht 5' 2\" (1.575 m)   Wt 144 lb 1.6 oz (65.4 kg)   LMP 2022 (Approximate) Comment: had ablation and ovary removal - urine preg neg  SpO2 97%   BMI 26.36 kg/m²  I Body mass index is 26.36 kg/m².  I   Wt Readings from Last 1 Encounters:   22 144 lb 1.6 oz (65.4 kg)      Temp (24hrs), Av.9 °F (36.6 °C), Min:97.5 °F (36.4 °C), Max:98.2 °F (36.8 °C)         GEN: well developed, well nourished, no acute distress  HEENT: Normocephalic atraumatic, EOMI, mucous membranes pink and moist  CV: RRR, no murmurs, rubs or gallops  PULM: CTAB, no rales or rhonchi. Respirations WNL and unlabored  ABD: soft, NT, ND, +BS and equal  NEURO: A&O x3. Sensation intact to light touch. MSK: 4+/5 upper and lower extremities  EXTREMITIES: No calf tenderness to palpation bilaterally. No edema BLEs  SKIN: warm dry and intact with good turgor  PSYCH: appropriately interactive. Affect WNL. Good spirits        Medications   Scheduled Meds:   clopidogrel  75 mg Oral Daily    hydrALAZINE  10 mg IntraVENous Once    topiramate  25 mg Oral Nightly    aspirin  81 mg Oral Daily     Continuous Infusions:   sodium chloride       PRN Meds:.melatonin, polyethylene glycol, acetaminophen, ondansetron **OR** ondansetron, sodium chloride, labetalol     Diagnostics:     CBC:   Recent Labs     11/27/22 0411 11/28/22 0425   WBC 15.4* 15.2*   RBC 4.36 4.46   HGB 12.7 12.9   HCT 39.1 39.9   MCV 89.7 89.5   RDW 12.7 13.0    317     BMP:   Recent Labs     11/27/22 0411 11/28/22 0425    134*   K 4.1 4.2    106   CO2 18* 18*   BUN 10 13   CREATININE 0.54 0.64     BNP: No results for input(s): BNP in the last 72 hours. PT/INR:   No results for input(s): PROTIME, INR in the last 72 hours. APTT: No results for input(s): APTT in the last 72 hours. CARDIAC ENZYMES: No results for input(s): CKMB, CKMBINDEX, TROPONINT in the last 72 hours. Invalid input(s): CKTOTAL;3  FASTING LIPID PANEL:  Lab Results   Component Value Date    CHOL 182 04/18/2022    HDL 74 04/18/2022    TRIG 60 04/18/2022     LIVER PROFILE:   No results for input(s): AST, ALT, ALB, BILIDIR, BILITOT, ALKPHOS in the last 72 hours. I/O (24Hr): Intake/Output Summary (Last 24 hours) at 11/29/2022 1009  Last data filed at 11/28/2022 2000  Gross per 24 hour   Intake 240 ml   Output --   Net 240 ml       Glu last 24 hour  No results for input(s): POCGLU in the last 72 hours.       No results for input(s): CL[place], [address], [address] [facility], [facility] [address], [address] [facility], [address] [facility], [address] BACTERIA, NITRU, [address], [address] [facility], YEAST, Carvel Sin in the last 72 hours. mpression: Ms. Ceasar Scales is a 29 y.o. female with a history of Intracranial hemorrhage ([facility] [address]     Intracranial hemorrhage-aspirin Plavix on hold  . 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 Diagnosis: Intracranial hemorrhage  2. Therapy: Ambulate 250 feet supervision, await OT  3. Medical  Necessity: As above  4. Support: Clarify  5. Rehab recommendation: await OT however high-level acute inpatient rehabilitation recommend home with assist versus SNF depending on support-patient notes plan for home discharge  6. DVT proph: None due to intracranial hemorrhage if inpatient rehab will need Doppler screen 24 to 48 hours prior to transfer     It was my pleasure to evaluate eCasar Scales today. Please call with questions. Yunier Del Valle. Rebeka Jauregui MD       This note is created with the assistance of a speech recognition program.  While intending to generate a document that actually reflects the content of the visit, the document can still have some errors including those of syntax and sound a like substitutions which may escape proof reading.   In such instances, actual meaning can be extrapolated by contextual diversion

## 2022-11-29 NOTE — DISCHARGE INSTRUCTIONS
Follow up with neurology clinic in the next week, they will be calling you to schedule an appointment. Please take topomax nightly for headaches.  If you develop worsening headache, vision changes, trouble walking/talking, or loss of strength or sensation return to the emergency department or call 911

## 2022-11-29 NOTE — CARE COORDINATION
Discharge [address] [facility] Case Management Department  Written by: Lawson Cagle RN    Patient Name: Tiana Green  Attending Provider: Sadaf Carlisle MD  Admit Date: 2022 10:09 PM  MRN: [id number]  Account: [de-identified]                     : 1990  Discharge Date: 22      Disposition: home    Met with patient to discuss transitional planning. Plan is home independently. Patient has ride home. Patient agreeable to plan.      Lawson Cagle RN

## 2022-11-29 NOTE — PROGRESS NOTES
Physician Progress Note      PATIENT:               Skye Estrada  CSN #:                  [id number]  :                       1990  ADMIT DATE:       2022 10:09 PM  [address] [place] DATE:        2022 12:34 PM  RESPONDING  PROVIDER #:        Natasha Pierce MD          QUERY TEXT:    Pt admitted with  and underwent stent assisted coiling of a remnant of the   acomm aneurysm on 22 , noted documentation of Left frontal lobe IPH. ? If   possible, please document in progress notes and discharge summary the   relationship if any between the Left frontal lobe IPH and the surgery: The medical record reflects the following:  Risk Factors: ***  Clinical Indicators: blood pressure at outlying facility was 157/104 , initial   blood pressure on arrival was 136/103. Per H&P; ruptured a comm aneurysm   2022 s/p coil embolization, presented as a transfer from [place], was   complaining of right-sided leg weakness noticed when she was walking, have not   fall down or lost consciousness, then progressed to right arm weakness,   associated with right-sided numbness. She was getting her medication   prescribed for headache, which she has been complaining of for the last 2   days, pounding in nature, localized to the left parietal ar  Treatment: Hold anticoagulation, SBP below 140, Labetalol 10 mg Q4 as needed,   Transfuse 1 unit of platelets, 1 dose of Decadron 2 mg, ICU monitoring    Thank you, Please call if questions  Abbi Boone RN [facility]  [phone]  Options provided:  -- Left frontal lobe IPH? is due to the procedure  -- Left frontal lobe IPH is not due to the procedure, but is due to HTN And on   Plavix ,ASA (known patient risk factor)  -- Left frontal lobe IPH is not due to the procedure, but is due to other   incidental risk factor, Please specify other incidental risk factor.   -- Other - I will add my own diagnosis  -- Disagree - Not applicable / Not valid  -- Disagree - Clinically unable to determine / Unknown  -- Refer to Clinical Documentation Reviewer    PROVIDER RESPONSE TEXT:    Provider is clinically unable to determine a response to this query. All the choices are possible and yes being on aspirin and clopidogrel   contributed to the bleed along with the hypertension that was left elevated   thursday night after her initial ed evaluation at [place] where she was   discharged for outpatient followup.     Query created by: Lara Bolanos on 11/29/2022 2:22 PM      Electronically signed by:  Olimpia Soliz MD 11/29/2022 4:41 PM

## 2022-11-29 NOTE — PROGRESS NOTES
CLINICAL PHARMACY NOTE: MEDS TO BEDS    Total # of Prescriptions Filled: 1   The following medications were delivered to the patient:  topiramate    Additional Documentation:  Delivered to patient at 11:07am. Paid $0.25 cash for $0.23 copay, did not want change.  HR

## 2022-11-29 NOTE — DISCHARGE SUMMARY
Neuro Critical Care   Discharge Summary      PATIENT NAME: Gerard Garner  YOB: 1990  MEDICAL RECORD NO. 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 Patient admitted for likely spontaneous IPH that was stable during her hospital course. Patient did have headaches during her stay that were controlled with Topamax     Labs and imaging were followed daily. At time of discharge, Gerard Garner was tolerating a ADULT DIET; Regular, having bowel movements, and is in stable condition to be discharged to home. PROCEDURES:    None    PHYSICAL EXAMINATION        Discharge Vitals:  height is 5' 2\" (1.575 m) and weight is 144 lb 1.6 oz (65.4 kg). Her oral temperature is 97.8 °F (36.6 °C). Her blood pressure is 139/82 and her pulse is 85. Her respiration is 28 and oxygen saturation is 97%. CONSTITUTIONAL:  Well developed, well nourished, alert and oriented x 3, in no acute distress. GCS 15. Nontoxic. No dysarthria. No aphasia. HEAD:  normocephalic, atraumatic    EYES:  PERRLA, EOMI.  Visual Acuity and Peripheral vision in tact b/l   ENT:  moist mucous membranes   NECK:  supple, symmetric   LUNGS:  Equal air entry bilaterally   CARDIOVASCULAR:  normal s1 / s2, RRR, distal pulses intact   ABDOMEN:  Soft, no rigidity   NEUROLOGIC:  Mental Status:  A & O x3,awake             Cranial Nerves:    II: Visual acuity:  normal  II: Visual fields:  normal  III: Pupils:  equal, round, reactive to light  III,IV,VI: Extra Ocular Movements: intact  V: Facial sensation:  intact  VII: Facial strength: intact  VIII: Hearing:  intact  IX: Palate:  intact  XI: Shoulder shrug:  intact  XII: Tongue movement:  normal     Motor Exam:    Drift:  absent  Tone:  normal     MOTOR:  RUE: 5/5  LUE: 5/5  RLE: 5/5  LLE: 5/5     Sensory:    Touch:    Right Upper Extremity:  normal  Left Upper Extremity:  normal  Right Lower Extremity:  normal  Left Lower Extremity:  normal     Coordination/Dysmetria:  Heel to Shin:  Right:  normal  Left:  normal  Finger to Nose:   Right:  normal  Left:  normal             NIH 0      LABS/IMAGING     Recent Labs     11/27/22  0411 11/28/22  0425   WBC 15.4* 15.2*   HGB 12.7 12.9   HCT 39.1 39.9    317    134*   K 4.1 4.2    106   CO2 18* 18*   BUN 10 13   CREATININE 0.54 0.64       CTA HEAD W WO CONTRAST    Result Date: 11/25/2022  EXAMINATION: CTA OF THE HEAD WITHOUT AND WITH CONTRAST 11/25/2022 8:57 pm TECHNIQUE: CTA of the head/brain was performed without and with the administration of intravenous contrast. Multiplanar reformatted images are provided for review. MIP images are provided for review. Automated exposure control, iterative reconstruction, and/or weight based adjustment of the mA/kV was utilized to reduce the radiation dose to as low as reasonably achievable.  COMPARISON: Head CT of 05/01/2022, MRA of the head of 09/30/2022 HISTORY: ORDERING SYSTEM PROVIDED HISTORY: stroke TECHNOLOGIST PROVIDED HISTORY: stroke Decision Support Exception - unselect if not a suspected or confirmed emergency medical condition->Emergency Medical Condition (MA) FINDINGS: CTA HEAD: ANTERIOR CIRCULATION: No significant stenosis of the intracranial internal carotid, anterior cerebral, or middle cerebral arteries. Status post coiling at the expected location at the level of anterior communicating artery. On the provided images no evidence of residual or recurrent aneurysm is noted. The metallic density within the left KRISSY A1 segment and the right KRISSY A2 segment were not present on the head CT of 05/01/2022 and MRA of 09/30/2022. However they appear unchanged since head CT [facility]20[facility]: No significant stenosis of the vertebral, basilar, or posterior cerebral arteries. No aneurysm. There is fetal configuration of right PCA. OTHER: No dural venous sinus thrombosis on this non-dedicated study. Status post aneurysm coiling of anterior communicating artery with no evidence of discrete residual or recurrent aneurysm. The metallic density within the left KRISSY A1 segment and the right KRISSY A2 segment were not present on the head CT of 05/01/2022 and MRA of 09/30/2022. However they appear unchanged since head CT of 11/24/2022. . No hemodynamically significant stenosis. RECOMMENDATIONS: Unavailable     CT HEAD WO CONTRAST    Result Date: 11/28/2022  EXAMINATION: CT OF THE HEAD WITHOUT CONTRAST  11/28/2022 7:49 am TECHNIQUE: CT of the head was performed without the administration of intravenous contrast. Automated exposure control, iterative reconstruction, and/or weight based adjustment of the mA/kV was utilized to reduce the radiation dose to as low as reasonably achievable. COMPARISON: CT brain performed 11/27/2022. HISTORY: ORDERING SYSTEM PROVIDED HISTORY: interval CT for IPH TECHNOLOGIST PROVIDED HISTORY: interval CT for IPH Is the patient pregnant?->No FINDINGS: BRAIN/VENTRICLES: There is redemonstration of an intraparenchymal hemorrhage the white matter of the left frontal lobe that is not significantly changed in size compared to prior examination. There is no significant mass effect or midline shift.   The ventricles are symmetric and unremarkable. There is evidence of prior aneurysm repair in the region of the [id number]. The infratentorial structures are unremarkable. ORBITS: The visualized portion of the orbits demonstrate no acute abnormality. SINUSES: The visualized paranasal sinuses and mastoid air cells demonstrate no acute abnormality. SOFT TISSUES/SKULL:  No acute abnormality of the visualized skull or soft tissues. Small intraparenchymal hemorrhage in the white matter of the left frontal lobe that is not significantly changed compared to prior examination. CT HEAD WO CONTRAST    Result Date: 11/27/2022  EXAMINATION: CT OF THE HEAD WITHOUT CONTRAST  11/27/2022 6:04 am TECHNIQUE: CT of the head was performed without the administration of intravenous contrast. Automated exposure control, iterative reconstruction, and/or weight based adjustment of the mA/kV was utilized to reduce the radiation dose to as low as reasonably achievable. COMPARISON: 11/26/2022 HISTORY: ORDERING SYSTEM PROVIDED HISTORY: interval CTH for IPH FINDINGS: BRAIN/VENTRICLES: Status post stent assisted aneurysmal coiling within region of the anterior communicating artery. No significant change in overall size and configuration of a small acute parenchymal hemorrhage centered within the left frontal centrum semiovale. No significant mass effect or midline shift. No hydrocephalus. ORBITS: The visualized portion of the orbits demonstrate no acute abnormality. SINUSES: The visualized paranasal sinuses and mastoid air cells demonstrate no acute abnormality. SOFT TISSUES/SKULL:  No acute abnormality of the visualized skull or soft tissues. Stable CT appearance of the head compared to study from 11/26/2022.      CT head without contrast    Result Date: 11/26/2022  EXAMINATION: CT OF THE HEAD WITHOUT CONTRAST  11/26/2022 2:52 am TECHNIQUE: CT of the head was performed without the administration of intravenous contrast. Automated exposure control, iterative reconstruction, and/or weight based adjustment of the mA/kV was utilized to reduce the radiation dose to as low as reasonably achievable. COMPARISON: Approximately 6 hours earlier. HISTORY: ORDERING SYSTEM PROVIDED HISTORY: Intracerebral Hemorrhage TECHNOLOGIST PROVIDED HISTORY: Intracerebral Hemorrhage Is the patient pregnant?->No Reason for Exam: Intracerebral Hemorrhage FINDINGS: BRAIN/VENTRICLES: Again noted and unchanged is the small parenchymal hematoma at the posterior left frontal deep white matter superiorly. The patient is status post A-comm region aneurysm coiling. No new hemorrhage is evident. There is no mass effect or midline shift. The ventricular system and other CSF containing spaces are normal. ORBITS: The visualized portion of the orbits demonstrate no acute abnormality. SINUSES: The visualized paranasal sinuses and mastoid air cells demonstrate no acute abnormality. SOFT TISSUES/SKULL:  No acute abnormality of the visualized skull or soft tissues. Stable exam compared to 6 hours earlier with small parenchymal hematoma at the posterior left frontal deep white matter. Status post A-comm region aneurysm coiling. CT Head WO Contrast    Result Date: 11/25/2022  EXAMINATION: CT OF THE HEAD WITHOUT CONTRAST  11/25/2022 8:37 pm TECHNIQUE: CT of the head was performed without the administration of intravenous contrast. Automated exposure control, iterative reconstruction, and/or weight based adjustment of the mA/kV was utilized to reduce the radiation dose to as low as reasonably achievable.  COMPARISON: 11/24/2022 HISTORY: ORDERING SYSTEM PROVIDED HISTORY: RIGHT SIDE WEAKNESS< coiling for aneurysm on 21st TECHNOLOGIST PROVIDED HISTORY: RIGHT SIDE WEAKNESS< coiling for aneurysm on 21st Decision Support Exception - unselect if not a suspected or confirmed emergency medical condition->Emergency Medical Condition (MA) Is the patient pregnant?->No FINDINGS: BRAIN/VENTRICLES: There has been interval development of 11 x 10 by 14 mm intraparenchymal hemorrhage within the left superior frontal gyrus subcortical white matter. There is unchanged coiling at the anterior communicating artery with stent  within the adjacent anterior cerebral arteries. There is no  mass effect or midline shift. No abnormal extra-axial fluid collection. The gray-white differentiation is maintained without evidence of an acute infarct. There is no evidence of hydrocephalus. ORBITS: The visualized portion of the orbits demonstrate no acute abnormality. SINUSES: The visualized paranasal sinuses and mastoid air cells demonstrate no acute abnormality. SOFT TISSUES/SKULL:  No acute abnormality of the visualized skull or soft tissues. Interval development of 14 mm intraparenchymal hemorrhage within the left superior frontal gyrus subcortical white matter. Stable post coiling appearance of anterior communicating artery. No acute territorial infarction or intracranial mass lesion. Critical results were called by Dr. Ayana Alicia MD to Dr Lorena Grimes on 11/25/2022 at 21:01. CT Head WO Contrast    Result Date: 11/24/2022  EXAMINATION: CT OF THE HEAD WITHOUT CONTRAST  11/24/2022 10:20 am TECHNIQUE: CT of the head was performed without the administration of intravenous contrast. Automated exposure control, iterative reconstruction, and/or weight based adjustment of the mA/kV was utilized to reduce the radiation dose to as low as reasonably achievable. COMPARISON: CT head dated 05/01/2022. HISTORY: ORDERING SYSTEM PROVIDED HISTORY: HA, known aneurysm with recent coiling TECHNOLOGIST PROVIDED HISTORY: HA, known aneurysm with recent coiling Decision Support Exception - unselect if not a suspected or confirmed emergency medical condition->Emergency Medical Condition (MA) Is the patient pregnant?->No FINDINGS: BRAIN/VENTRICLES: There is no acute intracranial hemorrhage, mass effect or midline shift. No abnormal extra-axial fluid collection.   The gray-white differentiation is maintained without evidence of an acute infarct. There is no evidence of hydrocephalus. Vascular coil noted in the region of the anterior communicating artery. ORBITS: The visualized portion of the orbits demonstrate no acute abnormality. SINUSES: The visualized paranasal sinuses and mastoid air cells demonstrate no acute abnormality. SOFT TISSUES/SKULL:  No acute abnormality of the visualized skull or soft tissues. No acute intracranial abnormality. IR ANGIOGRAM CAROTID CEREBRAL BILATERAL    Result Date: 11/28/2022  Date of Service: 11/21/2022 Patient arrived to the angio suite at: 08:45 am Anesthesia/sedation initiated at: 09:20 am Puncture obtained at: 09:40 am Vascular access was removed at: 12:20 pm Manual pressure for minutes: 0 Patient wheeled out of the angio suite at: 12:45 pm Diagnosis: Recanalized previously ruptured wide necked ACOM aneurysm with prior coil embolization Total anesthesia/sedation: 205 mins. Procedures: 1. Right ultrasound guided radial artery access 2. Right radial artery (RA) angiogram 3. Selective left common carotid artery (CCA) cerebral angiogram 4. Selective left internal carotid artery (ICA) cerebral angiogram 5. 3D rotational angiography 6. Transarterial stent assisted coil embolization of the ACOM artery aneurysm 7. Continuous IA nicardipine infusion Neurointerventionalist: Dania Downey MD [place]: Stephan Ponce MD Contrast: 70 cc of Visipaque-270. Fluoroscopy time: 65 minutes Access: Right radial artery. Comparison: None Consent: After explaining the risks and benefits to the patient and the patient's family, including but not limited to hemorrhagic and ischemic stroke, coma, death, vessel injury, dissection, tear, occlusion, and X-ray dye allergic type reaction, a signed consent form was obtained.  Indication and Clinical History: 28 y/o f with with pmh significant for [address] secondary to ruptured ACOM aneurysm s/p prior coil embolization on 04/19/2022, s/p DSA on 11/08 which showed recanalization of the aneurysm and compaction of the coil mass. Patient was therefore recommended to have stent assisted coil embolization of the recanalized portion. Anesthesia: Local anesthesia with lidocaine. General Anesthesia. Description and findings: The patient's right wrist was prepped and draped in standard sterile fashion and under local anesthesia. Ultrasound was used to insonate the right radial  artery, which was completely compressible and patent. It was then accessed with a micropuncture technique under direct visualization. Images were captured and stored in the system. A 7 Armenian 10cm intravascular sheath was placed within the right radial artery, establishing arterial access using Seldinger technique. A radial access cocktail including Verapamil 2.5 mg, Nitroglycerine 200 mcg and Heparin 2000 U were administered from the short sheath to prevent vasospasm and embolization. 70 units/kg of Heparin  minus 2000 U of hep was administered for a target ACT of 250-300. Right radial artery  technique: A right radial artery angiogram was performed and demonstrated appropriate arterial catheterization. There was no evidence of dissection, vasospasm or occlusion within the right radial artery. Under the smart- mask, a Sim select catheter, Rist 7 fr catheter were co-axially advanced over the guidewire upto the level of aortic arch. Penumbra sim shape was formed in the arch of aorta. Left CCA technique: The left common carotid artery was selectively catheterized under fluoroscopic guidance and digital subtraction angiography images were obtained in biplane projections of the left cervical common carotid artery. Interpretation: The left common carotid artery injection demonstrates normal antegrade flow into the external and internal carotid arteries with normal filling of the external carotid artery branches.  Caliber and lumen contour of the cervical portion of the left internal carotid artery are unremarkable. Further inspection demonstrates no other evidence of dissection, stenosis, aneurysm, or other vascular abnormality within the left CCA into the cervical segment of the left ICA. Left ICA technique: The left internal carotid artery was then selectively catheterized, and digital subtraction angiography of the intracranial left internal carotid artery circulation was performed in frontal and lateral projections. Interpretation: There is normal antegrade filling of the distal internal carotid artery, ophthalmic artery, anterior cerebral artery, middle cerebral artery and distal branches. Compacted coil mass was noted in the previously treated ruptured wide necked ACOM aneurysm with recanalized portion measuring length 2.22 mm, width 3.02 mm, height 2.65 mm and neck 1.80 mm. Dimensions were appeared to be unchanged compared to prior study. Inspection of the remaining left internal carotid artery circulation revealed no other evidence of cerebral aneurysm, arteriovenous malformation, or arterial stenosis. Capillary and venous phase images were also unremarkable, with no evidence of veno-occlusive disease. 3-D rotational Left internal carotid cerebral angiogram: A 3-D rotation of cerebral angiogram was performed with a left ICA injection using a power injector to better visualize the aneurysmal morphology and for surgical planning. This was interpreted on a separate workstation. Information obtained from the 3-D  rotational angiogram could not be obtained from other noninvasive diagnostic studies done prior to the procedure. ACOM artery aneurysm embolization: The select catheter was removed and an Excelsior SL-10 microcatheter was advanced over a Synchro2 014 microwire into the right KRISSY A-2 segment  under fluoroscopy. The microwire was removed. The second excelsior SL-10 microcatheter was then advanced over the microwire.  Under the guidance of roadmap,  access to recanalized necked?aneurysm measuring length 2.22 mm, width 3.02 mm, height 2.65 mm, with neck 1.80 mm, grossly unchanged from prior exam. 2.  The above aneurysm was treated with right radial access using 7 fr Rist catheter, neuroform [place] stent 3.0 mm x 21 mm, Penumbra coils 1.5 mm x 3 cm, and wave extra soft 1 mm x 2 cm. 3.  Stent was noted to be well apposed to parent vessel wall, extending from right KRISSY A-2 segment to left KRISSY A-1 segment 4. Vu score Class I 5. No evidence of distal embolization? Dr. Promise Sandoval dictated this invasive procedure. Dr Manuel Colmenares was present for all procedural and imaging components of this case. Examination was reviewed and reported findings confirmed and evaluated by Dr. Manuel Colmenares. IR ANGIOGRAM CAROTID CEREBRAL BILATERAL    Result Date: 11/14/2022  Date of Service: 11/8/2022 Procedure: Diagnostic cerebral angiogram Patient arrived to the angio suite at: 9:23am Puncture obtained at: 9:55am Vascular access was removed at: 10:56am Manual pressure for minutes: 10 Patient wheeled out of the angio suite at: 11:15am Diagnosis: Ruptured Acom aneurysm with prior coil embolization Procedures: 1. Right ultrasound guided femoral artery access 2. Intravenous moderate sedation for 66 minutes 3. Selective right common carotid artery (CCA) angiogram 4. Selective right internal carotid artery (ICA) cerebral angiogram 5. Selective left common carotid artery (CCA) angiogram 6. Selective left internal carotid artery (ICA) cerebral angiogram 7. Selective left vertebral artery (VA) cerebral angiogram 8. Right common femoral artery (CFA) angiogram Neurointerventionalist: Manuel Colmenares MD [place]: Jacqueline Arthur MD Contrast: 99 cc of Visipaque-320. Fluoroscopy time: 13.8 minutes Access: Right common femoral artery.  Comparison: cerebral angiogram 4/18/22 Consent: After explaining the risks and benefits to the patient and the patient's family, including but not limited to stroke, coma, death, vessel injury, dissection, tear, occlusion, and X-ray dye allergic type reaction, a signed consent form was obtained. Indication and Clinical History:  Ora Frazier is a 29 y.o. female who had successful April 18, 2022 coil embolization of the ruptured acomm aneurysm with EVD removal and discharge from the hospital with no further headaches, came back today for aneurysm surveillance with DSA Anesthesia: Anesthesia/sedation initiated at: 9:50am Anesthesia/sedation completed at: 10:56am Total anesthesia/sedation: 66 mins. Local anesthesia with lidocaine. IV moderate sedation with Versed and Fentanyl IV moderate sedation was supervised by the attending physician. The patient was independently monitored by a registered nurse assigned to the Department of Radiology using automated blood pressure, EKG and pulse oximetry. The detailed Conscious Record is  [facility] Description and findings: The patient's right groin was prepped and draped in standard sterile fashion and under local anesthesia with conscious sedation. Ultrasound was used to insonate the right common femoral artery and found to be patent and completely compressible. With direct vision then accessed with a micropuncture technique, and a 5 Austrian intravascular sheath was placed within the right common femoral artery, establishing arterial access using modified Seldinger technique. 2000u of heparin IV was administered. Images were captured and stored in patient records. A 5 Austrian multipurpose catheter was then advanced over a guide wire to the level of the aortic arch. Right CCA technique: The right common carotid artery was selectively catheterized under fluoroscopic guidance and digital subtraction angiography images were obtained in biplane projections of the right cervical carotid artery.  Interpretation: The right common carotid artery injection demonstrates normal antegrade flow into the external and internal carotid arteries with normal filling of the external carotid artery branches. Course and caliber of the cervical portion of the right internal carotid artery are unremarkable. Further inspection demonstrates no evidence of dissection, stenosis, aneurysm, or other vascular abnormality within the right CCA into the cervical segment of the right ICA. Right ICA technique: The right internal carotid artery was then selectively catheterized, and digital subtraction angiography of the intracranial right internal carotid circulation was performed in frontal, and lateral projections. Interpretation: There is normal antegrade filling of the distal internal carotid artery, ophthalmic artery, anterior cerebral artery, middle cerebral artery and the distal branches. A functional fetal R PCA is noted. Inspection of the remaining right internal carotid circulation revealed no other evidence of cerebral aneurysm, arteriovenous malformation, or arterial stenosis. Capillary and venous phase images were also unremarkable, with no evidence of veno-occlusive disease. Left CCA technique: The left common carotid artery was selectively catheterized under fluoroscopic guidance and digital subtraction angiography images were obtained in biplane projections of the left cervical common carotid artery. Interpretation: The left common carotid artery injection demonstrates normal antegrade flow into the external and internal carotid arteries with normal filling of the external carotid artery branches. Caliber and lumen contour of the cervical portion of the left internal carotid artery are unremarkable. Further inspection demonstrates no other evidence of dissection, stenosis, aneurysm, or other vascular abnormality within the left CCA into the cervical segment of the left ICA. Left ICA technique:  The left internal carotid artery was then selectively catheterized, and digital subtraction angiography of the intracranial left internal carotid artery circulation was performed in frontal and lateral projections. Interpretation: There is normal antegrade filling of the distal internal carotid artery, ophthalmic artery, anterior cerebral artery, middle cerebral artery and distal branches. A prior coil embolization at the anterior communicating artery was noted. The coil mass appears?to have?compaction?with noted? recanalization of the aneurysm measuring neck 1.80 mm, length 2.26 mm, width 3.02 mm, height 2.44 mm with Vu score 3. Inspection of the remaining left internal carotid artery circulation revealed no other evidence of cerebral aneurysm, arteriovenous malformation, or arterial stenosis. Capillary and venous phase images were also unremarkable, with no evidence of veno-occlusive disease. Left VA technique: The left subclavian artery was then selectively catheterized and the left vertebral artery was selectively catheterized with roadmap guidance. Digital subtraction angiography of the intracranial left vertebrobasilar run-off was obtained in frontal and lateral projections. Interpretation: The left vertebral artery injection demonstrates normal antegrade opacification of the left vertebral artery, including the cervical segment, basilar artery, and respective branches. The left V2 muscular branches are noted. There was no evidence of cerebral aneurysm, arteriovenous malformation, or arterial stenosis. Capillary and venous phase images were unremarkable. Right CFA technique: A right common femoral artery angiogram was performed and demonstrated arterial catheterization proximal to the bifurcation. There was no evidence of dissection or occlusion within the right common femoral artery. Vascade 5F was used to establish hemostasis at the right common femoral artery access site. No immediate complications were experienced.  Patient was re-examined after the procedure with no change noted in their neurologic examination, with distal pulses present. The patient was subsequently discharged from the neurointerventional suite. Impression: 1. 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 Coil mass appears to have compaction with noted recanalization of the aneurysm measuring neck 1.80 mm, length 2.26 mm, width 3.02 mm, height 2.44 mm with Vu score 3 3. Functional fetal right PCA Dr. Jess Frey dictated this invasive procedure. Dr. Macho Dee was present for all procedural and imaging components of this case. Examination was reviewed and reported findings confirmed and evaluated by Dr. Macho Dee. Macho Dee MD Final report electronically signed by Macho Dee M.D. on 11/14/2022 4:07 PM    XR CHEST PORTABLE    Result Date: 11/25/2022  EXAMINATION: ONE XRAY VIEW OF THE CHEST 11/25/2022 8:48 pm COMPARISON: 04/18/2022 HISTORY: ORDERING SYSTEM PROVIDED HISTORY: stroke symptoms TECHNOLOGIST PROVIDED HISTORY: stroke symptoms FINDINGS: The lungs are clear. The cardiac and mediastinal contours are normal.  There is no pleural effusion or pneumothorax. No acute osseous abnormality is identified. No acute cardiopulmonary abnormality.          DISCHARGE INSTRUCTIONS     Discharge Medications:        Medication List        START taking these medications      topiramate 25 MG tablet  Commonly known as: TOPAMAX  Take 1 tablet by mouth at bedtime            CONTINUE taking these medications      aspirin 81 MG chewable tablet  Take 1 tablet by mouth daily     clopidogrel 75 MG tablet  Commonly known as: Plavix  Take 1 tablet by mouth daily     pantoprazole 40 MG tablet  Commonly known as: PROTONIX  Take 1 tablet by mouth every morning (before breakfast) for 3 days     PROBIOTIC BLEND PO            STOP taking these medications      dexamethasone 4 MG tablet  Commonly known as: Decadron               Where to Get Your Medications        These medications were sent to [facility] [address] [place] [address], [address] P [phone] Meche Hughes [address] [place] [id number]      Phone: [phone]   topiramate 25 MG tablet       Diet: ADULT DIET;  Regular diet as tolerated  Activity: as tolerated  Follow-up:  1 week  Time Spent for discharge: 30 minutes    Mayank January DO  Neuro Critical Care  11/29/2022, 9:44 AM

## 2022-11-29 NOTE — PROGRESS NOTES
Neuro ICU History & Physical    Patient Name: Skyler Ray  Patient : 1990  Room/Bed: 0119/0119-01  Code Status: Full Code  Allergies: No Known Allergies    CHIEF COMPLAINT     Right sided weakness and numbness     HPI    History Obtained From: patient    The patient is a 29 y.o. female past medical history of subarachnoid hemorrhage due to ruptured a comm aneurysm 2022 s/p coil embolization, presented as a transfer from [facility], was complaining of right-sided leg weakness noticed when she was walking, have not fall down or lost consciousness, then progressed to right arm weakness, associated with right-sided numbness. She was getting her medication prescribed for headache, which she has been complaining of for the last 2 days, pounding in nature, localized to the left parietal area, nonradiating, 7/10, relief with over-the-counter pain medication. She was seen in ED 2 days ago had a CT imaging, no acute changes were found, patient was given a migraine cocktail and was discharged home. Eventually she was seen yesterday at [facility] had a CT head showing a 14 mm IPH of left superior frontal gyrus. CTA head was unremarkable for acute changes. The decision was to transfer patient to [place] [address]. Her blood pressure at outlying facility was 157/104  ICH score: 0  Of note patient was recently discharged  for a comm aneurysm retreatment. Patient was discharged on aspirin 81 mg and Plavix 75 mg     On evaluation her initial blood pressure on arrival was 136/103  Alert oriented following commands, 4+/5 right upper and lower extremity weakness, decrease sensation to light touch on of right upper and lower extremities. Last 24h:   No acute events overnight.  Patient doing well denies any complaints       PATIENT HISTORY   Past Medical History:        Diagnosis Date    Aneurysm ([facility] [address] 2022    brain    Wellness examination     Kong Parks, RAMSES, PCP       Past Surgical History:        Procedure Laterality Date    CEREBRAL ANGIOGRAM  2022    CEREBRAL ANGIOGRAM Bilateral 2022    IR ANGIOGRAM CAROTID CEREBRAL BILATERAL, 1 stent, 2 coils    DILATION AND CURETTAGE OF UTERUS N/A 2022    DILATATION AND CURETTAGE HYSTEROSCOPY CAUTERY ABLATION-NOVASURE ENDOMETRIAL ABLATION performed by Maria Guadalupe Colindres DO at [address] [place] [address]  2022     IR ANGIOGRAM CAROTID CEREBRAL BILATERAL    OVARIAN CYST REMOVAL Right 2022    LAPAROSCOPIC- RIGHT OOPHERECTOMY performed by Maria Guadalupe Colindres DO at [place] [address]  2022         SALPINGECTOMY Bilateral 2022    SALPINGECTOMY LAPAROSCOPIC performed by Maria Guadalupe Colindres DO at [address] [place] [address] History:   Social History     Socioeconomic History    Marital status: Single     Spouse name: Not on file    Number of children: Not on file    Years of education: Not on file    Highest education level: Not on file   Occupational History    Not on file   Tobacco Use    Smoking status: Former     Packs/day: 0.50     Years: 14.00     Pack years: 7.00     Types: Cigarettes     Quit date: 2022     Years since quittin.8    Smokeless tobacco: Current    Tobacco comments:     Nicotine pouches   Vaping Use    Vaping Use: Some days    Substances: Nicotine   Substance and Sexual Activity    Alcohol use: Not Currently    Drug use: No    Sexual activity: Yes     Partners: Male     Birth control/protection: Surgical   Other Topics Concern    Not on file   Social History Narrative    Not on file     Social Determinants of Health     Financial Resource Strain: Low Risk     Difficulty of Paying Living Expenses: Not hard at all   Food Insecurity: No Food Insecurity    Worried About Running Out of Food in the Last Year: Never true    Ran Out of Food in the Last Year: Never true   Transportation Needs: Not on file   Physical Activity: Not on file   Stress: Not on file   Social Connections: Not on file   Intimate Partner Violence: Not on file   Housing Stability: Not on file       Family History:   No family history on file. Allergies:    Patient has no known allergies.     Medications Prior to Admission:    Medications Prior to Admission: [] dexamethasone (DECADRON) 4 MG tablet, Take 1 tablet by mouth 2 times daily as needed (headache)  pantoprazole (PROTONIX) 40 MG tablet, Take 1 tablet by mouth every morning (before breakfast) for 3 days  aspirin 81 MG chewable tablet, Take 1 tablet by mouth daily  clopidogrel (PLAVIX) 75 MG tablet, Take 1 tablet by mouth daily  Probiotic Product (PROBIOTIC BLEND PO), Take by mouth daily    Current Medications:  Current Facility-Administered Medications: clopidogrel (PLAVIX) tablet 75 mg, 75 mg, Oral, Daily  melatonin tablet 3 mg, 3 mg, Oral, Nightly PRN  hydrALAZINE (APRESOLINE) injection 10 mg, 10 mg, IntraVENous, Once  topiramate (TOPAMAX) tablet 25 mg, 25 mg, Oral, Nightly  aspirin EC tablet 81 mg, 81 mg, Oral, Daily  polyethylene glycol (GLYCOLAX) packet 17 g, 17 g, Oral, Daily PRN  acetaminophen (TYLENOL) tablet 650 mg, 650 mg, Oral, Q4H PRN  ondansetron (ZOFRAN-ODT) disintegrating tablet 4 mg, 4 mg, Oral, Q8H PRN **OR** ondansetron (ZOFRAN) injection 4 mg, 4 mg, IntraVENous, Q6H PRN  0.9 % sodium chloride infusion, , IntraVENous, PRN  labetalol (NORMODYNE;TRANDATE) injection 10 mg, 10 mg, IntraVENous, Q4H PRN    REVIEW OF SYSTEMS     CONSTITUTIONAL: negative for fatigue   EYES: negative for double vision, blurry vision, and photophobia    HEENT: negative for tinnitus and sore throat   RESPIRATORY: negative for cough, shortness of breath   CARDIOVASCULAR: negative for chest pain, palpitations, or syncope   GASTROINTESTINAL: negative for abdominal pain, nausea, vomiting   GENITOURINARY: negative for incontinence or retention    MUSCULOSKELETAL: negative for neck or back pain, negative for extremity pain   NEUROLOGICAL: Negative for seizures, headaches, weakness, numbness, confusion, aphasia, dysarthria    PSYCHIATRIC: negative for agitation, hallucination, SI/HI   SKIN Negative for spontaneous contusions, rashes, or lesions      PHYSICAL EXAM:     /82   Pulse 85   Temp 97.8 °F (36.6 °C) (Oral)   Resp 28   Ht 5' 2\" (1.575 m)   Wt 144 lb 1.6 oz (65.4 kg)   LMP 11/03/2022 (Approximate) Comment: had ablation and ovary removal - urine preg neg  SpO2 97%   BMI 26.36 kg/m²     PHYSICAL EXAM:  CONSTITUTIONAL:  Well developed, well nourished, alert and oriented x 3, in no acute distress. GCS 15. Nontoxic. No dysarthria. No aphasia. HEAD:  normocephalic, atraumatic    EYES:  PERRLA, EOMI.  Visual Acuity and Peripheral vision in tact b/l   ENT:  moist mucous membranes   NECK:  supple, symmetric   LUNGS:  Equal air entry bilaterally   CARDIOVASCULAR:  normal s1 / s2, RRR, distal pulses intact   ABDOMEN:  Soft, no rigidity   NEUROLOGIC:  Mental Status:  A & O x3,awake             Cranial Nerves:    II: Visual acuity:  normal  II: Visual fields:  normal  III: Pupils:  equal, round, reactive to light  III,IV,VI: Extra Ocular Movements: intact  V: Facial sensation:  intact  VII: Facial strength: intact  VIII: Hearing:  intact  IX: Palate:  intact  XI: Shoulder shrug:  intact  XII: Tongue movement:  normal     Motor Exam:    Drift:  absent  Tone:  normal     MOTOR:  RUE: 5/5  LUE: 5/5  RLE: 5/5  LLE: 5/5     Sensory:    Touch:    Right Upper Extremity:  normal  Left Upper Extremity:  normal  Right Lower Extremity:  normal  Left Lower Extremity:  normal     Coordination/Dysmetria:  Heel to Shin:  Right:  normal  Left:  normal  Finger to Nose:   Right:  normal  Left:  normal            LABS AND IMAGING:     RECENT LABS:  CBC with Differential:    Lab Results   Component Value Date/Time    WBC 15.2 11/28/2022 04:25 AM    RBC 4.46 11/28/2022 04:25 AM    RBC 4.35 09/29/2011 02:40 PM    HGB 12.9 11/28/2022 04:25 AM    HCT 39.9 11/28/2022 04:25 AM     11/28/2022 04:25 AM     09/29/2011 02:40 PM    MCV 89.5 11/28/2022 04:25 AM    MCH 28.9 11/28/2022 04:25 AM    MCHC 32.3 11/28/2022 04:25 AM    RDW 13.0 11/28/2022 04:25 AM    LYMPHOPCT 13 11/28/2022 04:25 AM    MONOPCT 2 11/28/2022 04:25 AM    BASOPCT 0 11/28/2022 04:25 AM    MONOSABS 0.31 11/28/2022 04:25 AM    LYMPHSABS 1.89 11/28/2022 04:25 AM    EOSABS <0.03 11/28/2022 04:25 AM    BASOSABS 0.03 11/28/2022 04:25 AM    DIFFTYPE NOT REPORTED 02/19/2022 01:20 PM     BMP:    Lab Results   Component Value Date/Time     11/28/2022 04:25 AM    K 4.2 11/28/2022 04:25 AM     11/28/2022 04:25 AM    CO2 18 11/28/2022 04:25 AM    BUN 13 11/28/2022 04:25 AM    LABALBU 4.5 11/25/2022 08:32 PM    CREATININE 0.64 11/28/2022 04:25 AM    CALCIUM 8.7 11/28/2022 04:25 AM    GFRAA >60 09/30/2022 09:38 AM    LABGLOM >60 11/28/2022 04:25 AM    GLUCOSE 126 11/28/2022 04:25 AM       RADIOLOGY:  CT HEAD WO CONTRAST   Final Result   Small intraparenchymal hemorrhage in the white matter of the left frontal   lobe that is not significantly changed compared to prior examination. CT HEAD WO CONTRAST   Final Result   Stable CT appearance of the head compared to study from 11/26/2022. CT head without contrast   Final Result   Stable exam compared to 6 hours earlier with small parenchymal hematoma at   the posterior left frontal deep white matter. Status post A-comm region aneurysm coiling. Labs and Images reviewed with:    [] Zelda Rubio MD    [x] Temo Ventura MD  [] Suad Pavon MD  --[] there are no new interval images to review. ASSESSMENT AND PLAN:         ASSESSMENT:     This is a 29 y.o. female with with L front IPH    Patient care will be discussed with attending, will reevaluate patient along with attending. PLAN/MEDICAL DECISION MAKING:    NEUROLOGIC:  - CT head showing a 14 mm IPH of the left frontal lobe. - CTA head was unremarkable   - Repeat head CT stable.   - Continue ASA, restart plavix today per NEV  - Transfuse 1 unit of platelets  -Tylenol as needed for headache  - Received Decadron 2 mg bid for headaches x2 days  - Received Mag sulfate x 2 for headaches  - Continue topamax for headaches  - Goal SBP <140  - Neuro checks per protocol    CARDIOVASCULAR:  BP Range: Systolic (72VQQ), KUE:518 , Min:101 , AQF:815     Diastolic (51IXI), XID:86, Min:65, Max:107    Pulse Range: Pulse  Av  Min: 48  Max: 85  - Goal SBP <140  - Continue telemetry  - Labetalol 10 mg Q4 as needed    PULMONARY:  Respiration Range: Resp  Av.1  Min: 12  Max: 28  Current Pulse Ox: SpO2: 97 %  24HR Pulse Ox Range: SpO2  Av.8 %  Min: 97 %  Max: 100 %  -Maintaining normal O2 saturation on room air    RENAL/FLUID/ELECTROLYTE:  - No issues  - Good UOP    GI/NUTRITION:  NUTRITION:  ADULT DIET; Regular  - Bowel regimen:  MoM, Zofran, glycolax  - GI prophylaxis: PPI    ID:  Tmax: Temp (24hrs), Av.9 °F (36.6 °C), Min:97.5 °F (36.4 °C), Max:98.2 °F (36.8 °C)    Temperature Range: Temp: 97.8 °F (36.6 °C) Temp  Av.9 °F (36.6 °C)  Min: 97.5 °F (36.4 °C)  Max: 98.2 °F (36.8 °C)  - WBC   Lab Results   Component Value Date    WBC 15.2 (H) 2022     - Antimicrobials:  not indicated   - Leukocytosis likely steroid induced    HEME:   Recent Labs     22   HGB 12.7 12.9     ENDOCRINE:  - Continue to monitor blood glucose, goal <180  - glucose controlled - most recent BGL is   Recent Labs     22   GLUCOSE 118* 126*       OTHER:  - PT/OT/ST   - Code Status: Full Code    PROPHYLAXIS:  Stress ulcer: PPI    DVT PROPHYLAXIS:  - SCD sleeves - Thigh High     Dispo: plan to d/c to home today      Gregoria Tong, DO  Neuro Critical Care Service   Pager [phone]  2022     9:35 AM

## 2022-11-29 NOTE — PROGRESS NOTES
Endovascular Neurosurgery  Progress Note      Reason for evaluation: L ICH in frontal lobe, post Acomm stent assisted coiling    SUBJECTIVE:   No acute event overnight. No headache this am. Patient was sitting in bed and was comfortable     Review of Systems:  CONSTITUTIONAL:  negative for fevers, chills, fatigue and malaise    EYES:  negative for double vision, blurred vision and photophobia     HEENT:  negative for tinnitus, epistaxis and sore throat    RESPIRATORY:  negative for cough, shortness of breath, wheezing    CARDIOVASCULAR:  negative for chest pain, palpitations, syncope, edema    GASTROINTESTINAL:  negative for nausea, vomiting    GENITOURINARY:  negative for incontinence    MUSCULOSKELETAL:  negative for neck or back pain    NEUROLOGICAL:  Negative for weakness and tingling  negative for headaches and dizziness    PSYCHIATRIC:  negative for anxiety      Review of systems otherwise negative. OBJECTIVE:     Vitals:    11/29/22 0800   BP: 133/82   Pulse: 80   Resp: 18   Temp: 97.8 °F (36.6 °C)   SpO2: 97%        General:  Gen: normal habitus, NAD  HEENT: NCAT, mucosa moist  Cvs: RRR, S1 S2 normal  Resp: symmetric unlabored breathing  Abd: s/nd/nt  Ext: no edema  Skin: no lesions seen, warm and dry    Neuro:  Gen: awake and alert, oriented x3. Lang/speech: no aphasia or dysarthria. Follows commands. CN: PERRL, EOMI, VFF, V1-3 intact, face symmetric, hearing intact, shoulder shrug symmetric, tongue midline  Motor: grossly 5/5 UE and LE b/l  Sense: LT intact in all 4 ext. Coord: FTN and HTS intact b/l  DTR: deferred  Gait: narrow base gait    NIH Stroke Scale:   1a  Level of consciousness: 0 - alert; keenly responsive   1b. LOC questions:  0 - answers both questions correctly   1c. LOC commands: 0 - performs both tasks correctly   2. Best Gaze: 0 - normal   3. Visual: 0 - no visual loss   4. Facial Palsy: 0 - normal symmetric movement   5a.  Motor left arm: 0 - no drift, limb holds 90 (or 45) degrees for full 10 seconds   5b. Motor right arm: 0 - no drift, limb holds 90 (or 45) degrees for full 10 seconds   6a. Motor left le - no drift; leg holds 30 degree position for full 5 seconds   6b  Motor right le - no drift; leg holds 30 degree position for full 5 seconds   7. Limb Ataxia: 0 - absent   8. Sensory: 0   9. Best Language:  0 - no aphasia, normal   10. Dysarthria: 0 - normal   11. Extinction and Inattention: 0 - no abnormality         Total:   0     MRS: 0      LABS:   Reviewed. 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 Patient had it treated with a stent assisted coiling on the 22 and went home the next day uneventfully. 5 days later, patient c/o right leg weakness and fell CT head showed a small posterior left frontal small ICH. Repeat CT head stable. No increased ICH. PLAN:     - c/w ASA 81 mg daily   - will start Plavix 75 mg today   - keep SBP below 140  - headache management with Benadryl, Compazine  - PT OT and rehab consult        Case discussed with Dr. Yazan Weiss attending.      Ramiro Pickard MD  Stroke, [facility]  [id number]  Electronically signed 11/29/2022 at 9:01 AM

## 2022-11-30 NOTE — PROGRESS NOTES
Physician Progress Note      PATIENT:               Maude Eisenberg  [facility] #:                  [id number]  :                       1990  ADMIT DATE:       2022 10:09 PM  Jenn Mann DATE:        2022 12:34 PM  RESPONDING  PROVIDER #:        Balbir Saha MD          QUERY TEXT:    Patient admitted with IPH and is on chronic anticoagulation. If possible,   please document in the progress notes and discharge summary if you are   evaluating and/or treating any of the following: The medical record reflects the following:  Risk Factors: PMH coil embolization on ASA and Plavix  Clinical Indicators: Per Query response; being on aspirin and clopidogrel   contributed to the bleed. CT head showing a 14 mm IPH of left superior   frontal gyrus. patient was recently discharged  for a comm aneurysm   retreatment. Treatment[de-identified] Hold anticoagulation, Transfuse 1 unit of platelets, 1 dose of   Decadron 2 mg, ICU monitoring CT, resumed ASA and Plavix on discharge, lab   monitoring    Thank you, Please call if questions  Abbi Wilson RN [facility]  [phone]  Options provided:  -- IPH  associated with ASA and Plavix  -- Bleeding unrelated to anticoagulation  -- Other - I will add my own diagnosis  -- Disagree - Not applicable / Not valid  -- Disagree - Clinically unable to determine / Unknown  -- Refer to Clinical Documentation Reviewer    PROVIDER RESPONSE TEXT:    Provider disagreed with this query.   patient was not on anticoagulation just antiplatelets which were resumed on   discharge    Query created by: Riley Montano on 2022 8:44 AM      Electronically signed by:  Balbir Saha MD 2022 10:48 AM

## 2022-12-01 ENCOUNTER — TELEPHONE (OUTPATIENT)
Dept: PRIMARY CARE CLINIC | Age: 32
End: 2022-12-01

## 2022-12-02 ENCOUNTER — OFFICE VISIT (OUTPATIENT)
Dept: PRIMARY CARE CLINIC | Age: 32
End: 2022-12-02

## 2022-12-02 VITALS
WEIGHT: 146 LBS | BODY MASS INDEX: 26.87 KG/M2 | DIASTOLIC BLOOD PRESSURE: 80 MMHG | HEART RATE: 89 BPM | TEMPERATURE: 97.7 F | HEIGHT: 62 IN | RESPIRATION RATE: 18 BRPM | SYSTOLIC BLOOD PRESSURE: 118 MMHG | OXYGEN SATURATION: 98 %

## 2022-12-02 DIAGNOSIS — I60.9 SAH (SUBARACHNOID HEMORRHAGE) (HCC): Primary | ICD-10-CM

## 2022-12-02 DIAGNOSIS — E83.41 HYPERMAGNESEMIA: ICD-10-CM

## 2022-12-02 DIAGNOSIS — E87.1 HYPONATREMIA: ICD-10-CM

## 2022-12-02 DIAGNOSIS — D72.829 LEUKOCYTOSIS, UNSPECIFIED TYPE: ICD-10-CM

## 2022-12-02 NOTE — PATIENT INSTRUCTIONS
SURVEY:    You may be receiving a survey from [employer] regarding your visit today. Please complete the survey to enable us to provide the highest quality of care to you and your family. If you cannot score us a very good on any question, please call the office to discuss how we could of made your experience a very good one. Thank you.

## 2022-12-02 NOTE — PROGRESS NOTES
Name: Tsering Desouza  : 1990         Chief Complaint:     Chief Complaint   Patient presents with    Follow-Up from Hospital     presented to 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 Patient admitted for likely spontaneous IPH that was stable during her hospital course. Patient did have headaches during her stay that were controlled with Topamax     Headache     States she has constant headaches but feels fine after being discharged. Following up with neurology        History of Present Illness:      Tsering Desouza is a 29 y.o.  female who presents with Follow-Up from Hospital (presented to 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 Patient admitted for likely spontaneous IPH that was stable during her hospital course. Patient did have headaches during her stay that were controlled with Topamax ) and Headache (States she has constant headaches but feels fine after being discharged. Following up with neurology )      HPI    The patient presents for hospital follow-up. She was admitted to [facility] in [place], discharged 2022. Hospital Course per EMR as noted below:  Karen De La Garza is a 29 y.o. yo female who presented to 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 Patient admitted for likely spontaneous IPH that was stable during her hospital course. Patient did have headaches during her stay that were controlled with Topamax. \"    Today, she states she first presented to [facility] for headache and right sided weakness. She was then transferred to [facility] in [place]. Review of hospitalization notes shows that she was given Decadron, magnesium sulfate, and Topamax for headaches. Blood pressure was controlled to keep SBP <140. CT head images were trended.  Today, she confirms she is supplementing with topiramate 25 mg nightly, aspirin 81 mg daily, clopidogrel 75 mg daily. Since being home, she feels \"pretty good\". She questions if one of her medications makes her feel \"whoosy\" as this occurs hours after taking her medicine. However, she started drinking plenty of water yesterday and this has improved her symptoms. Her headaches are \"there\", but tolerable. She denies severe headaches since she has been home. Denies vision changes. Denies weakness. She states her right side still \"feels weird\". Patient states she was offered PT, but she declined. She is scheduled to see neurosurgery 12/30/22. Past Medical History:     Past Medical History:   Diagnosis Date    Aneurysm ([address] 04/2022    brain    Wellness examination     Cecillia Habermann, RAMSES, PCP      Reviewed all health maintenance requirements and ordered appropriate tests  Health Maintenance Due   Topic Date Due    Flu vaccine (1) Never done       Past Surgical History:     Past Surgical History:   Procedure Laterality Date    CEREBRAL ANGIOGRAM  11/08/2022    CEREBRAL ANGIOGRAM Bilateral 11/21/2022    IR ANGIOGRAM CAROTID CEREBRAL BILATERAL, 1 stent, 2 coils    DILATION AND CURETTAGE OF UTERUS N/A 03/21/2022    DILATATION AND CURETTAGE HYSTEROSCOPY CAUTERY ABLATION-NOVASURE ENDOMETRIAL ABLATION performed by Maria Guadalupe Colindres DO at [address] [place] [address]  04/18/2022     IR ANGIOGRAM CAROTID CEREBRAL BILATERAL    OVARIAN CYST REMOVAL Right 03/21/2022    LAPAROSCOPIC- RIGHT OOPHERECTOMY performed by Maria Guadalupe Colindres DO at [facility] [address]  04/25/2022         SALPINGECTOMY Bilateral 03/21/2022    SALPINGECTOMY LAPAROSCOPIC performed by Maria Guadalupe Colindres DO at [address] [place] [address]        Medications:       Prior to Admission medications    Medication Sig Start Date End Date Taking?  Authorizing Provider   topiramate (TOPAMAX) 25 MG tablet Take 1 tablet by mouth at bedtime 11/29/22  Yes Montrell Le DO   aspirin 81 MG chewable tablet Take 1 tablet by mouth daily 11/23/22  Yes Sam Cheema APRN - CNP   clopidogrel (PLAVIX) 75 MG tablet Take 1 tablet by mouth daily 11/10/22  Yes Jakub Morejon MD   Probiotic Product (PROBIOTIC BLEND PO) Take by mouth daily   Yes Historical Provider, MD        Allergies:       Patient has no known allergies. Social History:     Tobacco:    reports that she quit smoking about 10 months ago. Her smoking use included cigarettes. She has a 7.00 pack-year smoking history. She uses smokeless tobacco.  Alcohol:      reports that she does not currently use alcohol. Drug Use:  reports no history of drug use. Family History:     No family history on file. Review of Systems:     Positive and Negative as described in HPI    Review of Systems   Eyes:  Negative for visual disturbance. Neurological:  Positive for headaches. Negative for weakness. Physical Exam:   Vitals:  /80   Pulse 89   Temp 97.7 °F (36.5 °C)   Resp 18   Ht 5' 2\" (1.575 m)   Wt 146 lb (66.2 kg)   LMP 11/03/2022 (Approximate) Comment: had ablation and ovary removal - urine preg neg  SpO2 98%   BMI 26.70 kg/m²     Physical Exam  Constitutional:       General: She is not in acute distress. Appearance: Normal appearance. She is not ill-appearing or toxic-appearing. Eyes:      Pupils: Pupils are equal, round, and reactive to light. Comments: EOMs WNLs   Cardiovascular:      Rate and Rhythm: Normal rate and regular rhythm. Heart sounds: Normal heart sounds. No murmur heard. Pulmonary:      Effort: Pulmonary effort is normal. No respiratory distress. Breath sounds: Normal breath sounds. No stridor. No wheezing, rhonchi or rales. Neurological:      Mental Status: She is alert and oriented to person, place, and time. Cranial Nerves: No dysarthria or facial asymmetry. Motor: No weakness (5/5 upper and lower extremity strength bilaterally), tremor or pronator drift. Coordination: Finger-Nose-Finger Test normal.      Gait: Gait is intact. Deep Tendon Reflexes:      Reflex Scores:       Patellar reflexes are 2+ on the right side and 2+ on the left side. Psychiatric:         Mood and Affect: Mood normal.         Behavior: Behavior normal.         Thought Content: Thought content normal.         Judgment: Judgment normal.       Data:     Lab Results   Component Value Date/Time     11/28/2022 04:25 AM    K 4.2 11/28/2022 04:25 AM     11/28/2022 04:25 AM    CO2 18 11/28/2022 04:25 AM    BUN 13 11/28/2022 04:25 AM    CREATININE 0.64 11/28/2022 04:25 AM    GLUCOSE 126 11/28/2022 04:25 AM    PROT 7.3 11/25/2022 08:32 PM    LABALBU 4.5 11/25/2022 08:32 PM    BILITOT 0.2 11/25/2022 08:32 PM    ALKPHOS 96 11/25/2022 08:32 PM    AST 15 11/25/2022 08:32 PM    ALT 18 11/25/2022 08:32 PM     Lab Results   Component Value Date/Time    WBC 15.2 11/28/2022 04:25 AM    RBC 4.46 11/28/2022 04:25 AM    RBC 4.35 09/29/2011 02:40 PM    HGB 12.9 11/28/2022 04:25 AM    HCT 39.9 11/28/2022 04:25 AM    MCV 89.5 11/28/2022 04:25 AM    MCH 28.9 11/28/2022 04:25 AM    MCHC 32.3 11/28/2022 04:25 AM    RDW 13.0 11/28/2022 04:25 AM     11/28/2022 04:25 AM     09/29/2011 02:40 PM    MPV 11.1 11/28/2022 04:25 AM     Lab Results   Component Value Date/Time    TSH 1.36 09/30/2022 09:39 AM     Lab Results   Component Value Date/Time    CHOL 182 04/18/2022 06:15 AM    HDL 74 04/18/2022 06:15 AM    LABA1C 5.5 11/26/2022 03:36 AM       Assessment/Plan:      Diagnosis Orders   1. SAH (subarachnoid hemorrhage) ([address]        2. Hypermagnesemia  Magnesium      3. Hyponatremia  Basic Metabolic Panel      4.  Leukocytosis, unspecified type  CBC with Auto Differential        -- Reviewed hospitalization notes, imaging, labs   -- Repeat CBC, BMP, magnesium level   - Vital signs stable  - Neuro exam WNL  - Continue medications as prescribed by neurology including Plavix, aspirin, and Topamax  - Patient is not scheduled to follow-up with her neurologist until 12/30/2022, though her discharge paperwork recommended 2-week follow-up with neurosurgery. I recommend she call to move up her appointment  - Offered referral to physical therapy and Occupational Therapy for potential right sided strengthening, patient declines   - Discussed her \"wooziness\" feelings that have improved since increasing water intake. Encouraged her to remain well-hydrated daily. Will send this note to her neurosurgeon for their review  - Reviewed emergent signs and symptoms such as sudden weakness, vision changes, extreme headaches and when to seek care at the emergency department    Completed Refills   Requested Prescriptions      No prescriptions requested or ordered in this encounter       Orders Placed This Encounter   Procedures    CBC with Auto Differential     Standing Status:   Future     Standing Expiration Date:   36/4/5432    Basic Metabolic Panel     Standing Status:   Future     Standing Expiration Date:   12/2/2023    Magnesium     Standing Status:   Future     Standing Expiration Date:   12/2/2023          No results found for this visit on 12/02/22. Return if symptoms worsen or fail to improve.     Electronically signed by DEEDEE Isbell CNP on 12/02/22 at 1:58 PM.

## 2022-12-13 ENCOUNTER — HOSPITAL ENCOUNTER (OUTPATIENT)
Age: 32
Discharge: HOME | End: 2022-12-13
Payer: COMMERCIAL

## 2022-12-13 DIAGNOSIS — D72.829 LEUKOCYTOSIS, UNSPECIFIED TYPE: ICD-10-CM

## 2022-12-13 DIAGNOSIS — E83.41 HYPERMAGNESEMIA: ICD-10-CM

## 2022-12-13 DIAGNOSIS — E87.1 HYPONATREMIA: ICD-10-CM

## 2022-12-13 LAB
ABSOLUTE EOS #: 0.21 K/UL (ref 0–0.44)
ABSOLUTE IMMATURE GRANULOCYTE: <0.03 K/UL (ref 0–0.3)
ABSOLUTE LYMPH #: 1.51 K/UL (ref 1.1–3.7)
ABSOLUTE MONO #: 0.56 K/UL (ref 0.1–1.2)
ANION GAP SERPL CALCULATED.3IONS-SCNC: 9 MMOL/L (ref 9–17)
BASOPHILS # BLD: 1 % (ref 0–2)
BASOPHILS ABSOLUTE: 0.06 K/UL (ref 0–0.2)
BUN BLDV-MCNC: 11 MG/DL (ref 6–20)
BUN/CREAT BLD: 13 (ref 9–20)
CALCIUM SERPL-MCNC: 9.4 MG/DL (ref 8.6–10.4)
CHLORIDE BLD-SCNC: 108 MMOL/L (ref 98–107)
CO2: 22 MMOL/L (ref 20–31)
CREAT SERPL-MCNC: 0.84 MG/DL (ref 0.5–0.9)
EOSINOPHILS RELATIVE PERCENT: 4 % (ref 1–4)
GFR SERPL CREATININE-BSD FRML MDRD: >60 ML/MIN/1.73M2
GLUCOSE BLD-MCNC: 114 MG/DL (ref 70–99)
HCT VFR BLD CALC: 41 % (ref 36.3–47.1)
HEMOGLOBIN: 13.8 G/DL (ref 11.9–15.1)
IMMATURE GRANULOCYTES: 0 %
LYMPHOCYTES # BLD: 32 % (ref 24–43)
MAGNESIUM: 2.2 MG/DL (ref 1.6–2.6)
MCH RBC QN AUTO: 29.7 PG (ref 25.2–33.5)
MCHC RBC AUTO-ENTMCNC: 33.7 G/DL (ref 28.4–34.8)
MCV RBC AUTO: 88.2 FL (ref 82.6–102.9)
MONOCYTES # BLD: 12 % (ref 3–12)
NRBC AUTOMATED: 0 PER 100 WBC
PDW BLD-RTO: 12.9 % (ref 11.8–14.4)
PLATELET # BLD: 225 K/UL (ref 138–453)
PMV BLD AUTO: 10.6 FL (ref 8.1–13.5)
POTASSIUM SERPL-SCNC: 4 MMOL/L (ref 3.7–5.3)
RBC # BLD: 4.65 M/UL (ref 3.95–5.11)
SEG NEUTROPHILS: 51 % (ref 36–65)
SEGMENTED NEUTROPHILS ABSOLUTE COUNT: 2.37 K/UL (ref 1.5–8.1)
SODIUM BLD-SCNC: 139 MMOL/L (ref 135–144)
WBC # BLD: 4.7 K/UL (ref 3.5–11.3)

## 2022-12-13 PROCEDURE — 80048 BASIC METABOLIC PNL TOTAL CA: CPT

## 2022-12-13 PROCEDURE — 85025 COMPLETE CBC W/AUTO DIFF WBC: CPT

## 2022-12-13 PROCEDURE — 36415 COLL VENOUS BLD VENIPUNCTURE: CPT

## 2022-12-13 PROCEDURE — 83735 ASSAY OF MAGNESIUM: CPT

## 2022-12-19 DIAGNOSIS — I67.1 ANEURYSM OF ANTERIOR COMMUNICATING ARTERY: ICD-10-CM

## 2022-12-19 DIAGNOSIS — Z95.828 MRI-SAFE ENDOVASCULAR ANEURYSM COIL PRESENT: ICD-10-CM

## 2022-12-19 RX ORDER — CLOPIDOGREL BISULFATE 75 MG/1
75 TABLET ORAL DAILY
Qty: 30 TABLET | Refills: 3 | Status: SHIPPED | OUTPATIENT
Start: 2022-12-19

## 2023-01-13 ENCOUNTER — OFFICE VISIT (OUTPATIENT)
Dept: NEUROLOGY | Age: 33
End: 2023-01-13
Payer: COMMERCIAL

## 2023-01-13 VITALS
SYSTOLIC BLOOD PRESSURE: 123 MMHG | OXYGEN SATURATION: 100 % | WEIGHT: 156.6 LBS | HEART RATE: 63 BPM | BODY MASS INDEX: 28.82 KG/M2 | DIASTOLIC BLOOD PRESSURE: 87 MMHG | TEMPERATURE: 97.5 F | HEIGHT: 62 IN

## 2023-01-13 DIAGNOSIS — Z09 HOSPITAL DISCHARGE FOLLOW-UP: ICD-10-CM

## 2023-01-13 DIAGNOSIS — I61.0 NONTRAUMATIC SUBCORTICAL HEMORRHAGE OF LEFT CEREBRAL HEMISPHERE (HCC): Primary | ICD-10-CM

## 2023-01-13 PROCEDURE — 99214 OFFICE O/P EST MOD 30 MIN: CPT | Performed by: PSYCHIATRY & NEUROLOGY

## 2023-01-13 PROCEDURE — 1111F DSCHRG MED/CURRENT MED MERGE: CPT | Performed by: PSYCHIATRY & NEUROLOGY

## 2023-01-13 NOTE — PATIENT INSTRUCTIONS
After Visit Summary   6/1/2018    Theresa M Delosreyes    MRN: [id number]           Patient Information     Date Of Birth          1959        Visit Information        Provider Department      6/1/2018 9:20 AM Dilan Soto MD [facility]        Today's Diagnoses     Healthcare maintenance    -  1    Benign essential hypertension          Care Instructions      Preventive Health Recommendations  Female Ages 50 - 64    Yearly exam: See your health care provider every year in order to  o Review health changes.   o Discuss preventive care.    o Review your medicines if your doctor has prescribed any.      Get a Pap test every three years (unless you have an abnormal result and your provider advises testing more often).    If you get Pap tests with HPV test, you only need to test every 5 years, unless you have an abnormal result.     You do not need a Pap test if your uterus was removed (hysterectomy) and you have not had cancer.    You should be tested each year for STDs (sexually transmitted diseases) if you're at risk.     Have a mammogram every 1 to 2 years.    Have a colonoscopy at age 50, or have a yearly FIT test (stool test). These exams screen for colon cancer.      Have a cholesterol test every 5 years, or more often if advised.    Have a diabetes test (fasting glucose) every three years. If you are at risk for diabetes, you should have this test more often.     If you are at risk for osteoporosis (brittle bone disease), think about having a bone density scan (DEXA).    Shots: Get a flu shot each year. Get a tetanus shot every 10 years.    Nutrition:     Eat at least 5 servings of fruits and vegetables each day.    Eat whole-grain bread, whole-wheat pasta and brown rice instead of white grains and rice.    Talk to your provider about Calcium and Vitamin D.     Lifestyle    Exercise at least 150 minutes a week (30 minutes a day, 5 days a week). This will help you control your  Continue aspirin and plavix.     Continue taking topamax 25mg in the AM.    Get a repeat CT head without contrast "weight and prevent disease.    Limit alcohol to one drink per day.    No smoking.     Wear sunscreen to prevent skin cancer.     See your dentist every six months for an exam and cleaning.    See your eye doctor every 1 to 2 years.            Follow-ups after your visit        Follow-up notes from your care team     Return in about 1 year (around 6/1/2019) for Physical Exam.      Who to contact     If you have questions or need follow up information about today's clinic visit or your schedule please contact [facility] directly at [phone].  Normal or non-critical lab and imaging results will be communicated to you by LMN-1hart, letter or phone within 4 business days after the clinic has received the results. If you do not hear from us within 7 days, please contact the clinic through WeGam[employer] or phone. If you have a critical or abnormal lab result, we will notify you by phone as soon as possible.  Submit refill requests through ActX or call your pharmacy and they will forward the refill request to us. Please allow 3 business days for your refill to be completed.          Additional Information About Your Visit        MyChart Information     Act[employer] gives you secure access to your electronic health record. If you see a primary care provider, you can also send messages to your care team and make appointments. If you have questions, please call your primary care clinic.  If you do not have a primary care provider, please call [phone] and they will assist you.        Care EveryWhere ID     This is your Care EveryWhere ID. This could be used by other organizations to access your [place] medical records  [id number]        Your Vitals Were     Pulse Temperature Respirations Height Last Period Pulse Oximetry    68 99.3  F (37.4  C) (Tympanic) 16 5' 3\" (1.6 m) 02/23/2013 100%    BMI (Body Mass Index)                   35.43 kg/m2            Blood Pressure from Last 3 Encounters:   06/01/18 " 124/80   03/21/18 138/86   11/29/17 142/82    Weight from Last 3 Encounters:   06/01/18 200 lb (90.7 kg)   03/21/18 200 lb (90.7 kg)   11/10/17 201 lb (91.2 kg)              We Performed the Following     CBC with platelets     Comprehensive metabolic panel     Lipid panel reflex to direct LDL Fasting          Today's Medication Changes          These changes are accurate as of 6/1/18  9:56 AM.  If you have any questions, ask your nurse or doctor.               These medicines have changed or have updated prescriptions.        Dose/Directions    carvedilol 25 MG tablet   Commonly known as:  COREG   This may have changed:  how much to take   Used for:  Benign essential hypertension        Dose:  12.5 mg   Take 0.5 tablets (12.5 mg) by mouth 2 times daily (with meals)   Quantity:  180 tablet   Refills:  1            Where to get your medicines      Some of these will need a paper prescription and others can be bought over the counter.  Ask your nurse if you have questions.     You don't need a prescription for these medications     carvedilol 25 MG tablet                Primary Care Provider Office Phone # Fax #    Dilan Soto [phone]       [address] [place], [place] [id number]        Equal Access to Services     LYDIA WALKER AH: Sharonda Armstrong, wasalda nahun, qaybta kaalmada adeyobaniyada, chastity ruano. So [facility] [phone].    ATENCIÓN: Si habla español, tiene a gil disposición servicios gratuitos de asistencia lingüística. Llame al [phone].    We comply with applicable federal civil rights laws and [place] laws. We do not discriminate on the basis of race, color, national origin, age, disability, sex, sexual orientation, or gender identity.            Thank you!     Thank you for [place] [facility] [place]. Our goal is always to provide you with excellent care. Hearing back from our patients is one way we can  continue to improve our services. Please take a few minutes to complete the written survey that you may receive in the mail after your visit with us. Thank you!             Your Updated Medication List - Protect others around you: Learn how to safely use, store and throw away your medicines at www.disposemymeds.org.          This list is accurate as of 6/1/18  9:56 AM.  Always use your most recent med list.                   Brand Name Dispense Instructions for use Diagnosis    candesartan cilexetil 32 MG Tabs     90 tablet    Take 32 mg by mouth daily    Benign essential hypertension       carvedilol 25 MG tablet    COREG    180 tablet    Take 0.5 tablets (12.5 mg) by mouth 2 times daily (with meals)    Benign essential hypertension       denosumab 60 MG/ML Soln injection    PROLIA     Inject 60 mg Subcutaneous every 6 months        letrozole 2.5 MG tablet    FEMARA     Take 2.5 mg by mouth At Bedtime        VITAMIN B12 PO      Take by mouth daily        VITAMIN D (CHOLECALCIFEROL) PO      Take 3,000 Units by mouth daily

## 2023-01-13 NOTE — PROGRESS NOTES
Endovascular Neurosurgery  Progress Note      Reason for evaluation: L ICH in frontal lobe, post Acomm stent assisted coiling    SUBJECTIVE:     Since last week, c/o right parietal headache 5-7/10, needle pain, no photophobia or phonophobia, no vision changes    Since taking topamax at night, patient thinks she experiences restless leg syndrome, she changes the topamax to the AM and restless issue resolve, until one week ago, she starts experiencing mild headche only during the day time, happened for 3 days in the last week    Patient is otherwise happy today, no headache today, but because of the small ICH, patient is worried about another bleed, she is still taking aspirin and plavix. Review of Systems:  CONSTITUTIONAL:  negative for fevers, chills, fatigue and malaise    EYES:  negative for double vision, blurred vision and photophobia     HEENT:  negative for tinnitus, epistaxis and sore throat    RESPIRATORY:  negative for cough, shortness of breath, wheezing    CARDIOVASCULAR:  negative for chest pain, palpitations, syncope, edema    GASTROINTESTINAL:  negative for nausea, vomiting    GENITOURINARY:  negative for incontinence    MUSCULOSKELETAL:  negative for neck or back pain    NEUROLOGICAL:  Negative for weakness and tingling  negative for headaches and dizziness    PSYCHIATRIC:  negative for anxiety      Review of systems otherwise negative. OBJECTIVE:     Vitals:    01/13/23 0954   BP: 123/87   Pulse: 63   Temp: 97.5 °F (36.4 °C)   SpO2: 100%        General:  Gen: normal habitus, NAD  HEENT: NCAT, mucosa moist  Cvs: RRR, S1 S2 normal  Resp: symmetric unlabored breathing  Abd: s/nd/nt  Ext: no edema  Skin: no lesions seen, warm and dry    Neuro:  Gen: awake and alert, oriented x3. Lang/speech: no aphasia or dysarthria. Follows commands.   CN: PERRL, EOMI, VFF, V1-3 intact, face symmetric, hearing intact, shoulder shrug symmetric, tongue midline  Motor: grossly 5/5 UE and LE b/l  Sense: LT intact in all 4 ext. Coord: FTN and HTS intact b/l  DTR: deferred  Gait: narrow base gait    NIH Stroke Scale:   1a  Level of consciousness: 0 - alert; keenly responsive   1b. LOC questions:  0 - answers both questions correctly   1c. LOC commands: 0 - performs both tasks correctly   2. Best Gaze: 0 - normal   3. Visual: 0 - no visual loss   4. Facial Palsy: 0 - normal symmetric movement   5a. Motor left arm: 0 - no drift, limb holds 90 (or 45) degrees for full 10 seconds   5b. Motor right arm: 0 - no drift, limb holds 90 (or 45) degrees for full 10 seconds   6a. Motor left le - no drift; leg holds 30 degree position for full 5 seconds   6b  Motor right le - no drift; leg holds 30 degree position for full 5 seconds   7. Limb Ataxia: 0 - absent   8. Sensory: 0   9. Best Language:  0 - no aphasia, normal   10. Dysarthria: 0 - normal   11. Extinction and Inattention: 0 - no abnormality         Total:   0     MRS: 0      LABS:   Reviewed. Lab Results   Component Value Date    HGB 13.8 2022    WBC 4.7 2022     2022     2022    BUN 11 2022    CREATININE 0.84 2022    AST 15 2022    ALT 18 2022    MG 2.2 2022    APTT 22.6 2022    INR 1.0 2022      Lab Results   Component Value Date/Time    COVID19 Not Detected 2022 03:40 AM    COVID19 Not Detected 2022 09:58 AM       RADIOLOGY:   Images were personally reviewed including:    CT head on the 22: left posterior frontal ICH, repeat on the 22 is stable    ASSESSMENT:     29year old woman with hx of SAH from ruptured acomm in 2022, s/p coiling, on the 22 DSA found to have a remnant of the acomm aneurysm [address] 3. Patient had it treated with a stent assisted coiling on the 22 and went home the next day uneventfully. 5 days later, patient c/o right leg weakness and fell CT head showed a small posterior left frontal small ICH. Repeat CT head stable. No increased ICH. Patient was then discharged home uneventfully. PLAN:     - c/w ASA 81 mg daily   - c/w Plavix 75 mg daily   - headache management with topamax 25mg qAM, it is working well for patient so far  - will get a repeat head CT to look at the [address] from 7 weeks ago, and also to r/o new bleeding as pt c/o headache since last week    - f/u with Dr Cherylene Sima in 6 weeks        Case discussed with Dr. Cherylene Sima attending. Brooke Soler MD  Stroke, [facility]  [id number]  Electronically signed 1/13/2023 at 10:04 AM         I reviewed the Fellow's note and agree with the documented findings and plan of care. Any areas of disagreement are noted on the chart. I agree with the chief complaint, past medical history, past surgical history, allergies, medications, social and family history as documented unless otherwise noted below. I have personally seen and evaluated the patient and images. I find the patient's history and physical exam are consistent with the [address] [place] [address] documentation. I agree with the care provided, treatment rendered, disposition and follow-up plan. Late Entry Note for Date of Endovascular Neurosurgery/Stroke Service rendered on 1-.    82-year-old woman with coil in position of a ruptured anterior communicating aneurysm in April 2022 status post November 21, 2022 stent assisted coil embolization of the recurrent aneurysm. Doing well with headaches better managed. She recently had headache in setting of illness. We will recommend a follow-up head CT and follow-up with Dr. Cherylene Sima March 1, 2023    36 minutes with greater than 50% of time spent face to face, examining patient, counseling, coordinating care, obtaining history, reviewing images, labs, and other notes personally. Sangeetha North MD  Office [phone].  Cell [phone]  Stroke, Neurocritical Care & [facility]  [address]

## 2023-01-19 RX ORDER — ASPIRIN 81 MG/1
81 TABLET, CHEWABLE ORAL DAILY
Qty: 30 TABLET | Refills: 5 | Status: SHIPPED | OUTPATIENT
Start: 2023-01-19

## 2023-01-24 ENCOUNTER — HOSPITAL ENCOUNTER (OUTPATIENT)
Dept: CT IMAGING | Age: 33
Discharge: HOME | End: 2023-01-26
Payer: COMMERCIAL

## 2023-01-24 DIAGNOSIS — I61.0 NONTRAUMATIC SUBCORTICAL HEMORRHAGE OF LEFT CEREBRAL HEMISPHERE (HCC): ICD-10-CM

## 2023-01-24 PROCEDURE — 70450 CT HEAD/BRAIN W/O DYE: CPT

## 2023-03-01 ENCOUNTER — OFFICE VISIT (OUTPATIENT)
Dept: NEUROLOGY | Age: 33
End: 2023-03-01
Payer: COMMERCIAL

## 2023-03-01 VITALS
DIASTOLIC BLOOD PRESSURE: 88 MMHG | TEMPERATURE: 97.5 F | OXYGEN SATURATION: 99 % | HEIGHT: 62 IN | WEIGHT: 156 LBS | BODY MASS INDEX: 28.71 KG/M2 | HEART RATE: 57 BPM | SYSTOLIC BLOOD PRESSURE: 137 MMHG

## 2023-03-01 DIAGNOSIS — I61.0 NONTRAUMATIC SUBCORTICAL HEMORRHAGE OF LEFT CEREBRAL HEMISPHERE (HCC): ICD-10-CM

## 2023-03-01 DIAGNOSIS — Z95.828 MRI-SAFE ENDOVASCULAR ANEURYSM COIL PRESENT: Primary | ICD-10-CM

## 2023-03-01 DIAGNOSIS — I67.1 ANEURYSM OF ANTERIOR COMMUNICATING ARTERY: ICD-10-CM

## 2023-03-01 PROCEDURE — 99215 OFFICE O/P EST HI 40 MIN: CPT | Performed by: PSYCHIATRY & NEUROLOGY

## 2023-03-01 RX ORDER — TOPIRAMATE 25 MG/1
75 TABLET ORAL DAILY
Qty: 270 TABLET | Refills: 1 | Status: SHIPPED | OUTPATIENT
Start: 2023-03-01

## 2023-03-01 RX ORDER — GABAPENTIN 300 MG/1
CAPSULE ORAL
COMMUNITY
Start: 2022-12-19 | End: 2023-03-01

## 2023-03-01 NOTE — PROGRESS NOTES
Endovascular Neurosurgery - [address], [address] [facility], [address] [place] [address]  P: [phone]  F: [phone]      Dear DEEDEE Vaughn-RAMSES    HPI:     ALBERT    Janna Kinsey is a 34 y.o. female who presents with coil embolization of the ruptured acom aneurysm April 2022 with November 21, 2022 Stent assisted coil embolization of the recurrent aneurysm. Doing well Since the procedure. She has noted migraines and had been taking 50mg in am and 25 mg at night of topamax. She did not recurrence migraines while off topamax for 4 days. .    No Known Allergies  Current Outpatient Medications   Medication Sig Dispense Refill    topiramate (TOPAMAX) 25 MG tablet Take 3 tablets by mouth daily 25mg in am and 50 mg in pm 270 tablet 1    aspirin 81 MG chewable tablet Take 1 tablet by mouth daily 30 tablet 5    clopidogrel (PLAVIX) 75 MG tablet Take 1 tablet by mouth daily 30 tablet 3     No current facility-administered medications for this visit. Past Medical History:   Diagnosis Date    Aneurysm ([address] 04/2022    brain    Wellness examination     Janis Cha CNP, PCP      Past Surgical History:   Procedure Laterality Date    CEREBRAL ANGIOGRAM  11/08/2022    CEREBRAL ANGIOGRAM Bilateral 11/21/2022    IR ANGIOGRAM CAROTID CEREBRAL BILATERAL, 1 stent, 2 coils    DILATION AND CURETTAGE OF UTERUS N/A 03/21/2022    DILATATION AND CURETTAGE HYSTEROSCOPY CAUTERY ABLATION-NOVASURE ENDOMETRIAL ABLATION performed by Tati Rodriguez DO at [address]  04/18/2022     IR ANGIOGRAM CAROTID CEREBRAL BILATERAL    OVARIAN CYST REMOVAL Right 03/21/2022    LAPAROSCOPIC- RIGHT OOPHERECTOMY performed by Tati Rodriguez DO at [address]  04/25/2022         SALPINGECTOMY Bilateral 03/21/2022    SALPINGECTOMY LAPAROSCOPIC performed by Tati Rodriguez DO at [address]     No family history on file.   Social History     Tobacco Use Smoking status: Former     Packs/day: 0.50     Years: 14.00     Pack years: 7.00     Types: Cigarettes     Quit date: 2022     Years since quittin.1    Smokeless tobacco: Current    Tobacco comments:     Nicotine pouches   Substance Use Topics    Alcohol use: Not Currently        Subjective:     Review of Systems      Objective:     /88 (Site: Left Upper Arm, Position: Sitting, Cuff Size: Medium Adult)   Pulse 57   Temp 97.5 °F (36.4 °C) (Temporal)   Ht 5' 2\" (1.575 m)   Wt 156 lb (70.8 kg)   SpO2 99%   BMI 28.53 kg/m²   Physical Exam  Gen: Sitting in chair, nad  CV:RRR  NEURO: alert and oriented x 3 intact language attention and knowledge  CN: eomi, perrl, v1-v3 intact no facial asymmetry, midline tongue  Motor 5/5 bue/ble  COORD: no dysmetria    Assessment:        Billy Cho is a 34 y.o. female who has history of migraines and coil embolization of the ruptured acom aneurysm 2022 with 2022 Stent assisted coil embolization of the recurrent aneurysm   Diagnosis Orders   1. MRI-safe endovascular aneurysm coil present        2. Nontraumatic subcortical hemorrhage of left cerebral hemisphere ([address]        3. Aneurysm of anterior communicating artery            Recommendations:      Return in about 3 months (around 2023) for cerebral angiogram.      Cont plavix till end of march  Cont aspirin 81 mg forever  Cerebral angiogram around may 2023  Normotensive sbp  Topamax renewed with 50 mg at night and 25 mg in am.    Established Patient Visit Time: 42 minutes  Time Spent in Counselin minutes  Greater than 50% of the time in this visit was spent counseling and coordinating care of this patient. Patient given educational materials - see patient instructions. Discussed use, benefit, and side effects of prescribed medications. Personally reviewed imaging with patients and all questions answered. Pt voiced understanding. Patient agreed with treatment plan.  Follow up as directed above. If you have any questions, please do not hesitate to call me.   I look forward to following Cordell Zamora      Sincerely,        Avis Peguero MD, MD  Electronically signed by Avis Peguero MD, MD on 3/1/2023 at 10:48 AM

## 2023-03-10 DIAGNOSIS — G43.909 MIGRAINE WITHOUT STATUS MIGRAINOSUS, NOT INTRACTABLE, UNSPECIFIED MIGRAINE TYPE: Primary | ICD-10-CM

## 2023-03-13 RX ORDER — TOPIRAMATE 25 MG/1
75 TABLET ORAL DAILY
Qty: 270 TABLET | Refills: 1 | Status: SHIPPED | OUTPATIENT
Start: 2023-03-13

## 2023-06-06 ENCOUNTER — HOSPITAL ENCOUNTER (OUTPATIENT)
Dept: INTERVENTIONAL RADIOLOGY/VASCULAR | Age: 33
Discharge: HOME | End: 2023-06-08
Payer: COMMERCIAL

## 2023-06-06 VITALS
SYSTOLIC BLOOD PRESSURE: 138 MMHG | HEIGHT: 62 IN | HEART RATE: 74 BPM | TEMPERATURE: 97.8 F | WEIGHT: 145 LBS | OXYGEN SATURATION: 99 % | DIASTOLIC BLOOD PRESSURE: 86 MMHG | BODY MASS INDEX: 26.68 KG/M2 | RESPIRATION RATE: 8 BRPM

## 2023-06-06 DIAGNOSIS — I67.1 CEREBRAL ANEURYSM: ICD-10-CM

## 2023-06-06 LAB
EGFR, POC: >60 ML/MIN/1.73M2
GLUCOSE BLD-MCNC: 98 MG/DL (ref 74–100)
HCG, PREGNANCY URINE (POC): NEGATIVE
HCT VFR BLD AUTO: 45 % (ref 36–46)
POC BUN: 13 MG/DL (ref 8–26)
POC CHLORIDE: 110 MMOL/L (ref 98–107)
POC CREATININE: 0.79 MG/DL (ref 0.51–1.19)
POC HEMOGLOBIN: 15.4 G/DL (ref 12–16)
POC IONIZED CALCIUM: 1.22 MMOL/L (ref 1.15–1.33)
POC SODIUM: 146 MMOL/L (ref 138–146)
POTASSIUM BLD-SCNC: 4.9 MMOL/L (ref 3.5–4.5)

## 2023-06-06 PROCEDURE — 82947 ASSAY GLUCOSE BLOOD QUANT: CPT

## 2023-06-06 PROCEDURE — 2580000003 HC RX 258: Performed by: PSYCHIATRY & NEUROLOGY

## 2023-06-06 PROCEDURE — 75710 ARTERY X-RAYS ARM/LEG: CPT

## 2023-06-06 PROCEDURE — 6360000002 HC RX W HCPCS: Performed by: PSYCHIATRY & NEUROLOGY

## 2023-06-06 PROCEDURE — 6360000004 HC RX CONTRAST MEDICATION: Performed by: PSYCHIATRY & NEUROLOGY

## 2023-06-06 PROCEDURE — 84295 ASSAY OF SERUM SODIUM: CPT

## 2023-06-06 PROCEDURE — 84132 ASSAY OF SERUM POTASSIUM: CPT

## 2023-06-06 PROCEDURE — 82565 ASSAY OF CREATININE: CPT

## 2023-06-06 PROCEDURE — 81025 URINE PREGNANCY TEST: CPT

## 2023-06-06 PROCEDURE — 99152 MOD SED SAME PHYS/QHP 5/>YRS: CPT

## 2023-06-06 PROCEDURE — 2500000003 HC RX 250 WO HCPCS: Performed by: PSYCHIATRY & NEUROLOGY

## 2023-06-06 PROCEDURE — 99214 OFFICE O/P EST MOD 30 MIN: CPT | Performed by: PSYCHIATRY & NEUROLOGY

## 2023-06-06 PROCEDURE — 7100000010 HC PHASE II RECOVERY - FIRST 15 MIN

## 2023-06-06 PROCEDURE — 2709999900 HC NON-CHARGEABLE SUPPLY

## 2023-06-06 PROCEDURE — 76937 US GUIDE VASCULAR ACCESS: CPT | Performed by: PSYCHIATRY & NEUROLOGY

## 2023-06-06 PROCEDURE — 82435 ASSAY OF BLOOD CHLORIDE: CPT

## 2023-06-06 PROCEDURE — 7100000011 HC PHASE II RECOVERY - ADDTL 15 MIN

## 2023-06-06 PROCEDURE — C1894 INTRO/SHEATH, NON-LASER: HCPCS

## 2023-06-06 PROCEDURE — 85014 HEMATOCRIT: CPT

## 2023-06-06 PROCEDURE — 36224 PLACE CATH CAROTD ART: CPT | Performed by: PSYCHIATRY & NEUROLOGY

## 2023-06-06 PROCEDURE — 84520 ASSAY OF UREA NITROGEN: CPT

## 2023-06-06 PROCEDURE — 36224 PLACE CATH CAROTD ART: CPT

## 2023-06-06 PROCEDURE — 82330 ASSAY OF CALCIUM: CPT

## 2023-06-06 PROCEDURE — C1769 GUIDE WIRE: HCPCS

## 2023-06-06 PROCEDURE — C1887 CATHETER, GUIDING: HCPCS

## 2023-06-06 PROCEDURE — 99152 MOD SED SAME PHYS/QHP 5/>YRS: CPT | Performed by: PSYCHIATRY & NEUROLOGY

## 2023-06-06 PROCEDURE — 99153 MOD SED SAME PHYS/QHP EA: CPT

## 2023-06-06 RX ORDER — VERAPAMIL HYDROCHLORIDE 2.5 MG/ML
INJECTION, SOLUTION INTRAVENOUS PRN
Status: COMPLETED | OUTPATIENT
Start: 2023-06-06 | End: 2023-06-06

## 2023-06-06 RX ORDER — SODIUM CHLORIDE 9 MG/ML
INJECTION, SOLUTION INTRAVENOUS PRN
Status: DISCONTINUED | OUTPATIENT
Start: 2023-06-06 | End: 2023-06-09 | Stop reason: HOSPADM

## 2023-06-06 RX ORDER — FENTANYL CITRATE 50 UG/ML
INJECTION, SOLUTION INTRAMUSCULAR; INTRAVENOUS PRN
Status: COMPLETED | OUTPATIENT
Start: 2023-06-06 | End: 2023-06-06

## 2023-06-06 RX ORDER — ACETAMINOPHEN 325 MG/1
650 TABLET ORAL EVERY 4 HOURS PRN
Status: DISCONTINUED | OUTPATIENT
Start: 2023-06-06 | End: 2023-06-09 | Stop reason: HOSPADM

## 2023-06-06 RX ORDER — SODIUM CHLORIDE 0.9 % (FLUSH) 0.9 %
5-40 SYRINGE (ML) INJECTION PRN
Status: DISCONTINUED | OUTPATIENT
Start: 2023-06-06 | End: 2023-06-09 | Stop reason: HOSPADM

## 2023-06-06 RX ORDER — MIDAZOLAM HYDROCHLORIDE 2 MG/2ML
INJECTION, SOLUTION INTRAMUSCULAR; INTRAVENOUS PRN
Status: COMPLETED | OUTPATIENT
Start: 2023-06-06 | End: 2023-06-06

## 2023-06-06 RX ORDER — ONDANSETRON 2 MG/ML
4 INJECTION INTRAMUSCULAR; INTRAVENOUS EVERY 6 HOURS PRN
Status: DISCONTINUED | OUTPATIENT
Start: 2023-06-06 | End: 2023-06-09 | Stop reason: HOSPADM

## 2023-06-06 RX ORDER — NITROGLYCERIN 20 MG/100ML
INJECTION INTRAVENOUS PRN
Status: COMPLETED | OUTPATIENT
Start: 2023-06-06 | End: 2023-06-06

## 2023-06-06 RX ORDER — MULTIVITAMIN WITH IRON
250 TABLET ORAL DAILY
COMMUNITY

## 2023-06-06 RX ORDER — SODIUM CHLORIDE 9 MG/ML
INJECTION, SOLUTION INTRAVENOUS CONTINUOUS
Status: DISCONTINUED | OUTPATIENT
Start: 2023-06-06 | End: 2023-06-09 | Stop reason: HOSPADM

## 2023-06-06 RX ORDER — ONDANSETRON 4 MG/1
4 TABLET, ORALLY DISINTEGRATING ORAL EVERY 8 HOURS PRN
Status: DISCONTINUED | OUTPATIENT
Start: 2023-06-06 | End: 2023-06-09 | Stop reason: HOSPADM

## 2023-06-06 RX ORDER — HEPARIN SODIUM 1000 [USP'U]/ML
INJECTION, SOLUTION INTRAVENOUS; SUBCUTANEOUS PRN
Status: COMPLETED | OUTPATIENT
Start: 2023-06-06 | End: 2023-06-06

## 2023-06-06 RX ORDER — IODIXANOL 270 MG/ML
24 INJECTION, SOLUTION INTRAVASCULAR
Status: COMPLETED | OUTPATIENT
Start: 2023-06-06 | End: 2023-06-06

## 2023-06-06 RX ORDER — SODIUM CHLORIDE 0.9 % (FLUSH) 0.9 %
5-40 SYRINGE (ML) INJECTION EVERY 12 HOURS SCHEDULED
Status: DISCONTINUED | OUTPATIENT
Start: 2023-06-06 | End: 2023-06-09 | Stop reason: HOSPADM

## 2023-06-06 RX ADMIN — VERAPAMIL HYDROCHLORIDE 2.5 MG: 2.5 INJECTION, SOLUTION INTRAVENOUS at 14:43

## 2023-06-06 RX ADMIN — SODIUM CHLORIDE: 9 INJECTION, SOLUTION INTRAVENOUS at 08:39

## 2023-06-06 RX ADMIN — IODIXANOL 24 ML: 270 INJECTION, SOLUTION INTRAVASCULAR at 13:41

## 2023-06-06 RX ADMIN — HEPARIN SODIUM 2000 UNITS: 1000 INJECTION, SOLUTION INTRAVENOUS; SUBCUTANEOUS at 14:43

## 2023-06-06 RX ADMIN — FENTANYL CITRATE 25 MCG: 50 INJECTION, SOLUTION INTRAMUSCULAR; INTRAVENOUS at 12:45

## 2023-06-06 RX ADMIN — FENTANYL CITRATE 50 MCG: 50 INJECTION, SOLUTION INTRAMUSCULAR; INTRAVENOUS at 12:41

## 2023-06-06 RX ADMIN — NITROGLYCERIN 200 MCG: 20 INJECTION INTRAVENOUS at 12:43

## 2023-06-06 RX ADMIN — MIDAZOLAM HYDROCHLORIDE 1 MG: 1 INJECTION, SOLUTION INTRAMUSCULAR; INTRAVENOUS at 12:41

## 2023-06-06 NOTE — H&P
6a. Motor left le - no drift; leg holds 30 degree position for full 5 seconds   6b  Motor right le - no drift; leg holds 30 degree position for full 5 seconds   7. Limb Ataxia: 0 - absent   8. Sensory: 0 - normal; no sensory loss   9. Best Language:  0 - no aphasia, normal   10. Dysarthria: 0 - normal   11. Extinction and Inattention: 0 - no abnormality         Total:   0     MRS: 0      LABS:   Reviewed. Lab Results   Component Value Date    HGB 13.8 2022    WBC 4.7 2022     2022     2022    BUN 11 2022    CREATININE 0.84 2022    AST 15 2022    ALT 18 2022    MG 2.2 2022    APTT 22.6 2022    INR 1.0 2022      Lab Results   Component Value Date/Time    COVID19 Not Detected 2022 03:40 AM    COVID19 Not Detected 2022 09:58 AM       RADIOLOGY:   Images were personally reviewed including:    Cerebral angiogram 2022:  Coil mass compaction in the previously treated ruptured ACOM aneurysm with noted recanalized ACOM wide necked aneurysm measuring length 2.22 mm, width 3.02 mm, height 2.65 mm, with neck 1.80 mm, grossly unchanged from prior exam.   The above aneurysm was treated with right radial access using 7 fr Rist catheter, neuroform [place] stent 3.0 mm x 21 mm, Penumbra coils 1.5 mm x 3 cm, and wave extra soft 1 mm x 2 cm. Stent was noted to be well apposed to parent vessel wall, extending from right KRISSY A-2 segment to left KRISSY A-1 segment   Vu score Class I   No evidence of distal embolization             ASSESSMENT:     34year old woman with hx of SAH from ruptured acomm in 2022, s/p coiling, on the 22 DSA found to have a remnant of the acomm aneurysm Vu 3. Patient had it treated with a stent assisted coiling on the 22.      Patient came in for a follow up DSA for the acomm stent assisted coiling on the 22    PLAN:     - will proceed with DSA    Risks and benefits discussed,

## 2023-06-06 NOTE — BRIEF OP NOTE
MD   Pager [phone]  Stroke, [facility] Stroke [address] [facility]  Electronically signed 6/6/2023 at 1:41 PM

## 2023-06-06 NOTE — DISCHARGE INSTRUCTIONS
Discharge Instructions for angiogram  [address] to shower in AM. Discontinue band aid in AM.   Do not apply further band aids. Keep clean, dry and open to air. No powder or lotion. Do not soak in a pool or tub and do not swim for one week. If there is any bleeding at the catheter site, apply firm pressure with your hands until the bleeding stops. If bleeding continues after 3 minutes call 911. If there is any swelling or firm areas at your puncture site, this could be bleeding under the skin(hematoma), and if you have any concerns seek help immediately. .     If there is any bleeding at the catheter site, apply firm pressure with your hands until the bleeding stops. If bleeding continues after 3 minutes call 911. Drink plenty of fluids after the test. This will flush the x-ray dye from your system. Return to your normal diet. The sedative will make you sleepy. Rest until the effects have worn off. Ask your doctor when you will be able to return to work. Avoid heavy lifting objects greater than 10  pounds, physically demanding activities, and sexual activity for 5 days. Your activity will also depend upon where the catheter was inserted: Do not sit for long periods of time. Try to change positions frequently. Medications   If advised by your doctor, resume taking your normal medicines. Use acetaminophen (Tylenol) for pain relief. Do not take metformin (Glucophage) or glyburide and metformin (Glucovance) for 48 hours after the test.    If you had to stop taking these medications before the procedure, ask your doctor when you can resume taking them:   If youAnti-inflammatory drugs (eg, ibuprofen )   Blood thinners, such as warfarin (Coumadin)   Clopidogrel (Plavix)   If you are taking medicines, follow these general guidelines:   Take your medicine as directed. Do not change the amount or the schedule. Do not stop taking them without talking to your doctor.    Do not share

## 2023-06-06 NOTE — PROGRESS NOTES
Patient received post cath to [address] [place] [address] room 12. Assessment obtained. Post cath pathway initiated. Right radial site with Vascband intact. No hematoma noted. Restrictions reviewed with patient and family. Patient without complaints.

## 2023-06-06 NOTE — PROGRESS NOTES
Remaining air removed from [facility], no bleeding or hematoma noted. Radial pulse palpable. Pressure dressing applied.

## 2023-06-06 NOTE — PROGRESS NOTES
Patient admitted, consent signed and questions answered. Patient ready for procedure. Call light to reach with side rails up 2 of 2. Family at bedside with patient. History and physical needs to be completed. Yannick Chaudhry

## 2023-09-23 DIAGNOSIS — G43.909 MIGRAINE WITHOUT STATUS MIGRAINOSUS, NOT INTRACTABLE, UNSPECIFIED MIGRAINE TYPE: ICD-10-CM

## 2023-09-25 RX ORDER — TOPIRAMATE 25 MG/1
TABLET ORAL
Qty: 270 TABLET | Refills: 1 | Status: SHIPPED | OUTPATIENT
Start: 2023-09-25

## 2023-09-25 NOTE — TELEPHONE ENCOUNTER
E-scribe requesting refill for Topamax. Please review and e-scribe if applicable. Last Visit Date: 3/1/2023    Next Visit Date:  No future appointments.

## 2023-10-31 ENCOUNTER — HOSPITAL ENCOUNTER (OUTPATIENT)
Age: 33
Discharge: HOME | End: 2023-10-31
Payer: COMMERCIAL

## 2023-10-31 ENCOUNTER — OFFICE VISIT (OUTPATIENT)
Dept: PRIMARY CARE CLINIC | Age: 33
End: 2023-10-31
Payer: COMMERCIAL

## 2023-10-31 ENCOUNTER — HOSPITAL ENCOUNTER (OUTPATIENT)
Dept: CT IMAGING | Age: 33
Discharge: HOME | End: 2023-11-02
Payer: COMMERCIAL

## 2023-10-31 VITALS
WEIGHT: 153 LBS | HEART RATE: 64 BPM | OXYGEN SATURATION: 99 % | HEIGHT: 62 IN | SYSTOLIC BLOOD PRESSURE: 120 MMHG | DIASTOLIC BLOOD PRESSURE: 80 MMHG | BODY MASS INDEX: 28.16 KG/M2 | RESPIRATION RATE: 18 BRPM | TEMPERATURE: 97 F

## 2023-10-31 DIAGNOSIS — R10.84 GENERALIZED ABDOMINAL PAIN: ICD-10-CM

## 2023-10-31 DIAGNOSIS — N83.202 CYST OF LEFT OVARY: ICD-10-CM

## 2023-10-31 DIAGNOSIS — R10.84 GENERALIZED ABDOMINAL PAIN: Primary | ICD-10-CM

## 2023-10-31 DIAGNOSIS — R93.5 ABNORMAL CT OF THE ABDOMEN: ICD-10-CM

## 2023-10-31 LAB
ANION GAP SERPL CALCULATED.3IONS-SCNC: 9 MMOL/L (ref 9–17)
BACTERIA URNS QL MICRO: ABNORMAL
BASOPHILS # BLD: 0.1 K/UL (ref 0–0.2)
BASOPHILS NFR BLD: 1 % (ref 0–2)
BUN SERPL-MCNC: 9 MG/DL (ref 6–20)
BUN/CREAT SERPL: 13 (ref 9–20)
CALCIUM SERPL-MCNC: 9.4 MG/DL (ref 8.6–10.4)
CHLORIDE SERPL-SCNC: 106 MMOL/L (ref 98–107)
CO2 SERPL-SCNC: 25 MMOL/L (ref 20–31)
CREAT SERPL-MCNC: 0.7 MG/DL (ref 0.5–0.9)
EOSINOPHIL # BLD: 0.35 K/UL (ref 0–0.44)
EOSINOPHILS RELATIVE PERCENT: 4 % (ref 1–4)
EPI CELLS #/AREA URNS HPF: ABNORMAL /HPF (ref 0–25)
ERYTHROCYTE [DISTWIDTH] IN BLOOD BY AUTOMATED COUNT: 12.7 % (ref 11.8–14.4)
GFR SERPL CREATININE-BSD FRML MDRD: >60 ML/MIN/1.73M2
GLUCOSE SERPL-MCNC: 81 MG/DL (ref 70–99)
HCG SERPL QL: NEGATIVE
HCT VFR BLD AUTO: 43.1 % (ref 36.3–47.1)
HGB BLD-MCNC: 14 G/DL (ref 11.9–15.1)
IMM GRANULOCYTES # BLD AUTO: 0.04 K/UL (ref 0–0.3)
IMM GRANULOCYTES NFR BLD: 0 %
LYMPHOCYTES NFR BLD: 2.46 K/UL (ref 1.1–3.7)
LYMPHOCYTES RELATIVE PERCENT: 25 % (ref 24–43)
MCH RBC QN AUTO: 28.4 PG (ref 25.2–33.5)
MCHC RBC AUTO-ENTMCNC: 32.5 G/DL (ref 28.4–34.8)
MCV RBC AUTO: 87.4 FL (ref 82.6–102.9)
MONOCYTES NFR BLD: 0.5 K/UL (ref 0.1–1.2)
MONOCYTES NFR BLD: 5 % (ref 3–12)
NEUTROPHILS NFR BLD: 65 % (ref 36–65)
NEUTS SEG NFR BLD: 6.38 K/UL (ref 1.5–8.1)
NRBC BLD-RTO: 0 PER 100 WBC
PLATELET # BLD AUTO: 289 K/UL (ref 138–453)
PMV BLD AUTO: 10.7 FL (ref 8.1–13.5)
POTASSIUM SERPL-SCNC: 4.3 MMOL/L (ref 3.7–5.3)
RBC # BLD AUTO: 4.93 M/UL (ref 3.95–5.11)
RBC #/AREA URNS HPF: ABNORMAL /HPF (ref 0–2)
SODIUM SERPL-SCNC: 140 MMOL/L (ref 135–144)
WBC #/AREA URNS HPF: ABNORMAL /HPF (ref 0–5)
WBC OTHER # BLD: 9.8 K/UL (ref 3.5–11.3)

## 2023-10-31 PROCEDURE — 85025 COMPLETE CBC W/AUTO DIFF WBC: CPT

## 2023-10-31 PROCEDURE — 84703 CHORIONIC GONADOTROPIN ASSAY: CPT

## 2023-10-31 PROCEDURE — 81015 MICROSCOPIC EXAM OF URINE: CPT

## 2023-10-31 PROCEDURE — 74177 CT ABD & PELVIS W/CONTRAST: CPT

## 2023-10-31 PROCEDURE — 80048 BASIC METABOLIC PNL TOTAL CA: CPT

## 2023-10-31 PROCEDURE — 99214 OFFICE O/P EST MOD 30 MIN: CPT | Performed by: NURSE PRACTITIONER

## 2023-10-31 PROCEDURE — 87086 URINE CULTURE/COLONY COUNT: CPT

## 2023-10-31 PROCEDURE — 6360000004 HC RX CONTRAST MEDICATION: Performed by: NURSE PRACTITIONER

## 2023-10-31 PROCEDURE — 36415 COLL VENOUS BLD VENIPUNCTURE: CPT

## 2023-10-31 RX ADMIN — IOPAMIDOL 75 ML: 755 INJECTION, SOLUTION INTRAVENOUS at 16:57

## 2023-10-31 SDOH — ECONOMIC STABILITY: FOOD INSECURITY: WITHIN THE PAST 12 MONTHS, YOU WORRIED THAT YOUR FOOD WOULD RUN OUT BEFORE YOU GOT MONEY TO BUY MORE.: NEVER TRUE

## 2023-10-31 SDOH — ECONOMIC STABILITY: INCOME INSECURITY: HOW HARD IS IT FOR YOU TO PAY FOR THE VERY BASICS LIKE FOOD, HOUSING, MEDICAL CARE, AND HEATING?: NOT HARD AT ALL

## 2023-10-31 SDOH — ECONOMIC STABILITY: HOUSING INSECURITY
IN THE LAST 12 MONTHS, WAS THERE A TIME WHEN YOU DID NOT HAVE A STEADY PLACE TO SLEEP OR SLEPT IN A SHELTER (INCLUDING NOW)?: NO

## 2023-10-31 SDOH — ECONOMIC STABILITY: FOOD INSECURITY: WITHIN THE PAST 12 MONTHS, THE FOOD YOU BOUGHT JUST DIDN'T LAST AND YOU DIDN'T HAVE MONEY TO GET MORE.: NEVER TRUE

## 2023-10-31 ASSESSMENT — PATIENT HEALTH QUESTIONNAIRE - PHQ9
2. FEELING DOWN, DEPRESSED OR HOPELESS: 0
SUM OF ALL RESPONSES TO PHQ QUESTIONS 1-9: 0
SUM OF ALL RESPONSES TO PHQ9 QUESTIONS 1 & 2: 0
SUM OF ALL RESPONSES TO PHQ QUESTIONS 1-9: 0
1. LITTLE INTEREST OR PLEASURE IN DOING THINGS: 0

## 2023-10-31 ASSESSMENT — ENCOUNTER SYMPTOMS: ABDOMINAL PAIN: 1

## 2023-10-31 NOTE — PROGRESS NOTES
Name: Omar Bocanegra  : 1990         Chief Complaint:     Chief Complaint   Patient presents with    Abdominal Pain     Lower back pain, moved into md stomach quad. Started 10/27th. Denies all uti symptoms. History of Present Illness:      Omar Bocanegra is a 34 y.o.  female who presents with Abdominal Pain (Lower back pain, moved into md stomach quad. Started 10/27th. Denies all uti symptoms. )      HPI    The patient presents with abdominal pain. Pain started bilateral lower back 6 days ago. Pain has since moved to the left lower back and ultimately generalized anterior abdomen. She is having constant pain near her abdominal area and lower pelvic pain bilaterally. She has left lower back pain that comes in waves. Pain in her left lower back rated 8 on a scale of 10. Today, pain today is \"tolerable\". She has tried advil and heat with minimal benefit. Denies urinary symptoms. Admits normal bowel movements. She last had BM today, occurring every other day. Denies dark tarry stool or blood in the stool. She is unaware of fever or chills. She denies chance of pregnancy. Appendix and gallbladder intact. She had single ovary removed. She denies history of kidney stones. She does have history of kidney infection. She is s/p uterine ablation. She admits \"light periods\", unknown LMP.      Past Medical History:     Past Medical History:   Diagnosis Date    Aneurysm ([address]) 2022    brain    Cerebral artery occlusion with cerebral infarction [facility])     Wellness examination     Jt Beltran, RAMSES, PCP      Reviewed all health maintenance requirements and ordered appropriate tests  Health Maintenance Due   Topic Date Due    Hepatitis B vaccine (1 of 3 - 3-dose series) Never done    COVID-19 Vaccine (1) Never done    DTaP/Tdap/Td vaccine (1 - Tdap) Never done    Flu vaccine (1) Never done       Past Surgical History:     Past Surgical History:   Procedure Laterality Date    CEREBRAL ANGIOGRAM  2022

## 2023-11-01 ENCOUNTER — HOSPITAL ENCOUNTER (OUTPATIENT)
Dept: ULTRASOUND IMAGING | Age: 33
Discharge: HOME | End: 2023-11-03
Payer: COMMERCIAL

## 2023-11-01 DIAGNOSIS — R93.5 ABNORMAL CT OF THE ABDOMEN: ICD-10-CM

## 2023-11-01 DIAGNOSIS — N83.202 CYST OF LEFT OVARY: ICD-10-CM

## 2023-11-01 DIAGNOSIS — N73.9 PID (PELVIC INFLAMMATORY DISEASE): Primary | ICD-10-CM

## 2023-11-01 LAB
MICROORGANISM SPEC CULT: NORMAL
SPECIMEN DESCRIPTION: NORMAL

## 2023-11-01 PROCEDURE — 93976 VASCULAR STUDY: CPT

## 2023-11-01 NOTE — PATIENT INSTRUCTIONS
SURVEY:    You may be receiving a survey from [employer] regarding your visit today. Please complete the survey to enable us to provide the highest quality of care to you and your family. If you cannot score us a very good on any question, please call the office to discuss how we could have made your experience a very good one. Thank you.

## 2023-11-02 ENCOUNTER — TELEPHONE (OUTPATIENT)
Dept: PRIMARY CARE CLINIC | Age: 33
End: 2023-11-02

## 2023-11-02 ENCOUNTER — HOSPITAL ENCOUNTER (OUTPATIENT)
Dept: INFUSION THERAPY | Age: 33
Discharge: HOME | End: 2023-11-02
Payer: COMMERCIAL

## 2023-11-02 VITALS
RESPIRATION RATE: 18 BRPM | TEMPERATURE: 97.3 F | DIASTOLIC BLOOD PRESSURE: 73 MMHG | SYSTOLIC BLOOD PRESSURE: 142 MMHG | HEART RATE: 71 BPM

## 2023-11-02 DIAGNOSIS — N73.9 PELVIC INFLAMMATORY DISEASE: ICD-10-CM

## 2023-11-02 DIAGNOSIS — N73.9 PELVIC INFLAMMATORY DISEASE: Primary | ICD-10-CM

## 2023-11-02 DIAGNOSIS — N73.9 PID (PELVIC INFLAMMATORY DISEASE): ICD-10-CM

## 2023-11-02 PROCEDURE — 96372 THER/PROPH/DIAG INJ SC/IM: CPT

## 2023-11-02 PROCEDURE — 87491 CHLMYD TRACH DNA AMP PROBE: CPT

## 2023-11-02 PROCEDURE — 6360000002 HC RX W HCPCS: Performed by: NURSE PRACTITIONER

## 2023-11-02 PROCEDURE — 87591 N.GONORRHOEAE DNA AMP PROB: CPT

## 2023-11-02 PROCEDURE — 2500000003 HC RX 250 WO HCPCS: Performed by: NURSE PRACTITIONER

## 2023-11-02 RX ORDER — SODIUM CHLORIDE 0.9 % (FLUSH) 0.9 %
5-40 SYRINGE (ML) INJECTION PRN
OUTPATIENT
Start: 2023-11-02

## 2023-11-02 RX ORDER — SODIUM CHLORIDE 9 MG/ML
5-250 INJECTION, SOLUTION INTRAVENOUS PRN
OUTPATIENT
Start: 2023-11-02

## 2023-11-02 RX ADMIN — CEFTRIAXONE SODIUM 500 MG: 1 INJECTION, POWDER, FOR SOLUTION INTRAMUSCULAR; INTRAVENOUS at 15:57

## 2023-11-02 NOTE — DISCHARGE INSTRUCTIONS
Outpatient Instructions for IM or Subcutaneous Injections    [address]  [address] [facility], [address] [place] [address]  [phone]      You are advised to carry out the following instructions:      Diet:  As prescribed by your physician. Activity:  As prescribed by your physician. Care of the injection site: If your injection site becomes red, sore, swollen, painful,has drainage, or you develop a fever notify your Physician. Other:    If you develop hives, rash, itching or have trouble breathing or any unusual symptoms, go to the nearest Emergency Room. These could be signs of an allergic reaction to the medication.         Follow up appointment:               ANY PROBLEMS OR CONCERNS FOLLOW UP WITH YOUR PHYSICIAN                                                            OR                 GO TO THE NEAREST EMERGENCY ROOM

## 2023-11-02 NOTE — TELEPHONE ENCOUNTER
Called patient for status update. She denies fever or chills. She states her abdominal pain is improving from 2 days ago. She is planning to  PO ABX from pharmacy today. Importance of taking full course of ABX discussed. Encouraged completion of urine G/C STI testing today as well as coming into outpatient department at [facility] for ceftriaxone injection. At this time, I believe outpatient management is appropriate. Strict ED precautions discussed with patient.  She is scheduled to f/u in office tomorrow 11/3/23

## 2023-11-03 ENCOUNTER — HOSPITAL ENCOUNTER (OUTPATIENT)
Age: 33
Setting detail: SPECIMEN
Discharge: HOME | End: 2023-11-03
Payer: COMMERCIAL

## 2023-11-03 ENCOUNTER — OFFICE VISIT (OUTPATIENT)
Dept: PRIMARY CARE CLINIC | Age: 33
End: 2023-11-03
Payer: COMMERCIAL

## 2023-11-03 VITALS
BODY MASS INDEX: 27.98 KG/M2 | WEIGHT: 153 LBS | OXYGEN SATURATION: 98 % | DIASTOLIC BLOOD PRESSURE: 90 MMHG | HEART RATE: 70 BPM | SYSTOLIC BLOOD PRESSURE: 120 MMHG | TEMPERATURE: 97.3 F

## 2023-11-03 DIAGNOSIS — N73.0 PID (ACUTE PELVIC INFLAMMATORY DISEASE): ICD-10-CM

## 2023-11-03 DIAGNOSIS — N73.0 PID (ACUTE PELVIC INFLAMMATORY DISEASE): Primary | ICD-10-CM

## 2023-11-03 LAB
CHLAMYDIA DNA UR QL NAA+PROBE: NEGATIVE
N GONORRHOEA DNA UR QL NAA+PROBE: NEGATIVE
SPECIMEN DESCRIPTION: NORMAL

## 2023-11-03 PROCEDURE — 99214 OFFICE O/P EST MOD 30 MIN: CPT | Performed by: NURSE PRACTITIONER

## 2023-11-03 PROCEDURE — 87660 TRICHOMONAS VAGIN DIR PROBE: CPT

## 2023-11-03 PROCEDURE — 87480 CANDIDA DNA DIR PROBE: CPT

## 2023-11-03 PROCEDURE — 87510 GARDNER VAG DNA DIR PROBE: CPT

## 2023-11-03 ASSESSMENT — ENCOUNTER SYMPTOMS: ABDOMINAL PAIN: 1

## 2023-11-03 NOTE — PROGRESS NOTES
Heart sounds: Normal heart sounds. No murmur heard. Pulmonary:      Effort: Pulmonary effort is normal. No respiratory distress. Breath sounds: Normal breath sounds. No stridor. No wheezing, rhonchi or rales. Abdominal:      General: Bowel sounds are normal. There is no distension. Palpations: Abdomen is soft. There is no mass. Tenderness: There is abdominal tenderness (suprapubic tenderness to palpation). There is no guarding. Hernia: No hernia is present. Genitourinary:     Labia:         Right: No rash, tenderness or lesion. Left: No rash, tenderness or lesion. Vagina: No signs of injury. No vaginal discharge, tenderness, bleeding or lesions. Cervix: Cervical motion tenderness and erythema present. No discharge, friability or lesion. Uterus: Tender. Adnexa:         Right: No tenderness. Left: No tenderness. Lymphadenopathy:      Lower Body: No right inguinal adenopathy. No left inguinal adenopathy. Neurological:      Mental Status: She is alert. Psychiatric:         Mood and Affect: Mood normal.         Behavior: Behavior normal.         Thought Content:  Thought content normal.         Judgment: Judgment normal.         Data:     Lab Results   Component Value Date/Time     10/31/2023 03:27 PM    K 4.3 10/31/2023 03:27 PM     10/31/2023 03:27 PM    CO2 25 10/31/2023 03:27 PM    BUN 9 10/31/2023 03:27 PM    CREATININE 0.7 10/31/2023 03:27 PM    GLUCOSE 81 10/31/2023 03:27 PM    PROT 7.3 11/25/2022 08:32 PM    LABALBU 4.5 11/25/2022 08:32 PM    BILITOT 0.2 11/25/2022 08:32 PM    ALKPHOS 96 11/25/2022 08:32 PM    AST 15 11/25/2022 08:32 PM    ALT 18 11/25/2022 08:32 PM     Lab Results   Component Value Date/Time    WBC 9.8 10/31/2023 03:27 PM    RBC 4.93 10/31/2023 03:27 PM    RBC 4.35 09/29/2011 02:40 PM    HGB 14.0 10/31/2023 03:27 PM    HCT 43.1 10/31/2023 03:27 PM    MCV 87.4 10/31/2023 03:27 PM    MCH 28.4 10/31/2023 03:27 PM

## 2023-11-03 NOTE — PATIENT INSTRUCTIONS
SURVEY:    You may be receiving a survey from [employer] regarding your visit today. Please complete the survey to enable us to provide the highest quality of care to you and your family. If you cannot score us a very good on any question, please call the office to discuss how we could have made your experience a very good one. Thank you.

## 2023-11-04 LAB
CANDIDA SPECIES: NEGATIVE
GARDNERELLA VAGINALIS: NEGATIVE
SOURCE: NORMAL
TRICHOMONAS: NEGATIVE

## 2023-11-06 ENCOUNTER — OFFICE VISIT (OUTPATIENT)
Dept: PRIMARY CARE CLINIC | Age: 33
End: 2023-11-06
Payer: COMMERCIAL

## 2023-11-06 VITALS
TEMPERATURE: 98.3 F | HEART RATE: 61 BPM | HEIGHT: 62 IN | WEIGHT: 153 LBS | OXYGEN SATURATION: 98 % | SYSTOLIC BLOOD PRESSURE: 120 MMHG | RESPIRATION RATE: 18 BRPM | BODY MASS INDEX: 28.16 KG/M2 | DIASTOLIC BLOOD PRESSURE: 86 MMHG

## 2023-11-06 DIAGNOSIS — N73.0 PID (ACUTE PELVIC INFLAMMATORY DISEASE): Primary | ICD-10-CM

## 2023-11-06 DIAGNOSIS — R51.9 INTRACTABLE HEADACHE, UNSPECIFIED CHRONICITY PATTERN, UNSPECIFIED HEADACHE TYPE: ICD-10-CM

## 2023-11-06 PROCEDURE — 99213 OFFICE O/P EST LOW 20 MIN: CPT | Performed by: NURSE PRACTITIONER

## 2023-11-06 ASSESSMENT — ENCOUNTER SYMPTOMS: ABDOMINAL PAIN: 0

## 2023-11-06 NOTE — PATIENT INSTRUCTIONS
SURVEY:    You may be receiving a survey from [employer] regarding your visit today. Please complete the survey to enable us to provide the highest quality of care to you and your family. If you cannot score us a very good on any question, please call the office to discuss how we could have made your experience a very good one. Thank you.

## 2023-11-13 ENCOUNTER — TELEPHONE (OUTPATIENT)
Dept: PRIMARY CARE CLINIC | Age: 33
End: 2023-11-13

## 2023-11-13 NOTE — TELEPHONE ENCOUNTER
Please call patient. How are her PID symptoms (pelvic pain, abdominal pain, fever)? How are her headache symptoms?

## 2023-11-13 NOTE — TELEPHONE ENCOUNTER
Patient was called and she states she is doing well. Denies any fever, pelvic or abdominal pain. She states she gets occasional dull headache typically after taking the abx.

## 2023-11-15 NOTE — TELEPHONE ENCOUNTER
Notified Guicho Cope.  She states today is last day of abx and will plan to follow up with neuro if symptoms persist.

## 2024-05-30 ENCOUNTER — ANCILLARY PROCEDURE (OUTPATIENT)
Dept: OBGYN | Age: 34
End: 2024-05-30
Payer: COMMERCIAL

## 2024-05-30 ENCOUNTER — OFFICE VISIT (OUTPATIENT)
Dept: OBGYN | Age: 34
End: 2024-05-30
Payer: COMMERCIAL

## 2024-05-30 ENCOUNTER — HOSPITAL ENCOUNTER (OUTPATIENT)
Age: 34
Setting detail: SPECIMEN
Discharge: HOME | End: 2024-05-30
Payer: COMMERCIAL

## 2024-05-30 VITALS
BODY MASS INDEX: 28.37 KG/M2 | WEIGHT: 154.2 LBS | HEIGHT: 62 IN | SYSTOLIC BLOOD PRESSURE: 136 MMHG | DIASTOLIC BLOOD PRESSURE: 82 MMHG

## 2024-05-30 DIAGNOSIS — N92.1 MENORRHAGIA WITH IRREGULAR CYCLE: Primary | ICD-10-CM

## 2024-05-30 DIAGNOSIS — N92.1 MENORRHAGIA WITH IRREGULAR CYCLE: ICD-10-CM

## 2024-05-30 DIAGNOSIS — Z12.4 SCREENING FOR MALIGNANT NEOPLASM OF CERVIX: ICD-10-CM

## 2024-05-30 LAB
C TRACH DNA SPEC QL PROBE+SIG AMP: NORMAL
N GONORRHOEA DNA SPEC QL PROBE+SIG AMP: NORMAL
SPECIMEN DESCRIPTION: NORMAL

## 2024-05-30 PROCEDURE — 76830 TRANSVAGINAL US NON-OB: CPT | Performed by: OBSTETRICS & GYNECOLOGY

## 2024-05-30 PROCEDURE — 87591 N.GONORRHOEAE DNA AMP PROB: CPT

## 2024-05-30 PROCEDURE — G0145 SCR C/V CYTO,THINLAYER,RESCR: HCPCS

## 2024-05-30 PROCEDURE — 87491 CHLMYD TRACH DNA AMP PROBE: CPT

## 2024-05-30 PROCEDURE — 99214 OFFICE O/P EST MOD 30 MIN: CPT | Performed by: ADVANCED PRACTICE MIDWIFE

## 2024-05-30 PROCEDURE — 87624 HPV HI-RISK TYP POOLED RSLT: CPT

## 2024-05-30 RX ORDER — MEDROXYPROGESTERONE ACETATE 5 MG/1
5 TABLET ORAL DAILY
Qty: 10 TABLET | Refills: 11 | Status: SHIPPED | OUTPATIENT
Start: 2024-05-30

## 2024-05-30 SDOH — ECONOMIC STABILITY: FOOD INSECURITY: WITHIN THE PAST 12 MONTHS, THE FOOD YOU BOUGHT JUST DIDN'T LAST AND YOU DIDN'T HAVE MONEY TO GET MORE.: NEVER TRUE

## 2024-05-30 SDOH — ECONOMIC STABILITY: FOOD INSECURITY: WITHIN THE PAST 12 MONTHS, YOU WORRIED THAT YOUR FOOD WOULD RUN OUT BEFORE YOU GOT MONEY TO BUY MORE.: NEVER TRUE

## 2024-05-30 SDOH — ECONOMIC STABILITY: INCOME INSECURITY: HOW HARD IS IT FOR YOU TO PAY FOR THE VERY BASICS LIKE FOOD, HOUSING, MEDICAL CARE, AND HEATING?: NOT HARD AT ALL

## 2024-05-30 ASSESSMENT — PATIENT HEALTH QUESTIONNAIRE - PHQ9
SUM OF ALL RESPONSES TO PHQ9 QUESTIONS 1 & 2: 0
SUM OF ALL RESPONSES TO PHQ QUESTIONS 1-9: 0
2. FEELING DOWN, DEPRESSED OR HOPELESS: NOT AT ALL
SUM OF ALL RESPONSES TO PHQ QUESTIONS 1-9: 0
SUM OF ALL RESPONSES TO PHQ QUESTIONS 1-9: 0
1. LITTLE INTEREST OR PLEASURE IN DOING THINGS: NOT AT ALL
SUM OF ALL RESPONSES TO PHQ QUESTIONS 1-9: 0

## 2024-05-30 NOTE — PROGRESS NOTES
PROBLEM VISIT     Date of service: 2024    Corinne Anderson  Is a 34 y.o. single, female    PT's PCP is: Corrie Quiñones APRN - CNP     : 1990                                             Subjective:       No LMP recorded (lmp unknown).   OB History    Para Term  AB Living   2 2 2     2   SAB IAB Ectopic Molar Multiple Live Births             2      # Outcome Date GA Lbr Gallo/2nd Weight Sex Delivery Anes PTL Lv   2 Term 16 40w0d  3.43 kg (7 lb 9 oz) M Vag-Spont   MEGHNA   1 Term 11 40w0d  3.912 kg (8 lb 10 oz) M Vag-Spont   MEGHNA        Social History     Tobacco Use   Smoking Status Former    Current packs/day: 0.00    Average packs/day: 0.5 packs/day for 14.0 years (7.0 ttl pk-yrs)    Types: Cigarettes    Start date: 2008    Quit date: 2022    Years since quittin.3   Smokeless Tobacco Current   Tobacco Comments    Nicotine pouches        Social History     Substance and Sexual Activity   Alcohol Use Not Currently       Allergies: Patient has no known allergies.      Current Outpatient Medications:     medroxyPROGESTERone (PROVERA) 5 MG tablet, Take 1 tablet by mouth daily Days 16-25 of each month, Disp: 10 tablet, Rfl: 11    magnesium (MAGNESIUM-OXIDE) 250 MG TABS tablet, Take 1 tablet by mouth daily, Disp: , Rfl:     aspirin 81 MG chewable tablet, Take 1 tablet by mouth daily, Disp: 30 tablet, Rfl: 5    topiramate (TOPAMAX) 25 MG tablet, TAKE 3 TABLETS DAILY (1    TABLET IN THE MORNING AND 2TABLETS IN THE EVENING) (Patient not taking: Reported on 2024), Disp: 270 tablet, Rfl: 1    Social History     Substance and Sexual Activity   Sexual Activity Not Currently    Partners: Male    Birth control/protection: Surgical     Past Medical History:   Diagnosis Date    Aneurysm (HCC) 2022    brain    Cerebral artery occlusion with cerebral infarction (HCC)     Wellness examination     FRANKIE Quiñones CNP, PCP      Past Surgical History:   Procedure Laterality Date

## 2024-06-04 LAB
C TRACH DNA SPEC QL PROBE+SIG AMP: NEGATIVE
HPV I/H RISK 4 DNA CVX QL NAA+PROBE: NOT DETECTED
HPV SAMPLE: NORMAL
HPV, INTERPRETATION: NORMAL
HPV16 DNA CVX QL NAA+PROBE: NOT DETECTED
HPV18 DNA CVX QL NAA+PROBE: NOT DETECTED
N GONORRHOEA DNA SPEC QL PROBE+SIG AMP: NEGATIVE
SPECIMEN DESCRIPTION: NORMAL
SPECIMEN DESCRIPTION: NORMAL

## 2024-06-11 LAB — CYTOLOGY REPORT: NORMAL

## 2024-06-12 RX ORDER — METRONIDAZOLE 500 MG/1
2000 TABLET ORAL ONCE
Qty: 4 TABLET | Refills: 0 | Status: SHIPPED | OUTPATIENT
Start: 2024-06-12 | End: 2024-06-12

## 2024-10-14 DIAGNOSIS — G43.909 MIGRAINE WITHOUT STATUS MIGRAINOSUS, NOT INTRACTABLE, UNSPECIFIED MIGRAINE TYPE: ICD-10-CM

## 2024-10-14 NOTE — TELEPHONE ENCOUNTER
Patient is requesting refill for :    Medication: Topamax     Last Visit Date: 03/01/2023     Next Visit Date: none on file     Please review and e-scribe if applicable.

## 2024-10-28 RX ORDER — TOPIRAMATE 25 MG/1
75 TABLET, FILM COATED ORAL DAILY
Qty: 270 TABLET | Refills: 1 | Status: SHIPPED | OUTPATIENT
Start: 2024-10-28

## 2025-01-08 ENCOUNTER — TELEPHONE (OUTPATIENT)
Dept: OBGYN | Age: 35
End: 2025-01-08

## 2025-02-20 ENCOUNTER — TELEPHONE (OUTPATIENT)
Dept: PRIMARY CARE CLINIC | Age: 35
End: 2025-02-20

## 2025-02-20 ENCOUNTER — OFFICE VISIT (OUTPATIENT)
Dept: PRIMARY CARE CLINIC | Age: 35
End: 2025-02-20
Payer: COMMERCIAL

## 2025-02-20 ENCOUNTER — TELEPHONE (OUTPATIENT)
Dept: NEUROLOGY | Age: 35
End: 2025-02-20

## 2025-02-20 VITALS
OXYGEN SATURATION: 97 % | BODY MASS INDEX: 26.52 KG/M2 | TEMPERATURE: 95.7 F | DIASTOLIC BLOOD PRESSURE: 122 MMHG | HEART RATE: 84 BPM | SYSTOLIC BLOOD PRESSURE: 152 MMHG | WEIGHT: 145 LBS

## 2025-02-20 DIAGNOSIS — Z02.89 ENCOUNTER FOR PHYSICAL EXAMINATION RELATED TO EMPLOYMENT: Primary | ICD-10-CM

## 2025-02-20 PROCEDURE — 99214 OFFICE O/P EST MOD 30 MIN: CPT | Performed by: NURSE PRACTITIONER

## 2025-02-20 SDOH — ECONOMIC STABILITY: FOOD INSECURITY: WITHIN THE PAST 12 MONTHS, THE FOOD YOU BOUGHT JUST DIDN'T LAST AND YOU DIDN'T HAVE MONEY TO GET MORE.: NEVER TRUE

## 2025-02-20 SDOH — ECONOMIC STABILITY: FOOD INSECURITY: WITHIN THE PAST 12 MONTHS, YOU WORRIED THAT YOUR FOOD WOULD RUN OUT BEFORE YOU GOT MONEY TO BUY MORE.: NEVER TRUE

## 2025-02-20 ASSESSMENT — PATIENT HEALTH QUESTIONNAIRE - PHQ9
SUM OF ALL RESPONSES TO PHQ9 QUESTIONS 1 & 2: 0
1. LITTLE INTEREST OR PLEASURE IN DOING THINGS: NOT AT ALL
SUM OF ALL RESPONSES TO PHQ QUESTIONS 1-9: 0
2. FEELING DOWN, DEPRESSED OR HOPELESS: NOT AT ALL
SUM OF ALL RESPONSES TO PHQ QUESTIONS 1-9: 0

## 2025-02-20 NOTE — PROGRESS NOTES
Name: Corinne Anderson  : 1990         Chief Complaint:     Chief Complaint   Patient presents with    Annual Exam       History of Present Illness:      Corinne Anderson is a 35 y.o.  female who presents with Annual Exam      HPI    The patient presents to get cleared for work. 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 She completed her DOT physical \"last year sometime\". She is has a history of cerebral arterial embolism. She last saw neurology 1.5 years ago. She is requesting a clearance to drive.     Current medications include magnesium 250mg QD. Denies headaches or vision changes. Denies concerns with weakness or speech changes. She does not use any alcohol or recreational drugs. Most recent head CT 24 showed no acute abnormality. She does not monitor blood pressure at home. Denies lightheadedness, dizziness, palpitations, chest pain, or SOB. She wears contacts and had vision exam 2 weeks ago. She currently drives a car without concern.     Past Medical History:     Past Medical History:   Diagnosis Date    Aneurysm 2022    brain    Cerebral artery occlusion with cerebral infarction (HCC)     Wellness examination     FRANKIE Quiñones, RAMSES, PCP      Reviewed all health maintenance requirements and ordered appropriate tests  Health Maintenance Due   Topic Date Due    Hepatitis B vaccine (1 of 3 - 19+ 3-dose series) Never done    DTaP/Tdap/Td vaccine (1 - Tdap) Never done    Flu vaccine (1) Never done    COVID-19 Vaccine ( -  season) Never done       Past Surgical History:     Past Surgical History:   Procedure Laterality Date    CEREBRAL ANGIOGRAM  2022    CEREBRAL ANGIOGRAM Bilateral 2022    IR ANGIOGRAM CAROTID CEREBRAL BILATERAL, 1 stent, 2 coils    CEREBRAL ANGIOGRAM  2023    DILATION AND CURETTAGE OF UTERUS N/A 2022    DILATATION AND CURETTAGE HYSTEROSCOPY CAUTERY ABLATION-NOVASURE ENDOMETRIAL ABLATION performed by Lainey Madrigal DO at [facility] OR

## 2025-02-20 NOTE — TELEPHONE ENCOUNTER
Patient is calling stating that she went to her PCP for the clearance, but the PCP would like you to clear her. Please advise

## 2025-07-21 ENCOUNTER — HOSPITAL ENCOUNTER (OUTPATIENT)
Dept: GENERAL RADIOLOGY | Age: 35
Discharge: HOME | End: 2025-07-23
Payer: COMMERCIAL

## 2025-07-21 ENCOUNTER — HOSPITAL ENCOUNTER (OUTPATIENT)
Age: 35
Discharge: HOME | End: 2025-07-21

## 2025-07-21 DIAGNOSIS — T14.90XA INJURY: ICD-10-CM

## 2025-07-21 PROCEDURE — 73130 X-RAY EXAM OF HAND: CPT

## (undated) DEVICE — SOLUTION SURG PREP ANTIMICROBIAL 4 OZ SKIN WND EXIDINE

## (undated) DEVICE — SUTURE MCRYL + SZ 4-0 L27IN ABSRB UD L19MM PS-2 3/8 CIR MCP426H

## (undated) DEVICE — [HIGH FLOW INSUFFLATOR,  DO NOT USE IF PACKAGE IS DAMAGED,  KEEP DRY,  KEEP AWAY FROM SUNLIGHT,  PROTECT FROM HEAT AND RADIOACTIVE SOURCES.]: Brand: PNEUMOSURE

## (undated) DEVICE — ELECTRODE ES AD CRD L15FT DISP FOR PT BELOW 30LB REM

## (undated) DEVICE — HYPODERMIC SAFETY NEEDLE: Brand: MAGELLAN

## (undated) DEVICE — CATHETER URETH 16FR L16IN RED RUB INTMIT ROB MOD BARDX

## (undated) DEVICE — SOLUTION IV 1000ML 0.9% SOD CHL FOR IRRIG PLAS CONT

## (undated) DEVICE — SUTURE VCRL SZ 3-0 L27IN ABSRB UD L26MM SH 1/2 CIR J416H

## (undated) DEVICE — SUTURE MCRYL SZ 4-0 L27IN ABSRB UD L19MM PS-2 1/2 CIR PRIM Y426H

## (undated) DEVICE — MAGNETIC IMPLANT S1000-00: Brand: SOPHONO™

## (undated) DEVICE — GOWN,AURORA,NONRNF,XL,30/CS: Brand: MEDLINE

## (undated) DEVICE — Y-TYPE TUR/BLADDER IRRIGATION SET, REGULATING CLAMP

## (undated) DEVICE — DRAPE SURG LITH HYSTSCP D AND C STD N FEN N REINF W FLD PCH

## (undated) DEVICE — Z DISCONTINUED BY MEDLINE USE 2711682 TRAY SKIN PREP DRY W/ PREM GLV

## (undated) DEVICE — SYRINGE MED 10ML LUERLOCK TIP W/O SFTY DISP

## (undated) DEVICE — WARMER SCP 2 ANTIFOG LAP DISP

## (undated) DEVICE — MASTISOL ADHESIVE LIQ 2/3ML

## (undated) DEVICE — SEALER LAP L37CM MARYLAND JAW OPN NANO COAT MULTIFUNCTIONAL

## (undated) DEVICE — PAD,ABDOMINAL,8"X10",ST,LF: Brand: MEDLINE

## (undated) DEVICE — TROCAR ENDOSCP L110MM DIA5MM STD CANN DIL BLDELSS BLNT TIP

## (undated) DEVICE — 1000ML,PRESSURE INFUSER W/STOPCOCK: Brand: MEDLINE

## (undated) DEVICE — LAVH-LF: Brand: MEDLINE INDUSTRIES, INC.

## (undated) DEVICE — TOWEL,OR,DSP,ST,BLUE,STD,4/PK,20PK/CS: Brand: MEDLINE

## (undated) DEVICE — GOWN,SIRUS,POLYRNF,BRTHSLV,XL,30/CS: Brand: MEDLINE

## (undated) DEVICE — BLADELESS OBTURATOR: Brand: WECK VISTA

## (undated) DEVICE — 3M™ TEGADERM™ +PAD FILM DRESSING WITH NON-ADHERENT PAD, 3587, 3-1/2 IN X 4-1/8 IN (9 CM X 10.5 CM), 25/CAR, 4 CAR/CS: Brand: 3M™ TEGADERM™

## (undated) DEVICE — DRAPE,UTILTY,TAPE,15X26, 4EA/PK: Brand: MEDLINE

## (undated) DEVICE — UNDERPANTS INCONT XL 45-70IN KNIT SEAMLESS DSGN COLOR-CODED

## (undated) DEVICE — STRIP,CLOSURE,WOUND,MEDI-STRIP,1/2X4: Brand: MEDLINE

## (undated) DEVICE — SOLUTION IV IRRIG POUR BRL 0.9% SODIUM CHL 2F7124

## (undated) DEVICE — CHLORAPREP 26ML ORANGE

## (undated) DEVICE — COVER LT HNDL BLU PLAS

## (undated) DEVICE — PREP PAD BNS: Brand: CONVERTORS

## (undated) DEVICE — SUTURE VCRL SZ 2-0 L27IN ABSRB VLT L26MM UR-6 5/8 CIR J602H

## (undated) DEVICE — COVER,MAYO STAND,STERILE: Brand: MEDLINE

## (undated) DEVICE — PACK,LITHOTOMY: Brand: MEDLINE

## (undated) DEVICE — GLOVE SURG SZ 65 L12IN FNGR THK87MIL WHT LTX FREE

## (undated) DEVICE — GAUZE,SPONGE,4"X4",16PLY,XRAY,STRL,LF: Brand: MEDLINE

## (undated) DEVICE — CANNULA SEAL

## (undated) DEVICE — PAD N ADH W3XL4IN POLY COT SFT PERF FLM EASILY CUT ABSRB

## (undated) DEVICE — DEVICE MANIP L330MM DIA4.5MM UTER DISP INJ ZINNANTI KRONNER

## (undated) DEVICE — DRESSING SURESITE-123PLUSPAD 2.4 IN X2.8 IN

## (undated) DEVICE — Z DISCONTINUED NO SUB IDED DEVICE ABLAT ENDOMET UTER SOUNDING ES SURESOUND NOVASURE

## (undated) DEVICE — TISSUE RETRIEVAL SYSTEM: Brand: INZII RETRIEVAL SYSTEM